# Patient Record
Sex: FEMALE | Race: BLACK OR AFRICAN AMERICAN | Employment: UNEMPLOYED | URBAN - METROPOLITAN AREA
[De-identification: names, ages, dates, MRNs, and addresses within clinical notes are randomized per-mention and may not be internally consistent; named-entity substitution may affect disease eponyms.]

---

## 2017-01-06 ENCOUNTER — OFFICE VISIT (OUTPATIENT)
Dept: ENDOCRINOLOGY | Age: 27
End: 2017-01-06

## 2017-01-06 ENCOUNTER — NURSE NAVIGATOR (OUTPATIENT)
Dept: ENDOCRINOLOGY | Age: 27
End: 2017-01-06

## 2017-01-06 VITALS
DIASTOLIC BLOOD PRESSURE: 94 MMHG | HEIGHT: 68 IN | SYSTOLIC BLOOD PRESSURE: 124 MMHG | BODY MASS INDEX: 20.16 KG/M2 | HEART RATE: 100 BPM | WEIGHT: 133 LBS

## 2017-01-06 DIAGNOSIS — G62.9 NEUROPATHY: ICD-10-CM

## 2017-01-06 DIAGNOSIS — I10 HTN (HYPERTENSION), BENIGN: ICD-10-CM

## 2017-01-06 DIAGNOSIS — E10.8 TYPE 1 DIABETES MELLITUS WITH COMPLICATIONS (HCC): ICD-10-CM

## 2017-01-06 DIAGNOSIS — E10.8 TYPE 1 DIABETES MELLITUS WITH COMPLICATION (HCC): Primary | ICD-10-CM

## 2017-01-06 RX ORDER — INSULIN GLARGINE 100 [IU]/ML
INJECTION, SOLUTION SUBCUTANEOUS
Qty: 10 EACH | Refills: 0
Start: 2017-01-06 | End: 2019-04-18
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
## 2017-01-06 NOTE — PATIENT INSTRUCTIONS
Diabetes:  Monitor glucoses frequently  - check some bedtime values to assess how Lantus is working for you overnight    Lantus -   Recommend 23-24 units twice daily to better control glucoses when not eating (overnight)     Apidra: 1 unit for every 8 g carb appear to not be enough. Recommend trying carb ratio of 6 . If this is too much, then raise it to 7. Continue 1 unit to lower glucose 25 mg/dl for higher vlaues     I agree with taking extra in times of stress    Goals for blood sugars:  Fasting  (less than 150)  Other times -  (less than 180). Microalbuminuria - microscopic protein in urine  Continue lisinopril and good blood pressure control     Neuropathy:  Recommend considering duloxetine (generic for Cymbalta) or Lyrica for symptoms if they are very bothersome.

## 2017-01-06 NOTE — MR AVS SNAPSHOT
Visit Information Date & Time Provider Department Dept. Phone Encounter #  
 1/6/2017 11:30 AM Christiane Soriano, [address] [place] [address] Diabetes and Endocrinology [phone] [id number] Follow-up Instructions Return if symptoms worsen or fail to improve. Upcoming Health Maintenance Date Due  
 EYE EXAM RETINAL OR DILATED Q1 1/20/1998 DTaP/Tdap/Td series (1 - Tdap) 1/20/2009 PAP AKA CERVICAL CYTOLOGY 1/20/2009 Pneumococcal 19-64 Highest Risk (3 of 3 - PCV13) 5/19/2014 INFLUENZA AGE 9 TO ADULT 8/1/2016 FOOT EXAM Q1 11/25/2016 MICROALBUMIN Q1 1/5/2017 HEMOGLOBIN A1C Q6M 2/18/2017 LIPID PANEL Q1 5/15/2017 Allergies as of 1/6/2017  Review Complete On: 1/6/2017 By: Christiane Soriano MD  
  
 Severity Noted Reaction Type Reactions Morphine High 08/19/2016   Intolerance Other (comments) Pcn [Penicillins]  08/23/2012    Hives Current Immunizations  Reviewed on 8/8/2016 Name Date Influenza Vaccine 9/1/2013 Influenza Vaccine (Madin Dalton Canine Kidney) PF 9/29/2014  4:42 PM  
 Influenza Vaccine (Quad) PF 11/30/2015  1:45 PM  
 Influenza Vaccine Split 10/10/2012 12:12 PM  
 Pneumococcal Polysaccharide (PPSV-23) 5/19/2013  3:15 PM  
 Pneumococcal Vaccine (Unspecified Type) 1/1/2010 Not reviewed this visit You Were Diagnosed With   
  
 Codes Comments Type 1 diabetes mellitus with complication (HCC)    -  Primary ICD-10-CM: E10.8 ICD-9-CM: 250.91   
 HTN (hypertension), benign     ICD-10-CM: I10 
ICD-9-CM: 401.1 Neuropathy     ICD-10-CM: G62.9 ICD-9-CM: 355.9 Type 1 diabetes mellitus with complications (HCC)     BPV-03-TP: E10.8 ICD-9-CM: 250.91 Vitals BP Pulse Height(growth percentile) Weight(growth percentile) BMI OB Status (!) 124/94 100 5' 8\" (1.727 m) 133 lb (60.3 kg) 20.22 kg/m2 Unknown Smoking Status Current Every Day Smoker Vitals History BMI and BSA Data Body Mass Index Body Surface Area  
 20.22 kg/m 2 1.7 m 2 Preferred Pharmacy Pharmacy Name Phone [facility] [address], [address] [place] [address] Avenue [phone] Your Updated Medication List  
  
   
This list is accurate as of: 1/6/17 12:42 PM.  Always use your most recent med list.  
  
  
  
  
 FISH OIL 1,000 mg Cap Generic drug:  omega-3 fatty acids-vitamin e Take 1 Cap by mouth. insulin glargine 100 unit/mL (3 mL) pen Commonly known as:  LANTUS SOLOSTAR  
23-24 units twice daily  
  
 insulin glulisine 100 unit/mL pen Commonly known as:  APIDRA SOLOSTAR  
1 unit for every 6-7 g carb Insulin Needles (Disposable) 32 gauge x 5/32\" Ndle  
5 times daily LISINOPRIL PO Take  by mouth. METOPROLOL SUCCINATE PO Take  by mouth.  
  
 multivitamin tablet Commonly known as:  ONE A DAY Take 1 Tab by mouth daily. OTHER Glucose support vitamin Follow-up Instructions Return if symptoms worsen or fail to improve. Patient Instructions Diabetes: 
Monitor glucoses frequently - check some bedtime values to assess how Lantus is working for you overnight Lantus - Recommend 23-24 units twice daily to better control glucoses when not eating (overnight) Apidra: 1 unit for every 8 g carb appear to not be enough. Recommend trying carb ratio of 6 . If this is too much, then raise it to 7. Continue 1 unit to lower glucose 25 mg/dl for higher vlaues I agree with taking extra in times of stress Goals for blood sugars: 
Fasting  (less than 150) Other times -  (less than 180). Microalbuminuria - microscopic protein in urine Continue lisinopril and good blood pressure control Neuropathy: 
Recommend considering duloxetine (generic for Cymbalta) or Lyrica for symptoms if they are very bothersome. Introducing [facility]! Lisa Thomson introduces ebindle patient portal. Now you can access parts of your medical record, email your doctor's office, and request medication refills online. 1. In your internet browser, go to [email or web address] 2. Click on the First Time User? Click Here link in the Sign In box. You will see the New Member Sign Up page. 3. Enter your ebindle Access Code exactly as it appears below. You will not need to use this code after youve completed the sign-up process. If you do not sign up before the expiration date, you must request a new code. · ebindle Access Code: [email or web address] Expires: 4/6/2017 12:40 PM 
 
4. Enter the last four digits of your Social Security Number (xxxx) and Date of Birth (mm/dd/yyyy) as indicated and click Submit. You will be taken to the next sign-up page. 5. Create a ebindle ID. This will be your ebindle login ID and cannot be changed, so think of one that is secure and easy to remember. 6. Create a ebindle password. You can change your password at any time. 7. Enter your Password Reset Question and Answer. This can be used at a later time if you forget your password. 8. Enter your e-mail address. You will receive e-mail notification when new information is available in [address] 9. Click Sign Up. You can now view and download portions of your medical record. 10. Click the Download Summary menu link to download a portable copy of your medical information. If you have questions, please visit the Frequently Asked Questions section of the ebindle website. Remember, ebindle is NOT to be used for urgent needs. For medical emergencies, dial 911. Now available from your iPhone and Android! Please provide this summary of care documentation to your next provider. Your primary care clinician is listed as NONE. If you have any questions after today's visit, please call [phone].

## 2017-01-10 ENCOUNTER — NURSE NAVIGATOR (OUTPATIENT)
Dept: ENDOCRINOLOGY | Age: 27
End: 2017-01-10

## 2017-03-28 DIAGNOSIS — E10.8 TYPE 1 DIABETES MELLITUS WITH COMPLICATION (HCC): Primary | ICD-10-CM

## 2017-03-28 NOTE — TELEPHONE ENCOUNTER
Hannah Morelos,     Thanks for your reply. She is unsure how long she will need the samples. I don't want to use up all your samples. Do you have 4 pens to spare? Let me know if this is ok and I will place the order.      Thank you,   Benjamin Myers

## 2017-03-28 NOTE — TELEPHONE ENCOUNTER
I returned patient's call and offered her samples of Apidra from the [address] [place] [address] office. Patient stated this is too far for her and she would really like to get samples from the [place] office if we have any. She prefers Apidra pens, but will take the vials if that is all that is available.

## 2017-03-28 NOTE — TELEPHONE ENCOUNTER
Order placed for 6 sample pens of Apidra from the [facility] office. I attempted to reach patient but her phone rang busy.
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
## 2017-03-28 NOTE — TELEPHONE ENCOUNTER
We have several Apidra pens, if she wants to pick them up from [facility] office. How many should I log?      Thanks,  Jaswinder Keita

## 2017-03-31 ENCOUNTER — APPOINTMENT (OUTPATIENT)
Dept: GENERAL RADIOLOGY | Age: 27
End: 2017-03-31
Attending: EMERGENCY MEDICINE
Payer: MEDICAID

## 2017-03-31 ENCOUNTER — HOSPITAL ENCOUNTER (EMERGENCY)
Age: 27
Discharge: HOME | End: 2017-04-01
Attending: EMERGENCY MEDICINE
Payer: MEDICAID

## 2017-03-31 DIAGNOSIS — R10.13 EPIGASTRIC ABDOMINAL PAIN: ICD-10-CM

## 2017-03-31 DIAGNOSIS — F12.90 MARIJUANA USE: ICD-10-CM

## 2017-03-31 DIAGNOSIS — G43.A0 CYCLICAL VOMITING WITH NAUSEA, INTRACTABILITY OF VOMITING NOT SPECIFIED: Primary | ICD-10-CM

## 2017-03-31 DIAGNOSIS — E10.69 TYPE 1 DIABETES MELLITUS WITH OTHER SPECIFIED COMPLICATION (HCC): ICD-10-CM

## 2017-03-31 LAB
ALBUMIN SERPL BCP-MCNC: 4.1 G/DL (ref 3.5–5)
ALBUMIN/GLOB SERPL: 0.9 {RATIO} (ref 1.1–2.2)
ALP SERPL-CCNC: 111 U/L (ref 45–117)
ALT SERPL-CCNC: 21 U/L (ref 12–78)
AMPHET UR QL SCN: NEGATIVE
ANION GAP BLD CALC-SCNC: 17 MMOL/L (ref 5–15)
APPEARANCE UR: ABNORMAL
ARTERIAL PATENCY WRIST A: YES
AST SERPL W P-5'-P-CCNC: 16 U/L (ref 15–37)
BACTERIA URNS QL MICRO: ABNORMAL /HPF
BARBITURATES UR QL SCN: NEGATIVE
BASE DEFICIT BLDA-SCNC: 1.8 MMOL/L
BASOPHILS # BLD AUTO: 0 K/UL (ref 0–0.1)
BASOPHILS # BLD: 0 % (ref 0–1)
BDY SITE: ABNORMAL
BENZODIAZ UR QL: NEGATIVE
BILIRUB SERPL-MCNC: 0.5 MG/DL (ref 0.2–1)
BILIRUB UR QL: NEGATIVE
BUN SERPL-MCNC: 24 MG/DL (ref 6–20)
BUN/CREAT SERPL: 21 (ref 12–20)
CALCIUM SERPL-MCNC: 10.9 MG/DL (ref 8.5–10.1)
CANNABINOIDS UR QL SCN: POSITIVE
CHLORIDE SERPL-SCNC: 96 MMOL/L (ref 97–108)
CO2 SERPL-SCNC: 23 MMOL/L (ref 21–32)
COCAINE UR QL SCN: NEGATIVE
COLOR UR: ABNORMAL
CREAT SERPL-MCNC: 1.16 MG/DL (ref 0.55–1.02)
DRUG SCRN COMMENT,DRGCM: ABNORMAL
EOSINOPHIL # BLD: 0.1 K/UL (ref 0–0.4)
EOSINOPHIL NFR BLD: 1 % (ref 0–7)
EPITH CASTS URNS QL MICRO: ABNORMAL /LPF
ERYTHROCYTE [DISTWIDTH] IN BLOOD BY AUTOMATED COUNT: 17.6 % (ref 11.5–14.5)
FIO2 ON VENT: 21 %
GLOBULIN SER CALC-MCNC: 4.7 G/DL (ref 2–4)
GLUCOSE BLD STRIP.AUTO-MCNC: 169 MG/DL (ref 65–100)
GLUCOSE SERPL-MCNC: 187 MG/DL (ref 65–100)
GLUCOSE UR STRIP.AUTO-MCNC: >1000 MG/DL
HCG UR QL: NEGATIVE
HCO3 BLDA-SCNC: 23 MMOL/L (ref 22–26)
HCT VFR BLD AUTO: 35.5 % (ref 35–47)
HGB BLD-MCNC: 10.4 G/DL (ref 11.5–16)
HGB UR QL STRIP: ABNORMAL
KETONES UR QL STRIP.AUTO: 80 MG/DL
LEUKOCYTE ESTERASE UR QL STRIP.AUTO: ABNORMAL
LIPASE SERPL-CCNC: 445 U/L (ref 73–393)
LYMPHOCYTES # BLD AUTO: 21 % (ref 12–49)
LYMPHOCYTES # BLD: 3.1 K/UL (ref 0.8–3.5)
MAGNESIUM SERPL-MCNC: 2 MG/DL (ref 1.6–2.4)
MCH RBC QN AUTO: 21 PG (ref 26–34)
MCHC RBC AUTO-ENTMCNC: 29.3 G/DL (ref 30–36.5)
MCV RBC AUTO: 71.7 FL (ref 80–99)
METHADONE UR QL: NEGATIVE
MONOCYTES # BLD: 0.6 K/UL (ref 0–1)
MONOCYTES NFR BLD AUTO: 4 % (ref 5–13)
NEUTS SEG # BLD: 10.9 K/UL (ref 1.8–8)
NEUTS SEG NFR BLD AUTO: 74 % (ref 32–75)
NITRITE UR QL STRIP.AUTO: POSITIVE
OPIATES UR QL: NEGATIVE
PCO2 BLDA: 38 MMHG (ref 35–45)
PCP UR QL: NEGATIVE
PH BLDA: 7.4 [PH] (ref 7.35–7.45)
PH UR STRIP: 6 [PH] (ref 5–8)
PLATELET # BLD AUTO: 541 K/UL (ref 150–400)
PO2 BLDA: 102 MMHG (ref 80–100)
POTASSIUM SERPL-SCNC: 3.6 MMOL/L (ref 3.5–5.1)
PROT SERPL-MCNC: 8.8 G/DL (ref 6.4–8.2)
PROT UR STRIP-MCNC: 100 MG/DL
RBC # BLD AUTO: 4.95 M/UL (ref 3.8–5.2)
RBC #/AREA URNS HPF: ABNORMAL /HPF (ref 0–5)
RBC MORPH BLD: ABNORMAL
SAO2 % BLD: 98 % (ref 92–97)
SAO2% DEVICE SAO2% SENSOR NAME: ABNORMAL
SERVICE CMNT-IMP: ABNORMAL
SODIUM SERPL-SCNC: 136 MMOL/L (ref 136–145)
SP GR UR REFRACTOMETRY: 1.03 (ref 1–1.03)
SPECIMEN SITE: ABNORMAL
UA: UC IF INDICATED,UAUC: ABNORMAL
UROBILINOGEN UR QL STRIP.AUTO: 0.2 EU/DL (ref 0.2–1)
WBC # BLD AUTO: 14.7 K/UL (ref 3.6–11)
WBC URNS QL MICRO: ABNORMAL /HPF (ref 0–4)
YEAST URNS QL MICRO: PRESENT

## 2017-03-31 PROCEDURE — 36600 WITHDRAWAL OF ARTERIAL BLOOD: CPT | Performed by: EMERGENCY MEDICINE

## 2017-03-31 PROCEDURE — 96361 HYDRATE IV INFUSION ADD-ON: CPT

## 2017-03-31 PROCEDURE — 83735 ASSAY OF MAGNESIUM: CPT | Performed by: EMERGENCY MEDICINE

## 2017-03-31 PROCEDURE — 99285 EMERGENCY DEPT VISIT HI MDM: CPT

## 2017-03-31 PROCEDURE — 74011000258 HC RX REV CODE- 258: Performed by: EMERGENCY MEDICINE

## 2017-03-31 PROCEDURE — 83690 ASSAY OF LIPASE: CPT | Performed by: EMERGENCY MEDICINE

## 2017-03-31 PROCEDURE — 80053 COMPREHEN METABOLIC PANEL: CPT | Performed by: EMERGENCY MEDICINE

## 2017-03-31 PROCEDURE — 85025 COMPLETE CBC W/AUTO DIFF WBC: CPT | Performed by: EMERGENCY MEDICINE

## 2017-03-31 PROCEDURE — 96365 THER/PROPH/DIAG IV INF INIT: CPT

## 2017-03-31 PROCEDURE — 36600 WITHDRAWAL OF ARTERIAL BLOOD: CPT

## 2017-03-31 PROCEDURE — 82962 GLUCOSE BLOOD TEST: CPT

## 2017-03-31 PROCEDURE — 96375 TX/PRO/DX INJ NEW DRUG ADDON: CPT

## 2017-03-31 PROCEDURE — 82803 BLOOD GASES ANY COMBINATION: CPT | Performed by: EMERGENCY MEDICINE

## 2017-03-31 PROCEDURE — 74022 RADEX COMPL AQT ABD SERIES: CPT

## 2017-03-31 PROCEDURE — 87077 CULTURE AEROBIC IDENTIFY: CPT | Performed by: EMERGENCY MEDICINE

## 2017-03-31 PROCEDURE — 87086 URINE CULTURE/COLONY COUNT: CPT | Performed by: EMERGENCY MEDICINE

## 2017-03-31 PROCEDURE — 74011250637 HC RX REV CODE- 250/637: Performed by: EMERGENCY MEDICINE

## 2017-03-31 PROCEDURE — 81025 URINE PREGNANCY TEST: CPT

## 2017-03-31 PROCEDURE — 87186 SC STD MICRODIL/AGAR DIL: CPT | Performed by: EMERGENCY MEDICINE

## 2017-03-31 PROCEDURE — 80307 DRUG TEST PRSMV CHEM ANLYZR: CPT | Performed by: EMERGENCY MEDICINE

## 2017-03-31 PROCEDURE — 96376 TX/PRO/DX INJ SAME DRUG ADON: CPT

## 2017-03-31 PROCEDURE — 36415 COLL VENOUS BLD VENIPUNCTURE: CPT | Performed by: EMERGENCY MEDICINE

## 2017-03-31 PROCEDURE — 93005 ELECTROCARDIOGRAM TRACING: CPT

## 2017-03-31 PROCEDURE — 74011250636 HC RX REV CODE- 250/636: Performed by: EMERGENCY MEDICINE

## 2017-03-31 PROCEDURE — 81001 URINALYSIS AUTO W/SCOPE: CPT | Performed by: EMERGENCY MEDICINE

## 2017-03-31 RX ORDER — KETOROLAC TROMETHAMINE 30 MG/ML
30 INJECTION, SOLUTION INTRAMUSCULAR; INTRAVENOUS
Status: COMPLETED | OUTPATIENT
Start: 2017-03-31 | End: 2017-03-31

## 2017-03-31 RX ORDER — FENTANYL CITRATE 50 UG/ML
100 INJECTION, SOLUTION INTRAMUSCULAR; INTRAVENOUS
Status: COMPLETED | OUTPATIENT
Start: 2017-03-31 | End: 2017-03-31

## 2017-03-31 RX ORDER — SODIUM CHLORIDE 0.9 % (FLUSH) 0.9 %
5-10 SYRINGE (ML) INJECTION EVERY 8 HOURS
Status: DISCONTINUED | OUTPATIENT
Start: 2017-03-31 | End: 2017-04-01 | Stop reason: HOSPADM

## 2017-03-31 RX ORDER — SODIUM CHLORIDE 0.9 % (FLUSH) 0.9 %
5-10 SYRINGE (ML) INJECTION AS NEEDED
Status: DISCONTINUED | OUTPATIENT
Start: 2017-03-31 | End: 2017-04-01 | Stop reason: HOSPADM

## 2017-03-31 RX ORDER — HYDROMORPHONE HYDROCHLORIDE 1 MG/ML
1 INJECTION, SOLUTION INTRAMUSCULAR; INTRAVENOUS; SUBCUTANEOUS
Status: COMPLETED | OUTPATIENT
Start: 2017-03-31 | End: 2017-03-31

## 2017-03-31 RX ORDER — OXYCODONE AND ACETAMINOPHEN 5; 325 MG/1; MG/1
2 TABLET ORAL
Status: COMPLETED | OUTPATIENT
Start: 2017-03-31 | End: 2017-03-31

## 2017-03-31 RX ORDER — METOCLOPRAMIDE HYDROCHLORIDE 5 MG/ML
10 INJECTION INTRAMUSCULAR; INTRAVENOUS
Status: COMPLETED | OUTPATIENT
Start: 2017-03-31 | End: 2017-03-31

## 2017-03-31 RX ORDER — LORAZEPAM 2 MG/ML
1 INJECTION INTRAMUSCULAR ONCE
Status: COMPLETED | OUTPATIENT
Start: 2017-03-31 | End: 2017-03-31

## 2017-03-31 RX ORDER — FLUCONAZOLE 100 MG/1
100 TABLET ORAL
Status: COMPLETED | OUTPATIENT
Start: 2017-03-31 | End: 2017-03-31

## 2017-03-31 RX ADMIN — CEFTRIAXONE SODIUM 1 G: 1 INJECTION, POWDER, FOR SOLUTION INTRAMUSCULAR; INTRAVENOUS at 21:59

## 2017-03-31 RX ADMIN — Medication 10 ML: at 18:52

## 2017-03-31 RX ADMIN — Medication 10 ML: at 22:03

## 2017-03-31 RX ADMIN — FENTANYL CITRATE 100 MCG: 50 INJECTION, SOLUTION INTRAMUSCULAR; INTRAVENOUS at 17:15

## 2017-03-31 RX ADMIN — METOCLOPRAMIDE 10 MG: 5 INJECTION, SOLUTION INTRAMUSCULAR; INTRAVENOUS at 18:52

## 2017-03-31 RX ADMIN — METOCLOPRAMIDE 10 MG: 5 INJECTION, SOLUTION INTRAMUSCULAR; INTRAVENOUS at 17:18

## 2017-03-31 RX ADMIN — HYDROMORPHONE HYDROCHLORIDE 1 MG: 1 INJECTION, SOLUTION INTRAMUSCULAR; INTRAVENOUS; SUBCUTANEOUS at 18:51

## 2017-03-31 RX ADMIN — FLUCONAZOLE 100 MG: 100 TABLET ORAL at 21:59

## 2017-03-31 RX ADMIN — LORAZEPAM 1 MG: 2 INJECTION, SOLUTION INTRAMUSCULAR; INTRAVENOUS at 22:56

## 2017-03-31 RX ADMIN — SODIUM CHLORIDE 2000 ML: 900 INJECTION, SOLUTION INTRAVENOUS at 17:13

## 2017-03-31 RX ADMIN — KETOROLAC TROMETHAMINE 30 MG: 30 INJECTION, SOLUTION INTRAMUSCULAR at 18:52

## 2017-03-31 RX ADMIN — OXYCODONE HYDROCHLORIDE AND ACETAMINOPHEN 2 TABLET: 5; 325 TABLET ORAL at 23:34

## 2017-03-31 NOTE — ED TRIAGE NOTES
Patient reports vomiting x 4 today with BG at 376. She has had DKA in the past and believe she is in it currently. She is tearful in triage.

## 2017-03-31 NOTE — LETTER
4/4/2017 10:04 AM 
 
Ms. Yanes 81 [address] [facility] [address] Dear Ms. Edwardsord Yana, We have been unable to reach you by phone to notify you of your test results. Please call our office at [phone] and ask to speak with my nurse in order to explain these results to you and advise you of any recommendations. Sincerely, Delta Eduardo

## 2017-03-31 NOTE — ED NOTES
Bedside and Verbal shift change report given to Scot Marmolejo RN (oncoming nurse) by Nahun Cano RN (offgoing nurse). Report included the following information SBAR, Kardex and ED Summary.

## 2017-03-31 NOTE — ED NOTES
Pt arrives to the ED with c/c of nausea and vomiting onset about an hour ago. Pt reports severe generalized pain, denies being around anybody else that has been sick. Pt currently lives in [place] and is in [place] visiting family. Pt is alert, oriented, able to follow commands. Pt is tearful, anxious, intermittently moaning. Monitor X3, Side rails X2, call bell within reach.

## 2017-03-31 NOTE — ED NOTES
Assumed care of pt at this time, received bedside report from DEE Guerin with pt included in care. Pt is resting in room, with call bell in reach. All questions answered.

## 2017-03-31 NOTE — ED PROVIDER NOTES
HPI Comments: Carlos Mcnair is a 32 y.o. F with PMHx significant for DKA Type 1 / DM Type 1 who presents ambulatory to [facility] ED c/o nausea and vomiting with generalized body aches x 1600 today. Pt states that thinks she is in DKA currently, with PMHx of DKA. She notes last using marijuana 3 days ago. Pt reports a BG today of 376. She notes current tobacco use. Pt denies any current etOH use. She specifically denies any fever, diarrhea or recent sickness. There are no other complaints, changes, or physical findings at this time. The history is provided by the patient. Past Medical History:   Diagnosis Date    Chronic pain     Diabetes type 1, uncontrolled ([address]     DKA, type 1 (HCC)     Heart murmur     Other ill-defined conditions(799.89)     neuropathy    V-tach (HCC)        Past Surgical History:   Procedure Laterality Date    ABDOMEN SURGERY PROC UNLISTED      exploratory laparoscopy    HX CYST REMOVAL      HX OTHER SURGICAL  2-5-13    Diag. [facility]-Dr. Nataliia Martinez         Family History:   Problem Relation Age of Onset    Hypertension Mother     Sleep Apnea Father     Hypertension Other        Social History     Social History    Marital status: SINGLE     Spouse name: N/A    Number of children: N/A    Years of education: N/A     Occupational History    Not on file. Social History Main Topics    Smoking status: Current Every Day Smoker     Packs/day: 0.25     Years: 8.00     Types: Cigarettes     Last attempt to quit: 10/26/2014    Smokeless tobacco: Never Used    Alcohol use No    Drug use: 2.00 per week     Special: Marijuana    Sexual activity: Yes     Partners: Male     Birth control/ protection: Pill     Other Topics Concern    Not on file     Social History Narrative         ALLERGIES: Morphine and Pcn [penicillins]    Review of Systems   Constitutional: Negative for appetite change, chills, fatigue and fever. Positive for generalized body aches   HENT: Negative. Negative for congestion, rhinorrhea, sinus pressure and sore throat. Eyes: Negative. Respiratory: Negative. Negative for cough, choking, chest tightness, shortness of breath and wheezing. Cardiovascular: Negative. Negative for chest pain, palpitations and leg swelling. Gastrointestinal: Positive for nausea and vomiting. Negative for abdominal pain, constipation and diarrhea. Endocrine: Negative. Genitourinary: Negative. Negative for difficulty urinating, dysuria, flank pain and urgency. Musculoskeletal: Negative. Skin: Negative. Neurological: Negative. Negative for dizziness, speech difficulty, weakness, light-headedness, numbness and headaches. Psychiatric/Behavioral: Negative. All other systems reviewed and are negative. Patient Vitals for the past 12 hrs:   Temp Pulse Resp BP SpO2   03/31/17 2230 - (!) 101 17 (!) 164/99 99 %   03/31/17 2130 - 94 18 (!) 174/92 99 %   03/31/17 2030 - 98 18 (!) 165/92 98 %   03/31/17 1953 - 97 (!) 33 (!) 174/99 100 %   03/31/17 1900 - 96 15 (!) 159/99 97 %   03/31/17 1733 - 95 21 (!) 181/108 99 %   03/31/17 1653 97.7 °F (36.5 °C) (!) 109 20 (!) 171/128 100 %   03/31/17 1650 - - - (!) 171/128 -             Physical Exam   Constitutional: She is oriented to person, place, and time. She appears well-developed and well-nourished. Thin female, rocking back and forth, moaning    HENT:   Head: Normocephalic and atraumatic. Mouth/Throat: Oropharynx is clear and moist. No oropharyngeal exudate. Eyes: Conjunctivae and EOM are normal. Pupils are equal, round, and reactive to light. Neck: Normal range of motion. Neck supple. No JVD present. No tracheal deviation present. Cardiovascular: Regular rhythm, normal heart sounds and intact distal pulses. No murmur heard. Tachycardia     Pulmonary/Chest: Effort normal and breath sounds normal. No stridor. No respiratory distress. She has no wheezes. She has no rales. She exhibits no tenderness. Abdominal: Soft. She exhibits no distension. There is tenderness (Diffuse). There is no rebound and no guarding. Musculoskeletal: Normal range of motion. She exhibits no edema or tenderness. Neurological: She is alert and oriented to person, place, and time. No cranial nerve deficit. No gross motor or sensory deficits    Skin: Skin is warm and dry. She is not diaphoretic. Psychiatric: Her behavior is normal.   Anxious, upset     Nursing note and vitals reviewed. MDM  Number of Diagnoses or Management Options  Diagnosis management comments: DDx: DKA, HHNK, Cannabinoid Induced Hyperemesis, UTI, Renal failure. Amount and/or Complexity of Data Reviewed  Clinical lab tests: ordered and reviewed  Tests in the radiology section of CPT®: ordered and reviewed  Tests in the medicine section of CPT®: ordered and reviewed  Review and summarize past medical records: yes    Patient Progress  Patient progress: stable    ED Course       Procedures     ED EKG interpretation:  Rhythm: sinus tachycardia; and regular . Rate (approx.): 106; Axis: normal; P wave: normal; QRS interval: normal ; ST/T wave: normal;  This EKG was interpreted by Gaudencio Moreira DO,ED Provider. PROGRESS NOTE:  1:15 AM  Pt is Tolerating PO, Pt not in DKA. Discussed with pt admission vs discharge, she would like to be discharged.   Written by Charles Soriano ED Scribe, as dictated by Gaudencio Moreira DO.    LABORATORY TESTS:  Recent Results (from the past 12 hour(s))   GLUCOSE, POC    Collection Time: 03/31/17  4:28 PM   Result Value Ref Range    Glucose (POC) 169 (H) 65 - 100 mg/dL    Performed by Roxie Perry    CBC WITH AUTOMATED DIFF    Collection Time: 03/31/17  5:06 PM   Result Value Ref Range    WBC 14.7 (H) 3.6 - 11.0 K/uL    RBC 4.95 3.80 - 5.20 M/uL    HGB 10.4 (L) 11.5 - 16.0 g/dL    HCT 35.5 35.0 - 47.0 %    MCV 71.7 (L) 80.0 - 99.0 FL    MCH 21.0 (L) 26.0 - 34.0 PG    MCHC 29.3 (L) 30.0 - 36.5 g/dL    RDW 17.6 (H) 11.5 - 14.5 %    PLATELET 554 (H) 359 - 400 K/uL    NEUTROPHILS 74 32 - 75 %    LYMPHOCYTES 21 12 - 49 %    MONOCYTES 4 (L) 5 - 13 %    EOSINOPHILS 1 0 - 7 %    BASOPHILS 0 0 - 1 %    ABS. NEUTROPHILS 10.9 (H) 1.8 - 8.0 K/UL    ABS. LYMPHOCYTES 3.1 0.8 - 3.5 K/UL    ABS. MONOCYTES 0.6 0.0 - 1.0 K/UL    ABS. EOSINOPHILS 0.1 0.0 - 0.4 K/UL    ABS. BASOPHILS 0.0 0.0 - 0.1 K/UL    RBC COMMENTS ANISOCYTOSIS  1+        RBC COMMENTS MICROCYTOSIS  1+        RBC COMMENTS HYPOCHROMIA  1+       METABOLIC PANEL, COMPREHENSIVE    Collection Time: 03/31/17  5:06 PM   Result Value Ref Range    Sodium 136 136 - 145 mmol/L    Potassium 3.6 3.5 - 5.1 mmol/L    Chloride 96 (L) 97 - 108 mmol/L    CO2 23 21 - 32 mmol/L    Anion gap 17 (H) 5 - 15 mmol/L    Glucose 187 (H) 65 - 100 mg/dL    BUN 24 (H) 6 - 20 MG/DL    Creatinine 1.16 (H) 0.55 - 1.02 MG/DL    BUN/Creatinine ratio 21 (H) 12 - 20      GFR est AA >60 >60 ml/min/1.73m2    GFR est non-AA 55 (L) >60 ml/min/1.73m2    Calcium 10.9 (H) 8.5 - 10.1 MG/DL    Bilirubin, total 0.5 0.2 - 1.0 MG/DL    ALT (SGPT) 21 12 - 78 U/L    AST (SGOT) 16 15 - 37 U/L    Alk.  phosphatase 111 45 - 117 U/L    Protein, total 8.8 (H) 6.4 - 8.2 g/dL    Albumin 4.1 3.5 - 5.0 g/dL    Globulin 4.7 (H) 2.0 - 4.0 g/dL    A-G Ratio 0.9 (L) 1.1 - 2.2     LIPASE    Collection Time: 03/31/17  5:06 PM   Result Value Ref Range    Lipase 445 (H) 73 - 393 U/L   MAGNESIUM    Collection Time: 03/31/17  5:06 PM   Result Value Ref Range    Magnesium 2.0 1.6 - 2.4 mg/dL   EKG, 12 LEAD, INITIAL    Collection Time: 03/31/17  5:15 PM   Result Value Ref Range    Ventricular Rate 106 BPM    Atrial Rate 106 BPM    P-R Interval 112 ms    QRS Duration 72 ms    Q-T Interval 344 ms    QTC Calculation (Bezet) 456 ms    Calculated P Axis 45 degrees    Calculated R Axis 78 degrees    Calculated T Axis 42 degrees    Diagnosis       Sinus tachycardia  When compared with ECG of 18-AUG-2016 11:40,  Nonspecific T wave abnormality has replaced inverted T waves in Inferior   leads  T wave amplitude has decreased in Anterior leads     URINALYSIS W/ REFLEX CULTURE    Collection Time: 03/31/17  6:46 PM   Result Value Ref Range    Color YELLOW/STRAW      Appearance CLOUDY (A) CLEAR      Specific gravity 1.027 1.003 - 1.030      pH (UA) 6.0 5.0 - 8.0      Protein 100 (A) NEG mg/dL    Glucose >1000 (A) NEG mg/dL    Ketone 80 (A) NEG mg/dL    Bilirubin NEGATIVE  NEG      Blood MODERATE (A) NEG      Urobilinogen 0.2 0.2 - 1.0 EU/dL    Nitrites POSITIVE (A) NEG      Leukocyte Esterase MODERATE (A) NEG      WBC 10-20 0 - 4 /hpf    RBC 5-10 0 - 5 /hpf    Epithelial cells FEW FEW /lpf    Bacteria 2+ (A) NEG /hpf    UA:UC IF INDICATED URINE CULTURE ORDERED (A) CNI      Yeast PRESENT (A) NEG     DRUG SCREEN, URINE    Collection Time: 03/31/17  6:46 PM   Result Value Ref Range    AMPHETAMINE NEGATIVE  NEG      BARBITURATES NEGATIVE  NEG      BENZODIAZEPINE NEGATIVE  NEG      COCAINE NEGATIVE  NEG      METHADONE NEGATIVE  NEG      OPIATES NEGATIVE  NEG      PCP(PHENCYCLIDINE) NEGATIVE  NEG      THC (TH-CANNABINOL) POSITIVE (A) NEG      Drug screen comment (NOTE)    HCG URINE, QL. - POC    Collection Time: 03/31/17  6:58 PM   Result Value Ref Range    Pregnancy test,urine (POC) NEGATIVE  NEG     BLOOD GAS, ARTERIAL    Collection Time: 03/31/17  9:39 PM   Result Value Ref Range    pH 7.40 7.35 - 7.45      PCO2 38 35.0 - 45.0 mmHg    PO2 102 (H) 80 - 100 mmHg    O2 SAT 98 (H) 92 - 97 %    BICARBONATE 23 22 - 26 mmol/L    BASE DEFICIT 1.8 mmol/L    O2 METHOD ROOM AIR      FIO2 21 %    Sample source ARTERIAL      SITE RIGHT RADIAL      VALERIA'S TEST YES         IMAGING RESULTS:  XR ABD ACUTE W 1 V CHEST   Final Result   INDICATION: Abdominal Pain. Vomiting 4 times today.     Exam: AP view of the chest, supine and upright frontal views of the abdomen.     FINDINGS: Cardiomediastinal silhouette is within normal limits. Lungs are clear  bilaterally.  Pleural spaces are normal. Osseous structures are intact.     There is a nonspecific bowel gas pattern. No dilated loop of bowel or air-fluid  level is seen. There is no evidence of free intraperitoneal gas. No pathologic  calcification is seen. Osseous structures are intact.     IMPRESSION  IMPRESSION: No acute cardiopulmonary disease. Nonspecific bowel gas pattern. No  evidence of perforation. MEDICATIONS GIVEN:  Medications   sodium chloride (NS) flush 5-10 mL (10 mL IntraVENous Given 3/31/17 2203)   sodium chloride (NS) flush 5-10 mL (not administered)   sodium chloride 0.9 % bolus infusion 2,000 mL (2,000 mL IntraVENous New Bag 3/31/17 1713)   fentaNYL citrate (PF) injection 100 mcg (100 mcg IntraVENous Given 3/31/17 1715)   metoclopramide HCl (REGLAN) injection 10 mg (10 mg IntraVENous Given 3/31/17 1718)   ketorolac (TORADOL) injection 30 mg (30 mg IntraVENous Given 3/31/17 1852)   HYDROmorphone (PF) (DILAUDID) injection 1 mg (1 mg IntraVENous Given 3/31/17 1851)   metoclopramide HCl (REGLAN) injection 10 mg (10 mg IntraVENous Given 3/31/17 1852)   cefTRIAXone (ROCEPHIN) 1 g in 0.9% sodium chloride (MBP/ADV) 50 mL (1 g IntraVENous New Bag 3/31/17 2159)   fluconazole (DIFLUCAN) tablet 100 mg (100 mg Oral Given 3/31/17 2159)   LORazepam (ATIVAN) injection 1 mg (1 mg IntraVENous Given 3/31/17 2256)   oxyCODONE-acetaminophen (PERCOCET) 5-325 mg per tablet 2 Tab (2 Tabs Oral Given 3/31/17 2334)     Pt not in DKA, no vomiting here in the ED, I have discussed with pt concern for cannabinoid induced hyperemesis, pt also has hx of chronic abdominal pain. Pt tachycardia and BP improved. IMPRESSION:  1. Cyclical vomiting with nausea, intractability of vomiting not specified    2. Epigastric abdominal pain    3. Type 1 diabetes mellitus with other specified complication ([facility] [address]        PLAN:  1. Current Discharge Medication List        2.    Follow-up Information     None        Return to ED if worse     DISCHARGE NOTE  1:16 AM  The patient has been re-evaluated and is ready for discharge. Reviewed available results with patient. Counseled pt on diagnosis and care plan. Pt has expressed understanding, and all questions have been answered. Pt agrees with plan and agrees to F/U as recommended, or return to the ED if their sxs worsen. Discharge instructions have been provided and explained to the pt, along with reasons to return to the ED. Attestations: This note is prepared by Billie Freitas, acting as Scribe for Ilsa Sands, [address] [place] [address] DO: The scribe's documentation has been prepared under my direction and personally reviewed by me in its entirety. I confirm that the note above accurately reflects all work, treatment, procedures, and medical decision making performed by me.

## 2017-04-01 VITALS
TEMPERATURE: 97.7 F | OXYGEN SATURATION: 96 % | RESPIRATION RATE: 14 BRPM | HEIGHT: 68 IN | SYSTOLIC BLOOD PRESSURE: 104 MMHG | BODY MASS INDEX: 19.7 KG/M2 | DIASTOLIC BLOOD PRESSURE: 71 MMHG | HEART RATE: 97 BPM | WEIGHT: 130 LBS

## 2017-04-01 LAB
ATRIAL RATE: 106 BPM
CALCULATED P AXIS, ECG09: 45 DEGREES
CALCULATED R AXIS, ECG10: 78 DEGREES
CALCULATED T AXIS, ECG11: 42 DEGREES
DIAGNOSIS, 93000: NORMAL
P-R INTERVAL, ECG05: 112 MS
Q-T INTERVAL, ECG07: 344 MS
QRS DURATION, ECG06: 72 MS
QTC CALCULATION (BEZET), ECG08: 456 MS
VENTRICULAR RATE, ECG03: 106 BPM

## 2017-04-01 RX ORDER — OXYCODONE AND ACETAMINOPHEN 10; 325 MG/1; MG/1
1 TABLET ORAL
Qty: 10 TAB | Refills: 0 | Status: SHIPPED | OUTPATIENT
Start: 2017-04-01 | End: 2019-04-17

## 2017-04-01 RX ORDER — METOCLOPRAMIDE 10 MG/1
10 TABLET ORAL
Qty: 20 TAB | Refills: 0 | Status: SHIPPED | OUTPATIENT
Start: 2017-04-01 | End: 2017-04-11

## 2017-04-01 RX ORDER — PROMETHAZINE HYDROCHLORIDE 25 MG/1
25 SUPPOSITORY RECTAL
Qty: 6 SUPPOSITORY | Refills: 0 | Status: SHIPPED | OUTPATIENT
Start: 2017-04-01 | End: 2019-04-17

## 2017-04-01 NOTE — ED NOTES
Pt discharged with written instructions and prescriptions at this time. Pt verbalizes understanding and all questions were answered. Discharged by Ernestina Fuentes, in stable condition, ambulatory with assist to yellow cab.

## 2017-04-01 NOTE — DISCHARGE INSTRUCTIONS
Abdominal Pain: Care Instructions  Your Care Instructions    Abdominal pain has many possible causes. Some aren't serious and get better on their own in a few days. Others need more testing and treatment. If your pain continues or gets worse, you need to be rechecked and may need more tests to find out what is wrong. You may need surgery to correct the problem. Don't ignore new symptoms, such as fever, nausea and vomiting, urination problems, pain that gets worse, and dizziness. These may be signs of a more serious problem. Your doctor may have recommended a follow-up visit in the next 8 to 12 hours. If you are not getting better, you may need more tests or treatment. The doctor has checked you carefully, but problems can develop later. If you notice any problems or new symptoms, get medical treatment right away. Follow-up care is a key part of your treatment and safety. Be sure to make and go to all appointments, and call your doctor if you are having problems. It's also a good idea to know your test results and keep a list of the medicines you take. How can you care for yourself at home? · Rest until you feel better. · To prevent dehydration, drink plenty of fluids, enough so that your urine is light yellow or clear like water. Choose water and other caffeine-free clear liquids until you feel better. If you have kidney, heart, or liver disease and have to limit fluids, talk with your doctor before you increase the amount of fluids you drink. · If your stomach is upset, eat mild foods, such as rice, dry toast or crackers, bananas, and applesauce. Try eating several small meals instead of two or three large ones. · Wait until 48 hours after all symptoms have gone away before you have spicy foods, alcohol, and drinks that contain caffeine. · Do not eat foods that are high in fat. · Avoid anti-inflammatory medicines such as aspirin, ibuprofen (Advil, Motrin), and naproxen (Aleve).  These can cause stomach upset. Talk to your doctor if you take daily aspirin for another health problem. When should you call for help? Call 911 anytime you think you may need emergency care. For example, call if:  · You passed out (lost consciousness). · You pass maroon or very bloody stools. · You vomit blood or what looks like coffee grounds. · You have new, severe belly pain. Call your doctor now or seek immediate medical care if:  · Your pain gets worse, especially if it becomes focused in one area of your belly. · You have a new or higher fever. · Your stools are black and look like tar, or they have streaks of blood. · You have unexpected vaginal bleeding. · You have symptoms of a urinary tract infection. These may include:  ¨ Pain when you urinate. ¨ Urinating more often than usual.  ¨ Blood in your urine. · You are dizzy or lightheaded, or you feel like you may faint. Watch closely for changes in your health, and be sure to contact your doctor if:  · You are not getting better after 1 day (24 hours). Where can you learn more? Go to http://elvisGigmaxbernarda.info/. Enter F463 in the search box to learn more about \"Abdominal Pain: Care Instructions. \"  Current as of: May 27, 2016  Content Version: 11.2  © 0510-1770 [employer]. Care instructions adapted under license by [employer] (which disclaims liability or warranty for this information). If you have questions about a medical condition or this instruction, always ask your healthcare professional. [place] [address] any warranty or liability for your use of this information. Learning About Cyclic Vomiting Syndrome  What is it? An adult or child who has cyclic vomiting syndrome (CVS) has repeated bouts of severe vomiting and nausea. Between the vomiting episodes, the person's health is normal. The cause of CVS isn't known, but it may be related to migraine headaches.   What happens when you have CVS?  CVS causes severe nausea, vomiting, and drooling. You may not be able to walk or talk during an episode. You may look pale. You may have a fever and feel very tired and thirsty. Some people with CVS have belly pain and diarrhea. Bouts of vomiting can last for a few hours or a few days. People with this condition tend to have a typical pattern of attacks. For example, one person may have bouts of CVS 4 times a year and always in the morning. Another person may have 8 bouts a year and always in the evening. Some people with CVS have triggers that set off the vomiting. People have reported an infection, such as a cold, as a trigger. Other triggers include stress, menstrual cycles, and certain foods like chocolate or cheese. Children tend to have more triggers than adults do. How is CVS treated? Treatment is centered around easing the nausea and vomiting. The doctor may prescribe medicine. During very bad bouts of vomiting, your doctor may want you to stay in the hospital for a while. You may get fluids through a vein (IV) to prevent dehydration. Dehydration can be serious. Your body needs fluids to make enough blood. Without a good supply of blood, vital organs such as the heart and brain can't work as well as they should. When should you call for help? Call your doctor now or seek immediate medical care if:  · You have symptoms of dehydration, such as:  ¨ Dry eyes and a dry mouth. ¨ Passing only a little urine. ¨ Feeling thirstier than usual.  Watch closely for changes in your health, and be sure to contact your doctor if:  · You do not get better as expected. Follow-up care is a key part of your treatment and safety. Be sure to make and go to all appointments, and call your doctor if you are having problems. It's also a good idea to know your test results and keep a list of the medicines you take. Where can you learn more? Go to http://elvis-bernarda.info/.   Enter D521 in the search box to learn more about \"Learning About Cyclic Vomiting Syndrome. \"  Current as of: August 9, 2016  Content Version: 11.2  © 7638-9982 [employer]. Care instructions adapted under license by [employer] (which disclaims liability or warranty for this information). If you have questions about a medical condition or this instruction, always ask your healthcare professional. [place] [address] any warranty or liability for your use of this information.   IT IS LIKELY THAT YOUR ABDOMINAL PAIN IS DUE TO CANNABINOID INDUCED HYPEREMESIS, ABSTINENCE MAY BE KEY TO PREVENTING FURTHER EPISODES

## 2017-04-04 LAB
BACTERIA SPEC CULT: ABNORMAL
CC UR VC: ABNORMAL
SERVICE CMNT-IMP: ABNORMAL

## 2017-04-04 NOTE — PROGRESS NOTES
Attempted to contact patient, no answer and unable to leave . Sent letter. Advise to start [address] for UTI.    Delta Rowley

## 2017-06-15 ENCOUNTER — TELEPHONE (OUTPATIENT)
Dept: ENDOCRINOLOGY | Age: 27
End: 2017-06-15

## 2017-06-15 NOTE — TELEPHONE ENCOUNTER
----- Message from Dwain Thayer sent at 6/15/2017  3:34 PM EDT -----  Regarding: Dr. Shea Araiza  Pt requests to  \"Insulin\" and \"Apidra pens\" at [facility] on 06/16/17 or 06/19/17. Best contact number [phone].

## 2017-06-16 RX ORDER — INSULIN GLARGINE 100 [IU]/ML
INJECTION, SOLUTION SUBCUTANEOUS
Qty: 3 PEN | Refills: 0 | Status: SHIPPED | COMMUNITY
Start: 2017-06-16 | End: 2019-04-18

## 2018-10-20 LAB
ANTIBODY SCREEN, EXTERNAL: NEGATIVE
HBSAG, EXTERNAL: NEGATIVE
HIV, EXTERNAL: NORMAL
RPR, EXTERNAL: NORMAL
RUBELLA, EXTERNAL: NORMAL

## 2018-10-30 LAB
CHLAMYDIA, EXTERNAL: NEGATIVE
N. GONORRHEA, EXTERNAL: NEGATIVE

## 2019-04-01 LAB — TYPE, ABO & RH, EXTERNAL: NORMAL

## 2019-04-07 ENCOUNTER — HOSPITAL ENCOUNTER (INPATIENT)
Age: 29
LOS: 10 days | Discharge: HOME | DRG: 832 | End: 2019-04-17
Attending: OBSTETRICS & GYNECOLOGY | Admitting: OBSTETRICS & GYNECOLOGY
Payer: MEDICAID

## 2019-04-07 ENCOUNTER — HOSPITAL ENCOUNTER (INPATIENT)
Age: 29
LOS: 1 days | Discharge: OTHER INSTITUTION | DRG: 832 | End: 2019-04-07
Attending: EMERGENCY MEDICINE | Admitting: OBSTETRICS & GYNECOLOGY
Payer: MEDICAID

## 2019-04-07 VITALS
SYSTOLIC BLOOD PRESSURE: 134 MMHG | WEIGHT: 156 LBS | RESPIRATION RATE: 17 BRPM | TEMPERATURE: 98.5 F | HEIGHT: 68 IN | HEART RATE: 95 BPM | DIASTOLIC BLOOD PRESSURE: 76 MMHG | BODY MASS INDEX: 23.64 KG/M2 | OXYGEN SATURATION: 95 %

## 2019-04-07 DIAGNOSIS — O10.919 PRE-EXISTING HYPERTENSION DURING PREGNANCY, ANTEPARTUM, UNSPECIFIED PRE-EXISTING HYPERTENSION TYPE: ICD-10-CM

## 2019-04-07 DIAGNOSIS — G43.A0 CYCLICAL VOMITING WITH NAUSEA, INTRACTABILITY OF VOMITING NOT SPECIFIED: Primary | ICD-10-CM

## 2019-04-07 PROBLEM — O11.9 PRE-ECLAMPSIA SUPERIMPOSED ON CHRONIC HYPERTENSION, ANTEPARTUM: Status: ACTIVE | Noted: 2019-04-07

## 2019-04-07 LAB
ALBUMIN SERPL-MCNC: 2.2 G/DL (ref 3.5–5)
ALBUMIN/GLOB SERPL: 0.5 {RATIO} (ref 1.1–2.2)
ALP SERPL-CCNC: 78 U/L (ref 45–117)
ALT SERPL-CCNC: 13 U/L (ref 12–78)
AMPHET UR QL SCN: NEGATIVE
ANION GAP SERPL CALC-SCNC: 12 MMOL/L (ref 5–15)
APPEARANCE UR: ABNORMAL
ARTERIAL PATENCY WRIST A: ABNORMAL
AST SERPL-CCNC: 27 U/L (ref 15–37)
BACTERIA URNS QL MICRO: NEGATIVE /HPF
BARBITURATES UR QL SCN: NEGATIVE
BASE DEFICIT BLD-SCNC: 2 MMOL/L
BASOPHILS # BLD: 0.1 K/UL (ref 0–0.1)
BASOPHILS NFR BLD: 0 % (ref 0–1)
BDY SITE: ABNORMAL
BENZODIAZ UR QL: NEGATIVE
BILIRUB SERPL-MCNC: 0.4 MG/DL (ref 0.2–1)
BILIRUB UR QL CFM: NEGATIVE
BUN SERPL-MCNC: 18 MG/DL (ref 6–20)
BUN/CREAT SERPL: 12 (ref 12–20)
CALCIUM SERPL-MCNC: 9.9 MG/DL (ref 8.5–10.1)
CANNABINOIDS UR QL SCN: POSITIVE
CHLORIDE SERPL-SCNC: 100 MMOL/L (ref 97–108)
CO2 SERPL-SCNC: 25 MMOL/L (ref 21–32)
COCAINE UR QL SCN: NEGATIVE
COLOR UR: ABNORMAL
CREAT SERPL-MCNC: 1.54 MG/DL (ref 0.55–1.02)
CREAT UR-MCNC: 297 MG/DL
DIFFERENTIAL METHOD BLD: ABNORMAL
DRUG SCRN COMMENT,DRGCM: ABNORMAL
EOSINOPHIL # BLD: 0 K/UL (ref 0–0.4)
EOSINOPHIL NFR BLD: 0 % (ref 0–7)
EPITH CASTS URNS QL MICRO: ABNORMAL /LPF
ERYTHROCYTE [DISTWIDTH] IN BLOOD BY AUTOMATED COUNT: 17.2 % (ref 11.5–14.5)
GAS FLOW.O2 O2 DELIVERY SYS: ABNORMAL L/MIN
GLOBULIN SER CALC-MCNC: 4.8 G/DL (ref 2–4)
GLUCOSE BLD STRIP.AUTO-MCNC: 135 MG/DL (ref 65–100)
GLUCOSE BLD STRIP.AUTO-MCNC: 208 MG/DL (ref 65–100)
GLUCOSE BLD STRIP.AUTO-MCNC: 220 MG/DL (ref 65–100)
GLUCOSE SERPL-MCNC: 151 MG/DL (ref 65–100)
GLUCOSE UR STRIP.AUTO-MCNC: 500 MG/DL
HCO3 BLD-SCNC: 22.1 MMOL/L (ref 22–26)
HCT VFR BLD AUTO: 32.9 % (ref 35–47)
HGB BLD-MCNC: 10.6 G/DL (ref 11.5–16)
HGB UR QL STRIP: NEGATIVE
IMM GRANULOCYTES # BLD AUTO: 0.1 K/UL (ref 0–0.04)
IMM GRANULOCYTES NFR BLD AUTO: 1 % (ref 0–0.5)
KETONES UR QL STRIP.AUTO: 15 MG/DL
KETONES UR QL: NORMAL MG/DL
LEUKOCYTE ESTERASE UR QL STRIP.AUTO: NEGATIVE
LYMPHOCYTES # BLD: 2.2 K/UL (ref 0.8–3.5)
LYMPHOCYTES NFR BLD: 13 % (ref 12–49)
MCH RBC QN AUTO: 25.3 PG (ref 26–34)
MCHC RBC AUTO-ENTMCNC: 32.2 G/DL (ref 30–36.5)
MCV RBC AUTO: 78.5 FL (ref 80–99)
METHADONE UR QL: NEGATIVE
MONOCYTES # BLD: 0.8 K/UL (ref 0–1)
MONOCYTES NFR BLD: 5 % (ref 5–13)
NEUTS SEG # BLD: 13.5 K/UL (ref 1.8–8)
NEUTS SEG NFR BLD: 81 % (ref 32–75)
NITRITE UR QL STRIP.AUTO: NEGATIVE
NRBC # BLD: 0 K/UL (ref 0–0.01)
NRBC BLD-RTO: 0 PER 100 WBC
OPIATES UR QL: NEGATIVE
PCO2 BLD: 30.5 MMHG (ref 35–45)
PCP UR QL: NEGATIVE
PH BLD: 7.47 [PH] (ref 7.35–7.45)
PH UR STRIP: 6 [PH] (ref 5–8)
PLATELET # BLD AUTO: 466 K/UL (ref 150–400)
PMV BLD AUTO: 11.2 FL (ref 8.9–12.9)
PO2 BLD: 103 MMHG (ref 80–100)
POTASSIUM SERPL-SCNC: 3.4 MMOL/L (ref 3.5–5.1)
PROT SERPL-MCNC: 7 G/DL (ref 6.4–8.2)
PROT UR STRIP-MCNC: 300 MG/DL
PROT UR-MCNC: 1598 MG/DL (ref 0–11.9)
PROT/CREAT UR-RTO: 5.4
RBC # BLD AUTO: 4.19 M/UL (ref 3.8–5.2)
RBC #/AREA URNS HPF: ABNORMAL /HPF (ref 0–5)
SAO2 % BLD: 98 % (ref 92–97)
SERVICE CMNT-IMP: ABNORMAL
SODIUM SERPL-SCNC: 137 MMOL/L (ref 136–145)
SP GR UR REFRACTOMETRY: 1.01 (ref 1–1.03)
SPECIMEN TYPE: ABNORMAL
TOTAL RESP. RATE, ITRR: 22
UA: UC IF INDICATED,UAUC: ABNORMAL
UROBILINOGEN UR QL STRIP.AUTO: 0.2 EU/DL (ref 0.2–1)
WBC # BLD AUTO: 16.7 K/UL (ref 3.6–11)
WBC URNS QL MICRO: ABNORMAL /HPF (ref 0–4)

## 2019-04-07 PROCEDURE — 74011250636 HC RX REV CODE- 250/636: Performed by: OBSTETRICS & GYNECOLOGY

## 2019-04-07 PROCEDURE — 99285 EMERGENCY DEPT VISIT HI MDM: CPT

## 2019-04-07 PROCEDURE — 74011250636 HC RX REV CODE- 250/636: Performed by: EMERGENCY MEDICINE

## 2019-04-07 PROCEDURE — 80053 COMPREHEN METABOLIC PANEL: CPT

## 2019-04-07 PROCEDURE — 84156 ASSAY OF PROTEIN URINE: CPT

## 2019-04-07 PROCEDURE — 96372 THER/PROPH/DIAG INJ SC/IM: CPT

## 2019-04-07 PROCEDURE — 77030034850

## 2019-04-07 PROCEDURE — 96374 THER/PROPH/DIAG INJ IV PUSH: CPT

## 2019-04-07 PROCEDURE — 87086 URINE CULTURE/COLONY COUNT: CPT

## 2019-04-07 PROCEDURE — 65270000029 HC RM PRIVATE

## 2019-04-07 PROCEDURE — 59025 FETAL NON-STRESS TEST: CPT

## 2019-04-07 PROCEDURE — 74011000250 HC RX REV CODE- 250

## 2019-04-07 PROCEDURE — 74011250636 HC RX REV CODE- 250/636: Performed by: PHYSICIAN ASSISTANT

## 2019-04-07 PROCEDURE — 65410000002 HC RM PRIVATE OB

## 2019-04-07 PROCEDURE — 76815 OB US LIMITED FETUS(S): CPT

## 2019-04-07 PROCEDURE — 36415 COLL VENOUS BLD VENIPUNCTURE: CPT

## 2019-04-07 PROCEDURE — 36600 WITHDRAWAL OF ARTERIAL BLOOD: CPT

## 2019-04-07 PROCEDURE — 85025 COMPLETE CBC W/AUTO DIFF WBC: CPT

## 2019-04-07 PROCEDURE — 81001 URINALYSIS AUTO W/SCOPE: CPT

## 2019-04-07 PROCEDURE — 96360 HYDRATION IV INFUSION INIT: CPT

## 2019-04-07 PROCEDURE — 93005 ELECTROCARDIOGRAM TRACING: CPT

## 2019-04-07 PROCEDURE — 74011250636 HC RX REV CODE- 250/636

## 2019-04-07 PROCEDURE — 96365 THER/PROPH/DIAG IV INF INIT: CPT

## 2019-04-07 PROCEDURE — 80307 DRUG TEST PRSMV CHEM ANLYZR: CPT

## 2019-04-07 PROCEDURE — 81002 URINALYSIS NONAUTO W/O SCOPE: CPT

## 2019-04-07 PROCEDURE — 74011000250 HC RX REV CODE- 250: Performed by: OBSTETRICS & GYNECOLOGY

## 2019-04-07 PROCEDURE — 82962 GLUCOSE BLOOD TEST: CPT

## 2019-04-07 PROCEDURE — 74011000258 HC RX REV CODE- 258: Performed by: EMERGENCY MEDICINE

## 2019-04-07 PROCEDURE — 82803 BLOOD GASES ANY COMBINATION: CPT

## 2019-04-07 PROCEDURE — 96361 HYDRATE IV INFUSION ADD-ON: CPT

## 2019-04-07 RX ORDER — SODIUM CHLORIDE 0.9 % (FLUSH) 0.9 %
5-40 SYRINGE (ML) INJECTION EVERY 8 HOURS
Status: DISCONTINUED | OUTPATIENT
Start: 2019-04-07 | End: 2019-04-07 | Stop reason: HOSPADM

## 2019-04-07 RX ORDER — SODIUM CHLORIDE, SODIUM LACTATE, POTASSIUM CHLORIDE, CALCIUM CHLORIDE 600; 310; 30; 20 MG/100ML; MG/100ML; MG/100ML; MG/100ML
75 INJECTION, SOLUTION INTRAVENOUS CONTINUOUS
Status: DISCONTINUED | OUTPATIENT
Start: 2019-04-07 | End: 2019-04-07

## 2019-04-07 RX ORDER — LANOLIN ALCOHOL/MO/W.PET/CERES
1 CREAM (GRAM) TOPICAL
Status: DISCONTINUED | OUTPATIENT
Start: 2019-04-08 | End: 2019-04-07 | Stop reason: HOSPADM

## 2019-04-07 RX ORDER — MAGNESIUM SULFATE HEPTAHYDRATE 40 MG/ML
INJECTION, SOLUTION INTRAVENOUS
Status: DISCONTINUED
Start: 2019-04-07 | End: 2019-04-07

## 2019-04-07 RX ORDER — LABETALOL HYDROCHLORIDE 5 MG/ML
INJECTION, SOLUTION INTRAVENOUS
Status: COMPLETED
Start: 2019-04-07 | End: 2019-04-07

## 2019-04-07 RX ORDER — FAMOTIDINE 10 MG/ML
20 INJECTION INTRAVENOUS ONCE
Status: DISCONTINUED | OUTPATIENT
Start: 2019-04-07 | End: 2019-04-07

## 2019-04-07 RX ORDER — ONDANSETRON 2 MG/ML
4 INJECTION INTRAMUSCULAR; INTRAVENOUS
Status: COMPLETED | OUTPATIENT
Start: 2019-04-07 | End: 2019-04-07

## 2019-04-07 RX ORDER — ONDANSETRON 4 MG/1
4 TABLET, FILM COATED ORAL
Status: ON HOLD | COMMUNITY
End: 2019-04-17 | Stop reason: SDUPTHER

## 2019-04-07 RX ORDER — HALOPERIDOL 5 MG/ML
5 INJECTION INTRAMUSCULAR
Status: COMPLETED | OUTPATIENT
Start: 2019-04-07 | End: 2019-04-07

## 2019-04-07 RX ORDER — CALCIUM GLUCONATE 94 MG/ML
INJECTION, SOLUTION INTRAVENOUS
Status: DISCONTINUED
Start: 2019-04-07 | End: 2019-04-07 | Stop reason: WASHOUT

## 2019-04-07 RX ORDER — SODIUM CHLORIDE 0.9 % (FLUSH) 0.9 %
5-40 SYRINGE (ML) INJECTION AS NEEDED
Status: DISCONTINUED | OUTPATIENT
Start: 2019-04-07 | End: 2019-04-07 | Stop reason: HOSPADM

## 2019-04-07 RX ORDER — ACETAMINOPHEN 325 MG/1
650 TABLET ORAL
Status: DISCONTINUED | OUTPATIENT
Start: 2019-04-07 | End: 2019-04-07 | Stop reason: HOSPADM

## 2019-04-07 RX ORDER — LABETALOL HYDROCHLORIDE 5 MG/ML
20 INJECTION, SOLUTION INTRAVENOUS ONCE
Status: COMPLETED | OUTPATIENT
Start: 2019-04-07 | End: 2019-04-07

## 2019-04-07 RX ORDER — DEXTROSE MONOHYDRATE AND SODIUM CHLORIDE 5; .9 G/100ML; G/100ML
75 INJECTION, SOLUTION INTRAVENOUS CONTINUOUS
Status: DISCONTINUED | OUTPATIENT
Start: 2019-04-07 | End: 2019-04-07 | Stop reason: HOSPADM

## 2019-04-07 RX ORDER — DULOXETIN HYDROCHLORIDE 60 MG/1
60 CAPSULE, DELAYED RELEASE ORAL DAILY
Status: ON HOLD | COMMUNITY
End: 2019-04-17 | Stop reason: SDUPTHER

## 2019-04-07 RX ORDER — ZOLPIDEM TARTRATE 5 MG/1
5 TABLET ORAL
Status: DISCONTINUED | OUTPATIENT
Start: 2019-04-07 | End: 2019-04-07 | Stop reason: HOSPADM

## 2019-04-07 RX ORDER — MAGNESIUM SULFATE HEPTAHYDRATE 40 MG/ML
4 INJECTION, SOLUTION INTRAVENOUS ONCE
Status: COMPLETED | OUTPATIENT
Start: 2019-04-07 | End: 2019-04-07

## 2019-04-07 RX ORDER — SODIUM CHLORIDE 9 MG/ML
75 INJECTION, SOLUTION INTRAVENOUS CONTINUOUS
Status: DISCONTINUED | OUTPATIENT
Start: 2019-04-07 | End: 2019-04-07 | Stop reason: HOSPADM

## 2019-04-07 RX ORDER — LABETALOL 300 MG/1
300 TABLET, FILM COATED ORAL 2 TIMES DAILY
COMMUNITY
End: 2019-04-17

## 2019-04-07 RX ADMIN — LABETALOL HYDROCHLORIDE 20 MG: 5 INJECTION, SOLUTION INTRAVENOUS at 18:16

## 2019-04-07 RX ADMIN — LABETALOL HYDROCHLORIDE 20 MG: 5 INJECTION INTRAVENOUS at 18:16

## 2019-04-07 RX ADMIN — MAGNESIUM SULFATE HEPTAHYDRATE 4 G: 40 INJECTION, SOLUTION INTRAVENOUS at 18:55

## 2019-04-07 RX ADMIN — SODIUM CHLORIDE 75 ML/HR: 900 INJECTION, SOLUTION INTRAVENOUS at 21:28

## 2019-04-07 RX ADMIN — MAGNESIUM SULFATE 2 G/HR: 500 INJECTION, SOLUTION INTRAMUSCULAR; INTRAVENOUS at 19:18

## 2019-04-07 RX ADMIN — ONDANSETRON 4 MG: 2 INJECTION INTRAMUSCULAR; INTRAVENOUS at 14:00

## 2019-04-07 RX ADMIN — DEXTROSE MONOHYDRATE AND SODIUM CHLORIDE 75 ML/HR: 5; .9 INJECTION, SOLUTION INTRAVENOUS at 14:55

## 2019-04-07 RX ADMIN — SODIUM CHLORIDE, SODIUM LACTATE, POTASSIUM CHLORIDE, AND CALCIUM CHLORIDE 75 ML/HR: 600; 310; 30; 20 INJECTION, SOLUTION INTRAVENOUS at 18:51

## 2019-04-07 RX ADMIN — HALOPERIDOL LACTATE 5 MG: 5 INJECTION INTRAMUSCULAR at 14:51

## 2019-04-07 RX ADMIN — FAMOTIDINE 20 MG: 10 INJECTION, SOLUTION INTRAVENOUS at 21:46

## 2019-04-07 RX ADMIN — SODIUM CHLORIDE 1000 ML: 900 INJECTION, SOLUTION INTRAVENOUS at 14:00

## 2019-04-07 RX ADMIN — MAGNESIUM SULFATE IN WATER 4 G: 40 INJECTION, SOLUTION INTRAVENOUS at 18:55

## 2019-04-07 NOTE — ED NOTES
Pt is side lying on right side; she has the arm tucked up Checked iv it is hard to flush, but does flush and not blown/infiltrated Asked the pt to try not to compress the right arm for the iv fluids.

## 2019-04-07 NOTE — ED NOTES
Assumed care of pt, repositioned in the bed. Pt is short to answer questions and is voicing displeasure with plan of care. Switched BP cuff to other arm. Male visitor at bedside.

## 2019-04-07 NOTE — ED PROVIDER NOTES
EMERGENCY DEPARTMENT HISTORY AND PHYSICAL EXAM 
 
 
Date: 4/7/2019 Patient Name: Silvino Marshall History of Presenting Illness Chief Complaint Patient presents with  Vomiting During Pregnancy  
  complains of abdominal pain and vomiting: per pt she has vomited up dark brown vomit: EDC 06/26/2019 and does not have an OBGYN in [place] History Provided By: Patient HPI: Silvino Marshall, 30 y.o. female,  insulin-dependent diabetic presenting the emergency department complaining of nausea and vomiting. Patient is 29 weeks pregnant, with history htn prior to pregnancy on labetolol. Has suffered with some nausea and vomiting during this pregnancy and was last seen on Monday in [facility]. She has had to be admitted multiple times during this pregnancy for hyperemesis. Patient does admit to cannabinoid use. Has been told that her vomiting may be related to the cannabinoid use, but she is not stopped smoking marijuana. Patient has an insulin pump and states her glucose is well controlled, denies feeling like she is in DKA. She admits to cramping epigastric abdominal pain without radiation. No exacerbating or relieving factors. She reports multiple visits to a hospital in [facility] for the same. Se reports recent hospitalization for the same. She states normally when her symptoms are this bad she gets morphine and Phenergan in the IV. DX of htn prior to pregnancy, is on labetalol 300 mg bid. PCP: None No current facility-administered medications on file prior to encounter. Current Outpatient Medications on File Prior to Encounter Medication Sig Dispense Refill  insulin glulisine (APIDRA SOLOSTAR) 100 unit/mL pen 1 unit for every 6-7 g carb 2 Pen 0  
 insulin glargine (LANTUS SOLOSTAR) 100 unit/mL (3 mL) inpn 23-24 units twice daily.  3 Pen 0  
 promethazine (PHENERGAN) 25 mg suppository Insert 1 Suppository into rectum every six (6) hours as needed for Nausea. 6 Suppository 0  
 oxyCODONE-acetaminophen (PERCOCET)  mg per tablet Take 1 Tab by mouth every six (6) hours as needed for Pain. Max Daily Amount: 4 Tabs. 10 Tab 0  
 insulin glulisine (APIDRA SOLOSTAR) 100 unit/mL pen 1 unit for every 6-7 g carb 6 Pen 0  
 METOPROLOL SUCCINATE PO Take  by mouth.  LISINOPRIL PO Take  by mouth.  insulin glargine (LANTUS SOLOSTAR) 100 unit/mL (3 mL) pen 23-24 units twice daily 10 Each 0  
 insulin glulisine (APIDRA SOLOSTAR) 100 unit/mL pen 1 unit for every 6-7 g carb 4 Syringe 0  
 multivitamin (ONE A DAY) tablet Take 1 Tab by mouth daily.  OTHER Glucose support vitamin  omega-3 fatty acids-vitamin e (FISH OIL) 1,000 mg cap Take 1 Cap by mouth.  Insulin Needles, Disposable, 32 gauge x 5/32\" ndle 5 times daily 200 Pen Needle 6 Past History Past Medical History: 
Past Medical History:  
Diagnosis Date  Chronic pain  Diabetes type 1, uncontrolled ([address]  DKA, type 1 ([address]  Heart murmur  Other ill-defined conditions(799.89)   
 neuropathy  V-tach ([address] Past Surgical History: 
Past Surgical History:  
Procedure Laterality Date  ABDOMEN SURGERY PROC UNLISTED    
 exploratory laparoscopy  HX CYST REMOVAL    
 HX OTHER SURGICAL  2-5-13 Diag. [facility]-Dr. Santa Ly Family History: 
Family History Problem Relation Age of Onset  Hypertension Mother  Sleep Apnea Father  Hypertension Other Social History: 
Social History Tobacco Use  Smoking status: Current Every Day Smoker Packs/day: 0.25 Years: 8.00 Pack years: 2.00 Types: Cigarettes Last attempt to quit: 10/26/2014 Years since quittin.4  Smokeless tobacco: Never Used Substance Use Topics  Alcohol use: No  
  Alcohol/week: 0.0 oz  Drug use: Yes Frequency: 2.0 times per week Types: Marijuana Allergies: Allergies Allergen Reactions  Morphine Other (comments)  Pcn [Penicillins] Hives Review of Systems Review of Systems Constitutional: Negative for chills and fever. HENT: Negative for congestion and sore throat. Eyes: Negative for visual disturbance. Respiratory: Negative for cough and shortness of breath. Cardiovascular: Negative for chest pain and leg swelling. Gastrointestinal: Positive for abdominal pain, nausea and vomiting. Negative for blood in stool and diarrhea. Endocrine: Negative for polyuria. Genitourinary: Negative for dysuria, flank pain, vaginal bleeding and vaginal discharge. Musculoskeletal: Negative for myalgias. Skin: Negative for rash. Allergic/Immunologic: Negative for immunocompromised state. Neurological: Negative for weakness and headaches. Psychiatric/Behavioral: Negative for confusion. Physical Exam  
Physical Exam  
Constitutional: oriented to person, place, and time. Mild distress. HENT:  
Head: Normocephalic and atraumatic. Dry mucous membranes Eyes: Pupils are equal, round, and reactive to light. Conjunctivae are normal. Right eye exhibits no discharge. Left eye exhibits no discharge. Neck: Normal range of motion. Neck supple. No tracheal deviation present. Cardiovascular: Tachycardic rate, regular rhythm. No murmur heard. Pulmonary/Chest: Effort normal and breath sounds normal. No respiratory distress. no wheezes. no rales. Abdominal: ttp in the epigastrium. No guarding or rebound. Musculoskeletal: Normal range of motion. exhibits no edema, tenderness or deformity. Neurological: alert and oriented to person, place, and time. Skin: Skin is warm and dry. No rash noted. No erythema. Psychiatric: behavior is normal.  
Nursing note and vitals reviewed. Diagnostic Study Results Labs - Recent Results (from the past 12 hour(s)) GLUCOSE, POC Collection Time: 04/07/19  1:15 PM  
Result Value Ref Range Glucose (POC) 135 (H) 65 - 100 mg/dL Performed by Unkown  CBC WITH AUTOMATED DIFF Collection Time: 04/07/19  1:57 PM  
Result Value Ref Range WBC 16.7 (H) 3.6 - 11.0 K/uL  
 RBC 4.19 3.80 - 5.20 M/uL  
 HGB 10.6 (L) 11.5 - 16.0 g/dL HCT 32.9 (L) 35.0 - 47.0 % MCV 78.5 (L) 80.0 - 99.0 FL  
 MCH 25.3 (L) 26.0 - 34.0 PG  
 MCHC 32.2 30.0 - 36.5 g/dL  
 RDW 17.2 (H) 11.5 - 14.5 % PLATELET 500 (H) 414 - 400 K/uL MPV 11.2 8.9 - 12.9 FL  
 NRBC 0.0 0  WBC ABSOLUTE NRBC 0.00 0.00 - 0.01 K/uL NEUTROPHILS 81 (H) 32 - 75 % LYMPHOCYTES 13 12 - 49 % MONOCYTES 5 5 - 13 % EOSINOPHILS 0 0 - 7 % BASOPHILS 0 0 - 1 % IMMATURE GRANULOCYTES 1 (H) 0.0 - 0.5 % ABS. NEUTROPHILS 13.5 (H) 1.8 - 8.0 K/UL  
 ABS. LYMPHOCYTES 2.2 0.8 - 3.5 K/UL  
 ABS. MONOCYTES 0.8 0.0 - 1.0 K/UL  
 ABS. EOSINOPHILS 0.0 0.0 - 0.4 K/UL  
 ABS. BASOPHILS 0.1 0.0 - 0.1 K/UL  
 ABS. IMM. GRANS. 0.1 (H) 0.00 - 0.04 K/UL  
 DF AUTOMATED Radiologic Studies - No orders to display CT Results  (Last 48 hours) None CXR Results  (Last 48 hours) None Medical Decision Making I am the first provider for this patient. I reviewed the vital signs, available nursing notes, past medical history, past surgical history, family history and social history. Vital Signs-Reviewed the patient's vital signs. Patient Vitals for the past 12 hrs: 
 Temp Pulse Resp BP SpO2  
04/07/19 1309  (!) 118 18 104/88 99 % Pulse Oximetry Analysis - 95% on RA 
 
 
EKG interpretation: (Preliminary) Sinus tach, rate 111. qtc 465. No acute ischemic changes. Time 301pm.  
 
Records Reviewed: Nursing Notes and Old Medical Records Provider Notes (Medical Decision Making):  
68-year-old diabetic pregnant female here with epigastric pain and vomiting not in DKA according to the labs, does slightly smell of ketones, may be starvation ketosis, will add ketones, urinalysis, dextrose containing fluids to her therapy and workup. We will try haloperidol for symptomatic management first before opiates and other antiemetics. Disposition pending symptomatic management, anticipate likely hospitalization on the OB service given the severity of symptoms and hypertension. ED Course:  
Initial assessment performed. The patients presenting problems have been discussed, and they are in agreement with the care plan formulated and outlined with them. I have encouraged them to ask questions as they arise throughout their visit. Time 1542: Patient feeling better, feels as though she wants to try to drink something by mouth. Will try apple juice and ice water. Feels as though the haloperidol helped her. BP still elevated, lft's normal. Has history of htn, on labetalol at home. Discussed with Dr. Fareed Pretty. Requesting patient come up to L&D for further evaluation Critical Care Time:  
none Disposition: To labor and delivery Diagnosis Clinical Impression: 1. Cyclical vomiting with nausea, intractability of vomiting not specified 2. Pre-existing hypertension during pregnancy, antepartum, unspecified pre-existing hypertension type Attestations:  
This note was completed by Mackenzie Joe DO

## 2019-04-07 NOTE — H&P
OB History & Physical 
 
Name: Halima Ng MRN: [id number]  SSN: [id number] YOB: 1990  Age: 30 y.o. Sex: female Subjective:  
 
Reason for Admission:  Pregnancy and nausea/vomiting History of Present Illness: Halima Ng is a 30 y.o.  female with an estimated gestational age of 33w3d with Estimated Date of Delivery: 19. Patient complains of severe persistent nausea and vomiting since yesterday evening. Also associated with severe epigastric pain as well. Denies RUQ pain, severe HA, visual disturbances. Denies [address]; denies bleeding, ROM, contractions, lower abdominal cramping. Baby active. Patient was given haldol 5mg in [facility] ED. The patient states she has been obtaining her OB care in [place] and has just moved back the the [facility] and plans to deliver here. Problems with this pregnancy: 
Type I DM, insulin pump basal rate 1.1 units/hour. Adds boluses at mealtime. Patient reports multiple previous admissions for DKA, most recently about a month ago. Chronic HTN: was on labetalol 200 BID until last week when her OB provider increased the  Dose to 300 BID. The patient's partner states she was admitted to the hospital briefly  last week due to concerns about pre-eclampsia but was discharged home. Chronic renal disease:  Elevated creatinine in 2016 1.06, now 1.56 
 
OB History Perlita Spencer 1 Para Term  AB Living SAB  
   
 TAB Ectopic Molar Multiple Live Births Past Medical History:  
Diagnosis Date  Chronic pain  Diabetes type 1, uncontrolled ([address]  DKA, type 1 ([address]  Heart murmur  Other ill-defined conditions(799.89)   
 neuropathy  V-tach ([address] Past Surgical History:  
Procedure Laterality Date  ABDOMEN SURGERY PROC UNLISTED    
 exploratory laparoscopy  HX CYST REMOVAL    
 HX OTHER SURGICAL  2- Diag. [facility]Dr. Gurjit Joyce Social History Occupational History  Not on file Tobacco Use  Smoking status: Current Every Day Smoker Packs/day: 0.25 Years: 8.00 Pack years: 2.00 Types: Cigarettes Last attempt to quit: 10/26/2014 Years since quittin.4  Smokeless tobacco: Never Used Substance and Sexual Activity  Alcohol use: No  
  Alcohol/week: 0.0 oz  Drug use: Yes Frequency: 2.0 times per week Types: Marijuana  Sexual activity: Yes  
  Partners: Male Birth control/protection: Pill Family History Problem Relation Age of Onset  Hypertension Mother  Sleep Apnea Father  Hypertension Other Allergies Allergen Reactions  Morphine Other (comments)  Pcn [Penicillins] Hives Prior to Admission medications Medication Sig Start Date End Date Taking? Authorizing Provider  
insulin glulisine (APIDRA SOLOSTAR) 100 unit/mL pen 1 unit for every 6-7 g carb 17   Rosemary Simpson MD  
insulin glargine (LANTUS SOLOSTAR) 100 unit/mL (3 mL) inpn 23-24 units twice daily. 17   Rosemary Simpson MD  
promethazine (PHENERGAN) 25 mg suppository Insert 1 Suppository into rectum every six (6) hours as needed for Nausea. 17   Param Prudent, DO  
oxyCODONE-acetaminophen (PERCOCET)  mg per tablet Take 1 Tab by mouth every six (6) hours as needed for Pain. Max Daily Amount: 4 Tabs. 17   Param Prudent, DO  
insulin glulisine (APIDRA SOLOSTAR) 100 unit/mL pen 1 unit for every 6-7 g carb 3/28/17   Rosemary Simpson MD  
METOPROLOL SUCCINATE PO Take  by mouth. Provider, Historical  
LISINOPRIL PO Take  by mouth. Provider, Historical  
insulin glargine (LANTUS SOLOSTAR) 100 unit/mL (3 mL) pen 23-24 units twice daily 17   Rosemary Simpson MD  
insulin glulisine (APIDRA SOLOSTAR) 100 unit/mL pen 1 unit for every 6-7 g carb 17   Rosemary Simpson MD  
multivitamin (ONE A DAY) tablet Take 1 Tab by mouth daily.     Provider, Historical  
 OTHER Glucose support vitamin    Provider, Historical  
omega-3 fatty acids-vitamin e (FISH OIL) 1,000 mg cap Take 1 Cap by mouth. Provider, Historical  
Insulin Needles, Disposable, 32 gauge x \" ndle 5 times daily 16   Ramona Levin MD  
  
 
Review of Systems: 
General: Denies fever, sweats, chills CV: Denies CP, palpitations Resp: Denies SOB, cough GI:  See HPI 
: Denies dysura, abnormal frequency, hematuria Neuro: Denies HA, visual disturbance Objective:  
 
Vitals:   
Vitals:  
 19 1826 19 1855 19 1906 19 1907 BP: (!) 142/91 (!) 142/91  110/67 Pulse: (!) 101   97 Resp:      
Temp:      
SpO2: 94%  95% Weight:      
Height:      
  
Temp (24hrs), Av.8 °F (37.1 °C), Min:98.8 °F (37.1 °C), Max:98.8 °F (37.1 °C) BP  Min: 104/88  Max: 181/122 Physical Exam 
General: in NAD Back: no CVAT Lungs clear COR:  RRR, Abdomen: Gravid, soft, mild epigastric tenderness, no abnormal masses, rebound, rigidity,   guarding Fundus: soft, nontender Cervical Exam: not checked Uterine Activity: no contractions detected Membranes: no gross evidence of ROM 
DTRs: 1 + no clonus Fetal Heart Rate: adequate variability and reactivity; no significant abnormal decelerations Labs:  
Recent Results (from the past 24 hour(s)) GLUCOSE, POC Collection Time: 19  1:15 PM  
Result Value Ref Range Glucose (POC) 135 (H) 65 - 100 mg/dL Performed by Unkown  CBC WITH AUTOMATED DIFF Collection Time: 19  1:57 PM  
Result Value Ref Range WBC 16.7 (H) 3.6 - 11.0 K/uL  
 RBC 4.19 3.80 - 5.20 M/uL  
 HGB 10.6 (L) 11.5 - 16.0 g/dL HCT 32.9 (L) 35.0 - 47.0 % MCV 78.5 (L) 80.0 - 99.0 FL  
 MCH 25.3 (L) 26.0 - 34.0 PG  
 MCHC 32.2 30.0 - 36.5 g/dL  
 RDW 17.2 (H) 11.5 - 14.5 % PLATELET 503 (H) 864 - 400 K/uL MPV 11.2 8.9 - 12.9 FL  
 NRBC 0.0 0  WBC ABSOLUTE NRBC 0.00 0.00 - 0.01 K/uL NEUTROPHILS 81 (H) 32 - 75 % LYMPHOCYTES 13 12 - 49 % MONOCYTES 5 5 - 13 % EOSINOPHILS 0 0 - 7 % BASOPHILS 0 0 - 1 % IMMATURE GRANULOCYTES 1 (H) 0.0 - 0.5 % ABS. NEUTROPHILS 13.5 (H) 1.8 - 8.0 K/UL  
 ABS. LYMPHOCYTES 2.2 0.8 - 3.5 K/UL  
 ABS. MONOCYTES 0.8 0.0 - 1.0 K/UL  
 ABS. EOSINOPHILS 0.0 0.0 - 0.4 K/UL  
 ABS. BASOPHILS 0.1 0.0 - 0.1 K/UL  
 ABS. IMM. GRANS. 0.1 (H) 0.00 - 0.04 K/UL  
 DF AUTOMATED METABOLIC PANEL, COMPREHENSIVE Collection Time: 04/07/19  1:57 PM  
Result Value Ref Range Sodium 137 136 - 145 mmol/L Potassium 3.4 (L) 3.5 - 5.1 mmol/L Chloride 100 97 - 108 mmol/L  
 CO2 25 21 - 32 mmol/L Anion gap 12 5 - 15 mmol/L Glucose 151 (H) 65 - 100 mg/dL BUN 18 6 - 20 MG/DL Creatinine 1.54 (H) 0.55 - 1.02 MG/DL  
 BUN/Creatinine ratio 12 12 - 20 GFR est AA 48 (L) >60 ml/min/1.73m2 GFR est non-AA 39 (L) >60 ml/min/1.73m2 Calcium 9.9 8.5 - 10.1 MG/DL Bilirubin, total 0.4 0.2 - 1.0 MG/DL  
 ALT (SGPT) 13 12 - 78 U/L  
 AST (SGOT) 27 15 - 37 U/L Alk. phosphatase 78 45 - 117 U/L Protein, total 7.0 6.4 - 8.2 g/dL Albumin 2.2 (L) 3.5 - 5.0 g/dL Globulin 4.8 (H) 2.0 - 4.0 g/dL A-G Ratio 0.5 (L) 1.1 - 2.2 EKG, 12 LEAD, INITIAL Collection Time: 04/07/19  2:57 PM  
Result Value Ref Range Ventricular Rate 111 BPM  
 Atrial Rate 111 BPM  
 P-R Interval 130 ms QRS Duration 72 ms Q-T Interval 342 ms QTC Calculation (Bezet) 465 ms Calculated P Axis 30 degrees Calculated R Axis 67 degrees Calculated T Axis 30 degrees Diagnosis Sinus tachycardia When compared with ECG of 31-MAR-2017 17:15, No significant change was found GLUCOSE, POC Collection Time: 04/07/19  6:19 PM  
Result Value Ref Range Glucose (POC) 220 (H) 65 - 100 mg/dL Performed by Elvia Melendez POC G3 - PUL Collection Time: 04/07/19  7:12 PM  
Result Value Ref Range pH (POC) 7.469 (H) 7.35 - 7.45    
 pCO2 (POC) 30.5 (L) 35.0 - 45.0 MMHG pO2 (POC) 103 (H) 80 - 100 MMHG  
 HCO3 (POC) 22.1 22 - 26 MMOL/L  
 sO2 (POC) 98 (H) 92 - 97 % Base deficit (POC) 2 mmol/L Site RIGHT BRACHIAL Device: ROOM AIR Allens test (POC) N/A Specimen type (POC) ARTERIAL Total resp. rate 22 Patient Active Problem List  
Diagnosis Code  Abnormal LFTs R94.5  Acute renal failure (HCC) N17.9  SIRS (systemic inflammatory response syndrome) ([facility]) R65.10  Ventricular tachycardia (HCC) I47.2  SVT (supraventricular tachycardia) ([facility]) I47.1  UTI (urinary tract infection) N39.0  DM type 1 (diabetes mellitus, type 1) ([facility]) E10.9  Chronic abdominal pain R10.9, G89.29  
 Nausea & vomiting R11.2  Viral syndrome B34.9  Depression F32.9  Diabetic peripheral neuropathy associated with type 1 diabetes mellitus ([facility]) E10.42  
 DKA, type 1 ([facility]) E10.10  GIB (gastrointestinal bleeding) K92.2  Nausea R11.0  Neuropathy G62.9  Hypertension I10  
 Hypokalemia E87.6  Hypophosphatemia E83.39  
 DKA, type 1, not at goal [facility]) E10.10  Leukocytosis D72.829  
 Epigastric abdominal pain R10.13  
 LLQ abdominal pain R10.32  
 Diarrhea R19.7  Weight loss R63.4  Esophagitis, erosive K22.10  Nausea and vomiting R11.2  Sepsis ([address] A41.9  DKA (diabetic ketoacidoses) ([facility]) E13.10  Pre-eclampsia superimposed on chronic hypertension, antepartum O11.9, O10.019 Assessment and Plan:  
 
28 weeks IUP, nausea/vomiting, epigastric pain, severe range BPs Suspected pre-eclampsia with severe features, however, confounding issues include  history of GI issues, chronic HTN Type I DM, rising accuchecks: 135 - 151 - 220 Chronic renal disease, exacerbation/progression Discussed with MFM Dr Katty Barriga--- Admit L&D Start magnesium sulfate--4/2 Patient given Labetalol 20mg IV X1 with adequate response noted: 110/67 Further lab evaluation: ABG, UA, beta-hydroxybutyrate Dr Zo Liu recommends transfer to [address] [place] [address] this evening. Signed By:  Evelyn Joiner MD   
 April 7, 2019

## 2019-04-07 NOTE — ROUTINE PROCESS
TRANSFER - OUT REPORT: 
 
Verbal report given to Mayco Orosco RN(name) on [employer]  being transferred to L&D(unit) for routine progression of care Report consisted of patients Situation, Background, Assessment and  
Recommendations(SBAR). Information from the following report(s) SBAR, ED Summary, [facility] and Recent Results was reviewed with the receiving nurse. Lines:    
 
Opportunity for questions and clarification was provided. Patient transported with: 
 [employer]

## 2019-04-07 NOTE — PROGRESS NOTES
1920 - report received from elda Sage, [facility] resumed at this time Patient is resting on her right side, eyes closed, s/o at bedside. Magnesium infusing per order. Assessment completed. Waiting for transfer to [facility]. 1953 - bedside ultrasound by dr. Delvin Salmeron - baby is active, BONNY 15.  
 
2015 - quintana inserted. 2039 - urine output minimal with quintana, dr. Delvin Salmeron aware, LR bolus of 500 cc ordered. Per Dr. Delvin Salmeron, patient is ready for transport to [facility], this RN to initiate calls. 2050 - this RN spoke with Rodrigo Bowen from the transfer center. 2109 - report given to receiving RN Lisbeth Nunez RN. 
 
2120 - Per Rodrigo Bowen at transfer center, ETA for patient pickup is [phone].  
 
 
2138 - patient turned to her left side due to FHR, patient was reluctant to turn, this RN and her s/o had to encourage patient and persuade her. 2156 - report given to [address], EMT. 
 
2225 - Dr. Delvin Salmeron aware of patient's minimal urine output, 500 CC bolus of NS ordered. Dr. Delvin Salmeron aware of FHR. 2240 - transport team at bedside. 2250 - patient off of monitor for transport.  
 
2304 - patient departed L&D with transport team.

## 2019-04-07 NOTE — PROGRESS NOTES
1900 Pt states she can not pee Dr Ritesh Renee wants straight cath. Magnesium Sulfate bolus has been started. [facility]. Respiratory has been called for blood gas. 1912 urine to lab. Magnesium bolus continues to run. Pt doing well. Pt tolerated st cath well. Urine smells, dark in color.  elodia

## 2019-04-08 ENCOUNTER — APPOINTMENT (OUTPATIENT)
Dept: GENERAL RADIOLOGY | Age: 29
DRG: 832 | End: 2019-04-08
Attending: ANESTHESIOLOGY
Payer: MEDICAID

## 2019-04-08 PROBLEM — O11.9 CHRONIC HYPERTENSION WITH SUPERIMPOSED PREECLAMPSIA: Status: ACTIVE | Noted: 2019-04-08

## 2019-04-08 LAB
ADMINISTERED INITIALS, ADMINIT: NORMAL
ALBUMIN SERPL-MCNC: 1.4 G/DL (ref 3.5–5)
ALBUMIN SERPL-MCNC: 1.6 G/DL (ref 3.5–5)
ALBUMIN/GLOB SERPL: 0.5 {RATIO} (ref 1.1–2.2)
ALBUMIN/GLOB SERPL: 0.5 {RATIO} (ref 1.1–2.2)
ALP SERPL-CCNC: 59 U/L (ref 45–117)
ALP SERPL-CCNC: 61 U/L (ref 45–117)
ALT SERPL-CCNC: 7 U/L (ref 12–78)
ALT SERPL-CCNC: 9 U/L (ref 12–78)
ANION GAP SERPL CALC-SCNC: 10 MMOL/L (ref 5–15)
ANION GAP SERPL CALC-SCNC: 12 MMOL/L (ref 5–15)
AST SERPL-CCNC: 14 U/L (ref 15–37)
AST SERPL-CCNC: 18 U/L (ref 15–37)
ATRIAL RATE: 111 BPM
BASOPHILS # BLD: 0 K/UL (ref 0–0.1)
BASOPHILS # BLD: 0 K/UL (ref 0–0.1)
BASOPHILS NFR BLD: 0 % (ref 0–1)
BASOPHILS NFR BLD: 0 % (ref 0–1)
BILIRUB SERPL-MCNC: 0.2 MG/DL (ref 0.2–1)
BILIRUB SERPL-MCNC: 0.3 MG/DL (ref 0.2–1)
BUN SERPL-MCNC: 17 MG/DL (ref 6–20)
BUN SERPL-MCNC: 18 MG/DL (ref 6–20)
BUN/CREAT SERPL: 13 (ref 12–20)
BUN/CREAT SERPL: 16 (ref 12–20)
CALCIUM SERPL-MCNC: 7.4 MG/DL (ref 8.5–10.1)
CALCIUM SERPL-MCNC: 8.2 MG/DL (ref 8.5–10.1)
CALCULATED P AXIS, ECG09: 30 DEGREES
CALCULATED R AXIS, ECG10: 67 DEGREES
CALCULATED T AXIS, ECG11: 30 DEGREES
CHLORIDE SERPL-SCNC: 101 MMOL/L (ref 97–108)
CHLORIDE SERPL-SCNC: 103 MMOL/L (ref 97–108)
CO2 SERPL-SCNC: 22 MMOL/L (ref 21–32)
CO2 SERPL-SCNC: 23 MMOL/L (ref 21–32)
CREAT SERPL-MCNC: 1.13 MG/DL (ref 0.55–1.02)
CREAT SERPL-MCNC: 1.29 MG/DL (ref 0.55–1.02)
CREAT UR-MCNC: 244 MG/DL
D50 ADMINISTERED, D50ADM: 0 ML
D50 ORDER, D50ORD: 0 ML
DIAGNOSIS, 93000: NORMAL
DIFFERENTIAL METHOD BLD: ABNORMAL
DIFFERENTIAL METHOD BLD: ABNORMAL
EOSINOPHIL # BLD: 0 K/UL (ref 0–0.4)
EOSINOPHIL # BLD: 0.1 K/UL (ref 0–0.4)
EOSINOPHIL NFR BLD: 0 % (ref 0–7)
EOSINOPHIL NFR BLD: 1 % (ref 0–7)
ERYTHROCYTE [DISTWIDTH] IN BLOOD BY AUTOMATED COUNT: 16.9 % (ref 11.5–14.5)
ERYTHROCYTE [DISTWIDTH] IN BLOOD BY AUTOMATED COUNT: 17.1 % (ref 11.5–14.5)
ERYTHROCYTE [DISTWIDTH] IN BLOOD BY AUTOMATED COUNT: 17.2 % (ref 11.5–14.5)
GLOBULIN SER CALC-MCNC: 2.8 G/DL (ref 2–4)
GLOBULIN SER CALC-MCNC: 3.1 G/DL (ref 2–4)
GLSCOM COMMENTS: NORMAL
GLUCOSE BLD STRIP.AUTO-MCNC: 110 MG/DL (ref 65–100)
GLUCOSE BLD STRIP.AUTO-MCNC: 122 MG/DL (ref 65–100)
GLUCOSE BLD STRIP.AUTO-MCNC: 127 MG/DL (ref 65–100)
GLUCOSE BLD STRIP.AUTO-MCNC: 131 MG/DL (ref 65–100)
GLUCOSE BLD STRIP.AUTO-MCNC: 131 MG/DL (ref 65–100)
GLUCOSE BLD STRIP.AUTO-MCNC: 133 MG/DL (ref 65–100)
GLUCOSE BLD STRIP.AUTO-MCNC: 147 MG/DL (ref 65–100)
GLUCOSE BLD STRIP.AUTO-MCNC: 151 MG/DL (ref 65–100)
GLUCOSE BLD STRIP.AUTO-MCNC: 154 MG/DL (ref 65–100)
GLUCOSE BLD STRIP.AUTO-MCNC: 165 MG/DL (ref 65–100)
GLUCOSE BLD STRIP.AUTO-MCNC: 167 MG/DL (ref 65–100)
GLUCOSE BLD STRIP.AUTO-MCNC: 167 MG/DL (ref 65–100)
GLUCOSE BLD STRIP.AUTO-MCNC: 169 MG/DL (ref 65–100)
GLUCOSE BLD STRIP.AUTO-MCNC: 177 MG/DL (ref 65–100)
GLUCOSE BLD STRIP.AUTO-MCNC: 182 MG/DL (ref 65–100)
GLUCOSE BLD STRIP.AUTO-MCNC: 185 MG/DL (ref 65–100)
GLUCOSE BLD STRIP.AUTO-MCNC: 190 MG/DL (ref 65–100)
GLUCOSE BLD STRIP.AUTO-MCNC: 200 MG/DL (ref 65–100)
GLUCOSE BLD STRIP.AUTO-MCNC: 204 MG/DL (ref 65–100)
GLUCOSE BLD STRIP.AUTO-MCNC: 230 MG/DL (ref 65–100)
GLUCOSE BLD STRIP.AUTO-MCNC: 240 MG/DL (ref 65–100)
GLUCOSE SERPL-MCNC: 157 MG/DL (ref 65–100)
GLUCOSE SERPL-MCNC: 172 MG/DL (ref 65–100)
GLUCOSE, GLC: 110 MG/DL
GLUCOSE, GLC: 122 MG/DL
GLUCOSE, GLC: 127 MG/DL
GLUCOSE, GLC: 131 MG/DL
GLUCOSE, GLC: 147 MG/DL
GLUCOSE, GLC: 151 MG/DL
GLUCOSE, GLC: 154 MG/DL
GLUCOSE, GLC: 165 MG/DL
GLUCOSE, GLC: 167 MG/DL
GLUCOSE, GLC: 167 MG/DL
GLUCOSE, GLC: 169 MG/DL
GLUCOSE, GLC: 177 MG/DL
GLUCOSE, GLC: 182 MG/DL
GLUCOSE, GLC: 185 MG/DL
GLUCOSE, GLC: 200 MG/DL
GLUCOSE, GLC: 204 MG/DL
GLUCOSE, GLC: 230 MG/DL
GLUCOSE, GLC: 240 MG/DL
HCT VFR BLD AUTO: 20 % (ref 35–47)
HCT VFR BLD AUTO: 21.7 % (ref 35–47)
HCT VFR BLD AUTO: 25.7 % (ref 35–47)
HGB BLD-MCNC: 6 G/DL (ref 11.5–16)
HGB BLD-MCNC: 6.5 G/DL (ref 11.5–16)
HGB BLD-MCNC: 8 G/DL (ref 11.5–16)
HIGH TARGET, HITG: 180 MG/DL
IMM GRANULOCYTES # BLD AUTO: 0.1 K/UL (ref 0–0.04)
IMM GRANULOCYTES # BLD AUTO: 0.1 K/UL (ref 0–0.04)
IMM GRANULOCYTES NFR BLD AUTO: 1 % (ref 0–0.5)
IMM GRANULOCYTES NFR BLD AUTO: 1 % (ref 0–0.5)
INSULIN ADMINSTERED, INSADM: 0 UNITS/HOUR
INSULIN ADMINSTERED, INSADM: 0 UNITS/HOUR
INSULIN ADMINSTERED, INSADM: 0.1 UNITS/HOUR
INSULIN ADMINSTERED, INSADM: 0.7 UNITS/HOUR
INSULIN ADMINSTERED, INSADM: 0.7 UNITS/HOUR
INSULIN ADMINSTERED, INSADM: 1.2 UNITS/HOUR
INSULIN ADMINSTERED, INSADM: 1.4 UNITS/HOUR
INSULIN ADMINSTERED, INSADM: 1.4 UNITS/HOUR
INSULIN ADMINSTERED, INSADM: 1.5 UNITS/HOUR
INSULIN ADMINSTERED, INSADM: 2.1 UNITS/HOUR
INSULIN ADMINSTERED, INSADM: 2.2 UNITS/HOUR
INSULIN ADMINSTERED, INSADM: 2.5 UNITS/HOUR
INSULIN ADMINSTERED, INSADM: 2.8 UNITS/HOUR
INSULIN ADMINSTERED, INSADM: 3.2 UNITS/HOUR
INSULIN ADMINSTERED, INSADM: 3.2 UNITS/HOUR
INSULIN ADMINSTERED, INSADM: 3.5 UNITS/HOUR
INSULIN ADMINSTERED, INSADM: 3.6 UNITS/HOUR
INSULIN ADMINSTERED, INSADM: 3.6 UNITS/HOUR
INSULIN ADMINSTERED, INSADM: 4.9 UNITS/HOUR
INSULIN ADMINSTERED, INSADM: 5.1 UNITS/HOUR
INSULIN ORDER, INSORD: 0 UNITS/HOUR
INSULIN ORDER, INSORD: 0 UNITS/HOUR
INSULIN ORDER, INSORD: 0.1 UNITS/HOUR
INSULIN ORDER, INSORD: 0.7 UNITS/HOUR
INSULIN ORDER, INSORD: 0.7 UNITS/HOUR
INSULIN ORDER, INSORD: 1.2 UNITS/HOUR
INSULIN ORDER, INSORD: 1.4 UNITS/HOUR
INSULIN ORDER, INSORD: 1.4 UNITS/HOUR
INSULIN ORDER, INSORD: 1.5 UNITS/HOUR
INSULIN ORDER, INSORD: 2.1 UNITS/HOUR
INSULIN ORDER, INSORD: 2.2 UNITS/HOUR
INSULIN ORDER, INSORD: 2.5 UNITS/HOUR
INSULIN ORDER, INSORD: 2.8 UNITS/HOUR
INSULIN ORDER, INSORD: 3.2 UNITS/HOUR
INSULIN ORDER, INSORD: 3.2 UNITS/HOUR
INSULIN ORDER, INSORD: 3.5 UNITS/HOUR
INSULIN ORDER, INSORD: 3.6 UNITS/HOUR
INSULIN ORDER, INSORD: 3.6 UNITS/HOUR
INSULIN ORDER, INSORD: 4.9 UNITS/HOUR
INSULIN ORDER, INSORD: 5.1 UNITS/HOUR
LOW TARGET, LOT: 140 MG/DL
LYMPHOCYTES # BLD: 0.9 K/UL (ref 0.8–3.5)
LYMPHOCYTES # BLD: 2.7 K/UL (ref 0.8–3.5)
LYMPHOCYTES NFR BLD: 19 % (ref 12–49)
LYMPHOCYTES NFR BLD: 7 % (ref 12–49)
MCH RBC QN AUTO: 24.4 PG (ref 26–34)
MCH RBC QN AUTO: 24.6 PG (ref 26–34)
MCH RBC QN AUTO: 25.2 PG (ref 26–34)
MCHC RBC AUTO-ENTMCNC: 30 G/DL (ref 30–36.5)
MCHC RBC AUTO-ENTMCNC: 30 G/DL (ref 30–36.5)
MCHC RBC AUTO-ENTMCNC: 31.1 G/DL (ref 30–36.5)
MCV RBC AUTO: 81.1 FL (ref 80–99)
MCV RBC AUTO: 81.6 FL (ref 80–99)
MCV RBC AUTO: 82 FL (ref 80–99)
MINUTES UNTIL NEXT BG, NBG: 120 MIN
MINUTES UNTIL NEXT BG, NBG: 60 MIN
MONOCYTES # BLD: 0.1 K/UL (ref 0–1)
MONOCYTES # BLD: 0.9 K/UL (ref 0–1)
MONOCYTES NFR BLD: 1 % (ref 5–13)
MONOCYTES NFR BLD: 6 % (ref 5–13)
MULTIPLIER, MUL: 0
MULTIPLIER, MUL: 0.01
MULTIPLIER, MUL: 0.02
MULTIPLIER, MUL: 0.03
MULTIPLIER, MUL: 0.04
MULTIPLIER, MUL: 0.04
NEUTS SEG # BLD: 10.4 K/UL (ref 1.8–8)
NEUTS SEG # BLD: 11.9 K/UL (ref 1.8–8)
NEUTS SEG NFR BLD: 73 % (ref 32–75)
NEUTS SEG NFR BLD: 91 % (ref 32–75)
NRBC # BLD: 0 K/UL (ref 0–0.01)
NRBC BLD-RTO: 0 PER 100 WBC
ORDER INITIALS, ORDINIT: NORMAL
P-R INTERVAL, ECG05: 130 MS
PLATELET # BLD AUTO: 268 K/UL (ref 150–400)
PLATELET # BLD AUTO: 289 K/UL (ref 150–400)
PLATELET # BLD AUTO: 293 K/UL (ref 150–400)
PLATELET COMMENTS,PCOM: ABNORMAL
PLATELET COMMENTS,PCOM: ABNORMAL
PMV BLD AUTO: 11.1 FL (ref 8.9–12.9)
PMV BLD AUTO: 11.1 FL (ref 8.9–12.9)
PMV BLD AUTO: 11.3 FL (ref 8.9–12.9)
POTASSIUM SERPL-SCNC: 3.2 MMOL/L (ref 3.5–5.1)
POTASSIUM SERPL-SCNC: 3.5 MMOL/L (ref 3.5–5.1)
PROT SERPL-MCNC: 4.2 G/DL (ref 6.4–8.2)
PROT SERPL-MCNC: 4.7 G/DL (ref 6.4–8.2)
PROT UR-MCNC: 625 MG/DL (ref 0–11.9)
Q-T INTERVAL, ECG07: 342 MS
QRS DURATION, ECG06: 72 MS
QTC CALCULATION (BEZET), ECG08: 465 MS
RBC # BLD AUTO: 2.44 M/UL (ref 3.8–5.2)
RBC # BLD AUTO: 2.66 M/UL (ref 3.8–5.2)
RBC # BLD AUTO: 3.17 M/UL (ref 3.8–5.2)
RBC MORPH BLD: ABNORMAL
SERVICE CMNT-IMP: ABNORMAL
SODIUM SERPL-SCNC: 135 MMOL/L (ref 136–145)
SODIUM SERPL-SCNC: 136 MMOL/L (ref 136–145)
THERAPEUTIC MAGNESI,THMG: 5 MG/DL (ref 4.8–8.4)
VENTRICULAR RATE, ECG03: 111 BPM
WBC # BLD AUTO: 13 K/UL (ref 3.6–11)
WBC # BLD AUTO: 14.2 K/UL (ref 3.6–11)
WBC # BLD AUTO: 16.6 K/UL (ref 3.6–11)

## 2019-04-08 PROCEDURE — 74011250636 HC RX REV CODE- 250/636: Performed by: OBSTETRICS & GYNECOLOGY

## 2019-04-08 PROCEDURE — 71045 X-RAY EXAM CHEST 1 VIEW: CPT

## 2019-04-08 PROCEDURE — 74011250637 HC RX REV CODE- 250/637: Performed by: OBSTETRICS & GYNECOLOGY

## 2019-04-08 PROCEDURE — 65270000029 HC RM PRIVATE

## 2019-04-08 PROCEDURE — 74011000258 HC RX REV CODE- 258: Performed by: OBSTETRICS & GYNECOLOGY

## 2019-04-08 PROCEDURE — 85025 COMPLETE CBC W/AUTO DIFF WBC: CPT

## 2019-04-08 PROCEDURE — 36415 COLL VENOUS BLD VENIPUNCTURE: CPT

## 2019-04-08 PROCEDURE — 86644 CMV ANTIBODY: CPT

## 2019-04-08 PROCEDURE — 83735 ASSAY OF MAGNESIUM: CPT

## 2019-04-08 PROCEDURE — 86900 BLOOD TYPING SEROLOGIC ABO: CPT

## 2019-04-08 PROCEDURE — 84156 ASSAY OF PROTEIN URINE: CPT

## 2019-04-08 PROCEDURE — 74011250636 HC RX REV CODE- 250/636

## 2019-04-08 PROCEDURE — 85027 COMPLETE CBC AUTOMATED: CPT

## 2019-04-08 PROCEDURE — 80053 COMPREHEN METABOLIC PANEL: CPT

## 2019-04-08 PROCEDURE — P9016 RBC LEUKOCYTES REDUCED: HCPCS

## 2019-04-08 PROCEDURE — 86920 COMPATIBILITY TEST SPIN: CPT

## 2019-04-08 PROCEDURE — 82962 GLUCOSE BLOOD TEST: CPT

## 2019-04-08 PROCEDURE — 77030012890

## 2019-04-08 PROCEDURE — 74011250637 HC RX REV CODE- 250/637: Performed by: INTERNAL MEDICINE

## 2019-04-08 PROCEDURE — 74011636637 HC RX REV CODE- 636/637: Performed by: OBSTETRICS & GYNECOLOGY

## 2019-04-08 PROCEDURE — 36430 TRANSFUSION BLD/BLD COMPNT: CPT

## 2019-04-08 PROCEDURE — 82570 ASSAY OF URINE CREATININE: CPT

## 2019-04-08 RX ORDER — BACITRACIN 500 UNIT/G
1 PACKET (EA) TOPICAL AS NEEDED
Status: DISCONTINUED | OUTPATIENT
Start: 2019-04-08 | End: 2019-04-17 | Stop reason: HOSPADM

## 2019-04-08 RX ORDER — DEXTROSE 50 % IN WATER (D50W) INTRAVENOUS SYRINGE
25-50 AS NEEDED
Status: DISCONTINUED | OUTPATIENT
Start: 2019-04-08 | End: 2019-04-10

## 2019-04-08 RX ORDER — SODIUM CHLORIDE 9 MG/ML
250 INJECTION, SOLUTION INTRAVENOUS AS NEEDED
Status: DISCONTINUED | OUTPATIENT
Start: 2019-04-08 | End: 2019-04-17 | Stop reason: HOSPADM

## 2019-04-08 RX ORDER — SODIUM CHLORIDE 0.9 % (FLUSH) 0.9 %
SYRINGE (ML) INJECTION
Status: DISPENSED
Start: 2019-04-08 | End: 2019-04-08

## 2019-04-08 RX ORDER — BETAMETHASONE SODIUM PHOSPHATE AND BETAMETHASONE ACETATE 3; 3 MG/ML; MG/ML
12 INJECTION, SUSPENSION INTRA-ARTICULAR; INTRALESIONAL; INTRAMUSCULAR; SOFT TISSUE EVERY 24 HOURS
Status: COMPLETED | OUTPATIENT
Start: 2019-04-08 | End: 2019-04-09

## 2019-04-08 RX ORDER — MIDAZOLAM HYDROCHLORIDE 1 MG/ML
INJECTION, SOLUTION INTRAMUSCULAR; INTRAVENOUS
Status: DISCONTINUED
Start: 2019-04-08 | End: 2019-04-08 | Stop reason: WASHOUT

## 2019-04-08 RX ORDER — ONDANSETRON 2 MG/ML
4 INJECTION INTRAMUSCULAR; INTRAVENOUS
Status: DISCONTINUED | OUTPATIENT
Start: 2019-04-08 | End: 2019-04-17 | Stop reason: HOSPADM

## 2019-04-08 RX ORDER — POTASSIUM CHLORIDE 750 MG/1
40 TABLET, FILM COATED, EXTENDED RELEASE ORAL
Status: COMPLETED | OUTPATIENT
Start: 2019-04-08 | End: 2019-04-08

## 2019-04-08 RX ORDER — INSULIN LISPRO 100 [IU]/ML
INJECTION, SOLUTION INTRAVENOUS; SUBCUTANEOUS
Status: DISCONTINUED | OUTPATIENT
Start: 2019-04-08 | End: 2019-04-10

## 2019-04-08 RX ORDER — SODIUM CHLORIDE 0.9 % (FLUSH) 0.9 %
10 SYRINGE (ML) INJECTION EVERY 24 HOURS
Status: DISCONTINUED | OUTPATIENT
Start: 2019-04-08 | End: 2019-04-17 | Stop reason: HOSPADM

## 2019-04-08 RX ORDER — SODIUM CHLORIDE 0.9 % (FLUSH) 0.9 %
10-30 SYRINGE (ML) INJECTION AS NEEDED
Status: DISCONTINUED | OUTPATIENT
Start: 2019-04-08 | End: 2019-04-17 | Stop reason: HOSPADM

## 2019-04-08 RX ORDER — SODIUM CHLORIDE 9 MG/ML
25 INJECTION, SOLUTION INTRAVENOUS CONTINUOUS
Status: DISCONTINUED | OUTPATIENT
Start: 2019-04-08 | End: 2019-04-10

## 2019-04-08 RX ORDER — FAMOTIDINE 10 MG/ML
20 INJECTION INTRAVENOUS EVERY 12 HOURS
Status: DISCONTINUED | OUTPATIENT
Start: 2019-04-08 | End: 2019-04-15 | Stop reason: SDUPTHER

## 2019-04-08 RX ORDER — SODIUM CHLORIDE 0.9 % (FLUSH) 0.9 %
10 SYRINGE (ML) INJECTION AS NEEDED
Status: DISCONTINUED | OUTPATIENT
Start: 2019-04-08 | End: 2019-04-17 | Stop reason: HOSPADM

## 2019-04-08 RX ORDER — SODIUM CHLORIDE 0.9 % (FLUSH) 0.9 %
20 SYRINGE (ML) INJECTION EVERY 24 HOURS
Status: DISCONTINUED | OUTPATIENT
Start: 2019-04-08 | End: 2019-04-17 | Stop reason: HOSPADM

## 2019-04-08 RX ORDER — SODIUM CHLORIDE 0.9 % (FLUSH) 0.9 %
10-40 SYRINGE (ML) INJECTION EVERY 8 HOURS
Status: DISCONTINUED | OUTPATIENT
Start: 2019-04-08 | End: 2019-04-08

## 2019-04-08 RX ORDER — MAGNESIUM SULFATE 100 %
4 CRYSTALS MISCELLANEOUS AS NEEDED
Status: DISCONTINUED | OUTPATIENT
Start: 2019-04-08 | End: 2019-04-10

## 2019-04-08 RX ADMIN — SODIUM CHLORIDE 75 ML/HR: 900 INJECTION, SOLUTION INTRAVENOUS at 13:31

## 2019-04-08 RX ADMIN — LABETALOL HYDROCHLORIDE 300 MG: 100 TABLET, FILM COATED ORAL at 14:33

## 2019-04-08 RX ADMIN — FAMOTIDINE 20 MG: 10 INJECTION, SOLUTION INTRAVENOUS at 14:16

## 2019-04-08 RX ADMIN — SODIUM CHLORIDE 500 ML: 900 INJECTION, SOLUTION INTRAVENOUS at 03:53

## 2019-04-08 RX ADMIN — INSULIN LISPRO 2 UNITS: 100 INJECTION, SOLUTION INTRAVENOUS; SUBCUTANEOUS at 18:05

## 2019-04-08 RX ADMIN — POTASSIUM CHLORIDE 40 MEQ: 750 TABLET, EXTENDED RELEASE ORAL at 13:44

## 2019-04-08 RX ADMIN — SODIUM CHLORIDE 500 ML: 900 INJECTION, SOLUTION INTRAVENOUS at 00:25

## 2019-04-08 RX ADMIN — SODIUM CHLORIDE 2.2 UNITS/HR: 900 INJECTION, SOLUTION INTRAVENOUS at 01:54

## 2019-04-08 RX ADMIN — PROMETHAZINE HYDROCHLORIDE 25 MG: 25 INJECTION INTRAMUSCULAR; INTRAVENOUS at 22:09

## 2019-04-08 RX ADMIN — BETAMETHASONE SODIUM PHOSPHATE AND BETAMETHASONE ACETATE 12 MG: 3; 3 INJECTION, SUSPENSION INTRA-ARTICULAR; INTRALESIONAL; INTRAMUSCULAR at 02:01

## 2019-04-08 RX ADMIN — SODIUM CHLORIDE 125 ML/HR: 900 INJECTION, SOLUTION INTRAVENOUS at 05:00

## 2019-04-08 NOTE — DIABETES MGMT
DTC Consult Note Recommendations/ Comments:  Patient is 28 weeks and 5 days pregnant. She was on an insulin pump prior to admit, but is now on the insulin drip. If appropriate, please consider: 
 - adding carbohydrate coverage tid ac (1 unit per 15 grams of carbs) Spoke with someone on unit regarding above Current hospital DM medication: Insulin drip running @ 1.4 units/hr. Consult received for:  [x]             Assessment of home management [x]             Insulin pump patient [x]             Insulin infusion Chart reviewed and initial evaluation complete on Gracy Torres. Patient is a 30 y.o. female with known Type 1 Diabetes on insulin pump at home. Insulin Pump Settings from home - currently patient is not wearing: 
 
Pt on (Type of Pump) and uses [employer] G 70. Basal Rates     Insulin Carb Ratios 12am to 4 am = 1.0 units/hr  1 to 11 = 1 unit per 11 g from 12am-5pm 
4 to 10am = 1.1 units/hr  1 to 8 = 1 unit per 8 g 5pm-12am 
10am to 12am = 1 units/hr Insulin Sensitivity Factors 1 to 43 = 1 unit per 43 mg/dl Target 100-120 BG monitoring at home 3-4 times per day. Patient reports BG levels at home trend: fluctuating a bit. Assessed and instructed patient on the following:  
·  blood sugar goals, complications of diabetes mellitus, hypoglycemia prevention and treatment and nutrition Provided patient with the following: [x]             Survival skills education materials [x]             Outpatient DTC contact number Discussed with patient and/or family need for follow up appointment for diabetes management after discharge. A1c:  
Lab Results Component Value Date/Time Hemoglobin A1c 11.1 (H) 08/18/2016 12:38 PM  
 
 
Recent Glucose Results:  
Lab Results Component Value Date/Time   (H) 04/08/2019 07:17 AM  
  (H) 04/08/2019 01:27 AM  
 GLUCPOC 240 (H) 04/08/2019 12:22 PM  
 GLUCPOC 204 (H) 04/08/2019 11:25 AM  
 GLUCPOC 133 (H) 04/08/2019 10:17 AM  
  
 
Lab Results Component Value Date/Time Creatinine 1.13 (H) 04/08/2019 07:17 AM  
 
Estimated Creatinine Clearance: 72.8 mL/min (A) (based on SCr of 1.13 mg/dL (H)). Active Orders Diet DIET DIABETIC WITH OPTIONS Consistent Carb 2000kcal; Regular PO intake: No data found. Will continue to follow as needed. Thank you. Mari Chin, MS, RN, CDE Time spent: 30 minutes

## 2019-04-08 NOTE — PROGRESS NOTES
1530: Bedside and Verbal shift change report given to ROEL Lara (oncoming nurse) by COOKIE Chong (offgoing nurse). Report included the following information SBAR, Procedure Summary, Intake/Output, MAR and Recent Results. 1559: MFM at bedside to perform ultrasound. 1720: Dr. Melvin Fortune at bedside to talk with patient. 1935: Verbal shift change report given to RIKI Patrick (oncoming nurse) by ROEL Lamas (offgoing nurse). Report included the following information SBAR, Procedure Summary, Intake/Output, MAR and Recent Results.

## 2019-04-08 NOTE — PROGRESS NOTES
Central Line Procedure Note Indication: Inadequate venous access Sepsis protocol, DKA Risks, benefits, alternatives explained and patient agrees to proceed. Patient positioned in Trendelenburg. An ultrasound was used and the vascular structures were identified and marked. 7-Step Sterility Protocol followed. (cap, mask sterile gown, sterile gloves, large sterile sheet, hand hygiene, 2% chlorhexidine for cutaneous antisepsis) 5 mL 1% Lidocaine placed at insertion site. Right internal jugular cannulated x 1 attempt(s) utilizing the Seldinger technique. An 8.5 Fr Quad lumen catheter was secured with a suture and a Biopatch and sterile Tegaderm were applied. All ports were aspirated and flushed. CXR pending.

## 2019-04-08 NOTE — CONSULTS
Consultation Note    NAME: Fabricio Martin   :  1990   MRN:  [id number]     Date/Time:  2019 1:15 PM    I have been asked to see this patient by Dr. Keegan Estrada  for advice/opinion re: proteinuria. Assessment :    Plan:  MARIS  Proteinuria-UPCR 1598  Type I DM  Pregnancy  Anemia  Hypokalemia  preeclampsia Her MARIS seems related to hypovolemia. Creatinine has been improving with IVF. UO is better. Continue for now. Proteinuria related to Type I DM +/- preeclampsia. Repeat in AM    BP control is good at present. Replete K po. Subjective:   CHIEF COMPLAINT:  \"I'm sick to my stomach. \"    HISTORY OF PRESENT ILLNESS:     Dwain Phan is a 30 y.o.   female who has a history of type I DM admitted with N/V/MARIS/preeclampsia. She presented to [facility] yesterday with severe N/V. [place] moved here from [place] and says that she plans on staying in [place] for now. She was admitted and subsequently transferred to [facility]. Her creatinine yesterday was 1.54. Today, after IVF, it is down to 1.13. Nurse reports that the patient has had poor UO although it started to increase when I came to see her. She says that her N/V is better. Her father says that she has had very poor po intake for much of her pregnancy. She says that she has been trying to eat but almost immediately vomits it back up.           Past Medical History:   Diagnosis Date    Anemia     Chronic pain     Diabetes type 1, uncontrolled (HCC)     Disease of blood and blood forming organ     anemia    DKA, type 1 ([address]     Essential hypertension     Heart murmur     Herpes simplex virus (HSV) infection 2017    positive in blood    Kidney disease     Other ill-defined conditions(799.89)     neuropathy    Psychiatric problem     depression    V-tach [facility])       Past Surgical History:   Procedure Laterality Date    ABDOMEN SURGERY PROC UNLISTED      exploratory laparoscopy    HX CYST REMOVAL      HX OTHER SURGICAL  [facility]     Social History     Tobacco Use    Smoking status: Current Every Day Smoker     Packs/day: 0.25     Years: 8.00     Pack years: 2.00     Types: Cigarettes     Last attempt to quit: 10/26/2014     Years since quittin.4    Smokeless tobacco: Never Used   Substance Use Topics    Alcohol use: No     Alcohol/week: 0.0 oz      Family History   Problem Relation Age of Onset    Hypertension Mother     Sleep Apnea Father     Hypertension Other       Allergies   Allergen Reactions    Morphine Other (comments)    Pcn [Penicillins] Hives      Prior to Admission medications    Medication Sig Start Date End Date Taking? Authorizing Provider   DULoxetine (CYMBALTA) 60 mg capsule Take 60 mg by mouth daily. Yes Provider, Historical   ondansetron hcl (ZOFRAN) 4 mg tablet Take 4 mg by mouth every eight (8) hours as needed for Nausea. Yes Provider, Historical   labetalol (NORMODYNE) 300 mg tablet Take 300 mg by mouth two (2) times a day. Yes Provider, Historical   insulin glulisine (APIDRA SOLOSTAR) 100 unit/mL pen 1 unit for every 6-7 g carb 17   Mana Machuca MD   insulin glargine (LANTUS SOLOSTAR) 100 unit/mL (3 mL) inpn 23-24 units twice daily. 17   Mana Machuca MD   promethazine (PHENERGAN) 25 mg suppository Insert 1 Suppository into rectum every six (6) hours as needed for Nausea. 17   Marino Grise, DO   oxyCODONE-acetaminophen (PERCOCET)  mg per tablet Take 1 Tab by mouth every six (6) hours as needed for Pain. Max Daily Amount: 4 Tabs. 17   Marino Grise, DO   insulin glulisine (APIDRA SOLOSTAR) 100 unit/mL pen 1 unit for every 6-7 g carb 3/28/17   Mana Machuca MD   insulin glargine (LANTUS SOLOSTAR) 100 unit/mL (3 mL) pen 23-24 units twice daily 17   Mana Machuca MD   insulin glulisine (APIDRA SOLOSTAR) 100 unit/mL pen 1 unit for every 6-7 g carb 17   Mana Machuca MD   multivitamin (ONE A DAY) tablet Take 1 Tab by mouth daily. Provider, Historical   OTHER Glucose support vitamin    Provider, Historical   Insulin Needles, Disposable, 32 gauge x 5/32\" ndle 5 times daily 9/12/16   Christin Dial MD     REVIEW OF SYSTEMS:     []  Unable to obtain reliable ROS due to  [] mental status  [] sedated   [] intubated   [x] Total of 12 systems reviewed as follows:  Constitutional: negative fever, negative chills, negative weight loss  Eyes:   negative visual changes  ENT:   negative sore throat, tongue or lip swelling  Respiratory:  negative cough, negative dyspnea  Cards:  negative for chest pain, palpitations, lower extremity edema  GI:   pos for nausea, vomiting, no diarrhea, and no abdominal pain now  :  negative for frequency, dysuria  Integument:  negative for rash and pruritus  Heme:  negative for easy bruising and gum/nose bleeding  Musculoskel: negative for myalgias,  back pain and muscle weakness  Neuro:  negative for headaches, dizziness, vertigo  Psych:  negative for feelings of anxiety, depression   Travel?: none    Objective:   VITALS:    Visit Vitals  /72   Pulse 99   Temp 97.8 °F (36.6 °C)   Resp 16   Ht 5' 8\" (1.727 m)   Wt 70.8 kg (156 lb)   SpO2 100%   Breastfeeding?  No   BMI 23.72 kg/m²     PHYSICAL EXAM:  Gen:  [x]  WD [x]  WN - heavily tattooed  [] cachectic []  thin []  obese []  disheveled             []  ill apearing  []   Critical  []   Chronic    []  No acute distress    HEENT:   [x] NC/AT/PERRL    [x] pink conjunctivae      [] pale conjunctivae                  PERRL  [] yes  [] no      [] moist mucosa    [] dry mucosa    hearing intact to voice [] yes  [] No                 NECK:   supple [x] yes  [] no        masses [] yes  [x] No               thyroid  []  non tender  []  tender    RESP:   [x] CTA bilaterally/no wheezing/rhonchi/rales/crackles    [] rhonchi bilaterally - no dullness  [] wheezing   [] rhonchi   [] crackles     use of accessory muscles [] yes [] no    CARD:   [x]  regular rate and rhythm/No murmurs/rubs/gallops    murmur  [] yes ()  [] no      Rubs  [] yes  [] no       Gallops [] yes  [] no    Rate []  regular  []  irregular        carotid bruits  [] Right  []  Left                 LE edema [] yes  [x] no           JVP  []  yes   []  no    ABD:    [x] soft/non distended/non tender/+bowel sounds/no HSM    []  Rigid    tenderness [] yes [] no   Liver enlargement  []  yes []  no                Spleen enlargement  []  yes []  no     distended []  yes [] no     bowel sound  [] hypoactive   [] hyperactive    LYMPH:    Neck []  yes [x]  no       Axillae []  yes [x]  no    SKIN:   Rashes []  yes   [x]  no    Ulcers []  yes   [x]  no               [] tight to palpitation    skin turgor []  good  [] poor  [] decreased               Cyanosis/clubbing []  yes []  no    NEUR:   [x] cranial nerves II-XII grossly intact       [] Cranial nerves deficit                 []  facial droop    []  slurred speech   [] aphasic     [] Strength normal     []  weakness  []  LUE  []   RUE/ []  LLE  []   RLE    follows commands  [x]  yes []  no           PSYCH:   insight [] poor [x] good   Alert and Oriented to  [x] person  [x] place  [x]  time                    [] depressed [] anxious [] agitated  [] lethargic [] stuporous  [] sedated     LAB DATA REVIEWED:    Recent Labs     04/08/19 0717 04/08/19  0247   WBC 13.0* 14.2*   HGB 6.5* 6.0*   HCT 21.7* 20.0*    289     Recent Labs     04/08/19 0717 04/08/19 0127 04/07/19  1357   * 136 137   K 3.2* 3.5 3.4*    101 100   CO2 22 23 25   BUN 18 17 18   CREA 1.13* 1.29* 1.54*   * 157* 151*   CA 7.4* 8.2* 9.9     Recent Labs     04/08/19 0717 04/08/19 0127 04/07/19  1357   SGOT 14* 18 27   ALT 9* 7* 13   AP 59 61 78   TBILI 0.3 0.2 0.4   ALB 1.4* 1.6* 2.2*   GLOB 2.8 3.1 4.8*     No results for input(s): INR, PTP, APTT in the last 72 hours. No lab exists for component: INREXT   No results for input(s): FE, TIBC, PSAT, FERR in the last 72 hours.    No results for input(s): PH, PCO2, PO2 in the last 72 hours. No results for input(s): CPK, CKMB in the last 72 hours.     No lab exists for component: TROPONINI  Lab Results   Component Value Date/Time    Glucose (POC) 240 (H) 04/08/2019 12:22 PM    Glucose (POC) 204 (H) 04/08/2019 11:25 AM    Glucose (POC) 133 (H) 04/08/2019 10:17 AM    Glucose (POC) 131 (H) 04/08/2019 09:10 AM    Glucose (POC) 167 (H) 04/08/2019 08:05 AM       Procedures: see electronic medical records for all procedures/Xrays and details which were not copied into this note but were reviewed prior to creation of Plan.    ________________________________________________________________________       ___________________________________________________  Consulting Physician: Sonu Dan MD

## 2019-04-08 NOTE — CONSULTS
[address]  [address] [facility] [address], [address]  [phone]                     GI CONSULTATION NOTE  Carmelina Kline, AGACNP-BC    NAME:  Venita Zuniga   :   1990   MRN:   [id number]       Referring Provider: Dr. Angi Dexter Date: 2019     Chief Complaint: N/V, coffee ground emesis    History of Present Illness:  30year old female with history of IDDM type 1, gastroparesis who is 28 weeks pregnant seen in consultation for above complaint. She has history of chronic abdominal pain with nausea and vomiting. Previous work up included multiple EGDs,negative exploratory laparotomy (Dr. Praful Lazaro). She was admitted to labor and delivery yesterday for intractable nausea and vomiting. She reports she had lots of nausea and once she starts vomiting she is unable to stop. She noticed what she describes as coffee ground color to her emesis. Denies bright red blood. She denies abdominal pain. normal stools. She denies marijuana use however her drug screen is positive. Denies NSAIDs, alcohol or cigarette smoking. Her blood sugars were running around \"130\" prior to this admission. She uses an insulin pump. Previous endoscopies:  EGD 2012-200cc bilious gastric residua, minimal gastritis, moderate distal candida esophagitis-biopsy     EGD 10/2014-Diffuse ulceration in the distal esophagus extending proximally for 6 cm c/w LA Grade D erosive esophagitis;  Mucosa was friable. Bilious liquid present in stomach. Distal body and antrum with linear erosions/shallow ulcerations present without bleeding. Gastric, biopsy:Minimal chronic superficial gastritis with mucosal edema and patchy hemorrhage. Esophagus, biopsy: Ulcerative esophagitis. Negative for viral cytopathic effect and malignancy. GMS stain shows rare fungal hyphae of uncertain significance    EGD 2016-proximal esopahgus there was patchy white exudate from the crico to about 30 cm.   From 37 cm distally there was bluish necrotic debris and more severe yellow white exudate. The proximal exudate looked like severe candida. Esophagus, biopsy: Exudate, consistent with esophagitis (specimen consists entirely of exudate without mucosa). GMS stain shows fungal yeast and pseudohyphae suggestive of Candida esophagitis         PMH:  Past Medical History:   Diagnosis Date    Anemia     Chronic pain     Diabetes type 1, uncontrolled ([facility] [address] [address]     Disease of blood and blood forming organ     anemia    DKA, type 1 ([facility]     Essential hypertension     Heart murmur     Herpes simplex virus (HSV) infection 2017    positive in blood    Kidney disease     Other ill-defined conditions(799.89)     neuropathy    Psychiatric problem     depression    V-tach (HCC)        PSH:  Past Surgical History:   Procedure Laterality Date    ABDOMEN SURGERY PROC UNLISTED      exploratory laparoscopy    HX CYST REMOVAL      HX OTHER SURGICAL  2    [facility]. [facility]Dr. Theo Preciado       Allergies: Allergies   Allergen Reactions    Morphine Other (comments)    Pcn [Penicillins] Hives       Home Medications:  Prior to Admission Medications   Prescriptions Last Dose Informant Patient Reported? Taking? DULoxetine (CYMBALTA) 60 mg capsule 2019 at Unknown time  Yes Yes   Sig: Take 60 mg by mouth daily. Insulin Needles, Disposable, 32 gauge x \" ndle Not Taking at Unknown time  No No   Si times daily   OTHER   Yes No   Sig: Glucose support vitamin   insulin glargine (LANTUS SOLOSTAR) 100 unit/mL (3 mL) pen Not Taking at Unknown time  No No   Si-24 units twice daily   insulin glargine (LANTUS SOLOSTAR) 100 unit/mL (3 mL) inpn Not Taking at Unknown time  No No   Si-24 units twice daily.    insulin glulisine (APIDRA SOLOSTAR) 100 unit/mL pen Not Taking at Unknown time  No No   Si unit for every 6-7 g carb   insulin glulisine (APIDRA SOLOSTAR) 100 unit/mL pen Not Taking at Unknown time  No No   Si unit for every 6-7 g carb insulin glulisine (APIDRA SOLOSTAR) 100 unit/mL pen Not Taking at Unknown time  No No   Si unit for every 6-7 g carb   labetalol (NORMODYNE) 300 mg tablet 2019 at Unknown time  Yes Yes   Sig: Take 300 mg by mouth two (2) times a day. multivitamin (ONE A DAY) tablet Unknown at Unknown time  Yes No   Sig: Take 1 Tab by mouth daily. ondansetron hcl (ZOFRAN) 4 mg tablet 2019 at Unknown time  Yes Yes   Sig: Take 4 mg by mouth every eight (8) hours as needed for Nausea. oxyCODONE-acetaminophen (PERCOCET)  mg per tablet Unknown at Unknown time  No No   Sig: Take 1 Tab by mouth every six (6) hours as needed for Pain. Max Daily Amount: 4 Tabs. promethazine (PHENERGAN) 25 mg suppository Unknown at Unknown time  No No   Sig: Insert 1 Suppository into rectum every six (6) hours as needed for Nausea.       Facility-Administered Medications: None       Hospital Medications:  Current Facility-Administered Medications   Medication Dose Route Frequency    insulin regular (NOVOLIN R, HUMULIN R) 100 Units in 0.9% sodium chloride 100 mL infusion  1-10 Units/hr IntraVENous TITRATE    glucose chewable tablet 16 g  4 Tab Oral PRN    dextrose (D50W) injection syrg 12.5-25 g  25-50 mL IntraVENous PRN    glucagon (GLUCAGEN) injection 1 mg  1 mg IntraMUSCular PRN    betamethasone (CELESTONE) injection 12 mg  12 mg IntraMUSCular Q24H    ondansetron (ZOFRAN) injection 4 mg  4 mg IntraVENous Q4H PRN    promethazine (PHENERGAN) 25 mg in 0.9% sodium chloride 50 mL IVPB  25 mg IntraVENous Q4H PRN    famotidine (PF) (PEPCID) injection 20 mg  20 mg IntraVENous Q12H    sodium chloride (NS) flush        sodium chloride (NS) flush 10-30 mL  10-30 mL InterCATHeter PRN    sodium chloride (NS) flush 10 mL  10 mL InterCATHeter Q24H    sodium chloride (NS) flush 10 mL  10 mL InterCATHeter PRN    sodium chloride (NS) flush 20 mL  20 mL InterCATHeter Q24H    bacitracin 500 unit/gram packet 1 Packet  1 Packet Topical PRN  0.9% sodium chloride infusion  75 mL/hr IntraVENous CONTINUOUS    0.9% sodium chloride infusion 250 mL  250 mL IntraVENous PRN       Social History:  Social History     Tobacco Use    Smoking status: Current Every Day Smoker     Packs/day: 0.25     Years: 8.00     Pack years: 2.00     Types: Cigarettes     Last attempt to quit: 10/26/2014     Years since quittin.4    Smokeless tobacco: Never Used   Substance Use Topics    Alcohol use: No     Alcohol/week: 0.0 oz       Family History:  Family History   Problem Relation Age of Onset    Hypertension Mother     Sleep Apnea Father     Hypertension Other        Review of Systems:    Constitutional: negative fever, negative chills, negative weight loss  Eyes:   negative visual changes  ENT:   negative sore throat, tongue or lip swelling  Respiratory:  negative cough, negative dyspnea  Cards:  negative for chest pain, palpitations, lower extremity edema  GI:   See HPI  :  negative for frequency, dysuria  Integument:  negative for rash and pruritus  Heme:  negative for easy bruising and gum/nose bleeding  Musculoskel: negative for myalgias, back pain and muscle weakness  Neuro: negative for headaches, dizziness, vertigo  Psych:  negative for feelings of anxiety, depression      Objective:     Patient Vitals for the past 8 hrs:   BP Temp Pulse Resp SpO2   19 1252 105/72 97.8 °F (36.6 °C) (!) 101 16 100 %   19 1251 105/72  (!) 102     19 1242 141/81 97.9 °F (36.6 °C) 98 16 100 %   19 1237 118/72 97.8 °F (36.6 °C) 100 16 100 %   19 0958 137/89  94  100 %   19 0905 134/86  96     19 0814 124/88 98.3 °F (36.8 °C) 100 16    19 0658 117/62  88 16 100 %   19 0600 119/68  87 17      701 - 1900  In: 1631.2 [P.O.:1000; I.V.:631.2]  Out: 53 [Urine:53]  1901 -  07  In: -   Out: 130 [Urine:130]      PHYSICAL EXAM:  General: Well developed, Well nourished.  Alert, cooperative, no acute distress.    HEENT: Normocephalic, Atraumatic. PERRLA, EOMI. Anicteric sclerae. Lungs:  CTA Bilaterally. No Wheezing/Rhonchi/Rales. Heart:  Regular rate and rhythm, No murmur, No Rubs, No Gallops  Abdomen: Soft, non-distended, non-tender.  +Bowel sounds, no hepatomegaly  Extremities: No cyanosis,clubing or edema  Neurologic:  Alert and oriented X 3. No acute neurological distress. Psych:   Good insight. Not anxious nor agitated. Data Review     Recent Labs     04/08/19  0717 04/08/19  0247   WBC 13.0* 14.2*   HGB 6.5* 6.0*   HCT 21.7* 20.0*    289     Recent Labs     04/08/19  0717 04/08/19  0127   * 136   K 3.2* 3.5    101   CO2 22 23   BUN 18 17   CREA 1.13* 1.29*   * 157*   CA 7.4* 8.2*     Recent Labs     04/08/19  0717 04/08/19  0127   SGOT 14* 18   AP 59 61   TP 4.2* 4.7*   ALB 1.4* 1.6*   GLOB 2.8 3.1     No results for input(s): INR, PTP, APTT in the last 72 hours. No lab exists for component: INREXT     Imaging studies reviewed        Assessment:   1. Intractable n/v with hematemesis, gastroparesis, pregnancy, possible marijuana abuse all possibly could be playing a role. Differentials include esophagitis, gastritis or [address] tear, PUD, etc.  2. Chronic anemia-baseline hemoglobin ~10. GI cause is possibility.      Patient Active Problem List   Diagnosis Code    Abnormal LFTs R94.5    Acute renal failure (HCC) N17.9    SIRS (systemic inflammatory response syndrome) (HCC) R65.10    Ventricular tachycardia (HCC) I47.2    SVT (supraventricular tachycardia) (HCC) I47.1    UTI (urinary tract infection) N39.0    DM type 1 (diabetes mellitus, type 1) (HCC) E10.9    Chronic abdominal pain R10.9, G89.29    Nausea & vomiting R11.2    Viral syndrome B34.9    Depression F32.9    Diabetic peripheral neuropathy associated with type 1 diabetes mellitus (HCC) E10.42    DKA, type 1 (HCC) E10.10    GIB (gastrointestinal bleeding) K92.2    Nausea R11.0    Neuropathy G62.9    Hypertension I10    Hypokalemia E87.6    Hypophosphatemia E83.39    DKA, type 1, not at goal ([facility] [address] [address] E10.10    Leukocytosis D72.829    Epigastric abdominal pain R10.13    LLQ abdominal pain R10.32    Diarrhea R19.7    Weight loss R63.4    Esophagitis, erosive K22.10    Nausea and vomiting R11.2    Sepsis (HCC) A41.9    DKA (diabetic ketoacidoses) (HCC) E13.10    Pre-eclampsia superimposed on chronic hypertension, antepartum O11.9, O10.019    Chronic hypertension with superimposed preeclampsia O11.9                  Plan:   -Supportive care per primary team  -Transfuse as necessary   -PPI bid  -Monitor clinical course  -Consider EGD if hemoglobin continues to drop or overt bleeding occurs.  -Discussed with Dr. Kittie Buerger  -Will follow along with you  -Thank you kindly for allowing us to participate in the care of this patient      I have personally reviewed the history and independently examined the patient. I have reviewed the chart and agree with the documentation recorded by the Mid Level Provider, including the assessment, treatment plan, and disposition. ASSESSMENT AND PLAN:  Nausea and vomiting with UGI bleeding. Would consider differentials as PUD, erosive gastritis/esophagitis and MW tear. Transfuse as necessary and Pepci BID. Monitor clinical course as EGD and sedation are a risk given her pregnant state.   However, if her Hgb continues to drop or symptoms persist, would consider EGD  Will follow closely with you      Rexann Lennox, MD

## 2019-04-08 NOTE — PROGRESS NOTES
2355: Patient received via critical care transport from [address] [place] [address]. Previously took phone report at 2110 from Nury Hawkins, [facility] (nurse at [address] [place] [address]). 0002: Dr. Samantha Baird at bedside meeting patient and discussing [address].  
 
0113: Patient removed her own insulin pump. Awaiting anesthesia to start second IV. 5555: Dr. Eather Duane at bedside for central line placement. Consent reviewed and signed with patient. 5098-1800: Dr. Samantha Baird aware of fetal heart rate tracing since patient's arrival. Will continue to monitor. 5645: Patient sleeping flat on her back. Woke her up and asked her to try to sleep on her side instead. 3179: Patient complaining of pain in her lower legs. States the pain is in both legs and has been since Friday. Patient thinks the pain is related to her walking up and down the steps on Friday while moving. TEDS placed on legs.

## 2019-04-08 NOTE — PROGRESS NOTES
7298  Bedside and Verbal shift change report given to COOKIE Fernandez,RN (oncoming nurse) by ANGÉLICA Mack,DEE (offgoing nurse). Report included the following information SBAR, Kardex, MAR and Recent Results. [facility] office called for appt today, will be seen this afternoon. 0900  Dr. Keegan Estrada at bedside to examine pt and viewed strip and discussed plan for care. 1100  Dr. Keegan Estrada aware of low urine output, at bedside to discuss plan of care. 1130  Pt tolerating diet well. [facility] [address], Dr. Jeanie Blancas (on call), called for consult. [address] [facility], Dr. Lisa Cortes called for consult. 1150  [facility], Kimber Fajardo at bedside to talk with pt. 
26  Dr. Lisa Cortes at bedside to examine pt and discuss plan for care. Aware of low urine output. [address] practitoner at bedside to talk with pt. 
1410  Dr. Keegan Estrada notified of elevated BP's orders received. 1540  Bedside and Verbal shift change report given to ROEL Mccollum RN (oncoming nurse) by COOKIE Fernandez RN (offgoing nurse). Report included the following information SBAR, Kardex, MAR and Recent Results.

## 2019-04-08 NOTE — PROGRESS NOTES
Progress Note Patient reports abdominal pain improved. No more vomiting, still with some nausea. Denies bleeding, ROM, contractions. Baby active. Physical Exam: 
Visit Vitals /67 Pulse 97 Temp 98.8 °F (37.1 °C) Resp 20 Ht 5' 8\" (1.727 m) Wt 70.8 kg (156 lb) SpO2 95% BMI 23.72 kg/m² Abdomen; soft, minimal epigastric tenderness. Fundus: nontender Fetal Heart Rate: 120 - 130,  adequate variability and reactivity, CW gestational age Decelerations: brief variable (, 15 seconds) Bedside ultrasound :  Single IUP, vertex presentation; BONNY 15 cm. Placenta posterior, fundal 
  Fetus appears to be female. Recent Results (from the past 12 hour(s)) GLUCOSE, POC Collection Time: 04/07/19  1:15 PM  
Result Value Ref Range Glucose (POC) 135 (H) 65 - 100 mg/dL Performed by Unkown  CBC WITH AUTOMATED DIFF Collection Time: 04/07/19  1:57 PM  
Result Value Ref Range WBC 16.7 (H) 3.6 - 11.0 K/uL  
 RBC 4.19 3.80 - 5.20 M/uL  
 HGB 10.6 (L) 11.5 - 16.0 g/dL HCT 32.9 (L) 35.0 - 47.0 % MCV 78.5 (L) 80.0 - 99.0 FL  
 MCH 25.3 (L) 26.0 - 34.0 PG  
 MCHC 32.2 30.0 - 36.5 g/dL  
 RDW 17.2 (H) 11.5 - 14.5 % PLATELET 518 (H) 217 - 400 K/uL MPV 11.2 8.9 - 12.9 FL  
 NRBC 0.0 0  WBC ABSOLUTE NRBC 0.00 0.00 - 0.01 K/uL NEUTROPHILS 81 (H) 32 - 75 % LYMPHOCYTES 13 12 - 49 % MONOCYTES 5 5 - 13 % EOSINOPHILS 0 0 - 7 % BASOPHILS 0 0 - 1 % IMMATURE GRANULOCYTES 1 (H) 0.0 - 0.5 % ABS. NEUTROPHILS 13.5 (H) 1.8 - 8.0 K/UL  
 ABS. LYMPHOCYTES 2.2 0.8 - 3.5 K/UL  
 ABS. MONOCYTES 0.8 0.0 - 1.0 K/UL  
 ABS. EOSINOPHILS 0.0 0.0 - 0.4 K/UL  
 ABS. BASOPHILS 0.1 0.0 - 0.1 K/UL  
 ABS. IMM. GRANS. 0.1 (H) 0.00 - 0.04 K/UL  
 DF AUTOMATED METABOLIC PANEL, COMPREHENSIVE Collection Time: 04/07/19  1:57 PM  
Result Value Ref Range Sodium 137 136 - 145 mmol/L Potassium 3.4 (L) 3.5 - 5.1 mmol/L  Chloride 100 97 - 108 mmol/L  
 CO2 25 21 - 32 mmol/L Anion gap 12 5 - 15 mmol/L Glucose 151 (H) 65 - 100 mg/dL BUN 18 6 - 20 MG/DL Creatinine 1.54 (H) 0.55 - 1.02 MG/DL  
 BUN/Creatinine ratio 12 12 - 20 GFR est AA 48 (L) >60 ml/min/1.73m2 GFR est non-AA 39 (L) >60 ml/min/1.73m2 Calcium 9.9 8.5 - 10.1 MG/DL Bilirubin, total 0.4 0.2 - 1.0 MG/DL  
 ALT (SGPT) 13 12 - 78 U/L  
 AST (SGOT) 27 15 - 37 U/L Alk. phosphatase 78 45 - 117 U/L Protein, total 7.0 6.4 - 8.2 g/dL Albumin 2.2 (L) 3.5 - 5.0 g/dL Globulin 4.8 (H) 2.0 - 4.0 g/dL A-G Ratio 0.5 (L) 1.1 - 2.2 EKG, 12 LEAD, INITIAL Collection Time: 04/07/19  2:57 PM  
Result Value Ref Range Ventricular Rate 111 BPM  
 Atrial Rate 111 BPM  
 P-R Interval 130 ms QRS Duration 72 ms Q-T Interval 342 ms QTC Calculation (Bezet) 465 ms Calculated P Axis 30 degrees Calculated R Axis 67 degrees Calculated T Axis 30 degrees Diagnosis Sinus tachycardia When compared with ECG of 31-MAR-2017 17:15, No significant change was found GLUCOSE, POC Collection Time: 04/07/19  6:19 PM  
Result Value Ref Range Glucose (POC) 220 (H) 65 - 100 mg/dL Performed by Wero Nettle URINALYSIS W/ REFLEX CULTURE Collection Time: 04/07/19  7:07 PM  
Result Value Ref Range Color YELLOW/STRAW Appearance TURBID (A) CLEAR Specific gravity 1.010 1.003 - 1.030    
 pH (UA) 6.0 5.0 - 8.0 Protein 300 (A) NEG mg/dL Glucose 500 (A) NEG mg/dL Ketone 15 (A) NEG mg/dL Blood NEGATIVE  NEG Urobilinogen 0.2 0.2 - 1.0 EU/dL Nitrites NEGATIVE  NEG Leukocyte Esterase NEGATIVE  NEG    
 WBC 5-10 0 - 4 /hpf  
 RBC 0-5 0 - 5 /hpf Epithelial cells FEW FEW /lpf Bacteria NEGATIVE  NEG /hpf  
 UA:UC IF INDICATED URINE CULTURE ORDERED (A) CNI ACETONE/KETONE URINE, QL. Collection Time: 04/07/19  7:07 PM  
Result Value Ref Range Acetone/Ketone,urine qual. LARGE MG/DL PROTEIN/CREATININE RATIO, URINE  
 Collection Time: 19  7:07 PM  
Result Value Ref Range Protein, urine random 1,598 (H) 0.0 - 11.9 mg/dL Creatinine, urine 297.00 mg/dL Protein/Creat. urine Ratio 5.4 DRUG SCREEN, URINE Collection Time: 19  7:07 PM  
Result Value Ref Range AMPHETAMINES NEGATIVE  NEG    
 BARBITURATES NEGATIVE  NEG BENZODIAZEPINES NEGATIVE  NEG    
 COCAINE NEGATIVE  NEG METHADONE NEGATIVE  NEG    
 OPIATES NEGATIVE  NEG    
 PCP(PHENCYCLIDINE) NEGATIVE  NEG    
 THC (TH-CANNABINOL) POSITIVE (A) NEG Drug screen comment (NOTE) BILIRUBIN, CONFIRM Collection Time: 19  7:07 PM  
Result Value Ref Range Bilirubin UA, confirm NEGATIVE  NEG    
POC G3 - PUL Collection Time: 19  7:12 PM  
Result Value Ref Range pH (POC) 7.469 (H) 7.35 - 7.45    
 pCO2 (POC) 30.5 (L) 35.0 - 45.0 MMHG  
 pO2 (POC) 103 (H) 80 - 100 MMHG  
 HCO3 (POC) 22.1 22 - 26 MMOL/L  
 sO2 (POC) 98 (H) 92 - 97 % Base deficit (POC) 2 mmol/L Site RIGHT BRACHIAL Device: ROOM AIR Allens test (POC) N/A Specimen type (POC) ARTERIAL Total resp. rate 22 Assessment/Plan: 
Reassuring fetal status. Magnesium bolus complete. Preparing for transfer to [facility]--discussed transfer in detail, including risks and benefits. Also discussed potential for delivery, including  section. Patient indicates she understands and agrees to proceed with transfer to [facility].  
 
Bernadine Foy MD

## 2019-04-08 NOTE — PROGRESS NOTES
High Risk Obstetrics Progress Note Name: James Piedra MRN: [id number]  SSN: [id number] YOB: 1990  Age: 30 y.o. Sex: female Subjective: LOS: 1 day Estimated Date of Delivery: 19 Gestational Age Today: 30w6d Patient admitted for diabetes - Type 1 and preeclampsia. States she does not have abdominal pain  , chest pain, contractions, fever, headache , nausea and vomiting, pelvic pressure, right upper quadrant pain  , shortness of breath, swelling, vaginal bleeding , vaginal leaking of fluid  and visual disturbances. Objective:  
 
Vitals:  Blood pressure 124/88, pulse 100, temperature 98.3 °F (36.8 °C), resp. rate 16, height 5' 8\" (1.727 m), weight 70.8 kg (156 lb), SpO2 100 %, not currently breastfeeding. Temp (24hrs), Av.6 °F (37 °C), Min:98.1 °F (36.7 °C), Max:99.1 °F (37.3 °C) Systolic (92CWE), ZIQ:695 , Min:93 , Max:181 Diastolic (05HDR), NUN:38, Min:52, Max:122 Intake and Output:    
Date 19 0700 - 19 8896 Shift [id number] [id number] 24 Hour Total  
INTAKE Shift Total(mL/kg) OUTPUT Urine(mL/kg/hr) 38   38 Shift Total(mL/kg) 38(0.5)   38(0.5) Weight (kg) 70.8 70.8 70.8 70.8 Physical Exam: 
Patient without distress. Fundus: soft and non tender Lower Extremities:  - Patellar Reflexes: 1+ bilaterally Membranes:  Intact Uterine Activity:  None Fetal Heart Rate:  Baseline: 130 per minute Variability: moderate Accelerations: no 
Decelerations: none Uterine contractions: none Labs:  
Recent Results (from the past 36 hour(s)) GLUCOSE, POC Collection Time: 19  1:15 PM  
Result Value Ref Range Glucose (POC) 135 (H) 65 - 100 mg/dL Performed by Unkown  CBC WITH AUTOMATED DIFF Collection Time: 19  1:57 PM  
Result Value Ref Range WBC 16.7 (H) 3.6 - 11.0 K/uL  
 RBC 4.19 3.80 - 5.20 M/uL  
 HGB 10.6 (L) 11.5 - 16.0 g/dL HCT 32.9 (L) 35.0 - 47.0 % MCV 78.5 (L) 80.0 - 99.0 FL  
 MCH 25.3 (L) 26.0 - 34.0 PG  
 MCHC 32.2 30.0 - 36.5 g/dL  
 RDW 17.2 (H) 11.5 - 14.5 % PLATELET 735 (H) 876 - 400 K/uL MPV 11.2 8.9 - 12.9 FL  
 NRBC 0.0 0  WBC ABSOLUTE NRBC 0.00 0.00 - 0.01 K/uL NEUTROPHILS 81 (H) 32 - 75 % LYMPHOCYTES 13 12 - 49 % MONOCYTES 5 5 - 13 % EOSINOPHILS 0 0 - 7 % BASOPHILS 0 0 - 1 % IMMATURE GRANULOCYTES 1 (H) 0.0 - 0.5 % ABS. NEUTROPHILS 13.5 (H) 1.8 - 8.0 K/UL  
 ABS. LYMPHOCYTES 2.2 0.8 - 3.5 K/UL  
 ABS. MONOCYTES 0.8 0.0 - 1.0 K/UL  
 ABS. EOSINOPHILS 0.0 0.0 - 0.4 K/UL  
 ABS. BASOPHILS 0.1 0.0 - 0.1 K/UL  
 ABS. IMM. GRANS. 0.1 (H) 0.00 - 0.04 K/UL  
 DF AUTOMATED METABOLIC PANEL, COMPREHENSIVE Collection Time: 04/07/19  1:57 PM  
Result Value Ref Range Sodium 137 136 - 145 mmol/L Potassium 3.4 (L) 3.5 - 5.1 mmol/L Chloride 100 97 - 108 mmol/L  
 CO2 25 21 - 32 mmol/L Anion gap 12 5 - 15 mmol/L Glucose 151 (H) 65 - 100 mg/dL BUN 18 6 - 20 MG/DL Creatinine 1.54 (H) 0.55 - 1.02 MG/DL  
 BUN/Creatinine ratio 12 12 - 20 GFR est AA 48 (L) >60 ml/min/1.73m2 GFR est non-AA 39 (L) >60 ml/min/1.73m2 Calcium 9.9 8.5 - 10.1 MG/DL Bilirubin, total 0.4 0.2 - 1.0 MG/DL  
 ALT (SGPT) 13 12 - 78 U/L  
 AST (SGOT) 27 15 - 37 U/L Alk. phosphatase 78 45 - 117 U/L Protein, total 7.0 6.4 - 8.2 g/dL Albumin 2.2 (L) 3.5 - 5.0 g/dL Globulin 4.8 (H) 2.0 - 4.0 g/dL A-G Ratio 0.5 (L) 1.1 - 2.2 EKG, 12 LEAD, INITIAL Collection Time: 04/07/19  2:57 PM  
Result Value Ref Range Ventricular Rate 111 BPM  
 Atrial Rate 111 BPM  
 P-R Interval 130 ms QRS Duration 72 ms Q-T Interval 342 ms QTC Calculation (Bezet) 465 ms Calculated P Axis 30 degrees Calculated R Axis 67 degrees Calculated T Axis 30 degrees Diagnosis Sinus tachycardia When compared with ECG of 31-MAR-2017 17:15, No significant change was found GLUCOSE, POC  
 Collection Time: 04/07/19  6:19 PM  
Result Value Ref Range Glucose (POC) 220 (H) 65 - 100 mg/dL Performed by Heaven Preciado URINALYSIS W/ REFLEX CULTURE Collection Time: 04/07/19  7:07 PM  
Result Value Ref Range Color YELLOW/STRAW Appearance TURBID (A) CLEAR Specific gravity 1.010 1.003 - 1.030    
 pH (UA) 6.0 5.0 - 8.0 Protein 300 (A) NEG mg/dL Glucose 500 (A) NEG mg/dL Ketone 15 (A) NEG mg/dL Blood NEGATIVE  NEG Urobilinogen 0.2 0.2 - 1.0 EU/dL Nitrites NEGATIVE  NEG Leukocyte Esterase NEGATIVE  NEG    
 WBC 5-10 0 - 4 /hpf  
 RBC 0-5 0 - 5 /hpf Epithelial cells FEW FEW /lpf Bacteria NEGATIVE  NEG /hpf  
 UA:UC IF INDICATED URINE CULTURE ORDERED (A) CNI ACETONE/KETONE URINE, QL. Collection Time: 04/07/19  7:07 PM  
Result Value Ref Range Acetone/Ketone,urine qual. LARGE MG/DL PROTEIN/CREATININE RATIO, URINE Collection Time: 04/07/19  7:07 PM  
Result Value Ref Range Protein, urine random 1,598 (H) 0.0 - 11.9 mg/dL Creatinine, urine 297.00 mg/dL Protein/Creat. urine Ratio 5.4 DRUG SCREEN, URINE Collection Time: 04/07/19  7:07 PM  
Result Value Ref Range AMPHETAMINES NEGATIVE  NEG    
 BARBITURATES NEGATIVE  NEG BENZODIAZEPINES NEGATIVE  NEG    
 COCAINE NEGATIVE  NEG METHADONE NEGATIVE  NEG    
 OPIATES NEGATIVE  NEG    
 PCP(PHENCYCLIDINE) NEGATIVE  NEG    
 THC (TH-CANNABINOL) POSITIVE (A) NEG Drug screen comment (NOTE) BILIRUBIN, CONFIRM Collection Time: 04/07/19  7:07 PM  
Result Value Ref Range Bilirubin UA, confirm NEGATIVE  NEG    
POC G3 - PUL Collection Time: 04/07/19  7:12 PM  
Result Value Ref Range pH (POC) 7.469 (H) 7.35 - 7.45    
 pCO2 (POC) 30.5 (L) 35.0 - 45.0 MMHG  
 pO2 (POC) 103 (H) 80 - 100 MMHG  
 HCO3 (POC) 22.1 22 - 26 MMOL/L  
 sO2 (POC) 98 (H) 92 - 97 % Base deficit (POC) 2 mmol/L Site RIGHT BRACHIAL Device: ROOM AIR  Allens test (POC) N/A    
 Specimen type (POC) ARTERIAL Total resp. rate 22 GLUCOSE, POC Collection Time: 04/07/19  8:17 PM  
Result Value Ref Range Glucose (POC) 208 (H) 65 - 100 mg/dL Performed by Nohemy Patel GLUCOSE, POC Collection Time: 04/08/19 12:02 AM  
Result Value Ref Range Glucose (POC) 190 (H) 65 - 100 mg/dL Performed by Leah Perez METABOLIC PANEL, COMPREHENSIVE Collection Time: 04/08/19  1:27 AM  
Result Value Ref Range Sodium 136 136 - 145 mmol/L Potassium 3.5 3.5 - 5.1 mmol/L Chloride 101 97 - 108 mmol/L  
 CO2 23 21 - 32 mmol/L Anion gap 12 5 - 15 mmol/L Glucose 157 (H) 65 - 100 mg/dL BUN 17 6 - 20 MG/DL Creatinine 1.29 (H) 0.55 - 1.02 MG/DL  
 BUN/Creatinine ratio 13 12 - 20 GFR est AA 58 (L) >60 ml/min/1.73m2 GFR est non-AA 48 (L) >60 ml/min/1.73m2 Calcium 8.2 (L) 8.5 - 10.1 MG/DL Bilirubin, total 0.2 0.2 - 1.0 MG/DL  
 ALT (SGPT) 7 (L) 12 - 78 U/L  
 AST (SGOT) 18 15 - 37 U/L Alk. phosphatase 61 45 - 117 U/L Protein, total 4.7 (L) 6.4 - 8.2 g/dL Albumin 1.6 (L) 3.5 - 5.0 g/dL Globulin 3.1 2.0 - 4.0 g/dL A-G Ratio 0.5 (L) 1.1 - 2.2 MAGNESIUM, THERAPEUTIC Collection Time: 04/08/19  1:27 AM  
Result Value Ref Range Therapeutic magnesium 5.0 4.8 - 8.4 MG/DL  
GLUCOSE, POC Collection Time: 04/08/19  1:51 AM  
Result Value Ref Range Glucose (POC) 169 (H) 65 - 100 mg/dL Performed by Khadar Fuentes [facility] Collection Time: 04/08/19  1:58 AM  
Result Value Ref Range Glucose 169 mg/dL Insulin order 2.2 units/hour Insulin adminstered 2.2 units/hour Multiplier 0.020 Low target 140 mg/dL High target 180 mg/dL D50 order 0.0 ml  
 D50 administered 0.00 ml Minutes until next BG 60 min Order initials SS Administered initials RT   
 GLSCOM Comments CBC WITH AUTOMATED DIFF Collection Time: 04/08/19  2:47 AM  
Result Value Ref Range WBC 14.2 (H) 3.6 - 11.0 K/uL RBC 2.44 (L) 3.80 - 5.20 M/uL HGB 6.0 (L) 11.5 - 16.0 g/dL HCT 20.0 (L) 35.0 - 47.0 % MCV 82.0 80.0 - 99.0 FL  
 MCH 24.6 (L) 26.0 - 34.0 PG  
 MCHC 30.0 30.0 - 36.5 g/dL  
 RDW 17.1 (H) 11.5 - 14.5 % PLATELET 647 742 - 509 K/uL MPV 11.3 8.9 - 12.9 FL  
 NRBC 0.0 0  WBC ABSOLUTE NRBC 0.00 0.00 - 0.01 K/uL NEUTROPHILS 73 32 - 75 % LYMPHOCYTES 19 12 - 49 % MONOCYTES 6 5 - 13 % EOSINOPHILS 1 0 - 7 % BASOPHILS 0 0 - 1 % IMMATURE GRANULOCYTES 1 (H) 0.0 - 0.5 % ABS. NEUTROPHILS 10.4 (H) 1.8 - 8.0 K/UL  
 ABS. LYMPHOCYTES 2.7 0.8 - 3.5 K/UL  
 ABS. MONOCYTES 0.9 0.0 - 1.0 K/UL  
 ABS. EOSINOPHILS 0.1 0.0 - 0.4 K/UL  
 ABS. BASOPHILS 0.0 0.0 - 0.1 K/UL  
 ABS. IMM. GRANS. 0.1 (H) 0.00 - 0.04 K/UL  
 DF SMEAR SCANNED    
 PLATELET COMMENTS Large Platelets RBC COMMENTS ANISOCYTOSIS 1+ 
    
 RBC COMMENTS HYPOCHROMIA 1+ GLUCOSE, POC Collection Time: 04/08/19  3:00 AM  
Result Value Ref Range Glucose (POC) 131 (H) 65 - 100 mg/dL Performed by Ford Greene Collection Time: 04/08/19  3:02 AM  
Result Value Ref Range Glucose 131 mg/dL Insulin order 0.7 units/hour Insulin adminstered 0.7 units/hour Multiplier 0.010 Low target 140 mg/dL High target 180 mg/dL D50 order 0.0 ml  
 D50 administered 0.00 ml Minutes until next BG 60 min Order initials rt Administered initials cd GLSCOM Comments GLUCOSE, POC Collection Time: 04/08/19  3:57 AM  
Result Value Ref Range Glucose (POC) 127 (H) 65 - 100 mg/dL Performed by Ford Greene Collection Time: 04/08/19  3:58 AM  
Result Value Ref Range Glucose 127 mg/dL Insulin order 0.0 units/hour Insulin adminstered 0.0 units/hour Multiplier 0.000 Low target 140 mg/dL High target 180 mg/dL D50 order 0.0 ml  
 D50 administered 0.00 ml Minutes until next BG 60 min  Order initials SS   
 Administered initials RT   
 GLSCOM Comments GLUCOSE, POC Collection Time: 04/08/19  5:00 AM  
Result Value Ref Range Glucose (POC) 147 (H) 65 - 100 mg/dL Performed by [facility] Collection Time: 04/08/19  5:03 AM  
Result Value Ref Range Glucose 147 mg/dL Insulin order 0.1 units/hour Insulin adminstered 0.1 units/hour Multiplier 0.000 Low target 140 mg/dL High target 180 mg/dL D50 order 0.0 ml  
 D50 administered 0.00 ml Minutes until next BG 60 min Order initials SS Administered initials RT   
 GLSCOM Comments GLUCOSE, POC Collection Time: 04/08/19  5:57 AM  
Result Value Ref Range Glucose (POC) 200 (H) 65 - 100 mg/dL Performed by [facility] Collection Time: 04/08/19  5:57 AM  
Result Value Ref Range Glucose 200 mg/dL Insulin order 1.4 units/hour Insulin adminstered 1.4 units/hour Multiplier 0.010 Low target 140 mg/dL High target 180 mg/dL D50 order 0.0 ml  
 D50 administered 0.00 ml Minutes until next BG 60 min Order initials SS Administered initials RT   
 GLSCOM Comments GLUCOSE, POC Collection Time: 04/08/19  6:55 AM  
Result Value Ref Range Glucose (POC) 185 (H) 65 - 100 mg/dL Performed by [facility] Collection Time: 04/08/19  6:56 AM  
Result Value Ref Range Glucose 185 mg/dL Insulin order 2.5 units/hour Insulin adminstered 2.5 units/hour Multiplier 0.020 Low target 140 mg/dL High target 180 mg/dL D50 order 0.0 ml  
 D50 administered 0.00 ml Minutes until next BG 60 min Order initials SS Administered initials RT   
 GLSCOM Comments CBC WITH AUTOMATED DIFF Collection Time: 04/08/19  7:17 AM  
Result Value Ref Range WBC 13.0 (H) 3.6 - 11.0 K/uL  
 RBC 2.66 (L) 3.80 - 5.20 M/uL HGB 6.5 (L) 11.5 - 16.0 g/dL HCT 21.7 (L) 35.0 - 47.0 %  MCV 81.6 80.0 - 99.0 FL  
 MCH 24.4 (L) 26.0 - 34.0 PG  
 MCHC 30.0 30.0 - 36.5 g/dL  
 RDW 17.2 (H) 11.5 - 14.5 % PLATELET 940 891 - 092 K/uL MPV 11.1 8.9 - 12.9 FL  
 NRBC 0.0 0  WBC ABSOLUTE NRBC 0.00 0.00 - 0.01 K/uL NEUTROPHILS 91 (H) 32 - 75 % LYMPHOCYTES 7 (L) 12 - 49 % MONOCYTES 1 (L) 5 - 13 % EOSINOPHILS 0 0 - 7 % BASOPHILS 0 0 - 1 % IMMATURE GRANULOCYTES 1 (H) 0.0 - 0.5 % ABS. NEUTROPHILS 11.9 (H) 1.8 - 8.0 K/UL  
 ABS. LYMPHOCYTES 0.9 0.8 - 3.5 K/UL  
 ABS. MONOCYTES 0.1 0.0 - 1.0 K/UL  
 ABS. EOSINOPHILS 0.0 0.0 - 0.4 K/UL  
 ABS. BASOPHILS 0.0 0.0 - 0.1 K/UL  
 ABS. IMM. GRANS. 0.1 (H) 0.00 - 0.04 K/UL  
 DF AUTOMATED PLATELET COMMENTS Large Platelets RBC COMMENTS ANISOCYTOSIS 1+ 
    
 RBC COMMENTS HYPOCHROMIA 1+ METABOLIC PANEL, COMPREHENSIVE Collection Time: 04/08/19  7:17 AM  
Result Value Ref Range Sodium 135 (L) 136 - 145 mmol/L Potassium 3.2 (L) 3.5 - 5.1 mmol/L Chloride 103 97 - 108 mmol/L  
 CO2 22 21 - 32 mmol/L Anion gap 10 5 - 15 mmol/L Glucose 172 (H) 65 - 100 mg/dL BUN 18 6 - 20 MG/DL Creatinine 1.13 (H) 0.55 - 1.02 MG/DL  
 BUN/Creatinine ratio 16 12 - 20 GFR est AA >60 >60 ml/min/1.73m2 GFR est non-AA 56 (L) >60 ml/min/1.73m2 Calcium 7.4 (L) 8.5 - 10.1 MG/DL Bilirubin, total 0.3 0.2 - 1.0 MG/DL  
 ALT (SGPT) 9 (L) 12 - 78 U/L  
 AST (SGOT) 14 (L) 15 - 37 U/L Alk. phosphatase 59 45 - 117 U/L Protein, total 4.2 (L) 6.4 - 8.2 g/dL Albumin 1.4 (L) 3.5 - 5.0 g/dL Globulin 2.8 2.0 - 4.0 g/dL A-G Ratio 0.5 (L) 1.1 - 2.2 GLUCOSE, POC Collection Time: 04/08/19  8:05 AM  
Result Value Ref Range Glucose (POC) 167 (H) 65 - 100 mg/dL Performed by Tessa Quach Collection Time: 04/08/19  8:07 AM  
Result Value Ref Range Glucose 167 mg/dL Insulin order 2.1 units/hour Insulin adminstered 2.1 units/hour Multiplier 0.020 Low target 140 mg/dL High target 180 mg/dL D50 order 0.0 ml  
 D50 administered 0.00 ml Minutes until next BG 60 min Order initials RT Administered initials RT   
 GLSCOM Comments verified by PF   
GLUCOSE, POC Collection Time: 04/08/19  9:10 AM  
Result Value Ref Range Glucose (POC) 131 (H) 65 - 100 mg/dL Performed by Radha Noriega Collection Time: 04/08/19  9:14 AM  
Result Value Ref Range Glucose 131 mg/dL Insulin order 0.7 units/hour Insulin adminstered 0.7 units/hour Multiplier 0.010 Low target 140 mg/dL High target 180 mg/dL D50 order 0.0 ml  
 D50 administered 0.00 ml Minutes until next BG 60 min Order initials pf   
 Administered initials pf   
 GLSCOM Comments Assessment and Plan: Active Problems: 
  Chronic hypertension with superimposed preeclampsia (4/8/2019) 1. Chronic HTN with Superimposed Preeclampsia- Bp's Controlled w/o Severe features On labetalol 300 mg BID 2. IUP @ 31 weeks s/p Betamethasone x 1 Next dose due in AM for FLM, CAt 1 NST EGA appropriate NST 3. IDDM since age 13 h/o DKA in Pregnancy No current Evidence DKA poor control currently  On Insulin Drip Per MFM 4. H/o Opiate abuse(per records) 5. H/o Cannabis abuse(positve UDS Screen on admission) 6. H/o  ARF with oliguria , Nephrology Consult. 7. N/V of Pregnancy currently on Antiemetics Stable( scopolamine patch) 8. H/o Depression Currently on Cymbalta 9. H/o Chronic Pain(cymbalta) recent percocet prescription 10 H/o tobacco abuse 11. Awaiting MFM consult today for management advice. 12. Anemia-unknown etiology(Pregnancy related?) no acute symptoms of acute blood loss. Will Transfuse 1u PRBC, GI consult due to h/o hematemesis n/v r/o GI bleed Signed By: Declan Rivas MD   
 April 8, 2019

## 2019-04-08 NOTE — PROGRESS NOTES
1348 In patient's room to change fetal monitor paper. BP noted to be 155/107, pulse 108. Will re-evaluate after next BP to be taken in 5 minutes. 1353 /103 and pulse 102. Charge nurse TINO An RN notified.

## 2019-04-08 NOTE — H&P
Labor and Delivery Admission Note 4/8/2019 Transfer from [facility] with Chronic Hypertension with ASMITA and Uncontrolled Type 1 DM on Insulin pump 30 y.o., , female, G1 P 0 Estimated Date of Delivery: 6/26/19 @ 28 weeks 5 days  presents with nausea/vomiting and epigastric pain at [facility] around 12.30 pm . Patient states her symptoms worsened after having dinner with hot dogs on Saturday night. On arrival at the ED at [facility] her BPs were in the severe range and she was treated with ? Haldol. Thereafter she was transferred to L &D requiring Labetalol 20 mg IV single dose and was started on magnesium. Her urine output at [facility] over the last 4 hrs had been 10-15 cc/hr. Fetal monitoring was Baseline 120-130, with adequate variability and reactivity C/W GA. Currently , her nausea and vomiting has been better, no epigastric pain, no headache, no visual changes, no epigastric pain. Has been a Type 1 DM since the age of 13 and had been on Insulin pump since march 2018. Diagnosed with Chronic Hypertension and has been on labetalol 200 mg BID which was recently increased to 300 mg BID during her hospital admission last week at [facility]. Patient also mentions that she has been admitted with DKA couple of times in the first weeks of her pregnancy. Patient has been having prenatal care at [facility] and mentions having seen by high risk OB at [facility]. States her blood sugars has been in the normal ?range. PNC: Blood type: B 
          RH: pos Rubella: SVII serology: GBS status:  
Past Medical History:  
Diagnosis Date  Chronic pain  Diabetes type 1, uncontrolled ([address]  DKA, type 1 ([address]  Heart murmur  Other ill-defined conditions(799.89)   
 neuropathy  V-tach ([address] Past Surgical History:  
Procedure Laterality Date  ABDOMEN SURGERY PROC UNLISTED    
 exploratory laparoscopy  HX CYST REMOVAL    
 HX OTHER SURGICAL  [facility]: [facility] Meds:  
Current Facility-Administered Medications Medication Dose Route Frequency  insulin regular (NOVOLIN R, HUMULIN R) 100 Units in 0.9% sodium chloride 100 mL infusion  1-10 Units/hr IntraVENous TITRATE  glucose chewable tablet 16 g  4 Tab Oral PRN  
 dextrose (D50W) injection syrg 12.5-25 g  25-50 mL IntraVENous PRN  
 glucagon (GLUCAGEN) injection 1 mg  1 mg IntraMUSCular PRN  
 sodium chloride 0.9 % bolus infusion 333 mL  333 mL IntraVENous ONCE  
 betamethasone (CELESTONE) injection 12 mg  12 mg IntraMUSCular Q24H Allergies: Allergies Allergen Reactions  Morphine Other (comments)  Pcn [Penicillins] Hives Pertinent ROS: per HPI Family History Problem Relation Age of Onset  Hypertension Mother  Sleep Apnea Father  Hypertension Other Social History Socioeconomic History  Marital status: SINGLE Spouse name: Not on file  Number of children: Not on file  Years of education: Not on file  Highest education level: Not on file Occupational History  Not on file Social Needs  Financial resource strain: Not on file  Food insecurity:  
  Worry: Not on file Inability: Not on file  Transportation needs:  
  Medical: Not on file Non-medical: Not on file Tobacco Use  Smoking status: Current Every Day Smoker Packs/day: 0.25 Years: 8.00 Pack years: 2.00 Types: Cigarettes Last attempt to quit: 10/26/2014 Years since quittin.4  Smokeless tobacco: Never Used Substance and Sexual Activity  Alcohol use: No  
  Alcohol/week: 0.0 oz  Drug use: Yes Frequency: 2.0 times per week Types: Marijuana  Sexual activity: Yes  
  Partners: Male Birth control/protection: Pill Lifestyle  Physical activity:  
  Days per week: Not on file Minutes per session: Not on file  Stress: Not on file Relationships  Social connections:  
  Talks on phone: Not on file Gets together: Not on file Attends [employer] service: Not on file Active member of club or organization: Not on file Attends meetings of clubs or organizations: Not on file Relationship status: Not on file  Intimate partner violence:  
  Fear of current or ex partner: Not on file Emotionally abused: Not on file Physically abused: Not on file Forced sexual activity: Not on file Other Topics Concern  Not on file Social History Narrative  Not on file OBJECTIVE: 
Gravid , female NAD Temp (24hrs), Av.7 °F (37.1 °C), Min:98.5 °F (36.9 °C), Max:99.1 °F (37.3 °C) Visit Vitals /69 (BP 1 Location: Left arm, BP Patient Position: At rest;Sitting) Pulse 95 Temp 98.5 °F (36.9 °C) Resp 17 Breastfeeding? No  
 
 
Labs:   
Lab Results Component Value Date/Time WBC 16.7 (H) 2019 01:57 PM  
 
 
Exam: 
HEENT:  normal  
Lungs:  clear Cor:  RRR Abdomen:  Fundal height 26 cm Gravid, relaxed Fetal heart rate tracin, mild-moderate variability, few variables, with few accels C/W GA Contraction pattern: irritability noted Cervix: deferred Fluid: Intact U/S done at [facility] by Dr. Edmond Crowder- reveals BONNY 15 cm , placenta- posterior, fundal, Vertex presentation Impression:   
G1 @ 28 weeks 5 days with Poorly controlled Type 1 DM on Insulin pump and Chronic Hypertension with superimposed Pre-eclampsia Inpatient admission Strict NPO Fluids NS 1l over 2-3 hrs Strict Is and Os Will stop magnesium and Insulin pump Regular Insulin drip per protocol and acuchecks per protocol Betamethasone 12 mg IM and 2 nd dose to be repeated in 24 hrs Labs CBC, CMP, magnesium stat NICU consult All the above recommendations were reviewed with Dr. Laci Funez Delivery Indications along with mode of delivery -  delivery with Classical  section with implications in future pregnancies along with risks of prematurity were discussed with patient.  
 
 
 
 
Beatriz Sutton MD

## 2019-04-09 ENCOUNTER — APPOINTMENT (OUTPATIENT)
Dept: ULTRASOUND IMAGING | Age: 29
DRG: 832 | End: 2019-04-09
Attending: NURSE PRACTITIONER
Payer: MEDICAID

## 2019-04-09 LAB
ABO + RH BLD: NORMAL
ADMINISTERED INITIALS, ADMINIT: NORMAL
ANION GAP SERPL CALC-SCNC: 6 MMOL/L (ref 5–15)
BACTERIA SPEC CULT: NORMAL
BLD PROD TYP BPU: NORMAL
BLOOD GROUP ANTIBODIES SERPL: NORMAL
BPU ID: NORMAL
BUN SERPL-MCNC: 20 MG/DL (ref 6–20)
BUN/CREAT SERPL: 17 (ref 12–20)
CALCIUM SERPL-MCNC: 7.3 MG/DL (ref 8.5–10.1)
CC UR VC: NORMAL
CHLORIDE SERPL-SCNC: 107 MMOL/L (ref 97–108)
CO2 SERPL-SCNC: 21 MMOL/L (ref 21–32)
CREAT SERPL-MCNC: 1.15 MG/DL (ref 0.55–1.02)
CREAT UR-MCNC: 17.2 MG/DL
CROSSMATCH RESULT,%XM: NORMAL
D50 ADMINISTERED, D50ADM: 0 ML
D50 ORDER, D50ORD: 0 ML
ERYTHROCYTE [DISTWIDTH] IN BLOOD BY AUTOMATED COUNT: 16.8 % (ref 11.5–14.5)
GLSCOM COMMENTS: NORMAL
GLUCOSE BLD STRIP.AUTO-MCNC: 112 MG/DL (ref 65–100)
GLUCOSE BLD STRIP.AUTO-MCNC: 113 MG/DL (ref 65–100)
GLUCOSE BLD STRIP.AUTO-MCNC: 115 MG/DL (ref 65–100)
GLUCOSE BLD STRIP.AUTO-MCNC: 126 MG/DL (ref 65–100)
GLUCOSE BLD STRIP.AUTO-MCNC: 126 MG/DL (ref 65–100)
GLUCOSE BLD STRIP.AUTO-MCNC: 127 MG/DL (ref 65–100)
GLUCOSE BLD STRIP.AUTO-MCNC: 130 MG/DL (ref 65–100)
GLUCOSE BLD STRIP.AUTO-MCNC: 139 MG/DL (ref 65–100)
GLUCOSE BLD STRIP.AUTO-MCNC: 141 MG/DL (ref 65–100)
GLUCOSE BLD STRIP.AUTO-MCNC: 143 MG/DL (ref 65–100)
GLUCOSE BLD STRIP.AUTO-MCNC: 156 MG/DL (ref 65–100)
GLUCOSE BLD STRIP.AUTO-MCNC: 161 MG/DL (ref 65–100)
GLUCOSE BLD STRIP.AUTO-MCNC: 162 MG/DL (ref 65–100)
GLUCOSE BLD STRIP.AUTO-MCNC: 168 MG/DL (ref 65–100)
GLUCOSE BLD STRIP.AUTO-MCNC: 172 MG/DL (ref 65–100)
GLUCOSE BLD STRIP.AUTO-MCNC: 172 MG/DL (ref 65–100)
GLUCOSE BLD STRIP.AUTO-MCNC: 174 MG/DL (ref 65–100)
GLUCOSE BLD STRIP.AUTO-MCNC: 180 MG/DL (ref 65–100)
GLUCOSE BLD STRIP.AUTO-MCNC: 199 MG/DL (ref 65–100)
GLUCOSE BLD STRIP.AUTO-MCNC: 225 MG/DL (ref 65–100)
GLUCOSE BLD STRIP.AUTO-MCNC: 226 MG/DL (ref 65–100)
GLUCOSE SERPL-MCNC: 172 MG/DL (ref 65–100)
GLUCOSE, GLC: 112 MG/DL
GLUCOSE, GLC: 113 MG/DL
GLUCOSE, GLC: 115 MG/DL
GLUCOSE, GLC: 126 MG/DL
GLUCOSE, GLC: 126 MG/DL
GLUCOSE, GLC: 127 MG/DL
GLUCOSE, GLC: 130 MG/DL
GLUCOSE, GLC: 139 MG/DL
GLUCOSE, GLC: 143 MG/DL
GLUCOSE, GLC: 156 MG/DL
GLUCOSE, GLC: 161 MG/DL
GLUCOSE, GLC: 162 MG/DL
GLUCOSE, GLC: 168 MG/DL
GLUCOSE, GLC: 172 MG/DL
GLUCOSE, GLC: 172 MG/DL
GLUCOSE, GLC: 174 MG/DL
GLUCOSE, GLC: 180 MG/DL
GLUCOSE, GLC: 199 MG/DL
GLUCOSE, GLC: 225 MG/DL
GLUCOSE, GLC: 226 MG/DL
HCT VFR BLD AUTO: 24.4 % (ref 35–47)
HGB BLD-MCNC: 7.7 G/DL (ref 11.5–16)
HIGH TARGET, HITG: 140 MG/DL
HIGH TARGET, HITG: 180 MG/DL
INSULIN ADMINSTERED, INSADM: 0 UNITS/HOUR
INSULIN ADMINSTERED, INSADM: 0.1 UNITS/HOUR
INSULIN ADMINSTERED, INSADM: 0.7 UNITS/HOUR
INSULIN ADMINSTERED, INSADM: 1.3 UNITS/HOUR
INSULIN ADMINSTERED, INSADM: 1.6 UNITS/HOUR
INSULIN ADMINSTERED, INSADM: 1.6 UNITS/HOUR
INSULIN ADMINSTERED, INSADM: 1.7 UNITS/HOUR
INSULIN ADMINSTERED, INSADM: 2 UNITS/HOUR
INSULIN ADMINSTERED, INSADM: 2 UNITS/HOUR
INSULIN ADMINSTERED, INSADM: 2.2 UNITS/HOUR
INSULIN ADMINSTERED, INSADM: 2.2 UNITS/HOUR
INSULIN ADMINSTERED, INSADM: 2.3 UNITS/HOUR
INSULIN ADMINSTERED, INSADM: 2.4 UNITS/HOUR
INSULIN ADMINSTERED, INSADM: 2.4 UNITS/HOUR
INSULIN ADMINSTERED, INSADM: 2.9 UNITS/HOUR
INSULIN ADMINSTERED, INSADM: 3.1 UNITS/HOUR
INSULIN ADMINSTERED, INSADM: 3.3 UNITS/HOUR
INSULIN ADMINSTERED, INSADM: 4.2 UNITS/HOUR
INSULIN ORDER, INSORD: 0 UNITS/HOUR
INSULIN ORDER, INSORD: 0.1 UNITS/HOUR
INSULIN ORDER, INSORD: 0.7 UNITS/HOUR
INSULIN ORDER, INSORD: 1.3 UNITS/HOUR
INSULIN ORDER, INSORD: 1.6 UNITS/HOUR
INSULIN ORDER, INSORD: 1.6 UNITS/HOUR
INSULIN ORDER, INSORD: 1.7 UNITS/HOUR
INSULIN ORDER, INSORD: 2 UNITS/HOUR
INSULIN ORDER, INSORD: 2 UNITS/HOUR
INSULIN ORDER, INSORD: 2.2 UNITS/HOUR
INSULIN ORDER, INSORD: 2.2 UNITS/HOUR
INSULIN ORDER, INSORD: 2.3 UNITS/HOUR
INSULIN ORDER, INSORD: 2.4 UNITS/HOUR
INSULIN ORDER, INSORD: 2.4 UNITS/HOUR
INSULIN ORDER, INSORD: 2.9 UNITS/HOUR
INSULIN ORDER, INSORD: 3.1 UNITS/HOUR
INSULIN ORDER, INSORD: 3.3 UNITS/HOUR
INSULIN ORDER, INSORD: 4.2 UNITS/HOUR
LIPASE SERPL-CCNC: 70 U/L (ref 73–393)
LOW TARGET, LOT: 100 MG/DL
LOW TARGET, LOT: 140 MG/DL
MCH RBC QN AUTO: 25.6 PG (ref 26–34)
MCHC RBC AUTO-ENTMCNC: 31.6 G/DL (ref 30–36.5)
MCV RBC AUTO: 81.1 FL (ref 80–99)
MINUTES UNTIL NEXT BG, NBG: 120 MIN
MINUTES UNTIL NEXT BG, NBG: 60 MIN
MULTIPLIER, MUL: 0
MULTIPLIER, MUL: 0
MULTIPLIER, MUL: 0.01
MULTIPLIER, MUL: 0.01
MULTIPLIER, MUL: 0.02
MULTIPLIER, MUL: 0.03
NRBC # BLD: 0 K/UL (ref 0–0.01)
NRBC BLD-RTO: 0 PER 100 WBC
ORDER INITIALS, ORDINIT: NORMAL
PHYSICIAN INSTRUCTIO,%PI: NORMAL
PLATELET # BLD AUTO: 260 K/UL (ref 150–400)
PMV BLD AUTO: 11 FL (ref 8.9–12.9)
POTASSIUM SERPL-SCNC: 4.1 MMOL/L (ref 3.5–5.1)
PROT UR-MCNC: 74 MG/DL (ref 0–11.9)
PROT/CREAT UR-RTO: 4.3
RBC # BLD AUTO: 3.01 M/UL (ref 3.8–5.2)
SERVICE CMNT-IMP: ABNORMAL
SERVICE CMNT-IMP: NORMAL
SODIUM SERPL-SCNC: 134 MMOL/L (ref 136–145)
SPECIMEN EXP DATE BLD: NORMAL
STATUS OF UNIT,%ST: NORMAL
UNIT DIVISION, %UDIV: 0
WBC # BLD AUTO: 13.9 K/UL (ref 3.6–11)

## 2019-04-09 PROCEDURE — 74011000258 HC RX REV CODE- 258: Performed by: OBSTETRICS & GYNECOLOGY

## 2019-04-09 PROCEDURE — 85027 COMPLETE CBC AUTOMATED: CPT

## 2019-04-09 PROCEDURE — 74011636637 HC RX REV CODE- 636/637: Performed by: OBSTETRICS & GYNECOLOGY

## 2019-04-09 PROCEDURE — 65270000029 HC RM PRIVATE

## 2019-04-09 PROCEDURE — 74011250636 HC RX REV CODE- 250/636: Performed by: OBSTETRICS & GYNECOLOGY

## 2019-04-09 PROCEDURE — 76805 OB US >/= 14 WKS SNGL FETUS: CPT | Performed by: OBSTETRICS & GYNECOLOGY

## 2019-04-09 PROCEDURE — 74011250637 HC RX REV CODE- 250/637: Performed by: NURSE PRACTITIONER

## 2019-04-09 PROCEDURE — 80048 BASIC METABOLIC PNL TOTAL CA: CPT

## 2019-04-09 PROCEDURE — 83690 ASSAY OF LIPASE: CPT

## 2019-04-09 PROCEDURE — 74011250637 HC RX REV CODE- 250/637: Performed by: OBSTETRICS & GYNECOLOGY

## 2019-04-09 PROCEDURE — 84156 ASSAY OF PROTEIN URINE: CPT

## 2019-04-09 PROCEDURE — 36415 COLL VENOUS BLD VENIPUNCTURE: CPT

## 2019-04-09 PROCEDURE — 77030018846 HC SOL IRR STRL H20 ICUM -A

## 2019-04-09 PROCEDURE — 76705 ECHO EXAM OF ABDOMEN: CPT

## 2019-04-09 PROCEDURE — 74011000250 HC RX REV CODE- 250

## 2019-04-09 PROCEDURE — 82962 GLUCOSE BLOOD TEST: CPT

## 2019-04-09 RX ORDER — SUCRALFATE 1 G/1
1 TABLET ORAL
Status: DISCONTINUED | OUTPATIENT
Start: 2019-04-09 | End: 2019-04-17 | Stop reason: HOSPADM

## 2019-04-09 RX ORDER — SUCRALFATE 1 G/10ML
1 SUSPENSION ORAL
Status: DISCONTINUED | OUTPATIENT
Start: 2019-04-09 | End: 2019-04-09 | Stop reason: SDUPTHER

## 2019-04-09 RX ORDER — SODIUM CHLORIDE 9 MG/ML
INJECTION INTRAMUSCULAR; INTRAVENOUS; SUBCUTANEOUS
Status: COMPLETED
Start: 2019-04-09 | End: 2019-04-09

## 2019-04-09 RX ADMIN — FAMOTIDINE 20 MG: 10 INJECTION, SOLUTION INTRAVENOUS at 02:24

## 2019-04-09 RX ADMIN — SODIUM CHLORIDE 0.1 UNITS/HR: 900 INJECTION, SOLUTION INTRAVENOUS at 04:23

## 2019-04-09 RX ADMIN — Medication 10 ML: at 03:48

## 2019-04-09 RX ADMIN — ONDANSETRON 4 MG: 2 INJECTION INTRAMUSCULAR; INTRAVENOUS at 03:47

## 2019-04-09 RX ADMIN — Medication 30 ML: at 08:16

## 2019-04-09 RX ADMIN — LABETALOL HYDROCHLORIDE 300 MG: 100 TABLET, FILM COATED ORAL at 14:01

## 2019-04-09 RX ADMIN — PROMETHAZINE HYDROCHLORIDE 25 MG: 25 INJECTION INTRAMUSCULAR; INTRAVENOUS at 08:45

## 2019-04-09 RX ADMIN — Medication 10 ML: at 03:47

## 2019-04-09 RX ADMIN — Medication 30 ML: at 04:27

## 2019-04-09 RX ADMIN — BETAMETHASONE SODIUM PHOSPHATE AND BETAMETHASONE ACETATE 12 MG: 3; 3 INJECTION, SUSPENSION INTRA-ARTICULAR; INTRALESIONAL; INTRAMUSCULAR at 02:26

## 2019-04-09 RX ADMIN — Medication 20 ML: at 02:33

## 2019-04-09 RX ADMIN — FAMOTIDINE 20 MG: 10 INJECTION, SOLUTION INTRAVENOUS at 14:02

## 2019-04-09 RX ADMIN — PROMETHAZINE HYDROCHLORIDE 25 MG: 25 INJECTION INTRAMUSCULAR; INTRAVENOUS at 02:40

## 2019-04-09 RX ADMIN — SUCRALFATE 1 G: 1 TABLET ORAL at 21:58

## 2019-04-09 RX ADMIN — SODIUM CHLORIDE 75 ML/HR: 900 INJECTION, SOLUTION INTRAVENOUS at 03:47

## 2019-04-09 RX ADMIN — SUCRALFATE 1 G: 1 TABLET ORAL at 15:47

## 2019-04-09 RX ADMIN — INSULIN LISPRO 4 UNITS: 100 INJECTION, SOLUTION INTRAVENOUS; SUBCUTANEOUS at 10:01

## 2019-04-09 RX ADMIN — INSULIN LISPRO 5 UNITS: 100 INJECTION, SOLUTION INTRAVENOUS; SUBCUTANEOUS at 18:42

## 2019-04-09 RX ADMIN — INSULIN LISPRO 4 UNITS: 100 INJECTION, SOLUTION INTRAVENOUS; SUBCUTANEOUS at 12:11

## 2019-04-09 RX ADMIN — INSULIN LISPRO 2 UNITS: 100 INJECTION, SOLUTION INTRAVENOUS; SUBCUTANEOUS at 00:19

## 2019-04-09 RX ADMIN — SODIUM CHLORIDE 10 ML: 9 INJECTION, SOLUTION INTRAMUSCULAR; INTRAVENOUS; SUBCUTANEOUS at 02:24

## 2019-04-09 RX ADMIN — LABETALOL HYDROCHLORIDE 300 MG: 100 TABLET, FILM COATED ORAL at 02:16

## 2019-04-09 NOTE — PROGRESS NOTES
[address] 
[address] [facility] [address] [facility] [address] [place], [address] 
[phone] GI [facility] [place] [facility] 
 
 
NAME:   Gene Chavira :    1990 MRN:    [id number] Assessment/Plan 1. Intractable n/v with hematemesis,-gastroparesis, pregnancy, possible marijuana abuse all possibly could be playing a role. Differentials include esophagitis, gastritis or [place] tear, PUD, etc. Abd US unremarkable. -Transfuse as necessary 
-Pepcid BID 
-add carafate 
-Continue to monitor clinical course. EGD and sedation are a risk given her pregnant state. However, if her Hgb continues to drop or symptoms persist, would consider EGD 2. Chronic anemia-baseline hemoglobin ~10. GI cause is possibility Patient Active Problem List  
Diagnosis Code  Abnormal LFTs R94.5  Acute renal failure (HCC) N17.9  SIRS (systemic inflammatory response syndrome) ([facility]) R65.10  Ventricular tachycardia ([facility]) I47.2  SVT (supraventricular tachycardia) ([facility]) I47.1  UTI (urinary tract infection) N39.0  DM type 1 (diabetes mellitus, type 1) ([facility]) E10.9  Chronic abdominal pain R10.9, G89.29  
 Nausea & vomiting R11.2  Viral syndrome B34.9  Depression F32.9  Diabetic peripheral neuropathy associated with type 1 diabetes mellitus (HCC) E10.42  
 DKA, type 1 (HCC) E10.10  GIB (gastrointestinal bleeding) K92.2  Nausea R11.0  Neuropathy G62.9  Hypertension I10  
 Hypokalemia E87.6  Hypophosphatemia E83.39  
 DKA, type 1, not at goal [facility]) E10.10  Leukocytosis D72.829  
 Epigastric abdominal pain R10.13  
 LLQ abdominal pain R10.32  
 Diarrhea R19.7  Weight loss R63.4  Esophagitis, erosive K22.10  Nausea and vomiting R11.2  Sepsis ([address] A41.9  DKA (diabetic ketoacidoses) ([facility]) E13.10  Pre-eclampsia superimposed on chronic hypertension, antepartum O11.9, O10.019  
  Chronic hypertension with superimposed preeclampsia O11.9 Subjective:  
 
Feeling ino. No vomiting. Tolerating PO. Objective: VITALS:  
Last 24hrs VS reviewed since prior hospitalist progress note. Most recent are: 
Visit Vitals /85 Pulse 91 Temp 97.9 °F (36.6 °C) Resp 16 Ht 5' 8\" (1.727 m) Wt 70.8 kg (156 lb) SpO2 99% Breastfeeding? No  
BMI 23.72 kg/m² Intake/Output Summary (Last 24 hours) at 4/9/2019 1518 Last data filed at 4/9/2019 1103 Gross per 24 hour Intake 50 ml Output 2100 ml Net -2050 ml PHYSICAL EXAM: 
General:          Well developed, Well nourished. Alert, cooperative, no acute distress.   
HEENT:           Normocephalic, Atraumatic. PERRLA, EOMI. Anicteric sclerae. Lungs:             CTA Bilaterally. No Wheezing/Rhonchi/Rales. Heart:              Regular rate and rhythm, No murmur, No Rubs, No Gallops Abdomen:        Soft, gravid, non-tender.  +Bowel sounds, no hepatomegaly Extremities:     No cyanosis,clubing or edema Neurologic:      Alert and oriented X 3. No acute neurological distress. Psych:             Good insight. Not anxious nor agitated. Lab Data Recent Results (from the past 12 hour(s)) METABOLIC PANEL, BASIC Collection Time: 04/09/19  4:18 AM  
Result Value Ref Range Sodium 134 (L) 136 - 145 mmol/L Potassium 4.1 3.5 - 5.1 mmol/L Chloride 107 97 - 108 mmol/L  
 CO2 21 21 - 32 mmol/L Anion gap 6 5 - 15 mmol/L Glucose 172 (H) 65 - 100 mg/dL BUN 20 6 - 20 MG/DL Creatinine 1.15 (H) 0.55 - 1.02 MG/DL  
 BUN/Creatinine ratio 17 12 - 20 GFR est AA >60 >60 ml/min/1.73m2 GFR est non-AA 55 (L) >60 ml/min/1.73m2 Calcium 7.3 (L) 8.5 - 10.1 MG/DL PROTEIN/CREATININE RATIO, URINE Collection Time: 04/09/19  4:18 AM  
Result Value Ref Range Protein, urine random 74 (H) 0.0 - 11.9 mg/dL Creatinine, urine 17.20 mg/dL Protein/Creat.  urine Ratio 4.3    
CBC W/O DIFF  
 Collection Time: 04/09/19  4:18 AM  
Result Value Ref Range WBC 13.9 (H) 3.6 - 11.0 K/uL  
 RBC 3.01 (L) 3.80 - 5.20 M/uL HGB 7.7 (L) 11.5 - 16.0 g/dL HCT 24.4 (L) 35.0 - 47.0 % MCV 81.1 80.0 - 99.0 FL  
 MCH 25.6 (L) 26.0 - 34.0 PG  
 MCHC 31.6 30.0 - 36.5 g/dL  
 RDW 16.8 (H) 11.5 - 14.5 % PLATELET 809 917 - 464 K/uL MPV 11.0 8.9 - 12.9 FL  
 NRBC 0.0 0  WBC ABSOLUTE NRBC 0.00 0.00 - 0.01 K/uL GLUCOSE, POC Collection Time: 04/09/19  4:21 AM  
Result Value Ref Range Glucose (POC) 172 (H) 65 - 100 mg/dL Performed by Anoop Rivera Collection Time: 04/09/19  4:25 AM  
Result Value Ref Range Glucose 172 mg/dL Insulin order 0.1 units/hour Insulin adminstered 0.1 units/hour Multiplier 0.000 Low target 140 mg/dL High target 180 mg/dL D50 order 0.0 ml  
 D50 administered 0.00 ml Minutes until next BG 60 min Order initials cd Administered initials cd GLSCOM Comments GLUCOSE, POC Collection Time: 04/09/19  5:33 AM  
Result Value Ref Range Glucose (POC) 226 (H) 65 - 100 mg/dL Performed by Anoop Rivera Collection Time: 04/09/19  5:33 AM  
Result Value Ref Range Glucose 226 mg/dL Insulin order 1.7 units/hour Insulin adminstered 1.7 units/hour Multiplier 0.010 Low target 140 mg/dL High target 180 mg/dL D50 order 0.0 ml  
 D50 administered 0.00 ml Minutes until next BG 60 min Order initials cd Administered initials cd GLSCOM Comments GLUCOSE, POC Collection Time: 04/09/19  6:40 AM  
Result Value Ref Range Glucose (POC) 225 (H) 65 - 100 mg/dL Performed by Anoop Rivera Collection Time: 04/09/19  6:41 AM  
Result Value Ref Range Glucose 225 mg/dL Insulin order 3.3 units/hour Insulin adminstered 3.3 units/hour Multiplier 0.020 Low target 140 mg/dL High target 180 mg/dL  D50 order 0.0 ml  
 D50 administered 0.00 ml Minutes until next BG 60 min Order initials cd Administered initials cd GLSCOM Comments GLUCOSE, POC Collection Time: 04/09/19  7:57 AM  
Result Value Ref Range Glucose (POC) 199 (H) 65 - 100 mg/dL Performed by Pastora Kramer Collection Time: 04/09/19  7:57 AM  
Result Value Ref Range Glucose 199 mg/dL Insulin order 4.2 units/hour Insulin adminstered 4.2 units/hour Multiplier 0.030 Low target 140 mg/dL High target 180 mg/dL D50 order 0.0 ml  
 D50 administered 0.00 ml Minutes until next BG 60 min Order initials cd Administered initials cd GLSCOM Comments LIPASE Collection Time: 04/09/19  8:21 AM  
Result Value Ref Range Lipase 70 (L) 73 - 393 U/L  
GLUCOSE, POC Collection Time: 04/09/19  9:06 AM  
Result Value Ref Range Glucose (POC) 156 (H) 65 - 100 mg/dL Performed by [place] Kramer Collection Time: 04/09/19  9:08 AM  
Result Value Ref Range Glucose 156 mg/dL Insulin order 2.9 units/hour Insulin adminstered 2.9 units/hour Multiplier 0.030 Low target 140 mg/dL High target 180 mg/dL D50 order 0.0 ml  
 D50 administered 0.00 ml Minutes until next BG 60 min Order initials lf   
 Administered initials lf   
 GLSCOM Comments GLUCOSE, POC Collection Time: 04/09/19 10:00 AM  
Result Value Ref Range Glucose (POC) 127 (H) 65 - 100 mg/dL Performed by [place] Kramer Collection Time: 04/09/19 10:03 AM  
Result Value Ref Range Glucose 127 mg/dL Insulin order 1.3 units/hour Insulin adminstered 1.3 units/hour Multiplier 0.020 Low target 140 mg/dL High target 180 mg/dL D50 order 0.0 ml  
 D50 administered 0.00 ml Minutes until next BG 60 min Order initials lf   
 Administered initials lf   
 GLSCOM Comments GLUCOSE, POC Collection Time: 04/09/19 11:00 AM  
Result Value Ref Range Glucose (POC) 172 (H) 65 - 100 mg/dL Performed by Amaya Alberto Collection Time: 04/09/19 11:02 AM  
Result Value Ref Range Glucose 172 mg/dL Insulin order 2.2 units/hour Insulin adminstered 2.2 units/hour Multiplier 0.020 Low target 140 mg/dL High target 180 mg/dL D50 order 0.0 ml  
 D50 administered 0.00 ml Minutes until next BG 60 min Order initials lf   
 Administered initials lf   
 GLSCOM Comments GLUCOSE, POC Collection Time: 04/09/19 11:59 AM  
Result Value Ref Range Glucose (POC) 174 (H) 65 - 100 mg/dL Performed by Amaya Alberto Collection Time: 04/09/19 12:02 PM  
Result Value Ref Range Glucose 174 mg/dL Insulin order 2.3 units/hour Insulin adminstered 2.3 units/hour Multiplier 0.020 Low target 140 mg/dL High target 180 mg/dL D50 order 0.0 ml  
 D50 administered 0.00 ml Minutes until next BG 60 min Order initials lf   
 Administered initials lf   
 GLSCOM Comments GLUCOSE, POC Collection Time: 04/09/19 12:59 PM  
Result Value Ref Range Glucose (POC) 180 (H) 65 - 100 mg/dL Performed by Amaya Alberto Collection Time: 04/09/19  1:01 PM  
Result Value Ref Range Glucose 180 mg/dL Insulin order 2.4 units/hour Insulin adminstered 2.4 units/hour Multiplier 0.020 Low target 140 mg/dL High target 180 mg/dL D50 order 0.0 ml  
 D50 administered 0.00 ml Minutes until next BG 60 min Order initials lf   
 Administered initials lf   
 GLSCOM Comments GLUCOSE, POC Collection Time: 04/09/19  1:59 PM  
Result Value Ref Range Glucose (POC) 168 (H) 65 - 100 mg/dL Performed by Amaya Alberto Collection Time: 04/09/19  1:59 PM  
Result Value Ref Range Glucose 168 mg/dL Insulin order 2.2 units/hour Insulin adminstered 2.2 units/hour Multiplier 0.020 Low target 140 mg/dL High target 180 mg/dL D50 order 0.0 ml  
 D50 administered 0.00 ml Minutes until next BG 60 min Order initials lf   
 Administered initials lf   
 GLSCOM Comments GLUCOSE, POC Collection Time: 04/09/19  3:02 PM  
Result Value Ref Range Glucose (POC) 162 (H) 65 - 100 mg/dL Performed by Qiana Brewer Collection Time: 04/09/19  3:03 PM  
Result Value Ref Range Glucose 162 mg/dL Insulin order 3.1 units/hour Insulin adminstered 3.1 units/hour Multiplier 0.030 Low target 100 mg/dL High target 140 mg/dL D50 order 0.0 ml  
 D50 administered 0.00 ml Minutes until next BG 60 min Order initials lf   
 Administered initials lf   
 GLSCOM Comments Medications: Reviewed PMH/ reviewed - no change compared to H&P Mid-Level Provider: Jones Schmitz NP Date/Time:  4/9/2019 I have personally reviewed the history and independently examined the patient. I have reviewed the chart and agree with the documentation recorded by the Mid Level Provider, including the assessment, treatment plan, and disposition.  
 
 
Murali Mccrary MD

## 2019-04-09 NOTE — PROGRESS NOTES
NAME: Silvino Marshall :  1990 MRN:  [id number] Assessment :    Plan: 
--MARIS [id number] Type I DM Pregnancy Anemia Hypokalemia 
preeclampsia --repeat UPCR a little better. Creatinine stable. Suspect underlying CKD from DM I. 
 
Says that she is taking po well. Will decrease IVF. Subjective: Chief Complaint:  \" No one has been by today. \" Review of Systems: 
 
Symptom Y/N Comments  Symptom Y/N Comments Fever/Chills    Chest Pain Poor Appetite    Edema Cough    Abdominal Pain Sputum    Joint Pain SOB/JUSTIN    Pruritis/Rash Nausea/vomit    Tolerating PT/OT Diarrhea    Tolerating Diet Constipation    Other Could not obtain due to:   
 
Objective: VITALS:  
Last 24hrs VS reviewed since prior progress note. Most recent are: 
Visit Vitals /89 Pulse 93 Temp 97.9 °F (36.6 °C) Resp 16 Ht 5' 8\" (1.727 m) Wt 70.8 kg (156 lb) SpO2 99% Breastfeeding? No  
BMI 23.72 kg/m² Intake/Output Summary (Last 24 hours) at 2019 1135 Last data filed at 2019 1103 Gross per 24 hour Intake 360 ml Output 2200 ml Net -1840 ml Telemetry Reviewed: PHYSICAL EXAM: 
General: NAD Lab Data Reviewed: (see below) Medications Reviewed: (see below) PMH/SH reviewed - no change compared to H&P 
________________________________________________________________________ Care Plan discussed with: 
Patient Family RN Care Manager Consultant:     
 
  Comments >50% of visit spent in counseling and coordination of care    
 
________________________________________________________________________ Chris Peralta MD  
 
Procedures: see electronic medical records for all procedures/Xrays and details which 
were not copied into this note but were reviewed prior to creation of Plan.    
 
LABS: 
 Recent Labs 04/09/19 
0418 04/08/19 
1815 WBC 13.9* 16.6* HGB 7.7* 8.0*  
HCT 24.4* 25.7*  
 293 Recent Labs 04/09/19 
4744 04/08/19 
6644 04/08/19 
0127 * 135* 136  
K 4.1 3.2* 3.5  103 101 CO2 21 22 23 BUN 20 18 17 CREA 1.15* 1.13* 1.29* * 172* 157* CA 7.3* 7.4* 8.2* Recent Labs 04/09/19 
2124 04/08/19 
5628 04/08/19 
0127 04/07/19 
1357 SGOT  --  14* 18 27 AP  --  59 61 78 TP  --  4.2* 4.7* 7.0 ALB  --  1.4* 1.6* 2.2*  
GLOB  --  2.8 3.1 4.8* LPSE 70*  --   --   -- No results for input(s): INR, PTP, APTT in the last 72 hours. No lab exists for component: INREXT No results for input(s): FE, TIBC, PSAT, FERR in the last 72 hours. Lab Results Component Value Date/Time Folate 26.9 (H) 09/03/2012 05:45 AM  
  
No results for input(s): PH, PCO2, PO2 in the last 72 hours. No results for input(s): CPK, CKMB in the last 72 hours. No lab exists for component: TROPONINI No components found for: [place] Lab Results Component Value Date/Time Color YELLOW/STRAW 04/07/2019 07:07 PM  
 Appearance TURBID (A) 04/07/2019 07:07 PM  
 Specific gravity 1.010 04/07/2019 07:07 PM  
 Specific gravity 1.027 03/31/2017 06:46 PM  
 pH (UA) 6.0 04/07/2019 07:07 PM  
 Protein 300 (A) 04/07/2019 07:07 PM  
 Glucose 500 (A) 04/07/2019 07:07 PM  
 Ketone 15 (A) 04/07/2019 07:07 PM  
 Bilirubin NEGATIVE  03/31/2017 06:46 PM  
 Urobilinogen 0.2 04/07/2019 07:07 PM  
 Nitrites NEGATIVE  04/07/2019 07:07 PM  
 Leukocyte Esterase NEGATIVE  04/07/2019 07:07 PM  
 Epithelial cells FEW 04/07/2019 07:07 PM  
 Bacteria NEGATIVE  04/07/2019 07:07 PM  
 WBC 5-10 04/07/2019 07:07 PM  
 RBC 0-5 04/07/2019 07:07 PM  
 
 
MEDICATIONS: 
Current Facility-Administered Medications Medication Dose Route Frequency  insulin regular (NOVOLIN R, HUMULIN R) 100 Units in 0.9% sodium chloride 100 mL infusion  1-10 Units/hr IntraVENous TITRATE  glucose chewable tablet 16 g  4 Tab Oral PRN  
 dextrose (D50W) injection syrg 12.5-25 g  25-50 mL IntraVENous PRN  
 glucagon (GLUCAGEN) injection 1 mg  1 mg IntraMUSCular PRN  
 ondansetron (ZOFRAN) injection 4 mg  4 mg IntraVENous Q4H PRN  promethazine (PHENERGAN) 25 mg in 0.9% sodium chloride 50 mL IVPB  25 mg IntraVENous Q4H PRN  
 famotidine (PF) (PEPCID) injection 20 mg  20 mg IntraVENous Q12H  
 sodium chloride (NS) flush 10-30 mL  10-30 mL InterCATHeter PRN  
 sodium chloride (NS) flush 10 mL  10 mL InterCATHeter Q24H  
 sodium chloride (NS) flush 10 mL  10 mL InterCATHeter PRN  
 sodium chloride (NS) flush 20 mL  20 mL InterCATHeter Q24H  
 bacitracin 500 unit/gram packet 1 Packet  1 Packet Topical PRN  
 0.9% sodium chloride infusion  25 mL/hr IntraVENous CONTINUOUS  
 0.9% sodium chloride infusion 250 mL  250 mL IntraVENous PRN  
 labetalol (NORMODYNE) tablet 300 mg  300 mg Oral Q12H  
 insulin lispro (HUMALOG) injection   SubCUTAneous TIDAC

## 2019-04-09 NOTE — PROGRESS NOTES
High Risk Obstetrics Progress Note Name: Joss Perdomo MRN: [id number]  SSN: [id number] YOB: 1990  Age: 30 y.o. Sex: female Subjective: LOS: 2 days Estimated Date of Delivery: 19 Gestational Age Today: 30w11d Patient admitted for Chronic Hypertension with Superimposed Preeclampsia and Poorly controlled Type 1 DM . States she does not have abdominal pain  , contractions, headache , nausea and vomiting, pelvic pressure, right upper quadrant pain  , shortness of breath, swelling, vaginal bleeding , vaginal leaking of fluid  and visual disturbances. Objective:  
 
Vitals:  Blood pressure 130/81, pulse 97, temperature 97.9 °F (36.6 °C), resp. rate 16, height 5' 8\" (1.727 m), weight 70.8 kg (156 lb), SpO2 99 %, not currently breastfeeding. Temp (24hrs), Av °F (36.7 °C), Min:97.8 °F (36.6 °C), Max:98.7 °F (37.1 °C) Systolic (85ETY), URQ:000 , Min:105 , Max:155 Diastolic (15RCY), KRE:53, Min:69, Max:107 Intake and Output:    
Date 19 0700 - 04/10/19 5283 Shift [id number] [id number] 24 Hour Total  
INTAKE Shift Total(mL/kg) OUTPUT Urine(mL/kg/hr) 60   60 Shift Total(mL/kg) 60(0.8)   60(0.8) Weight (kg) 70.8 70.8 70.8 70.8 Physical Exam: 
Patient without distress. Heart: Regular rate and rhythm Lung: normal respiratory effort Abdomen: soft, nontender Fundus: soft and non tender Lower Extremities:  - Edema No    
 
Membranes:  Intact Uterine Activity:  None Fetal Heart Rate:  Baseline: 140 per minute Variability: moderate Accelerations: yes Decelerations: none Uterine irritability: no   
   
Labs:  
Recent Results (from the past 36 hour(s)) GLUCOSE, POC Collection Time: 19 12:02 AM  
Result Value Ref Range Glucose (POC) 190 (H) 65 - 100 mg/dL Performed by Svetlana Cortes METABOLIC PANEL, COMPREHENSIVE Collection Time: 19  1:27 AM  
Result Value Ref Range  Sodium 136 136 - 145 mmol/L  
 Potassium 3.5 3.5 - 5.1 mmol/L Chloride 101 97 - 108 mmol/L  
 CO2 23 21 - 32 mmol/L Anion gap 12 5 - 15 mmol/L Glucose 157 (H) 65 - 100 mg/dL BUN 17 6 - 20 MG/DL Creatinine 1.29 (H) 0.55 - 1.02 MG/DL  
 BUN/Creatinine ratio 13 12 - 20 GFR est AA 58 (L) >60 ml/min/1.73m2 GFR est non-AA 48 (L) >60 ml/min/1.73m2 Calcium 8.2 (L) 8.5 - 10.1 MG/DL Bilirubin, total 0.2 0.2 - 1.0 MG/DL  
 ALT (SGPT) 7 (L) 12 - 78 U/L  
 AST (SGOT) 18 15 - 37 U/L Alk. phosphatase 61 45 - 117 U/L Protein, total 4.7 (L) 6.4 - 8.2 g/dL Albumin 1.6 (L) 3.5 - 5.0 g/dL Globulin 3.1 2.0 - 4.0 g/dL A-G Ratio 0.5 (L) 1.1 - 2.2 MAGNESIUM, THERAPEUTIC Collection Time: 04/08/19  1:27 AM  
Result Value Ref Range Therapeutic magnesium 5.0 4.8 - 8.4 MG/DL  
GLUCOSE, POC Collection Time: 04/08/19  1:51 AM  
Result Value Ref Range Glucose (POC) 169 (H) 65 - 100 mg/dL Performed by Janusz Calvillo Collection Time: 04/08/19  1:58 AM  
Result Value Ref Range Glucose 169 mg/dL Insulin order 2.2 units/hour Insulin adminstered 2.2 units/hour Multiplier 0.020 Low target 140 mg/dL High target 180 mg/dL D50 order 0.0 ml  
 D50 administered 0.00 ml Minutes until next BG 60 min Order initials SS Administered initials RT   
 [facility] Comments CBC WITH AUTOMATED DIFF Collection Time: 04/08/19  2:47 AM  
Result Value Ref Range WBC 14.2 (H) 3.6 - 11.0 K/uL  
 RBC 2.44 (L) 3.80 - 5.20 M/uL HGB 6.0 (L) 11.5 - 16.0 g/dL HCT 20.0 (L) 35.0 - 47.0 % MCV 82.0 80.0 - 99.0 FL  
 MCH 24.6 (L) 26.0 - 34.0 PG  
 MCHC 30.0 30.0 - 36.5 g/dL  
 RDW 17.1 (H) 11.5 - 14.5 % PLATELET 401 487 - 542 K/uL MPV 11.3 8.9 - 12.9 FL  
 NRBC 0.0 0  WBC ABSOLUTE NRBC 0.00 0.00 - 0.01 K/uL NEUTROPHILS 73 32 - 75 % LYMPHOCYTES 19 12 - 49 % MONOCYTES 6 5 - 13 % EOSINOPHILS 1 0 - 7 % BASOPHILS 0 0 - 1 % IMMATURE GRANULOCYTES 1 (H) 0.0 - 0.5 % ABS. NEUTROPHILS 10.4 (H) 1.8 - 8.0 K/UL  
 ABS. LYMPHOCYTES 2.7 0.8 - 3.5 K/UL  
 ABS. MONOCYTES 0.9 0.0 - 1.0 K/UL  
 ABS. EOSINOPHILS 0.1 0.0 - 0.4 K/UL  
 ABS. BASOPHILS 0.0 0.0 - 0.1 K/UL  
 ABS. IMM. GRANS. 0.1 (H) 0.00 - 0.04 K/UL  
 DF SMEAR SCANNED    
 PLATELET COMMENTS Large Platelets RBC COMMENTS ANISOCYTOSIS 1+ 
    
 RBC COMMENTS HYPOCHROMIA 1+ GLUCOSE, POC Collection Time: 04/08/19  3:00 AM  
Result Value Ref Range Glucose (POC) 131 (H) 65 - 100 mg/dL Performed by Juancarlos Turk Collection Time: 04/08/19  3:02 AM  
Result Value Ref Range Glucose 131 mg/dL Insulin order 0.7 units/hour Insulin adminstered 0.7 units/hour Multiplier 0.010 Low target 140 mg/dL High target 180 mg/dL D50 order 0.0 ml  
 D50 administered 0.00 ml Minutes until next BG 60 min Order initials rt Administered initials cd GLSCOM Comments TYPE + CROSSMATCH Collection Time: 04/08/19  3:11 AM  
Result Value Ref Range Crossmatch Expiration 04/11/2019 ABO/Rh(D) B POSITIVE Antibody screen NEG Physician instructions CMV NEGATIVE Unit number [id number] Blood component type RC LR Unit division 00 Status of unit TRANSFUSED Crossmatch result Compatible GLUCOSE, POC Collection Time: 04/08/19  3:57 AM  
Result Value Ref Range Glucose (POC) 127 (H) 65 - 100 mg/dL Performed by Juancarlos Turk Collection Time: 04/08/19  3:58 AM  
Result Value Ref Range Glucose 127 mg/dL Insulin order 0.0 units/hour Insulin adminstered 0.0 units/hour Multiplier 0.000 Low target 140 mg/dL High target 180 mg/dL D50 order 0.0 ml  
 D50 administered 0.00 ml Minutes until next BG 60 min Order initials SS Administered initials RT   
 [facility] Comments GLUCOSE, POC  Collection Time: 04/08/19  5:00 AM  
 Result Value Ref Range Glucose (POC) 147 (H) 65 - 100 mg/dL Performed by Geovanna Cavanaughlouise Betty Lewis Collection Time: 04/08/19  5:03 AM  
Result Value Ref Range Glucose 147 mg/dL Insulin order 0.1 units/hour Insulin adminstered 0.1 units/hour Multiplier 0.000 Low target 140 mg/dL High target 180 mg/dL D50 order 0.0 ml  
 D50 administered 0.00 ml Minutes until next BG 60 min Order initials SS Administered initials RT   
 GLSCOM Comments GLUCOSE, POC Collection Time: 04/08/19  5:57 AM  
Result Value Ref Range Glucose (POC) 200 (H) 65 - 100 mg/dL Performed by Geovanna Cavanaughlouise Betty Lewis Collection Time: 04/08/19  5:57 AM  
Result Value Ref Range Glucose 200 mg/dL Insulin order 1.4 units/hour Insulin adminstered 1.4 units/hour Multiplier 0.010 Low target 140 mg/dL High target 180 mg/dL D50 order 0.0 ml  
 D50 administered 0.00 ml Minutes until next BG 60 min Order initials SS Administered initials RT   
 GLSCOM Comments GLUCOSE, POC Collection Time: 04/08/19  6:55 AM  
Result Value Ref Range Glucose (POC) 185 (H) 65 - 100 mg/dL Performed by Geovanna Douglas Betty Lewis Collection Time: 04/08/19  6:56 AM  
Result Value Ref Range Glucose 185 mg/dL Insulin order 2.5 units/hour Insulin adminstered 2.5 units/hour Multiplier 0.020 Low target 140 mg/dL High target 180 mg/dL D50 order 0.0 ml  
 D50 administered 0.00 ml Minutes until next BG 60 min Order initials SS Administered initials RT   
 [facility] Comments CBC WITH AUTOMATED DIFF Collection Time: 04/08/19  7:17 AM  
Result Value Ref Range WBC 13.0 (H) 3.6 - 11.0 K/uL  
 RBC 2.66 (L) 3.80 - 5.20 M/uL HGB 6.5 (L) 11.5 - 16.0 g/dL HCT 21.7 (L) 35.0 - 47.0 % MCV 81.6 80.0 - 99.0 FL  
 MCH 24.4 (L) 26.0 - 34.0 PG  
 MCHC 30.0 30.0 - 36.5 g/dL  
 RDW 17.2 (H) 11.5 - 14.5 % PLATELET 010 117 - 482 K/uL MPV 11.1 8.9 - 12.9 FL  
 NRBC 0.0 0  WBC ABSOLUTE NRBC 0.00 0.00 - 0.01 K/uL NEUTROPHILS 91 (H) 32 - 75 % LYMPHOCYTES 7 (L) 12 - 49 % MONOCYTES 1 (L) 5 - 13 % EOSINOPHILS 0 0 - 7 % BASOPHILS 0 0 - 1 % IMMATURE GRANULOCYTES 1 (H) 0.0 - 0.5 % ABS. NEUTROPHILS 11.9 (H) 1.8 - 8.0 K/UL  
 ABS. LYMPHOCYTES 0.9 0.8 - 3.5 K/UL  
 ABS. MONOCYTES 0.1 0.0 - 1.0 K/UL  
 ABS. EOSINOPHILS 0.0 0.0 - 0.4 K/UL  
 ABS. BASOPHILS 0.0 0.0 - 0.1 K/UL  
 ABS. IMM. GRANS. 0.1 (H) 0.00 - 0.04 K/UL  
 DF AUTOMATED PLATELET COMMENTS Large Platelets RBC COMMENTS ANISOCYTOSIS 1+ 
    
 RBC COMMENTS HYPOCHROMIA 1+ METABOLIC PANEL, COMPREHENSIVE Collection Time: 04/08/19  7:17 AM  
Result Value Ref Range Sodium 135 (L) 136 - 145 mmol/L Potassium 3.2 (L) 3.5 - 5.1 mmol/L Chloride 103 97 - 108 mmol/L  
 CO2 22 21 - 32 mmol/L Anion gap 10 5 - 15 mmol/L Glucose 172 (H) 65 - 100 mg/dL BUN 18 6 - 20 MG/DL Creatinine 1.13 (H) 0.55 - 1.02 MG/DL  
 BUN/Creatinine ratio 16 12 - 20 GFR est AA >60 >60 ml/min/1.73m2 GFR est non-AA 56 (L) >60 ml/min/1.73m2 Calcium 7.4 (L) 8.5 - 10.1 MG/DL Bilirubin, total 0.3 0.2 - 1.0 MG/DL  
 ALT (SGPT) 9 (L) 12 - 78 U/L  
 AST (SGOT) 14 (L) 15 - 37 U/L Alk. phosphatase 59 45 - 117 U/L Protein, total 4.2 (L) 6.4 - 8.2 g/dL Albumin 1.4 (L) 3.5 - 5.0 g/dL Globulin 2.8 2.0 - 4.0 g/dL A-G Ratio 0.5 (L) 1.1 - 2.2 GLUCOSE, POC Collection Time: 04/08/19  8:05 AM  
Result Value Ref Range Glucose (POC) 167 (H) 65 - 100 mg/dL Performed by Vito Kramer Collection Time: 04/08/19  8:07 AM  
Result Value Ref Range Glucose 167 mg/dL Insulin order 2.1 units/hour Insulin adminstered 2.1 units/hour Multiplier 0.020 Low target 140 mg/dL High target 180 mg/dL D50 order 0.0 ml  
 D50 administered 0.00 ml Minutes until next BG 60 min Order initials RT Administered initials RT   
 GL[facility]OM Comments verified by PF   
GLUCOSE, POC Collection Time: 04/08/19  9:10 AM  
Result Value Ref Range Glucose (POC) 131 (H) 65 - 100 mg/dL Performed by [facility] Collection Time: 04/08/19  9:14 AM  
Result Value Ref Range Glucose 131 mg/dL Insulin order 0.7 units/hour Insulin adminstered 0.7 units/hour Multiplier 0.010 Low target 140 mg/dL High target 180 mg/dL D50 order 0.0 ml  
 D50 administered 0.00 ml Minutes until next BG 60 min Order initials pf   
 Administered initials pf   
 GLSCOM Comments GLUCOSE, POC Collection Time: 04/08/19 10:17 AM  
Result Value Ref Range Glucose (POC) 133 (H) 65 - 100 mg/dL Performed by [facility] Collection Time: 04/08/19 10:23 AM  
Result Value Ref Range Glucose 131 mg/dL Insulin order 0.0 units/hour Insulin adminstered 0.0 units/hour Multiplier 0.000 Low target 140 mg/dL High target 180 mg/dL D50 order 0.0 ml  
 D50 administered 0.00 ml Minutes until next BG 60 min Order initials pf   
 Administered initials pf   
 GLSCOM Comments GLUCOSE, POC Collection Time: 04/08/19 11:25 AM  
Result Value Ref Range Glucose (POC) 204 (H) 65 - 100 mg/dL Performed by [facility] Collection Time: 04/08/19 11:29 AM  
Result Value Ref Range Glucose 204 mg/dL Insulin order 1.4 units/hour Insulin adminstered 1.4 units/hour Multiplier 0.010 Low target 140 mg/dL High target 180 mg/dL D50 order 0.0 ml  
 D50 administered 0.00 ml Minutes until next BG 60 min Order initials pf   
 Administered initials pf   
 GL[facility] Comments verified with  DEE Thacker   
GLUCOSE, POC Collection Time: 04/08/19 12:22 PM  
Result Value Ref Range Glucose (POC) 240 (H) 65 - 100 mg/dL Performed by Aleksandra Drake  Collection Time: 04/08/19 12:23 PM  
 Result Value Ref Range Glucose 240 mg/dL Insulin order 3.6 units/hour Insulin adminstered 3.6 units/hour Multiplier 0.020 Low target 140 mg/dL High target 180 mg/dL D50 order 0.0 ml  
 D50 administered 0.00 ml Minutes until next BG 60 min Order initials pf   
 Administered initials pf   
 GLSCOM Comments verified with DEE Evans   
GLUCOSE, POC Collection Time: 04/08/19  1:21 PM  
Result Value Ref Range Glucose (POC) 230 (H) 65 - 100 mg/dL Performed by Wenceslao Snow Collection Time: 04/08/19  1:24 PM  
Result Value Ref Range Glucose 230 mg/dL Insulin order 5.1 units/hour Insulin adminstered 5.1 units/hour Multiplier 0.030 Low target 140 mg/dL High target 180 mg/dL D50 order 0.0 ml  
 D50 administered 0.00 ml Minutes until next BG 60 min Order initials pf   
 Administered initials pf   
 GL[facility]OM Comments verified with COOKIE Fernandez RN   
GLUCOSE, POC Collection Time: 04/08/19  2:23 PM  
Result Value Ref Range Glucose (POC) 177 (H) 65 - 100 mg/dL Performed by Wenceslao Snow Collection Time: 04/08/19  2:27 PM  
Result Value Ref Range Glucose 177 mg/dL Insulin order 3.5 units/hour Insulin adminstered 3.5 units/hour Multiplier 0.030 Low target 140 mg/dL High target 180 mg/dL D50 order 0.0 ml  
 D50 administered 0.00 ml Minutes until next BG 60 min Order initials pf   
 Administered initials pf   
 GLSC[facility] Comments verified with DEE Downey PROTEIN URINE, RANDOM Collection Time: 04/08/19  3:02 PM  
Result Value Ref Range Protein, urine random 625 (H) 0.0 - 11.9 mg/dL CREATININE, UR, RANDOM Collection Time: 04/08/19  3:02 PM  
Result Value Ref Range Creatinine, urine 244.00 mg/dL GLUCOSE, POC Collection Time: 04/08/19  3:27 PM  
Result Value Ref Range Glucose (POC) 154 (H) 65 - 100 mg/dL  Performed by Wenceslao Snow  
 Collection Time: 04/08/19  3:27 PM  
Result Value Ref Range Glucose 154 mg/dL Insulin order 2.8 units/hour Insulin adminstered 2.8 units/hour Multiplier 0.030 Low target 140 mg/dL High target 180 mg/dL D50 order 0.0 ml  
 D50 administered 0.00 ml Minutes until next BG 60 min Order initials ff   
 Administered initials ff   
 GLSCOM Comments GLUCOSE, POC Collection Time: 04/08/19  4:31 PM  
Result Value Ref Range Glucose (POC) 165 (H) 65 - 100 mg/dL Performed by Jamie Barcenas Collection Time: 04/08/19  4:32 PM  
Result Value Ref Range Glucose 165 mg/dL Insulin order 3.2 units/hour Insulin adminstered 3.2 units/hour Multiplier 0.030 Low target 140 mg/dL High target 180 mg/dL D50 order 0.0 ml  
 D50 administered 0.00 ml Minutes until next BG 60 min Order initials AB Administered initials AB   
 GLSCOM Comments GLUCOSE, POC Collection Time: 04/08/19  5:42 PM  
Result Value Ref Range Glucose (POC) 167 (H) 65 - 100 mg/dL Performed by Jamie Barcenas Collection Time: 04/08/19  5:42 PM  
Result Value Ref Range Glucose 167 mg/dL Insulin order 3.2 units/hour Insulin adminstered 3.2 units/hour Multiplier 0.030 Low target 140 mg/dL High target 180 mg/dL D50 order 0.0 ml  
 D50 administered 0.00 ml Minutes until next BG 60 min Order initials ff   
 Administered initials ff   
 GLSCOM Comments CBC W/O DIFF Collection Time: 04/08/19  6:15 PM  
Result Value Ref Range WBC 16.6 (H) 3.6 - 11.0 K/uL  
 RBC 3.17 (L) 3.80 - 5.20 M/uL HGB 8.0 (L) 11.5 - 16.0 g/dL HCT 25.7 (L) 35.0 - 47.0 % MCV 81.1 80.0 - 99.0 FL  
 MCH 25.2 (L) 26.0 - 34.0 PG  
 MCHC 31.1 30.0 - 36.5 g/dL  
 RDW 16.9 (H) 11.5 - 14.5 % PLATELET 109 552 - 616 K/uL MPV 11.1 8.9 - 12.9 FL  
 NRBC 0.0 0  WBC ABSOLUTE NRBC 0.00 0.00 - 0.01 K/uL GLUCOSE, POC  
 Collection Time: 04/08/19  6:52 PM  
Result Value Ref Range Glucose (POC) 182 (H) 65 - 100 mg/dL Performed by Roman Meyers Collection Time: 04/08/19  6:53 PM  
Result Value Ref Range Glucose 182 mg/dL Insulin order 4.9 units/hour Insulin adminstered 4.9 units/hour Multiplier 0.040 Low target 140 mg/dL High target 180 mg/dL D50 order 0.0 ml  
 D50 administered 0.00 ml Minutes until next BG 60 min Order initials ff   
 Administered initials ff   
 GLSCOM Comments GLUCOSE, POC Collection Time: 04/08/19  7:57 PM  
Result Value Ref Range Glucose (POC) 151 (H) 65 - 100 mg/dL Performed by Kaylyn Gilbert Hurley Collection Time: 04/08/19  7:58 PM  
Result Value Ref Range Glucose 151 mg/dL Insulin order 3.6 units/hour Insulin adminstered 3.6 units/hour Multiplier 0.040 Low target 140 mg/dL High target 180 mg/dL D50 order 0.0 ml  
 D50 administered 0.00 ml Minutes until next  min Order initials SS Administered initials CD   
 GLSCOM Comments GLUCOSE, POC Collection Time: 04/08/19 10:01 PM  
Result Value Ref Range Glucose (POC) 110 (H) 65 - 100 mg/dL Performed by Kaylyn Gilbert Hurley Collection Time: 04/08/19 10:03 PM  
Result Value Ref Range Glucose 110 mg/dL Insulin order 1.5 units/hour Insulin adminstered 1.5 units/hour Multiplier 0.030 Low target 140 mg/dL High target 180 mg/dL D50 order 0.0 ml  
 D50 administered 0.00 ml Minutes until next BG 60 min Order initials cd Administered initials ah   
 GLSCOM Comments GLUCOSE, POC Collection Time: 04/08/19 11:06 PM  
Result Value Ref Range Glucose (POC) 122 (H) 65 - 100 mg/dL Performed by Kaylyn Gilbert Hurley Collection Time: 04/08/19 11:07 PM  
Result Value Ref Range Glucose 122 mg/dL Insulin order 1.2 units/hour Insulin adminstered 1.2 units/hour Multiplier 0.020 Low target 140 mg/dL High target 180 mg/dL D50 order 0.0 ml  
 D50 administered 0.00 ml Minutes until next BG 60 min Order initials cd Administered initials cd GLSCOM Comments GLUCOSE, POC Collection Time: 04/09/19 12:10 AM  
Result Value Ref Range Glucose (POC) 161 (H) 65 - 100 mg/dL Performed by Flavio Sofia Michelle Collection Time: 04/09/19 12:11 AM  
Result Value Ref Range Glucose 161 mg/dL Insulin order 2.0 units/hour Insulin adminstered 2.0 units/hour Multiplier 0.020 Low target 140 mg/dL High target 180 mg/dL D50 order 0.0 ml  
 D50 administered 0.00 ml Minutes until next BG 60 min Order initials cd Administered initials SS   
 GLSCOM Comments GLUCOSE, POC Collection Time: 04/09/19  1:08 AM  
Result Value Ref Range Glucose (POC) 143 (H) 65 - 100 mg/dL Performed by Flavio Sofia Michelle Collection Time: 04/09/19  1:09 AM  
Result Value Ref Range Glucose 143 mg/dL Insulin order 1.7 units/hour Insulin adminstered 1.7 units/hour Multiplier 0.020 Low target 140 mg/dL High target 180 mg/dL D50 order 0.0 ml  
 D50 administered 0.00 ml Minutes until next BG 60 min Order initials cd Administered initials cd GLSCOM Comments GLUCOSE, POC Collection Time: 04/09/19  2:13 AM  
Result Value Ref Range Glucose (POC) 126 (H) 65 - 100 mg/dL Performed by Flavio Sofia Michelle Collection Time: 04/09/19  2:15 AM  
Result Value Ref Range Glucose 126 mg/dL Insulin order 0.7 units/hour Insulin adminstered 0.7 units/hour Multiplier 0.010 Low target 140 mg/dL High target 180 mg/dL D50 order 0.0 ml  
 D50 administered 0.00 ml Minutes until next BG 60 min Order initials cd Administered initials cd GLSCOM Comments GLUCOSE, POC  
 Collection Time: 04/09/19  3:13 AM  
Result Value Ref Range Glucose (POC) 130 (H) 65 - 100 mg/dL Performed by Esperanza Drake Collection Time: 04/09/19  3:13 AM  
Result Value Ref Range Glucose 130 mg/dL Insulin order 0.0 units/hour Insulin adminstered 0.0 units/hour Multiplier 0.000 Low target 140 mg/dL High target 180 mg/dL D50 order 0.0 ml  
 D50 administered 0.00 ml Minutes until next BG 60 min Order initials cd Administered initials CD   
 GLSCOM Comments METABOLIC PANEL, BASIC Collection Time: 04/09/19  4:18 AM  
Result Value Ref Range Sodium 134 (L) 136 - 145 mmol/L Potassium 4.1 3.5 - 5.1 mmol/L Chloride 107 97 - 108 mmol/L  
 CO2 21 21 - 32 mmol/L Anion gap 6 5 - 15 mmol/L Glucose 172 (H) 65 - 100 mg/dL BUN 20 6 - 20 MG/DL Creatinine 1.15 (H) 0.55 - 1.02 MG/DL  
 BUN/Creatinine ratio 17 12 - 20 GFR est AA >60 >60 ml/min/1.73m2 GFR est non-AA 55 (L) >60 ml/min/1.73m2 Calcium 7.3 (L) 8.5 - 10.1 MG/DL PROTEIN/CREATININE RATIO, URINE Collection Time: 04/09/19  4:18 AM  
Result Value Ref Range Protein, urine random 74 (H) 0.0 - 11.9 mg/dL Creatinine, urine 17.20 mg/dL Protein/Creat. urine Ratio 4.3    
CBC W/O DIFF Collection Time: 04/09/19  4:18 AM  
Result Value Ref Range WBC 13.9 (H) 3.6 - 11.0 K/uL  
 RBC 3.01 (L) 3.80 - 5.20 M/uL HGB 7.7 (L) 11.5 - 16.0 g/dL HCT 24.4 (L) 35.0 - 47.0 % MCV 81.1 80.0 - 99.0 FL  
 MCH 25.6 (L) 26.0 - 34.0 PG  
 MCHC 31.6 30.0 - 36.5 g/dL  
 RDW 16.8 (H) 11.5 - 14.5 % PLATELET 091 114 - 102 K/uL MPV 11.0 8.9 - 12.9 FL  
 NRBC 0.0 0  WBC ABSOLUTE NRBC 0.00 0.00 - 0.01 K/uL GLUCOSE, POC Collection Time: 04/09/19  4:21 AM  
Result Value Ref Range Glucose (POC) 172 (H) 65 - 100 mg/dL Performed by Esperanza Drake Collection Time: 04/09/19  4:25 AM  
Result Value Ref Range Glucose 172 mg/dL Insulin order 0.1 units/hour Insulin adminstered 0.1 units/hour Multiplier 0.000 Low target 140 mg/dL High target 180 mg/dL D50 order 0.0 ml  
 D50 administered 0.00 ml Minutes until next BG 60 min Order initials cd Administered initials cd GLSCOM Comments GLUCOSE, POC Collection Time: 04/09/19  5:33 AM  
Result Value Ref Range Glucose (POC) 226 (H) 65 - 100 mg/dL [facility]formed [facility] Collection Time: 04/09/19  5:33 AM  
Result Value Ref Range Glucose 226 mg/dL Insulin order 1.7 units/hour Insulin adminstered 1.7 units/hour Multiplier 0.010 Low target 140 mg/dL High target 180 mg/dL D50 order 0.0 ml  
 D50 administered 0.00 ml Minutes until next BG 60 min Order initials cd Administered initials cd GLSCOM Comments GLUCOSE, POC Collection Time: 04/09/19  6:40 AM  
Result Value Ref Range Glucose (POC) 225 (H) 65 - 100 mg/dL [facility] Collection Time: 04/09/19  6:41 AM  
Result Value Ref Range Glucose 225 mg/dL Insulin order 3.3 units/hour Insulin adminstered 3.3 units/hour Multiplier 0.020 Low target 140 mg/dL High target 180 mg/dL D50 order 0.0 ml  
 D50 administered 0.00 ml Minutes until next BG 60 min Order initials cd Administered initials cd GLSCOM Comments GLUCOSE, POC Collection Time: 04/09/19  7:57 AM  
Result Value Ref Range Glucose (POC) 199 (H) 65 - 100 mg/dL Performed by [facility] Collection Time: 04/09/19  7:57 AM  
Result Value Ref Range Glucose 199 mg/dL Insulin order 4.2 units/hour Insulin adminstered 4.2 units/hour Multiplier 0.030 Low target 140 mg/dL High target 180 mg/dL D50 order 0.0 ml  
 D50 administered 0.00 ml Minutes until next BG 60 min Order initials cd Administered initials cd GLSCOM Comments GLUCOSE, POC Collection Time: 04/09/19  9:06 AM  
Result Value Ref Range Glucose (POC) 156 (H) 65 - 100 mg/dL Performed by Bud Backbone Lavinia Kehr Collection Time: 04/09/19  9:08 AM  
Result Value Ref Range Glucose 156 mg/dL Insulin order 2.9 units/hour Insulin adminstered 2.9 units/hour Multiplier 0.030 Low target 140 mg/dL High target 180 mg/dL D50 order 0.0 ml  
 D50 administered 0.00 ml Minutes until next BG 60 min Order initials lf   
 Administered initials lf   
 GLSCOM Comments Assessment and Plan: Active Problems: 
  Chronic hypertension with superimposed preeclampsia (4/8/2019) 
 
  
G1 @ 28 weeks 6 days admitted with Chronic Hypertension with Superimposed Preeclampsia and Poorly controlled Type 1 DM S/P 2 doses of Betamethasone Currently On Insulin Drip Blood Pressures Stable: On Labetalol 300 mg PO BID Anemia- S/P 1 unit of PRBC Nausea/Vomiting/Gastroparesis: Appreciate GI consult Chronic Renal Disease- Appreciate Nephrology Consult Endocrinology Consult placed Appreciate Input from Diabetic management Team 
Recommendations reviewed with Dr Santi RILEY 
NSTs BID Strict Is and Os TEDs in place Signed By: Naz Marion MD   
 April 9, 2019

## 2019-04-09 NOTE — DIABETES MGMT
DTC Insulin Drip Progress Note Recommendations/ Comments:  Patient continutes on the Insulin drip. Drip rate 2.4 units/hr. Has required 15.3 units insulin over last 6 hours. Blood sugars ranging 127-226 mg/dL in the last 12 hours. If appropriate, please consider: 
 - changing target ranges for glucostabilizer to 100-140 mg/dL. Message sent to MD regarding above Patient will require basal insulin (Insulin Pump) 1-2 hours prior to discontinuing the drip. Chart reviewed on [facility]. Patient is a 30 y.o. female with known Type 1 Diabetes on insulin pump at home. Insulin Pump Settings from home - currently patient is not wearing: 
  
Pt on (Type of Pump) and uses [employer] G 70. 
  
Basal Rates                                    Insulin Carb Ratios 12am to 4 am = 1.0 units/hr                      1 to 11 = 1 unit per 11 g from 12am-5pm 
4 to 10am = 1.1 units/hr                 1 to 8 = 1 unit per 8 g 5pm-12am 
10am to 12am = 1 units/hr                         
  
 Insulin Sensitivity Factors 1 to 43 = 1 unit per 43 mg/dl 
  
Target 100-120 A1c:  
Lab Results Component Value Date/Time Hemoglobin A1c 11.1 (H) 08/18/2016 12:38 PM  
 
 
 
Recent Glucose Results:  
Lab Results Component Value Date/Time  (H) 04/09/2019 04:18 AM  
 GLUCPOC 168 (H) 04/09/2019 01:59 PM  
 GLUCPOC 180 (H) 04/09/2019 12:59 PM  
 GLUCPOC 174 (H) 04/09/2019 11:59 AM  
  
 
Lab Results Component Value Date/Time Creatinine 1.15 (H) 04/09/2019 04:18 AM  
 
 
Active Orders Diet DIET DIABETIC WITH OPTIONS Consistent Carb 2000kcal; Regular PO intake: No data found. Currently on insulin gtt. Will continue to follow as needed. Thank you. Tee Holbrook MS, RN, CDE Time Spent: 8 minutes

## 2019-04-09 NOTE — PROGRESS NOTES
1935: Verbal shift change report given to ALEX Patrick RN (oncoming nurse) by Hermelindo Tobar RN (offgoing nurse). Report included the following information SBAR, Intake/Output, MAR and Recent Results. Pt currently sleeping 2025: pt eating dinner tray, reports upper abd pain since she started eating (salmon and green beans), gave sugar free pop sicle instead 
 
2209: phenergan given per pt request for abd pain, texting on cell phone in no apparent distress 2300: pt states she is still hungry and asking for something to eat. Offered chicken lean cuisine meal and pt agrees. 0000: pt sister visiting at the bedside, pt talking with sister and reports abd pain is \"a little better\", ate about 75% of meal, insulin meal coverage given  
 
0100: pt watching tv, talking, and laughing with boyfriend 
 
[phone]: pt requesting more phenergan for abd pain, reports that it is still in her upper abdomen 2369: zofran given per pt request 
 
[phone]: drawing labs off central line, pt sleeping through procedure 0440: pt whimpering and crying, asking for the fetal monitors to be removed and states that she has had them on \"long enough\". Advised patient that we need to continue to monitor her and her baby to keep them safe but will ask MFM in the morning. Attempted to determine source of pts \"constant\" abdominal pain, questionable contractions on toco but pt denies contractions/cramping, states that it feels like someone \"punched\" her at the top of her stomach \"really hard\", upper abdomen soft to palpation. Pt rolled over in the bed and stopped answering questions from RN, unable to readjust fetal monitors at this time d/t pt being uncooperative 0510: pt sleeping, snoring 0530: pt sleeping 
 
0700: updated Dr. Ameya Hemphill on overnight events 9500: Bedside and Verbal shift change report given to [facility] (oncoming nurse) by Rhetta Castleman RN (offgoing nurse).  Report included the following information SBAR, Intake/Output, MAR and Recent Results.

## 2019-04-09 NOTE — ROUTINE PROCESS
1057: Bedside shift change report given to [facility] (oncoming nurse) by Katrin Fields RN (offgoing nurse). Report included the following information SBAR, Intake/Output, MAR and Recent Results. 0815: Dr. Jeanie Blancas at bedside. Would like lipase to be drawn. Will consider Carafate if safe for baby. Patient complains of nausea and indigestion, would like Phenergan. 0845: ultrasound at bedside. 3938: Dr. Martin Ahumada at bedside discussing [address]. Monitors removed, orders for BID NST. 
 
1005: patient eating breakfast in bed, refuses to sit up on side of bed due to being uncomfortable from Carranza. 1102: adequate urine output, Carranza may be d/c'ed per Dr. Martin Ahumada. 1110: Dr. Lisa Cortes at bedside, plan to decrease IVF and allow patient to eat and drink. 1130: patient will remain on drip 24-36 hours post second betamethasone dose. Discussed insulin pump with patient, states she has her supplies at home. Significant other to  pump supplies this afternoon. [phone] [id number]: patient refusing to eat most of lunch stating, \"my stomach is hurting again. \"  IVF increased back to 75/hr. 1435: spoke with Brigid Banks from [employer], would like gluco- stabilizer target range to be changed to 100-140 after speaking with Dr. Martin Ahumada. Gluco-stabilizer target range changed and verified by TINO An RN 
 
3414: IVF rate changed back to 25/hr. Patient states \"I'm feeling much better. \"  Drinking ice water and tea at this time. 1549: patient up to [facility], voided 300 ml urine.

## 2019-04-10 LAB
ADMINISTERED INITIALS, ADMINIT: NORMAL
D50 ADMINISTERED, D50ADM: 0 ML
D50 ORDER, D50ORD: 0 ML
GLSCOM COMMENTS: NORMAL
GLUCOSE BLD STRIP.AUTO-MCNC: 101 MG/DL (ref 65–100)
GLUCOSE BLD STRIP.AUTO-MCNC: 114 MG/DL (ref 65–100)
GLUCOSE BLD STRIP.AUTO-MCNC: 118 MG/DL (ref 65–100)
GLUCOSE BLD STRIP.AUTO-MCNC: 120 MG/DL (ref 65–100)
GLUCOSE BLD STRIP.AUTO-MCNC: 129 MG/DL (ref 65–100)
GLUCOSE BLD STRIP.AUTO-MCNC: 137 MG/DL (ref 65–100)
GLUCOSE BLD STRIP.AUTO-MCNC: 140 MG/DL (ref 65–100)
GLUCOSE BLD STRIP.AUTO-MCNC: 155 MG/DL (ref 65–100)
GLUCOSE BLD STRIP.AUTO-MCNC: 62 MG/DL (ref 65–100)
GLUCOSE BLD STRIP.AUTO-MCNC: 66 MG/DL (ref 65–100)
GLUCOSE BLD STRIP.AUTO-MCNC: 75 MG/DL (ref 65–100)
GLUCOSE BLD STRIP.AUTO-MCNC: 78 MG/DL (ref 65–100)
GLUCOSE BLD STRIP.AUTO-MCNC: 85 MG/DL (ref 65–100)
GLUCOSE BLD STRIP.AUTO-MCNC: 85 MG/DL (ref 65–100)
GLUCOSE BLD STRIP.AUTO-MCNC: 90 MG/DL (ref 65–100)
GLUCOSE BLD STRIP.AUTO-MCNC: 91 MG/DL (ref 65–100)
GLUCOSE, GLC: 101 MG/DL
GLUCOSE, GLC: 114 MG/DL
GLUCOSE, GLC: 118 MG/DL
GLUCOSE, GLC: 129 MG/DL
GLUCOSE, GLC: 137 MG/DL
GLUCOSE, GLC: 140 MG/DL
GLUCOSE, GLC: 155 MG/DL
HIGH TARGET, HITG: 140 MG/DL
INSULIN ADMINSTERED, INSADM: 1.2 UNITS/HOUR
INSULIN ADMINSTERED, INSADM: 1.6 UNITS/HOUR
INSULIN ADMINSTERED, INSADM: 1.7 UNITS/HOUR
INSULIN ADMINSTERED, INSADM: 2.1 UNITS/HOUR
INSULIN ADMINSTERED, INSADM: 2.3 UNITS/HOUR
INSULIN ADMINSTERED, INSADM: 2.4 UNITS/HOUR
INSULIN ADMINSTERED, INSADM: 2.9 UNITS/HOUR
INSULIN ORDER, INSORD: 1.2 UNITS/HOUR
INSULIN ORDER, INSORD: 1.6 UNITS/HOUR
INSULIN ORDER, INSORD: 1.7 UNITS/HOUR
INSULIN ORDER, INSORD: 2.1 UNITS/HOUR
INSULIN ORDER, INSORD: 2.3 UNITS/HOUR
INSULIN ORDER, INSORD: 2.4 UNITS/HOUR
INSULIN ORDER, INSORD: 2.9 UNITS/HOUR
LIPASE SERPL-CCNC: 108 U/L (ref 73–393)
LOW TARGET, LOT: 100 MG/DL
MINUTES UNTIL NEXT BG, NBG: 120 MIN
MINUTES UNTIL NEXT BG, NBG: 60 MIN
MULTIPLIER, MUL: 0.03
ORDER INITIALS, ORDINIT: NORMAL
SERVICE CMNT-IMP: ABNORMAL
SERVICE CMNT-IMP: NORMAL

## 2019-04-10 PROCEDURE — 74011250637 HC RX REV CODE- 250/637: Performed by: NURSE PRACTITIONER

## 2019-04-10 PROCEDURE — 74011250637 HC RX REV CODE- 250/637: Performed by: OBSTETRICS & GYNECOLOGY

## 2019-04-10 PROCEDURE — 74011636637 HC RX REV CODE- 636/637: Performed by: OBSTETRICS & GYNECOLOGY

## 2019-04-10 PROCEDURE — 83690 ASSAY OF LIPASE: CPT

## 2019-04-10 PROCEDURE — 74011250636 HC RX REV CODE- 250/636: Performed by: INTERNAL MEDICINE

## 2019-04-10 PROCEDURE — 65410000002 HC RM PRIVATE OB

## 2019-04-10 PROCEDURE — 74011000258 HC RX REV CODE- 258: Performed by: OBSTETRICS & GYNECOLOGY

## 2019-04-10 PROCEDURE — 74011250636 HC RX REV CODE- 250/636: Performed by: OBSTETRICS & GYNECOLOGY

## 2019-04-10 PROCEDURE — 36415 COLL VENOUS BLD VENIPUNCTURE: CPT

## 2019-04-10 PROCEDURE — 82962 GLUCOSE BLOOD TEST: CPT

## 2019-04-10 RX ORDER — OXYTOCIN/0.9 % SODIUM CHLORIDE 30/500 ML
PLASTIC BAG, INJECTION (ML) INTRAVENOUS
Status: DISCONTINUED
Start: 2019-04-10 | End: 2019-04-10

## 2019-04-10 RX ORDER — SCOLOPAMINE TRANSDERMAL SYSTEM 1 MG/1
1 PATCH, EXTENDED RELEASE TRANSDERMAL
Status: DISCONTINUED | OUTPATIENT
Start: 2019-04-10 | End: 2019-04-17 | Stop reason: HOSPADM

## 2019-04-10 RX ORDER — INSULIN LISPRO 100 [IU]/ML
INJECTION, SOLUTION INTRAVENOUS; SUBCUTANEOUS ONCE
Status: COMPLETED | OUTPATIENT
Start: 2019-04-10 | End: 2019-04-10

## 2019-04-10 RX ORDER — INSULIN LISPRO 100 [IU]/ML
INJECTION, SOLUTION INTRAVENOUS; SUBCUTANEOUS
Status: COMPLETED | OUTPATIENT
Start: 2019-04-10 | End: 2019-04-10

## 2019-04-10 RX ORDER — DEXTROSE 50 % IN WATER (D50W) INTRAVENOUS SYRINGE
12.5-25 AS NEEDED
Status: DISCONTINUED | OUTPATIENT
Start: 2019-04-10 | End: 2019-04-17 | Stop reason: HOSPADM

## 2019-04-10 RX ADMIN — INSULIN LISPRO 4 UNITS: 100 INJECTION, SOLUTION INTRAVENOUS; SUBCUTANEOUS at 12:12

## 2019-04-10 RX ADMIN — FAMOTIDINE 20 MG: 10 INJECTION, SOLUTION INTRAVENOUS at 02:09

## 2019-04-10 RX ADMIN — SODIUM CHLORIDE 25 ML/HR: 900 INJECTION, SOLUTION INTRAVENOUS at 02:28

## 2019-04-10 RX ADMIN — INSULIN LISPRO 1.2 UNITS: 100 INJECTION, SOLUTION INTRAVENOUS; SUBCUTANEOUS at 12:36

## 2019-04-10 RX ADMIN — LABETALOL HYDROCHLORIDE 300 MG: 100 TABLET, FILM COATED ORAL at 13:53

## 2019-04-10 RX ADMIN — SODIUM CHLORIDE 1.7 UNITS/HR: 900 INJECTION, SOLUTION INTRAVENOUS at 02:35

## 2019-04-10 RX ADMIN — SUCRALFATE 1 G: 1 TABLET ORAL at 22:05

## 2019-04-10 RX ADMIN — Medication 20 ML: at 02:18

## 2019-04-10 RX ADMIN — PROMETHAZINE HYDROCHLORIDE 25 MG: 25 INJECTION INTRAMUSCULAR; INTRAVENOUS at 09:57

## 2019-04-10 RX ADMIN — INSULIN LISPRO 3 UNITS: 100 INJECTION, SOLUTION INTRAVENOUS; SUBCUTANEOUS at 15:48

## 2019-04-10 RX ADMIN — Medication 10 ML: at 02:18

## 2019-04-10 RX ADMIN — SUCRALFATE 1 G: 1 TABLET ORAL at 09:40

## 2019-04-10 RX ADMIN — SUCRALFATE 1 G: 1 TABLET ORAL at 16:30

## 2019-04-10 RX ADMIN — SUCRALFATE 1 G: 1 TABLET ORAL at 11:44

## 2019-04-10 RX ADMIN — PROMETHAZINE HYDROCHLORIDE 25 MG: 25 INJECTION INTRAMUSCULAR; INTRAVENOUS at 02:31

## 2019-04-10 RX ADMIN — LABETALOL HYDROCHLORIDE 300 MG: 100 TABLET, FILM COATED ORAL at 02:00

## 2019-04-10 RX ADMIN — INSULIN LISPRO 2 UNITS: 100 INJECTION, SOLUTION INTRAVENOUS; SUBCUTANEOUS at 01:58

## 2019-04-10 RX ADMIN — FAMOTIDINE 20 MG: 10 INJECTION, SOLUTION INTRAVENOUS at 13:54

## 2019-04-10 NOTE — PROGRESS NOTES
[address] 
[address] [facility] [address] [place], [address] 
[phone] GI PROGRESS Therese Marlow AGACN-BC 
 
 
NAME:   Jr Gee :    1990 MRN:    [id number] Assessment/Plan 1. Intractable n/v with hematemesis,-gastroparesis, pregnancy, possible marijuana abuse all possibly could be playing a role. Differentials include esophagitis, gastritis or [place] tear, PUD, etc. Abd US unremarkable. -Transfuse as necessary 
-Pepcid BID 
-add carafate 
-Continue to monitor clinical course. EGD and sedation are a risk given her pregnant state. However, if her Hgb continues to drop or symptoms persist, would consider EGD 
  
  
2. Chronic anemia-baseline hemoglobin ~10. GI cause is possibility Patient Active Problem List  
Diagnosis Code  Abnormal LFTs R94.5  Acute renal failure (HCC) N17.9  SIRS (systemic inflammatory response syndrome) ([facility]) R65.10  Ventricular tachycardia ([facility]) I47.2  SVT (supraventricular tachycardia) ([facility]) I47.1  UTI (urinary tract infection) N39.0  DM type 1 (diabetes mellitus, type 1) ([facility]) E10.9  Chronic abdominal pain R10.9, G89.29  
 Nausea & vomiting R11.2  Viral syndrome B34.9  Depression F32.9  Diabetic peripheral neuropathy associated with type 1 diabetes mellitus (HCC) E10.42  
 DKA, type 1 (HCC) E10.10  GIB (gastrointestinal bleeding) K92.2  Nausea R11.0  Neuropathy G62.9  Hypertension I10  
 Hypokalemia E87.6  Hypophosphatemia E83.39  
 DKA, type 1, not at goal [facility]) E10.10  Leukocytosis D72.829  
 Epigastric abdominal pain R10.13  
 LLQ abdominal pain R10.32  
 Diarrhea R19.7  Weight loss R63.4  Esophagitis, erosive K22.10  Nausea and vomiting R11.2  Sepsis ([address] A41.9  DKA (diabetic ketoacidoses) ([facility]) E13.10  Pre-eclampsia superimposed on chronic hypertension, antepartum O11.9, O10.019  
  Chronic hypertension with superimposed preeclampsia O11.9 Subjective:  
 
Denies abdominal pain or vomiting. Objective: VITALS:  
Last 24hrs VS reviewed since prior hospitalist progress note. Most recent are: 
Visit Vitals BP (!) 144/93 Pulse 93 Temp 98.1 °F (36.7 °C) Resp 16 Ht 5' 8\" (1.727 m) Wt 70.8 kg (156 lb) SpO2 99% Breastfeeding? No  
BMI 23.72 kg/m² Intake/Output Summary (Last 24 hours) at 4/10/2019 1311 Last data filed at 4/10/2019 0205 Gross per 24 hour Intake  Output 2150 ml Net -2150 ml PHYSICAL EXAM: 
General:          Well developed, Well nourished. Alert, cooperative, no acute distress.   
HEENT:           Normocephalic, Atraumatic. PERRLA, EOMI. Anicteric sclerae. Lungs:             CTA Bilaterally. No Wheezing/Rhonchi/Rales. Heart:              Regular rate and rhythm, No murmur, No Rubs, No Gallops Abdomen:        Soft, gravid, non-tender.  +Bowel sounds, no hepatomegaly Extremities:     No cyanosis,clubing or edema Neurologic:      Alert and oriented X 3.  No acute neurological distress. Psych:             Good insight. Not anxious nor agitated. Lab Data Recent Results (from the past 12 hour(s)) GLUCOSE, POC Collection Time: 04/10/19  2:04 AM  
Result Value Ref Range Glucose (POC) 118 (H) 65 - 100 mg/dL Performed by Frieda Shipley Collection Time: 04/10/19  2:06 AM  
Result Value Ref Range Glucose 118 mg/dL Insulin order 1.7 units/hour Insulin adminstered 1.7 units/hour Multiplier 0.030 Low target 100 mg/dL High target 140 mg/dL D50 order 0.0 ml  
 D50 administered 0.00 ml Minutes until next  min Order initials AB Administered initials AB   
 GLSCOM Comments LIPASE Collection Time: 04/10/19  2:21 AM  
Result Value Ref Range Lipase 108 73 - 393 U/L  
GLUCOSE, POC Collection Time: 04/10/19  4:08 AM  
Result Value Ref Range Glucose (POC) 101 (H) 65 - 100 mg/dL Performed by Jone Easton Collection Time: 04/10/19  4:12 AM  
Result Value Ref Range Glucose 101 mg/dL Insulin order 1.2 units/hour Insulin adminstered 1.2 units/hour Multiplier 0.030 Low target 100 mg/dL High target 140 mg/dL D50 order 0.0 ml  
 D50 administered 0.00 ml Minutes until next  min Order initials AB Administered initials AB   
 GLSCOM Comments GLUCOSE, POC Collection Time: 04/10/19  6:00 AM  
Result Value Ref Range Glucose (POC) 137 (H) 65 - 100 mg/dL Performed by Jone Easton Collection Time: 04/10/19  6:02 AM  
Result Value Ref Range Glucose 137 mg/dL Insulin order 2.3 units/hour Insulin adminstered 2.3 units/hour Multiplier 0.030 Low target 100 mg/dL High target 140 mg/dL D50 order 0.0 ml  
 D50 administered 0.00 ml Minutes until next  min Order initials AB Administered initials AB   
 GLSCOM Comments GLUCOSE, POC Collection Time: 04/10/19  8:03 AM  
Result Value Ref Range Glucose (POC) 129 (H) 65 - 100 mg/dL Performed by Fahad Cruz Collection Time: 04/10/19  8:05 AM  
Result Value Ref Range Glucose 129 mg/dL Insulin order 2.1 units/hour Insulin adminstered 2.1 units/hour Multiplier 0.030 Low target 100 mg/dL High target 140 mg/dL D50 order 0.0 ml  
 D50 administered 0.00 ml Minutes until next  min Order initials lf   
 Administered initials lf   
 GLSCOM Comments GLUCOSE, POC Collection Time: 04/10/19  9:45 AM  
Result Value Ref Range Glucose (POC) 114 (H) 65 - 100 mg/dL Performed by Fahad Cruz Collection Time: 04/10/19  9:48 AM  
Result Value Ref Range Glucose 114 mg/dL Insulin order 1.6 units/hour Insulin adminstered 1.6 units/hour Multiplier 0.030 Low target 100 mg/dL High target 140 mg/dL D50 order 0.0 ml  
 D50 administered 0.00 ml Minutes until next  min Order initials lf   
 Administered initials lf   
 GLSCOM Comments GLUCOSE, POC Collection Time: 04/10/19 11:47 AM  
Result Value Ref Range Glucose (POC) 140 (H) 65 - 100 mg/dL Performed by Yojana Hooper Collection Time: 04/10/19 11:49 AM  
Result Value Ref Range Glucose 140 mg/dL Insulin order 2.4 units/hour Insulin adminstered 2.4 units/hour Multiplier 0.030 Low target 100 mg/dL High target 140 mg/dL D50 order 0.0 ml  
 D50 administered 0.00 ml Minutes until next  min Order initials lf   
 Administered initials lf   
 GLSCOM Comments tr   
 
 
 
Medications: Reviewed PMH/ reviewed - no change compared to H&P Mid-Level Provider: Vanesa Lewis NP Date/Time:  4/10/2019 I have personally reviewed the history and independently examined the patient. I have reviewed the chart and agree with the documentation recorded by the Mid Level Provider, including the assessment, treatment plan, and disposition.  
 
 
Kuldip Gray MD

## 2019-04-10 NOTE — ROUTINE PROCESS
0740: Bedside shift change report given to  [facility] [place] (oncoming nurse) by Amanda Workman RN (offgoing nurse). Report included the following information SBAR, Intake/Output, MAR and Recent Results. 0945: patient refusing NST at this time, stating \"I'm too nauseous. \"  Will attempt later after Phenergan dose. 1210: Arielle Watkins from [employer] at bedside, patient signed consent to use own insulin pump. Patient will set up pump after eating lunch. 1505: spoke with Dr. Joey Parada, informed that insulin drip has been discontinued. Orders for q2 accuchecks until glucose has been stabilized, then may change to ACHS. 1817: BS = 62, apple juice given and will recheck.  
 
1903: spoke with Dr. Joey Parada, patient may be transferred to [facility].

## 2019-04-10 NOTE — PROGRESS NOTES
High Risk Obstetrics Progress Note Name: Johnathon Sweeney MRN: [id number]  SSN: [id number] YOB: 1990  Age: 30 y.o. Sex: female Subjective: LOS: 3 days Estimated Date of Delivery: 19 Gestational Age Today: 32w0d Patient admitted for chronic hypertension, diabetes - Type 1, nausea and vomiting and Anemia. States she does not have abdominal pain  , chest pain, contractions, fever, headache , nausea and vomiting, pelvic pressure, right upper quadrant pain  , shortness of breath, swelling, vaginal bleeding , vaginal leaking of fluid  and visual disturbances. Objective:  
 
Vitals:  Blood pressure 148/87, pulse 90, temperature 98.1 °F (36.7 °C), resp. rate 16, height 5' 8\" (1.727 m), weight 70.8 kg (156 lb), SpO2 99 %, not currently breastfeeding. Temp (24hrs), Av.2 °F (36.8 °C), Min:98.1 °F (36.7 °C), Max:98.3 °F (36.8 °C) Systolic (95TLQ), OSY:174 , Min:126 , Max:163 Diastolic (21MVB), OPP:92, Min:73, Max:97 Intake and Output:    
Date 04/10/19 0700 - 19 3814 Shift [id number] 1805-6710 24 Hour Total  
INTAKE Shift Total(mL/kg) OUTPUT Urine(mL/kg/hr) 700   700 Shift Total(mL/kg) 700(9.9)   700(9.9) Weight (kg) 70.8 70.8 70.8 70.8 Physical Exam: 
Patient without distress. Abdomen: soft, nontender Membranes:  Intact Uterine Activity:  None Fetal Heart Rate:  Baseline: 130 per minute Variability: moderate Accelerations: yes Decelerations: none Uterine contractions: none Labs:  
Recent Results (from the past 36 hour(s)) GLUCOSE, POC Collection Time: 19 12:10 AM  
Result Value Ref Range Glucose (POC) 161 (H) 65 - 100 mg/dL Performed by Angely Brewer Collection Time: 19 12:11 AM  
Result Value Ref Range Glucose 161 mg/dL Insulin order 2.0 units/hour Insulin adminstered 2.0 units/hour Multiplier 0.020 Low target 140 mg/dL High target 180 mg/dL D50 order 0.0 ml  
 D50 administered 0.00 ml Minutes until next BG 60 min Order initials cd Administered initials SS   
 [facility] Comments GLUCOSE, POC Collection Time: 04/09/19  1:08 AM  
Result Value Ref Range Glucose (POC) 143 (H) 65 - 100 mg/dL Performed by Logan Kothari Collection Time: 04/09/19  1:09 AM  
Result Value Ref Range Glucose 143 mg/dL Insulin order 1.7 units/hour Insulin adminstered 1.7 units/hour Multiplier 0.020 Low target 140 mg/dL High target 180 mg/dL D50 order 0.0 ml  
 D50 administered 0.00 ml Minutes until next BG 60 min Order initials cd Administered initials cd GLSCOM Comments GLUCOSE, POC Collection Time: 04/09/19  2:13 AM  
Result Value Ref Range Glucose (POC) 126 (H) 65 - 100 mg/dL Performed by Logan Kothari Collection Time: 04/09/19  2:15 AM  
Result Value Ref Range Glucose 126 mg/dL Insulin order 0.7 units/hour Insulin adminstered 0.7 units/hour Multiplier 0.010 Low target 140 mg/dL High target 180 mg/dL D50 order 0.0 ml  
 D50 administered 0.00 ml Minutes until next BG 60 min Order initials cd Administered initials cd GLSCOM Comments GLUCOSE, POC Collection Time: 04/09/19  3:13 AM  
Result Value Ref Range Glucose (POC) 130 (H) 65 - 100 mg/dL Performed by Logan Kothari Collection Time: 04/09/19  3:13 AM  
Result Value Ref Range Glucose 130 mg/dL Insulin order 0.0 units/hour Insulin adminstered 0.0 units/hour Multiplier 0.000 Low target 140 mg/dL High target 180 mg/dL D50 order 0.0 ml  
 D50 administered 0.00 ml Minutes until next BG 60 min Order initials cd Administered initials CD   
 [facility] Comments METABOLIC PANEL, BASIC Collection Time: 04/09/19  4:18 AM  
Result Value Ref Range  Sodium 134 (L) 136 - 145 mmol/L  
 Potassium 4.1 3.5 - 5.1 mmol/L Chloride 107 97 - 108 mmol/L  
 CO2 21 21 - 32 mmol/L Anion gap 6 5 - 15 mmol/L Glucose 172 (H) 65 - 100 mg/dL BUN 20 6 - 20 MG/DL Creatinine 1.15 (H) 0.55 - 1.02 MG/DL  
 BUN/Creatinine ratio 17 12 - 20 GFR est AA >60 >60 ml/min/1.73m2 GFR est non-AA 55 (L) >60 ml/min/1.73m2 Calcium 7.3 (L) 8.5 - 10.1 MG/DL PROTEIN/CREATININE RATIO, URINE Collection Time: 04/09/19  4:18 AM  
Result Value Ref Range Protein, urine random 74 (H) 0.0 - 11.9 mg/dL Creatinine, urine 17.20 mg/dL Protein/Creat. urine Ratio 4.3    
CBC W/O DIFF Collection Time: 04/09/19  4:18 AM  
Result Value Ref Range WBC 13.9 (H) 3.6 - 11.0 K/uL  
 RBC 3.01 (L) 3.80 - 5.20 M/uL HGB 7.7 (L) 11.5 - 16.0 g/dL HCT 24.4 (L) 35.0 - 47.0 % MCV 81.1 80.0 - 99.0 FL  
 MCH 25.6 (L) 26.0 - 34.0 PG  
 MCHC 31.6 30.0 - 36.5 g/dL  
 RDW 16.8 (H) 11.5 - 14.5 % PLATELET 649 253 - 689 K/uL MPV 11.0 8.9 - 12.9 FL  
 NRBC 0.0 0  WBC ABSOLUTE NRBC 0.00 0.00 - 0.01 K/uL GLUCOSE, POC Collection Time: 04/09/19  4:21 AM  
Result Value Ref Range Glucose (POC) 172 (H) 65 - 100 mg/dL Performed by Marlen Fishman Collection Time: 04/09/19  4:25 AM  
Result Value Ref Range Glucose 172 mg/dL Insulin order 0.1 units/hour Insulin adminstered 0.1 units/hour Multiplier 0.000 Low target 140 mg/dL High target 180 mg/dL D50 order 0.0 ml  
 D50 administered 0.00 ml Minutes until next BG 60 min Order initials cd Administered initials cd GLSCOM Comments GLUCOSE, POC Collection Time: 04/09/19  5:33 AM  
Result Value Ref Range Glucose (POC) 226 (H) 65 - 100 mg/dL Performed by Marlen Fishman Collection Time: 04/09/19  5:33 AM  
Result Value Ref Range Glucose 226 mg/dL Insulin order 1.7 units/hour Insulin adminstered 1.7 units/hour  Multiplier 0.010   
 Low target 140 mg/dL High target 180 mg/dL D50 order 0.0 ml  
 D50 administered 0.00 ml Minutes until next BG 60 min Order initials cd Administered initials cd GLSCOM Comments GLUCOSE, POC Collection Time: 04/09/19  6:40 AM  
Result Value Ref Range Glucose (POC) 225 (H) 65 - 100 mg/dL Performed by Lakisha Cruz Collection Time: 04/09/19  6:41 AM  
Result Value Ref Range Glucose 225 mg/dL Insulin order 3.3 units/hour Insulin adminstered 3.3 units/hour Multiplier 0.020 Low target 140 mg/dL High target 180 mg/dL D50 order 0.0 ml  
 D50 administered 0.00 ml Minutes until next BG 60 min Order initials cd Administered initials cd GLSCOM Comments GLUCOSE, POC Collection Time: 04/09/19  7:57 AM  
Result Value Ref Range Glucose (POC) 199 (H) 65 - 100 mg/dL Performed by Lakisha Cruz Collection Time: 04/09/19  7:57 AM  
Result Value Ref Range Glucose 199 mg/dL Insulin order 4.2 units/hour Insulin adminstered 4.2 units/hour Multiplier 0.030 Low target 140 mg/dL High target 180 mg/dL D50 order 0.0 ml  
 D50 administered 0.00 ml Minutes until next BG 60 min Order initials cd Administered initials cd GLSCOM Comments LIPASE Collection Time: 04/09/19  8:21 AM  
Result Value Ref Range Lipase 70 (L) 73 - 393 U/L  
GLUCOSE, POC Collection Time: 04/09/19  9:06 AM  
Result Value Ref Range Glucose (POC) 156 (H) 65 - 100 mg/dL Performed by Fahad Cruz Collection Time: 04/09/19  9:08 AM  
Result Value Ref Range Glucose 156 mg/dL Insulin order 2.9 units/hour Insulin adminstered 2.9 units/hour Multiplier 0.030 Low target 140 mg/dL High target 180 mg/dL D50 order 0.0 ml  
 D50 administered 0.00 ml Minutes until next BG 60 min Order initials lf   
 Administered initials lf   
 GLSCOM Comments GLUCOSE, POC Collection Time: 04/09/19 10:00 AM  
Result Value Ref Range Glucose (POC) 127 (H) 65 - 100 mg/dL Performed by Luis E Hartman Collection Time: 04/09/19 10:03 AM  
Result Value Ref Range Glucose 127 mg/dL Insulin order 1.3 units/hour Insulin adminstered 1.3 units/hour Multiplier 0.020 Low target 140 mg/dL High target 180 mg/dL D50 order 0.0 ml  
 D50 administered 0.00 ml Minutes until next BG 60 min Order initials lf   
 Administered initials lf   
 GLSCOM Comments GLUCOSE, POC Collection Time: 04/09/19 11:00 AM  
Result Value Ref Range Glucose (POC) 172 (H) 65 - 100 mg/dL Performed by Luis E Hartman Collection Time: 04/09/19 11:02 AM  
Result Value Ref Range Glucose 172 mg/dL Insulin order 2.2 units/hour Insulin adminstered 2.2 units/hour Multiplier 0.020 Low target 140 mg/dL High target 180 mg/dL D50 order 0.0 ml  
 D50 administered 0.00 ml Minutes until next BG 60 min Order initials lf   
 Administered initials lf   
 GLSCOM Comments GLUCOSE, POC Collection Time: 04/09/19 11:59 AM  
Result Value Ref Range Glucose (POC) 174 (H) 65 - 100 mg/dL Performed by Luis E Hartman Collection Time: 04/09/19 12:02 PM  
Result Value Ref Range Glucose 174 mg/dL Insulin order 2.3 units/hour Insulin adminstered 2.3 units/hour Multiplier 0.020 Low target 140 mg/dL High target 180 mg/dL D50 order 0.0 ml  
 D50 administered 0.00 ml Minutes until next BG 60 min Order initials lf   
 Administered initials lf   
 GLSCOM Comments GLUCOSE, POC Collection Time: 04/09/19 12:59 PM  
Result Value Ref Range Glucose (POC) 180 (H) 65 - 100 mg/dL Performed by Luis E Hartman Collection Time: 04/09/19  1:01 PM  
Result Value Ref Range Glucose 180 mg/dL Insulin order 2.4 units/hour Insulin adminstered 2.4 units/hour Multiplier 0.020 Low target 140 mg/dL High target 180 mg/dL D50 order 0.0 ml  
 D50 administered 0.00 ml Minutes until next BG 60 min Order initials lf   
 Administered initials lf   
 GLSCOM Comments GLUCOSE, POC Collection Time: 04/09/19  1:59 PM  
Result Value Ref Range Glucose (POC) 168 (H) 65 - 100 mg/dL Performed by [place] Kramer Collection Time: 04/09/19  1:59 PM  
Result Value Ref Range Glucose 168 mg/dL Insulin order 2.2 units/hour Insulin adminstered 2.2 units/hour Multiplier 0.020 Low target 140 mg/dL High target 180 mg/dL D50 order 0.0 ml  
 D50 administered 0.00 ml Minutes until next BG 60 min Order initials lf   
 Administered initials lf   
 GLSCOM Comments GLUCOSE, POC Collection Time: 04/09/19  3:02 PM  
Result Value Ref Range Glucose (POC) 162 (H) 65 - 100 mg/dL Performed by [place] Kramer Collection Time: 04/09/19  3:03 PM  
Result Value Ref Range Glucose 162 mg/dL Insulin order 3.1 units/hour Insulin adminstered 3.1 units/hour Multiplier 0.030 Low target 100 mg/dL High target 140 mg/dL D50 order 0.0 ml  
 D50 administered 0.00 ml Minutes until next BG 60 min Order initials lf   
 Administered initials lf   
 GL[facility]OM Comments GLUCOSE, POC Collection Time: 04/09/19  4:04 PM  
Result Value Ref Range Glucose (POC) 139 (H) 65 - 100 mg/dL Performed by [place] Kramer Collection Time: 04/09/19  4:06 PM  
Result Value Ref Range Glucose 139 mg/dL Insulin order 2.4 units/hour Insulin adminstered 2.4 units/hour Multiplier 0.030 Low target 100 mg/dL High target 140 mg/dL D50 order 0.0 ml  
 D50 administered 0.00 ml Minutes until next  min Order initials lf   
 Administered initials lf   
 GL[facility]OM Comments GLUCOSE, POC  Collection Time: 04/09/19  5:02 PM  
 Result Value Ref Range Glucose (POC) 141 (H) 65 - 100 mg/dL Performed by Carlos Avendaño GLUCOSE, POC Collection Time: 04/09/19  5:59 PM  
Result Value Ref Range Glucose (POC) 126 (H) 65 - 100 mg/dL Performed by Carlos James Collection Time: 04/09/19  6:01 PM  
Result Value Ref Range Glucose 126 mg/dL Insulin order 2.0 units/hour Insulin adminstered 2.0 units/hour Multiplier 0.030 Low target 100 mg/dL High target 140 mg/dL D50 order 0.0 ml  
 D50 administered 0.00 ml Minutes until next  min Order initials lf   
 Administered initials lf   
 GLSCOM Comments GLUCOSE, POC Collection Time: 04/09/19  7:45 PM  
Result Value Ref Range Glucose (POC) 113 (H) 65 - 100 mg/dL Performed by Carlos James Collection Time: 04/09/19  7:46 PM  
Result Value Ref Range Glucose 113 mg/dL Insulin order 1.6 units/hour Insulin adminstered 1.6 units/hour Multiplier 0.030 Low target 100 mg/dL High target 140 mg/dL D50 order 0.0 ml  
 D50 administered 0.00 ml Minutes until next  min Order initials lf   
 Administered initials lf   
 GLSCOM Comments GLUCOSE, POC Collection Time: 04/09/19  9:55 PM  
Result Value Ref Range Glucose (POC) 115 (H) 65 - 100 mg/dL Performed by Sheela Saucedo Collection Time: 04/09/19  9:58 PM  
Result Value Ref Range Glucose 115 mg/dL Insulin order 1.7 units/hour Insulin adminstered 1.7 units/hour Multiplier 0.030 Low target 100 mg/dL High target 140 mg/dL D50 order 0.0 ml  
 D50 administered 0.00 ml Minutes until next  min Order initials AB Administered initials AB   
 [facility] Comments GLUCOSE, POC Collection Time: 04/09/19 11:54 PM  
Result Value Ref Range Glucose (POC) 112 (H) 65 - 100 mg/dL Performed by Sheela Saucedo  Collection Time: 04/09/19 11:55 PM  
 Result Value Ref Range Glucose 112 mg/dL Insulin order 1.6 units/hour Insulin adminstered 1.6 units/hour Multiplier 0.030 Low target 100 mg/dL High target 140 mg/dL D50 order 0.0 ml  
 D50 administered 0.00 ml Minutes until next  min Order initials AB Administered initials AB   
 GLSCOM Comments GLUCOSE, POC Collection Time: 04/10/19  2:04 AM  
Result Value Ref Range Glucose (POC) 118 (H) 65 - 100 mg/dL Performed by Coby Tanner Collection Time: 04/10/19  2:06 AM  
Result Value Ref Range Glucose 118 mg/dL Insulin order 1.7 units/hour Insulin adminstered 1.7 units/hour Multiplier 0.030 Low target 100 mg/dL High target 140 mg/dL D50 order 0.0 ml  
 D50 administered 0.00 ml Minutes until next  min Order initials AB Administered initials AB   
 GUALBERT[facility]OM Comments LIPASE Collection Time: 04/10/19  2:21 AM  
Result Value Ref Range Lipase 108 73 - 393 U/L  
GLUCOSE, POC Collection Time: 04/10/19  4:08 AM  
Result Value Ref Range Glucose (POC) 101 (H) 65 - 100 mg/dL Performed by Coby Tanner Collection Time: 04/10/19  4:12 AM  
Result Value Ref Range Glucose 101 mg/dL Insulin order 1.2 units/hour Insulin adminstered 1.2 units/hour Multiplier 0.030 Low target 100 mg/dL High target 140 mg/dL D50 order 0.0 ml  
 D50 administered 0.00 ml Minutes until next  min Order initials AB Administered initials AB   
 GUALBERTOOM Comments GLUCOSE, POC Collection Time: 04/10/19  6:00 AM  
Result Value Ref Range Glucose (POC) 137 (H) 65 - 100 mg/dL Performed by Coby Tanner Collection Time: 04/10/19  6:02 AM  
Result Value Ref Range Glucose 137 mg/dL Insulin order 2.3 units/hour Insulin adminstered 2.3 units/hour Multiplier 0.030 Low target 100 mg/dL High target 140 mg/dL  D50 order 0.0 ml  
 D50 administered 0.00 ml Minutes until next  min Order initials AB Administered initials AB   
 GLSCOM Comments GLUCOSE, POC Collection Time: 04/10/19  8:03 AM  
Result Value Ref Range Glucose (POC) 129 (H) 65 - 100 mg/dL Performed by Vidya Rodriguez Collection Time: 04/10/19  8:05 AM  
Result Value Ref Range Glucose 129 mg/dL Insulin order 2.1 units/hour Insulin adminstered 2.1 units/hour Multiplier 0.030 Low target 100 mg/dL High target 140 mg/dL D50 order 0.0 ml  
 D50 administered 0.00 ml Minutes until next  min Order initials lf   
 Administered initials lf   
 [facility] Comments GLUCOSE, POC Collection Time: 04/10/19  9:45 AM  
Result Value Ref Range Glucose (POC) 114 (H) 65 - 100 mg/dL Performed by Vidya Rodriguez Collection Time: 04/10/19  9:48 AM  
Result Value Ref Range Glucose 114 mg/dL Insulin order 1.6 units/hour Insulin adminstered 1.6 units/hour Multiplier 0.030 Low target 100 mg/dL High target 140 mg/dL D50 order 0.0 ml  
 D50 administered 0.00 ml Minutes until next  min Order initials lf   
 Administered initials lf   
 [facility] Comments Assessment and Plan: Active Problems: 
  Chronic hypertension with superimposed preeclampsia (4/8/2019) Diabetes-Type 1:  management per Diabetes treatment team reccomendations  To switch over to Insulin Pump, then stop IV insulin. CHronic HTN- Stable on Labetalol 300mg BID 
N/V improved . [facility] per [facility](thanks) Oliguria improved, CKD, management per [facility](thanks) 29 weeks EGA Cat 1 NST. Manage per MFM  (thanks) Discharge planning- patient to need OB Provider. Signed By: Cristo Jeff MD   
 April 10, 2019

## 2019-04-10 NOTE — PROGRESS NOTES
~1950: Bedside and Verbal shift change report given to TINO Cason RN by KELLIE Collins RN. Report included the following information SBAR, MAR, Accordion and Recent Results.

## 2019-04-10 NOTE — DIABETES MGMT
DTC Insulin Drip Progress Note Recommendations/ Comments:  Chart reviewed on [address]  Patient is now off insulin drip and back on pump, which was started 2 hours prior to stop of drip. Blood sugar monitoring per OB MD. 
 
Current Hospital DM Medication:  Insulin Pump with pre admit settings, see below Patient is a 30 y.o. female with known Type 1 Diabetes on insulin pump at home. Insulin Pump Settings from home - currently patient is not wearing: 
  
Pt on (Type of Pump) and uses Medtronic G 70. 
  
Basal Rates                                    Insulin Carb Ratios 12am to 4 am = 1.0 units/hr                      1 to 11 = 1 unit per 11 g from 12am-5pm 
4 to 10am = 1.1 units/hr                 1 to 8 = 1 unit per 8 g 5pm-12am 
10am to 12am = 1 units/hr                         
  
 Insulin Sensitivity Factors 1 to 43 = 1 unit per 43 mg/dl 
  
Target 100-120 A1c:  
Lab Results Component Value Date/Time Hemoglobin A1c 11.1 (H) 08/18/2016 12:38 PM  
 
 
 
Recent Glucose Results:  
Lab Results Component Value Date/Time GLUCPOC 90 04/10/2019 02:51 PM  
 GLUCPOC 155 (H) 04/10/2019 01:48 PM  
 GLUCPOC 140 (H) 04/10/2019 11:47 AM  
  
 
Lab Results Component Value Date/Time Creatinine 1.15 (H) 04/09/2019 04:18 AM  
 
 
Active Orders Diet DIET DIABETIC WITH OPTIONS Consistent Carb 2000kcal; Regular PO intake: No data found. Currently on insulin gtt. Will continue to follow as needed. Thank you. Jem Gonzales, MS, RN, CDE Time Spent: 15 minutes

## 2019-04-11 LAB
CREAT UR-MCNC: 42.7 MG/DL
GLUCOSE BLD STRIP.AUTO-MCNC: 103 MG/DL (ref 65–100)
GLUCOSE BLD STRIP.AUTO-MCNC: 118 MG/DL (ref 65–100)
GLUCOSE BLD STRIP.AUTO-MCNC: 136 MG/DL (ref 65–100)
GLUCOSE BLD STRIP.AUTO-MCNC: 87 MG/DL (ref 65–100)
PROT UR-MCNC: 245 MG/DL (ref 0–11.9)
SERVICE CMNT-IMP: ABNORMAL
SERVICE CMNT-IMP: NORMAL

## 2019-04-11 PROCEDURE — 84156 ASSAY OF PROTEIN URINE: CPT

## 2019-04-11 PROCEDURE — 65410000002 HC RM PRIVATE OB

## 2019-04-11 PROCEDURE — 82962 GLUCOSE BLOOD TEST: CPT

## 2019-04-11 PROCEDURE — 74011250637 HC RX REV CODE- 250/637: Performed by: ADVANCED PRACTICE MIDWIFE

## 2019-04-11 PROCEDURE — 59025 FETAL NON-STRESS TEST: CPT

## 2019-04-11 PROCEDURE — 74011250636 HC RX REV CODE- 250/636: Performed by: OBSTETRICS & GYNECOLOGY

## 2019-04-11 PROCEDURE — 74011250637 HC RX REV CODE- 250/637: Performed by: NURSE PRACTITIONER

## 2019-04-11 PROCEDURE — 82570 ASSAY OF URINE CREATININE: CPT

## 2019-04-11 PROCEDURE — 74011250637 HC RX REV CODE- 250/637: Performed by: OBSTETRICS & GYNECOLOGY

## 2019-04-11 RX ORDER — PROMETHAZINE HYDROCHLORIDE 25 MG/1
25 TABLET ORAL
Status: DISCONTINUED | OUTPATIENT
Start: 2019-04-11 | End: 2019-04-17 | Stop reason: HOSPADM

## 2019-04-11 RX ADMIN — FAMOTIDINE 20 MG: 10 INJECTION, SOLUTION INTRAVENOUS at 01:22

## 2019-04-11 RX ADMIN — LABETALOL HYDROCHLORIDE 300 MG: 100 TABLET, FILM COATED ORAL at 13:56

## 2019-04-11 RX ADMIN — FAMOTIDINE 20 MG: 10 INJECTION, SOLUTION INTRAVENOUS at 13:56

## 2019-04-11 RX ADMIN — SUCRALFATE 1 G: 1 TABLET ORAL at 17:42

## 2019-04-11 RX ADMIN — Medication 20 ML: at 04:33

## 2019-04-11 RX ADMIN — LABETALOL HYDROCHLORIDE 300 MG: 100 TABLET, FILM COATED ORAL at 01:24

## 2019-04-11 RX ADMIN — Medication 10 ML: at 04:33

## 2019-04-11 RX ADMIN — SUCRALFATE 1 G: 1 TABLET ORAL at 22:20

## 2019-04-11 RX ADMIN — SUCRALFATE 1 G: 1 TABLET ORAL at 12:25

## 2019-04-11 RX ADMIN — Medication 10 ML: at 21:08

## 2019-04-11 RX ADMIN — SUCRALFATE 1 G: 1 TABLET ORAL at 06:55

## 2019-04-11 RX ADMIN — PROMETHAZINE HYDROCHLORIDE 25 MG: 25 TABLET ORAL at 03:50

## 2019-04-11 NOTE — PROGRESS NOTES
Maternal-Fetal Medicine See [facility] consultation (by Dr. Wendy Newton) below my note. Ms. Freddy Marrero P0 at 29w 1d gestation, was admitted 3 days ago with c/o nausea/vomiting. She was transferred from [facility]. Patient has type 1 diabetes and chronic hypertension and recently moved from [place]. Since asdmission, she received magnesium sulfate and betamethasone. She was seen by our nephrologist and GI (gastroparesis). Her GI symptoms have improved. She also has mild renal insufficiency. Patient is taking labetalol 300 mg bid. Ultrasound after admission showed appropriately-grown fetus. Patient does not have symptoms of severe headache or visual disturbances or right upper quadrant pain or vaginal bleeding. Diabetes: Patient is on continuous subcutaneous insulin infusion (CSII or pump). Her basal rates are: 
12-4AM: 1.0 units/h 
4A-10A: 1.1 units 10a-12A: 1.0 units She is on insulin:carb ratio of 1:11 (ISF 1:43) Her blood glucose levels were normal yesterday. Kenna Soares fasting level is increased. She is being followed by our diabetic educator. P/E: Patient is comfortably sitting up in bed; not in distress. BP: 131-163/73/102 mm Hg Abd: Soft gravid uterus; no tenderness. Labs including liver enzymes and platelets are normal. 
 
I counseled the patient on the following: 
Chronic hypertension: She had one SBP value of 160 mm Hg over 24 hours and her blood pressures have been either normal or in the mild hypertensive range. I am reluctant to make a diagnosis of preeclampsia with severe features. Increased proteinuria is common in patients with long-standing diabetes and mild renal insufficiency. She also did not require increasing antihypertensive dosage. I recommend we observe her for 24 to 48 hours to be certain of the diagnosis. Outpatient management is reasonable if no severe features are present. Type 1 diabetes: Patient is very educated on insulin pump.  However, she needs to follow with her endocrinologist once discharged. She is keen on delivering at [facility] and is in the process of searching for an obstetrician who has privileges here. Plan: 
-Close monitoring of BP. 
-Increase labetalol as needed. -Diabetic management with CSII. -If blood pressures are not in the severe range, we can consider discharge in 24 to 48 hours. xxxxxxxxxxxxxxxxxxxxxxxxxxxxxxxxxxxxxxxxxxxxxxxxxxxxxxxxxxxxxxxxxxxxxxxxx[id number] MFM Consultation (Dr. Piña)-2019 Indication: Pre-existing DM Type 1 3rd Tri O24.013, Hypertension, Pre-existing 3rd Tri O10.013.  
____________________________________________________________________________ History: Age: 30 years. : 1 Para: 0.  
Current Pregnancy: Pre- pregnancy data: Weight 156 lbs. Height 5 ft 8 ins. BMI 23.7. 
____________________________________________________________________________ Dating: 
Stated EDC:  EDC: 19 GA by stated EDC: 28w5d Current Scan on: 19 EDC: 19 GA by current scan: 28w1d Best Overall Assessment: 19 EDC: 19 Assessed GA: 65G8V The calculation of the gestational age by current scan was based on BPD, OFD, HC, AC, FL and HUM. The Best Overall Assessment is based on the stated EDC. 
____________________________________________________________________________ General Evaluation: 
Fetal heart activity: present. Fetal heart rate: 147 bpm.  
Presentation: cephalic. Fetal movement: visible. Amniotic Fluid: normal. BONNY  20.3 cm. Maximal vertical pocket 5.6 cm. Cord: 3 vessels. Fetal cord insertion site: Normal.  
Placenta: posterior. ____________________________________________________________________________ Anatomy Scan: 
[facility] gestation. Biometry: BPD 69.0 mm  - 27w5d (27w0d to 28w3d) .3 mm  - 28w5d (26w5d to 30w6d) .5 mm  - 28w1d (27w3d to 28w6d) FL 51.9 mm  - 27w5d (25w4d to 29w5d) OFD 92.0 mm  - 27w5d HUM 49.4 mm  - 28w5d EFW (lbs/oz) 2 lbs 9 ozs EFW (g) 1163 g  40th% Fetal Anatomy: 
Visualized with normal appearance: head, brain, abdominal wall, gastrointestinal tract, kidneys, bladder. Heart: The 4-chamber view was obtained but outflow tracts could not be adequately visualized. Summary of Ultrasound Findings: 
Transabdominal US. U/S machine: Squee E8 Expert. U/S view: limited by adiposity. ____________________________________________________________________________ Fetal Wellbeing Assessment: 
Amniotic fluid: normal. BONNY: 20.3 cm. MVP: 5.6 cm. Q1: 4.8 cm. Q2: 4.3 cm. Q3: 5.6 cm. Q4: 5.6 cm. Biophysical Profile: Fetal body movements: normal (2), Fetal tone: normal (2), Fetal breathing movements: normal (2), Amniotic fluid volume: normal (2). Score 8 / 8.  
 
____________________________________________________________________________ Doppler: 
Fetal Doppler: 
Umbilical Artery: PS 11.0 cm/s  
 ED 18.38 cm/s S/D ratio 2.18   
 RI 0.55   
 PI 0.78   
 TAMX 27.14 cm/s  
 
____________________________________________________________________________ Report Summary: 
Impression: A maternal and fetal anatomy evaluation was performed. The patient moved back to [place] from [place], [place] ([place]) a few days ago, and was admitted to [facility] for evaluation of her hypertension and diabetes. She is a known type 1 diabetic on an insulin pump. Her last A1c was ~8%, and she reports fasting values 70-90, and postprandial values less than 130. Her diabetes is complicated by gastroparesis and mild renal insufficiency. She also has chronic hypertension (diagnosed 2-3 years ago) and takes Labetalol 300 mg BID. Her urine Pr/Cr ratio is chronically elevated (last value 5.4) likely due to her diabetic nephropathy.  She also takes Cymbalta for anxiety/depression, hydroxyzine for insomnia, and Zofran, Phenergan, and the scopolamine patch for nausea. She was anemic on admission, and received one unit of blood. GI is following, to see if she needs endoscopy for a possible GI bleed. Single viable IUP with biometry measurements consistent with her stated EDC is observed. Fetal growth is appropriate; today's EFW is at the 40th percentile. Visible fetal anatomy appears normal as indicated above. Normal fetal movements and amniotic fluid measurements are noted. The patient received magnesium intially due to worse hypertension, but current values are in the mild range. She also is receiving a course of  corticosteroids, and is on an insulin drip with a sliding scale to account for steroid-induced hyperglycemia. Her steroids finish tomorrow morning; I recommend she stay on her sliding scale drip for 24-36 hours after steroids are completed, and then get switched back to her insulin pump at her normal dosing (was at 1.1 units/hr). Once she is doing well back on her pump, BP's are stable on her Labetalol, and her Hgb is stable she is a candidate for discharge. She needs to arrange local OB care, we discussed her options. All questions and concerns were addressed. Recommendations: We recommend repeat evaluation for fetal growth assessment in 4 weeks.

## 2019-04-11 NOTE — PROGRESS NOTES
Bedside and Verbal shift change report given to Collette Sour RN (oncoming nurse) by Veronica Streeter RN and Jie RN (offgoing nurse). Report included the following information SBAR, Kardex, Intake/Output, MAR and Recent Results. 9591: pt states her pump administered 3.4 units of insulin for her blood sugar of 136 
 
0900: pt resting comfortably. Denies bleeding, discharge, vision changes, headache, epigastric pain. 1015: Dr. Cori Ford at bedside. Pt upset that she has to stay at hospital longer. Education given on importance of monitoring with pre-eclampsia 1030: List of OB offices and physicians given to pt.  
 
448 [id number]: NST non reactive but reassuring. 1 accel, 1 variable, baseline 150. Dr. Cori Ford notified. Pt seems in better spirits with family at bedside. 1145: Dr. John Linton with GI at bedside 1159: . Pump administered 2.1

## 2019-04-11 NOTE — PROGRESS NOTES
0200- assessment, listened to fetal heart tones, checked /105, scheduled labetalol given 5826- Pt complaining of nausea, requests phenergan, phenergan d/c, offered compazine and zofran. Pt states \"phenergan is the only thing that works for me, I have tried zofran and compazine\". Midwife paged, orders for phenergan received.

## 2019-04-11 NOTE — PROGRESS NOTES
[address] [address] 
[address] [facility] [address] [place], [address] 
[phone] GI PROGRESS Selvin Jin AGACN-BC 
 
 
NAME:   Luz Mobley :    1990 MRN:    [id number] Assessment/Plan 1. Intractable n/v with hematemesis,-gastroparesis, pregnancy, possible marijuana abuse all possibly could be playing a role. Differentials include esophagitis, gastritis or [place] tear, PUD, etc. Abd US unremarkable.  
-Transfuse as necessary 
-Pepcid BID 
-add carafate 
-Continue to monitor clinical course. EGD and sedation are a risk given her pregnant state. However, if her Hgb continues to drop or symptoms persist, would consider EGD. Symptoms are improving and stable. Continue above plan and PNV w/ iron.  
  
2. Chronic anemia-baseline hemoglobin ~10. No labs today Patient Active Problem List  
Diagnosis Code  Abnormal LFTs R94.5  Acute renal failure (HCC) N17.9  SIRS (systemic inflammatory response syndrome) ([facility]) R65.10  Ventricular tachycardia ([facility]) I47.2  SVT (supraventricular tachycardia) ([facility]) I47.1  UTI (urinary tract infection) N39.0  DM type 1 (diabetes mellitus, type 1) ([facility]) E10.9  Chronic abdominal pain R10.9, G89.29  
 Nausea & vomiting R11.2  Viral syndrome B34.9  Depression F32.9  Diabetic peripheral neuropathy associated with type 1 diabetes mellitus (HCC) E10.42  
 DKA, type 1 (HCC) E10.10  GIB (gastrointestinal bleeding) K92.2  Nausea R11.0  Neuropathy G62.9  Hypertension I10  
 Hypokalemia E87.6  Hypophosphatemia E83.39  
 DKA, type 1, not at goal [facility]) E10.10  Leukocytosis D72.829  
 Epigastric abdominal pain R10.13  
 LLQ abdominal pain R10.32  
 Diarrhea R19.7  Weight loss R63.4  Esophagitis, erosive K22.10  Nausea and vomiting R11.2  Sepsis ([facility] [address] A41.9  DKA (diabetic ketoacidoses) ([facility]) E13.10  Pre-eclampsia superimposed on chronic hypertension, antepartum O11.9, O10.019  
 Chronic hypertension with superimposed preeclampsia O11.9 Subjective:  
 
Upset and agitated that she is not being discharged. No further vomiting or abdominal pain. Eating. Off insulin drip and on insulin pump. Objective: VITALS:  
Last 24hrs VS reviewed since prior hospitalist progress note. Most recent are: 
Visit Vitals /89 (BP 1 Location: Left arm, BP Patient Position: At rest;Sitting) Pulse 94 Temp 98.5 °F (36.9 °C) Resp 16 Ht 5' 8\" (1.727 m) Wt 70.8 kg (156 lb) SpO2 99% Breastfeeding? No  
BMI 23.72 kg/m² Intake/Output Summary (Last 24 hours) at 4/11/2019 1144 Last data filed at 4/10/2019 1818 Gross per 24 hour Intake  Output 850 ml Net -850 ml PHYSICAL EXAM: 
General:          Well developed, Well nourished. Alert, cooperative, no acute distress.   
HEENT:           Normocephalic, Atraumatic. PERRLA, EOMI. Anicteric sclerae. Lungs:             CTA Bilaterally. No Wheezing/Rhonchi/Rales. Heart:              Regular rate and rhythm, No murmur, No Rubs, No Gallops Abdomen:        Soft, gravid, non-tender.  +Bowel sounds, no hepatomegaly Extremities:     No cyanosis,clubing or edema Neurologic:      Alert and oriented X 3.  No acute neurological distress. Psych:             Good insight. Not anxious nor agitated. 
  
 
 
 
  
Lab Data Recent Results (from the past 12 hour(s)) GLUCOSE, POC Collection Time: 04/11/19  6:56 AM  
Result Value Ref Range Glucose (POC) 136 (H) 65 - 100 mg/dL Performed by Chante Jefferson Medications: Reviewed PMH/ reviewed - no change compared to H&P Mid-Level Provider: Toan Ng NP Date/Time:  4/11/2019

## 2019-04-11 NOTE — PROGRESS NOTES
High Risk Obstetrics Progress Note Name: Radha Chacon MRN: [id number]  SSN: [id number] YOB: 1990  Age: 30 y.o. Sex: female Subjective: LOS: 4 days Estimated Date of Delivery: 19 Gestational Age Today: 28w2d Patient admitted for Chronic Hypertension with Superimposed Preeclampsia and Poorly controlled Type 1 DM on Insulin Pump. . States she does not have headache, visual changes, epigastric pain. , contractions, Vaginal bleeding. Active fetal movement. Nausea and Vomiting improved. Patient very tearful today as she desires to be discharged home. Talked to the patient at length about Severe Pre-eclampsia and the need for close monitoring. Objective:  
BPs in the last 24 hrs  126-159/ mmHg Vitals:  Blood pressure 135/89, pulse 94, temperature 98.5 °F (36.9 °C), resp. rate 16, height 5' 8\" (1.727 m), weight 70.8 kg (156 lb), SpO2 99 %, not currently breastfeeding. Temp (24hrs), Av.3 °F (36.8 °C), Min:98.1 °F (36.7 °C), Max:98.5 °F (36.9 °C) Systolic (97ZJI), MNA:391 , Min:135 , Max:159 Diastolic (45FZZ), QOV:68, Min:89, Max:109 Intake and Output:    
 
 
Physical Exam: 
Patient without distress. Heart: Regular rate and rhythm Lung: normal respiratory effort Abdomen: soft, nontender Fundus: soft and non tender Lower Extremities:  - Edema No    
 
Membranes:  Intact Uterine Activity:  None Fetal Heart Rate:  Baseline: 130 per minute Variability: moderate Accelerations: yes Decelerations: none Labs:  
Recent Results (from the past 36 hour(s)) GLUCOSE, POC Collection Time: 19 11:54 PM  
Result Value Ref Range Glucose (POC) 112 (H) 65 - 100 mg/dL Performed by Dk Gong Collection Time: 19 11:55 PM  
Result Value Ref Range Glucose 112 mg/dL Insulin order 1.6 units/hour Insulin adminstered 1.6 units/hour Multiplier 0.030 Low target 100 mg/dL High target 140 mg/dL D50 order 0.0 ml  
 D50 administered 0.00 ml Minutes until next  min Order initials AB Administered initials AB   
 GLSCOM Comments GLUCOSE, POC Collection Time: 04/10/19  2:04 AM  
Result Value Ref Range Glucose (POC) 118 (H) 65 - 100 mg/dL Performed by Ulyess Barrier Collection Time: 04/10/19  2:06 AM  
Result Value Ref Range Glucose 118 mg/dL Insulin order 1.7 units/hour Insulin adminstered 1.7 units/hour Multiplier 0.030 Low target 100 mg/dL High target 140 mg/dL D50 order 0.0 ml  
 D50 administered 0.00 ml Minutes until next  min Order initials AB Administered initials AB   
 GLSCOM Comments LIPASE Collection Time: 04/10/19  2:21 AM  
Result Value Ref Range Lipase 108 73 - 393 U/L  
GLUCOSE, POC Collection Time: 04/10/19  4:08 AM  
Result Value Ref Range Glucose (POC) 101 (H) 65 - 100 mg/dL Performed by Ulyess Barrier Collection Time: 04/10/19  4:12 AM  
Result Value Ref Range Glucose 101 mg/dL Insulin order 1.2 units/hour Insulin adminstered 1.2 units/hour Multiplier 0.030 Low target 100 mg/dL High target 140 mg/dL D50 order 0.0 ml  
 D50 administered 0.00 ml Minutes until next  min Order initials AB Administered initials AB   
 GLSCOM Comments GLUCOSE, POC Collection Time: 04/10/19  6:00 AM  
Result Value Ref Range Glucose (POC) 137 (H) 65 - 100 mg/dL Performed by Ulyess Barrier Collection Time: 04/10/19  6:02 AM  
Result Value Ref Range Glucose 137 mg/dL Insulin order 2.3 units/hour Insulin adminstered 2.3 units/hour Multiplier 0.030 Low target 100 mg/dL High target 140 mg/dL D50 order 0.0 ml  
 D50 administered 0.00 ml Minutes until next  min Order initials AB Administered initials AB   
 GLSCOM Comments GLUCOSE, POC  
 Collection Time: 04/10/19  8:03 AM  
Result Value Ref Range Glucose (POC) 129 (H) 65 - 100 mg/dL Performed by Supa Rivera Collection Time: 04/10/19  8:05 AM  
Result Value Ref Range Glucose 129 mg/dL Insulin order 2.1 units/hour Insulin adminstered 2.1 units/hour Multiplier 0.030 Low target 100 mg/dL High target 140 mg/dL D50 order 0.0 ml  
 D50 administered 0.00 ml Minutes until next  min Order initials lf   
 Administered initials lf   
 GLSCOM Comments GLUCOSE, POC Collection Time: 04/10/19  9:45 AM  
Result Value Ref Range Glucose (POC) 114 (H) 65 - 100 mg/dL Performed by Supa Rivera Collection Time: 04/10/19  9:48 AM  
Result Value Ref Range Glucose 114 mg/dL Insulin order 1.6 units/hour Insulin adminstered 1.6 units/hour Multiplier 0.030 Low target 100 mg/dL High target 140 mg/dL D50 order 0.0 ml  
 D50 administered 0.00 ml Minutes until next  min Order initials lf   
 Administered initials lf   
 GLSCOM Comments GLUCOSE, POC Collection Time: 04/10/19 11:47 AM  
Result Value Ref Range Glucose (POC) 140 (H) 65 - 100 mg/dL Performed by Supa Rivera Collection Time: 04/10/19 11:49 AM  
Result Value Ref Range Glucose 140 mg/dL Insulin order 2.4 units/hour Insulin adminstered 2.4 units/hour Multiplier 0.030 Low target 100 mg/dL High target 140 mg/dL D50 order 0.0 ml  
 D50 administered 0.00 ml Minutes until next  min Order initials lf   
 Administered initials lf   
 GLSCOM Comments tr   
GLUCOSE, POC Collection Time: 04/10/19  1:48 PM  
Result Value Ref Range Glucose (POC) 155 (H) 65 - 100 mg/dL Performed by Supa Lemus Grzegorz Nicole Collection Time: 04/10/19  1:49 PM  
Result Value Ref Range Glucose 155 mg/dL Insulin order 2.9 units/hour Insulin adminstered 2.9 units/hour Multiplier 0.030 Low target 100 mg/dL High target 140 mg/dL D50 order 0.0 ml  
 D50 administered 0.00 ml Minutes until next BG 60 min Order initials lf   
 Administered initials lf   
 GLSCOM Comments GLUCOSE, POC Collection Time: 04/10/19  2:51 PM  
Result Value Ref Range Glucose (POC) 90 65 - 100 mg/dL Performed by Felipe So GLUCOSE, POC Collection Time: 04/10/19  5:17 PM  
Result Value Ref Range Glucose (POC) 85 65 - 100 mg/dL Performed by Chivo Gee GLUCOSE, POC Collection Time: 04/10/19  6:17 PM  
Result Value Ref Range Glucose (POC) 62 (L) 65 - 100 mg/dL Performed by inContact GLUCOSE, POC Collection Time: 04/10/19  6:45 PM  
Result Value Ref Range Glucose (POC) 75 65 - 100 mg/dL Performed by [facility] So GLUCOSE, POC Collection Time: 04/10/19  6:59 PM  
Result Value Ref Range Glucose (POC) 85 65 - 100 mg/dL Performed by Felipe So GLUCOSE, POC Collection Time: 04/10/19  9:25 PM  
Result Value Ref Range Glucose (POC) 66 65 - 100 mg/dL Performed by Cullen Carpenter, POC Collection Time: 04/10/19  9:47 PM  
Result Value Ref Range Glucose (POC) 78 65 - 100 mg/dL Performed by Willits  GLUCOSE, POC Collection Time: 04/10/19 10:06 PM  
Result Value Ref Range Glucose (POC) 91 65 - 100 mg/dL Performed by Len  GLUCOSE, POC Collection Time: 04/10/19 11:34 PM  
Result Value Ref Range Glucose (POC) 120 (H) 65 - 100 mg/dL Performed by Madhu Hill, POC Collection Time: 04/11/19  6:56 AM  
Result Value Ref Range Glucose (POC) 136 (H) 65 - 100 mg/dL Performed by Laura Diane Assessment and Plan: Active Problems: 
  Chronic hypertension with superimposed preeclampsia (4/8/2019) 
 
  
G1 @ 29 weeks 1 day with CHTN with Superimposed Pre-eclampsia and Type 1 DM on Insulin pump S/P Insulin drip and currently on the pump. Acuchecks AC and HS 
CHTN - On Labetalol 300 mg BID. Will continue to monitor closely NSTs BID Appreciate GI and Nephrology Consults Dr. Ellen RILEY to see patient today Signed By: Rhianna Chacon MD   
 April 11, 2019

## 2019-04-11 NOTE — PROGRESS NOTES
TRANSFER - IN REPORT: 
 
Verbal report received from Romy Hoff RN(name) on [employer]  being received from L&D(unit) for routine progression of care Report consisted of patients Situation, Background, Assessment and  
Recommendations(SBAR). Information from the following report(s) SBAR, Kardex, Intake/Output, MAR, Accordion and Recent Results was reviewed with the receiving nurse. Opportunity for questions and clarification was provided. Assessment completed upon patients arrival to unit and care assumed. 2012:  Assessment and vitals taken at this time. Pt states pain is tolerable at a 2/3 out of 10.  
 
2125:  Pt called out for BS to be taken, because she wanted to eat her dinner. BS = 66, given apple juice and will recheck in 15mins. 2147:  Recheck BS= 78, given another cup of apple juice and will recheck.

## 2019-04-11 NOTE — DIABETES MGMT
DTC Insulin Drip Progress Note Recommendations/ Comments:  Chart reviewed on [facility]. Patient is now off insulin drip and back on pump, which was started 2 hours prior to stop of drip. Noted BG being treated below 80 mg/dl at this time. Per pregnancy hypoglycemia protocol, BG to be treated below 60 mg/dl. If pt symptomatic and above 60 mg/dl, please provide combined protein + carbohydrate snack. Current Hospital DM Medication:  Insulin Pump with pre admit settings, see below Patient is a 30 y.o. female with known Type 1 Diabetes on insulin pump at home. Insulin Pump Settings from home - currently patient is not wearing: 
  
Pt on (Type of Pump) and uses Medtronic G 70. 
  
Basal Rates                                    Insulin Carb Ratios 12am to 4 am = 1.0 units/hr                      1 to 11 = 1 unit per 11 g from 12am-5pm 
4 to 10am = 1.1 units/hr                 1 to 8 = 1 unit per 8 g 5pm-12am 
10am to 12am = 1 units/hr                         
  
 Insulin Sensitivity Factors 1 to 43 = 1 unit per 43 mg/dl 
  
Target 100-120 A1c:  
Lab Results Component Value Date/Time Hemoglobin A1c 11.1 (H) 08/18/2016 12:38 PM  
 
 
 
Recent Glucose Results:  
Lab Results Component Value Date/Time GLUCPOC 136 (H) 04/11/2019 06:56 AM  
 GLUCPOC 120 (H) 04/10/2019 11:34 PM  
 GLUCPOC 91 04/10/2019 10:06 PM  
  
 
Lab Results Component Value Date/Time Creatinine 1.15 (H) 04/09/2019 04:18 AM  
 
 
Active Orders Diet DIET DIABETIC WITH OPTIONS Consistent Carb 2000kcal; Regular PO intake: No data found. Currently on insulin gtt. Will continue to follow as needed. Thank you, Miguelangel Burris RD Time Spent: 4 minutes

## 2019-04-11 NOTE — PROGRESS NOTES
Problem: Pressure Injury - Risk of 
Goal: *Prevention of pressure injury Description Document Doroteo Scale and appropriate interventions in the flowsheet. Outcome: Progressing Towards Goal 
  
Problem: Patient Education: Go to Patient Education Activity Goal: Patient/Family Education Outcome: Progressing Towards Goal 
  
Problem: Hypertensive Disorders of Pregnancy Care Plan (Gestational HTN, Preeclampsia, HELLP syndrome, Eclampsia, Chronic HTN, Superimposed Preeclamsia) Goal: *Blood pressure within specified parameters Outcome: Progressing Towards Goal 
Goal: *Fluid volume balance Outcome: Progressing Towards Goal 
Goal: *Labs within defined limits Outcome: Progressing Towards Goal 
Goal: *Reassuring fetal surveillance Outcome: Progressing Towards Goal 
Goal: *Remains free of seizures Outcome: Progressing Towards Goal 
  
Problem: Falls - Risk of 
Goal: *Absence of Falls Description Document Lisa Velazquezjeffdimple Fall Risk and appropriate interventions in the flowsheet. Outcome: Progressing Towards Goal 
  
Problem: Patient Education: Go to Patient Education Activity Goal: Patient/Family Education Outcome: Progressing Towards Goal 
  
Problem: Patient Education: Go to Patient Education Activity Goal: Patient/Family Education Outcome: Progressing Towards Goal 
  
Problem: Antepartum: Day 1 through Discharge Goal: Off Pathway (Use only if patient is Off Pathway) Outcome: Progressing Towards Goal 
Goal: Activity/Safety Outcome: Progressing Towards Goal 
Goal: Consults, if ordered Outcome: Progressing Towards Goal 
Goal: Diagnostic Test/Procedures Outcome: Progressing Towards Goal 
Goal: Nutrition/Diet Outcome: Progressing Towards Goal 
Goal: Discharge Planning Outcome: Progressing Towards Goal 
Goal: Medications Outcome: Progressing Towards Goal 
Goal: Treatments/Interventions/Procedures Outcome: Progressing Towards Goal 
Goal: Psychosocial 
Outcome: Progressing Towards Goal 
 Goal: *Vital signs within defined limits Outcome: Progressing Towards Goal 
Goal: *Labs within defined limits Outcome: Progressing Towards Goal 
Goal: *Hemodynamically stable Outcome: Progressing Towards Goal 
Goal: *Optimal pain control at patient's stated goal 
Outcome: Progressing Towards Goal 
Goal: *Tolerating diet Outcome: Progressing Towards Goal 
Goal: *Performs self perineal care Outcome: Progressing Towards Goal 
Goal: *Reassuring fetal surveillance Outcome: Progressing Towards Goal 
Goal: *Able to cope Outcome: Progressing Towards Goal 
Goal: *Absence of injury Outcome: Progressing Towards Goal 
Goal: *Free from active signs of labor Outcome: Progressing Towards Goal 
  
Problem: Antepartum: Discharge Outcomes Goal: *Free from active signs of labor Outcome: Progressing Towards Goal 
Goal: *Absence of injury Outcome: Progressing Towards Goal 
Goal: *Able to cope Outcome: Progressing Towards Goal 
Goal: *Reassuring fetal surveillance Outcome: Progressing Towards Goal 
Goal: *Describes available resources and support systems Outcome: Progressing Towards Goal 
Goal: *No signs and symptoms of infection Outcome: Progressing Towards Goal 
Goal: *Received and verbalizes understanding of discharge plan and instructions Outcome: Progressing Towards Goal 
Goal: *Vital signs within defined limits Outcome: Progressing Towards Goal 
Goal: *Labs within defined limits Outcome: Progressing Towards Goal 
Goal: *Hemodynamically stable Outcome: Progressing Towards Goal 
Goal: *Optimal pain control at patient's stated goal 
Outcome: Progressing Towards Goal 
Goal: *Demonstrates progressive activity Outcome: Progressing Towards Goal 
Goal: *Tolerating diet Outcome: Progressing Towards Goal 
Goal: *Verbalizes name, dosage, time, side effects, and number of days to continue medications Outcome: Progressing Towards Goal 
  
Problem: Diabetes Self-Management Goal: *Disease process and treatment process Description Define diabetes and identify own type of diabetes; list 3 options for treating diabetes. Outcome: Progressing Towards Goal 
Goal: *Incorporating nutritional management into lifestyle Description Describe effect of type, amount and timing of food on blood glucose; list 3 methods for planning meals. Outcome: Progressing Towards Goal 
Goal: *Incorporating physical activity into lifestyle Description State effect of exercise on blood glucose levels. Outcome: Progressing Towards Goal 
Goal: *Developing strategies to promote health/change behavior Description Define the ABC's of diabetes; identify appropriate screenings, schedule and personal plan for screenings. Outcome: Progressing Towards Goal 
Goal: *Using medications safely Description State effect of diabetes medications on diabetes; name diabetes medication taking, action and side effects. Outcome: Progressing Towards Goal 
Goal: *Monitoring blood glucose, interpreting and using results Description Identify recommended blood glucose targets  and personal targets. Outcome: Progressing Towards Goal 
Goal: *Prevention, detection, treatment of acute complications Description List symptoms of hyper- and hypoglycemia; describe how to treat low blood sugar and actions for lowering  high blood glucose level. Outcome: Progressing Towards Goal 
Goal: *Prevention, detection and treatment of chronic complications Description Define the natural course of diabetes and describe the relationship of blood glucose levels to long term complications of diabetes. Outcome: Progressing Towards Goal 
Goal: *Developing strategies to address psychosocial issues Description Describe feelings about living with diabetes; identify support needed and support network Outcome: Progressing Towards Goal 
Goal: *Insulin pump training Outcome: Progressing Towards Goal 
Goal: *Sick day guidelines Outcome: Progressing Towards Goal 
 Goal: *Patient Specific Goal (EDIT GOAL, INSERT TEXT) Outcome: Progressing Towards Goal 
  
Problem: Patient Education: Go to Patient Education Activity Goal: Patient/Family Education Outcome: Progressing Towards Goal

## 2019-04-12 LAB
ALBUMIN SERPL-MCNC: 1.5 G/DL (ref 3.5–5)
ALBUMIN/GLOB SERPL: 0.4 {RATIO} (ref 1.1–2.2)
ALP SERPL-CCNC: 70 U/L (ref 45–117)
ALT SERPL-CCNC: 10 U/L (ref 12–78)
ANION GAP SERPL CALC-SCNC: 3 MMOL/L (ref 5–15)
AST SERPL-CCNC: 13 U/L (ref 15–37)
BASOPHILS # BLD: 0.1 K/UL (ref 0–0.1)
BASOPHILS NFR BLD: 1 % (ref 0–1)
BILIRUB SERPL-MCNC: 0.2 MG/DL (ref 0.2–1)
BUN SERPL-MCNC: 22 MG/DL (ref 6–20)
BUN/CREAT SERPL: 20 (ref 12–20)
CALCIUM SERPL-MCNC: 8.4 MG/DL (ref 8.5–10.1)
CHLORIDE SERPL-SCNC: 109 MMOL/L (ref 97–108)
CO2 SERPL-SCNC: 21 MMOL/L (ref 21–32)
CREAT SERPL-MCNC: 1.1 MG/DL (ref 0.55–1.02)
DIFFERENTIAL METHOD BLD: ABNORMAL
EOSINOPHIL # BLD: 0.3 K/UL (ref 0–0.4)
EOSINOPHIL NFR BLD: 2 % (ref 0–7)
ERYTHROCYTE [DISTWIDTH] IN BLOOD BY AUTOMATED COUNT: 17.1 % (ref 11.5–14.5)
GLOBULIN SER CALC-MCNC: 3.7 G/DL (ref 2–4)
GLUCOSE BLD STRIP.AUTO-MCNC: 143 MG/DL (ref 65–100)
GLUCOSE BLD STRIP.AUTO-MCNC: 156 MG/DL (ref 65–100)
GLUCOSE BLD STRIP.AUTO-MCNC: 186 MG/DL (ref 65–100)
GLUCOSE BLD STRIP.AUTO-MCNC: 56 MG/DL (ref 65–100)
GLUCOSE BLD STRIP.AUTO-MCNC: 60 MG/DL (ref 65–100)
GLUCOSE BLD STRIP.AUTO-MCNC: 70 MG/DL (ref 65–100)
GLUCOSE BLD STRIP.AUTO-MCNC: 90 MG/DL (ref 65–100)
GLUCOSE SERPL-MCNC: 208 MG/DL (ref 65–100)
HCT VFR BLD AUTO: 27.2 % (ref 35–47)
HGB BLD-MCNC: 8 G/DL (ref 11.5–16)
IMM GRANULOCYTES # BLD AUTO: 0.1 K/UL (ref 0–0.04)
IMM GRANULOCYTES NFR BLD AUTO: 1 % (ref 0–0.5)
LYMPHOCYTES # BLD: 2 K/UL (ref 0.8–3.5)
LYMPHOCYTES NFR BLD: 17 % (ref 12–49)
MCH RBC QN AUTO: 24.8 PG (ref 26–34)
MCHC RBC AUTO-ENTMCNC: 29.4 G/DL (ref 30–36.5)
MCV RBC AUTO: 84.2 FL (ref 80–99)
MONOCYTES # BLD: 0.9 K/UL (ref 0–1)
MONOCYTES NFR BLD: 7 % (ref 5–13)
NEUTS SEG # BLD: 8.7 K/UL (ref 1.8–8)
NEUTS SEG NFR BLD: 72 % (ref 32–75)
NRBC # BLD: 0 K/UL (ref 0–0.01)
NRBC BLD-RTO: 0 PER 100 WBC
PLATELET # BLD AUTO: 249 K/UL (ref 150–400)
PMV BLD AUTO: 10.9 FL (ref 8.9–12.9)
POTASSIUM SERPL-SCNC: 4.4 MMOL/L (ref 3.5–5.1)
PROT SERPL-MCNC: 5.2 G/DL (ref 6.4–8.2)
RBC # BLD AUTO: 3.23 M/UL (ref 3.8–5.2)
SERVICE CMNT-IMP: ABNORMAL
SERVICE CMNT-IMP: NORMAL
SERVICE CMNT-IMP: NORMAL
SODIUM SERPL-SCNC: 133 MMOL/L (ref 136–145)
WBC # BLD AUTO: 12 K/UL (ref 3.6–11)

## 2019-04-12 PROCEDURE — 65410000002 HC RM PRIVATE OB

## 2019-04-12 PROCEDURE — 85025 COMPLETE CBC W/AUTO DIFF WBC: CPT

## 2019-04-12 PROCEDURE — 82962 GLUCOSE BLOOD TEST: CPT

## 2019-04-12 PROCEDURE — 74011250637 HC RX REV CODE- 250/637: Performed by: NURSE PRACTITIONER

## 2019-04-12 PROCEDURE — 74011250637 HC RX REV CODE- 250/637: Performed by: ADVANCED PRACTICE MIDWIFE

## 2019-04-12 PROCEDURE — 74011250637 HC RX REV CODE- 250/637: Performed by: OBSTETRICS & GYNECOLOGY

## 2019-04-12 PROCEDURE — 74011250636 HC RX REV CODE- 250/636: Performed by: OBSTETRICS & GYNECOLOGY

## 2019-04-12 PROCEDURE — 36415 COLL VENOUS BLD VENIPUNCTURE: CPT

## 2019-04-12 PROCEDURE — 77030012890

## 2019-04-12 PROCEDURE — 80053 COMPREHEN METABOLIC PANEL: CPT

## 2019-04-12 PROCEDURE — 74011000250 HC RX REV CODE- 250: Performed by: OBSTETRICS & GYNECOLOGY

## 2019-04-12 RX ORDER — NIFEDIPINE 10 MG/1
20 CAPSULE ORAL
Status: COMPLETED | OUTPATIENT
Start: 2019-04-12 | End: 2019-04-12

## 2019-04-12 RX ORDER — NIFEDIPINE 30 MG/1
30 TABLET, EXTENDED RELEASE ORAL DAILY
Status: DISCONTINUED | OUTPATIENT
Start: 2019-04-13 | End: 2019-04-17 | Stop reason: HOSPADM

## 2019-04-12 RX ORDER — NIFEDIPINE 10 MG/1
10 CAPSULE ORAL ONCE
Status: COMPLETED | OUTPATIENT
Start: 2019-04-12 | End: 2019-04-12

## 2019-04-12 RX ORDER — LABETALOL 100 MG/1
100 TABLET, FILM COATED ORAL
Status: COMPLETED | OUTPATIENT
Start: 2019-04-12 | End: 2019-04-12

## 2019-04-12 RX ORDER — SODIUM CHLORIDE, SODIUM LACTATE, POTASSIUM CHLORIDE, CALCIUM CHLORIDE 600; 310; 30; 20 MG/100ML; MG/100ML; MG/100ML; MG/100ML
75 INJECTION, SOLUTION INTRAVENOUS CONTINUOUS
Status: DISCONTINUED | OUTPATIENT
Start: 2019-04-12 | End: 2019-04-12

## 2019-04-12 RX ORDER — LABETALOL 100 MG/1
400 TABLET, FILM COATED ORAL EVERY 12 HOURS
Status: DISCONTINUED | OUTPATIENT
Start: 2019-04-12 | End: 2019-04-17 | Stop reason: HOSPADM

## 2019-04-12 RX ORDER — LABETALOL HCL 20 MG/4 ML
20 SYRINGE (ML) INTRAVENOUS ONCE
Status: ACTIVE | OUTPATIENT
Start: 2019-04-12 | End: 2019-04-12

## 2019-04-12 RX ADMIN — DEXTROSE MONOHYDRATE 25 G: 500 INJECTION PARENTERAL at 18:25

## 2019-04-12 RX ADMIN — SUCRALFATE 1 G: 1 TABLET ORAL at 19:10

## 2019-04-12 RX ADMIN — SODIUM CHLORIDE, SODIUM LACTATE, POTASSIUM CHLORIDE, AND CALCIUM CHLORIDE 75 ML/HR: 600; 310; 30; 20 INJECTION, SOLUTION INTRAVENOUS at 08:30

## 2019-04-12 RX ADMIN — LABETALOL HYDROCHLORIDE 400 MG: 100 TABLET, FILM COATED ORAL at 14:07

## 2019-04-12 RX ADMIN — LABETALOL HYDROCHLORIDE 300 MG: 100 TABLET, FILM COATED ORAL at 02:15

## 2019-04-12 RX ADMIN — Medication 20 ML: at 02:14

## 2019-04-12 RX ADMIN — LABETALOL HYDROCHLORIDE 100 MG: 100 TABLET, FILM COATED ORAL at 08:44

## 2019-04-12 RX ADMIN — Medication 10 ML: at 20:22

## 2019-04-12 RX ADMIN — NIFEDIPINE 10 MG: 10 CAPSULE, LIQUID FILLED ORAL at 07:17

## 2019-04-12 RX ADMIN — SUCRALFATE 1 G: 1 TABLET ORAL at 22:27

## 2019-04-12 RX ADMIN — FAMOTIDINE 20 MG: 10 INJECTION, SOLUTION INTRAVENOUS at 02:15

## 2019-04-12 RX ADMIN — NIFEDIPINE 20 MG: 10 CAPSULE ORAL at 08:44

## 2019-04-12 RX ADMIN — PROMETHAZINE HYDROCHLORIDE 25 MG: 25 TABLET ORAL at 01:27

## 2019-04-12 RX ADMIN — PROMETHAZINE HYDROCHLORIDE 25 MG: 25 TABLET ORAL at 06:07

## 2019-04-12 NOTE — PROGRESS NOTES
High Risk Obstetrics Progress Note Name: Mark Juarez MRN: [id number]  SSN: [id number] YOB: 1990  Age: 30 y.o. Sex: female Subjective: LOS: 5 days Estimated Date of Delivery: 19 Gestational Age Today: 26w3d Patient admitted for chronic hypertension, diabetes - Type 1 and nausea and vomiting. States she does not have abdominal pain  , chest pain, contractions, fever, headache , nausea and vomiting, pelvic pressure, right upper quadrant pain  , shortness of breath, swelling, vaginal bleeding , vaginal leaking of fluid  and visual disturbances. Objective:  
 
Vitals:  Blood pressure (!) 144/105, pulse 92, temperature 98.7 °F (37.1 °C), resp. rate 20, height 5' 8\" (1.727 m), weight 70.8 kg (156 lb), SpO2 99 %, not currently breastfeeding. Temp (24hrs), Av.7 °F (37.1 °C), Min:98.7 °F (37.1 °C), Max:98.7 °F (37.1 °C) Systolic (30QGX), HDB:127 , Min:121 , Max:192 Diastolic (06UVF), VJX:445, Min:73, Max:125 Intake and Output:    
 
 
Physical Exam: 
Deferred Membranes:  Intact Uterine Activity:  None Fetal Heart Rate:  Baseline: 120 per minute Variability: moderate Accelerations: no 
Decelerations: none Uterine contractions: none Labs:  
Recent Results (from the past 36 hour(s)) GLUCOSE, POC Collection Time: 04/10/19  9:25 PM  
Result Value Ref Range Glucose (POC) 66 65 - 100 mg/dL Performed by Anshu Mcdonald, POC Collection Time: 04/10/19  9:47 PM  
Result Value Ref Range Glucose (POC) 78 65 - 100 mg/dL Performed by Roman Stout GLUCOSE, POC Collection Time: 04/10/19 10:06 PM  
Result Value Ref Range Glucose (POC) 91 65 - 100 mg/dL Performed by Roman Stout GLUCOSE, POC Collection Time: 04/10/19 11:34 PM  
Result Value Ref Range Glucose (POC) 120 (H) 65 - 100 mg/dL Performed by Dina Lutz, POC  Collection Time: 19  6:56 AM  
 Result Value Ref Range Glucose (POC) 136 (H) 65 - 100 mg/dL Performed by Paulina Ivan, POC Collection Time: 04/11/19 11:58 AM  
Result Value Ref Range Glucose (POC) 118 (H) 65 - 100 mg/dL Performed by Valdez Low PROTEIN URINE, RANDOM Collection Time: 04/11/19  4:45 PM  
Result Value Ref Range Protein, urine random 245 (H) 0.0 - 11.9 mg/dL CREATININE, UR, RANDOM Collection Time: 04/11/19  4:45 PM  
Result Value Ref Range Creatinine, urine 42.70 mg/dL GLUCOSE, POC Collection Time: 04/11/19  6:12 PM  
Result Value Ref Range Glucose (POC) 87 65 - 100 mg/dL Performed by Mihcael Acosta GLUCOSE, POC Collection Time: 04/11/19 10:19 PM  
Result Value Ref Range Glucose (POC) 103 (H) 65 - 100 mg/dL Performed by Ana Rose   
CBC WITH AUTOMATED DIFF Collection Time: 04/12/19  7:31 AM  
Result Value Ref Range WBC 12.0 (H) 3.6 - 11.0 K/uL  
 RBC 3.23 (L) 3.80 - 5.20 M/uL HGB 8.0 (L) 11.5 - 16.0 g/dL HCT 27.2 (L) 35.0 - 47.0 % MCV 84.2 80.0 - 99.0 FL  
 MCH 24.8 (L) 26.0 - 34.0 PG  
 MCHC 29.4 (L) 30.0 - 36.5 g/dL  
 RDW 17.1 (H) 11.5 - 14.5 % PLATELET 724 903 - 143 K/uL MPV 10.9 8.9 - 12.9 FL  
 NRBC 0.0 0  WBC ABSOLUTE NRBC 0.00 0.00 - 0.01 K/uL NEUTROPHILS 72 32 - 75 % LYMPHOCYTES 17 12 - 49 % MONOCYTES 7 5 - 13 % EOSINOPHILS 2 0 - 7 % BASOPHILS 1 0 - 1 % IMMATURE GRANULOCYTES 1 (H) 0.0 - 0.5 % ABS. NEUTROPHILS 8.7 (H) 1.8 - 8.0 K/UL  
 ABS. LYMPHOCYTES 2.0 0.8 - 3.5 K/UL  
 ABS. MONOCYTES 0.9 0.0 - 1.0 K/UL  
 ABS. EOSINOPHILS 0.3 0.0 - 0.4 K/UL  
 ABS. BASOPHILS 0.1 0.0 - 0.1 K/UL  
 ABS. IMM. GRANS. 0.1 (H) 0.00 - 0.04 K/UL  
 DF AUTOMATED METABOLIC PANEL, COMPREHENSIVE Collection Time: 04/12/19  7:31 AM  
Result Value Ref Range Sodium 133 (L) 136 - 145 mmol/L Potassium 4.4 3.5 - 5.1 mmol/L Chloride 109 (H) 97 - 108 mmol/L  
 CO2 21 21 - 32 mmol/L  Anion gap 3 (L) 5 - 15 mmol/L  
 Glucose 208 (H) 65 - 100 mg/dL BUN 22 (H) 6 - 20 MG/DL Creatinine 1.10 (H) 0.55 - 1.02 MG/DL  
 BUN/Creatinine ratio 20 12 - 20 GFR est AA >60 >60 ml/min/1.73m2 GFR est non-AA 58 (L) >60 ml/min/1.73m2 Calcium 8.4 (L) 8.5 - 10.1 MG/DL Bilirubin, total 0.2 0.2 - 1.0 MG/DL  
 ALT (SGPT) 10 (L) 12 - 78 U/L  
 AST (SGOT) 13 (L) 15 - 37 U/L Alk. phosphatase 70 45 - 117 U/L Protein, total 5.2 (L) 6.4 - 8.2 g/dL Albumin 1.5 (L) 3.5 - 5.0 g/dL Globulin 3.7 2.0 - 4.0 g/dL A-G Ratio 0.4 (L) 1.1 - 2.2 Assessment and Plan: Active Problems: 
  Chronic hypertension with superimposed preeclampsia (4/8/2019) Acute Exacerbation of Hypertension in severe range. She received Procardia 10 Mg PO. With some improvement. She also received 20 mg Procardia. POP and Labetalol increased  To 400 mg BID(100mg given now). M Recommend s Procardia XL 30 Mg Daily in addition to 400 mg BID Labetalol. Will continue Close observation of BPs. IDDM- on Insulin Pump- [facility] recommends IM Hospitalist consult for diabetes management(insulin Pump). MARIS- Stable Nephrology signed Off (Appreaciate ). none Signed By: Noemi Duke MD   
 April 12, 2019

## 2019-04-12 NOTE — PROGRESS NOTES
NAME: Teddy Deluca :  1990 MRN:  [id number] Assessment :    Plan: 
--MAR[facility]OHIWZFOIC-[facility]  Type I DM Pregnancy Anemia Hypokalemia 
preeclampsia -- Creatinine stable. Suspect underlying CKD from DM I. I will sign off. Please call if nay questions. Subjective: Chief Complaint:  \" I feel OK. \"  BP up. No N/V. No dyspnea. No pain. C/o pedal edema. Review of Systems: 
 
Symptom Y/N Comments  Symptom Y/N Comments Fever/Chills    Chest Pain Poor Appetite    Edema Cough    Abdominal Pain Sputum    Joint Pain SOB/JUSTIN    Pruritis/Rash Nausea/vomit    Tolerating PT/OT Diarrhea    Tolerating Diet Constipation    Other Could not obtain due to:   
 
Objective: VITALS:  
Last 24hrs VS reviewed since prior progress note. Most recent are: 
Visit Vitals BP (!) 151/93 Pulse 93 Temp 98.5 °F (36.9 °C) Resp 16 Ht 5' 8\" (1.727 m) Wt 70.8 kg (156 lb) SpO2 99% Breastfeeding? No  
BMI 23.72 kg/m² Intake/Output Summary (Last 24 hours) at 2019 1040 Last data filed at 2019 2221 Gross per 24 hour Intake 250 ml Output 500 ml Net -250 ml Telemetry Reviewed: PHYSICAL EXAM: 
General: NAD Trace pedal edema Lab Data Reviewed: (see below) Medications Reviewed: (see below) PMH/SH reviewed - no change compared to H&P 
________________________________________________________________________ Care Plan discussed with: 
Patient Family RN Care Manager Consultant:     
 
  Comments >50% of visit spent in counseling and coordination of care    
 
________________________________________________________________________ Delmis Rausch MD  
 
Procedures: see electronic medical records for all procedures/Xrays and details which 
 were not copied into this note but were reviewed prior to creation of Plan. LABS: 
Recent Labs 04/12/19 
5584 WBC 12.0* HGB 8.0*  
HCT 27.2*  
 Recent Labs 04/12/19 
1389 * K 4.4  
* CO2 21 BUN 22* CREA 1.10* * CA 8.4* Recent Labs 04/12/19 
6198 04/10/19 
0221 SGOT 13*  --   
AP 70  --   
TP 5.2*  --   
ALB 1.5*  --   
GLOB 3.7  --   
LPSE  --  108 No results for input(s): INR, PTP, APTT in the last 72 hours. No lab exists for component: INREXT, INREXT No results for input(s): FE, TIBC, PSAT, FERR in the last 72 hours. Lab Results Component Value Date/Time Folate 26.9 (H) 09/03/2012 05:45 AM  
  
No results for input(s): PH, PCO2, PO2 in the last 72 hours. No results for input(s): CPK, CKMB in the last 72 hours. No lab exists for component: TROPONINI No components found for: [place] Lab Results Component Value Date/Time Color YELLOW/STRAW 04/07/2019 07:07 PM  
 Appearance TURBID (A) 04/07/2019 07:07 PM  
 Specific gravity 1.010 04/07/2019 07:07 PM  
 Specific gravity 1.027 03/31/2017 06:46 PM  
 pH (UA) 6.0 04/07/2019 07:07 PM  
 Protein 300 (A) 04/07/2019 07:07 PM  
 Glucose 500 (A) 04/07/2019 07:07 PM  
 Ketone 15 (A) 04/07/2019 07:07 PM  
 Bilirubin NEGATIVE  03/31/2017 06:46 PM  
 Urobilinogen 0.2 04/07/2019 07:07 PM  
 Nitrites NEGATIVE  04/07/2019 07:07 PM  
 Leukocyte Esterase NEGATIVE  04/07/2019 07:07 PM  
 Epithelial cells FEW 04/07/2019 07:07 PM  
 Bacteria NEGATIVE  04/07/2019 07:07 PM  
 WBC 5-10 04/07/2019 07:07 PM  
 RBC 0-5 04/07/2019 07:07 PM  
 
 
MEDICATIONS: 
Current Facility-Administered Medications Medication Dose Route Frequency  labetalol (NORMODYNE;TRANDATE) 20 mg/4 mL (5 mg/mL) injection 20 mg  20 mg IntraVENous ONCE  
 labetalol (NORMODYNE) tablet 400 mg  400 mg Oral Q12H  promethazine (PHENERGAN) tablet 25 mg  25 mg Oral Q6H PRN  
  scopolamine (TRANSDERM-SCOP) 1 mg over 3 days 1 Patch  1 Patch TransDERmal Q72H  
 insulin pump (PATIENT SUPPLIED) (Patient Supplied)   SubCUTAneous PRN  
 dextrose (D50W) injection syrg 12.5-25 g  12.5-25 g IntraVENous PRN  prochlorperazine (COMPAZINE) with saline injection 10 mg  10 mg IntraVENous Q6H PRN  
 sucralfate (CARAFATE) tablet 1 g  1 g Oral AC&HS  ondansetron (ZOFRAN) injection 4 mg  4 mg IntraVENous Q4H PRN  
 famotidine (PF) (PEPCID) injection 20 mg  20 mg IntraVENous Q12H  
 sodium chloride (NS) flush 10-30 mL  10-30 mL InterCATHeter PRN  
 sodium chloride (NS) flush 10 mL  10 mL InterCATHeter Q24H  
 sodium chloride (NS) flush 10 mL  10 mL InterCATHeter PRN  
 sodium chloride (NS) flush 20 mL  20 mL InterCATHeter Q24H  
 bacitracin 500 unit/gram packet 1 Packet  1 Packet Topical PRN  
 0.9% sodium chloride infusion 250 mL  250 mL IntraVENous PRN

## 2019-04-12 NOTE — PROGRESS NOTES
65- TRANSFER - IN REPORT: 
 
Verbal report received from KELLIE Brewer RN (name) on Karyna Sickle  being received from [address] [place] [address] (unit) for routine progression of care Report consisted of patients Situation, Background, Assessment and  
Recommendations(SBAR). Information from the following report(s) Kardex, Intake/Output, MAR, Accordion, Recent Results and Med Rec Status was reviewed with the receiving nurse. Opportunity for questions and clarification was provided. Assessment completed upon patients arrival to unit and care assumed. 1200- Lunch Insulin- 8.6 units [facility] at bedside to ANDREW Lora RN

## 2019-04-12 NOTE — PROGRESS NOTES
1540-Bedside and Verbal shift change report given to ANDREW Avilez RN (oncoming nurse) by ANGÉLICA Garcia RN (offgoing nurse). Report included the following information SBAR. Pt sitting up in bed eating dinner. 1548-BG prior to eating was 143. Pt ate just a few bites and bolused herself with 2.4 units on insulin pump. 1615-Dr Helga Nuñez called and aware of last two blood pressures. Patient's only complaint at this time is pain in upper abdomen at breast bone which has been present x 5 days and MDs aware. Pt given heat pack for it. Will recheck BP in 20 minutes per Dr Helga Nuñez and if BP greater than 160/110 then will initiate protocol. 1638-/103. Will continue to monitor closely. Pt sitting high fowlers, states she is unable to lay on her side due to back pain. 1705-Dr Augustine aware of last 2 BPs. Can go to to unit protocol for checking BPs and if di systolic over 782 check q 20 minutes. 1811-BG 56, pt asymptomatic. Will give D50W per order set. Pt aware. 1825-50ml of D50W given IV. Will recheck BG in 15 minutes per order. Pt remains on insulin pump. 1840-Repeat BG now 156 (15 minutes after dextrose). Pt has dinner at bedside but states she is not ready to eat at this time. 1854-Diabetic treatment team after hours called and message left. Awaiting call back. 1908-Pt sitting up eating dinner. No complaints voiced. 1955-Pt complains of constant back pain, stated has been present last few days. Kpad to back. 2000-Dr Helga Nuñez updated on patient, BPs and recent BGs and back pain. 2005-DR Augustine at bedside, viewed strip. LR stopped per Dr Helga Nuñez. Pulse ox placed per Dr Helga Nuñez. Will continue BG check before meals and at bedtime. 2015-Dr Helga Nuñez remains at bedside,  [facility] for patient to be transferred to [address] [place] [address] to discontinue fetal monitoring.   
2120-TRANSFER - OUT REPORT: 
 
Verbal report given to [facility] RN(name) on [employer]  being transferred to room 323 (unit) for routine progression of care Report consisted of patients Situation, Background, Assessment and  
Recommendations(SBAR). Information from the following report(s) SBAR was reviewed with the receiving nurse. Lines:  
Quad Lumen 04/08/19 Right Internal jugular (Active) Central Line Being Utilized Yes 4/12/2019  2:11 AM  
Criteria for Appropriate Use Limited/no vessel suitable for conventional peripheral access 4/12/2019  2:11 AM  
Site Assessment Clean, dry, & intact 4/12/2019  2:11 AM  
Infiltration Assessment 0 4/12/2019  2:11 AM  
Affected Extremity/Extremities Color distal to insertion site pink (or appropriate for race); Pulses palpable 4/12/2019  2:11 AM  
Date of Last Dressing Change 04/08/19 4/12/2019  2:11 AM  
Dressing Status Clean, dry, & intact 4/12/2019  2:11 AM  
Dressing Type Disk with Chlorhexadine gluconate (CHG); Transparent 4/12/2019  2:11 AM  
Action Taken Open ports on tubing capped; Other (comment) 4/12/2019  2:11 AM  
Proximal Hub Color/Line Status Capped 4/12/2019  2:11 AM  
Positive Blood Return (Medial Site) Yes 4/12/2019  2:11 AM  
Medial 1 Hub Color/Line Status Capped 4/12/2019  2:11 AM  
Positive Blood Return (Lateral Site) Yes 4/12/2019  2:11 AM  
Medial 2 Hub Color/Line Status Capped 4/12/2019  2:11 AM  
Positive Blood Return (Site #3) Yes 4/12/2019  2:11 AM  
Distal Hub Color/Line Status Capped 4/12/2019  2:11 AM  
Positive Blood Return (Site #4) Yes 4/12/2019  2:11 AM  
Alcohol Cap Used Yes 4/12/2019  2:11 AM  
   
Subcutaneous Insulin Infusion Device 04/10/19 (Active) Site Assessment Clean, dry, & intact 4/12/2019  7:40 PM  
Patient able to care for pump? Yes 4/12/2019  3:48 PM  
Was pump agreement signed? Yes 4/12/2019  2:11 AM  
BOLUS (in Units) given via pump 3.3 4/12/2019  7:40 PM  
  
 
Opportunity for questions and clarification was provided. Patient transported with: 
 Registered Nurse Pt transported with all belongings. No complaints voiced.

## 2019-04-12 NOTE — PROGRESS NOTES
Bedside shift change report given to QUANG oMn RN (oncoming nurse) by Hair Wallace RN (offgoing nurse). Report included the following information SBAR, Kardex, Intake/Output and MAR.  
 
2212: RN entered pt room and noticed pt has dried blood on her nails. When RN asked pt where the blood came from, pt said that she just had a small nosebleed. RN asked pt if she knew why and pt reported that it was because the air in the hospital is very dry, and that she has a history of minor nosebleeds with dry air. RN offered pt some vaseline for nose, and she declined. Will continue to monitor.

## 2019-04-12 NOTE — DIABETES MGMT
DTC Insulin Drip Progress Note Recommendations/ Comments:  Chart reviewed on [address]  Patient is now off insulin drip and back on home insuloin pump, which was started 2 hours prior to stop of drip. Per pregnancy hypoglycemia protocol, BG to be treated below 60 mg/dl. If pt symptomatic and above 60 mg/dl, please provide combined protein + carbohydrate snack. Noted elevated bg at 1058 of 186 mg/dl, now seems to be improving. Spoke with patient's RN and provided DTC after hours phone and encouraged to call if needed during the weekend. DTC after hours phone: [phone] Current Hospital DM Medication:  Insulin Pump with pre admit settings, see below Patient is a 30 y.o. female with known Type 1 Diabetes on insulin pump at home. Insulin Pump Settings from home  
  
Pt on (Type of Pump) and uses Medtronic G 70. 
  
Basal Rates                                    Insulin Carb Ratios 12am to 4 am = 1.0 units/hr                      1 to 11 = 1 unit per 11 g from 12am-5pm 
4 to 10am = 1.1 units/hr                 1 to 8 = 1 unit per 8 g 5pm-12am 
10am to 12am = 1 units/hr                         
  
 Insulin Sensitivity Factors 1 to 43 = 1 unit per 43 mg/dl 
  
Target 100-120 A1c:  
Lab Results Component Value Date/Time Hemoglobin A1c 11.1 (H) 08/18/2016 12:38 PM  
 
 
 
Recent Glucose Results:  
Lab Results Component Value Date/Time  (H) 04/12/2019 07:31 AM  
 GLUCPOC 143 (H) 04/12/2019 03:11 PM  
 GLUCPOC 186 (H) 04/12/2019 10:58 AM  
 GLUCPOC 103 (H) 04/11/2019 10:19 PM  
  
 
Lab Results Component Value Date/Time Creatinine 1.10 (H) 04/12/2019 07:31 AM  
 
 
Active Orders Diet DIET DIABETIC WITH OPTIONS Consistent Carb 2000kcal; Regular PO intake:  
Patient Vitals for the past 72 hrs: 
 % Diet Eaten 04/11/19 2103 75 % Currently on insulin gtt. Will continue to follow as needed. Thank you, Ligia Biggs RD, CDE 
 [facility] Pager: [phone] Time Spent: 4 minutes

## 2019-04-12 NOTE — PROGRESS NOTES
2150: Pt called out complaining of nausea, requesting dose of phenergan. 
 
0607: Phenergan given. 0630: Automatic BP taken in R arm at rest, 192/125.  
0635: Repeat /105. 
0637: Automatic BP taken in L arm at rest, 188/107. 4704: Manual BP taken L arm, 176/110. 
 
0642: Called OB Hospitalist regarding BP per BP parameters. Dr. Miguel Sanchez to send over LD nurse to retake BP. [id number] Davis RN from L&D repeated /111. 
 
2374Berdara Lloyd RN placed called to Dr. Miguel Sanchez regarding pt repeat BP check. Dr. Miguel Sanchez to place orders for 10 mg procardia. 6076: Procardia 10mg PO given. 1398: Dr. Miguel Sanchez at bedside to evaluate pt. Dr. Miguel Sanchez requesting BP check in 15 minutes. 9892: Repeat BP taken 166/106. Dr. Miguel Sanchez notified by LYN Selby RN. Dr. Miguel Sanchez requesting pt transfer to L&D. 
 
6226: Pt transferred to L&D room 7 with this RN and Callie Min RN.  
 
1334: Bedside and verbal report given to Ambika Ortiz RN.

## 2019-04-12 NOTE — PROGRESS NOTES
8960:  Dr. Corrigan Place at bedside to assess patient on [address]. 2106:  Patient placed on Fetal Heart Monitor. 5716:  Labs drawn and sent per order. 0740:  L&D notified of patient's repeat blood pressure. Plan for patient to transfer to L&D for higher level of monitoring/care. Transfer to L&D room 7.

## 2019-04-12 NOTE — ADT AUTH CERT NOTES
Utilization Reviews  
 
   
LOC:Acute Adult-Hypertensive Disorders of Pregnancy (4/11/2019) by Surinder Rebolledo  
 
   
Review Status Review Entered In Primary 4/11/2019 15:19  
   
Criteria Review REVIEW SUMMARY 
  
Patient: Parveen Chandra Review Number: [id number] Review Status: In Primary 
  
Condition Specific: Yes 
  
Condition Level Of Care Code: ACUTE Condition Level Of Care Description: Acute 
  
  
OUTCOMES Outcome Type: Primary 
  
  
  
REVIEW DETAILS 
  
Service Date: 04/11/2019 Admit Date: 04/07/2019 Product: Migdalia Katja Adult Subset: Hypertensive Disorders of Pregnancy (Symptom or finding within 24h) 
  (Excludes PO medications unless noted) [X] Select Day, One: 
            [X] Episode Day 4-5, One: 
                [X] ACUTE, One: 
                ~--Admin, IQ Admin Admin on 04- 03:19 PM--~ 
                Pt is 29w1d; DG 6/26/19 Off insulin drip and using self insulin pump Pt not happy that not being discharged today, but BP are too high; MD discussed at length need for close monitoring with PREE Pt still with chronic HNT and superimposed PREE and poorly controlled type 1 DM Gastroparetic s/s improving, but still c/o some nausea-receiving IV antiemetic GI MD contin to eval for gastroparesis DMT contin to eval for diabetes NST BID: FHR: 130/min; mod variabil; +acc; no decel; membranes intact; no uterine activity Case management asst with locating OB/GYN of preference Pt moved to bld glucose ac and hs: 136-118 today 98.7; 89; RR 16 
                 
                Pepcid 20mg IV Q12H Phenergan 25mg Q8H PRN PO x1 Carafate 1gram PO QID IVF off Poss dc tomorrow if stable [X] Partial responder, not clinically stable for discharge and requires continued stay, >= One: 
                        [X] Hypertension uncontrolled <= 2d, All: 
                            [X] Finding, >= One: 
                            ~--Admin, IQ Admin Admin on 04- 03:09 PM--~ 
                            BP today 146/101; 148/102; 159/105; 143/109 [X] Systolic  >= 845 mmHg last 24h [X] Diastolic >= 749 mmHg last 24h [X] Antihypertensive (includes PO) 
                            ~--Admin, IQ Admin Admin on 04- 03:10 PM--~ 
                            Normodyne 300mg PO Q12H 
                             
                             
                             
                            [X] BP monitoring at least 3x/24h 
                            ~--Admin, IQ Admin Admin on 04- 03:10 PM--~ 
                            BP min Q4H 
                             
                             
                             
  
Version: GCW 2018.1 Lucie Burns  © 2018 [employer] and/or one of its [place]. All Rights Reserved. CPT only © 2017 American [employer] Association. All Rights Reserved.  
   
LOC:Acute Adult-Antepartum (4/10/2019) by Radha Reeder  
 
   
Review Status Review Entered In Primary 4/11/2019 11:16  
   
Criteria Review REVIEW SUMMARY 
  
Patient: Meeta Rock Review Number: [id number] Review Status: In Primary 
  
Condition Specific: Yes 
  
Condition Level Of Care Code: ACUTE Condition Level Of Care Description: Acute 
  
  
OUTCOMES Outcome Type: Primary 
  
  
  
REVIEW DETAILS 
  
Service Date: 04/10/2019 Admit Date: 04/07/2019 Product: Lonny Pearson Adult Subset: Antepartum (Symptom or finding within 24h) 
  (Excludes PO medications unless noted) [X] Select Day, One: 
            [X] Episode Day 3-X, One: 
                [X] ACUTE, One: 
                ~--Admin, IQ Admin Admin on 04- 11:16 AM--~ 
                29w0d with DG 6/26/19 Pt off insulin drip and switched to insulin pump at 1400 Bld glucoses closely monitored and adjustments made Pt moved to antepartum from L+D room NST BID: FHR: baseline 130/min; mod viability; +accel/no decel and no uterine contractions noted GI progress note: gastroparesis; denies abd pain or vomiting DX: chronic HTN with superimposed PREE Pt will need OB outpt since moved to area 98.1; 93; 135/99; RR 16 with O2 sat 99% Pepcid 20mg IV Q12H Normodyne 300mg PO Q12H Transdermal scopalamine patch started today for continued nausea Carafate 1gram PO QID Phenergan 25mg IV Q4H PRN x2 Tolerating diabetic diet with snacks at all meals Plan; dc soon as soon as bld glucoses stable [X] Partial responder, not clinically stable for discharge and requires continued stay, >= One: 
                        [X] Gestational diabetes mellitus and uncontrolled blood sugar <= 24h, Both: 
                            [X] Finding, One: 
                                [X] Blood sugar < 70 mg/dL(3.9 mmol/L) 
                                ~--Admin, IQ Admin Admin on 04- 11:07 AM--~ 
                                bld glucose 62 at 1817 [X] Intervention, Both: 
                                [X] Blood glucose monitoring at least 3x/24h 
                                ~--Admin, IQ Admin Admin on 04- 11:06 AM--~ Bld glucose monitored Q1-2H while on insulin drip:  
                                [phone] Bld glucose monitored Q2H when stable/changed to insulin pump (pt's own-after consent signed); started at 1400 
                                [id number]- [X] Hypoglycemic medication adjustment at least daily (includes PO) 
                                ~--Admin, IQ Admin Admin on 04- 11:07 AM--~ Insulin drip/pump adjustments made according to bld glucose level 1.7units/2.3 units/2. 4units/2.9 units 
                                 
                                 
                                 
  
Version: [employer]Qual® 2018.1 Danis Gupta  © 2018 [employer] and/or one of its [place]. All Rights Reserved. CPT only © 2017 American Medical Association. All Rights Reserved.

## 2019-04-12 NOTE — ROUTINE PROCESS
Bedside shift change report given to KELLIE Brewer RN (oncoming nurse) by Miriam Boyer RN (offgoing nurse). Report included the following information SBAR.

## 2019-04-13 PROBLEM — N18.9 CHRONIC RENAL DISEASE: Status: ACTIVE | Noted: 2019-04-13

## 2019-04-13 LAB
GLUCOSE BLD STRIP.AUTO-MCNC: 112 MG/DL (ref 65–100)
GLUCOSE BLD STRIP.AUTO-MCNC: 87 MG/DL (ref 65–100)
GLUCOSE BLD STRIP.AUTO-MCNC: 87 MG/DL (ref 65–100)
GLUCOSE BLD STRIP.AUTO-MCNC: 97 MG/DL (ref 65–100)
SERVICE CMNT-IMP: ABNORMAL
SERVICE CMNT-IMP: NORMAL

## 2019-04-13 PROCEDURE — 74011250637 HC RX REV CODE- 250/637: Performed by: OBSTETRICS & GYNECOLOGY

## 2019-04-13 PROCEDURE — 74011250636 HC RX REV CODE- 250/636: Performed by: OBSTETRICS & GYNECOLOGY

## 2019-04-13 PROCEDURE — 74011250637 HC RX REV CODE- 250/637: Performed by: ADVANCED PRACTICE MIDWIFE

## 2019-04-13 PROCEDURE — 82962 GLUCOSE BLOOD TEST: CPT

## 2019-04-13 PROCEDURE — 65410000002 HC RM PRIVATE OB

## 2019-04-13 PROCEDURE — 74011250637 HC RX REV CODE- 250/637: Performed by: NURSE PRACTITIONER

## 2019-04-13 PROCEDURE — 59025 FETAL NON-STRESS TEST: CPT

## 2019-04-13 RX ADMIN — Medication 20 ML: at 05:30

## 2019-04-13 RX ADMIN — LABETALOL HYDROCHLORIDE 400 MG: 100 TABLET, FILM COATED ORAL at 14:04

## 2019-04-13 RX ADMIN — SUCRALFATE 1 G: 1 TABLET ORAL at 16:54

## 2019-04-13 RX ADMIN — NIFEDIPINE 30 MG: 30 TABLET, FILM COATED, EXTENDED RELEASE ORAL at 09:16

## 2019-04-13 RX ADMIN — FAMOTIDINE 20 MG: 10 INJECTION, SOLUTION INTRAVENOUS at 02:20

## 2019-04-13 RX ADMIN — SUCRALFATE 1 G: 1 TABLET ORAL at 12:31

## 2019-04-13 RX ADMIN — PROMETHAZINE HYDROCHLORIDE 25 MG: 25 TABLET ORAL at 15:16

## 2019-04-13 RX ADMIN — Medication 10 ML: at 05:29

## 2019-04-13 RX ADMIN — FAMOTIDINE 20 MG: 10 INJECTION, SOLUTION INTRAVENOUS at 14:04

## 2019-04-13 RX ADMIN — PROMETHAZINE HYDROCHLORIDE 25 MG: 25 TABLET ORAL at 02:37

## 2019-04-13 RX ADMIN — SUCRALFATE 1 G: 1 TABLET ORAL at 23:36

## 2019-04-13 RX ADMIN — PROMETHAZINE HYDROCHLORIDE 25 MG: 25 TABLET ORAL at 09:15

## 2019-04-13 RX ADMIN — LABETALOL HYDROCHLORIDE 400 MG: 100 TABLET, FILM COATED ORAL at 02:18

## 2019-04-13 NOTE — PROGRESS NOTES
Antepartum Progress Note ASSESSMENT/PLAN Luz Mobley is a 30 y.o.  at 28w2d Hospital day: 6 1. DM - insulin pump, bolus wizard. Improved control. Fewer hypoglycemic episodes with pt eating better. 2. HTN - chronic HTN. Possible superimposed preeclampsia Labetalol increased to 400mg BID Procardia 30mg XL added. Mild range pressures now. No preeclampsia symptoms. Reassuring monitoring for 29 weeks 3. DM related Renal dz suspected. Creatinine Stable 4. GI  - Nausea still, but somewhat improved with PPI, sucralfate and anti-emetics. 5. Dispo - continue inpatient management. MFM to see again next week Active Problems: 
  DM type 1 (diabetes mellitus, type 1) ([facility] [address] (2012) Nausea and vomiting (2016) Chronic hypertension with superimposed preeclampsia (2019) Chronic renal disease (2019) Subjective No HA, Mild nausea, no emesis No visual changes, no abdominal pain GFM Objective Vital signs in last 24 hours: 
Temp:  [98.1 °F (36.7 °C)-98.7 °F (37.1 °C)] Pulse (Heart Rate):  [87-98] BP: (106-192)/() Resp Rate:  [16-20] O2 Sat (%):  [98 %-99 %] Weight: -- 
 
Fetal Monitoring NST + variability, no decels. Approp for 29 weeks. PHYSICAL EXAM 
 
General:   WDWN, NAD Abdomen:  Gravid, uterus nontender Pelvic:  Deferred Extremities     No edema, nontender Data Review Labs:  
Recent Results (from the past 36 hour(s)) CBC WITH AUTOMATED DIFF Collection Time: 19  7:31 AM  
Result Value Ref Range WBC 12.0 (H) 3.6 - 11.0 K/uL  
 RBC 3.23 (L) 3.80 - 5.20 M/uL HGB 8.0 (L) 11.5 - 16.0 g/dL HCT 27.2 (L) 35.0 - 47.0 % MCV 84.2 80.0 - 99.0 FL  
 MCH 24.8 (L) 26.0 - 34.0 PG  
 MCHC 29.4 (L) 30.0 - 36.5 g/dL  
 RDW 17.1 (H) 11.5 - 14.5 % PLATELET 361 779 - 171 K/uL MPV 10.9 8.9 - 12.9 FL  
 NRBC 0.0 0  WBC ABSOLUTE NRBC 0.00 0.00 - 0.01 K/uL NEUTROPHILS 72 32 - 75 % LYMPHOCYTES 17 12 - 49 % MONOCYTES 7 5 - 13 % EOSINOPHILS 2 0 - 7 % BASOPHILS 1 0 - 1 % IMMATURE GRANULOCYTES 1 (H) 0.0 - 0.5 % ABS. NEUTROPHILS 8.7 (H) 1.8 - 8.0 K/UL  
 ABS. LYMPHOCYTES 2.0 0.8 - 3.5 K/UL  
 ABS. MONOCYTES 0.9 0.0 - 1.0 K/UL  
 ABS. EOSINOPHILS 0.3 0.0 - 0.4 K/UL  
 ABS. BASOPHILS 0.1 0.0 - 0.1 K/UL  
 ABS. IMM. GRANS. 0.1 (H) 0.00 - 0.04 K/UL  
 DF AUTOMATED METABOLIC PANEL, COMPREHENSIVE Collection Time: 04/12/19  7:31 AM  
Result Value Ref Range Sodium 133 (L) 136 - 145 mmol/L Potassium 4.4 3.5 - 5.1 mmol/L Chloride 109 (H) 97 - 108 mmol/L  
 CO2 21 21 - 32 mmol/L Anion gap 3 (L) 5 - 15 mmol/L Glucose 208 (H) 65 - 100 mg/dL BUN 22 (H) 6 - 20 MG/DL Creatinine 1.10 (H) 0.55 - 1.02 MG/DL  
 BUN/Creatinine ratio 20 12 - 20 GFR est AA >60 >60 ml/min/1.73m2 GFR est non-AA 58 (L) >60 ml/min/1.73m2 Calcium 8.4 (L) 8.5 - 10.1 MG/DL Bilirubin, total 0.2 0.2 - 1.0 MG/DL  
 ALT (SGPT) 10 (L) 12 - 78 U/L  
 AST (SGOT) 13 (L) 15 - 37 U/L Alk. phosphatase 70 45 - 117 U/L Protein, total 5.2 (L) 6.4 - 8.2 g/dL Albumin 1.5 (L) 3.5 - 5.0 g/dL Globulin 3.7 2.0 - 4.0 g/dL A-G Ratio 0.4 (L) 1.1 - 2.2 GLUCOSE, POC Collection Time: 04/12/19 10:58 AM  
Result Value Ref Range Glucose (POC) 186 (H) 65 - 100 mg/dL Performed by Hallie Gary, POC Collection Time: 04/12/19  3:11 PM  
Result Value Ref Range Glucose (POC) 143 (H) 65 - 100 mg/dL Performed by Hallie Gary, POC Collection Time: 04/12/19  6:11 PM  
Result Value Ref Range Glucose (POC) 56 (L) 65 - 100 mg/dL Performed by Craig Daniels, POC Collection Time: 04/12/19  6:42 PM  
Result Value Ref Range Glucose (POC) 156 (H) 65 - 100 mg/dL Performed by Craig Daniels, POC Collection Time: 04/12/19 10:09 PM  
Result Value Ref Range Glucose (POC) 60 (L) 65 - 100 mg/dL Performed by Lee Ann Solis, POC Collection Time: 04/12/19 10:29 PM  
Result Value Ref Range Glucose (POC) 70 65 - 100 mg/dL Performed by Diego Shane, POC Collection Time: 04/12/19 10:46 PM  
Result Value Ref Range Glucose (POC) 90 65 - 100 mg/dL Performed by Diego Carbmarika, POC Collection Time: 04/13/19  7:15 AM  
Result Value Ref Range Glucose (POC) 112 (H) 65 - 100 mg/dL Performed by [address] [place] [address] Noel López MD 
 
4/13/2019 
10:23 AM

## 2019-04-13 NOTE — PROGRESS NOTES
TRANSFER - IN REPORT: 
 
Verbal report received from Madan Brar RN(name) on [employer]  being received from L&D(unit) for routine progression of care Report consisted of patients Situation, Background, Assessment and  
Recommendations(SBAR). Information from the following report(s) SBAR, Kardex, Intake/Output, MAR, Accordion, Recent Results and Med Rec Status was reviewed with the receiving nurse. Opportunity for questions and clarification was provided. Assessment completed upon patients arrival to unit and care assumed.

## 2019-04-13 NOTE — DISCHARGE SUMMARY
Obstetrical Discharge Summary     Name: Fabricio Martin MRN: [id number]  SSN: [id number]    YOB: 1990  Age: 30 y.o. Sex: female      Admit Date: 4/7/2019    Discharge Date: 4/13/2019     Admitting Physician: Jean Carlos Painter MD     Attending Physician:  Jean Carlos Painter MD     Admission Diagnoses: 28 week IUP   Pre-eclampsia superimposed on chronic hypertension, antepartum [O11.9, O10.019]  Type I DM, poorly controlled  Nausea and vomiting of pregnancy, third trimester    Discharge Diagnoses:   Same     Additional Diagnoses:   Hospital Problems  Date Reviewed: 1/6/2017          Codes Class Noted POA    Pre-eclampsia superimposed on chronic hypertension, antepartum ICD-10-CM: O11.9, O10.019  ICD-9-CM: 642.73  4/7/2019 Unknown             Lab Results   Component Value Date/Time    Rubella, External immune 10/20/2018       Immunization(s):   Immunization History   Administered Date(s) Administered    (RETIRED) Pneumococcal Vaccine (Unspecified Type) 01/01/2010    Influenza Vaccine 09/01/2013    Influenza Vaccine (Madin Rabia Canine Kidney) PF 09/29/2014    Influenza Vaccine (Quad) PF 11/30/2015    Influenza Vaccine Split 10/10/2012    Pneumococcal Polysaccharide (PPSV-23) 05/19/2013        Hospital Course: The patient was admitted through the ED with severe nausea and vomiting, epigastric abdominal pain at 28 weeks of pregnancy. Also, she has a history of chronic hypertension, on Labetalol 300mg BID, as well as Type I DM, with an insulin pump. Initial BPs in the ED and on L&D were in the severe range, and the patient was started on Magnesium Sulfate prophylaxis for presumptive pre-eclampsia with severe features. She required one dose of IV labetalol. Utrasound revealed a single IUP, vertex, normal BONNY and fetal activity, and posterior placenta. Fetal surveillance was reassuring for gestational age. After stabilizing, she was transferred to [address] [place] [address] for ongoing care.          Signed By:  Jean Carlos Painter MD April 13, 2019

## 2019-04-13 NOTE — PROGRESS NOTES
Bedside and Verbal shift change report given to Milagros Estrada RN (oncoming nurse) by JALEESA Marte RN (offgoing nurse). Report included the following information SBAR, Kardex, ED Summary, Intake/Output, MAR and Recent Results. Pt. Resting comfortably. 1005- pt placed on fetal monitors. Denies any complaints at this time. 1015- apple juice given. MD Tim Bowen at bedside. 1025- RN at bedside encouraged to drink juice. 1040- fetal monitors removed. Pt up to restroom. Denies any complaints at this time. NST reactive. 1525- pt request blood sugar be taken, states feeling funny. BS 97 
 
1550- pt resting comfortably.

## 2019-04-13 NOTE — PROGRESS NOTES
Pt stated she wanted this nurse to \"call the doctor now\" to inform of 2+ BLE edema she has had for the \"past 2 nights or more\". This nurse called Doctor Nagi Servin [facility] hospitalist on call. Reported patient insisted doctor be informed now and pt requested explanation for edema. Patient VS are stable and patient is alert and oriented, no c/o pain. This nurse has not observed the patient sleep this shift.

## 2019-04-14 LAB
FERRITIN SERPL-MCNC: 8 NG/ML (ref 8–252)
GLUCOSE BLD STRIP.AUTO-MCNC: 101 MG/DL (ref 65–100)
GLUCOSE BLD STRIP.AUTO-MCNC: 149 MG/DL (ref 65–100)
GLUCOSE BLD STRIP.AUTO-MCNC: 162 MG/DL (ref 65–100)
GLUCOSE BLD STRIP.AUTO-MCNC: 40 MG/DL (ref 65–100)
GLUCOSE BLD STRIP.AUTO-MCNC: 78 MG/DL (ref 65–100)
IRON SATN MFR SERPL: 7 % (ref 20–50)
IRON SERPL-MCNC: 22 UG/DL (ref 35–150)
SERVICE CMNT-IMP: ABNORMAL
SERVICE CMNT-IMP: NORMAL
TIBC SERPL-MCNC: 320 UG/DL (ref 250–450)

## 2019-04-14 PROCEDURE — 59025 FETAL NON-STRESS TEST: CPT

## 2019-04-14 PROCEDURE — 74011000250 HC RX REV CODE- 250: Performed by: OBSTETRICS & GYNECOLOGY

## 2019-04-14 PROCEDURE — 36415 COLL VENOUS BLD VENIPUNCTURE: CPT

## 2019-04-14 PROCEDURE — 82962 GLUCOSE BLOOD TEST: CPT

## 2019-04-14 PROCEDURE — 65410000002 HC RM PRIVATE OB

## 2019-04-14 PROCEDURE — 74011250637 HC RX REV CODE- 250/637: Performed by: NURSE PRACTITIONER

## 2019-04-14 PROCEDURE — 83540 ASSAY OF IRON: CPT

## 2019-04-14 PROCEDURE — 82728 ASSAY OF FERRITIN: CPT

## 2019-04-14 PROCEDURE — 74011250636 HC RX REV CODE- 250/636: Performed by: OBSTETRICS & GYNECOLOGY

## 2019-04-14 PROCEDURE — 74011250637 HC RX REV CODE- 250/637: Performed by: OBSTETRICS & GYNECOLOGY

## 2019-04-14 RX ADMIN — FAMOTIDINE 20 MG: 10 INJECTION, SOLUTION INTRAVENOUS at 14:16

## 2019-04-14 RX ADMIN — SUCRALFATE 1 G: 1 TABLET ORAL at 21:30

## 2019-04-14 RX ADMIN — SUCRALFATE 1 G: 1 TABLET ORAL at 06:44

## 2019-04-14 RX ADMIN — SUCRALFATE 1 G: 1 TABLET ORAL at 16:34

## 2019-04-14 RX ADMIN — NIFEDIPINE 30 MG: 30 TABLET, FILM COATED, EXTENDED RELEASE ORAL at 08:39

## 2019-04-14 RX ADMIN — SUCRALFATE 1 G: 1 TABLET ORAL at 11:16

## 2019-04-14 RX ADMIN — FAMOTIDINE 20 MG: 10 INJECTION, SOLUTION INTRAVENOUS at 02:40

## 2019-04-14 RX ADMIN — Medication 20 ML: at 03:00

## 2019-04-14 RX ADMIN — Medication 10 ML: at 03:00

## 2019-04-14 RX ADMIN — LABETALOL HYDROCHLORIDE 400 MG: 100 TABLET, FILM COATED ORAL at 14:16

## 2019-04-14 RX ADMIN — Medication 10 ML: at 14:16

## 2019-04-14 RX ADMIN — DEXTROSE MONOHYDRATE 25 G: 500 INJECTION PARENTERAL at 11:19

## 2019-04-14 NOTE — CONSULTS
HOSPITALIST CONSULT    Patient: Yony Rubi MRN: [id number]  SSN: [id number]    YOB: 1990  Age: 30 y.o. Sex: female      Subjective:      Patient is a 56-year-old female with type I diabetes who is 29 weeks pregnant. She was admitted a week ago to the labor and delivery unit at [facility] for management of hypertension and potential preeclampsia, eventually transferred to [facility]. Patient presented with uncontrolled blood pressure and acute kidney injury. Her estimated date of delivery is June 26, 2019. Patient was diagnosed with type I diabetes at the age of 13, and has been on insulin pump since March, 2018. She states that she does not take any other diabetic medication at home, and that her blood glucose has been nicely managed with the insulin pump. Patient states that her basal insulin rate per insulin pump is Humalog 1.1/hour, and she boluses after eating with 1 unit per 11 g carbohydrate with breakfast and lunch, and 1 unit per 8 g carbohydrate with  dinner. Patient was given steroids initially during her stay at [facility] when it was anticipated that patient me have to deliver. While on steroids (per record, she was on betamethasone injections 12 mg IM every 24 hours on 4/8 and 4/9), she was on insulin drip. After the Betamethasone was stopped, the insulin drip was also stopped. On April 8-10, patient also received subcutaneous Insulin prior to each meal then management went back to patients insulin pump. Apparently blood glucose did drop to the 50s when PO intake was low, and up to the low 200s when patient was on steroids. Patient had blood glucose in the  100-140 range for the most part over the last few days, and over the day today her  glucose has been in the 80s-90s.     Per nursing staff as well as review of progress notes, it is anticipated that patient will be discharged shortly, now that her blood pressure has come under control with labetalol and nifedipine. She is not expected to imminently deliver the baby. Patient feels fine. She has no complaints at this time. It is noted that initially, she did have nausea and vomiting,  and patient now has decent PO intake. Past Medical History:   Diagnosis Date    Anemia     Chronic pain     Depression     Diabetes type 1, uncontrolled (HCC)     DKA, type 1 (HCC)     Essential hypertension     Heart murmur     Herpes simplex virus (HSV) infection 2017    positive in blood    Peripheral neuropathy     Ventricular tachycardia (HCC)      Past Surgical History:   Procedure Laterality Date    ABDOMEN SURGERY PROC UNLISTED      exploratory laparoscopy for chronic abdominal pain -- no pathology found    HX CYST REMOVAL        Family History   Problem Relation Age of Onset    Sleep Apnea Father     Hypertension Father     Gastric ulcers Mother      Social History     Tobacco Use    Smoking status: Current Every Day Smoker     Packs/day: 0.25     Years: 13     Types: Cigarettes     Last attempt to quit: Stopped when she found herself pregnany    Smokeless tobacco: Never Used   Substance Use Topics    Alcohol use: No     Alcohol/week: 0.0 oz    Recreational drugs: Admits to  Occasional marijuana use      Allergies   Allergen Reactions    Morphine Other (comments)    Pcn [Penicillins] Hives       Review of Systems:  A comprehensive review of systems was negative except for that written in the History of Present Illness. Objective:      04/13/19 1403 04/13/19 2039   BP: 131/87 114/78   Pulse: 89 90   Resp: 16 16   Temp: 98.3 °F (36.8 °C) 98.3 °F (36.8 °C)   SpO2:  98% on room air   Weight:     Height:          Physical Exam:  General:  Alert, no distress, appears stated age. Eyes:  Conjunctivae/corneas clear. PERRL, EOMs intact. Mouth/Throat: Lips, mucosa, and tongue normal. Teeth and gums normal.   Neck: Supple, symmetrical, trachea midline, no adenopathy.    Lungs:   Clear to auscultation bilaterally. Heart:  Regular rate and rhythm, S1, S2 normal, no murmur, click, rub or gallop. Abdomen:   Soft, gravid. Bowel sounds normal. Nontender. Extremities: Extremities normal, atraumatic, no cyanosis or edema. Pulses: 2+ and symmetric all extremities. Skin: Skin color, texture, turgor normal. No rashes or lesions   Lymph nodes: Cervical, supraclavicular, and axillary nodes normal.   Neurologic: CNII-XII intact. Normal strength, sensation and reflexes throughout. Recent Results (from the past 24 hour(s))   GLUCOSE, POC    Collection Time: 04/13/19  7:15 AM   Result Value Ref Range    Glucose (POC) 112 (H) 65 - 100 mg/dL    Performed by MINOR MARSHE(CON)    GLUCOSE, POC    Collection Time: 04/13/19 10:57 AM   Result Value Ref Range    Glucose (POC) 87 65 - 100 mg/dL    Performed by Yasmani 350, POC    Collection Time: 04/13/19  3:26 PM   Result Value Ref Range    Glucose (POC) 97 65 - 100 mg/dL    Performed by Florecita SMITH    GLUCOSE, POC    Collection Time: 04/13/19  8:37 PM   Result Value Ref Range    Glucose (POC) 87 65 - 100 mg/dL    Performed by Suyapa 49 Problems  Date Reviewed: 1/6/2017          Codes Class Noted POA        Chronic hypertension with superimposed preeclampsia ICD-10-CM: O11.9  ICD-9-CM: 642.70  4/8/2019 Unknown        Nausea and vomiting ICD-10-CM: R11.2  ICD-9-CM: 787.01  5/14/2016 Yes        DM type [facility] ICD-10-CM: E10.9  ICD-9-CM: 250.01  11/23/2012 Yes              Plan:   Impression/recommendations:  1. Type one diabetes. At this time, feel that insulin pump will adequately manage blood glucose. Patient appears to have a good handle on managing her own insulin pump. 2. Hypertension. Patient has underlying hypertension and now has preeclampsia related hypertension.  Blood pressures are being well managed with the Nifedipine 30 mg PO daily and labetalol 200 mg PO BID.    Signed By: Desiree Ramos, DO     April 14, 2019

## 2019-04-14 NOTE — PROGRESS NOTES
High Risk Obstetrics Progress Note Name: Silvino Marshall MRN: [id number]  SSN: [id number] YOB: 1990  Age: 30 y.o. Sex: female Subjective: LOS: 7 days Estimated Date of Delivery: 19 Gestational Age Today: 32w2d Patient admitted for chronic hypertension, diabetes - Type 1, nausea and vomiting and preeclampsia. States she does not have abdominal pain  , chest pain, contractions, fever, headache , pelvic pressure, right upper quadrant pain  , shortness of breath, swelling, vaginal bleeding , vaginal leaking of fluid  and visual disturbances. Objective:  
 
Vitals:  Blood pressure (!) 139/95, pulse 88, temperature 98.3 °F (36.8 °C), resp. rate 18, height 5' 8\" (1.727 m), weight 70.8 kg (156 lb), SpO2 98 %, not currently breastfeeding. Temp (24hrs), Av.3 °F (36.8 °C), Min:98.3 °F (36.8 °C), Max:98.3 °F (36.8 °C) Systolic (47BPY), JTH:940 , Min:105 , Max:139 Diastolic (64JHY), JMK:89, Min:70, Max:95 Intake and Output:    
 
 
Physical Exam: 
Patient without distress. Fundus: soft and non tender Lower Extremities:  - Edema 2+ 
 - No evidence of DVT seen on physical exam. 
Negative Prisca's sign. No cords or calf tenderness. 
 - Patellar Reflexes: 1+ bilaterally - Clonus: absent Membranes:  Intact Uterine Activity:  None Fetal Heart Rate:  Baseline: 130 per minute Variability: moderate Accelerations: yes Decelerations: none Uterine contractions: none Labs:  
Recent Results (from the past 36 hour(s)) GLUCOSE, POC Collection Time: 19 10:46 PM  
Result Value Ref Range Glucose (POC) 90 65 - 100 mg/dL Performed by Mary Ellen Acuna, POC Collection Time: 19  7:15 AM  
Result Value Ref Range Glucose (POC) 112 (H) 65 - 100 mg/dL Performed by Enval) GLUCOSE, POC Collection Time: 19 10:57 AM  
Result Value Ref Range Glucose (POC) 87 65 - 100 mg/dL Performed by Wilhemenia Schlatter, POC Collection Time: 19  3:26 PM  
Result Value Ref Range Glucose (POC) 97 65 - 100 mg/dL Performed by Florecita SMITH   
GLUCOSE, POC Collection Time: 19  8:37 PM  
Result Value Ref Range Glucose (POC) 87 65 - 100 mg/dL Performed by Waldo Boxer, POC Collection Time: 19  6:51 AM  
Result Value Ref Range Glucose (POC) 101 (H) 65 - 100 mg/dL Performed by Brandon Doll Collection Time: 19  8:46 AM  
Result Value Ref Range Ferritin 8 8 - 252 NG/ML Assessment and Plan: Active Problems: 
  [facility] ([facility]) Nausea and vomiting (2016) Chronic hypertension with superimposed preeclampsia (2019) Chronic renal disease (2019) 
 
  
none Signed By: Declan Rivas MD   
 2019 High Risk Obstetrics Progress Note Name: Yony Rubi MRN: [id number]  SSN: [id number] YOB: 1990  Age: 30 y.o. Sex: female Subjective: LOS: 7 days Estimated Date of Delivery: 19 Gestational Age Today: 32w2d Patient admitted for none. States she does not have abdominal pain  , chest pain, contractions, fever, headache , nausea and vomiting, right upper quadrant pain  , shortness of breath, swelling, vaginal bleeding , vaginal leaking of fluid  and visual disturbances. Objective:  
 
Vitals:  Blood pressure (!) 139/95, pulse 88, temperature 98.3 °F (36.8 °C), resp. rate 18, height 5' 8\" (1.727 m), weight 70.8 kg (156 lb), SpO2 98 %, not currently breastfeeding. Temp (24hrs), Av.3 °F (36.8 °C), Min:98.3 °F (36.8 °C), Max:98.3 °F (36.8 °C) Systolic (09XKP), QHT:248 , Min:105 , Max:139 Diastolic (32XNM), MYU:28, Min:70, Max:95 Intake and Output:    
 
 
Physical Exam: 
Patient without distress. Fundus: soft and non tender Lower Extremities:  - Edema 2+ - No evidence of DVT seen on physical exam. 
Negative Prisca's sign. 
 - Patellar Reflexes: 1+ bilaterally - Clonus: absent Membranes:  Intact Uterine Activity:  None Fetal Heart Rate:  See above Labs:  
Recent Results (from the past 36 hour(s)) GLUCOSE, POC Collection Time: 04/12/19 10:46 PM  
Result Value Ref Range Glucose (POC) 90 65 - 100 mg/dL Performed by Darryl Gonzalez, POC Collection Time: 04/13/19  7:15 AM  
Result Value Ref Range Glucose (POC) 112 (H) 65 - 100 mg/dL Performed by Meitu) GLUCOSE, POC Collection Time: 04/13/19 10:57 AM  
Result Value Ref Range Glucose (POC) 87 65 - 100 mg/dL Performed by Shai Ansari, POC Collection Time: 04/13/19  3:26 PM  
Result Value Ref Range Glucose (POC) 97 65 - 100 mg/dL Performed by Raymond SMITH   
GLUCOSE, POC Collection Time: 04/13/19  8:37 PM  
Result Value Ref Range Glucose (POC) 87 65 - 100 mg/dL Performed by Daryle Perkins, POC Collection Time: 04/14/19  6:51 AM  
Result Value Ref Range Glucose (POC) 101 (H) 65 - 100 mg/dL Performed by Colletta Antonio Collection Time: 04/14/19  8:46 AM  
Result Value Ref Range Ferritin 8 8 - 252 NG/ML Assessment and Plan: Active Problems: 
  DM type 1 (diabetes mellitus, type 1) ([facility] (11/23/2012) Nausea and vomiting (5/14/2016) Chronic hypertension with superimposed preeclampsia (4/8/2019) Chronic renal disease (4/13/2019) 
 
  
none 1. IUP @ 29 w 4d Cat 1 NST S/P Magnesium for Neuro protection and Betamethasone for FLM. 2. Appropriately Grown fetus per [facility] Ultrasound 3. IDDM Type 1 Managed with Insulin Pump, Appreciate IM Hospitalist service Consult and Diabetic Treatment Team Input. BS improved w/o hypoglycemia today.  
4. Chronic Kidney Disease secondary to IDDM, S/P Hypovolemic NORA and Oliguria(now Improved) with elevated but improved BUN and Creatinine. (Nephrology consult appreciated- now signed off.) 5. Chronic N/V of pregnancy and Also possibly secondary to Gastroparesis of IDDM, PUD, Gastritis- Appreciate continued GI Input will continue PPI, Carafate. EGD if signs of acute Hemorrhage or persistent intractable N/V unresponsive to Medications and supportive treatments. 6. Hypertension Chronic with proteinuria (suspected from CKD and IDDM) Vs Superimposed Preeclampsia. P/C 5.4 on admission. Bps requiring  IV labetalol on admission and again a few days ago for sever range BP. Now well controlled on labetalol 400 mg BID and Procardia XL 30 Mg Daily. 7. H/o Opioid abuse and Cannabis 8. Depression- continue antidepressant Medication 9. Lower extremity edema 2+ bilaterally w/o calf tenderness or signs of DVT. SCD if not ambulating. Suspect secondary to Chronic Kidney disease and or third spacing from IVF over past week. Encourage Ambulation. Close observation Signed By: Amy Richardson MD   
 April 14, 2019

## 2019-04-14 NOTE — PROGRESS NOTES
HYPOGLYCEMIC EPISODE DOCUMENTATION Patient with hypoglycemic episode(s) at 1115 (time) on 4/14/19 (date). BG value(s) pre-treatment 40 Was patient symptomatic? [x] yes, [] no (per pt feeling that my blood sugar is low) Patient was treated with the following rescue medications/treatments: [x] D50 [] Glucose tablets 
              [] Glucagon 
              [x] 4oz juice 
              [] 6oz reg soda 
              [] 8oz low fat milk BG value post-treatment: 162 Once BG treated and value greater than 80mg/dl, pt was provided with the following: 
[x] snack (crackers and apple juice given ) [] meal 
Name of MD notified:MD Johana Borjas paged @[phone]. The following orders were received:  
 
 
Second page to MD placed @ [phone] to update in regards to hypoglycemic episode.

## 2019-04-14 NOTE — PROGRESS NOTES
Hospitalist Progress Note Julio César Moore MD 
Answering service: [phone] from in house phone Cell: [phone] Date of Service:  2019 NAME:  Luz Mobley :  1990 MRN:  [id number] Admission Summary:  
Patient is a 57-year-old female with type I diabetes who is 29 weeks pregnant. She was admitted a week ago to the labor and delivery unit at [facility] for management of hypertension and potential preeclampsia, eventually transferred to [facility]. Patient presented with uncontrolled blood pressure and acute kidney injury. Her estimated date of delivery is 2019. Patient was diagnosed with type I diabetes at the age of 13, and has been on insulin pump since . She states that she does not take any other diabetic medication at home, and that her blood glucose has been nicely managed with the insulin pump. Patient states that her basal insulin rate per insulin pump is Humalog 1.1/hour, and she boluses after eating with 1 unit per 11 g carbohydrate with breakfast and lunch, and 1 unit per 8 g carbohydrate with  dinner. Patient was given steroids initially during her stay at [facility] when it was anticipated that patient may have to deliver. While on steroids (per record, she was on betamethasone injections 12 mg IM every 24 hours on  and ), she was on insulin drip. After the Betamethasone was stopped, the insulin drip was also stopped. On April 8-10, patient also received subcutaneous Insulin prior to each meal then management went back to patients insulin pump. Apparently blood glucose did drop to the 50s when PO intake was low, and up to the low 200s when patient was on steroids. Patient had blood glucose in the  100-140 range for the most part over the last few days, and over the day today her  glucose has been in the 80s-90s.  
  
 Per nursing staff as well as review of progress notes, it is anticipated that patient will be discharged shortly, now that her blood pressure has come under control with labetalol and nifedipine. She is not expected to imminently deliver the baby. 
  
Patient feels fine. She has no complaints at this time. It is noted that initially, she did have nausea and vomiting,  and patient now has decent PO intake. 
  
 
 
 
Interval history / Subjective:  
  F/u Diabetes management Assessment & Plan:  
 
DM type 1 
-on insulin pump. 
-blood sugars reasonable 
-Monitor Hypertension/Pre-eclampsia.  
-On Nifedipine 30 mg PO daily and labetalol 200 mg PO BID. 
-Continue 
  
Diabetic diet Will follow the patient peripherally. Please call if needed. Code status: FULL CODE 
DVT prophylaxis: per primary Care Plan discussed with: Patient/Family Disposition: TBD Hospital Problems  Date Reviewed: 1/6/2017 Codes Class Noted POA Chronic renal disease ICD-10-CM: N18.9 ICD-9-CM: 585.9  4/13/2019 Unknown Chronic hypertension with superimposed preeclampsia ICD-10-CM: O11.9 ICD-9-CM: 642.70  4/8/2019 Unknown Nausea and vomiting ICD-10-CM: R11.2 ICD-9-CM: 787.01  5/14/2016 Yes DM type 1 (diabetes mellitus, type 1) ([facility]) ICD-10-CM: E10.9 ICD-9-CM: 250.01  11/23/2012 Yes Review of Systems: A comprehensive review of systems was negative except for that written in the HPI. Vital Signs:  
 Last 24hrs VS reviewed since prior progress note. Most recent are: 
Visit Vitals /80 (BP 1 Location: Left arm, BP Patient Position: At rest) Pulse 86 Temp 98.3 °F (36.8 °C) Resp 16 Ht 5' 8\" (1.727 m) Wt 70.8 kg (156 lb) SpO2 98% Breastfeeding? No  
BMI 23.72 kg/m² No intake or output data in the 24 hours ending 04/14/19 0730 Physical Examination:  
 
 
     
Constitutional:  No acute distress, cooperative, pleasant   
 ENT:  Oral mucous moist, oropharynx benign. Neck supple, Resp:  CTA bilaterally. No wheezing/rhonchi/rales. No accessory muscle use CV:  Regular rhythm, normal rate, no murmurs, gallops, rubs GI:  Soft, non distended, non tender. normoactive bowel sounds, no hepatosplenomegaly Musculoskeletal:  No edema, warm, 2+ pulses throughout Neurologic:  Moves all extremities. AAOx3, CN II-XII reviewed Skin:  Good turgor, no rashes or ulcers Data Review:  
 Review and/or order of clinical lab test 
 
 
Labs:  
 
Recent Labs 04/12/19 
4196 WBC 12.0* HGB 8.0*  
HCT 27.2*  
 Recent Labs 04/12/19 
7927 * K 4.4  
* CO2 21 BUN 22* CREA 1.10* * CA 8.4* Recent Labs 04/12/19 
9665 SGOT 13* ALT 10* AP 70 TBILI 0.2 TP 5.2* ALB 1.5*  
GLOB 3.7 No results for input(s): INR, PTP, APTT in the last 72 hours. No lab exists for component: INREXT No results for input(s): FE, TIBC, PSAT, FERR in the last 72 hours. Lab Results Component Value Date/Time Folate 26.9 (H) 09/03/2012 05:45 AM  
  
No results for input(s): PH, PCO2, PO2 in the last 72 hours. No results for input(s): CPK, CKNDX, TROIQ in the last 72 hours. No lab exists for component: CPKMB Lab Results Component Value Date/Time Cholesterol, total 160 05/15/2016 12:09 AM  
 HDL Cholesterol 41 05/15/2016 12:09 AM  
 LDL, calculated 98 05/15/2016 12:09 AM  
 Triglyceride 105 05/15/2016 12:09 AM  
 CHOL/HDL Ratio 3.9 05/15/2016 12:09 AM  
 
Lab Results Component Value Date/Time Glucose (POC) 101 (H) 04/14/2019 06:51 AM  
 Glucose (POC) 87 04/13/2019 08:37 PM  
 Glucose (POC) 97 04/13/2019 03:26 PM  
 Glucose (POC) 87 04/13/2019 10:57 AM  
 Glucose (POC) 112 (H) 04/13/2019 07:15 AM  
 
Lab Results Component Value Date/Time  Color YELLOW/STRAW 04/07/2019 07:07 PM  
 Appearance TURBID (A) 04/07/2019 07:07 PM  
 Specific gravity 1.010 04/07/2019 07:07 PM  
 Specific gravity 1.027 03/31/2017 06:46 PM  
 pH (UA) 6.0 04/07/2019 07:07 PM  
 Protein 300 (A) 04/07/2019 07:07 PM  
 Glucose 500 (A) 04/07/2019 07:07 PM  
 Ketone 15 (A) 04/07/2019 07:07 PM  
 Bilirubin NEGATIVE  03/31/2017 06:46 PM  
 Urobilinogen 0.2 04/07/2019 07:07 PM  
 Nitrites NEGATIVE  04/07/2019 07:07 PM  
 Leukocyte Esterase NEGATIVE  04/07/2019 07:07 PM  
 Epithelial cells FEW 04/07/2019 07:07 PM  
 Bacteria NEGATIVE  04/07/2019 07:07 PM  
 WBC 5-10 04/07/2019 07:07 PM  
 RBC 0-5 04/07/2019 07:07 PM  
 
 
 
Medications Reviewed:  
 
Current Facility-Administered Medications Medication Dose Route Frequency  labetalol (NORMODYNE) tablet 400 mg  400 mg Oral Q12H  
 NIFEdipine ER (PROCARDIA XL) tablet 30 mg  30 mg Oral DAILY  promethazine (PHENERGAN) tablet 25 mg  25 mg Oral Q6H PRN  
 scopolamine (TRANSDERM-SCOP) 1 mg over 3 days 1 Patch  1 Patch TransDERmal Q72H  
 insulin pump (PATIENT SUPPLIED) (Patient Supplied)   SubCUTAneous PRN  
 dextrose (D50W) injection syrg 12.5-25 g  12.5-25 g IntraVENous PRN  prochlorperazine (COMPAZINE) with saline injection 10 mg  10 mg IntraVENous Q6H PRN  
 sucralfate (CARAFATE) tablet 1 g  1 g Oral AC&HS  ondansetron (ZOFRAN) injection 4 mg  4 mg IntraVENous Q4H PRN  
 famotidine (PF) (PEPCID) injection 20 mg  20 mg IntraVENous Q12H  
 sodium chloride (NS) flush 10-30 mL  10-30 mL InterCATHeter PRN  
 sodium chloride (NS) flush 10 mL  10 mL InterCATHeter Q24H  
 sodium chloride (NS) flush 10 mL  10 mL InterCATHeter PRN  
 sodium chloride (NS) flush 20 mL  20 mL InterCATHeter Q24H  
 bacitracin 500 unit/gram packet 1 Packet  1 Packet Topical PRN  
 0.9% sodium chloride infusion 250 mL  250 mL IntraVENous PRN  
 
______________________________________________________________________ EXPECTED LENGTH OF STAY: - - - 
ACTUAL LENGTH OF STAY:          7 Kusum Mendez MD

## 2019-04-14 NOTE — PROGRESS NOTES
Bedside and Verbal shift change report given to Sonja Ferrer RN (oncoming nurse) by JALEESA Pavon RN (offgoing nurse). Report included the following information SBAR, Kardex, MAR and Recent Results. 0800- MD Bárbara at bedside. 0830 - pt reports 4.7 coverage given after breakfast.  
 
0910-pt placed on fetal monitors. Complaints of mild abd. Cramping. 0930- pt removed from fetal monitors. NST reactive. No contrctions noted. Pt denies any complaints at this time.

## 2019-04-15 LAB
GLUCOSE BLD STRIP.AUTO-MCNC: 103 MG/DL (ref 65–100)
GLUCOSE BLD STRIP.AUTO-MCNC: 106 MG/DL (ref 65–100)
GLUCOSE BLD STRIP.AUTO-MCNC: 62 MG/DL (ref 65–100)
GLUCOSE BLD STRIP.AUTO-MCNC: 66 MG/DL (ref 65–100)
GLUCOSE BLD STRIP.AUTO-MCNC: 83 MG/DL (ref 65–100)
GLUCOSE BLD STRIP.AUTO-MCNC: 91 MG/DL (ref 65–100)
SERVICE CMNT-IMP: ABNORMAL
SERVICE CMNT-IMP: NORMAL

## 2019-04-15 PROCEDURE — 74011250637 HC RX REV CODE- 250/637: Performed by: ADVANCED PRACTICE MIDWIFE

## 2019-04-15 PROCEDURE — 74011250637 HC RX REV CODE- 250/637: Performed by: OBSTETRICS & GYNECOLOGY

## 2019-04-15 PROCEDURE — 74011250636 HC RX REV CODE- 250/636: Performed by: OBSTETRICS & GYNECOLOGY

## 2019-04-15 PROCEDURE — 74011250637 HC RX REV CODE- 250/637: Performed by: NURSE PRACTITIONER

## 2019-04-15 PROCEDURE — 82962 GLUCOSE BLOOD TEST: CPT

## 2019-04-15 PROCEDURE — 65410000002 HC RM PRIVATE OB

## 2019-04-15 RX ORDER — FAMOTIDINE 20 MG/1
20 TABLET, FILM COATED ORAL EVERY 12 HOURS
Status: DISCONTINUED | OUTPATIENT
Start: 2019-04-15 | End: 2019-04-17 | Stop reason: HOSPADM

## 2019-04-15 RX ADMIN — SUCRALFATE 1 G: 1 TABLET ORAL at 12:31

## 2019-04-15 RX ADMIN — PROMETHAZINE HYDROCHLORIDE 25 MG: 25 TABLET ORAL at 17:08

## 2019-04-15 RX ADMIN — NIFEDIPINE 30 MG: 30 TABLET, FILM COATED, EXTENDED RELEASE ORAL at 08:49

## 2019-04-15 RX ADMIN — FAMOTIDINE 20 MG: 10 INJECTION, SOLUTION INTRAVENOUS at 14:09

## 2019-04-15 RX ADMIN — Medication 20 ML: at 02:05

## 2019-04-15 RX ADMIN — LABETALOL HYDROCHLORIDE 400 MG: 100 TABLET, FILM COATED ORAL at 14:09

## 2019-04-15 RX ADMIN — FAMOTIDINE 20 MG: 20 TABLET ORAL at 23:02

## 2019-04-15 RX ADMIN — FAMOTIDINE 20 MG: 10 INJECTION, SOLUTION INTRAVENOUS at 02:02

## 2019-04-15 RX ADMIN — SUCRALFATE 1 G: 1 TABLET ORAL at 23:02

## 2019-04-15 RX ADMIN — SUCRALFATE 1 G: 1 TABLET ORAL at 08:49

## 2019-04-15 RX ADMIN — SUCRALFATE 1 G: 1 TABLET ORAL at 17:40

## 2019-04-15 NOTE — PROGRESS NOTES
Bedside shift change report given to Anish Villar RN (oncoming nurse) by Letty Rodarte (offgoing nurse). Report included the following information SBAR, Kardex, Intake/Output, MAR and Recent Results. 6906: . Pump administered 3.8 units. 0900: Dr. Aaron Jiménez on unit. Will return to assess pt after M appointment. Aware of high BP. 2472-5884: pt to MFM 
1219: BS 91. Pump administered 1.7 units. Pt states she will likely to choose Dr. Denise Alejandre as her OB for the remainder of the pregnancy. 1300: Dr. Aaron Jiménez at bedside. 1318: Dr. Jo Ann Cochran called to unit to update RN on plan of care. Plans to monitor patient for at least 48 hrs and the revaluate for discharge. Will make appointment with Dr. Denise Alejandre before discharge or Dr. Denise Alejandre may take over care in hospital. Pt plans to see Dr. Angi Cho from endocrinology for DM management. States he will talk to hospitalist about plan of care as well. 0655: Pt .

## 2019-04-15 NOTE — PROGRESS NOTES
Clinical Pharmacy Note: IV to PO Automatic Conversion Please note: Gracy Torres?s medication(s) (famotidine) has/have been changed from IV to PO based on the following critiera: 
 
Patient is taking scheduled oral medications Patient is tolerating tube feeds at goal rate or a full liquid, soft or regular diet This IV to PO conversion is based on the [employer] approved automatic conversion policy for eligible patients. Please call with questions.

## 2019-04-15 NOTE — PROGRESS NOTES
Bedside shift change report given to QUANG Mon RN (oncoming nurse) by Sendy Perez RN (offgoing nurse). Report included the following information SBAR, Kardex, Intake/Output and MAR.  
 
2000: RN went to assess pt, pt up in bed eating dinnner. Pt will call out after finished eating for RN assessment. BP taken 123/88. 
 
2305: RN took pt BG, 66. RN gave Pt apple juice to drink, and will recheck BG in 15 minutes. 2322: RN took pt BG, 62. RN gave pt apple juice to drink, will recheck BG in 15 minutes. 2342: RN took pt BG, 83. Will continue to monitor.

## 2019-04-15 NOTE — CONSULTS
Maternal-Fetal Medicine    Ms. Saige Valverde was admitted with the diagnosis of type 1 diabetes and chronic hypertension with r/o superimposed preeclampsia. She is  at 29w 5d gestation. She does not have symptoms of severe headache or visual disturbances or right upper quadrant pain or vaginal bleeding. Labs including liver enzymes and platelets are normal.    Patient is on insulin pump (settings unchanged) and her blood glucose levels are mostly within normal range. She had one hypoglycemic episode (40 mg/dL) that was corrected with orange juice. She reports good fetal movements. She is being followed by hospitalist (medicine) and diabetic educator. Over the last 48 hours, her blood pressures have not been in the severe range. She takes labetalol 400 mg bid and nifedipine XL 30 mg daily. P/E: Patient is comfortable; not in distress. BP (48 hours): 114-146/ mm Hg. Afebrile  Abd: Soft gravid uterus; no tenderness. Labs: , ALT 10, AST 13, creatinine 1.1    On ultrasound, amniotic fluid is normal and good fetal activity is seen. Cephalic presentation.  testing is reassuring. Cardiac anatomy appears normal. Umbilical artery Doppler study is normal.  NST performed in AP unit showed reactive trace (appropriate for gestational age) with occasional variable decelerations. I reassured the patient of ultrasound findings. I counseled her on the following:  Chronic hypertension: It is difficult to differentiate between chronic hypertension and superimposed preeclampsia especially when the patient also has long-standing diabetes and renal insufficiency. Management is similar for now till 29 weeks gestation. However, it is important to make a decision for inpatient versus outpatient management. Patient recently moved from [place], and it is possible she had inadequate treatment of chronic hypertension.   Patient does not have symptoms of severe features of preeclampsia and would like to go home. Her parents live in [place] and she informed that she has good family support. I recommend 48-hour observation to check her blood pressures and symptoms. I also encouraged her to choose an obstetrician who has privileges at [facility]. If her hypertension is not in the severe range and if antihypertensive dosage is not increased, consider discharge after 48 hours. Appointment should be made to see her Ob provider before discharge. Diabetes:   Patient is being followed by hospitalist and DTC. She informed that she had seen Dr. Marga Bello (endocrinologist) when she was in [place] and would like to have a follow-up with him after discharge. Recommendations:  -Observation for 48 hours. -If she does not have severe features, consider discharge.  -Ob and endocrinology appointment before discharge.  -Continue labetalol and nifedipine.  -[facility]ly BPP at the [facility], [facility]..  -Daily NST.  -Labs once weekly or more-frequently as indicated.

## 2019-04-15 NOTE — PROGRESS NOTES
High Risk Obstetrics Progress Note Name: Diego Moscoso MRN: [id number]  SSN: [id number] YOB: 1990  Age: 30 y.o. Sex: female Subjective: LOS: 8 days Estimated Date of Delivery: 19 Gestational Age Today: 34w10d Patient admitted for chronic hypertension, diabetes - Type 1 and nausea and vomiting, and anemia. States she feels well. Saw MFM today. Objective:  
 
Vitals:  Blood pressure (!) 139/93, pulse 92, temperature 98 °F (36.7 °C), resp. rate 16, height 5' 8\" (1.727 m), weight 70.8 kg (156 lb), SpO2 95 %, not currently breastfeeding. Temp (24hrs), Av.1 °F (36.7 °C), Min:98 °F (36.7 °C), Max:98.3 °F (36.8 °C) Systolic (82DOG), MATIAS:466 , Min:104 , Max:146 Diastolic (12PES), DMM:55, Min:65, Max:105 Intake and Output:    
 
 
Physical Exam: 
 
Normal ultrasound and BPP today with MFM Labs:  
Recent Results (from the past 36 hour(s)) GLUCOSE, POC Collection Time: 19  6:51 AM  
Result Value Ref Range Glucose (POC) 101 (H) 65 - 100 mg/dL Performed by Ethel Garsia Collection Time: 19  8:46 AM  
Result Value Ref Range Ferritin 8 8 - 252 NG/ML  
IRON PROFILE Collection Time: 19  8:46 AM  
Result Value Ref Range Iron 22 (L) 35 - 150 ug/dL TIBC 320 250 - 450 ug/dL Iron % saturation 7 (L) 20 - 50 % GLUCOSE, POC Collection Time: 19 11:14 AM  
Result Value Ref Range Glucose (POC) 40 (LL) 65 - 100 mg/dL Performed by Tu SMITH   
GLUCOSE, POC Collection Time: 19 11:30 AM  
Result Value Ref Range Glucose (POC) 162 (H) 65 - 100 mg/dL Performed by Tu SMITH   
GLUCOSE, POC Collection Time: 19  6:06 PM  
Result Value Ref Range Glucose (POC) 149 (H) 65 - 100 mg/dL Performed by Tu SMITH   
GLUCOSE, POC Collection Time: 19 11:06 PM  
Result Value Ref Range Glucose (POC) 78 65 - 100 mg/dL Performed by Barron Dykes GLUCOSE, POC Collection Time: 04/15/19  8:53 AM  
Result Value Ref Range Glucose (POC) 106 (H) 65 - 100 mg/dL Performed by cuaQea GLUCOSE, POC Collection Time: 04/15/19 12:19 PM  
Result Value Ref Range Glucose (POC) 91 65 - 100 mg/dL Performed by cuaQea Assessment and Plan: Active Problems: 
  DM [facility] ([facility]2012) Nausea and vomiting (5/14/2016) Chronic hypertension with superimposed preeclampsia (4/8/2019) Chronic renal disease (4/13/2019) Diabetes-Type 1:  stable on insulin pump Anemia:  Continue oral iron therapy. chronic hypertension - labile but mostly acceptable on labetelol 400 BID and nifedipine XL 30 QD Per MFM stay in house to be monitored for an additional 48 hours. If she remains stable can go home Wednesday. Pt would like to f/u with Dr. Marcio Garcia. She will call for appt. She saw Dr. Bennett Batres before for endocrinology and will call his office for f/u.

## 2019-04-15 NOTE — PROGRESS NOTES
Bedside and Verbal shift change report given to ANDREW Harden RN (oncoming nurse) by Alo Uribe RN (offgoing nurse). Report included the following information SBAR, Kardex, Intake/Output, MAR, Accordion and Recent Results. 21:30 Pt reports 4.9 coverage given with dinner

## 2019-04-16 LAB
GLUCOSE BLD STRIP.AUTO-MCNC: 110 MG/DL (ref 65–100)
GLUCOSE BLD STRIP.AUTO-MCNC: 89 MG/DL (ref 65–100)
GLUCOSE BLD STRIP.AUTO-MCNC: 93 MG/DL (ref 65–100)
SERVICE CMNT-IMP: ABNORMAL
SERVICE CMNT-IMP: NORMAL
SERVICE CMNT-IMP: NORMAL

## 2019-04-16 PROCEDURE — 74011250636 HC RX REV CODE- 250/636: Performed by: OBSTETRICS & GYNECOLOGY

## 2019-04-16 PROCEDURE — 59025 FETAL NON-STRESS TEST: CPT

## 2019-04-16 PROCEDURE — 74011250637 HC RX REV CODE- 250/637: Performed by: NURSE PRACTITIONER

## 2019-04-16 PROCEDURE — 74011250637 HC RX REV CODE- 250/637: Performed by: ADVANCED PRACTICE MIDWIFE

## 2019-04-16 PROCEDURE — 82962 GLUCOSE BLOOD TEST: CPT

## 2019-04-16 PROCEDURE — 65410000002 HC RM PRIVATE OB

## 2019-04-16 PROCEDURE — 74011250637 HC RX REV CODE- 250/637: Performed by: OBSTETRICS & GYNECOLOGY

## 2019-04-16 RX ADMIN — FAMOTIDINE 20 MG: 20 TABLET ORAL at 08:48

## 2019-04-16 RX ADMIN — NIFEDIPINE 30 MG: 30 TABLET, FILM COATED, EXTENDED RELEASE ORAL at 08:48

## 2019-04-16 RX ADMIN — SUCRALFATE 1 G: 1 TABLET ORAL at 22:10

## 2019-04-16 RX ADMIN — FAMOTIDINE 20 MG: 20 TABLET ORAL at 22:10

## 2019-04-16 RX ADMIN — LABETALOL HYDROCHLORIDE 400 MG: 100 TABLET, FILM COATED ORAL at 13:56

## 2019-04-16 RX ADMIN — SUCRALFATE 1 G: 1 TABLET ORAL at 13:56

## 2019-04-16 RX ADMIN — PROMETHAZINE HYDROCHLORIDE 25 MG: 25 TABLET ORAL at 11:51

## 2019-04-16 RX ADMIN — SUCRALFATE 1 G: 1 TABLET ORAL at 08:48

## 2019-04-16 RX ADMIN — ONDANSETRON 4 MG: 2 INJECTION INTRAMUSCULAR; INTRAVENOUS at 14:01

## 2019-04-16 RX ADMIN — LABETALOL HYDROCHLORIDE 400 MG: 100 TABLET, FILM COATED ORAL at 02:42

## 2019-04-16 NOTE — PROGRESS NOTES
Bedside shift change report given to QUANG Mon RN (oncoming nurse) by Cara Hyman RN (offgoing nurse). Report included the following information SBAR, Kardex, Intake/Output and MAR.

## 2019-04-16 NOTE — PROGRESS NOTES
Bedside and Verbal shift change report given to [address] (oncoming nurse) by Trey Santacruz RN (offgoing nurse). Report included the following information SBAR, Kardex, Intake/Output, MAR and Recent Results. 0849: . Pump administered 1.6 units of insulin 1000: Dr. Olive Adams at bedside 
 
1100: appointment made for after discharge with Dr. Suni Mckoy on Tuesday the 23rd at 1120am at [address] [facility] [address] [address].  
 
1240: Medical release form signed and faxed to Dr. Fahad Garcia office. 1318: BS 93. Pt has been feeling nauseous and did not eat a full lunch so no insulin bolus given. 1905: BS 89.

## 2019-04-16 NOTE — PROGRESS NOTES
High Risk Obstetrics Progress Note Name: Alyssia Jaime MRN: [id number]  SSN: [id number] YOB: 1990  Age: 30 y.o. Sex: female Subjective: LOS: 9 days Estimated Date of Delivery: 19 Gestational Age Today: 33w8d Patient admitted for chronic hypertension. States she does not have chest pain, contractions, fever, headache , nausea and vomiting, pelvic pressure, shortness of breath, swelling, vaginal bleeding , vaginal leaking of fluid  and visual disturbances. Objective:  
 
Vitals:  Blood pressure (!) 146/92, pulse 84, temperature 98 °F (36.7 °C), resp. rate 16, height 5' 8\" (1.727 m), weight 70.8 kg (156 lb), SpO2 98 %, not currently breastfeeding. Temp (24hrs), Av.2 °F (36.8 °C), Min:98 °F (36.7 °C), Max:98.4 °F (36.9 °C) Systolic (12KTD), KGF:486 , Min:117 , Max:146 Diastolic (74WCC), EYZ:59, Min:73, Max:94 Intake and Output:    
 
 
Physical Exam: 
Patient without distress. Abdomen: soft, nontender Fundus: soft and non tender Lower Extremities:  - Edema 2+ - Patellar Reflexes: 1+ bilaterally - Clonus: absent Membranes:  Intact Uterine Activity:  None Fetal Heart Rate:  NST Pending Labs:  
Recent Results (from the past 36 hour(s)) GLUCOSE, POC Collection Time: 19 11:06 PM  
Result Value Ref Range Glucose (POC) 78 65 - 100 mg/dL Performed by Jones Leavitt GLUCOSE, POC Collection Time: 04/15/19  8:53 AM  
Result Value Ref Range Glucose (POC) 106 (H) 65 - 100 mg/dL Performed by Margaret Cuevas GLUCOSE, POC Collection Time: 04/15/19 12:19 PM  
Result Value Ref Range Glucose (POC) 91 65 - 100 mg/dL Performed by Margaret Cuevas GLUCOSE, POC Collection Time: 04/15/19  5:43 PM  
Result Value Ref Range Glucose (POC) 103 (H) 65 - 100 mg/dL Performed by Anita Erazo GLUCOSE, POC Collection Time: 04/15/19 11:05 PM  
Result Value Ref Range Glucose (POC) 66 65 - 100 mg/dL Performed by Janina Diez, POC Collection Time: 04/15/19 11:24 PM  
Result Value Ref Range Glucose (POC) 62 (L) 65 - 100 mg/dL Performed by Janina Diez, POC Collection Time: 04/15/19 11:42 PM  
Result Value Ref Range Glucose (POC) 83 65 - 100 mg/dL Performed by Janina Diez, POC Collection Time: 04/16/19  8:50 AM  
Result Value Ref Range Glucose (POC) 110 (H) 65 - 100 mg/dL Performed by Heather Richard Assessment and Plan: Active Problems: 
  DM type 1 (diabetes mellitus, type 1) ([facility] (11/23/2012) Nausea and vomiting (5/14/2016) Chronic hypertension with superimposed preeclampsia (4/8/2019) Chronic renal disease (4/13/2019) Preeclampsia:  mild  BP Stableand Normal  
Diabetes-Type 1:  Improved glucose control with insulin Pump: to f/u with Endocrinologist upon discharge Anemia:  Continue oral iron therapy. Desires to f/u with Dr. Benson Faust of [address] [place] [address]  OB. Will need scheduled appointment before Discharge Signed By: Ramo Spears MD   
 April 16, 2019

## 2019-04-17 VITALS
TEMPERATURE: 98 F | SYSTOLIC BLOOD PRESSURE: 144 MMHG | HEART RATE: 79 BPM | OXYGEN SATURATION: 99 % | BODY MASS INDEX: 23.64 KG/M2 | HEIGHT: 68 IN | WEIGHT: 156 LBS | DIASTOLIC BLOOD PRESSURE: 100 MMHG | RESPIRATION RATE: 16 BRPM

## 2019-04-17 LAB
GLUCOSE BLD STRIP.AUTO-MCNC: 173 MG/DL (ref 65–100)
GLUCOSE BLD STRIP.AUTO-MCNC: 69 MG/DL (ref 65–100)
SERVICE CMNT-IMP: ABNORMAL
SERVICE CMNT-IMP: NORMAL

## 2019-04-17 PROCEDURE — 74011250636 HC RX REV CODE- 250/636: Performed by: OBSTETRICS & GYNECOLOGY

## 2019-04-17 PROCEDURE — 82962 GLUCOSE BLOOD TEST: CPT

## 2019-04-17 PROCEDURE — 74011250637 HC RX REV CODE- 250/637: Performed by: OBSTETRICS & GYNECOLOGY

## 2019-04-17 PROCEDURE — 74011250637 HC RX REV CODE- 250/637: Performed by: ADVANCED PRACTICE MIDWIFE

## 2019-04-17 RX ORDER — ONDANSETRON 4 MG/1
4 TABLET, FILM COATED ORAL
Qty: 30 TAB | Refills: 0 | Status: SHIPPED | OUTPATIENT
Start: 2019-04-17 | End: 2019-05-14

## 2019-04-17 RX ORDER — DULOXETIN HYDROCHLORIDE 60 MG/1
60 CAPSULE, DELAYED RELEASE ORAL DAILY
Qty: 30 CAP | Refills: 0 | Status: SHIPPED | OUTPATIENT
Start: 2019-04-17 | End: 2019-04-18

## 2019-04-17 RX ORDER — FAMOTIDINE 20 MG/1
20 TABLET, FILM COATED ORAL EVERY 12 HOURS
Qty: 30 TAB | Refills: 0 | Status: SHIPPED | OUTPATIENT
Start: 2019-04-17 | End: 2019-05-14

## 2019-04-17 RX ORDER — NIFEDIPINE 30 MG/1
30 TABLET, EXTENDED RELEASE ORAL DAILY
Qty: 30 TAB | Refills: 0 | Status: SHIPPED | OUTPATIENT
Start: 2019-04-18 | End: 2019-04-18

## 2019-04-17 RX ORDER — LABETALOL 200 MG/1
400 TABLET, FILM COATED ORAL EVERY 12 HOURS
Qty: 30 TAB | Refills: 0 | Status: SHIPPED | OUTPATIENT
Start: 2019-04-17 | End: 2019-05-14

## 2019-04-17 RX ORDER — PROMETHAZINE HYDROCHLORIDE 25 MG/1
25 TABLET ORAL
Qty: 30 TAB | Refills: 0 | Status: SHIPPED | OUTPATIENT
Start: 2019-04-17 | End: 2019-05-14

## 2019-04-17 RX ADMIN — LABETALOL HYDROCHLORIDE 400 MG: 100 TABLET, FILM COATED ORAL at 01:54

## 2019-04-17 RX ADMIN — FAMOTIDINE 20 MG: 20 TABLET ORAL at 08:40

## 2019-04-17 RX ADMIN — PROMETHAZINE HYDROCHLORIDE 25 MG: 25 TABLET ORAL at 00:56

## 2019-04-17 RX ADMIN — Medication 10 ML: at 03:00

## 2019-04-17 RX ADMIN — NIFEDIPINE 30 MG: 30 TABLET, FILM COATED, EXTENDED RELEASE ORAL at 08:40

## 2019-04-17 RX ADMIN — Medication 10 ML: at 10:12

## 2019-04-17 RX ADMIN — ONDANSETRON 4 MG: 2 INJECTION INTRAMUSCULAR; INTRAVENOUS at 03:49

## 2019-04-17 RX ADMIN — PROMETHAZINE HYDROCHLORIDE 25 MG: 25 TABLET ORAL at 08:21

## 2019-04-17 RX ADMIN — Medication 20 ML: at 03:00

## 2019-04-17 RX ADMIN — ONDANSETRON 4 MG: 2 INJECTION INTRAMUSCULAR; INTRAVENOUS at 10:11

## 2019-04-17 NOTE — ADT AUTH CERT NOTES
Utilization Reviews  
 
   
LOC:Acute Adult-Extended Stay (4/16/2019) by Toan Oliver  
 
   
Review Status Review Entered In Primary 4/17/2019 12:58  
   
Criteria Review REVIEW SUMMARY 
  
Patient: Ashley Cho Review Number: [id number] Review Status: In Primary 
  
Condition Specific: Yes 
  
Condition Level Of Care Code: ACUTE Condition Level Of Care Description: Acute 
  
  
OUTCOMES Outcome Type: Primary 
  
  
  
REVIEW DETAILS 
  
Service Date: 04/16/2019 Admit Date: 04/07/2019 Product: Melisa Dempsey Adult Subset: Extended Stay (Symptom or finding within 24h) 
  (Excludes PO medications unless noted) Select Level of Care, One: 
            [ ] ACUTE, >= One: 
                [ ] General, One: 
                ~--Admin, IQ Admin Admin on 04- 12:58 PM--~ 
                29w6d; DG 6/26/19 Pt still with some labile bld glucose sec to nausea: insulin pump on and boluses held as needed for bld glucose reading, but also rebounded: 110, 93, 89, 173 HTN still present: BP Q4H 144/101; 124/94; 127/91; 146/92; 137/94;  
                NST twice daily reactive; no uterine activity; membranes intact Pepcid 20mg po twice daily 
                normodyne 400mg po Q12H Procardia xl 30mg po daily 
                zofran 4mg IV PRN x1 and phenergan 25mg po Q6H PRN x1 
                carafate 1gram po QID Plan: dc when stable with f/u to endocrinologist and OB appt scheduled OP 
                 
                 
                 
  
Version: InterQual® 2018.1 Delia Bryan  © 2018 [employer] and/or one of its [place]. All Rights Reserved. CPT only © 2017 American Medical Association.   All Rights Reserved.  
   
LOC:Acute Adult-Hypertensive Disorders of Pregnancy (4/15/2019) by Haley Espinoza  
 Review Status Review Entered In Primary 4/16/2019 17:35  
   
Criteria Review REVIEW SUMMARY 
  
Patient: Chandra Castellano Review Number: [id number] Review Status: In Primary 
  
Condition Specific: Yes 
  
Condition Level Of Care Code: ACUTE Condition Level Of Care Description: Acute 
  
  
OUTCOMES Outcome Type: Primary 
  
  
  
REVIEW DETAILS 
  
Service Date: 04/15/2019 Admit Date: 04/07/2019 Product: Audelia Duong Adult Subset: Hypertensive Disorders of Pregnancy (Symptom or finding within 24h) 
  (Excludes PO medications unless noted) [X] Select Day, One: 
            [X] Episode Day 4-5, One: 
                [X] ACUTE, One: 
                ~--Admin, IQ Admin Admin on 04- 05:35 PM--~ 
                Pt states feeling better: still having some nausea but controlled with PO phenergan x1 Having some elevated BP Pt remains on insulin pump: ac and hs bld glucose checks: 137--91 IV pepcid changed to PO 20mg twice daily Remains on carafate 1gram po QID 
                 
                afebrile: 98.0; 94; RR 16 with O2 sats 90-98% on RA Normal US and BPP today with MFM Plan:  
                Per MFM stay in house to be monitored for an additional 48 hours. If she remains stable can go home Wednesday. Pt would like to f/u with Dr. Arya Britton. She will call for appt. She saw Dr. Marina Aggarwal before for endocrinology and will call his office for f/u. 
                 
                 
                 
                    [X] Partial responder, not clinically stable for discharge and requires continued stay, >= One: 
                        [X] Preeclampsia, One: 
                            [X] Expectant management, Both: 
                                [X] Finding, One: 
                                    [X] Severe and < 34 wks gestation ~--Admin,  Pin [place] Shawn on 04- 05:31 PM--~ 
                                    Estimated Date of Delivery: 6/26/19 Gestational Age Today: 34w10d  
                                      
                                    Patient admitted for chronic hypertension, diabetes - Type 1 and nausea and vomiting, and anemia. States she feels well. Saw MFM today [X] Intervention, Both: 
                                    [X] Blood pressure management, >= One: 
                                        [X] Antihypertensive (includes PO) 
                                        ~--Admin, IQ Admin Admin on 04- 05:30 PM--~ 
                                        Normodyne 400mg po [id number] dose held 
                                        procardia Xl 30mg po daily [X] BP monitoring at least 3x/24h 
                                        ~--Admin, IQ Admin Admin on 04- 05:29 PM--~ 
                                        BP Q4H 
                                        146/105; 144/91; 139/93 [X] Fetal heart rate monitoring ~--Admin, IQ Admin Admin on 04- 05:30 PM--~ 
                                    NST twice daily 
                                     
                                     
                                     
  
Version: InterQual® 2018. 1 Micaela Preston  © 2018 [employer] and/or one of its [place]. All Rights Reserved. CPT only © 2017 American Medical Association. All Rights Reserved.  
   
LOC:Acute Adult-Hypertensive Disorders of Pregnancy (4/14/2019) by Rito Luo  
 
   
Review Status Review Entered In Primary 4/15/2019 15:40  
   
Criteria Review REVIEW SUMMARY 
  
Patient: Sammy Velazquez Review Number: [id number] Review Status: In Primary 
  
Condition Specific: Yes 
  
Condition Level Of Care Code: ACUTE Condition Level Of Care Description: Acute 
  
  
OUTCOMES Outcome Type: Primary 
  
  
  
REVIEW DETAILS 
  
Service Date: 04/14/2019 Admit Date: 04/07/2019 Product: Latrobe Goodwill Adult Subset: Hypertensive Disorders of Pregnancy (Symptom or finding within 24h) 
  (Excludes PO medications unless noted) [X] Select Day, One: 
            [X] Episode Day 4-5, One: 
                [X] ACUTE, One: 
                ~--Admin, IQ Admin Admin on 04- 03:40 PM--~ 
                29w4d Pt remains with at least one episode of hypoglycemia/day: received D50 x1 
                remains on own insulin pump with ac bld glucose checks: 101-40 (post tx 802)-306-26 No nauseas/vomiting and pt states that she now has decent po intake Afebrile: 98.3; 91; RR 16 and on RA-no O2 sat recorded today Internal Medicine eval today for diabetes Normodyne 400mg PO Q12H Procardia XL 30mg PO daily Carafate 1gram PO QID with meals Pepcid 20mg IV Q12H Plan: discharge when bld glucose levels stabililze and HTN normalizes [X] Partial responder, not clinically stable for discharge and requires continued stay, >= One: [X] Preeclampsia, One: 
                            [X] Expectant management, Both: 
                                [X] Finding, One: 
                                    [X] Without severe features and < 37 wks gestation, >= One: 
                                        [X] Barrier to discharge and arrangements for outpatient services pending <= 24h, >= One: 
                                            [X] Patient unreliable ~--Admin,  Pin [place] Shawn on 04- 03:35 PM--~ Pt with continued hypoglycemia episodes, at least one/day:  
                                            1115 Pt said that she felt her Bld glucose was low, and noted value of 40; Pt was given D50 and 4 oz juice Post treatment value 162 [X] Intervention, Both: 
                                    [X] Blood pressure management, >= One: 
                                        [X] Antihypertensive (includes PO) 
                                        ~--Admin, IQ Admin Admin on 04- 03:30 PM--~ 
                                        labetalol and procardia xl [X] BP monitoring at least 3x/24h 
                                        ~--Admin, IQ Admin Admin on 04- 03:32 PM--~ 
                                        BP Q4H at minimum 
                                        bp 139/95 and 140/105 readings appear self limiting remainder of bp 619//83 [X] Fetal heart rate monitoring ~--Admin, IQ Admin Admin on 04- 03:32 PM--~ 
                                    NST twice daily:  
                                    baseline 130/min  
                                    mod variability 
                                    +acceleration 
                                    no decels 
                                    no uterine contractions 
                                     
                                     
                                     
  
Version: [employer] 2018.1 Sarah He  © 2018 [employer] and/or one of its [place]. All Rights Reserved. CPT only © 2017 American Medical Association. All Rights Reserved.  
   
LOC:Acute Adult-Hypertensive Disorders of Pregnancy (2019) by Gabe Toribio  
 
   
Review Status Review Entered In Primary 4/15/2019 15:27  
   
Criteria Review REVIEW SUMMARY 
  
Patient: Lseley Watters Review Number: [id number] Review Status: In Primary 
  
Condition Specific: Yes 
  
Condition Level Of Care Code: ACUTE Condition Level Of Care Description: Acute 
  
  
OUTCOMES Outcome Type: Primary 
  
  
  
REVIEW DETAILS 
  
Service Date: 2019 Admit Date: 2019 Product: Mary Issa Adult Subset: Hypertensive Disorders of Pregnancy (Symptom or finding within 24h) 
  (Excludes PO medications unless noted) [X] Select Day, One: 
            [X] Episode Day 3, One: 
                [X] ACUTE, One: 
                ~--Admin, IQ Admin Admin on 04- 03:27 PM--~ 
                Pt is 29yr old   
                29w3d Remains on own insulin pump-still having occasional hypoglucemia episodes. Pt with superimposed PEE on chronic HTN: labetalol increased to 400mg PO BID; Procardia XL added; mild range pressure now; reassuring montitoring for 29wks Denies abd pain today but states that still has nausea/req nausea meds Toleraing diabetic det with snacks at all meals 
                 
                bld glucose checks ac and hs: [id number] Pepcid 20mg IV Q12H Normodyne as described Procardia XL as described Phenergan 25mg po Q6H PRN x3 Scopaolomine patch 
                carafate 1gram QID-refused breakfast med Plan: as per gained stability and MD plan 
                 
                 
                 
                    [X] Partial responder, not clinically stable for discharge and requires continued stay, >= One: 
                        [X] Preeclampsia, One: 
                            [X] Expectant management, Both: 
                                [X] Finding, One: 
                                    [X] Without severe features and < 37 wks gestation, >= One: 
                                        [X] Barrier to discharge and arrangements for outpatient services pending <= 24h, >= One: 
                                            [X] Patient unreliable ~--Admin,  Pin [place] Shawn on 04- 03:21 PM--~ 
                                            Pt unreliable with management Pt also having concurrent hypoglycemia in hospitall on insulin pump. Must be encouraged to eat with bld glucose checks as low as 56 ac supper and pt has not eaten meal delivered.  Pt reports feeling funny at times and requiesing additional bld glucose assessments [X] Intervention, Both: 
                                    [X] Blood pressure management, >= One: 
                                        [X] Antihypertensive (includes PO) [X] BP monitoring at least 3x/24h 
                                        ~--Admin, IQ Admin Admin on 04- 03:18 PM--~ 
                                        BP still high, although pt was moved back to antepartum room last night when able to control acute BP 
                                        BP Q4H minimum:  
                                        149/99; 143/102 for 2 readings wiht rest 112//87 [X] Fetal heart rate monitoring ~--Admin,  Pin [place] Shawn on 04- 03:16 PM--~ Fetal Non stress test twice daily: +variabilities; no decels; approp for 29wks 
                                     
                                     
                                     
  
Version: InterQual® 2018.1 Trinh Tony  © 2018 [employer] and/or one of its [place]. All Rights Reserved. CPT only © 2017 American Medical Association. All Rights Reserved.  
   
LOC:Acute Adult-Hypertensive Disorders of Pregnancy (4/12/2019) by Angela Wahl  
 
   
Review Status Review Entered In Primary 4/12/2019 14:01  
   
Criteria Review REVIEW SUMMARY 
  
Patient: Harley Wallace Review Number: [id number] Review Status:  In Primary 
  
Condition Specific: Yes 
  
Condition Level Of Care Code: ACUTE 
 Condition Level Of Care Description: Acute 
  
  
OUTCOMES Outcome Type: Primary 
  
  
  
REVIEW DETAILS 
  
Service Date: 04/12/2019 Admit Date: 04/07/2019 Product: Madison Ellis Adult Subset: Hypertensive Disorders of Pregnancy (Symptom or finding within 24h) 
  (Excludes PO medications unless noted) [X] Select Day, One: 
            [X] Episode Day 2, One: 
                [X] ACUTE, >= One: 
                ~--Admin, IQ Admin Admin on 04- 02:01 PM--~ Pt with very elevated BP noted at 0630 today and continued on multiple repeats Moved back to L+D from antepartum for closer monitoring Multiple doses stat procardia and additional labetalol given Remains on insulin pump at 1. 1units/hour with ac and hs bld glucose readings: [phone] Nephrology has signed off: MARIS resolved; suspect underliying CKD from DM1 MFM consult completed yesterday evening Wbc 12.0; hgb 8.0; hct 27.2 Na 133; chl 109; bun 22; creat 1.10; gluc 208; bogdan 8.4; sgot 13; tp 5.2; alb 1.5 FHR reassuring Denies SOB, vaginal bleeding, etc.  
                 
                Plan: continue close monitoring of BP 
                 
                 
                 
                    [X] Preeclampsia, One: 
                        [X] Expectant management, Both: 
                            [X] < 37 wks gestation ~--Admin, IQ Admin Admin on 04- 01:47 PM--~ 
                            29w2d [X] Intervention, Both: 
                                [X] Blood pressure management, >= One: 
                                    [X] Antihypertensive (includes PO) ~--Admin, IQ Admin Admin on 04- 01:48 PM--~ 
                                    Normodyne NOW at 0700 (received regular dose at 0200) Procardia 10mg NOW at 0700 Procardia 20mg NOW at 0800 [X] BP monitoring at least 3x/24h 
                                    ~--Admin, IQ Admin Admin on 04- 01:52 PM--~ 
                                    BP monitoring every 10-20min from [id number] secondary to very elevated bp and interventions 192/125, 188/107, 156/99, 160/104; 140/102; 139/102 are samples from [id number] 
                                    1012: 130/68 
                                    1022: 129/83 1032: 144/91 
                                    1032: 134/85 [X] Fetal heart rate monitoring ~--Admin, IQ Admin Admin on 04- 01:53 PM--~ 
                                FH: baseline 120/min; mod viability; no acc, no decel; no uterine contractoins

## 2019-04-17 NOTE — DISCHARGE INSTRUCTIONS
HYPERTENSIVE DISORDERS OF PREGNANCY DISCHARGE INSTRUCTIONS    Name: Luz Mboley  YOB: 1990  Primary Diagnosis: Active Problems:    DM type 1 (diabetes mellitus, type 1) ([facility] (11/23/2012)      Nausea and vomiting (5/14/2016)      Chronic hypertension with superimposed preeclampsia (4/8/2019)      Chronic renal disease (4/13/2019)        Introduction:  You have visited the hospital because your physician thinks your blood pressure is increased or increasing. This condition during pregnancy is called gestational hypertension. If you also have an elevated level of protein in your urine, this condition is called preeclampsia. Some of the characteristics you may have seen or felt include edema (usually swelling of face, feet, ankles or hands), and increased weight gain, headaches, spots (floaters) before your eyes or excessive epigastric pain (much more intense than indigestion). It is because of these symptoms and the effect on you and your baby/babies that you have come to the hospital. These guidelines are for your information at home to help you decide whether to call your physician an possibly the need for hospitalization. General:     Follow instructions given to you by Dr. Antwon Rossi this morning. Diet/Diet Restrictions: Follow diabetic diet as ordered. Physical Activity / Restrictions / Safety:     * Activity at home is based on how you may be feeling or at the direction of your physician. {OB PHYSICAL ACTIVITIES:42288949}       Discharge Instructions/ Special Treatment/ Home Care Needs:     Call your provider if:   You notice an increase in swelling or excessive epigastric pain.  You have a constant headache not relieved by Tylenol.  You have dizziness while carrying out activities of daily living.  You see spots (floaters) before your eyes.  You start having regular painful contractions every 5 minutes or less for 1 hour.  Time your contractions from the beginning of one to the beginning of the next one.   Your baby is not moving as much as usual-at least 4 fetal movements in 1 hour after drinking and resting on your side for 1 hour.   You have a gush of fluid or blood from your vagina (it is normal to have spotting after vaginal exam or intercourse).  Other:       labor instructions:    Uterine cramping (menstrual-like cramps, intermittent or constant   Uterine contractions every 10-15 minutes or more frequently   Low abdominal pressure (pelvic pressure)   Dull low backache (intermittent or constant)   Increase or change in vaginal discharge   Feeling that the baby is \"pushing down\"   Abdominal cramping with or without diarrhea  If any of these symptoms are experienced, stop what you are doing, lie down on your side, drink two to three glasses of water and wait one hour. If the symptoms persist or get worse, call your provider. Pain Management:     Tylenol as needed for pain. Discharge Checklist-NURSING TO COMPLETE:     Date and Time of Discharge: Date: 2019 Time: 12:15 PM    Return of:   Dental Appliance: Dental Appliances: None  Vision: Visual Aid: Glasses, With patient  Hearing Aid:    Tonna Rico: Tonna Rico: Body Piercing(both nipples, lips, and ears)  Clothing: Clothing: At bedside  Other Valuables: Other Valuables: At bedside  Valuables sent to safe: Personal Items Sent to Safe: None    Prescription Given: yes  Medication Instruction Sheet(s), including side effects, provided: {yes no:409194}    Accompanied By:     Mode of Transportation:  car    Discharge Disposition: {Discharge Destination:38671}    I have had the opportunity to make my options or choices for discharge. I have received and understand these instructions.

## 2019-04-17 NOTE — PROGRESS NOTES
Problem: Pressure Injury - Risk of 
Goal: *Prevention of pressure injury Description Document Doroteo Scale and appropriate interventions in the flowsheet. Outcome: Progressing Towards Goal 
  
Problem: Patient Education: Go to Patient Education Activity Goal: Patient/Family Education Outcome: Progressing Towards Goal 
  
Problem: Hypertensive Disorders of Pregnancy Care Plan (Gestational HTN, Preeclampsia, HELLP syndrome, Eclampsia, Chronic HTN, Superimposed Preeclamsia) Goal: *Blood pressure within specified parameters Outcome: Progressing Towards Goal 
Goal: *Fluid volume balance Outcome: Progressing Towards Goal 
Goal: *Labs within defined limits Outcome: Progressing Towards Goal 
Goal: *Reassuring fetal surveillance Outcome: Progressing Towards Goal 
Goal: *Remains free of seizures Outcome: Progressing Towards Goal 
  
Problem: Falls - Risk of 
Goal: *Absence of Falls Description Document Jeremy Lowell Fall Risk and appropriate interventions in the flowsheet. Outcome: Progressing Towards Goal 
  
Problem: Patient Education: Go to Patient Education Activity Goal: Patient/Family Education Outcome: Progressing Towards Goal 
  
Problem: Patient Education: Go to Patient Education Activity Goal: Patient/Family Education Outcome: Progressing Towards Goal 
  
Problem: Antepartum: Day 1 through Discharge Goal: Off Pathway (Use only if patient is Off Pathway) Outcome: Progressing Towards Goal 
Goal: Activity/Safety Outcome: Progressing Towards Goal 
Goal: Consults, if ordered Outcome: Progressing Towards Goal 
Goal: Diagnostic Test/Procedures Outcome: Progressing Towards Goal 
Goal: Nutrition/Diet Outcome: Progressing Towards Goal 
Goal: Discharge Planning Outcome: Progressing Towards Goal 
Goal: Medications Outcome: Progressing Towards Goal 
Goal: Treatments/Interventions/Procedures Outcome: Progressing Towards Goal 
Goal: Psychosocial 
Outcome: Progressing Towards Goal 
 Goal: *Vital signs within defined limits Outcome: Progressing Towards Goal 
Goal: *Labs within defined limits Outcome: Progressing Towards Goal 
Goal: *Hemodynamically stable Outcome: Progressing Towards Goal 
Goal: *Optimal pain control at patient's stated goal 
Outcome: Progressing Towards Goal 
Goal: *Tolerating diet Outcome: Progressing Towards Goal 
Goal: *Performs self perineal care Outcome: Progressing Towards Goal 
Goal: *Reassuring fetal surveillance Outcome: Progressing Towards Goal 
Goal: *Able to cope Outcome: Progressing Towards Goal 
Goal: *Absence of injury Outcome: Progressing Towards Goal 
Goal: *Free from active signs of labor Outcome: Progressing Towards Goal 
  
Problem: Antepartum: Discharge Outcomes Goal: *Free from active signs of labor Outcome: Progressing Towards Goal 
Goal: *Absence of injury Outcome: Progressing Towards Goal 
Goal: *Able to cope Outcome: Progressing Towards Goal 
Goal: *Reassuring fetal surveillance Outcome: Progressing Towards Goal 
Goal: *Describes available resources and support systems Outcome: Progressing Towards Goal 
Goal: *No signs and symptoms of infection Outcome: Progressing Towards Goal 
Goal: *Received and verbalizes understanding of discharge plan and instructions Outcome: Progressing Towards Goal 
Goal: *Vital signs within defined limits Outcome: Progressing Towards Goal 
Goal: *Labs within defined limits Outcome: Progressing Towards Goal 
Goal: *Hemodynamically stable Outcome: Progressing Towards Goal 
Goal: *Optimal pain control at patient's stated goal 
Outcome: Progressing Towards Goal 
Goal: *Demonstrates progressive activity Outcome: Progressing Towards Goal 
Goal: *Tolerating diet Outcome: Progressing Towards Goal 
Goal: *Verbalizes name, dosage, time, side effects, and number of days to continue medications Outcome: Progressing Towards Goal 
  
Problem: Diabetes Self-Management Goal: *Disease process and treatment process Description Define diabetes and identify own type of diabetes; list 3 options for treating diabetes. Outcome: Progressing Towards Goal 
Goal: *Incorporating nutritional management into lifestyle Description Describe effect of type, amount and timing of food on blood glucose; list 3 methods for planning meals. Outcome: Progressing Towards Goal 
Goal: *Incorporating physical activity into lifestyle Description State effect of exercise on blood glucose levels. Outcome: Progressing Towards Goal 
Goal: *Developing strategies to promote health/change behavior Description Define the ABC's of diabetes; identify appropriate screenings, schedule and personal plan for screenings. Outcome: Progressing Towards Goal 
Goal: *Using medications safely Description State effect of diabetes medications on diabetes; name diabetes medication taking, action and side effects. Outcome: Progressing Towards Goal 
Goal: *Monitoring blood glucose, interpreting and using results Description Identify recommended blood glucose targets  and personal targets. Outcome: Progressing Towards Goal 
Goal: *Prevention, detection, treatment of acute complications Description List symptoms of hyper- and hypoglycemia; describe how to treat low blood sugar and actions for lowering  high blood glucose level. Outcome: Progressing Towards Goal 
Goal: *Prevention, detection and treatment of chronic complications Description Define the natural course of diabetes and describe the relationship of blood glucose levels to long term complications of diabetes. Outcome: Progressing Towards Goal 
Goal: *Developing strategies to address psychosocial issues Description Describe feelings about living with diabetes; identify support needed and support network Outcome: Progressing Towards Goal 
Goal: *Insulin pump training Outcome: Progressing Towards Goal 
Goal: *Sick day guidelines Outcome: Progressing Towards Goal 
 Goal: *Patient Specific Goal (EDIT GOAL, INSERT TEXT) Outcome: Progressing Towards Goal 
  
Problem: Patient Education: Go to Patient Education Activity Goal: Patient/Family Education Outcome: Progressing Towards Goal

## 2019-04-17 NOTE — PROGRESS NOTES
Bedside and Verbal shift change report given to [employer] (oncoming nurse) by Devan Chaidez (offgoing nurse). Report included the following information SBAR, Kardex, Intake/Output, MAR, Accordion and Recent Results.

## 2019-04-17 NOTE — PROGRESS NOTES
High Risk Obstetrics Progress Note Name: Gonzalo Eller MRN: [id number]  SSN: [id number] YOB: 1990  Age: 30 y.o. Sex: female Subjective: LOS: 10 days Estimated Date of Delivery: 19 Gestational Age Today: 26w0d Patient admitted for chronic hypertension, diabetes - Type 1 and nausea and vomiting. States she she has some nausea this morning but feels ready to go home. Normal fetal movement. Objective:  
 
Vitals:  Blood pressure (!) 144/100, pulse 79, temperature 98 °F (36.7 °C), resp. rate 16, height 5' 8\" (1.727 m), weight 70.8 kg (156 lb), SpO2 99 %, not currently breastfeeding. Temp (24hrs), Av °F (36.7 °C), Min:97.9 °F (36.6 °C), Max:98.1 °F (36.7 °C) Systolic (11LCM), WPP:889 , Min:113 , Max:144 Diastolic (39DQD), JZY:24, Min:70, Max:101 Intake and Output:    
 
 
Physical Exam: 
Patient without distress. Abdomen: soft, nontender Membranes:  Intact Uterine Activity:  None Fetal Heart Rate:  Reactive Labs:  
Recent Results (from the past 36 hour(s)) GLUCOSE, POC Collection Time: 04/15/19 11:05 PM  
Result Value Ref Range Glucose (POC) 66 65 - 100 mg/dL Performed by Reola Aishwarya, POC Collection Time: 04/15/19 11:24 PM  
Result Value Ref Range Glucose (POC) 62 (L) 65 - 100 mg/dL Performed by Reola Aishwarya, POC Collection Time: 04/15/19 11:42 PM  
Result Value Ref Range Glucose (POC) 83 65 - 100 mg/dL Performed by Reola Aishwarya, POC Collection Time: 19  8:50 AM  
Result Value Ref Range Glucose (POC) 110 (H) 65 - 100 mg/dL Performed by Lopez Fried GLUCOSE, POC Collection Time: 19  1:18 PM  
Result Value Ref Range Glucose (POC) 93 65 - 100 mg/dL Performed by Jean-Paul SMITH   
GLUCOSE, POC Collection Time: 19  7:05 PM  
Result Value Ref Range Glucose (POC) 89 65 - 100 mg/dL Performed by Wesley Finnegan GLUCOSE, POC Collection Time: 04/17/19 12:46 AM  
Result Value Ref Range Glucose (POC) 173 (H) 65 - 100 mg/dL Performed by Hannah Angulo GLUCOSE, POC Collection Time: 04/17/19  8:46 AM  
Result Value Ref Range Glucose (POC) 69 65 - 100 mg/dL Performed by Mercy Safia Assessment and Plan: Active Problems: 
  [facility] (11/23/2012) Nausea and vomiting (5/14/2016) Chronic hypertension with superimposed preeclampsia (4/8/2019) Chronic renal disease (4/13/2019) Diabetes-Type 1:  stable on insulin pum Patient stable and ready for discharge. I spoke with Dr. Casimiro Arias this morning who agrees with discharge as well. She has f/u with Dr. Francia Alba and Dr. Rui Esquivel (endocrinology) scheduled

## 2019-04-17 NOTE — PROGRESS NOTES
Bedside and Verbal shift change report given to RIKI Bhat RN (oncoming nurse) by RIKI Denis RN (offgoing nurse). Report included the following information SBAR, Kardex and MAR.  
 
0846  BS 69.  Patient will bolus her insulin once she starts eating. 8478  Dr. Brigid Kwan at bedside. Notified of patient's BP and that patient reports 2 episodes of loose stools this am and BS of 69 prior to breakfast.  No new orders received. 1138  IJ removed, tip intact. Pressure applied for 5 minutes. Patient to remain flat for 30 minutes. No bleeding noted. 1250  Discharge instructions given and reviewed with patient. All questions answered. RXs given. Significant other here to take patient home.

## 2019-04-17 NOTE — DISCHARGE SUMMARY
Antepartum Discharge Summary     Name: Esther Iyer MRN: [id number]  SSN: [id number]    YOB: 1990  Age: 30 y.o. Sex: female      Allergies: Morphine and Pcn [penicillins]    Admit Date: 4/7/2019    Discharge Date: 4/17/2019     Admitting Physician: Estefany Smith MD     Attending Physician:  Caden Mcnair MD     * Admission Diagnoses: Chronic hypertension with superimposed preeclampsia [O11.9]    * Discharge Diagnoses:   Hospital Problems as of 4/17/2019 Date Reviewed: 1/6/2017          Codes Class Noted - Resolved POA    Chronic renal disease ICD-10-CM: N18.9  ICD-9-CM: 585.9  4/13/2019 - Present Yes        Chronic hypertension with superimposed preeclampsia ICD-10-CM: O11.9  ICD-9-CM: 642.70  4/8/2019 - Present Yes        Pre-eclampsia superimposed on chronic hypertension, antepartum ICD-10-CM: O11.9, O10.019  ICD-9-CM: 642.73  4/7/2019 - Present         Nausea and vomiting ICD-10-CM: R11.2  ICD-9-CM: 787.01  5/14/2016 - Present Yes        DM type 1 (diabetes mellitus, type 1) ([facility] ICD-10-CM: E10.9  ICD-9-CM: 250.01  11/23/2012 - Present Yes             Lab Results   Component Value Date/Time    ABO/Rh(D) B POSITIVE 04/08/2019 03:11 AM    Rubella, External immune 10/20/2018    ABO,Rh B positive 04/01/2019      Immunization History   Administered Date(s) Administered    (RETIRED) Pneumococcal Vaccine (Unspecified Type) 01/01/2010    Influenza Vaccine 09/01/2013    Influenza Vaccine ([employer] [employer] [facility]) PF 09/29/2014    Influenza Vaccine (Quad) PF 11/30/2015    Influenza Vaccine Split 10/10/2012    Pneumococcal Polysaccharide (PPSV-23) 05/19/2013       * Discharge Condition: good    * Procedures: none  * No surgery found *      [facility] Course:    - Diabetes -Type 1:                                     - Discharge home on insulin pump. - Her blood pressure was stabilized by increasing labetalol and adding procardia. Nausea improved with antiemetics.  She was seen by MFM and had reassuring testing. * Disposition: Home    Discharge Medications:   Current Discharge Medication List      START taking these medications    Details   famotidine (PEPCID) 20 mg tablet Take 1 Tab by mouth every twelve (12) hours. Qty: 30 Tab, Refills: 0      NIFEdipine ER (PROCARDIA XL) 30 mg ER tablet Take 1 Tab by mouth daily. Qty: 30 Tab, Refills: 0      promethazine (PHENERGAN) 25 mg tablet Take 1 Tab by mouth every six (6) hours as needed for Nausea. Qty: 30 Tab, Refills: 0         CONTINUE these medications which have CHANGED    Details   DULoxetine (CYMBALTA) 60 mg capsule Take 1 Cap by mouth daily for 30 days. Qty: 30 Cap, Refills: 0      ondansetron hcl (ZOFRAN) 4 mg tablet Take 1 Tab by mouth every eight (8) hours as needed for Nausea. Qty: 30 Tab, Refills: 0      labetalol (NORMODYNE) 200 mg tablet Take 2 Tabs by mouth every twelve (12) hours. Qty: 30 Tab, Refills: 0         CONTINUE these medications which have NOT CHANGED    Details   !! insulin glulisine (APIDRA SOLOSTAR) 100 unit/mL pen 1 unit for every 6-7 g carb  Qty: 2 Pen, Refills: 0      !! insulin glargine (LANTUS SOLOSTAR) 100 unit/mL (3 mL) inpn 23-24 units twice daily. Qty: 3 Pen, Refills: 0      !! insulin glulisine (APIDRA SOLOSTAR) 100 unit/mL pen 1 unit for every 6-7 g carb  Qty: 6 Pen, Refills: 0    Associated Diagnoses: Type 1 diabetes mellitus with complication ([address]      ! ! insulin glargine (LANTUS SOLOSTAR) 100 unit/mL (3 mL) pen 23-24 units twice daily  Qty: 10 Each, Refills: 0      !! insulin glulisine (APIDRA SOLOSTAR) 100 unit/mL pen 1 unit for every 6-7 g carb  Qty: 4 Syringe, Refills: 0    Associated Diagnoses: Type 1 diabetes mellitus with complications (HCC)      multivitamin (ONE A DAY) tablet Take 1 Tab by mouth daily.       OTHER Glucose support vitamin      Insulin Needles, Disposable, 32 gauge x 5/32\" ndle 5 times daily  Qty: 200 Pen Needle, Refills: 6    Comments: Please hold       !! - Potential duplicate medications found. Please discuss with provider. STOP taking these medications       promethazine (PHENERGAN) 25 mg suppository Comments:   Reason for Stopping:         oxyCODONE-acetaminophen (PERCOCET)  mg per tablet Comments:   Reason for Stopping:               * Follow-up Care/Patient Instructions:   Activity: Activity as tolerated  Diet: Diabetic Diet  Wound Care: None needed    Follow-up Information     Follow up With Specialties Details Why Contact Info    James Huang MD Obstetrics & Gynecology Go on 4/23/2019 11: 20am [address] [facility] [address] [address] [address] [place] [address]  [address] [facility]  [phone]      Antoine Sandoval MD Endocrinology   [address] [facility]  [address]  [phone]

## 2019-04-18 ENCOUNTER — OFFICE VISIT (OUTPATIENT)
Dept: ENDOCRINOLOGY | Age: 29
End: 2019-04-18

## 2019-04-18 VITALS
HEIGHT: 68 IN | DIASTOLIC BLOOD PRESSURE: 87 MMHG | HEART RATE: 89 BPM | WEIGHT: 163 LBS | SYSTOLIC BLOOD PRESSURE: 130 MMHG | BODY MASS INDEX: 24.71 KG/M2

## 2019-04-18 DIAGNOSIS — O24.013 PRE-EXISTING TYPE 1 DIABETES MELLITUS DURING PREGNANCY IN THIRD TRIMESTER: Primary | ICD-10-CM

## 2019-04-18 RX ORDER — SCOLOPAMINE TRANSDERMAL SYSTEM 1 MG/1
1 PATCH, EXTENDED RELEASE TRANSDERMAL
COMMUNITY
End: 2019-05-14

## 2019-04-18 RX ORDER — HYDROXYZINE 25 MG/1
TABLET, FILM COATED ORAL
COMMUNITY
Start: 2018-04-09 | End: 2019-05-14

## 2019-04-18 RX ORDER — INSULIN LISPRO 100 [IU]/ML
INJECTION, SOLUTION INTRAVENOUS; SUBCUTANEOUS
COMMUNITY
Start: 2019-03-13 | End: 2019-05-30

## 2019-04-18 RX ORDER — PYRIDOXINE HCL (VITAMIN B6) 25 MG
25 TABLET ORAL
COMMUNITY
Start: 2018-11-03 | End: 2019-05-14

## 2019-04-18 NOTE — PATIENT INSTRUCTIONS
Diabetes type I Goals for blood sugar: 
- fasting: less than 90 
- 2 hours after a meal: less than 120-130. [place] ** Be sure to check glucose before each meal and also 2 hours after the meal. 
 
Enter in all carbohydrate intake into meter See about obtaining CGM (continuous glucose monitor) from [employer] Lowered basal rate to 0.9 Current pump settings: 
- Basal: 0.9 
- Carbohydrate ratio: 11. 5 pm: 8 
- Sensitivity: 43 - Target:100-120 - Active insulin time: 3

## 2019-04-18 NOTE — PROGRESS NOTES
History of Present Illness: Elian Meredith is a 30 y.o. female presents for follow-up of diabetes. She was last seen in 1/2017. [place] moved back In 2015 and 2016 she had numerous admissions for DKA at [facility] as well as at [facility]. Admissions were due to N/V - either from gastroparesis or hyperemesis from marijuana use. She is now 29 weeks pregnant Using pump. She has had this for about 1 year. Current pump settings: 
- Basal: 1.0, 4 am 1.1, 10 am 1.0 
- CHO ratio: 11. 5 pm: 8 
- Sensitivity: 43 - Target:100-120 - Active insulin time: 3 Reviewed pump download. Not all glucoses are entered into pump as she uses two meters, one that links to pump and other that does not. It should also be noted she was in hospital from 4/7/2019-4/13/2019. Average entered into pump is 188 with standard deviation of 138 Total insulin used/day - 26 units, 85% basal. 
Since hospitalization glucoses have been much better Fasting values 101, 106, 110, 69, 73 on 4/14-4/18. Values at other times are  over these 4 days. She is not checking 2 hours after meals currently. Feels low at times overnight Over the last week she has been doing a much better job entering in carb intake, but will sometimes do manual boluses as well. Admits to eating at times and not bolusing for carb intake due to concern about hypoglycemia if she does bolus. Social: 
Her mother and father are here. Brandie Velasco is here. Past Medical History:  
Diagnosis Date  Anemia  Chronic pain  Depression  Diabetes type 1, uncontrolled ([address]  DKA, type 1 ([address]  Essential hypertension  Heart murmur  Herpes simplex virus (HSV) infection 2017  
 positive in blood  Peripheral neuropathy  Ventricular tachycardia ([address] Current Outpatient Medications Medication Sig  
 scopolamine (TRANSDERM-SCOP) 1 mg over 3 days pt3d 1 Patch by TransDERmal route.  glucose blood VI test strips (CONTOUR NEXT TEST STRIPS) strip Check blood sugar 7-10 times per day, mini med contour next link blood glucose meter  HUMALOG KWIKPEN INSULIN 100 unit/mL kwikpen  hydrOXYzine HCl (ATARAX) 25 mg tablet  glucagon (GLUCAGON EMERGENCY KIT, HUMAN,) 1 mg injection Glucagon emergency kit, IM injection  Indications: type 1 diabetes, pregnancy  doxylamine succinate (UNISOM, DOXYLAMINE,) 25 mg tablet Take 25 mg by mouth.  pyridoxine, vitamin B6, (VITAMIN B-6) 25 mg tablet Take 25 mg by mouth.  pnv w/o calcium-iron fum-fa (COMPLETENATE) 29 mg iron- 1 mg chew Take 1 Tab by mouth.  ondansetron hcl (ZOFRAN) 4 mg tablet Take 1 Tab by mouth every eight (8) hours as needed for Nausea.  labetalol (NORMODYNE) 200 mg tablet Take 2 Tabs by mouth every twelve (12) hours.  famotidine (PEPCID) 20 mg tablet Take 1 Tab by mouth every twelve (12) hours.  promethazine (PHENERGAN) 25 mg tablet Take 1 Tab by mouth every six (6) hours as needed for Nausea. No current facility-administered medications for this visit. Allergies Allergen Reactions  Morphine Other (comments)  Pcn [Penicillins] Hives Review of Systems: - Eyes: no blurry vision or double vision - Cardiovascular: no chest pain - Respiratory: no shortness of breath - Musculoskeletal: no myalgias - Neurological: no numbness/tingling in extremities Physical Examination: 
Visit Vitals /87 (BP 1 Location: Left arm, BP Patient Position: Sitting) Pulse 89 Ht 5' 8\" (1.727 m) Wt 163 lb (73.9 kg) BMI 24.78 kg/m²  
-  
- General: pleasant, no distress, normal gait HEENT: hearing intact, EOMI, clear sclera without icterus - Cardiovascular: regular, normal rate - Respiratory: normal effort - Integumentary: no edema Diabetic foot exam:  
 
Left Foot: 
 Visual Exam: normal  
 Pulse DP: 2+ (normal) Filament test: normal sensation Right Foot: 
 Visual Exam: normal  
 Pulse DP: 2+ (normal) Filament test: normal sensation - Psychiatric: normal mood and affect Data Reviewed:  
No results found for: HBA1C, [id number], [id number] Lab Results Component Value Date/Time Sodium 133 04/12/2019 07:31 AM  
 Potassium 4.4 04/12/2019 07:31 AM  
 Creatinine 1.10 04/12/2019 07:31 AM  
  
 
No results found for: CHOL, HDL, LDLC, LDL, LDLCEXT, TRIGL No results found for: TSH, FT4, TRALT, TMCLT, TSHEXT Assessment/Plan: 1. Pre-existing type 1 diabetes mellitus during pregnancy in third trimester -  As noted in HPI, she has not been seen by me in a little over 2 years. This is her first pregnancy and first visit with me in pregnancy 
- reviewed the glucose goals in pregnancy and discussed importance of monitoring 2 hours after meals. - discussed how high glucoses can lead to larger babies, due to overnutrition,  Which will make for more difficult and risky child birth for mother and for child. - Also reviewed how chronically elevated glucoses will lead to pancreatic cell hypertrophy by baby and can lead to hypoglycemia after child birth, which can be scary and dangerous. She seems to be doing much better since hospitalization. Glucoses are not too far off. We lowered basal rate to 0.9 units/hr so glucoses will not drop as much when she is not eating. Would like her to enter into pump all carbohydrate intake and to receive boluses based on bolus wizard on pump. This way we know how her dose is determined and which settings should be changed. Likewise, encouraged her to enter all glucoses into the pump Recommended she contact [facility] for continuous glucose monitor Greater than 50% of 40 minute visit was spent counseling the patient about above. Patient Instructions Diabetes type I Goals for blood sugar: 
- fasting: less than 90 
- 2 hours after a meal: less than 120-130. [place] ** Be sure to check glucose before each meal and also 2 hours after the meal. 
 
Enter in all carbohydrate intake into meter See about obtaining CGM (continuous glucose monitor) from [employer] Lowered basal rate to 0.9 Current pump settings: 
- Basal: 0.9 
- Carbohydrate ratio: 11. 5 pm: 8 
- Sensitivity: 43 - Target:100-120 - Active insulin time: 3 Follow-up and Dispositions · Return in about 1 week (around 4/25/2019), or 4/23/2019 - 3:50 pm . Copy sent to:

## 2019-04-23 ENCOUNTER — OFFICE VISIT (OUTPATIENT)
Dept: OBGYN CLINIC | Age: 29
End: 2019-04-23

## 2019-04-23 VITALS
BODY MASS INDEX: 24.25 KG/M2 | DIASTOLIC BLOOD PRESSURE: 84 MMHG | SYSTOLIC BLOOD PRESSURE: 124 MMHG | WEIGHT: 160 LBS | HEIGHT: 68 IN

## 2019-04-23 DIAGNOSIS — Z34.90 PREGNANCY, UNSPECIFIED GESTATIONAL AGE: ICD-10-CM

## 2019-04-23 DIAGNOSIS — E10.65 HYPERGLYCEMIA DUE TO TYPE 1 DIABETES MELLITUS (HCC): ICD-10-CM

## 2019-04-23 DIAGNOSIS — O10.919 CHRONIC HYPERTENSION AFFECTING PREGNANCY: ICD-10-CM

## 2019-04-23 DIAGNOSIS — Z23 ENCOUNTER FOR IMMUNIZATION: ICD-10-CM

## 2019-04-23 DIAGNOSIS — I47.20 VENTRICULAR TACHYCARDIA: ICD-10-CM

## 2019-04-23 DIAGNOSIS — O13.3 GESTATIONAL HYPERTENSION, THIRD TRIMESTER: Primary | ICD-10-CM

## 2019-04-23 NOTE — PROGRESS NOTES
OB Transfer Visit    Mert Jefferson is a 30 y.o.  at 30w6d by stated [address] (based on first trimester ultrasound in [facility], consistent with 28 wk scan with MFM - Dr. Rodo Yepez, records showing [address] of  by ultrasound so 2 day discrepancy). Pt is presenting for transfer OB visit. Prior care in [place]. FHT's in office today 147. Good FM. Denies ctx, LOF or VB. Ongoing intermittent N/V. Complicated medical history. Unsure if she got Tdap. She has had her flu vaccine. s/p BMZ and mag during recent hospital admission. FOB involved and supportive. Her family is also in [place] [address] and is supportive. PMH significant for:  -IDDM (type 1) - on insulin pump, following with Dr. Aidee Metcalf, multiple admissions for DKA in the past, BG as high as 552, also with sequelae of nephropathy, gastroparesis, and peripheral neuropathy  -CHTN - normotensive today, currently on labetalol 300mg BID, has not been taking procardia XL 30mg since hospital discharge, has had BPs as high as 210s/120s documented earlier in the pregnancy, has been checking BPs at home, mostly normal, highest has been 150/100, but this was an outlier. Pt with chronic renal disease and chronic proteinuria. PreE labs in hospital overall wnl. Needs close surveillance for SI preE. S/p mag on admission. S/p BMZ.  Not currently on ASA 81mg daily due to renal disease.  -anemia - likely iron deficiency and chronic disease, ferritin 8, recommended ferrous sulfate 325mg daily  -?h/o VTACH, heart murmur, and pt reports cardiac arrest (unable to find documentation of this - given extensive medical records), will refer to cardiology  -chronic kidney disease - with recent MARIS --> creat 1.54 in hospital, improved to 1.1 prior to discharge, not currently following with nephrologist, will refer  -depression - currently stable, no meds, has been prescribed cymbalta and celexa 60mg daily in the past  -h/o chronic pain - prior diagnostic laparoscopy wnl, no evidenc eof endometriosis, no current pain complaints  -tobacco and marijuana abuse    Pregnancy problems:  -N/V of pregnancy - using scopolamine patch and taking promethazine and zofran prn, has also used THC for nausea in the pregnancy  -GERD - taking pepcid daily and tums prn    Pregnancy symptoms:  -N/V? present  -breast tenderness? denies  -fatigue? present  -cramping? denies  -weight change? yes  -Vaginal bleeding? denies  -Fetal Movement? present    Ob/Gyn Hx:   A1 - 1 SAB  EDC- 19  LMP- Date: unsure  Menstrual pattern prior to conception: irregular  Contraception at time of conception? none  STI-denies  ? SA-yes    Health maintenance:  Pap- 3 years ago, normal per patient, ?h/o abnormal pap in the past  Gardasil- unsure  Mammo- not indicated    Substance history: negative for alcohol, tobacco, does use marijuana intermittently        Past Medical History:   Diagnosis Date    Anemia     Chronic pain     Depression     Diabetes type 1, uncontrolled ([address]     DKA, type 1 ([address]     Essential hypertension     Heart murmur     Herpes simplex virus (HSV) infection 2017    positive in blood    Peripheral neuropathy     Ventricular tachycardia ([address]      Past Surgical History:   Procedure Laterality Date    ABDOMEN SURGERY PROC UNLISTED      exploratory laparoscopy    HX CYST REMOVAL      groin       Family History   Problem Relation Age of Onset    No Known Problems Mother     Sleep Apnea Father     Hypertension Father     Diabetes Father     Heart Disease Maternal Grandfather      Social History     Socioeconomic History    Marital status: SINGLE     Spouse name: Not on file    Number of children: Not on file    Years of education: Not on file    Highest education level: Not on file   Occupational History    Not on file   Social Needs    Financial resource strain: Not on file    Food insecurity:     Worry: Not on file     Inability: Not on file    Transportation needs:     Medical: Not on file Non-medical: Not on file   Tobacco Use    Smoking status: Former Smoker     Packs/day: 0.25     Years: 8.00     Pack years: 2.00     Types: Cigarettes     Last attempt to quit: 10/26/2014     Years since quittin.4    Smokeless tobacco: Never Used   Substance and Sexual Activity    Alcohol use: No     Alcohol/week: 0.0 oz    Drug use: Yes     Frequency: 2.0 times per week     Types: Marijuana    Sexual activity: Yes     Partners: Male     Birth control/protection: None   Lifestyle    Physical activity:     Days per week: Not on file     Minutes per session: Not on file    Stress: Not on file   Relationships    Social connections:     Talks on phone: Not on file     Gets together: Not on file     Attends Mandaen service: Not on file     Active member of club or organization: Not on file     Attends meetings of clubs or organizations: Not on file     Relationship status: Not on file    Intimate partner violence:     Fear of current or ex partner: Not on file     Emotionally abused: Not on file     Physically abused: Not on file     Forced sexual activity: Not on file   Other Topics Concern     Service Not Asked    Blood Transfusions Not Asked    Caffeine Concern Not Asked    Occupational Exposure Not Asked   Declan Proud Hazards Not Asked    Sleep Concern Not Asked    Stress Concern Not Asked    Weight Concern Not Asked    Special Diet Not Asked    Back Care Not Asked    Exercise Not Asked    Bike Helmet Not Asked    Seat Belt Not Asked    Self-Exams Not Asked   Social History Narrative    Not on file       Current Outpatient Medications   Medication Sig Dispense Refill    scopolamine (TRANSDERM-SCOP) 1 mg over 3 days pt3d 1 Patch by TransDERmal route.  HUMALOG KWIKPEN INSULIN 100 unit/mL kwikpen       pyridoxine, vitamin B6, (VITAMIN B-6) 25 mg tablet Take 25 mg by mouth.       ondansetron hcl (ZOFRAN) 4 mg tablet Take 1 Tab by mouth every eight (8) hours as needed for Nausea. 30 Tab 0    labetalol (NORMODYNE) 200 mg tablet Take 2 Tabs by mouth every twelve (12) hours. 30 Tab 0    famotidine (PEPCID) 20 mg tablet Take 1 Tab by mouth every twelve (12) hours. 30 Tab 0    promethazine (PHENERGAN) 25 mg tablet Take 1 Tab by mouth every six (6) hours as needed for Nausea. 30 Tab 0    glucose blood VI test strips (CONTOUR NEXT TEST STRIPS) strip Check blood sugar 7-10 times per day, mini med contour next link blood glucose meter      hydrOXYzine HCl (ATARAX) 25 mg tablet       glucagon (GLUCAGON EMERGENCY KIT, HUMAN,) 1 mg injection Glucagon emergency kit, IM injection  Indications: type 1 diabetes, pregnancy      doxylamine succinate (UNISOM, DOXYLAMINE,) 25 mg tablet Take 25 mg by mouth.  pnv w/o calcium-iron fum-fa (COMPLETENATE) 29 mg iron- 1 mg chew Take 1 Tab by mouth.        Allergies   Allergen Reactions    Morphine Other (comments)    Pcn [Penicillins] Hives         Review of Systems - History obtained from the patient  Constitutional: negative for weight loss, fever, night sweats  HEENT: negative for hearing loss, earache, congestion, snoring, sorethroat  CV: negative for chest pain, palpitations, edema  Resp: negative for cough, shortness of breath, wheezing  GI: negative for change in bowel habits, abdominal pain, black or bloody stools, +nausea/vomiting  : negative for frequency, dysuria, hematuria, vaginal discharge  MSK: negative for back pain, joint pain, muscle pain  Breast: negative for breast lumps, nipple discharge, galactorrhea  Skin :negative for itching, rash, hives  Neuro: negative for dizziness, confusion, weakness, +occasional mild HAs  Psych: negative for anxiety, depression, change in mood  Heme/lymph: negative for bleeding, bruising, pallor    Physical Exam    Visit Vitals  /84 (BP 1 Location: Left arm, BP Patient Position: Sitting)   Ht 5' 8\" (1.727 m)   Wt 160 lb (72.6 kg)   BMI 24.33 kg/m²       Constitutional  · Appearance: well-nourished, well developed, alert, in no acute distress    HENT  · Head and Face: appears normal    Chest  · Respiratory Effort: non-labored breathing  · Auscultation: CTAB, normal breath sounds    Cardiovascular  · Heart:  · Auscultation: regular rate and rhythm without murmur  · Extremities: no peripheral edema    Gastrointestinal  · Abdominal Examination: abdomen non-tender to palpation, normal bowel sounds, no masses present, gravid, FH 29  · Liver and spleen: no hepatomegaly present, spleen not palpable  · Hernias: no hernias identified    Skin  · General Inspection: no rash, no lesions identified, multiple tattoos and piercings    Neurologic/Psychiatric  · Mental Status:  · Orientation: grossly oriented to person, place and time  · Mood and Affect: mood normal, affect appropriate      Assessment/Plan:  Primary Provider: Yuri Stover    31 yo  with DG 19 by stated EDC (based on first trimester ultrasound in [facility], consistent with 28 wk scan with MFM - Dr. Latasha Rocha) vs.  by ultrasound per OSH records. 2 day discrepancy in dating.     IUP: MFM scan 19 - 28w1d showing EFW 40%ile and posterior placenta, per OSH records prior fetal ECHO wnl  -records requested from anatomy scan and dating scan  -Tdap:   -Flu: vaccinated 2018  -PNV    Genetics/Carrier screening: late transfer, do not see documentation that this was done    PNL: B pos / ABSC neg / Hgb 8.0, Plts 249 / hiv, hepB, hepC, rubella, syphilis, gc, chl all neg / urine GBS+ / needs pap postpartum (last wnl 3 yrs ago per pt)    PMH significant for:  -IDDM (type 1) - on insulin pump, following with Dr. Letty Madden, improved control, however, multiple admissions for DKA in the past, BG as high as 552, also with sequelae of nephropathy, gastroparesis, and peripheral neuropathy --> advised follow up with GI for gastroparesis, also will recommend follow up with ophthalmologist and podiatrist at future visit  -CHTN - normotensive today, currently on labetalol 300mg BID, has not been taking procardia XL 30mg since hospital discharge, has had BPs as high as 210s/120s documented earlier in the pregnancy, has been checking BPs at home, mostly normal, highest has been 150/100, but this was an outlier. Pt with likely chronic renal disease and chronic proteinuria. Baseline 24 hr urine protein 4659g per OSH records. PreE labs in hospital overall wnl, will repeat preE labs weekly. Needs close surveillance for SI preE. S/p mag on admission. S/p BMZ. Not currently on ASA 81mg daily due to renal disease. Needs weekly surveillance BPPs with MFM.  Appointment requested 4/23.  -anemia - hgb most recently 8.0, likely iron deficiency and chronic disease, ferritin 8, recommended ferrous sulfate 325mg daily  -?h/o VTACH, heart murmur, and pt reports cardiac arrest (unable to find documentation of this - given extensive medical records), will refer to cardiology  -chronic kidney disease - with recent MARIS --> creat 1.54 in hospital, improved to 1.1 prior to discharge, will refer to nephrology  -depression - currently stable, no meds, has been prescribed cymbalta and celexa 60mg daily in the past  -h/o chronic pain - prior diagnostic laparoscopy wnl, no evidenc eof endometriosis, no current pain complaints  -tobacco and marijuana abuse - counseled on cessation  -HSV - unclear history, reports possible outbreak and was empirically treated, but was never informed of test results, will plan for acyclovir suppression at 34 wks in anticipation of slightly early delivery    Pregnancy problems:  -N/V of pregnancy - using scopolamine patch and taking promethazine and zofran prn, has also used THC for nausea in the pregnancy  -GERD - taking pepcid daily and tums prn    Delivery/PP plans:  -Breast - concerned about nipple piercings  -NCB vs. Epid? tbd  -Gender/Circ? tbd    Social:  -FOB and parents supportive  -pt is     RTC: 1 week, or sooner prn  -labor precautions and FKCs reviewed    Krunal Garcia MD  4/23/2019  4:28 PM           Patient in office for Tdap injection, office supplied.      After obtaining consent, and per orders of Aide Adams, injection of Tdap given by Iraj Gore LPN. Patient instructed to remain in clinic for 20 minutes afterwards, and to report any adverse reaction to me immediately.     Lot   Exp 6/14/21  Dose 0.5 ml  Site right deltoid  Route IM   [employer]  [employer] [phone]

## 2019-04-23 NOTE — PATIENT INSTRUCTIONS

## 2019-04-30 ENCOUNTER — TELEPHONE (OUTPATIENT)
Dept: OBGYN CLINIC | Age: 29
End: 2019-04-30

## 2019-04-30 NOTE — TELEPHONE ENCOUNTER
Call received at 303PM    30year old patient  31w6d pregnant patient last sen in the office on 19. [facility] calling with appointment details for the patient for 19 at 2:!5PM    MFM stating they have attempted to reach the patient and are unable to leave a message due to voice mail box is not set up. This nurse called the patient and was able to advise the patient of her appointment details. Patient verbalized understanding and states the baby is doing ok.     CONNIE

## 2019-05-02 ENCOUNTER — ROUTINE PRENATAL (OUTPATIENT)
Dept: OBGYN CLINIC | Age: 29
End: 2019-05-02

## 2019-05-02 VITALS — WEIGHT: 159.6 LBS | DIASTOLIC BLOOD PRESSURE: 84 MMHG | BODY MASS INDEX: 24.27 KG/M2 | SYSTOLIC BLOOD PRESSURE: 126 MMHG

## 2019-05-02 DIAGNOSIS — O13.3 GESTATIONAL HYPERTENSION, THIRD TRIMESTER: Primary | ICD-10-CM

## 2019-05-02 DIAGNOSIS — Z34.90 PREGNANCY, UNSPECIFIED GESTATIONAL AGE: ICD-10-CM

## 2019-05-02 RX ORDER — VALACYCLOVIR HYDROCHLORIDE 500 MG/1
500 TABLET, FILM COATED ORAL 2 TIMES DAILY
Qty: 60 TAB | Refills: 2 | Status: SHIPPED | OUTPATIENT
Start: 2019-05-02 | End: 2019-05-14

## 2019-05-02 NOTE — PROGRESS NOTES
Few headaches, resolve spontaneously, no significant vision changes, no RUQ pain, no swelling, normotensive today on labetalol 300mg BID. Nausea only. Pelvic cramping, no LOF or VB. Positive fetal movement. Size slightly less than dates, FH 29 at 32 wks --> growth assessment with MFM shceduled. BS elevated this AM in 200s, otherwise running <100  Missed MFM appt today, rescheduled for next Thursday at 1:45 PM.   Has not seen nephrology or cardiology. Referrals provided again today and pt given phone numbers. Advised pt start valtrex suppression. Rx provided. GAYLE briggs with pt today. [address] labs today and weekly.     RTC: 1 week (pt requiring weekly appts)

## 2019-05-02 NOTE — PATIENT INSTRUCTIONS
Weeks 32 to 34 of Your Pregnancy: Care Instructions  Your Care Instructions    During the last few weeks of your pregnancy, you may have more aches and pains. It's important to rest when you can. Your growing baby is putting more pressure on your bladder. So you may need to urinate more often. Hemorrhoids are also common. These are painful, itchy veins in the rectal area. In the 36th week, most women have a test for group B streptococcus (GBS). GBS is a common bacteria that can live in the vagina and rectum. It can make your baby sick after birth. If you test positive, you will get antibiotics during labor. These will keep your baby from getting the bacteria. You may want to talk with your doctor about banking your baby's umbilical cord blood. This is the blood left in the cord after birth. If you want to save this blood, you must arrange it ahead of time. You can't decide at the last minute. If you haven't already had the Tdap shot during this pregnancy, talk to your doctor about getting it. It will help protect your  against pertussis infection. Follow-up care is a key part of your treatment and safety. Be sure to make and go to all appointments, and call your doctor if you are having problems. It's also a good idea to know your test results and keep a list of the medicines you take. How can you care for yourself at home? Ease hemorrhoids  · Get more liquids, fruits, vegetables, and fiber in your diet. This will help keep your stools soft. · Avoid sitting for too long. Lie on your left side several times a day. · Clean yourself with soft, moist toilet paper. Or you can use witch hazel pads or personal hygiene pads. · If you are uncomfortable, try ice packs. Or you can sit in a warm sitz bath. Do these for 20 minutes at a time, as needed. · Use hydrocortisone cream for pain and itching. Two examples are Anusol and Preparation H Hydrocortisone.   · Ask your doctor about taking an over-the-counter stool softener. Consider breastfeeding  · Experts recommend that women breastfeed for 1 year or longer. Breast milk is the perfect food for babies. · Breast milk is easier for babies to digest than formula. And it is always available, just the right temperature, and free. · Breast milk may help protect your child from some health problems.  babies are less likely than formula-fed babies to:  ? Get ear infections, colds, diarrhea, and pneumonia. ? Be obese or get diabetes later in life. · Women who breastfeed have less bleeding after the birth. Their uteruses also shrink back faster. · Some women who breastfeed lose weight faster. Making milk burns calories. · Breastfeeding can lower your risk of breast cancer, ovarian cancer, and osteoporosis. Decide about circumcision for boys  · As you make this decision, it may help to think about your personal, Restorationism, and family traditions. You get to decide if you will keep your son's penis natural or if he will be circumcised. · If you decide that you would like to have your baby circumcised, talk with your doctor. You can share your concerns about pain. And you can discuss your preferences for anesthesia. Where can you learn more? Go to http://elvis-bernarda.info/. Enter K448 in the search box to learn more about \"Weeks 32 to 34 of Your Pregnancy: Care Instructions. \"  Current as of: September 5, 2018  Content Version: 11.9  © 9860-3171 [employer]. Care instructions adapted under license by [employer] (which disclaims liability or warranty for this information). If you have questions about a medical condition or this instruction, always ask your healthcare professional. [place] [address] any warranty or liability for your use of this information.

## 2019-05-03 LAB
ALBUMIN SERPL-MCNC: 2.7 G/DL (ref 3.5–5.5)
ALBUMIN/GLOB SERPL: 1.1 {RATIO} (ref 1.2–2.2)
ALP SERPL-CCNC: 87 IU/L (ref 39–117)
ALT SERPL-CCNC: 12 IU/L (ref 0–32)
AST SERPL-CCNC: 15 IU/L (ref 0–40)
BILIRUB SERPL-MCNC: <0.2 MG/DL (ref 0–1.2)
BUN SERPL-MCNC: 28 MG/DL (ref 6–20)
BUN/CREAT SERPL: 20 (ref 9–23)
CALCIUM SERPL-MCNC: 8.3 MG/DL (ref 8.7–10.2)
CHLORIDE SERPL-SCNC: 106 MMOL/L (ref 96–106)
CO2 SERPL-SCNC: 21 MMOL/L (ref 20–29)
CREAT SERPL-MCNC: 1.37 MG/DL (ref 0.57–1)
ERYTHROCYTE [DISTWIDTH] IN BLOOD BY AUTOMATED COUNT: 18.7 % (ref 12.3–15.4)
GLOBULIN SER CALC-MCNC: 2.5 G/DL (ref 1.5–4.5)
GLUCOSE SERPL-MCNC: 66 MG/DL (ref 65–99)
HCT VFR BLD AUTO: 29.8 % (ref 34–46.6)
HGB BLD-MCNC: 9.3 G/DL (ref 11.1–15.9)
MCH RBC QN AUTO: 25.3 PG (ref 26.6–33)
MCHC RBC AUTO-ENTMCNC: 31.2 G/DL (ref 31.5–35.7)
MCV RBC AUTO: 81 FL (ref 79–97)
PLATELET # BLD AUTO: 295 X10E3/UL (ref 150–379)
POTASSIUM SERPL-SCNC: 4.6 MMOL/L (ref 3.5–5.2)
PROT SERPL-MCNC: 5.2 G/DL (ref 6–8.5)
RBC # BLD AUTO: 3.67 X10E6/UL (ref 3.77–5.28)
SODIUM SERPL-SCNC: 139 MMOL/L (ref 134–144)
WBC # BLD AUTO: 11.4 X10E3/UL (ref 3.4–10.8)

## 2019-05-06 NOTE — PROGRESS NOTES
preE labs overall reassuring    Hgb 9.3 (improved from 8.0)    Plts 295    Creat 1.37 (elevated from baseline, while patient has some degree of chronic renal impairment, this is elevated c/w prior value, thus recommend good hydration and recheck in 1 week) --> please inform patient and make sure she keeps MFM appointment this week    AST/ALT wnl

## 2019-05-09 ENCOUNTER — ANESTHESIA (OUTPATIENT)
Dept: LABOR AND DELIVERY | Age: 29
End: 2019-05-09
Payer: MEDICAID

## 2019-05-09 ENCOUNTER — HOSPITAL ENCOUNTER (OUTPATIENT)
Dept: PERINATAL CARE | Age: 29
Discharge: HOME | End: 2019-05-09
Attending: OBSTETRICS & GYNECOLOGY
Payer: MEDICAID

## 2019-05-09 ENCOUNTER — ANESTHESIA EVENT (OUTPATIENT)
Dept: LABOR AND DELIVERY | Age: 29
End: 2019-05-09
Payer: MEDICAID

## 2019-05-09 ENCOUNTER — HOSPITAL ENCOUNTER (INPATIENT)
Age: 29
LOS: 5 days | Discharge: HOME | End: 2019-05-14
Attending: OBSTETRICS & GYNECOLOGY | Admitting: OBSTETRICS & GYNECOLOGY
Payer: MEDICAID

## 2019-05-09 PROBLEM — Z34.93 PREGNANT AND NOT YET DELIVERED IN THIRD TRIMESTER: Status: ACTIVE | Noted: 2019-05-09

## 2019-05-09 LAB
ABO + RH BLD: NORMAL
ALBUMIN SERPL-MCNC: 2.1 G/DL (ref 3.5–5)
ALBUMIN/GLOB SERPL: 0.5 {RATIO} (ref 1.1–2.2)
ALP SERPL-CCNC: 119 U/L (ref 45–117)
ALT SERPL-CCNC: 18 U/L (ref 12–78)
ANION GAP SERPL CALC-SCNC: 9 MMOL/L (ref 5–15)
AST SERPL-CCNC: 35 U/L (ref 15–37)
BILIRUB SERPL-MCNC: 0.2 MG/DL (ref 0.2–1)
BLOOD GROUP ANTIBODIES SERPL: NORMAL
BUN SERPL-MCNC: 27 MG/DL (ref 6–20)
BUN/CREAT SERPL: 21 (ref 12–20)
CALCIUM SERPL-MCNC: 9.8 MG/DL (ref 8.5–10.1)
CHLORIDE SERPL-SCNC: 105 MMOL/L (ref 97–108)
CO2 SERPL-SCNC: 22 MMOL/L (ref 21–32)
CREAT SERPL-MCNC: 1.28 MG/DL (ref 0.55–1.02)
ERYTHROCYTE [DISTWIDTH] IN BLOOD BY AUTOMATED COUNT: 17.7 % (ref 11.5–14.5)
GLOBULIN SER CALC-MCNC: 4.6 G/DL (ref 2–4)
GLUCOSE BLD STRIP.AUTO-MCNC: 155 MG/DL (ref 65–100)
GLUCOSE BLD STRIP.AUTO-MCNC: 156 MG/DL (ref 65–100)
GLUCOSE BLD STRIP.AUTO-MCNC: 70 MG/DL (ref 65–100)
GLUCOSE SERPL-MCNC: 120 MG/DL (ref 65–100)
HCT VFR BLD AUTO: 39 % (ref 35–47)
HGB BLD-MCNC: 11.8 G/DL (ref 11.5–16)
MCH RBC QN AUTO: 24.8 PG (ref 26–34)
MCHC RBC AUTO-ENTMCNC: 30.3 G/DL (ref 30–36.5)
MCV RBC AUTO: 82.1 FL (ref 80–99)
NRBC # BLD: 0 K/UL (ref 0–0.01)
NRBC BLD-RTO: 0 PER 100 WBC
PLATELET # BLD AUTO: 286 K/UL (ref 150–400)
PMV BLD AUTO: 11.8 FL (ref 8.9–12.9)
POTASSIUM SERPL-SCNC: 4.5 MMOL/L (ref 3.5–5.1)
PROT SERPL-MCNC: 6.7 G/DL (ref 6.4–8.2)
RBC # BLD AUTO: 4.75 M/UL (ref 3.8–5.2)
SERVICE CMNT-IMP: ABNORMAL
SERVICE CMNT-IMP: ABNORMAL
SERVICE CMNT-IMP: NORMAL
SODIUM SERPL-SCNC: 136 MMOL/L (ref 136–145)
SPECIMEN EXP DATE BLD: NORMAL
WBC # BLD AUTO: 10.4 K/UL (ref 3.6–11)

## 2019-05-09 PROCEDURE — 05HY33Z INSERTION OF INFUSION DEVICE INTO UPPER VEIN, PERCUTANEOUS APPROACH: ICD-10-PCS | Performed by: OBSTETRICS & GYNECOLOGY

## 2019-05-09 PROCEDURE — 76819 FETAL BIOPHYS PROFIL W/O NST: CPT | Performed by: OBSTETRICS & GYNECOLOGY

## 2019-05-09 PROCEDURE — 74011250636 HC RX REV CODE- 250/636

## 2019-05-09 PROCEDURE — 80053 COMPREHEN METABOLIC PANEL: CPT

## 2019-05-09 PROCEDURE — 85027 COMPLETE CBC AUTOMATED: CPT

## 2019-05-09 PROCEDURE — 87491 CHLMYD TRACH DNA AMP PROBE: CPT

## 2019-05-09 PROCEDURE — 75410000002 HC LABOR FEE PER 1 HR

## 2019-05-09 PROCEDURE — 74011250636 HC RX REV CODE- 250/636: Performed by: OBSTETRICS & GYNECOLOGY

## 2019-05-09 PROCEDURE — 76816 OB US FOLLOW-UP PER FETUS: CPT | Performed by: OBSTETRICS & GYNECOLOGY

## 2019-05-09 PROCEDURE — 87210 SMEAR WET MOUNT SALINE/INK: CPT

## 2019-05-09 PROCEDURE — 76820 UMBILICAL ARTERY ECHO: CPT | Performed by: OBSTETRICS & GYNECOLOGY

## 2019-05-09 PROCEDURE — 74011000258 HC RX REV CODE- 258: Performed by: OBSTETRICS & GYNECOLOGY

## 2019-05-09 PROCEDURE — 36415 COLL VENOUS BLD VENIPUNCTURE: CPT

## 2019-05-09 PROCEDURE — 87510 GARDNER VAG DNA DIR PROBE: CPT

## 2019-05-09 PROCEDURE — 65270000029 HC RM PRIVATE

## 2019-05-09 PROCEDURE — C1751 CATH, INF, PER/CENT/MIDLINE: HCPCS

## 2019-05-09 PROCEDURE — 86900 BLOOD TYPING SEROLOGIC ABO: CPT

## 2019-05-09 PROCEDURE — 74011250637 HC RX REV CODE- 250/637: Performed by: OBSTETRICS & GYNECOLOGY

## 2019-05-09 PROCEDURE — 82962 GLUCOSE BLOOD TEST: CPT

## 2019-05-09 RX ORDER — LABETALOL 200 MG/1
400 TABLET, FILM COATED ORAL EVERY 12 HOURS
Status: DISCONTINUED | OUTPATIENT
Start: 2019-05-09 | End: 2019-05-10

## 2019-05-09 RX ORDER — VALACYCLOVIR HYDROCHLORIDE 500 MG/1
500 TABLET, FILM COATED ORAL EVERY 12 HOURS
Status: DISCONTINUED | OUTPATIENT
Start: 2019-05-09 | End: 2019-05-11

## 2019-05-09 RX ORDER — DIPHENHYDRAMINE HCL 25 MG
25 CAPSULE ORAL
Status: DISCONTINUED | OUTPATIENT
Start: 2019-05-09 | End: 2019-05-14 | Stop reason: HOSPADM

## 2019-05-09 RX ORDER — LABETALOL HYDROCHLORIDE 5 MG/ML
20 INJECTION, SOLUTION INTRAVENOUS ONCE
Status: DISCONTINUED | OUTPATIENT
Start: 2019-05-09 | End: 2019-05-09

## 2019-05-09 RX ORDER — SWAB
1 SWAB, NON-MEDICATED MISCELLANEOUS DAILY
Status: DISCONTINUED | OUTPATIENT
Start: 2019-05-10 | End: 2019-05-14 | Stop reason: HOSPADM

## 2019-05-09 RX ORDER — MAGNESIUM SULFATE 100 %
4 CRYSTALS MISCELLANEOUS AS NEEDED
Status: DISCONTINUED | OUTPATIENT
Start: 2019-05-09 | End: 2019-05-14 | Stop reason: HOSPADM

## 2019-05-09 RX ORDER — NALBUPHINE HYDROCHLORIDE 10 MG/ML
10 INJECTION, SOLUTION INTRAMUSCULAR; INTRAVENOUS; SUBCUTANEOUS
Status: DISCONTINUED | OUTPATIENT
Start: 2019-05-09 | End: 2019-05-11

## 2019-05-09 RX ORDER — DOCUSATE SODIUM 100 MG/1
100 CAPSULE, LIQUID FILLED ORAL
Status: DISCONTINUED | OUTPATIENT
Start: 2019-05-09 | End: 2019-05-13 | Stop reason: SDUPTHER

## 2019-05-09 RX ORDER — NIFEDIPINE 10 MG/1
10 CAPSULE ORAL ONCE
Status: COMPLETED | OUTPATIENT
Start: 2019-05-09 | End: 2019-05-09

## 2019-05-09 RX ORDER — SODIUM CHLORIDE 0.9 % (FLUSH) 0.9 %
5-40 SYRINGE (ML) INJECTION EVERY 8 HOURS
Status: DISCONTINUED | OUTPATIENT
Start: 2019-05-09 | End: 2019-05-14 | Stop reason: HOSPADM

## 2019-05-09 RX ORDER — SODIUM CHLORIDE 0.9 % (FLUSH) 0.9 %
5-40 SYRINGE (ML) INJECTION AS NEEDED
Status: DISCONTINUED | OUTPATIENT
Start: 2019-05-09 | End: 2019-05-14 | Stop reason: HOSPADM

## 2019-05-09 RX ORDER — SCOLOPAMINE TRANSDERMAL SYSTEM 1 MG/1
1 PATCH, EXTENDED RELEASE TRANSDERMAL
Status: DISCONTINUED | OUTPATIENT
Start: 2019-05-09 | End: 2019-05-14 | Stop reason: HOSPADM

## 2019-05-09 RX ORDER — LANOLIN ALCOHOL/MO/W.PET/CERES
1 CREAM (GRAM) TOPICAL
Status: DISCONTINUED | OUTPATIENT
Start: 2019-05-10 | End: 2019-05-14 | Stop reason: HOSPADM

## 2019-05-09 RX ORDER — DEXTROSE 50 % IN WATER (D50W) INTRAVENOUS SYRINGE
12.5-25 AS NEEDED
Status: DISCONTINUED | OUTPATIENT
Start: 2019-05-09 | End: 2019-05-13 | Stop reason: SDUPTHER

## 2019-05-09 RX ORDER — TERBUTALINE SULFATE 1 MG/ML
INJECTION SUBCUTANEOUS
Status: COMPLETED
Start: 2019-05-09 | End: 2019-05-09

## 2019-05-09 RX ORDER — SODIUM CHLORIDE 0.9 % (FLUSH) 0.9 %
SYRINGE (ML) INJECTION
Status: COMPLETED
Start: 2019-05-09 | End: 2019-05-09

## 2019-05-09 RX ORDER — ZOLPIDEM TARTRATE 5 MG/1
5 TABLET ORAL
Status: DISCONTINUED | OUTPATIENT
Start: 2019-05-09 | End: 2019-05-14 | Stop reason: HOSPADM

## 2019-05-09 RX ORDER — FAMOTIDINE 20 MG/1
20 TABLET, FILM COATED ORAL
Status: DISCONTINUED | OUTPATIENT
Start: 2019-05-09 | End: 2019-05-14 | Stop reason: HOSPADM

## 2019-05-09 RX ORDER — SODIUM CHLORIDE, SODIUM LACTATE, POTASSIUM CHLORIDE, CALCIUM CHLORIDE 600; 310; 30; 20 MG/100ML; MG/100ML; MG/100ML; MG/100ML
125 INJECTION, SOLUTION INTRAVENOUS CONTINUOUS
Status: DISCONTINUED | OUTPATIENT
Start: 2019-05-09 | End: 2019-05-10

## 2019-05-09 RX ORDER — TERBUTALINE SULFATE 1 MG/ML
0.25 INJECTION SUBCUTANEOUS
Status: COMPLETED | OUTPATIENT
Start: 2019-05-09 | End: 2019-05-09

## 2019-05-09 RX ADMIN — NALBUPHINE HYDROCHLORIDE 10 MG: 10 INJECTION, SOLUTION INTRAMUSCULAR; INTRAVENOUS; SUBCUTANEOUS at 22:00

## 2019-05-09 RX ADMIN — PROMETHAZINE HYDROCHLORIDE 25 MG: 25 INJECTION INTRAMUSCULAR; INTRAVENOUS at 22:01

## 2019-05-09 RX ADMIN — TERBUTALINE SULFATE 0.25 MG: 1 INJECTION SUBCUTANEOUS at 20:24

## 2019-05-09 RX ADMIN — SODIUM CHLORIDE, SODIUM LACTATE, POTASSIUM CHLORIDE, AND CALCIUM CHLORIDE 999 ML/HR: 600; 310; 30; 20 INJECTION, SOLUTION INTRAVENOUS at 21:33

## 2019-05-09 RX ADMIN — Medication 10 ML: at 21:33

## 2019-05-09 RX ADMIN — SODIUM CHLORIDE, SODIUM LACTATE, POTASSIUM CHLORIDE, AND CALCIUM CHLORIDE 125 ML/HR: 600; 310; 30; 20 INJECTION, SOLUTION INTRAVENOUS at 22:42

## 2019-05-09 RX ADMIN — LABETALOL HCL 400 MG: 200 TABLET, FILM COATED ORAL at 17:33

## 2019-05-09 RX ADMIN — NIFEDIPINE 10 MG: 10 CAPSULE ORAL at 17:00

## 2019-05-09 RX ADMIN — VALACYCLOVIR HYDROCHLORIDE 500 MG: 500 TABLET, FILM COATED ORAL at 21:59

## 2019-05-09 NOTE — H&P
History & Physical 
 
Name: Stella Wong MRN: [id number]  SSN: [id number] YOB: 1990  Age: [age] [place] y.o. Sex: female Subjective:  
 
Estimated Date of Delivery: 19 OB History Deonna Gunn 2 Para Term  AB 1 Living SAB  
1  
 TAB Ectopic Molar Multiple Live Births Ms. Tiffany Panchal is a [age] [place] yo  insulin dependent type 1 brittle diabetic admitted to L&D for blood glucose and fetal monitoring. Pt was seen by Dr. Amol Rucker ([facility]) this afternoon and had hypoglycemic episode to 46s in office. Ultrasound notable for abnormal dopplers. Dr. Amol Rucker recommended brief monitoring of baby and blood glucose on L&D. Since arrival to L&D, pt with multiple severe range BPs. Pt c/o mild HAs over the past couple of days, denies vision changes, RUQ pain, SOB/CP, etc. Does have slight increase in lower extremity edema. Pt with increased nausea today. At home she uses scopolomine, zofran, and phenergan as needed. Patient Active Problem List  
 Diagnosis  Esophagitis, erosive  Pregnant Primary Provider: Dorina Ron MD ONLY PATIENT - to be co-managed with MFM and OBHG as needed 
  
[id number] [place] yo  with DG 19 by stated EDC (based on first trimester ultrasound in [facility], consistent with 28 wk scan with MFM - Dr. Amol Rucker) vs.  by ultrasound per OSH records.  2 day discrepancy in dating. 
  
IUP: MFM scan 19 - 28w1d showing EFW 40%ile and posterior placenta, per OSH records prior fetal ECHO wnl, SIZE<DATES on exam  --> has MFM appt scheduled next week  
-records requested from anatomy scan and dating scan 
-Tdap:  
-Flu: vaccinated 2018 
-PNV 
  
Genetics/Carrier screening: late transfer, do not see documentation that this was done 
  
PNL: B pos / ABSC neg / Hgb 8.0, Plts 249 / hiv, hepB, hepC, rubella, syphilis, gc, chl all neg / urine GBS+ / needs pap postpartum (last wnl 3 yrs ago per pt) 
  
 PMH significant for: 
-IDDM (type 1) - on insulin pump, following with Dr. Rui Esquivel, improved control, however, multiple admissions for DKA in the past, BG as high as 552, also with sequelae of nephropathy, gastroparesis, and peripheral neuropathy --> advised follow up with GI for gastroparesis, also will recommend follow up with ophthalmologist and podiatrist at future visit 
-CHTN - normotensive today, currently on labetalol 300mg BID, has not been taking procardia XL 30mg since hospital discharge, has had BPs as high as 210s/120s documented earlier in the pregnancy, has been checking BPs at home, mostly normal, highest has been 150/100, but this was an outlier. Pt with likely chronic renal disease and chronic proteinuria. Baseline 24 hr urine protein 4659g per OSH records. PreE labs in hospital overall wnl, will repeat preE labs weekly. Needs close surveillance for SI preE. S/p mag on admission. S/p BMZ. Not currently on ASA 81mg daily due to renal disease. Needs weekly surveillance BPPs with MFM.  Appointment requested 4/23. 
-anemia - hgb most recently 8.0, likely iron deficiency and chronic disease, ferritin 8, recommended ferrous sulfate 325mg daily 
-?h/o VTACH, heart murmur, and pt reports cardiac arrest (unable to find documentation of this - given extensive medical records), will refer to cardiology 
-chronic kidney disease - with recent MARIS --> creat 1.54 in hospital, improved to 1.1 prior to discharge, will refer to nephrology 
-depression - currently stable, no meds, has been prescribed cymbalta and celexa 60mg daily in the past 
-h/o chronic pain - prior diagnostic laparoscopy wnl, no evidenc eof endometriosis, no current pain complaints 
-tobacco and marijuana abuse - counseled on cessation 
-HSV - unclear history, reports possible outbreak and was empirically treated, but was never informed of test results, valtrex suppression initiated 32 wks in anticipation of possible need for early delivery 
  
 Pregnancy problems: 
-N/V of pregnancy - using scopolamine patch and taking promethazine and zofran prn, has also used THC for nausea in the pregnancy -GERD - taking pepcid daily and tums prn 
  
Delivery/PP plans: 
-Breast - concerned about nipple piercings 
-NCB vs. Epid? tbd 
-Gender/Circ? tbd 
  
Social: 
-FOB \"Burke\" and parents supportive 
-pt is  
 
  
  
  
 Chronic renal disease  Chronic hypertension with superimposed preeclampsia  Pre-eclampsia superimposed on chronic hypertension, antepartum  DKA (diabetic ketoacidoses) ([address]  Sepsis ([address]  Nausea and vomiting  Epigastric abdominal pain  LLQ abdominal pain  Diarrhea  Weight loss  DKA, type 1, not at goal [facility])  Leukocytosis  Hypokalemia  Hypophosphatemia  Nausea  Neuropathy  Hypertension  GIB (gastrointestinal bleeding)  DKA, type 1 ([address]  Viral syndrome  Depression  Diabetic peripheral neuropathy associated with type 1 diabetes mellitus ([address]  DM type 1 (diabetes mellitus, type 1) ([address]  Chronic abdominal pain  Nausea & vomiting  UTI (urinary tract infection)  Ventricular tachycardia ([address]  SVT (supraventricular tachycardia) (HCC)  Acute renal failure ([address]  SIRS (systemic inflammatory response syndrome) (HCC)  Abnormal LFTs No specialty comments available. Past Medical History:  
Diagnosis Date  Anemia  Chronic pain  Depression  Diabetes type 1, uncontrolled ([address]  DKA, type 1 ([address]  Essential hypertension  Heart murmur  Herpes simplex virus (HSV) infection 2017  
 positive in blood  Peripheral neuropathy  Ventricular tachycardia ([address] Past Surgical History:  
Procedure Laterality Date  ABDOMEN SURGERY PROC UNLISTED    
 exploratory laparoscopy  HX CYST REMOVAL    
 groin Social History Occupational History  Not on file Tobacco Use  Smoking status: Former Smoker Packs/day: 0.25 Years: 8.00 Pack years: 2.00 Types: Cigarettes Last attempt to quit: 10/26/2014 Years since quittin.5  Smokeless tobacco: Never Used Substance and Sexual Activity  Alcohol use: No  
  Alcohol/week: 0.0 oz  Drug use: Yes Frequency: 2.0 times per week Types: Marijuana  Sexual activity: Yes  
  Partners: Male Birth control/protection: None Family History Problem Relation Age of Onset  No Known Problems Mother  Sleep Apnea Father  Hypertension Father  Diabetes Father  Heart Disease Maternal Grandfather Allergies Allergen Reactions  Morphine Other (comments)  Pcn [Penicillins] Hives Prior to Admission medications Medication Sig Start Date End Date Taking? Authorizing Provider  
valACYclovir (VALTREX) 500 mg tablet Take 1 Tab by mouth two (2) times a day for 30 days. 19 Yes Trey Gandhi MD  
glucose blood VI test strips (CONTOUR NEXT TEST STRIPS) strip Check blood sugar 7-10 times per day, mini med contour next link blood glucose meter 18  Yes Provider, Historical  
hydrOXYzine HCl (ATARAX) 25 mg tablet  18  Yes Provider, Historical  
doxylamine succinate (UNISOM, DOXYLAMINE,) 25 mg tablet Take 25 mg by mouth. 11/3/18  Yes Provider, Historical  
pyridoxine, vitamin B6, (VITAMIN B-6) 25 mg tablet Take 25 mg by mouth. 11/3/18  Yes Provider, Historical  
pnv w/o calcium-iron fum-fa (COMPLETENATE) 29 mg iron- 1 mg chew Take 1 Tab by mouth. 10/23/18  Yes Provider, Historical  
ondansetron hcl (ZOFRAN) 4 mg tablet Take 1 Tab by mouth every eight (8) hours as needed for Nausea. 19  Yes Dirk Sellers MD  
famotidine (PEPCID) 20 mg tablet Take 1 Tab by mouth every twelve (12) hours. 19  Yes Dirk Sellers MD  
promethazine (PHENERGAN) 25 mg tablet Take 1 Tab by mouth every six (6) hours as needed for Nausea.  19  Yes Aurelio Mello MD  
 scopolamine (TRANSDERM-SCOP) 1 mg over 3 days pt3d 1 Patch by TransDERmal route. Provider, Historical  
HUMALOG Xavi Mcfadden INSULIN 100 unit/mL kwikpen  3/13/19   Provider, Historical  
glucagon (GLUCAGON EMERGENCY KIT, HUMAN,) 1 mg injection Glucagon emergency kit, IM injection  Indications: type 1 diabetes, pregnancy 18   Provider, Historical  
labetalol (NORMODYNE) 200 mg tablet Take 2 Tabs by mouth every twelve (12) hours. Patient taking differently: Take 300 mg by mouth every twelve (12) hours. 19   Jorge Smith MD  
  
 
Review of Systems: A comprehensive review of systems was negative except for that written in the HPI. Objective:  
 
Vitals: 
Vitals:  
 19 1518 Height: 5' 8\" (1.727 m) Physical Exam: 
Patient without distress. Heart: Regular rate and rhythm Lung: normal respiratory effort, no w/r/r Abdomen: soft, nontender, size<dates Membranes:  Intact : cervix closed/long/high, very posterior, speculum exam wnl, with small amount of thin white d/c Ext: 2+ edema NST:  
Indication: DM type 1, CHTN, r/o SI preE, abnormal dopplers FHTs: baseline 150s, moderate variability, +accels, occasional variable decels [place]: uterine irritability Time: > 20 min Prenatal Labs:  
Lab Results Component Value Date/Time  
 Rubella, External immune 10/20/2018 HBsAg, External negative 10/20/2018 HIV, External non-reactive 10/20/2018 RPR, External non-reactive 10/20/2018 Gonorrhea, External negative 10/30/2018 Chlamydia, External negative 10/30/2018 Assessment/Plan:  
 
29 yo  insulin dependent type 1 brittle diabetic admitted to L&D for blood glucose and fetal monitoring due to hypoglycemic episode at [facility] with abnormal S/D ratio. Pt also with CHTN, now with severe range, must r/o SI preE. CHTN, r/o si preE: 
-BP monitoring  
-CBC, CMP 
-initiate IR oral nifedipine protocol for BP control -increase labetalol to 400mg BID (given next dose now) 
-consider IV mag if pressures persistently elevated and not responding to medications IDDM, episode of hypoglycemia on admission: multiple episodes of DKA in current pregnancy 
-insulin pump 
-monitor BG fasting and 1 hr postprandial TID 
 
IUP: abnormal dopplers (though still forward flow), fetal monitoring category 2 
-CEFM for now until reactive, if reactive can consider changing frequency of monitoring to BID 
-Dr. Erica Aldana ([facility]) following --> recommends holding off on rescue course of BMZ for now due to concern for effects on BG Vaginal discharge: reports pruritis and odor 
-screen for BV/yeast/GC/Chl sent PNL: B pos / GBS pos / needs pap postpartum PMH also significant for: 
-vtach 
-chronic kidney disease 
-HSV - cont valtrex suppression  
-depression 
-tobacco/marijuana  
-N/V of pregnancy - scopolamine patch and zofran ordered (will have to be careful with QT prolonging agents given h/o vtach) -GERD - famotidine PRN Difficult IV access: currently has functioning IV in [facility], but has required PICC lines or neck lines on prior admissions Dispo: continued inpatient monitoring on L&D Pt signed out to [facility] Dr. Ramo Spears for care overnight. Signed By:  Betty Bo MD   
 May 9, 2019

## 2019-05-09 NOTE — PROGRESS NOTES
1450 pt arrived to labor and delivery from Dr Luis Roa office for a low blood sugar episode. BS was 56 in office. Apple juice given. 1509 BS 70, pt feeling better. BP's are increased. Dr Anabelle Arango is on her way to unit. 1700 pt extremely hard IV start, multiple tries by nurses and Dr Alfa Brice. Labs able to be obtained. PO nifedipine given at this time for blood pressure until IV access can be obtained. 1730 blood pressure within normal range now and Dr Anabelle Arango canceled orders for IV labetalol. 1745 fetal heart rate tracing is minimal variability without decelerations. Dr Anabelle Arango aware of strip.

## 2019-05-09 NOTE — CONSULTS
Maternal-Fetal Medicine    Indication: Pre-existing DM Type 1 3rd Tri  O24.013, Hypertension, Pre-existing 3rd Tri  O10.013.   ____________________________________________________________________________  History: Age: 30 years. : 1 Para: 0.   Current Pregnancy: Pre- pregnancy data: Weight 156 lbs. Height 5 ft 8 ins. BMI 23.7.  ____________________________________________________________________________  Dating:  Stated EDC:  EDC: 19 GA by stated EDC: 33w1d  Current Scan on: 19 EDC: 19 GA by current scan: 31w5d  Best Overall Assessment: 19 EDC: 19 Assessed GA: 33w1d  The calculation of the gestational age by current scan was based on BPD, OFD, HC, AC and FL. The Best Overall Assessment is based on the stated EDC.  ____________________________________________________________________________  General Evaluation:  Fetal heart activity: present. Fetal heart rate: 161 bpm.   Presentation: cephalic. Fetal movement: visible. Amniotic Fluid: normal. BONNY  14.6 cm. Maximal vertical pocket 5.4 cm. Placenta: posterior. ____________________________________________________________________________  Anatomy Scan:  Villalba gestation. Biometry:  BPD 78.8 mm 9th% 31w4d (30w4d to 32w5d)  .0 mm <5th% 31w2d (28w2d to 34w2d)  .4 mm 48th% 33w0d (32w0d to 34w0d)  FL 61.9 mm 15th% 32w1d (29w1d to 35w0d)  .1 mm 6th% 30w2d  EFW (lbs/oz) 4 lbs 6 ozs  EFW (g) 1977 g  34th%       Fetal Anatomy:  Visualized with normal appearance: chest, gastrointestinal tract, kidneys, bladder. Heart: Limited views. Summary of Ultrasound Findings:  Transabdominal US. U/S machine: [email or web address] E8 Expert. U/S view: limited by late gestational age.     ____________________________________________________________________________  Fetal Wellbeing Assessment:  Amniotic fluid: normal. BONNY: 14.6 cm. MVP: 5.4 cm. Q1: 5.4 cm. Q2: 2.4 cm. Q3: 3.4 cm. Q4: 3.4 cm.    Biophysical Profile: Fetal body movements: normal (2), Fetal tone: normal (2), Fetal breathing movements: Did not meet the criteria, Amniotic fluid volume: normal (2). Score 6 / 8.     ____________________________________________________________________________  Doppler:  U/S Machine: Dwellable Vol[employer]on E8 Expert.  ____________________________________________________________________________  Report Summary:  Impression:   Ms. Cory Fernandez returned for fetal growth assessment and  testing. She has type 1 diabetes and had initiated care with Dr. Valeri Osgood, her endocrinologist. He recommended reduction of basal insulin because of low blood glucose levels. Hypertension: Well-controlled on labetalol 200 mg bid. Renal insufficiency: Recent creatinine level is 1.37. On ultrasound, amniotic fluid is normal and good fetal activity is seen. Fetal growth is appropriate for gestational age. Head circumference measures between -1 and -2 SD. Cephalic presentation. Fetal breathing movements did not meet the criteria. BPP 6/8. Umbilical artery Doppler study showed increased S/D ratio. Patient felt unwell during ultrasound. She checked her blood glucose and it was 56 mg/dL. We gave her a cookie. I made a decision to send her to L&D. I counseled her on the following:  Abnormal Doppler studies: Although fetal growth is appropriate for gestational age, abnormal Doppler and a BPP of 6/8 warrants NST and clinical evaluation. If NST is not reactive, I recommend inpatient management. Given the presence of multiple medical problems, I recommend delivery at 37 weeks provided  testing is reassuring. Patient also needs serial blood pressure monitoring and titration of antihypertensives if required. If the patient is discharged, we will follow her at the [facility]. next week for BPP and Doppler studies. Discussed with Dr. Josey Muller.

## 2019-05-10 ENCOUNTER — ANESTHESIA EVENT (OUTPATIENT)
Dept: LABOR AND DELIVERY | Age: 29
End: 2019-05-10
Payer: MEDICAID

## 2019-05-10 ENCOUNTER — ANESTHESIA (OUTPATIENT)
Dept: LABOR AND DELIVERY | Age: 29
End: 2019-05-10
Payer: MEDICAID

## 2019-05-10 LAB
ALBUMIN SERPL-MCNC: 1.5 G/DL (ref 3.5–5)
ALBUMIN/GLOB SERPL: 0.5 {RATIO} (ref 1.1–2.2)
ALP SERPL-CCNC: 68 U/L (ref 45–117)
ALT SERPL-CCNC: 9 U/L (ref 12–78)
ANION GAP SERPL CALC-SCNC: 8 MMOL/L (ref 5–15)
AST SERPL-CCNC: 20 U/L (ref 15–37)
BILIRUB SERPL-MCNC: 0.2 MG/DL (ref 0.2–1)
BUN SERPL-MCNC: 26 MG/DL (ref 6–20)
BUN/CREAT SERPL: 20 (ref 12–20)
C TRACH DNA SPEC QL NAA+PROBE: NEGATIVE
CALCIUM SERPL-MCNC: 7.8 MG/DL (ref 8.5–10.1)
CHLORIDE SERPL-SCNC: 108 MMOL/L (ref 97–108)
CLUE CELLS VAG QL WET PREP: NORMAL
CO2 SERPL-SCNC: 23 MMOL/L (ref 21–32)
CREAT SERPL-MCNC: 1.3 MG/DL (ref 0.55–1.02)
ERYTHROCYTE [DISTWIDTH] IN BLOOD BY AUTOMATED COUNT: 17.4 % (ref 11.5–14.5)
GLOBULIN SER CALC-MCNC: 2.8 G/DL (ref 2–4)
GLUCOSE BLD STRIP.AUTO-MCNC: 104 MG/DL (ref 65–100)
GLUCOSE BLD STRIP.AUTO-MCNC: 110 MG/DL (ref 65–100)
GLUCOSE BLD STRIP.AUTO-MCNC: 116 MG/DL (ref 65–100)
GLUCOSE BLD STRIP.AUTO-MCNC: 119 MG/DL (ref 65–100)
GLUCOSE BLD STRIP.AUTO-MCNC: 127 MG/DL (ref 65–100)
GLUCOSE BLD STRIP.AUTO-MCNC: 131 MG/DL (ref 65–100)
GLUCOSE BLD STRIP.AUTO-MCNC: 133 MG/DL (ref 65–100)
GLUCOSE BLD STRIP.AUTO-MCNC: 145 MG/DL (ref 65–100)
GLUCOSE BLD STRIP.AUTO-MCNC: 154 MG/DL (ref 65–100)
GLUCOSE BLD STRIP.AUTO-MCNC: 160 MG/DL (ref 65–100)
GLUCOSE BLD STRIP.AUTO-MCNC: 160 MG/DL (ref 65–100)
GLUCOSE BLD STRIP.AUTO-MCNC: 45 MG/DL (ref 65–100)
GLUCOSE BLD STRIP.AUTO-MCNC: 50 MG/DL (ref 65–100)
GLUCOSE BLD STRIP.AUTO-MCNC: 50 MG/DL (ref 65–100)
GLUCOSE BLD STRIP.AUTO-MCNC: 68 MG/DL (ref 65–100)
GLUCOSE BLD STRIP.AUTO-MCNC: 71 MG/DL (ref 65–100)
GLUCOSE BLD STRIP.AUTO-MCNC: 75 MG/DL (ref 65–100)
GLUCOSE SERPL-MCNC: 86 MG/DL (ref 65–100)
HCT VFR BLD AUTO: 22.6 % (ref 35–47)
HGB BLD-MCNC: 7.2 G/DL (ref 11.5–16)
KOH PREP SPEC: NORMAL
MAGNESIUM SERPL-MCNC: 4.6 MG/DL (ref 1.6–2.4)
MCH RBC QN AUTO: 26.1 PG (ref 26–34)
MCHC RBC AUTO-ENTMCNC: 31.9 G/DL (ref 30–36.5)
MCV RBC AUTO: 81.9 FL (ref 80–99)
N GONORRHOEA DNA SPEC QL NAA+PROBE: NEGATIVE
NRBC # BLD: 0 K/UL (ref 0–0.01)
NRBC BLD-RTO: 0 PER 100 WBC
PLATELET # BLD AUTO: 208 K/UL (ref 150–400)
PMV BLD AUTO: 11.1 FL (ref 8.9–12.9)
POTASSIUM SERPL-SCNC: 4.5 MMOL/L (ref 3.5–5.1)
PROT SERPL-MCNC: 4.3 G/DL (ref 6.4–8.2)
RBC # BLD AUTO: 2.76 M/UL (ref 3.8–5.2)
SAMPLE TYPE: NORMAL
SERVICE CMNT-IMP: ABNORMAL
SERVICE CMNT-IMP: NORMAL
SODIUM SERPL-SCNC: 139 MMOL/L (ref 136–145)
SPECIMEN SOURCE: NORMAL
T VAGINALIS VAG QL WET PREP: NORMAL
THERAPEUTIC MAGNESI,THMG: 7 MG/DL (ref 4.8–8.4)
THERAPEUTIC MAGNESI,THMG: 7.2 MG/DL (ref 4.8–8.4)
WBC # BLD AUTO: 13.3 K/UL (ref 3.6–11)

## 2019-05-10 PROCEDURE — 74011000258 HC RX REV CODE- 258: Performed by: OBSTETRICS & GYNECOLOGY

## 2019-05-10 PROCEDURE — 83735 ASSAY OF MAGNESIUM: CPT

## 2019-05-10 PROCEDURE — 75410000002 HC LABOR FEE PER 1 HR

## 2019-05-10 PROCEDURE — 77030032060 HC PWDR HEMSTAT ARISTA ASRB 3GM BARD -C

## 2019-05-10 PROCEDURE — 74011250637 HC RX REV CODE- 250/637

## 2019-05-10 PROCEDURE — 36415 COLL VENOUS BLD VENIPUNCTURE: CPT

## 2019-05-10 PROCEDURE — 85027 COMPLETE CBC AUTOMATED: CPT

## 2019-05-10 PROCEDURE — 76010000391 HC C SECN FIRST 1 HR: Performed by: OBSTETRICS & GYNECOLOGY

## 2019-05-10 PROCEDURE — 74011250636 HC RX REV CODE- 250/636: Performed by: OBSTETRICS & GYNECOLOGY

## 2019-05-10 PROCEDURE — 74011636637 HC RX REV CODE- 636/637: Performed by: INTERNAL MEDICINE

## 2019-05-10 PROCEDURE — 74011000250 HC RX REV CODE- 250

## 2019-05-10 PROCEDURE — 74011250636 HC RX REV CODE- 250/636

## 2019-05-10 PROCEDURE — 77030034850

## 2019-05-10 PROCEDURE — 77030011255 HC DSG AQUACEL AG BMS -A

## 2019-05-10 PROCEDURE — 80053 COMPREHEN METABOLIC PANEL: CPT

## 2019-05-10 PROCEDURE — 74011000250 HC RX REV CODE- 250: Performed by: OBSTETRICS & GYNECOLOGY

## 2019-05-10 PROCEDURE — 75410000003 HC RECOV DEL/VAG/CSECN EA 0.5 HR: Performed by: OBSTETRICS & GYNECOLOGY

## 2019-05-10 PROCEDURE — 76010000392 HC C SECN EA ADDL 0.5 HR: Performed by: OBSTETRICS & GYNECOLOGY

## 2019-05-10 PROCEDURE — 76060000078 HC EPIDURAL ANESTHESIA: Performed by: OBSTETRICS & GYNECOLOGY

## 2019-05-10 PROCEDURE — 74011250637 HC RX REV CODE- 250/637: Performed by: OBSTETRICS & GYNECOLOGY

## 2019-05-10 PROCEDURE — 65270000029 HC RM PRIVATE

## 2019-05-10 PROCEDURE — 77030039266 HC ADH SKN EXOFIN S2SG -A

## 2019-05-10 PROCEDURE — 88307 TISSUE EXAM BY PATHOLOGIST: CPT

## 2019-05-10 PROCEDURE — 77030012890

## 2019-05-10 PROCEDURE — 77030007866 HC KT SPN ANES BBMI -B: Performed by: ANESTHESIOLOGY

## 2019-05-10 PROCEDURE — 74011250636 HC RX REV CODE- 250/636: Performed by: ANESTHESIOLOGY

## 2019-05-10 PROCEDURE — 82962 GLUCOSE BLOOD TEST: CPT

## 2019-05-10 RX ORDER — HYDROMORPHONE HYDROCHLORIDE 1 MG/ML
0.5 INJECTION, SOLUTION INTRAMUSCULAR; INTRAVENOUS; SUBCUTANEOUS
Status: DISPENSED | OUTPATIENT
Start: 2019-05-10 | End: 2019-05-11

## 2019-05-10 RX ORDER — NALOXONE HYDROCHLORIDE 0.4 MG/ML
0.2 INJECTION, SOLUTION INTRAMUSCULAR; INTRAVENOUS; SUBCUTANEOUS AS NEEDED
Status: ACTIVE | OUTPATIENT
Start: 2019-05-10 | End: 2019-05-11

## 2019-05-10 RX ORDER — SODIUM CHLORIDE 0.9 % (FLUSH) 0.9 %
SYRINGE (ML) INJECTION
Status: COMPLETED
Start: 2019-05-10 | End: 2019-05-10

## 2019-05-10 RX ORDER — MAGNESIUM SULFATE 100 %
16 CRYSTALS MISCELLANEOUS AS NEEDED
Status: DISCONTINUED | OUTPATIENT
Start: 2019-05-10 | End: 2019-05-14 | Stop reason: HOSPADM

## 2019-05-10 RX ORDER — OXYTOCIN/RINGER'S LACTATE 20/1000 ML
PLASTIC BAG, INJECTION (ML) INTRAVENOUS
Status: COMPLETED
Start: 2019-05-10 | End: 2019-05-10

## 2019-05-10 RX ORDER — MAGNESIUM SULFATE HEPTAHYDRATE 40 MG/ML
INJECTION, SOLUTION INTRAVENOUS
Status: COMPLETED
Start: 2019-05-10 | End: 2019-05-10

## 2019-05-10 RX ORDER — MAGNESIUM SULFATE HEPTAHYDRATE 40 MG/ML
4 INJECTION, SOLUTION INTRAVENOUS
Status: COMPLETED | OUTPATIENT
Start: 2019-05-10 | End: 2019-05-10

## 2019-05-10 RX ORDER — HYDROCORTISONE ACETATE PRAMOXINE HCL 2.5; 1 G/100G; G/100G
CREAM TOPICAL AS NEEDED
Status: DISCONTINUED | OUTPATIENT
Start: 2019-05-10 | End: 2019-05-14 | Stop reason: HOSPADM

## 2019-05-10 RX ORDER — IBUPROFEN 400 MG/1
800 TABLET ORAL EVERY 8 HOURS
Status: DISCONTINUED | OUTPATIENT
Start: 2019-05-10 | End: 2019-05-11

## 2019-05-10 RX ORDER — OXYTOCIN/RINGER'S LACTATE 20/1000 ML
999 PLASTIC BAG, INJECTION (ML) INTRAVENOUS AS NEEDED
Status: DISCONTINUED | OUTPATIENT
Start: 2019-05-10 | End: 2019-05-11

## 2019-05-10 RX ORDER — HYDROCORTISONE 1 %
CREAM (GRAM) TOPICAL AS NEEDED
Status: DISCONTINUED | OUTPATIENT
Start: 2019-05-10 | End: 2019-05-14 | Stop reason: HOSPADM

## 2019-05-10 RX ORDER — DOCUSATE SODIUM 100 MG/1
100 CAPSULE, LIQUID FILLED ORAL
Status: DISCONTINUED | OUTPATIENT
Start: 2019-05-10 | End: 2019-05-14 | Stop reason: HOSPADM

## 2019-05-10 RX ORDER — OXYTOCIN/RINGER'S LACTATE 20/1000 ML
125 PLASTIC BAG, INJECTION (ML) INTRAVENOUS AS NEEDED
Status: DISCONTINUED | OUTPATIENT
Start: 2019-05-10 | End: 2019-05-14 | Stop reason: HOSPADM

## 2019-05-10 RX ORDER — SODIUM CHLORIDE 0.9 % (FLUSH) 0.9 %
5-40 SYRINGE (ML) INJECTION EVERY 8 HOURS
Status: DISCONTINUED | OUTPATIENT
Start: 2019-05-10 | End: 2019-05-14 | Stop reason: HOSPADM

## 2019-05-10 RX ORDER — PROCHLORPERAZINE EDISYLATE 5 MG/ML
5 INJECTION INTRAMUSCULAR; INTRAVENOUS
Status: DISCONTINUED | OUTPATIENT
Start: 2019-05-10 | End: 2019-05-10 | Stop reason: SDUPTHER

## 2019-05-10 RX ORDER — SODIUM CHLORIDE 0.9 % (FLUSH) 0.9 %
5-40 SYRINGE (ML) INJECTION AS NEEDED
Status: DISCONTINUED | OUTPATIENT
Start: 2019-05-10 | End: 2019-05-14 | Stop reason: HOSPADM

## 2019-05-10 RX ORDER — DEXTROSE, SODIUM CHLORIDE, SODIUM LACTATE, POTASSIUM CHLORIDE, AND CALCIUM CHLORIDE 5; .6; .31; .03; .02 G/100ML; G/100ML; G/100ML; G/100ML; G/100ML
100 INJECTION, SOLUTION INTRAVENOUS CONTINUOUS
Status: DISCONTINUED | OUTPATIENT
Start: 2019-05-10 | End: 2019-05-10

## 2019-05-10 RX ORDER — NALBUPHINE HYDROCHLORIDE 10 MG/ML
5 INJECTION, SOLUTION INTRAMUSCULAR; INTRAVENOUS; SUBCUTANEOUS
Status: DISCONTINUED | OUTPATIENT
Start: 2019-05-10 | End: 2019-05-11

## 2019-05-10 RX ORDER — INSULIN LISPRO 100 [IU]/ML
INJECTION, SOLUTION INTRAVENOUS; SUBCUTANEOUS
Status: DISCONTINUED | OUTPATIENT
Start: 2019-05-10 | End: 2019-05-14 | Stop reason: HOSPADM

## 2019-05-10 RX ORDER — OXYCODONE HYDROCHLORIDE 5 MG/1
5 TABLET ORAL
Status: DISPENSED | OUTPATIENT
Start: 2019-05-10 | End: 2019-05-11

## 2019-05-10 RX ORDER — ACETAMINOPHEN 325 MG/1
650 TABLET ORAL EVERY 6 HOURS
Status: ACTIVE | OUTPATIENT
Start: 2019-05-10 | End: 2019-05-11

## 2019-05-10 RX ORDER — LABETALOL HCL 20 MG/4 ML
40 SYRINGE (ML) INTRAVENOUS
Status: ACTIVE | OUTPATIENT
Start: 2019-05-10 | End: 2019-05-11

## 2019-05-10 RX ORDER — CLINDAMYCIN PHOSPHATE 900 MG/50ML
INJECTION INTRAVENOUS
Status: COMPLETED
Start: 2019-05-10 | End: 2019-05-10

## 2019-05-10 RX ORDER — INSULIN LISPRO 100 [IU]/ML
1 INJECTION, SOLUTION INTRAVENOUS; SUBCUTANEOUS ONCE
Status: COMPLETED | OUTPATIENT
Start: 2019-05-10 | End: 2019-05-10

## 2019-05-10 RX ORDER — OXYTOCIN/RINGER'S LACTATE 20/1000 ML
125-1000 PLASTIC BAG, INJECTION (ML) INTRAVENOUS
Status: DISCONTINUED | OUTPATIENT
Start: 2019-05-10 | End: 2019-05-11

## 2019-05-10 RX ORDER — ONDANSETRON 2 MG/ML
INJECTION INTRAMUSCULAR; INTRAVENOUS
Status: COMPLETED
Start: 2019-05-10 | End: 2019-05-10

## 2019-05-10 RX ORDER — ONDANSETRON 4 MG/1
8 TABLET, ORALLY DISINTEGRATING ORAL
Status: DISCONTINUED | OUTPATIENT
Start: 2019-05-10 | End: 2019-05-14 | Stop reason: HOSPADM

## 2019-05-10 RX ORDER — CLINDAMYCIN PHOSPHATE 900 MG/50ML
INJECTION INTRAVENOUS AS NEEDED
Status: DISCONTINUED | OUTPATIENT
Start: 2019-05-10 | End: 2019-05-10 | Stop reason: HOSPADM

## 2019-05-10 RX ORDER — OXYTOCIN/RINGER'S LACTATE 20/1000 ML
PLASTIC BAG, INJECTION (ML) INTRAVENOUS
Status: DISCONTINUED | OUTPATIENT
Start: 2019-05-10 | End: 2019-05-10 | Stop reason: HOSPADM

## 2019-05-10 RX ORDER — ONDANSETRON 2 MG/ML
8 INJECTION INTRAMUSCULAR; INTRAVENOUS ONCE
Status: COMPLETED | OUTPATIENT
Start: 2019-05-10 | End: 2019-05-10

## 2019-05-10 RX ORDER — NIFEDIPINE 10 MG/1
10 CAPSULE ORAL
Status: COMPLETED | OUTPATIENT
Start: 2019-05-10 | End: 2019-05-10

## 2019-05-10 RX ORDER — LABETALOL HCL 20 MG/4 ML
80 SYRINGE (ML) INTRAVENOUS
Status: DISCONTINUED | OUTPATIENT
Start: 2019-05-10 | End: 2019-05-11

## 2019-05-10 RX ORDER — SIMETHICONE 80 MG
80 TABLET,CHEWABLE ORAL
Status: DISCONTINUED | OUTPATIENT
Start: 2019-05-10 | End: 2019-05-14 | Stop reason: HOSPADM

## 2019-05-10 RX ORDER — LABETALOL HCL 20 MG/4 ML
20 SYRINGE (ML) INTRAVENOUS ONCE
Status: COMPLETED | OUTPATIENT
Start: 2019-05-10 | End: 2019-05-10

## 2019-05-10 RX ORDER — INSULIN GLARGINE 100 [IU]/ML
6 INJECTION, SOLUTION SUBCUTANEOUS DAILY
Status: DISCONTINUED | OUTPATIENT
Start: 2019-05-10 | End: 2019-05-14 | Stop reason: HOSPADM

## 2019-05-10 RX ORDER — SODIUM CHLORIDE, SODIUM LACTATE, POTASSIUM CHLORIDE, CALCIUM CHLORIDE 600; 310; 30; 20 MG/100ML; MG/100ML; MG/100ML; MG/100ML
100 INJECTION, SOLUTION INTRAVENOUS CONTINUOUS
Status: DISCONTINUED | OUTPATIENT
Start: 2019-05-10 | End: 2019-05-11

## 2019-05-10 RX ORDER — SODIUM CHLORIDE, SODIUM LACTATE, POTASSIUM CHLORIDE, CALCIUM CHLORIDE 600; 310; 30; 20 MG/100ML; MG/100ML; MG/100ML; MG/100ML
125 INJECTION, SOLUTION INTRAVENOUS CONTINUOUS
Status: DISCONTINUED | OUTPATIENT
Start: 2019-05-10 | End: 2019-05-10

## 2019-05-10 RX ORDER — DEXTROSE, SODIUM CHLORIDE, SODIUM LACTATE, POTASSIUM CHLORIDE, AND CALCIUM CHLORIDE 5; .6; .31; .03; .02 G/100ML; G/100ML; G/100ML; G/100ML; G/100ML
100 INJECTION, SOLUTION INTRAVENOUS CONTINUOUS
Status: DISCONTINUED | OUTPATIENT
Start: 2019-05-10 | End: 2019-05-11

## 2019-05-10 RX ORDER — SODIUM CHLORIDE, SODIUM LACTATE, POTASSIUM CHLORIDE, CALCIUM CHLORIDE 600; 310; 30; 20 MG/100ML; MG/100ML; MG/100ML; MG/100ML
INJECTION, SOLUTION INTRAVENOUS
Status: DISCONTINUED | OUTPATIENT
Start: 2019-05-10 | End: 2019-05-10 | Stop reason: HOSPADM

## 2019-05-10 RX ORDER — AMMONIA 15 % (W/V)
1 AMPUL (EA) INHALATION AS NEEDED
Status: DISCONTINUED | OUTPATIENT
Start: 2019-05-10 | End: 2019-05-14 | Stop reason: HOSPADM

## 2019-05-10 RX ORDER — LABETALOL HCL 20 MG/4 ML
SYRINGE (ML) INTRAVENOUS
Status: DISCONTINUED
Start: 2019-05-10 | End: 2019-05-10

## 2019-05-10 RX ORDER — FENTANYL CITRATE 50 UG/ML
INJECTION, SOLUTION INTRAMUSCULAR; INTRAVENOUS AS NEEDED
Status: DISCONTINUED | OUTPATIENT
Start: 2019-05-10 | End: 2019-05-10 | Stop reason: HOSPADM

## 2019-05-10 RX ORDER — BUPIVACAINE HYDROCHLORIDE 7.5 MG/ML
INJECTION, SOLUTION EPIDURAL; RETROBULBAR AS NEEDED
Status: DISCONTINUED | OUTPATIENT
Start: 2019-05-10 | End: 2019-05-10 | Stop reason: HOSPADM

## 2019-05-10 RX ORDER — DEXTROSE 50 % IN WATER (D50W) INTRAVENOUS SYRINGE
12.5-25 AS NEEDED
Status: DISCONTINUED | OUTPATIENT
Start: 2019-05-10 | End: 2019-05-14 | Stop reason: HOSPADM

## 2019-05-10 RX ORDER — KETOROLAC TROMETHAMINE 30 MG/ML
INJECTION, SOLUTION INTRAMUSCULAR; INTRAVENOUS AS NEEDED
Status: DISCONTINUED | OUTPATIENT
Start: 2019-05-10 | End: 2019-05-10 | Stop reason: HOSPADM

## 2019-05-10 RX ORDER — MORPHINE SULFATE 0.5 MG/ML
INJECTION, SOLUTION EPIDURAL; INTRATHECAL; INTRAVENOUS AS NEEDED
Status: DISCONTINUED | OUTPATIENT
Start: 2019-05-10 | End: 2019-05-10 | Stop reason: HOSPADM

## 2019-05-10 RX ORDER — ONDANSETRON 2 MG/ML
4 INJECTION INTRAMUSCULAR; INTRAVENOUS
Status: DISCONTINUED | OUTPATIENT
Start: 2019-05-10 | End: 2019-05-11

## 2019-05-10 RX ORDER — CLINDAMYCIN PHOSPHATE 900 MG/50ML
900 INJECTION INTRAVENOUS ONCE
Status: DISCONTINUED | OUTPATIENT
Start: 2019-05-10 | End: 2019-05-10

## 2019-05-10 RX ORDER — MISOPROSTOL 100 UG/1
TABLET ORAL AS NEEDED
Status: DISCONTINUED | OUTPATIENT
Start: 2019-05-10 | End: 2019-05-10 | Stop reason: HOSPADM

## 2019-05-10 RX ORDER — NIFEDIPINE 10 MG/1
CAPSULE ORAL
Status: COMPLETED
Start: 2019-05-10 | End: 2019-05-10

## 2019-05-10 RX ORDER — LABETALOL HCL 20 MG/4 ML
20 SYRINGE (ML) INTRAVENOUS ONCE
Status: DISCONTINUED | OUTPATIENT
Start: 2019-05-10 | End: 2019-05-10

## 2019-05-10 RX ADMIN — AZITHROMYCIN MONOHYDRATE 500 MG: 500 INJECTION, POWDER, LYOPHILIZED, FOR SOLUTION INTRAVENOUS at 03:46

## 2019-05-10 RX ADMIN — SODIUM CHLORIDE, SODIUM LACTATE, POTASSIUM CHLORIDE, AND CALCIUM CHLORIDE 125 ML/HR: 600; 310; 30; 20 INJECTION, SOLUTION INTRAVENOUS at 02:29

## 2019-05-10 RX ADMIN — Medication 10 ML: at 09:07

## 2019-05-10 RX ADMIN — PROMETHAZINE HYDROCHLORIDE 25 MG: 25 INJECTION INTRAMUSCULAR; INTRAVENOUS at 18:19

## 2019-05-10 RX ADMIN — INSULIN LISPRO 1 UNITS: 100 INJECTION, SOLUTION INTRAVENOUS; SUBCUTANEOUS at 11:56

## 2019-05-10 RX ADMIN — ONDANSETRON 8 MG: 2 INJECTION INTRAMUSCULAR; INTRAVENOUS at 03:06

## 2019-05-10 RX ADMIN — KETOROLAC TROMETHAMINE 30 MG: 30 INJECTION, SOLUTION INTRAMUSCULAR; INTRAVENOUS at 04:31

## 2019-05-10 RX ADMIN — DEXTROSE MONOHYDRATE 25 G: 500 INJECTION PARENTERAL at 06:03

## 2019-05-10 RX ADMIN — HYDROMORPHONE HYDROCHLORIDE 0.5 MG: 1 INJECTION, SOLUTION INTRAMUSCULAR; INTRAVENOUS; SUBCUTANEOUS at 06:17

## 2019-05-10 RX ADMIN — DEXTROSE MONOHYDRATE 25 G: 500 INJECTION PARENTERAL at 02:40

## 2019-05-10 RX ADMIN — MAGNESIUM SULFATE 2 G/HR: 500 INJECTION, SOLUTION INTRAMUSCULAR; INTRAVENOUS at 09:15

## 2019-05-10 RX ADMIN — Medication 999 ML/HR: at 03:55

## 2019-05-10 RX ADMIN — BUPIVACAINE HYDROCHLORIDE 10 MG: 7.5 INJECTION, SOLUTION EPIDURAL; RETROBULBAR at 03:35

## 2019-05-10 RX ADMIN — SODIUM CHLORIDE, SODIUM LACTATE, POTASSIUM CHLORIDE, AND CALCIUM CHLORIDE 125 ML/HR: 600; 310; 30; 20 INJECTION, SOLUTION INTRAVENOUS at 06:10

## 2019-05-10 RX ADMIN — Medication 10 ML: at 05:08

## 2019-05-10 RX ADMIN — Medication 20 ML: at 10:36

## 2019-05-10 RX ADMIN — Medication 10 ML: at 22:30

## 2019-05-10 RX ADMIN — NIFEDIPINE 10 MG: 10 CAPSULE ORAL at 03:00

## 2019-05-10 RX ADMIN — FENTANYL CITRATE 50 MCG: 50 INJECTION, SOLUTION INTRAMUSCULAR; INTRAVENOUS at 04:38

## 2019-05-10 RX ADMIN — SODIUM CHLORIDE, SODIUM LACTATE, POTASSIUM CHLORIDE, CALCIUM CHLORIDE, AND DEXTROSE MONOHYDRATE 100 ML/HR: 600; 310; 30; 20; 5 INJECTION, SOLUTION INTRAVENOUS at 09:00

## 2019-05-10 RX ADMIN — MAGNESIUM SULFATE 2 G/HR: 500 INJECTION, SOLUTION INTRAMUSCULAR; INTRAVENOUS at 19:55

## 2019-05-10 RX ADMIN — NIFEDIPINE 10 MG: 10 CAPSULE ORAL at 02:17

## 2019-05-10 RX ADMIN — MAGNESIUM SULFATE IN WATER 4 G: 40 INJECTION, SOLUTION INTRAVENOUS at 02:33

## 2019-05-10 RX ADMIN — Medication 16 G: at 02:14

## 2019-05-10 RX ADMIN — SODIUM CHLORIDE, SODIUM LACTATE, POTASSIUM CHLORIDE, CALCIUM CHLORIDE, AND DEXTROSE MONOHYDRATE 100 ML/HR: 600; 310; 30; 20; 5 INJECTION, SOLUTION INTRAVENOUS at 19:54

## 2019-05-10 RX ADMIN — MAGNESIUM SULFATE 2 G/HR: 500 INJECTION, SOLUTION INTRAMUSCULAR; INTRAVENOUS at 14:59

## 2019-05-10 RX ADMIN — MAGNESIUM SULFATE 2 G/HR: 500 INJECTION, SOLUTION INTRAMUSCULAR; INTRAVENOUS at 02:58

## 2019-05-10 RX ADMIN — SODIUM CHLORIDE, SODIUM LACTATE, POTASSIUM CHLORIDE, CALCIUM CHLORIDE: 600; 310; 30; 20 INJECTION, SOLUTION INTRAVENOUS at 03:26

## 2019-05-10 RX ADMIN — LABETALOL 20 MG/4 ML (5 MG/ML) INTRAVENOUS SYRINGE 20 MG: at 02:38

## 2019-05-10 RX ADMIN — HYDROMORPHONE HYDROCHLORIDE 0.5 MG: 1 INJECTION, SOLUTION INTRAMUSCULAR; INTRAVENOUS; SUBCUTANEOUS at 21:13

## 2019-05-10 RX ADMIN — MAGNESIUM SULFATE HEPTAHYDRATE 4 G: 40 INJECTION, SOLUTION INTRAVENOUS at 02:33

## 2019-05-10 RX ADMIN — INSULIN GLARGINE 6 UNITS: 100 INJECTION, SOLUTION SUBCUTANEOUS at 13:06

## 2019-05-10 RX ADMIN — LABETALOL HCL 300 MG: 200 TABLET, FILM COATED ORAL at 19:07

## 2019-05-10 RX ADMIN — MORPHINE SULFATE 100 MCG: 0.5 INJECTION, SOLUTION EPIDURAL; INTRATHECAL; INTRAVENOUS at 03:46

## 2019-05-10 RX ADMIN — PROMETHAZINE HYDROCHLORIDE 25 MG: 25 INJECTION INTRAMUSCULAR; INTRAVENOUS at 11:24

## 2019-05-10 RX ADMIN — LABETALOL HCL 300 MG: 200 TABLET, FILM COATED ORAL at 10:47

## 2019-05-10 RX ADMIN — BENZOCAINE AND MENTHOL 1 LOZENGE: 15; 3.6 LOZENGE ORAL at 11:57

## 2019-05-10 RX ADMIN — HYDROMORPHONE HYDROCHLORIDE 1 MG: 1 INJECTION, SOLUTION INTRAMUSCULAR; INTRAVENOUS; SUBCUTANEOUS at 10:32

## 2019-05-10 RX ADMIN — CLINDAMYCIN PHOSPHATE 900 MG: 900 INJECTION INTRAVENOUS at 03:36

## 2019-05-10 NOTE — ANESTHESIA PROCEDURE NOTES
Spinal Block    Performed by: Orlando Bryson MD  Authorized by: Orlando Bryson MD     Pre-procedure: Indications: primary anesthetic  Preanesthetic Checklist: patient identified, risks and benefits discussed, anesthesia consent, site marked, patient being monitored and timeout performed          Assessment:      Spinal Block Procedure Note    Anesthetic procedure including post-op analgesic plan was discussed and all questions were answered. Pt. agrees to proceed. Patient was assisted to the seated position in the O.R., a \"time-out\" was performed. Monitors (ECG, NIBP & Sp02) and oxygen @ 2LPM via nasal cannula were applied. Mild sedation was administered. The back was prepped at the lumbar region with topical antiseptic and draped. 3mL 1% lidocaine was injected locally at the L3-4 interspace. A 25 G pencil point spinal needle was placed @ the above noted interspace and advanced until CSF was obtained. No blood or paresthesia was noted. Bupivacaine-MPF(hyperbaric) 10mg  +  morphine PF 0.1mg was injected into the CSF. Patient tolerated the procedure well. No apparent complications were noted.

## 2019-05-10 NOTE — PROGRESS NOTES
Postpartum Progress Note Subjective: Pt POD0 1LTCS overnight for cat 3 tracing, in setting of CHTN with SI preE (on mag for persistent severe range BPs despite treatment) and poorly controlled type 1 IDDM (on insulin pump). Pain currently controlled, just mild appropriate abdominal discomfort. Lochia/bleeding appropriate. Carranza catheter in place, thus far UOP adequate. Not yet passing flatus. Scant edema. Denies f/c, CP, SOB, or other concerns. Baby in NICU. Significant hypoglycemia overnight requiring multiple amps of D50. Insulin pump paused. Pt with additional severe range BPs around 2am and received additional PO nifedipine IR and one dose of IV labetalol. She was started on magnesium at that time (4g load and 2g/hr maintenance). On labetalol 400mg BID for maintenance. Normotensive at this time, will continue to monitor. No HA, vision changes or RUQ pain. She does have increased lower extremity edema. Objective: 
Patient Vitals for the past 12 hrs: 
 Temp Pulse Resp BP SpO2  
05/10/19 0739  81  123/81   
05/10/19 0724  81  127/83 99 % 05/10/19 0709  82  119/74 99 % 05/10/19 0654  84  124/76 99 % 05/10/19 0639  85  123/75   
05/10/19 0624  82  126/87 98 % 05/10/19 0609  86  132/61 99 % 05/10/19 0554    122/74 94 % 05/10/19 0538    105/61 97 % 05/10/19 0523  79  106/80 (!) 87 % 05/10/19 0508  75  106/78 97 % 05/10/19 0457 97.6 °F (36.4 °C) 75 12 124/75 97 % 05/10/19 0453  76  124/75 91 % 05/10/19 0241  83  (!) 138/93 97 % 05/10/19 0239  83  (!) 157/96   
05/10/19 0225    (!) 164/104   
05/10/19 0215  86  (!) 162/104   
05/10/19 0146  81  (!) 152/100 99 % 05/09/19 2350 98.4 °F (36.9 °C) 88 16 103/74   
05/09/19 2148  93 16 128/80   
05/09/19 2029 98.4 °F (36.9 °C) 89 16 134/82 99 % UOP: 100-125cc/hr overnight per nursing Physical Exam: 
Gen-A&O, NAD, resting comfortably CV-regular rate, no m/r/g 
 Resp-non-labored, CTAB, no w/r/r Abd-nt, nd, fundus firm below the umbilicus Incision-c/d/i, dressing left in place -scant blood on pad Ext-2+ edema, 2+ DTRs bilateral patellar reflexes Recent Results (from the past 24 hour(s)) GLUCOSE, POC Collection Time: 05/09/19  3:09 PM  
Result Value Ref Range Glucose (POC) 70 65 - 100 mg/dL Performed by Ellis Sheppard CBC W/O DIFF Collection Time: 05/09/19  5:14 PM  
Result Value Ref Range WBC 10.4 3.6 - 11.0 K/uL  
 RBC 4.75 3.80 - 5.20 M/uL  
 HGB 11.8 11.5 - 16.0 g/dL HCT 39.0 35.0 - 47.0 % MCV 82.1 80.0 - 99.0 FL  
 MCH 24.8 (L) 26.0 - 34.0 PG  
 MCHC 30.3 30.0 - 36.5 g/dL  
 RDW 17.7 (H) 11.5 - 14.5 % PLATELET 550 112 - 012 K/uL MPV 11.8 8.9 - 12.9 FL  
 NRBC 0.0 0  WBC ABSOLUTE NRBC 0.00 0.00 - 0.01 K/uL METABOLIC PANEL, COMPREHENSIVE Collection Time: 05/09/19  5:14 PM  
Result Value Ref Range Sodium 136 136 - 145 mmol/L Potassium 4.5 3.5 - 5.1 mmol/L Chloride 105 97 - 108 mmol/L  
 CO2 22 21 - 32 mmol/L Anion gap 9 5 - 15 mmol/L Glucose 120 (H) 65 - 100 mg/dL BUN 27 (H) 6 - 20 MG/DL Creatinine 1.28 (H) 0.55 - 1.02 MG/DL  
 BUN/Creatinine ratio 21 (H) 12 - 20 GFR est AA 59 (L) >60 ml/min/1.73m2 GFR est non-AA 49 (L) >60 ml/min/1.73m2 Calcium 9.8 8.5 - 10.1 MG/DL Bilirubin, total 0.2 0.2 - 1.0 MG/DL  
 ALT (SGPT) 18 12 - 78 U/L  
 AST (SGOT) 35 15 - 37 U/L Alk. phosphatase 119 (H) 45 - 117 U/L Protein, total 6.7 6.4 - 8.2 g/dL Albumin 2.1 (L) 3.5 - 5.0 g/dL Globulin 4.6 (H) 2.0 - 4.0 g/dL A-G Ratio 0.5 (L) 1.1 - 2.2 TYPE & SCREEN Collection Time: 05/09/19  5:14 PM  
Result Value Ref Range Crossmatch Expiration 05/12/2019 ABO/Rh(D) B POSITIVE Antibody screen NEG   
GLUCOSE, POC Collection Time: 05/09/19  5:59 PM  
Result Value Ref Range Glucose (POC) 156 (H) 65 - 100 mg/dL Performed by Ellis Sheppard GLUCOSE, POC  
 Collection Time: 05/09/19 10:48 PM  
Result Value Ref Range Glucose (POC) 155 (H) 65 - 100 mg/dL Performed by Jackolyn Nissen, POC Collection Time: 05/10/19  2:04 AM  
Result Value Ref Range Glucose (POC) 50 (L) 65 - 100 mg/dL Performed by Kt Toussainten, POC Collection Time: 05/10/19  2:36 AM  
Result Value Ref Range Glucose (POC) 50 (L) 65 - 100 mg/dL Performed by Ramona Robbie GLUCOSE, POC Collection Time: 05/10/19  2:54 AM  
Result Value Ref Range Glucose (POC) 160 (H) 65 - 100 mg/dL Performed by Ramona Robbie GLUCOSE, POC Collection Time: 05/10/19  5:07 AM  
Result Value Ref Range Glucose (POC) 68 65 - 100 mg/dL Performed by Kt Nissen, POC Collection Time: 05/10/19  5:59 AM  
Result Value Ref Range Glucose (POC) 45 (LL) 65 - 100 mg/dL Performed by Jackolyn Nissen, POC Collection Time: 05/10/19  6:21 AM  
Result Value Ref Range Glucose (POC) 116 (H) 65 - 100 mg/dL Performed by Jackolyn Nissen, POC Collection Time: 05/10/19  7:23 AM  
Result Value Ref Range Glucose (POC) 71 65 - 100 mg/dL Performed by Ellen Appiah Assessment/Plan: 
25yo POD1 s/p 1LTCS for cat 3 tracing, in setting of CHTN with SI preE (on mag for persistent severe range BPs despite treatment) and poorly controlled type 1 IDDM (on insulin pump), uncomplicated. Routine post-op care: 
-d/c epidural, transition to PO pain meds 
-advance diet as tolerated 
-remove quintana POD1 
-cont to monitor bleeding 
-stool softeners, antiemetics, benadryl prn 
-SCDs, ambulation, incentive spirometry CHTN, r/o si preE:  
-BP monitoring  
-on mag x 24 hrs postpartum, 2g/hr for now, but will monitor mag level q4 hrs to make sure not getting mag toxic given impaired renal function, no si/sx of mag toxicity on exam, low threshold to decrease or discontinue mag rate -CBC, CMP, mag level 
-cont labetalol to 400mg BID 
-IV antihypertensives prn severe range BPs IDDM, type 1: multiple episodes of DKA in current pregnancy, most recently with issues with hypoglycemia overnight 
-insulin pump paused --> endocrinology (Dr. Venu Lewis) to come help manage this patient post-operatively (spoke with him directly this morning), holding insulin pump for now until he sees her (per his recommendation, as insulin requirements plummet after placenta removed) -D5LR at 100cc/hr (due to hypoglycemia), 
-hourly POC blood glucose checks for now  
-D50 prn hypoglycemia 
-goal BG >70 but <200 Vaginal discharge: reports pruritis and odor  
-screen for BV/yeast/GC/Chl sent, results pending PNL: B pos / GBS pos / needs pap postpartum PMH also significant for:  
-vtach  
-chronic kidney disease 
-HSV - cont valtrex suppression  
-depression  
-tobacco/marijuana  
-N/V of pregnancy - scopolamine patch and zofran ordered (will have to be careful with QT prolonging agents given h/o vtach) -GERD - famotidine PRN Difficult IV access: now with central line placed 5/9 Postpartum plans: 
-baby in NICU 
-pumping as tolerated Dispo: continued inpatient monitoring on L&D Norma Mendez MD 
[facility] [place]

## 2019-05-10 NOTE — PROGRESS NOTES
0800 Bedside and Verbal shift change report given to Dearl Schwab RN (oncoming nurse) by Devorah Aguilar RN (offgoing nurse). Report included the following information SBAR, Procedure Summary, Intake/Output, Accordion and Recent Results. Nahid Salgado in to see pt. Received orders for labs: cmp, magnesium, & cbc, change fluids to D5LR, and continue to labetalol. MD consulting Jolene Quinn in endocrinology for further interventions. Jolene Quinn coming to bedside to evaluate. Finger stick blood sugar 75. 
 
0900 [place] in to see pt & discuss POC; leave insulin pump off, will manage with every hour fingerstick blood sugars. Aware of current blood sugar of 75. Please call MD for blood sugar > 150: [phone] (cell). 0915 FLuids paused currently. Lab drawn for distal port of central line: cmp, cbc & magnesium level. 0930 Fingerstick blood sugar 104. 
 
1032 Fingerstick blood sugar 131. Dilaudid 0.5 mg IV as requested for incision pain. 1124 Phenergan 25 mg IVPB as requested for nausea 1130 Fingerstick blood sugar 160. 
 
1145 Spoke with Jolene Quinn (Endocrinology) regarding insulin order & current medication. Aware that pt is not eating. MD to enter orders. To give 1 unit humalog now. 1156 Humalog 1 unit SQ. 
 
1230 Pt pumped breast x 10 mins. Fingerstick blood sugar 156. Pharmacy alerted to Lantus insulin order. 1308 Lantus 6 units per Coamo's order. 190 [facility] MD at bedside to assess pt. Noted a slight pink to urine output. Tubing flushed with 10cc NS per MD request.  
 
3699 [facility] at bedside, pt sleeping, but spoke with Jordin Morillo (father of baby) & nurse. MD reported history of drug dependency, to use caution with narcotics. MD aware of current blood sugars & output. Received verbal orders to continue with D5LR until dinner time, which can be a soft diet, check blood sugars every 2-4 hours unless pt symptomatic. 1435 Fingerstick blood sugar 110. Malou, case management in to see pt. 1500 Pt with nausea but no emesis. Given [place]. [address] called in to unit. Updated on urine assessment & noted bleeding from pt \"chipped\" tooth during oral care. Pt to see nephrology this evening. Received orders for repeat magnesium level & to change maintenance IV fluid to LR at 1800. 
 
1615 Bedside and Verbal shift change report given to Kavin Campos RN (oncoming nurse) by Elle Hurtado RN (offgoing nurse). Report included the following information SBAR, Procedure Summary, Intake/Output, MAR, Accordion and Recent Results. 1630 Nephology notified of ordered consult. Face sheet faxed to practice. Mini Tariq is the on call MD.  
 
Julián Wiggins called to unit and requested to speak to attending physician. Jesica Mcarthur, on call for Fernie Wellington made aware and given MD number. [phone]

## 2019-05-10 NOTE — PROGRESS NOTES
OB Hospitalist 
 
Assumed care from Dr. Susy Arrington. S/P LTCS for NRFHR POD # 0 Pregnancy complicated by [facility] with superimposed Preeclampsia and Type 1 DM and Chronic renal disease. Postoperatively her Insulin requirements is being managed by Dr. Eusebia Bartlett (Endocrinologist) Currently , patient asleep. Blood Pressures Stable. On labetalol 300 mg BID. Adequate Urine output. /88 mmHg RRR 
CTA B/L BS Abdomen Soft, Dressing in place B/L SCDs in place On Magnesium Sulphate for Seizure Prophylaxis until 3 am  
Will continue to follow closely.  
 
 
Uriah Rae MD

## 2019-05-10 NOTE — PROGRESS NOTES
Pt clinically stable at this time, just very fatigued, and drowsy from dilaudid/phenergan that she received. Otherwise pain well controlled. Mild nausea, but starting to feel hungry. Denies HA, vision changes, or other si/sx of preE. Normal respiratory rate and normal DTRs (no evidence of mag toxicity). Normotensive, still making good urine output, but most recently with slightly blood tinged urine. Advised nursing flush quintana. Continue IV hydration. Nephrology consulted due to hematuria and also baseline impaired renal function, with high risk for developing MARIS due to anemia, surgery, etc. Consult placed to Dr. Thea Gallagher [facility]. BG currently stable 150s-160. Per Dr. Juan Alberto Aponte (endocrine), addition of lantus 6U daily, with sliding scale lispro. He will continue to follow along with this patient, very grateful for assistance on this patient. Plan for continued close monitoring on L&D at this time. Continue mag x24 hrs post-delivery. Patient Vitals for the past 4 hrs: 
 Pulse Resp BP SpO2  
05/10/19 1200 72   96 % 05/10/19 1159   124/82 98 % 05/10/19 1130  16  97 % 05/10/19 1102  16  95 % 05/10/19 1056    99 % 05/10/19 0957 72  127/86 98 % 05/10/19 0954    98 % Recent Results (from the past 8 hour(s)) GLUCOSE, POC Collection Time: 05/10/19  5:59 AM  
Result Value Ref Range Glucose (POC) 45 (LL) 65 - 100 mg/dL Performed by Jaime Bond, POC Collection Time: 05/10/19  6:21 AM  
Result Value Ref Range Glucose (POC) 116 (H) 65 - 100 mg/dL Performed by Jaime Bond, POC Collection Time: 05/10/19  7:23 AM  
Result Value Ref Range Glucose (POC) 71 65 - 100 mg/dL Performed by Jaime Bond, POC Collection Time: 05/10/19  8:25 AM  
Result Value Ref Range Glucose (POC) 75 65 - 100 mg/dL Performed by Sondra Hernandez   
CBC W/O DIFF  Collection Time: 05/10/19  9:08 AM  
 Result Value Ref Range WBC 13.3 (H) 3.6 - 11.0 K/uL  
 RBC 2.76 (L) 3.80 - 5.20 M/uL HGB 7.2 (L) 11.5 - 16.0 g/dL HCT 22.6 (L) 35.0 - 47.0 % MCV 81.9 80.0 - 99.0 FL  
 MCH 26.1 26.0 - 34.0 PG  
 MCHC 31.9 30.0 - 36.5 g/dL  
 RDW 17.4 (H) 11.5 - 14.5 % PLATELET 516 045 - 978 K/uL MPV 11.1 8.9 - 12.9 FL  
 NRBC 0.0 0  WBC ABSOLUTE NRBC 0.00 0.00 - 0.01 K/uL METABOLIC PANEL, COMPREHENSIVE Collection Time: 05/10/19  9:08 AM  
Result Value Ref Range Sodium 139 136 - 145 mmol/L Potassium 4.5 3.5 - 5.1 mmol/L Chloride 108 97 - 108 mmol/L  
 CO2 23 21 - 32 mmol/L Anion gap 8 5 - 15 mmol/L Glucose 86 65 - 100 mg/dL BUN 26 (H) 6 - 20 MG/DL Creatinine 1.30 (H) 0.55 - 1.02 MG/DL  
 BUN/Creatinine ratio 20 12 - 20 GFR est AA 58 (L) >60 ml/min/1.73m2 GFR est non-AA 48 (L) >60 ml/min/1.73m2 Calcium 7.8 (L) 8.5 - 10.1 MG/DL Bilirubin, total 0.2 0.2 - 1.0 MG/DL  
 ALT (SGPT) 9 (L) 12 - 78 U/L  
 AST (SGOT) 20 15 - 37 U/L Alk. phosphatase 68 45 - 117 U/L Protein, total 4.3 (L) 6.4 - 8.2 g/dL Albumin 1.5 (L) 3.5 - 5.0 g/dL Globulin 2.8 2.0 - 4.0 g/dL A-G Ratio 0.5 (L) 1.1 - 2.2 MAGNESIUM Collection Time: 05/10/19  9:08 AM  
Result Value Ref Range Magnesium 4.6 (H) 1.6 - 2.4 mg/dL GLUCOSE, POC Collection Time: 05/10/19  9:33 AM  
Result Value Ref Range Glucose (POC) 104 (H) 65 - 100 mg/dL Performed by Vee Castrejon, POC Collection Time: 05/10/19 10:51 AM  
Result Value Ref Range Glucose (POC) 131 (H) 65 - 100 mg/dL Performed by Vee Castrejon, POC Collection Time: 05/10/19 11:33 AM  
Result Value Ref Range Glucose (POC) 160 (H) 65 - 100 mg/dL Performed by Vee Castrejon, POC Collection Time: 05/10/19 12:43 PM  
Result Value Ref Range Glucose (POC) 154 (H) 65 - 100 mg/dL  Performed by Cruz Jimenez

## 2019-05-10 NOTE — ANESTHESIA PREPROCEDURE EVALUATION
Relevant Problems No relevant active problems Anesthetic History No history of anesthetic complications PONV Review of Systems / Medical History Patient summary reviewed, nursing notes reviewed and pertinent labs reviewed Pulmonary Within defined limits Neuro/Psych Within defined limits Cardiovascular Within defined limits Hypertension Dysrhythmias GI/Hepatic/Renal 
Within defined limits GERD Endo/Other Within defined limits Diabetes Other Findings Physical Exam 
 
Airway Mallampati: II 
TM Distance: > 6 cm Neck ROM: normal range of motion Mouth opening: Normal 
 
 Cardiovascular Regular rate and rhythm,  S1 and S2 normal,  no murmur, click, rub, or gallop Dental 
No notable dental hx Pulmonary Breath sounds clear to auscultation Abdominal 
GI exam deferred Other Findings Anesthetic Plan ASA: 2 Anesthesia type: spinal 
 
 
 
 
 
Anesthetic plan and risks discussed with: Patient

## 2019-05-10 NOTE — L&D DELIVERY NOTE
Operative Note    Name: Alexander Wiley  Record Number: [id number]   YOB: 1990  Today's Date: May 10, 2019      Pre-operative Diagnosis: Fetal Intolerance of Labor    Post-operative Diagnosis: * No post-op diagnosis entered *    Operation: Low Cervical Transverse Procedure(s):   SECTION    Surgeon(s):  Lucina Hatch MD    Anesthesia: Epidural    Prophylactic Antibiotics: clindamycin and azithromycin  DVT Prophylaxis: Sequential Compression Devices         Fetal Description: chance     Birth Information:   Information for the patient's :  Aliza Brennan [[id number]]   Delivery of a   female infant on 5/10/2019 at 3:53 AM. Apgars were 7  and 8 . Umbilical Cord:       Umbilical Cord Events:       Placenta: Manual Removal removal with   appearance. Amniotic Fluid Volume: Moderate     Amniotic Fluid Description:  Blood stained        Umbilical Cord: 3 vessels present    Placenta:  spontaneous    Specimens: placenta           Complications:  non reassuring fetal monitoring, decreased variability, and persistent non remediable late deceleerations    Procedure Detail:      After proper patient identification and consent, the patient was taken to the operating room, where spinal anesthesia was administered and found to be adequate. Carranza catheter had been placed using sterile technique. The patient was prepped and draped in the normal sterile fashion. The abdomen was entered using the Pfannenstiel technique. The peritoneum was entered sharply well superior to the bladder without any apparent injury. The bladder flap was created without difficulty. A low transverse uterine incision was made with the scalpel and extended with blunt finger dissection. Amniotomy was performed and the fluid was medium amount bloody amniotic fluid. The babys head was then delivered atraumatically. The nose and mouth were suctioned.  The cord was clamped and cut and the baby was handed off to  staff in attendance. Placenta was then removed from the uterus. The uterus was curettaged with a moist lap pad and cleared of all clots and debris. The uterine incision was closed with 0 chromic, double layer  in running locking fashion with good hemostasis assured followed by an embricating stitch. The posterior cul-de-sac was irrigated with warm normal saline. Good hemostasis was again reassured and the uterus was returned to the abdomen. The anterior pelvis was irrigated with warm normal saline and good hemostasis was again reassured throughout. The fascia was closed with 0 Vicryl in a running fashion. Good hemostasis was assured. The skin was closed with a 4-0 vicryl subcuticular closure. The patient tolerated the procedure well. Sponge, lap, and needle counts were correct times three and the patient and baby were taken to recovery/postpartum room in stable condition. Isabella Gee MD  May 10, 2019  4:47 AM                 Delivery Summary    Patient: Meka Gaston MRN: [id number]  SSN: [id number]    YOB: 1990  Age: 30 y.o. Sex: female        Information for the patient's :  Heather Desai [[id number]]       Labor Events:    Labor: No    Steroids: Full Course   Cervical Ripening Date/Time:       Cervical Ripening Type: None   Antibiotics During Labor: No   Rupture Identifier: Sac 1    Rupture Date/Time: 5/10/2019 3:53 AM   Rupture Type: AROM   Amniotic Fluid Volume: Moderate    Amniotic Fluid Description: Blood stained    Amniotic Fluid Odor: None    Induction: None       Induction Date/Time:        Indications for Induction:      Augmentation: None   Augmentation Date/Time:      Indications for Augmentation:     Labor complications:          Additional complications:        Delivery Events:  Indications For Episiotomy:     Episiotomy: None   Perineal Laceration(s): None   Repaired:     Periurethral Laceration Location:      Repaired:     Labial Laceration Location:     Repaired:     Sulcal Laceration Location:     Repaired:     Vaginal Laceration Location:     Repaired:     Cervical Laceration Location:     Repaired:     Repair Suture: None   Number of Repair Packets:     Estimated Blood Loss (ml):  ml     Delivery Date: 5/10/2019    Delivery Time: 3:53 AM   Delivery Type: , Low Transverse     Details    Trial of Labor: No   Primary/Repeat: Primary   Priority:     Indications:          Sex:  Female     Gestational Age: 33w2d  Delivery Clinician:  José Rouse  Living Status: Living   Delivery Location: L&D OR 1           APGARS  One minute Five minutes Ten minutes   Skin color: 1   1        Heart rate: 2   2        Grimace: 2   2        Muscle tone: 1   1        Breathin   2        Totals: 7   8          Presentation: Vertex    Position:        Resuscitation Method:  Suctioning-bulb; Tactile Stimulation;C-PAP     Meconium Stained: None      Cord Information:    Complications:    Cord around:    Delayed cord clamping? Cord clamped date/time:   Disposition of Cord Blood: Discard    Blood Gases Sent?: Yes    Placenta:  Date/Time: 5/10/2019  3:54 AM  Removal: Manual Removal      Appearance:       Ferrum Measurements:  Birth Weight:        Birth Length:        Head Circumference:        Chest Circumference:       Abdominal Girth: Other Providers:   JOEY Pederson;BYRON TORIBIO;JOSEPH CASEY;PATRIA ARAGON;OJ CESAR;;ELEUTERIO DUGAN;SALINA PRIETO;JOHANA JACOBO, Obstetrician;Primary Nurse;Nicu Nurse;Staff Nurse; Anesthesiologist;Crna;Physician Assistant; Nicu Nurse;Respiratory Therapist             Group B Strep: No results found for: GRBSEXT, GRBSEXT  Information for the patient's :  River Reyes [[id number]]   No results found for: ABORH, PCTABR, PCTDIG, BILI, ABORHEXT, ABORH    No results for input(s): PCO2CB, PO2CB, HCO3I, SO2I, IBD, PTEMPI, SPECTI, PHICB, ISITE, IDEV, IALLEN in the last 72 hours.

## 2019-05-10 NOTE — PROGRESS NOTES
Endocrinology Progress Note Ms Dana Vallejo was seen around 1:30. She was quite lethargic from lack of sleep and from recent administration of pain medications No food intake D5 LR infusing at 100 Glucoses: 
Component Latest Ref Rng & Units 5/10/2019 5/10/2019 5/10/2019 5/10/2019  
 
     12:43 PM 11:33 AM 10:51 AM  9:33 AM  
GLUCOSE,FAST - POC 
    65 - 100 mg/dL 154 (H) 160 (H) 131 (H) 104 (H) Received 1 unit Humalog at noon 6 units Lantus at 1pm 
 
Recommendations: 1. Diabetes type I 
- will see how she does with Lantus 6 units 
- will likely add mealtime Humalog to be given after meals based on intake this evening 
- recommend changing D5 LR to LR at dinnertime 2. History of marijuana use and possible other drug use/dependency 
- majority of her drug screens dating back to 2012 have been positive for THC, numerous for opiates and a few for benzodiazepines 
- with type I diabetes, would prefer her to be lucid and clear-headed as much as possible to manage her diabetes/insuiln dosing well. Recommend treating her pain appropriately in the post-op period, but would be as frugal as possible with IV opiate pain medications and would very much limit/avoid any outpatient prescriptions for oral opiate-based medications to decrease dependency risks.

## 2019-05-10 NOTE — PROGRESS NOTES
CTSP due to cramping. Contractions noted on monitor with concomitant fetal decelerations No IV Access awaiting PICC line Terbutaline SQ given for contractions.

## 2019-05-10 NOTE — PROGRESS NOTES
Endocrinology Progress Note Glucoses stable. Ms Delvis Haley is very sedated/sleepy still. She is not very hungry. Thinks she may eat a little this evening. Glucoses are stable 110-119 last 2 checks D5LR infusing Recommendations 1. Diabetes: 
- continue lantus 6 units daily, Humalog for highs 
- will add Humalog after meals when she starts eating- hopefully tomorrow. - would continue D5 LR so long as glucoses are < 150 and she is not eating. If glucoses start rising, would change to LR. 
- so long as she is very sleepy and not alert, would continue every 2 hour glucose checks Once she is more alert and eating ,can change to [facility] and . - spoke with nurse about above impressions and recommendations Will check in remotely over weekend. Feel free to call with questions - [phone] I spent 50 minutes on her care today and > 50% of the time was spent in coordination of her care

## 2019-05-10 NOTE — PROGRESS NOTES
Labor Progress Note CTSP due to fetal decelerations and decreased variability. Patient seen, fetal heart rate and contraction pattern evaluated. Rn reports having medicated patient with Nubain and phenergan earlier. Reports patient BS was 153 and she medicated herself with insulin coverage in addition to her basal rate. Physical Exam: 
Cervical Exam: closed and high Membranes:  Intact Uterine Activity: Frequency: Irregular Fetal Heart Rate: 160 Accelerations: no 
Decelerations: variable Variability- absent Uterine contractions: irregular Uterus soft and nontender Assessment/Plan: 
 IUP @ 33w2d IDDM poorly controlled and brittle. BS was 150  But after Medicating dropped to 50- I believe it is hypoglycemia affecting the fetal tracing- so will give juice now and Glucose tabs PRN. Recheck BS every 1 hour. Will continue with IVf bolus . CHt with Si Pre-e will add procardia 60 mg Xl daily- and give 10 mg PO now. Will Begin Magnesium Sulfate for seizure prophylaxis now. 4gm bolus then 2 gm /hr. Begin Hypertension Protocol with IV labetalol dwight Rouse MD

## 2019-05-10 NOTE — PROGRESS NOTES
1615 Bedside and Verbal shift change report given to ROXANA bose RN (oncoming nurse) by Angel Bagley RN (offgoing nurse). Report included the following information SBAR, Kardex, Procedure Summary, MAR and Recent Results. 1635 Magnesium level drawn. Blood sugar 119. Central line ports flushed. 1800 Dr Raymond Glover in to see patient and consult on care coordination. States to keep D5LR running. Change to LR if blood sugars running over 150 and eating. Continue with Q 2 hour blood sugars until eating, then ac and hs 
1835 Assisting patient with breast pump. Pt very sleepy 200 Dr Luis A Viera notified that pt was seen by Dr Raymond Glover, of increasing blood pressures, that pt is very sleepy and 7.0 MgSo4 level. Order received for increasing labetalol to Q 8 hours and repeat mg+ level at 2100 
1930 Bedside and Verbal shift change report given to Faby Leon2 (oncoming nurse) by roxana bose RN (offgoing nurse). Report included the following information SBAR, Kardex, Procedure Summary, MAR and Recent Results. 1955 Dr Luis A Viera states to decrease D%LR to 75 ml hour. States that she has not gotten a call from nephrology

## 2019-05-10 NOTE — PROGRESS NOTES
Pt signed out to Dr. Dorothea Ogden [facility] agreeable to round over weekend).

## 2019-05-10 NOTE — PROGRESS NOTES
1945: Bedside and Verbal shift change report given to HILLARY Bruce RN (oncoming nurse) by KELLIE Denise RN (offgoing nurse). Report included the following information SBAR, Kardex, Procedure Summary, Intake/Output, MAR, Accordion, Recent Results and Med Rec Status. 2024: Dr Melonie Calvillo called for central line placement. terb given per Dr Meliton Hathaway order. 2133: central line placed. Dr Melonie Calvillo states xray placement not needed, states he visualized placement, states to flush ports and it is ok to use. All ports flushed with good blood return. 2142: Dr Darius Claros called to inform him that central line was placed, pt has a severe sudden onset CABA, informed Dr Darius Claros that placement xray not done per Dr Melonie Calvillo and asked if he wanted to have one done, declines need for placement xray if Dr Melonie Calvillo feels it is not needed, orders received for pain medication for pt. 2153: IV restarted per Dr Melonie Calvillo order, declines need for line placement xray, states if HA gets worse may get xray. 9352: Dr Darius Claros called to assess pt,informed of FHR tracing, states will come see pt. 0204: BS 50, will treat w/ glucose tabs,Dr Darius Claros remains at bedside 0217: procardia 10mg given. 0235: IV labetalol given. 0236: BS 50, will treat w/ D50 
 
0310: c-section called 3332: NICU in room giving pt update on baby.

## 2019-05-10 NOTE — ANESTHESIA PREPROCEDURE EVALUATION
Relevant Problems No relevant active problems Anesthetic History No history of anesthetic complications PONV Review of Systems / Medical History Patient summary reviewed, nursing notes reviewed and pertinent labs reviewed Pulmonary Within defined limits Neuro/Psych Within defined limits Cardiovascular Within defined limits Hypertension Dysrhythmias GI/Hepatic/Renal 
Within defined limits Endo/Other Within defined limits Diabetes Other Findings Physical Exam 
 
Airway Mallampati: II 
TM Distance: > 6 cm Neck ROM: normal range of motion Mouth opening: Normal 
 
 Cardiovascular Regular rate and rhythm,  S1 and S2 normal,  no murmur, click, rub, or gallop Dental 
No notable dental hx Pulmonary Breath sounds clear to auscultation Abdominal 
GI exam deferred Other Findings Anesthetic Plan ASA: 2 Local injection Anesthetic plan and risks discussed with: Patient

## 2019-05-10 NOTE — PROGRESS NOTES
Labor Progress Note Patient seen, fetal heart rate and contraction pattern evaluated. Physical Exam: 
Cervical Exam: not examined Membranes:  Intact Uterine Activity: Frequency: regular Fetal Heart Rate: 160 Accelerations: no 
Decelerations:  Repetitive Variability: Absent Uterine contractions: regular Assessment/Plan: 
Cat 3 NST with repetitive late decelerations, and absent variability  @ 33 w 2 d , CHTN with SI Pre-e. D/w MFM who agrees with Plan for delivery by  section.  
 
Marshal Jones MD

## 2019-05-10 NOTE — ANESTHESIA POSTPROCEDURE EVALUATION
Procedure(s):   SECTION. spinal    <BSHSIANPOST>    No vitals data found for the desired time range.

## 2019-05-10 NOTE — PROGRESS NOTES
Endocrinology Progress Note Concern: diabetes. Called by Dr Santi Rush this AM to notify me that Ms Dionisio Chacon had delivered via  overnight. Ms Dionisio Chacon is a 31 yo woman who I have followed in clinic for several years with type I diabetes. [place] was away for several years in [place] and returned several months ago. She was last seen by me on  She uses an insulin pump at home to manage her diabetes. Has had hypoglycemia since delivery. Insulin pump was removed around 6 am. 
 
She is receiving D5LR as IV fluid Glucoses are stabilizing and gradually rising Component GLUCOSE,FAST - POC Latest Ref Rng & Units 65 - 100 mg/dL 5/10/2019 
    10:51  (H)  
5/10/2019 
    9:33  (H)  
5/10/2019 
    8:25 AM 75  
5/10/2019 
    7:23 AM 71  
5/10/2019 
    6:21  (H)  
5/10/2019 
    5:59 AM 45 (LL) Mrs Dionisio Chacon was seen around 8:30 this AM 
She was quite sleepy after having little sleep last night and after having recently receiving some pain medications. We discussed continuing pump with substantial setting changes vs using SC insulin to manage. Plan: 1. Type I diabetes 
- insulin needs decrease dramatically after delivery and are sometimes substantially lower than pre-pregnancy needs for several days. This is clearly the case with her 
- she has received no insulin for about 5 hours now. She will need some insulin to prevent DKA. Due to uncertainties about insulin needs, likely need to make considerable adjustments to insulin doses over the next few days, and due to her sleepy/somnolent mental status with pain medication and sleep deprivation overnight, feel SC insulin is best course of treatment Will start Lantus 6 units now. This will provide 0.25 units/hour of basal insulin, which is very conservative. Will assess later in the day for the need to increase this or provide a second dose this evening Humalog correction - will use insulin sensitive scale Humalog for meals - will start once glucose rise higher. Will see her at lunch. After lunch, we can likely decrease glucose montoring.

## 2019-05-10 NOTE — PROGRESS NOTES
I was consulted on Ms Cory Fernandez for CKD management. She was seen previously by [address] [place] [address]. I contacted Dr Rashad Esquivel and informed him about the consult. Thank you, Signed By: Darcie Ontiveros MD   
 May 10, 2019

## 2019-05-10 NOTE — OP NOTES
L&D Delivery Note   Signed   Date of Service:  05/10/19 0447                          []Hide copied text    []Samm for details       Operative Note     Name: [address] Record Number: [id number]   YOB: 1990  Today's Date: May 10, 2019        Pre-operative Diagnosis: Fetal Intolerance of Labor     Post-operative Diagnosis: * No post-op diagnosis entered *     Operation: Low Cervical Transverse Procedure(s):   SECTION     Surgeon(s):  Ca Joseph MD     Anesthesia: Epidural     Prophylactic Antibiotics: clindamycin and azithromycin  DVT Prophylaxis: Sequential Compression Devices        Fetal Description: chance      Birth Information:   Information for the patient's :  Roya Villatoro [[id number]]   Delivery of a   female infant on 5/10/2019 at 3:53 AM. Apgars were 7  and 8 .     Umbilical Cord:        Umbilical Cord Events:        Placenta: Manual Removal removal with   appearance.     Amniotic Fluid Volume: Moderate      Amniotic Fluid Description:  Blood stained           Umbilical Cord: 3 vessels present     Placenta:  spontaneous     Specimens: placenta           Complications:  non reassuring fetal monitoring, decreased variability, and persistent non remediable late deceleerations     Procedure Detail:       After proper patient identification and consent, the patient was taken to the operating room, where spinal anesthesia was administered and found to be adequate. Carranza catheter had been placed using sterile technique. The patient was prepped and draped in the normal sterile fashion. The abdomen was entered using the Pfannenstiel technique. The peritoneum was entered sharply well superior to the bladder without any apparent injury. The bladder flap was created without difficulty. A low transverse uterine incision was made with the scalpel and extended with blunt finger dissection.  Amniotomy was performed and the fluid was medium amount bloody amniotic fluid. The babys head was then delivered atraumatically. The nose and mouth were suctioned. The cord was clamped and cut and the baby was handed off to  staff in attendance. Placenta was then removed from the uterus. The uterus was curettaged with a moist lap pad and cleared of all clots and debris. The uterine incision was closed with 0 chromic, double layer  in running locking fashion with good hemostasis assured followed by an embricating stitch. The posterior cul-de-sac was irrigated with warm normal saline. Good hemostasis was again reassured and the uterus was returned to the abdomen. The anterior pelvis was irrigated with warm normal saline and good hemostasis was again reassured throughout. The fascia was closed with 0 Vicryl in a running fashion. Good hemostasis was assured. The skin was closed with a 4-0 vicryl subcuticular closure. The patient tolerated the procedure well. Sponge, lap, and needle counts were correct times three and the patient and baby were taken to recovery/postpartum room in stable condition.     Alicia Belle MD  May 10, 2019  4:47 AM                       Delivery Summary     Patient: Mark Juarez MRN: [id number]  SSN: [id number]    YOB: 1990  Age: 30 y.o. Sex: female        Information for the patient's :  Franc Flynn [[id number]]         Labor Events:          Labor: No     Steroids: Full Course   Cervical Ripening Date/Time:       Cervical Ripening Type: None   Antibiotics During Labor: No   Rupture Identifier: Sac 1    Rupture Date/Time: 5/10/2019 3:53 AM   Rupture Type: AROM   Amniotic Fluid Volume: Moderate    Amniotic Fluid Description: Blood stained    Amniotic Fluid Odor: None    Induction: None       Induction Date/Time:        Indications for Induction:      Augmentation: None   Augmentation Date/Time:      Indications for Augmentation:     Labor complications:          Additional complications:         Delivery Events:             Indications For Episiotomy:     Episiotomy: None   Perineal Laceration(s): None   Repaired:     Periurethral Laceration Location:      Repaired:     Labial Laceration Location:     Repaired:     Sulcal Laceration Location:     Repaired:     Vaginal Laceration Location:     Repaired:     Cervical Laceration Location:     Repaired:     Repair Suture: None   Number of Repair Packets:     Estimated Blood Loss (ml):  ml      Delivery Date: 5/10/2019    Delivery Time: 3:53 AM   Delivery Type: , Low Transverse      Details     Trial of Labor: No   Primary/Repeat: Primary   Priority:     Indications:           Sex:  Female     Gestational Age: 33w2d  Delivery Clinician:  Jazmin Renteria  Living Status: Living   Delivery Location: L&D OR 1            APGARS  One minute Five minutes Ten minutes   Skin color: 1   1        Heart rate: 2   2        Grimace: 2   2        Muscle tone: 1   1        Breathin   2        Totals: 7   8           Presentation: Vertex    Position:        Resuscitation Method:  Suctioning-bulb; Tactile Stimulation;C-PAP     Meconium Stained: None       Cord Information:    Complications:    Cord around:    Delayed cord clamping? Cord clamped date/time:   Disposition of Cord Blood: Discard    Blood Gases Sent?: Yes     Placenta:  Date/Time: 5/10/2019  3:54 AM  Removal: Manual Removal      Appearance:         Measurements:  Birth Weight:        Birth Length:        Head Circumference:        Chest Circumference:       Abdominal Girth:       Other Providers:   JOEY GOMEZ;BYRON TORIBIO;JOSEPH CASEY;PATRIA ARAGON;OJ CESAR;;ELEUTERIO DUGAN;SALINA PRIETO;JOHANA JACOBO, Obstetrician;Primary Nurse;Nicu Nurse;Staff Nurse; Anesthesiologist;Crna;Physician Assistant; Nicu Nurse;Respiratory Therapist                  Group B Strep: No results found for: GRBSEXT, GRBSEXT  Information for the patient's :  Radha Bob [[id number]]   No results found for: ABORH, PCTABR, PCTDIG, BILI, ABORHEXT, ABORH     No results for input(s): PCO2CB, PO2CB, HCO3I, SO2I, IBD, PTEMPI, SPECTI, PHICB, ISITE, IDEV, IALLEN in the last 72 hours.

## 2019-05-10 NOTE — ANESTHESIA PROCEDURE NOTES
Central Line Placement Performed by: Jarvis Fleming MD 
Authorized by: Jarvis Fleming MD  
 
Indications: vascular access Preanesthetic Checklist: patient identified, risks and benefits discussed, anesthesia consent, site marked, patient being monitored and timeout performed Pre-procedure: All elements of maximal sterile barrier technique followed? Yes   
2% Chlorhexidine for cutaneous antisepsis, Hand hygiene performed prior to catheter insertion and Ultrasound guidance Sterile Ultrasound Technique followed?: Yes Procedure:  
Prep:  ChloraPrep Orientation:  Right Patient position:  Flat Catheter type:  Triple lumen Catheter size:  7 Fr 
 
Number of attempts:  1 Successful placement: Yes Assessment:  
Post-procedure:  Catheter secured and sterile dressing applied Assessment:  Blood return through all ports and guidewire removal verified Insertion:  Uncomplicated Patient tolerance:  Patient tolerated the procedure well with no immediate complications Central Line Procedure Note Indication: Inadequate venous access All questions answered. Consent obtained. Time-out performed. Patient positioned in slight Trendelenburg. 7-Step Sterility Protocol followed. (cap, mask sterile gown, sterile gloves, large sterile sheet, hand hygiene, patient skin prep) 5 mL 1% Lidocaine placed at insertion site. Right internal jugular vein cannulated utilizing the Seldinger technique. Venous cannulation confirmed with column drop test.   
Secured w/ suture,  Biopatch and Tegaderm applied. Positive blood return from all ports. Flushed w/ saline. CXR pending.

## 2019-05-10 NOTE — DIABETES MGMT
DTC Pump and Consult Note Recommendations/ Comments:  this AM. Consult received for insulin pump. Noted multiple episodes of hypoglycemia Per nursing note, Dr Eri Hassan has been in to see the pt. The pump has been stopped. BG's will be monitored hourly with results reported to Dr Eri Hassan. DTC will continue to follow Patient with known DM on Medtronic 670 insulin pump PTA. She is well known to the DTC from previous admissions - last seen 2019 A1c:  
Lab Results Component Value Date/Time Hemoglobin A1c 11.1 (H) 2016 12:38 PM  
 
 
Recent Glucose Results:  
Lab Results Component Value Date/Time GLU 86 05/10/2019 09:08 AM  
  (H) 2019 05:14 PM  
 GLUCPOC 104 (H) 05/10/2019 09:33 AM  
 GLUCPOC 75 05/10/2019 08:25 AM  
 GLUCPOC 71 05/10/2019 07:23 AM  
  
 
Lab Results Component Value Date/Time Creatinine 1.30 (H) 05/10/2019 09:08 AM  
 
CrCl cannot be calculated (Unknown ideal weight. ). Active Orders Diet DIET CLEAR LIQUID  
  
 
PO intake: No data found. Will continue to follow as needed. Thank you. Emmanuelle Boyd RN, CDE Pager [phone] Time spent: 8 min

## 2019-05-11 LAB
ALBUMIN SERPL-MCNC: 1.4 G/DL (ref 3.5–5)
ALBUMIN/GLOB SERPL: 0.5 {RATIO} (ref 1.1–2.2)
ALP SERPL-CCNC: 77 U/L (ref 45–117)
ALT SERPL-CCNC: 11 U/L (ref 12–78)
ANION GAP SERPL CALC-SCNC: 11 MMOL/L (ref 5–15)
AST SERPL-CCNC: 24 U/L (ref 15–37)
BASOPHILS # BLD: 0 K/UL (ref 0–0.1)
BASOPHILS NFR BLD: 0 % (ref 0–1)
BILIRUB SERPL-MCNC: 0.1 MG/DL (ref 0.2–1)
BUN SERPL-MCNC: 21 MG/DL (ref 6–20)
BUN/CREAT SERPL: 19 (ref 12–20)
CALCIUM SERPL-MCNC: 7.5 MG/DL (ref 8.5–10.1)
CHLORIDE SERPL-SCNC: 103 MMOL/L (ref 97–108)
CO2 SERPL-SCNC: 23 MMOL/L (ref 21–32)
CREAT SERPL-MCNC: 1.11 MG/DL (ref 0.55–1.02)
DIFFERENTIAL METHOD BLD: ABNORMAL
EOSINOPHIL # BLD: 0.1 K/UL (ref 0–0.4)
EOSINOPHIL NFR BLD: 1 % (ref 0–7)
ERYTHROCYTE [DISTWIDTH] IN BLOOD BY AUTOMATED COUNT: 17.5 % (ref 11.5–14.5)
GLOBULIN SER CALC-MCNC: 3.1 G/DL (ref 2–4)
GLUCOSE BLD STRIP.AUTO-MCNC: 104 MG/DL (ref 65–100)
GLUCOSE BLD STRIP.AUTO-MCNC: 114 MG/DL (ref 65–100)
GLUCOSE BLD STRIP.AUTO-MCNC: 118 MG/DL (ref 65–100)
GLUCOSE BLD STRIP.AUTO-MCNC: 189 MG/DL (ref 65–100)
GLUCOSE BLD STRIP.AUTO-MCNC: 216 MG/DL (ref 65–100)
GLUCOSE BLD STRIP.AUTO-MCNC: 223 MG/DL (ref 65–100)
GLUCOSE BLD STRIP.AUTO-MCNC: 238 MG/DL (ref 65–100)
GLUCOSE BLD STRIP.AUTO-MCNC: 68 MG/DL (ref 65–100)
GLUCOSE BLD STRIP.AUTO-MCNC: 73 MG/DL (ref 65–100)
GLUCOSE BLD STRIP.AUTO-MCNC: 90 MG/DL (ref 65–100)
GLUCOSE SERPL-MCNC: 58 MG/DL (ref 65–100)
HCT VFR BLD AUTO: 26.1 % (ref 35–47)
HGB BLD-MCNC: 8.1 G/DL (ref 11.5–16)
IMM GRANULOCYTES # BLD AUTO: 0.1 K/UL (ref 0–0.04)
IMM GRANULOCYTES NFR BLD AUTO: 0 % (ref 0–0.5)
LYMPHOCYTES # BLD: 1.1 K/UL (ref 0.8–3.5)
LYMPHOCYTES NFR BLD: 9 % (ref 12–49)
MCH RBC QN AUTO: 25.3 PG (ref 26–34)
MCHC RBC AUTO-ENTMCNC: 31 G/DL (ref 30–36.5)
MCV RBC AUTO: 81.6 FL (ref 80–99)
MONOCYTES # BLD: 0.7 K/UL (ref 0–1)
MONOCYTES NFR BLD: 5 % (ref 5–13)
NEUTS SEG # BLD: 10.8 K/UL (ref 1.8–8)
NEUTS SEG NFR BLD: 85 % (ref 32–75)
NRBC # BLD: 0 K/UL (ref 0–0.01)
NRBC BLD-RTO: 0 PER 100 WBC
PLATELET # BLD AUTO: 214 K/UL (ref 150–400)
PMV BLD AUTO: 11.8 FL (ref 8.9–12.9)
POTASSIUM SERPL-SCNC: 4.3 MMOL/L (ref 3.5–5.1)
PROT SERPL-MCNC: 4.5 G/DL (ref 6.4–8.2)
RBC # BLD AUTO: 3.2 M/UL (ref 3.8–5.2)
SERVICE CMNT-IMP: ABNORMAL
SERVICE CMNT-IMP: NORMAL
SODIUM SERPL-SCNC: 137 MMOL/L (ref 136–145)
WBC # BLD AUTO: 12.8 K/UL (ref 3.6–11)

## 2019-05-11 PROCEDURE — 65410000002 HC RM PRIVATE OB

## 2019-05-11 PROCEDURE — 74011250636 HC RX REV CODE- 250/636: Performed by: OBSTETRICS & GYNECOLOGY

## 2019-05-11 PROCEDURE — 77030018846 HC SOL IRR STRL H20 ICUM -A

## 2019-05-11 PROCEDURE — 74011250637 HC RX REV CODE- 250/637: Performed by: ANESTHESIOLOGY

## 2019-05-11 PROCEDURE — 82962 GLUCOSE BLOOD TEST: CPT

## 2019-05-11 PROCEDURE — 36415 COLL VENOUS BLD VENIPUNCTURE: CPT

## 2019-05-11 PROCEDURE — 74011250637 HC RX REV CODE- 250/637: Performed by: OBSTETRICS & GYNECOLOGY

## 2019-05-11 PROCEDURE — 74011636637 HC RX REV CODE- 636/637: Performed by: INTERNAL MEDICINE

## 2019-05-11 PROCEDURE — 80053 COMPREHEN METABOLIC PANEL: CPT

## 2019-05-11 PROCEDURE — 85025 COMPLETE CBC W/AUTO DIFF WBC: CPT

## 2019-05-11 RX ORDER — OXYCODONE AND ACETAMINOPHEN 5; 325 MG/1; MG/1
2 TABLET ORAL
Status: DISCONTINUED | OUTPATIENT
Start: 2019-05-11 | End: 2019-05-14 | Stop reason: HOSPADM

## 2019-05-11 RX ORDER — INSULIN LISPRO 100 [IU]/ML
2 INJECTION, SOLUTION INTRAVENOUS; SUBCUTANEOUS ONCE
Status: COMPLETED | OUTPATIENT
Start: 2019-05-11 | End: 2019-05-11

## 2019-05-11 RX ORDER — ACETAMINOPHEN 325 MG/1
650 TABLET ORAL
Status: DISCONTINUED | OUTPATIENT
Start: 2019-05-11 | End: 2019-05-14 | Stop reason: HOSPADM

## 2019-05-11 RX ORDER — OXYCODONE AND ACETAMINOPHEN 5; 325 MG/1; MG/1
1 TABLET ORAL
Status: DISCONTINUED | OUTPATIENT
Start: 2019-05-11 | End: 2019-05-14 | Stop reason: HOSPADM

## 2019-05-11 RX ADMIN — Medication 10 ML: at 06:15

## 2019-05-11 RX ADMIN — INSULIN LISPRO 2 UNITS: 100 INJECTION, SOLUTION INTRAVENOUS; SUBCUTANEOUS at 02:37

## 2019-05-11 RX ADMIN — LABETALOL HYDROCHLORIDE 300 MG: 100 TABLET, FILM COATED ORAL at 20:25

## 2019-05-11 RX ADMIN — Medication 10 ML: at 23:52

## 2019-05-11 RX ADMIN — Medication 10 ML: at 15:22

## 2019-05-11 RX ADMIN — Medication 10 ML: at 23:53

## 2019-05-11 RX ADMIN — INSULIN LISPRO 2 UNITS: 100 INJECTION, SOLUTION INTRAVENOUS; SUBCUTANEOUS at 17:37

## 2019-05-11 RX ADMIN — OXYCODONE HYDROCHLORIDE AND ACETAMINOPHEN 2 TABLET: 5; 325 TABLET ORAL at 18:14

## 2019-05-11 RX ADMIN — OXYCODONE HYDROCHLORIDE 5 MG: 5 TABLET ORAL at 04:09

## 2019-05-11 RX ADMIN — SODIUM CHLORIDE, SODIUM LACTATE, POTASSIUM CHLORIDE, AND CALCIUM CHLORIDE 75 ML/HR: 600; 310; 30; 20 INJECTION, SOLUTION INTRAVENOUS at 02:17

## 2019-05-11 RX ADMIN — Medication 10 ML: at 15:21

## 2019-05-11 RX ADMIN — FERROUS SULFATE TAB 325 MG (65 MG ELEMENTAL FE) 325 MG: 325 (65 FE) TAB at 09:29

## 2019-05-11 RX ADMIN — Medication 10 ML: at 15:20

## 2019-05-11 RX ADMIN — INSULIN LISPRO 1 UNITS: 100 INJECTION, SOLUTION INTRAVENOUS; SUBCUTANEOUS at 00:40

## 2019-05-11 RX ADMIN — MAGNESIUM SULFATE 2 G/HR: 500 INJECTION, SOLUTION INTRAMUSCULAR; INTRAVENOUS at 01:03

## 2019-05-11 RX ADMIN — OXYCODONE HYDROCHLORIDE AND ACETAMINOPHEN 2 TABLET: 5; 325 TABLET ORAL at 12:13

## 2019-05-11 RX ADMIN — LABETALOL HCL 300 MG: 200 TABLET, FILM COATED ORAL at 02:39

## 2019-05-11 RX ADMIN — INSULIN LISPRO 2 UNITS: 100 INJECTION, SOLUTION INTRAVENOUS; SUBCUTANEOUS at 16:37

## 2019-05-11 RX ADMIN — INSULIN GLARGINE 6 UNITS: 100 INJECTION, SOLUTION SUBCUTANEOUS at 09:04

## 2019-05-11 RX ADMIN — Medication 1 TABLET: at 09:30

## 2019-05-11 RX ADMIN — INSULIN LISPRO 2 UNITS: 100 INJECTION, SOLUTION INTRAVENOUS; SUBCUTANEOUS at 12:14

## 2019-05-11 RX ADMIN — Medication 10 ML: at 23:54

## 2019-05-11 NOTE — ROUTINE PROCESS
TRANSFER - IN REPORT: 
 
Verbal report received from Kendrick Moscoso RN(name) on [employer]  being received from L & D(unit) for routine progression of care Report consisted of patients Situation, Background, Assessment and  
Recommendations(SBAR). Information from the following report(s) SBAR was reviewed with the receiving nurse. Opportunity for questions and clarification was provided. Assessment completed upon patients arrival to unit and care assumed. 1230-Pt escorted to NICU. 
1400-Called to NICU to check on pt. 
1500-Pt escorted back to her room. 1730-Pt requested I call Dr. Cristal Jules due to having 2 AC Blood Sugars over 200. Dr. Cristal Jules gave orders to administer 2 units of Humalog Insulin in addition to sliding scale as a one time dose to give coverage for the meal she was getting ready to eat.   He ordered 2 units of Humalog Insulin be given to cover her dinner if she eats an additional meal.

## 2019-05-11 NOTE — CONSULTS
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 Underlying DM nephropathy     Nephrotic syndrome: DM nephropathy complicated by pre-eclampsia    HTN: Labetalol dose decreased    DM1: Poorly controlled. Dr. Aidee Metcalf (endocrinology) helping manage blood sugars    Anemia: On ferrous sulfate    33wk   delivery on 5/10/19    Plan/Recommendations:  Avoid NSAIDS  Would benefit from Nephrology f/u post discharge  BP management per OB team. Agree with decreasing labetalol  Insulin per Dr. Monica Scott with patient and RN    Thanks for the consultation. Renal service will follow patient with you. Please contact me with any questions or concerns. Initial Consult note         Patient name: Mert Jefferson  MR no: [id number]  Date of admission: 2019  Date of consultation: 2019  Requested by: Dr. Chet Shepard  Reason for consult: MARIS/CKD    Patient seen and examined. History obtained from patient and chart review. Relevant labs, data and notes reviewed. HPI: Mert Jefferson is a 30 y.o. female with PMH significant for CKD stage 2/3, DM1 (since age 13), HTN recently delivered   at 31wks via  on 5/10/19. No complications noted during surgery. Pregnancy complicated by pre-eclampsia and nephrotic range proteinuria. Nephrology consulted to evaluate MARIS/CKD. Patient's serum Cr holding steady at 1.1-1.3mg/dl. HTN better-> mildly hypotensive this morning.  No NSAIDS noted on MAR    PMH:  Past Medical History:   Diagnosis Date    Anemia     Chronic pain     Depression     Diabetes type 1, uncontrolled ([address]     DKA, type 1 ([address]     Essential hypertension     Heart murmur     Herpes simplex virus (HSV) infection 2017    positive in blood    Peripheral neuropathy     Ventricular tachycardia (HCC)      PSH:  Past Surgical History:   Procedure Laterality Date    ABDOMEN SURGERY PROC UNLISTED      exploratory laparoscopy    HX CYST REMOVAL      groin       Social history:   Social History     Tobacco Use    Smoking status: Former Smoker     Packs/day: 0.25     Years: 8.00     Pack years: 2.00     Types: Cigarettes     Last attempt to quit: 10/26/2014     Years since quittin.5    Smokeless tobacco: Never Used   Substance Use Topics    Alcohol use: No     Alcohol/week: 0.0 oz    Drug use: Yes     Frequency: 2.0 times per week     Types: Marijuana       Family history:  No history of CKD or ESRD in the family.      Allergies   Allergen Reactions    Morphine Other (comments)    Pcn [Penicillins] Hives       Current Facility-Administered Medications   Medication Dose Route Frequency Last Dose    labetalol (NORMODYNE) tablet 300 mg  300 mg Oral Q12H      acetaminophen (TYLENOL) tablet 650 mg  650 mg Oral Q4H PRN      oxyCODONE-acetaminophen (PERCOCET) 5-325 mg per tablet 1 Tab  1 Tab Oral Q4H PRN      oxyCODONE-acetaminophen (PERCOCET) 5-325 mg per tablet 2 Tab  2 Tab Oral Q4H PRN      glucose chewable tablet 16 g  16 g Oral PRN      magnesium sulfate 10 gm/250 mL D5W infusion  2 g/hr IntraVENous CONTINUOUS Stopped at 19 0302    labetalol (NORMODYNE;TRANDATE) 20 mg/4 mL (5 mg/mL) injection 80 mg  80 mg IntraVENous Q10MIN PRN      ondansetron (ZOFRAN ODT) tablet 8 mg  8 mg Oral Q8H PRN      oxytocin (PITOCIN) 20 units/1000 ml LR  125-1,000 mL/hr IntraVENous TITRATE      sodium chloride (NS) flush 5-40 mL  5-40 mL IntraVENous Q8H 10 mL at 19 0615    sodium chloride (NS) flush 5-40 mL  5-40 mL IntraVENous PRN      nalbuphine (NUBAIN) injection 5 mg  5 mg IntraVENous Q5MIN PRN      sodium chloride (NS) flush 5-40 mL  5-40 mL IntraVENous Q8H 10 mL at 19 0615    sodium chloride (NS) flush 5-40 mL  5-40 mL IntraVENous PRN 20 mL at 05/10/19 1036    oxytocin (PITOCIN) 20 units/1000 ml LR  125 mL/hr IntraVENous PRN      oxytocin (PITOCIN) 20 units/1000 ml LR  999 mL/hr IntraVENous PRN      docusate sodium (COLACE) capsule 100 mg  100 mg Oral DAILY PRN      witch hazel-glycerin (TUCKS) 12.5-50 % pads 1 Pad  1 Pad PeriANAL PRN      hydrocortisone (CORTAID) 1 % cream   Topical PRN      modified lanolin (LANSINOH) cream   Topical PRN      hydrocortisone-pramoxine (ANALPRAM-HC) 2.5-1 % (4g) cream   PeriANAL PRN      aluminum-magnesium hydroxide (MAALOX) oral suspension 30 mL  30 mL Oral PRN      simethicone (MYLICON) tablet 80 mg  80 mg Oral QID PRN      ammonia aromatic 15% (W/V) ampule for inhalation  1 Ampule Inhalation PRN      ondansetron (ZOFRAN) injection 4 mg  4 mg IntraVENous Q6H PRN      prochlorperazine (COMPAZINE) with saline injection 5 mg  5 mg IntraVENous Q6H PRN      dextrose 5% lactated ringers infusion  100 mL/hr IntraVENous CONTINUOUS Stopped at 05/11/19 0218    benzocaine-menthol (CEPACOL) lozenge 1 Lozenge  1 Lozenge Mucous Membrane PRN 1 Lozenge at 05/10/19 1157    insulin glargine (LANTUS) injection 6 Units  6 Units SubCUTAneous DAILY 6 Units at 05/11/19 0904    insulin lispro (HUMALOG) injection   SubCUTAneous AC&HS 2 Units at 05/11/19 0237    dextrose (D50W) injection syrg 12.5-25 g  12.5-25 g IntraVENous PRN      lactated Ringers infusion  100 mL/hr IntraVENous CONTINUOUS Stopped at 05/11/19 0931    sodium chloride (NS) flush 5-40 mL  5-40 mL IntraVENous Q8H 10 mL at 05/11/19 0615    sodium chloride (NS) flush 5-40 mL  5-40 mL IntraVENous PRN      zolpidem (AMBIEN) tablet 5 mg  5 mg Oral QHS PRN      ferrous sulfate tablet 325 mg  1 Tab Oral DAILY WITH BREAKFAST 325 mg at 05/11/19 0929    diphenhydrAMINE (BENADRYL) capsule 25 mg  25 mg Oral Q4H PRN      docusate sodium (COLACE) capsule 100 mg  100 mg Oral BID PRN      famotidine (PEPCID) tablet 20 mg  20 mg Oral Q12H PRN      prenatal vit-iron fumarate-fa (PRENATAL PLUS with IRON) tablet 1 Tab  1 Tab Oral DAILY 1 Tab at 05/11/19 0930    insulin pump (PATIENT SUPPLIED)   SubCUTAneous PRN      glucose chewable tablet 16 g  4 Tab Oral PRN 16 g at 05/10/19 0214    dextrose (D50W) injection syrg 12.5-25 g  12.5-25 g IntraVENous PRN 25 g at 05/10/19 0603    glucagon (GLUCAGEN) injection 1 mg  1 mg IntraMUSCular PRN      scopolamine (TRANSDERM-SCOP) 1 mg over 3 days 1 Patch  1 Patch TransDERmal Q72H 1 Patch at 19 1755    nalbuphine (NUBAIN) injection 10 mg  10 mg IntraVENous Q3H PRN 10 mg at 19 2200    promethazine (PHENERGAN) 25 mg in 0.9% sodium chloride 50 mL IVPB  25 mg IntraVENous Q6H PRN 25 mg at 05/10/19 1819       ROS (besides HPI):    General: No fever. No weight changes  ENT: No hearing loss or visual changes  Cardiovascular: No Chest pain  Pulmonary: No SOB  GI: No abdominal pain. No Nausea/Vomiting/Diarrhea. No blood in stool  : No blood in urine. No foamy or cloudy urine  Musculoskeletal: No joint swelling or redness. No morning stiffness  Endocrine: no cold or heat intolerance  Psych: denies anxiety or depression  Neuro: No light headedness or dizziness    Objective   Visit Vitals  /82   Pulse 69   Temp 98.1 °F (36.7 °C)   Resp 16   Ht 5' 8\" (1.727 m)   SpO2 97%   Breastfeeding? Unknown   BMI 24.27 kg/m²       Physical Exam:    Gen: NAD    HEENT: AT/NC, EOMI, moist mucous membrane, no scleral icterus    Neck: no JVD, no cervical lymphadenopathy, no carotid bruit    Lungs/Chest wall: Breath sounds normal. Symmetrical chest wall expansion. No accessory muscle use. Clear to auscultation    Cardiovascular: Normal S1/S2, normal rate, regular rhythm. Abdomen: soft, TTP.  incision    Ext: no clubbing or cyanosis. No edema    Skin: warm and dry. No rashes    : no CVA tenderness    CNS: alert awake. Answers appropriately.      Labs/Data:    Lab Results   Component Value Date/Time    Sodium 137 2019 06:29 AM    Potassium 4.3 2019 06:29 AM    Chloride 103 2019 06:29 AM    CO2 23 2019 06:29 AM    Anion gap 11 2019 06:29 AM    Glucose 58 (L) 2019 06:29 AM    BUN 21 (H) 05/11/2019 06:29 AM    Creatinine 1.11 (H) 05/11/2019 06:29 AM    BUN/Creatinine ratio 19 05/11/2019 06:29 AM    GFR est AA >60 05/11/2019 06:29 AM    GFR est non-AA 57 (L) 05/11/2019 06:29 AM    Calcium 7.5 (L) 05/11/2019 06:29 AM       Lab Results   Component Value Date/Time    WBC 12.8 (H) 05/11/2019 06:29 AM    Hemoglobin (POC) 15.6 05/13/2014 11:59 PM    HGB 8.1 (L) 05/11/2019 06:29 AM    Hematocrit (POC) 46 05/13/2014 11:59 PM    HCT 26.1 (L) 05/11/2019 06:29 AM    PLATELET 593 01/61/6084 06:29 AM    MCV 81.6 05/11/2019 06:29 AM       Urine analysis:   Results for orders placed or performed in visit on 12/03/13   AMB POC URINALYSIS DIP STICK MANUAL W/O MICRO     Status: None   Result Value Ref Range Status    Color (UA POC) Yellow  Final    Clarity (UA POC) Slightly Cloudy  Final    Glucose (UA POC) 2+ Negative Final    Bilirubin (UA POC) 3+ Negative Final    Ketones (UA POC) 2+ Negative Final    Specific gravity (UA POC) 1.020 1.001 - 1.035 Final    Blood (UA POC) 2+ Negative Final    pH (UA POC) 6.0 4.6 - 8.0 Final    Protein (UA POC) Trace Negative mg/dL Final    Urobilinogen (UA POC) 0.2 mg/dL 0.2 - 1 Final    Nitrites (UA POC) Negative Negative Final    Leukocyte esterase (UA POC) 1+ Negative Final           No components found for: SPEP, UPEP  No results found for: PUQ, PROTU2, PROTU1, BJP1, CPE1, IMEL1, MET2  Lab Results   Component Value Date/Time    Microalb/Creat ratio (ug/mg creat.) 58.9 (H) 01/05/2016 09:50 AM         Intake/Output Summary (Last 24 hours) at 5/11/2019 1016  Last data filed at 5/11/2019 0443  Gross per 24 hour   Intake 2602 ml   Output 3245 ml   Net -643 ml       Wt Readings from Last 3 Encounters:   05/02/19 72.4 kg (159 lb 9.6 oz)   04/23/19 72.6 kg (160 lb)   04/18/19 73.9 kg (163 lb)       Signed by:  Diana Pierre MD  Nephrology and Hypertension  [facility]

## 2019-05-11 NOTE — PROGRESS NOTES
~1950: Bedside and Verbal shift change report given to A. Refugio Koyanagi by Marilyn León. Coleen Machuca RN. Report included the following information SBAR, MAR, Accordion and Recent Results.  
~2010: Dr Latasha Rao is at the nurses station. Discussed the plan of care for the patient. 
~2002: DEANNA Machuca, RN spoke with Dr Latasha Rao. Okay to decrease D5LR to 75ml/hr. 
~2100: The patient is pumping. She is awake, alert, and oriented. The patient is conversing with nurse and her boyfriend. Apple sauce is offered to the patient and she agrees to try some. ~2120: The patient vomited a small amount of green fluid. Declines antiemetic at this time. ~2145: The patient is sitting in bed coloring in her coloring book and watching TV.   
~2153: dr aLtasha Rao was called and made aware of the therapeutic magnesium result. No additional orders are received. Magnesium is to be discontinued at 0300. ~2230: The patient is asleep. She has not eaten any applesauce yet. Naldo@[employer]: The patient is given beef broth per request. 
~0217:  Blood sugar is 216. D5LR is stopped and LR is started. IV line is primed with LR. The patient is sipping broth. The patient reports pain of 4/10 in abdomen. The patient requests more IV pain medication. I discussed with the patient trying Tylenol by mouth or IV Tylenol instead of IV Dilaudid. I informed the patient that the IV Dilaudid makes her very drowsy and at times it is difficult to wake her up. The patient states that Tylenol does not work for her and refuses pain medication if she cannot have Dilaudid. 
~0302: Magnesium infusion is discontinued. The patient continues to sleep and does not wake when the nurse is in the room. ~0355: The patient is awake and texting on the phone when the nurse enters the room. I inquired about the patient's pain. The patient states \"I am still in a lot of pain\". Pain medication options ar discussed again with the patient. ~1615: The patient is up to a wheelchair with one person assist.  The patient dangled at the bedside and denies dizziness or shortness of breath. The patient is steady on her feet when ambulating to the wheelchair. ~8497: The patient is in NICU visiting with her baby. Nurse remains with the patient. ~0600: The patient is back to her room after visiting her baby in NICU.   
~0615: Blood sugar is taken and the result is 68. The patient denies symptoms of low blood sugar. The patient is given apple juice and the blood sugar will be repeated. ~0625: The patient is finished pumping. Small amount of colostrum is collected. ~6709: Repeat blood sugar is 73. The patient still denies hypoglycemia symptoms. More apple juice is given to the patient.  
~0707: Recheck of blood sugar is 90. The patient is given a menu to order breakfast because she states that her appetite is improving.  
~2475: Bedside and Verbal shift change report given to DEANNA Ibarra RN by TINO Red RN. Report included the following information SBAR, MAR, Accordion and Recent Results.

## 2019-05-11 NOTE — PROGRESS NOTES
Anesthesia Post Operative Day 1 The patient is status post C Section with spinal  anesthesia and Duramorph for pain. The patient relates the following scales: pain,mild ; itching, none ; nausea, none. All symptoms were treated with medications per protocol. The patient is up and ambulating and has minimal complaints. Plan: Continue to treat breakthrough symptoms as needed with protocol medications.

## 2019-05-11 NOTE — PROGRESS NOTES
0750 Bedside and Verbal shift change report given to ROXANA boes RN (oncoming nurse) by Gayatri Castanon RN (offgoing nurse). Report included the following information SBAR, Kardex, Procedure Summary, Intake/Output, MAR and Recent Results. 0815 . Served diet 7768 Dr Edgar Greene in to see patient 0830 Dr Feli Franz (from nephrology) in to see patient. Discourages use of ibuprofen, even for short term use. Will discontinue order. States that he will check in on patient tomorrow and would ask her to follow up in clinic after discharge 1020 TRANSFER - OUT REPORT: 
 
Verbal report given to A Criptext (name) on [facility]  being transferred to MIU(unit) for routine progression of care Report consisted of patients Situation, Background, Assessment and  
Recommendations(SBAR). Information from the following report(s) SBAR, Kardex, Procedure Summary and Recent Results was reviewed with the receiving nurse. Lines:  
Triple Lumen 05/09/19 Right (Active) Central Line Being Utilized Yes 5/11/2019 12:00 AM  
Criteria for Appropriate Use Limited/no vessel suitable for conventional peripheral access 5/10/2019  8:00 PM  
Site Assessment Clean, dry, & intact 5/11/2019 12:00 AM  
Infiltration Assessment 0 5/11/2019 12:00 AM  
Affected Extremity/Extremities Color distal to insertion site pink (or appropriate for race); Pulses palpable;Range of motion performed 5/10/2019  8:00 PM  
Dressing Status Clean, dry, & intact 5/11/2019 12:00 AM  
Dressing Type Transparent 5/11/2019 12:00 AM  
Distal Hub Color/Line Status Yellow 5/10/2019  9:15 AM  
Alcohol Cap Used Yes 5/10/2019  8:00 PM  
   
Subcutaneous Insulin Infusion Device 02/09/19 (Active) Site Assessment Clean, dry, & intact 5/10/2019  8:30 AM  
Patient reported basal rate  .9 5/9/2019 10:48 PM  
BOLUS (in Units) given via pump 0.8 5/9/2019 10:48 PM  
  
 
Opportunity for questions and clarification was provided. Patient transported with: 
 Registered Nurse

## 2019-05-11 NOTE — PROGRESS NOTES
Endocrinology Progress Note Chart reviewed remotely. Glucoses overnight reviewed: 
Component GLUCOSE,FAST - POC Latest Ref Rng & Units 65 - 100 mg/dL 5/11/2019 
    6:41 AM 73  
5/11/2019 6:15 AM 68  
5/11/2019 
    4:38  (H)  
5/11/2019 2:15  (H)  
5/11/2019 
    12:18  (H) She received 1 unit Humalog for glucose of 189, then 2 units 2 hour later for glucose of 216 and then D5LR was stopped at 2 am. 
 
Diabetes: 1. Diabetes: - Lows overnight could have been due in part to the two Humalog doses 'stacking' as Humalog has a 4 hour duration and takes 4 hours to display full effect (3 units given within 2 hours). Glucoses were stable to increasing on D5LR at 75 cc/hr. Stopping D5 LR likely also played a role in the decrease. Recommendations: Hopefully she'll eat some breakfast and lunch today We'll continue Lantus 6 units daily Will review chart around midday to evaluate for adding a Humalog dose after meals based on meal consumption Please feel free to call with questions [phone]

## 2019-05-11 NOTE — PROGRESS NOTES
Progress Note Patient: Osmar Dunham MRN: [id number]  SSN: [id number] YOB: 1990  Age: 30 y.o. Sex: female 1 Day Post-Op Subjective:  
 
Patient doing well postpartum without significant complaints. Voiding without difficulty. Her endocrinologist saw her this morning and nephrologist saw her as well. Patient reports normal lochia. She has questions mostly about her baby. Objective:  
 
Patient Vitals for the past 18 hrs: 
 Temp Pulse Resp BP SpO2  
19 0708  64  98/67   
19 0603  67  98/65   
19 0356  66  (!) 125/92 97 % 19 0302  64  (!) 128/91 98 % 19 0204  65 16 (!) 136/95   
19 0101  66 14 (!) 136/94   
19 0002 97.8 °F (36.6 °C) 68 16 (!) 148/91   
05/10/19 2300  64  137/87 95 % 05/10/19 2159  64 18 (!) 138/92   
05/10/19 2055  79 18 143/90 98 % 05/10/19 2002 98 °F (36.7 °C) 65 16 (!) 143/98   
05/10/19 1907    (!) 139/101   
05/10/19 1906 97.8 °F (36.6 °C) 66 14 (!) 139/101   
05/10/19 1834  66  (!) 146/99 96 % 05/10/19 1833   16 (!) 147/102   
05/10/19 1704  66 16 132/88 99 % 05/10/19 1600 97.6 °F (36.4 °C) 66 14 136/86   
05/10/19 1559     99 % Temp (24hrs), Av.8 °F (36.6 °C), Min:97.6 °F (36.4 °C), Max:98 °F (36.7 °C) Physical Exam:   
General:   Patient without distress. Abdomen: Soft, fundus firm at level of umbilicus, nontender Incision: Clean, dry, and intact without erythema Lower Extremities: Negative for swelling, cords, or tenderness Lab/Data Review: CBC:   
Recent Labs 19 
9199 05/10/19 
2461 WBC 12.8* 13.3* HGB 8.1* 7.2* HCT 26.1* 22.6*  
 208 BMP/CMP:   
Recent Labs 19 
6068 05/10/19 
2901  139  
K 4.3 4.5  
 108 CO2 23 23 AGAP 11 8 GLU 58* 86 BUN 21* 26* CREA 1.11* 1.30* GFRAA >60 58* GFRNA 57* 48*  
CA 7.5* 7.8*  
MG  --  4.6* ALB 1.4* 1.5* TP 4.5* 4.3*  
 AP 77 68 GLOB 3.1 2.8 AGRAT 0.5* 0.5* SGOT 24 20 ALT 11* 9* Assessment and Plan: POD #1 s/p primary c/s for NRFHTs, severe pre-e @ 32 weeks - Mg off; doing well overall - Type 1 IDDM: Insulin being managed by her endocrinologist Dr. Letty Madden. Really appreciate his help - Chronic kidney disease. Creatine 1.11 today (down from 1.3). Nephrologist saw her this morning. Recommend outpatient follow up and no NSAIDs post-op. Will do percocet and then just tylenol 
- CHTN: her blood pressure dropped too much with the labetalol so will decrease it to BID instead of TID I reviewed what to expect in terms of post-op course.

## 2019-05-12 LAB
GLUCOSE BLD STRIP.AUTO-MCNC: 108 MG/DL (ref 65–100)
GLUCOSE BLD STRIP.AUTO-MCNC: 165 MG/DL (ref 65–100)
GLUCOSE BLD STRIP.AUTO-MCNC: 254 MG/DL (ref 65–100)
GLUCOSE BLD STRIP.AUTO-MCNC: 415 MG/DL (ref 65–100)
GLUCOSE BLD STRIP.AUTO-MCNC: 99 MG/DL (ref 65–100)
SERVICE CMNT-IMP: ABNORMAL
SERVICE CMNT-IMP: NORMAL

## 2019-05-12 PROCEDURE — 74011636637 HC RX REV CODE- 636/637: Performed by: INTERNAL MEDICINE

## 2019-05-12 PROCEDURE — 74011250637 HC RX REV CODE- 250/637: Performed by: OBSTETRICS & GYNECOLOGY

## 2019-05-12 PROCEDURE — 65410000002 HC RM PRIVATE OB

## 2019-05-12 PROCEDURE — 82962 GLUCOSE BLOOD TEST: CPT

## 2019-05-12 RX ORDER — INSULIN LISPRO 100 [IU]/ML
2 INJECTION, SOLUTION INTRAVENOUS; SUBCUTANEOUS
Status: DISCONTINUED | OUTPATIENT
Start: 2019-05-12 | End: 2019-05-14 | Stop reason: HOSPADM

## 2019-05-12 RX ORDER — INSULIN LISPRO 100 [IU]/ML
5 INJECTION, SOLUTION INTRAVENOUS; SUBCUTANEOUS ONCE
Status: ACTIVE | OUTPATIENT
Start: 2019-05-12 | End: 2019-05-12

## 2019-05-12 RX ORDER — INSULIN LISPRO 100 [IU]/ML
1-2 INJECTION, SOLUTION INTRAVENOUS; SUBCUTANEOUS AS NEEDED
Status: DISCONTINUED | OUTPATIENT
Start: 2019-05-12 | End: 2019-05-14 | Stop reason: HOSPADM

## 2019-05-12 RX ADMIN — OXYCODONE HYDROCHLORIDE AND ACETAMINOPHEN 2 TABLET: 5; 325 TABLET ORAL at 13:08

## 2019-05-12 RX ADMIN — OXYCODONE HYDROCHLORIDE AND ACETAMINOPHEN 2 TABLET: 5; 325 TABLET ORAL at 00:17

## 2019-05-12 RX ADMIN — Medication 10 ML: at 23:30

## 2019-05-12 RX ADMIN — OXYCODONE HYDROCHLORIDE AND ACETAMINOPHEN 2 TABLET: 5; 325 TABLET ORAL at 23:22

## 2019-05-12 RX ADMIN — INSULIN LISPRO 2 UNITS: 100 INJECTION, SOLUTION INTRAVENOUS; SUBCUTANEOUS at 09:21

## 2019-05-12 RX ADMIN — Medication 10 ML: at 16:00

## 2019-05-12 RX ADMIN — Medication 10 ML: at 07:17

## 2019-05-12 RX ADMIN — INSULIN LISPRO 3 UNITS: 100 INJECTION, SOLUTION INTRAVENOUS; SUBCUTANEOUS at 13:22

## 2019-05-12 RX ADMIN — INSULIN LISPRO 2 UNITS: 100 INJECTION, SOLUTION INTRAVENOUS; SUBCUTANEOUS at 19:00

## 2019-05-12 RX ADMIN — OXYCODONE HYDROCHLORIDE AND ACETAMINOPHEN 2 TABLET: 5; 325 TABLET ORAL at 17:24

## 2019-05-12 RX ADMIN — LABETALOL HYDROCHLORIDE 300 MG: 100 TABLET, FILM COATED ORAL at 09:21

## 2019-05-12 RX ADMIN — Medication 1 TABLET: at 09:19

## 2019-05-12 RX ADMIN — Medication 10 ML: at 16:17

## 2019-05-12 RX ADMIN — INSULIN LISPRO 2 UNITS: 100 INJECTION, SOLUTION INTRAVENOUS; SUBCUTANEOUS at 13:21

## 2019-05-12 RX ADMIN — INSULIN LISPRO 5 UNITS: 100 INJECTION, SOLUTION INTRAVENOUS; SUBCUTANEOUS at 07:54

## 2019-05-12 RX ADMIN — Medication 10 ML: at 16:16

## 2019-05-12 RX ADMIN — OXYCODONE HYDROCHLORIDE AND ACETAMINOPHEN 2 TABLET: 5; 325 TABLET ORAL at 06:06

## 2019-05-12 RX ADMIN — Medication 10 ML: at 07:18

## 2019-05-12 RX ADMIN — LABETALOL HYDROCHLORIDE 300 MG: 100 TABLET, FILM COATED ORAL at 23:22

## 2019-05-12 RX ADMIN — INSULIN GLARGINE 6 UNITS: 100 INJECTION, SOLUTION SUBCUTANEOUS at 09:20

## 2019-05-12 RX ADMIN — FERROUS SULFATE TAB 325 MG (65 MG ELEMENTAL FE) 325 MG: 325 (65 FE) TAB at 09:19

## 2019-05-12 NOTE — PROGRESS NOTES
Endocrinology Progress Note Reviewed chart remotely and have been in communication with nurse yesterday and overnight. At dinner (very late lunch) she had 2 units Humalog for glucose in low 200s and 2 units for meal.  This seemed to work well and glucose was 114 at bedtime She reportedly has noted an increase in her appetite and snacked throughout the night on ervin crackers and peanut butter. This may explain very high value this AM. Nurse also reports that she is starting to pump and has some milk coming in, which may be contributing to the increase in appetite. Glucoses noted: 
Component GLUCOSE,FAST - POC Latest Ref Rng & Units 65 - 100 mg/dL 5/12/2019 
    7:34  (H)  
5/11/2019 
    9:37  (H)  
5/11/2019 4:17  (H)  
5/11/2019 
    12:03  (H)  
5/11/2019 
    8:15  (H) Insulin: 
Lantus - 6 units daily Humalog correction Plan: 1. Diabetes type I 
- due to snacking last night, hard to determine if Lantus 6 units is appropriate or not. This dose worked well night prior, so will continue - Will add scheduled 2 units Humalog with meals today 
- continue Humalog correction She will receive 5 units Humalog now for glucose of 415 and then 2 units with breakfast for 7 units total 
 
Encouraged nurse to communicate to Ms Dionisio Chacon to aim to eat all of her meals and to limit snacking overnight However, will also add a Humalog order of 1 unit for every 20-30 g carb consumed as a 'snack' to be given prn. Please feel free to contact me - [phone] I will see her in the AM tomorrow.

## 2019-05-12 NOTE — PROGRESS NOTES
Post Operative Day #2- C section Ambulating and voiding; advancing diet as tolerated. Passing flatus. Pian well controlled. Trying to breast pump. VSS/AF Abd- soft and NT with fundus at or below umbilicus Breasts- NE Wound- c/d/i Extrem- negative edema or Prisca's Impression- POD #2 S/P C section doing well CHTN- BPs stable. S/P mag for 24 hrs. On labetalol 300 mg BID Type 1 DM - Sugars being managed by Dr. Valeri Osgood. Chronic Kidney disease- Outpatient follow up recommended. No NSAIDs postop.   
 
Quinn Rodgers MD

## 2019-05-12 NOTE — LACTATION NOTE
Mom continues to pump every 2-3 hours and any EBM obtained is put in the syringe, labeled and taken to the NICU for the infant. Mom had planned to try to latch infant this afternoon, but is having pain issues and is not able to do so at this time. Mom has \"bar\" nipple piercings. Mom will need to remove bars before attempting to latch infant.

## 2019-05-12 NOTE — ROUTINE PROCESS
Bedside shift change report given to [address] [place] [address] (oncoming nurse) by TINO Diaz RN (offgoing nurse). Report included the following information SBAR, Procedure Summary, Intake/Output, MAR and Recent Results.

## 2019-05-12 NOTE — PROGRESS NOTES
2587 Verbal shift change report given to Antonio Sahu RN (oncoming nurse) by Giorgi Washington RN (offgoing nurse). Report included the following information SBAR, Kardex, MAR and Recent Results. 1740 Note pt continues to graze on her lunch tray at this time. RN reoffered to remove tray as most food was eaten, and pt states she is still working on it, and saved the corn and a couple bites of meat. Pt states she has already ordered her supper tray but will eat later, after pumping, taking pain medication, and visiting daughter in the NICU. Pt has been educated on importance in eating at mealtimes rather than grazing in attempt to stabilize blood sugars as she doesn't have insulin pump in place anymore. 1800 Pt reports her supper tray is here and she's ready to have her blood sugar checked so she can eat.

## 2019-05-12 NOTE — PROGRESS NOTES
Patient in wheelchair with FOB to NICU to visit with baby. 2345 Returned from NICU, VS /84, triple lumen flushed as ordered.

## 2019-05-13 ENCOUNTER — APPOINTMENT (OUTPATIENT)
Dept: GENERAL RADIOLOGY | Age: 29
End: 2019-05-13
Attending: OBSTETRICS & GYNECOLOGY
Payer: MEDICAID

## 2019-05-13 LAB
GLUCOSE BLD STRIP.AUTO-MCNC: 121 MG/DL (ref 65–100)
GLUCOSE BLD STRIP.AUTO-MCNC: 126 MG/DL (ref 65–100)
GLUCOSE BLD STRIP.AUTO-MCNC: 180 MG/DL (ref 65–100)
SERVICE CMNT-IMP: ABNORMAL

## 2019-05-13 PROCEDURE — 74011250637 HC RX REV CODE- 250/637: Performed by: OBSTETRICS & GYNECOLOGY

## 2019-05-13 PROCEDURE — 82962 GLUCOSE BLOOD TEST: CPT

## 2019-05-13 PROCEDURE — 71045 X-RAY EXAM CHEST 1 VIEW: CPT

## 2019-05-13 PROCEDURE — 74011636637 HC RX REV CODE- 636/637: Performed by: INTERNAL MEDICINE

## 2019-05-13 PROCEDURE — 74011250636 HC RX REV CODE- 250/636: Performed by: OBSTETRICS & GYNECOLOGY

## 2019-05-13 PROCEDURE — 65410000002 HC RM PRIVATE OB

## 2019-05-13 PROCEDURE — 74011000258 HC RX REV CODE- 258: Performed by: OBSTETRICS & GYNECOLOGY

## 2019-05-13 RX ORDER — NIFEDIPINE 10 MG/1
10 CAPSULE ORAL ONCE
Status: COMPLETED | OUTPATIENT
Start: 2019-05-13 | End: 2019-05-13

## 2019-05-13 RX ADMIN — LABETALOL HYDROCHLORIDE 300 MG: 100 TABLET, FILM COATED ORAL at 08:32

## 2019-05-13 RX ADMIN — Medication 10 ML: at 08:37

## 2019-05-13 RX ADMIN — OXYCODONE HYDROCHLORIDE AND ACETAMINOPHEN 2 TABLET: 5; 325 TABLET ORAL at 20:37

## 2019-05-13 RX ADMIN — Medication 10 ML: at 17:42

## 2019-05-13 RX ADMIN — Medication 1 TABLET: at 08:32

## 2019-05-13 RX ADMIN — OXYCODONE HYDROCHLORIDE AND ACETAMINOPHEN 2 TABLET: 5; 325 TABLET ORAL at 09:46

## 2019-05-13 RX ADMIN — NIFEDIPINE 10 MG: 10 CAPSULE ORAL at 00:31

## 2019-05-13 RX ADMIN — LABETALOL HYDROCHLORIDE 300 MG: 100 TABLET, FILM COATED ORAL at 20:18

## 2019-05-13 RX ADMIN — OXYCODONE HYDROCHLORIDE AND ACETAMINOPHEN 2 TABLET: 5; 325 TABLET ORAL at 03:23

## 2019-05-13 RX ADMIN — INSULIN GLARGINE 6 UNITS: 100 INJECTION, SOLUTION SUBCUTANEOUS at 08:33

## 2019-05-13 RX ADMIN — FERROUS SULFATE TAB 325 MG (65 MG ELEMENTAL FE) 325 MG: 325 (65 FE) TAB at 08:32

## 2019-05-13 RX ADMIN — Medication 10 ML: at 08:38

## 2019-05-13 RX ADMIN — Medication 30 ML: at 17:06

## 2019-05-13 RX ADMIN — INSULIN LISPRO 1 UNITS: 100 INJECTION, SOLUTION INTRAVENOUS; SUBCUTANEOUS at 18:50

## 2019-05-13 RX ADMIN — OXYCODONE HYDROCHLORIDE AND ACETAMINOPHEN 2 TABLET: 5; 325 TABLET ORAL at 13:43

## 2019-05-13 RX ADMIN — PROMETHAZINE HYDROCHLORIDE 25 MG: 25 INJECTION INTRAMUSCULAR; INTRAVENOUS at 17:07

## 2019-05-13 RX ADMIN — INSULIN LISPRO 2 UNITS: 100 INJECTION, SOLUTION INTRAVENOUS; SUBCUTANEOUS at 13:13

## 2019-05-13 RX ADMIN — INSULIN LISPRO 2 UNITS: 100 INJECTION, SOLUTION INTRAVENOUS; SUBCUTANEOUS at 08:32

## 2019-05-13 RX ADMIN — INSULIN LISPRO 2 UNITS: 100 INJECTION, SOLUTION INTRAVENOUS; SUBCUTANEOUS at 18:51

## 2019-05-13 NOTE — PROGRESS NOTES
Bedside and Verbal shift change report given to [address] (oncoming nurse) by Carol Shen RN (offgoing nurse). Report included the following information SBAR, Kardex, OR Summary, Procedure Summary, Intake/Output and MAR.

## 2019-05-13 NOTE — PROGRESS NOTES
Nursing to consult anesthesia about central line placement to determine if it needs to be pulled back slightly. Will likely remove tomorrow prior to discharge, but would like to keep it in place overnight in case she needs any additional IV treatments.

## 2019-05-13 NOTE — LACTATION NOTE
Mom continues to pump every 3 hours to stimulate lactogenesis. She states she pumped 15 ml at the last pumping session. I discussed with mom the option for rental of a hospital grade pump, since infant is in the NICU. She will let me know tomorrow if she wants to do so.

## 2019-05-13 NOTE — PROGRESS NOTES
Endocrinology Progress Note Ms Jose Olivas was seen this AM 
She was about to eat breakfast. 
Appears breakfast would have about 30 g carb (cream of wheat), eggs, fuentes, jello. She also had some vanilla wafers Her appetite is very good now. No snacking overnight last night Glucoses reviewed Component GLUCOSE,FAST - POC Latest Ref Rng & Units 65 - 100 mg/dL 5/13/2019 
    7:57  (H)  
5/12/2019 
    11:27  (H)  
5/12/2019 
    6:04 PM 99  
5/12/2019 
    2:41  (H)  
5/12/2019 
    1:16  (H)  
5/12/2019 
    7:34  (H) Insulin: 
Lantus 6 units daily Humalog 2 units with meals (1-2 with snacks - not needed) - this was added over the weekend. Humalog correction Exam: 
Visit Vitals /80 Pulse 70 Temp 98.3 °F (36.8 °C) Gen: sitting up in bed. Alert, NAD Impressions and recommendations: 1. Diabetes type I:  
High yesterday AM was due to snacking night prior Since that time, glucoses have been improved and overall at goal.  
Continue current insulin dosing Reviewed with Ms Jose Olivas how insulin needs are likely to be variable over next few weeks. Discussing how nursing can lower values. Very low post-partum insulin needs may wane as well. Reviewed that we are using roughly a carb ratio of 20-30, so if she snacks on more than 20 g carb, she should request prn Humalog order. 2. HTN: variable values Possible that labetalol 200 mg TID (600 mg/day)  may work better than 300 mg BID, if she metabolizes medication more quickly. I spent 35 minutes on her care today and > 50% of the time was spent in coordination of her care

## 2019-05-13 NOTE — PROGRESS NOTES
[facility]  PROGRESS NOTE Subjective:  
 on the phone,ordering breakfast,said she is doing fine Objective: VITALS SIGNS:   
Visit Vitals /84 (BP 1 Location: Left arm) Pulse 78 Temp 98.5 °F (36.9 °C) Resp 16 Ht 5' 8\" (1.727 m) SpO2 97% Breastfeeding? Unknown BMI 24.27 kg/m² O2 Device: Room air O2 Flow Rate (L/min): 2 l/min Temp (24hrs), Av.3 °F (36.8 °C), Min:98 °F (36.7 °C), Max:98.6 °F (37 °C) PHYSICAL EXAM: 
NAD 
 
DATA REVIEW:  
 
INTAKE / OUTPUT:  
Last shift:      No intake/output data recorded. Last 3 shifts: No intake/output data recorded. No intake or output data in the 24 hours ending 19 0756 LABS:  
Recent Labs 19 
1273 05/10/19 
5542 WBC 12.8* 13.3* HGB 8.1* 7.2* HCT 26.1* 22.6*  
 208 Recent Labs 19 
1825 05/10/19 
9478  139  
K 4.3 4.5  
 108 CO2 23 23 GLU 58* 86 BUN 21* 26* CREA 1.11* 1.30* CA 7.5* 7.8*  
MG  --  4.6* ALB 1.4* 1.5* TBILI 0.1* 0.2 SGOT 24 20 ALT 11* 9* Assessment:  
 
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 Underlying DM nephropathy  
  
Nephrotic syndrome: DM nephropathy complicated by pre-eclampsia 
  
HTN: Labetalol dose decreased 
  
DM1: Poorly controlled. Dr. Cortney Presley (endocrinology) helping manage blood sugars 
  
Anemia: On ferrous sulfate 
  
33wk   delivery on ;YAIP complicated by pre-eclampsia Plan:  
Labile BP but better Creatinine better Will follow Michele Sheldon MD

## 2019-05-13 NOTE — ROUTINE PROCESS
1115: Bedside shift change report given to ANDREW Bhatt RN (oncoming nurse) by Trinity Santos RN (offgoing nurse). Report included the following information SBAR.  
1325: Dr. Mac Sanchez notified of x-ray results. No new orders received at this time. 1535: Dr. Mac Sanchez requesting nurse call anesthesia to verify central line placement is correct and doesn't need to be pulled back. Per Dr. Mac Sanchez, central line needs to stay in place until pt has orders for discharge because \"she is such a hard stick. \" Notified Dr. Mayco Hayden from anesthesia and he verified correct placement and that line does not need to be pulled back. 1645: Pt c/o nausea and requesting IV Phenergan. Education provided to pt about side effects of Phenergan, including drowsiness, but pt still requesting Phenergan. 1700: Phenergan received from pharmacy and brought into pt's room. Pt asleep sitting up in bed, but pt woke up when nurse entered room. Pt still requesting Phenergan. 1800: Pt asleep sitting up in bed awoke when nurse entered room, no complaints of nausea at this time. 1900: Pt c/o blurry peripheral vision that began when she woke up after her nap after phenergan was given. She denies headache, dizziness, or epigastric pain. /96 automatic and 140/88 manual. [facility] Midwife phone who stated they are not covering for pt and to call Dr. Elise Murillo from [facility]. Called Dr. Elise Murillo and she stated she is not covering for pt and to call [facility] Hospitalist phone, Dr. Jackquline Paget. Dr. Jackquline Paget in [address] in surgery currently and Labor and Delivery RN will notify her to call back when she is done in the OR.

## 2019-05-13 NOTE — PROGRESS NOTES
Postpartum Progress Note Subjective: Pt doing well POD3. No acute events overnight. Pain controlled. Lochia less than menses. Tolerating diet w/o N/V. Quintana out, voiding without difficulty. Ambulating without assistance. Passing flatus. Scant edema. Denies f/c, CP, SOB, or other concerns. Breastfeeding without difficulty. Objective: 
Patient Vitals for the past 24 hrs: 
 Temp Pulse Resp BP  
05/13/19 0206    138/84  
05/13/19 0104 98.5 °F (36.9 °C) 78 16 (!) 152/98  
05/12/19 2358    (!) 162/98  
05/12/19 2338 98.6 °F (37 °C) 82 16 (!) 181/104  
05/12/19 1629 98.2 °F (36.8 °C) 92 14 115/76  
05/12/19 0906 98 °F (36.7 °C) 84 14 148/82 Physical Exam: 
Gen-A&O, NAD, resting comfortably CV-regular rate Resp-non-labored Abd-nt, nd, fundus firm below the umbilicus Incision-c/d/i 
-scant blood on pad Ext-trace edema Recent Results (from the past 24 hour(s)) GLUCOSE, POC Collection Time: 05/12/19  1:16 PM  
Result Value Ref Range Glucose (POC) 254 (H) 65 - 100 mg/dL Performed by Shama Arcos GLUCOSE, POC Collection Time: 05/12/19  2:41 PM  
Result Value Ref Range Glucose (POC) 165 (H) 65 - 100 mg/dL Performed by Cheko Be GLUCOSE, POC Collection Time: 05/12/19  6:04 PM  
Result Value Ref Range Glucose (POC) 99 65 - 100 mg/dL Performed by Michael Vazquez GLUCOSE, POC Collection Time: 05/12/19 11:27 PM  
Result Value Ref Range Glucose (POC) 108 (H) 65 - 100 mg/dL Performed by Tiago Mai Assessment/Plan: 
30yo POD3 s/p 1LTCS at 33w2d for cat 3 tracing, in setting of CHTN with SI preE (severe range BPs) and poorly controlled type 1 IDDM (on insulin pump), uncomplicated.  
  
Routine post-op care: 
-PO pain meds 
-advance diet as tolerated 
-remove quintana POD1 
-cont to monitor bleeding 
-stool softeners, antiemetics, benadryl prn 
-SCDs, ambulation, incentive spirometry 
  
 CHTN, w/ SI preE with SF: s/p 24 hr mag, required 1 dose of PO IR nifedipine for severe range BPs overnight 
-BP monitoring  
-labetalol 300mg BID 
-1 week BP check after discharge 
  
IDDM, type 1: frequent DKA, issues with hypoglycemia, brittle diabetic with poor insulin control. Typically on insulin pump. 
-Dr. Valeri Osgood (endocrinologist) managing BG, really appreciate assistance, currently on lantus 6U daily, and 2U lispo with meals, and standing sliding scale 
-BG monitoring 
-D50 prn hypoglycemia 
-goal BG >70 but <200 PNL: B pos / GBS pos / needs pap postpartum  
  
PMH also significant for:  
-vtach (remote), unclear diagnosis, cardiology consult as outpatient recommended 
-chronic kidney disease - plan for nephrology follow up as outpatient postpartum 
-HSV - cont valtrex suppression  
-depression - 1 week mood check 
-tobacco/marijuana abuse history 
-N/V - scopolamine patch and anti-emetics prn (will have to be careful with QT prolonging agents given h/o vtach) -GERD - famotidine PRN  
  
Difficult IV access: now with central line placed 5/9 
  
Postpartum plans: 
-baby in NICU 
-pumping as tolerated 
  
Dispo: continued inpatient monitoring on L&D Raymond Zamudio MD 
[facility] [place]

## 2019-05-14 VITALS
DIASTOLIC BLOOD PRESSURE: 90 MMHG | SYSTOLIC BLOOD PRESSURE: 130 MMHG | OXYGEN SATURATION: 99 % | HEIGHT: 68 IN | RESPIRATION RATE: 16 BRPM | HEART RATE: 88 BPM | BODY MASS INDEX: 24.27 KG/M2 | TEMPERATURE: 98 F

## 2019-05-14 LAB
GLUCOSE BLD STRIP.AUTO-MCNC: 171 MG/DL (ref 65–100)
GLUCOSE BLD STRIP.AUTO-MCNC: 284 MG/DL (ref 65–100)
SERVICE CMNT-IMP: ABNORMAL
SERVICE CMNT-IMP: ABNORMAL

## 2019-05-14 PROCEDURE — 74011250637 HC RX REV CODE- 250/637: Performed by: OBSTETRICS & GYNECOLOGY

## 2019-05-14 PROCEDURE — 74011636637 HC RX REV CODE- 636/637: Performed by: INTERNAL MEDICINE

## 2019-05-14 PROCEDURE — 82962 GLUCOSE BLOOD TEST: CPT

## 2019-05-14 PROCEDURE — 74011000250 HC RX REV CODE- 250: Performed by: OBSTETRICS & GYNECOLOGY

## 2019-05-14 RX ORDER — BACITRACIN 500 [USP'U]/G
OINTMENT TOPICAL 3 TIMES DAILY
Status: DISCONTINUED | OUTPATIENT
Start: 2019-05-14 | End: 2019-05-14 | Stop reason: HOSPADM

## 2019-05-14 RX ORDER — DULOXETIN HYDROCHLORIDE 20 MG/1
20 CAPSULE, DELAYED RELEASE ORAL 2 TIMES DAILY
Qty: 60 CAP | Refills: 2 | Status: SHIPPED | OUTPATIENT
Start: 2019-05-14 | End: 2019-06-13

## 2019-05-14 RX ORDER — OXYCODONE AND ACETAMINOPHEN 5; 325 MG/1; MG/1
1 TABLET ORAL
Qty: 28 TAB | Refills: 0 | Status: SHIPPED | OUTPATIENT
Start: 2019-05-14 | End: 2019-05-24

## 2019-05-14 RX ORDER — DULOXETIN HYDROCHLORIDE 20 MG/1
20 CAPSULE, DELAYED RELEASE ORAL 2 TIMES DAILY
Qty: 60 CAP | Refills: 2 | Status: SHIPPED | OUTPATIENT
Start: 2019-05-14 | End: 2019-05-14 | Stop reason: SDUPTHER

## 2019-05-14 RX ORDER — LABETALOL 300 MG/1
300 TABLET, FILM COATED ORAL EVERY 12 HOURS
Qty: 60 TAB | Refills: 0 | Status: SHIPPED | OUTPATIENT
Start: 2019-05-14 | End: 2019-06-13

## 2019-05-14 RX ADMIN — OXYCODONE HYDROCHLORIDE AND ACETAMINOPHEN 2 TABLET: 5; 325 TABLET ORAL at 07:53

## 2019-05-14 RX ADMIN — INSULIN LISPRO 2 UNITS: 100 INJECTION, SOLUTION INTRAVENOUS; SUBCUTANEOUS at 08:24

## 2019-05-14 RX ADMIN — OXYCODONE HYDROCHLORIDE AND ACETAMINOPHEN 2 TABLET: 5; 325 TABLET ORAL at 11:54

## 2019-05-14 RX ADMIN — BACITRACIN: 500 OINTMENT TOPICAL at 09:49

## 2019-05-14 RX ADMIN — Medication 1 TABLET: at 08:23

## 2019-05-14 RX ADMIN — INSULIN LISPRO 1 UNITS: 100 INJECTION, SOLUTION INTRAVENOUS; SUBCUTANEOUS at 08:25

## 2019-05-14 RX ADMIN — OXYCODONE HYDROCHLORIDE AND ACETAMINOPHEN 2 TABLET: 5; 325 TABLET ORAL at 01:22

## 2019-05-14 RX ADMIN — FERROUS SULFATE TAB 325 MG (65 MG ELEMENTAL FE) 325 MG: 325 (65 FE) TAB at 08:23

## 2019-05-14 RX ADMIN — INSULIN LISPRO 2 UNITS: 100 INJECTION, SOLUTION INTRAVENOUS; SUBCUTANEOUS at 04:47

## 2019-05-14 RX ADMIN — LABETALOL HYDROCHLORIDE 300 MG: 100 TABLET, FILM COATED ORAL at 08:23

## 2019-05-14 RX ADMIN — Medication 10 ML: at 04:31

## 2019-05-14 RX ADMIN — INSULIN LISPRO 2 UNITS: 100 INJECTION, SOLUTION INTRAVENOUS; SUBCUTANEOUS at 01:34

## 2019-05-14 NOTE — PROGRESS NOTES
0800 Received report from Pawel Cheek RN using sbar format   Dr Tfifanie Estes at bedside   ABD dressing taken off by MD   Stated to d/c C-line and discharge home  Discharge teaching told to patient by MD  Patient to see MD in office on Friday or Monday for blood pressure check   Prescription for percocet and labetalol and Cymbalata written and given to patient 46  Dr Juan Alberto Aponte at bedside and gave patient her own orders for her diabetes 0930  Pt stated Dr Juan Alberto Aponte wrote down all her orders for her to start her insulin pump when ever she wanted   IV team notified to pull C-LIne per Dr Fuller Organ request  
Pt refused her Lanus because she would be starting her insulin pump per Dr Juan Alberto Aponte 1130  IV team at bedside to take out C-Line  Pt stated she would be putting on her insulin pump right after discharge and would handle her own insulin 1200  Discharge instructions and prescriptions for percocet and labetalol 
 given to patient and discussed  No further questions per patient  Pt Patient has family that will bring her food   Patient stated she understands her diabetes and has no questions  Pt stated she would take her own blood sugar with her machine  Inn[facility] info given to patient and discussed  No further questions   Pt placed in [employer]

## 2019-05-14 NOTE — DISCHARGE INSTRUCTIONS
Postpartum Support Group-Offering Hope and Help for New Mothers  The Postpartum Support Group meets on the  and  of each month from 9:00am-10:30am in the [address] [place] [address], located on the lobby floor, behind the cafeteria. Breastfeeding Support Group  [employer] [facility] Mother[facility] Group meets the  and  of each month in the [facility] from 10:00-11:00, (located behind [facility] [employer] on the first floor) Meetings are facilitated by board certified lactation consultants and all breastfeeding moms and their infants are invited. POST DELIVERY DISCHARGE INSTRUCTIONS    Name: Venita Zuniga  YOB: 1990  Primary Diagnosis: Active Problems:    Pregnant and not yet delivered in third trimester (2019)        General:     Diet/Diet Restrictions:  Eight 8-ounce glasses of fluid daily (water, juices); avoid excessive caffeine intake. Meals/snacks as desired which are high in fiber and carbohydrates and low in fat and cholesterol. Medications:   Rx for labetalol and percocet provided    Physical Activity / Restrictions / Safety:     Avoid heavy lifting, no more that 8 lbs. For 2-3 weeks; No driving while taking narcotic pain medication. Post  patients should not drive until pain free. No intercourse 4-6 weeks, no douching or tampon use. May resume exercise in 6 weeks. Discharge Instructions/Special Treatment/Home Care Needs:     Continue prenatal vitamins. Continue to use squirt bottle with warm water on your episiotomy after each bathroom use until bleeding stops. If steri-strips applied to your incision, remove in 7 days. Take stool softeners daily. Call your doctor for the following:     Fever over 101 degrees by mouth. Vaginal bleeding heavier than a normal menstrual period or lost larger than a golf ball.   Red streaks or increased swelling of legs, painful red streaks on your breast.  Painful urination, or increased pain, redness or discharge with your incision. Pain Management:     Pain Management:   Take Acetaminophen (Tylenol) or Ibuprofen (Advil, Motrin), as directed for pain. Use a warm Sitz bath 3 times daily to relieve episiotomy or hemorrhoidal discomfort. Heating pad to  incision as needed. For hemorrhoidal discomfort, use Tucks and Anusol cream as needed and directed. Follow-Up Care:     Appointment with MD:   Follow-up Appointments   Procedures    FOLLOW UP VISIT Appointment in: One Week     Standing Status:   Standing     Number of Occurrences:   1     Order Specific Question:   Appointment in     Answer: One Week     Telephone number: [phone]    Signed By: Remedios Rodriguez MD                                                                                                   Date: 2019 Time: 7:23 AM    Patient Education        POSTPARTUM DISCHARGE INSTRUCTIONS       Name:  Stella Wong  YOB: 1990  Admission Diagnosis:  Pregnant and not yet delivered in third trimester [Z34.93]     Discharge Diagnosis:    Problem List as of 2019 Date Reviewed: 2019          Codes Class Noted - Resolved    RESOLVED: DKA (diabetic ketoacidoses) ([facility] ICD-10-CM: E13.10  ICD-9-CM: 250.10 Acute 2013 - 2015        Esophagitis, erosive ICD-10-CM: K22.10  ICD-9-CM: 530.19 Present on Admission 2016 - Present        Pregnant and not yet delivered in third trimester ICD-10-CM: Z34.93  ICD-9-CM: V22.1  2019 - Present        Pregnant ICD-10-CM: Z34.90  ICD-9-CM: V22.2  2019 - Present    Overview Addendum 2019  3:55 PM by Saray Hathaway MD     Primary Provider: Dorina Ron MD ONLY PATIENT - to be co-managed with MFM and OBHG as needed     29 yo  with DG 19 by [facility] (based on first trimester ultrasound in [facility], consistent with 28 wk scan with MFM - Dr. Amol Rucker) vs.  by ultrasound per OSH records.  2 day discrepancy in dating.     IUP: MFM scan 4/11/19 - 28w1d showing EFW 40%ile and posterior placenta, per OSH records prior fetal ECHO wnl, SIZE<DATES on exam 5/2 --> has MFM appt scheduled next week   -records requested from anatomy scan and dating scan  -Tdap: 4/23  -Flu: vaccinated 11/2018  -PNV     Genetics/Carrier screening: late transfer, do not see documentation that this was done     PNL: B pos / ABSC neg / Hgb 8.0, Plts 249 / hiv, hepB, hepC, rubella, syphilis, gc, chl all neg / urine GBS+ / needs pap postpartum (last wnl 3 yrs ago per pt)     PMH significant for:  -IDDM (type 1) - on insulin pump, following with Dr. Eri Hassan, improved control, however, multiple admissions for DKA in the past, BG as high as 552, also with sequelae of nephropathy, gastroparesis, and peripheral neuropathy --> advised follow up with GI for gastroparesis, also will recommend follow up with ophthalmologist and podiatrist at future visit  -CHTN - normotensive today, currently on labetalol 300mg BID, has not been taking procardia XL 30mg since hospital discharge, has had BPs as high as 210s/120s documented earlier in the pregnancy, has been checking BPs at home, mostly normal, highest has been 150/100, but this was an outlier. Pt with likely chronic renal disease and chronic proteinuria. Baseline 24 hr urine protein 4659g per OSH records. PreE labs in hospital overall wnl, will repeat preE labs weekly. Needs close surveillance for SI preE. S/p mag on admission. S/p BMZ. Not currently on ASA 81mg daily due to renal disease. Needs weekly surveillance BPPs with MFM.  Appointment requested 4/23.  -anemia - hgb most recently 8.0, likely iron deficiency and chronic disease, ferritin 8, recommended ferrous sulfate 325mg daily  -?h/o VTACH, heart murmur, and pt reports cardiac arrest (unable to find documentation of this - given extensive medical records), will refer to cardiology  -chronic kidney disease - with recent MARIS --> creat 1.54 in hospital, improved to 1.1 prior to discharge, will refer to nephrology  -depression - currently stable, no meds, has been prescribed cymbalta and celexa 60mg daily in the past  -h/o chronic pain - prior diagnostic laparoscopy wnl, no evidenc eof endometriosis, no current pain complaints  -tobacco and marijuana abuse - counseled on cessation  -HSV - unclear history, reports possible outbreak and was empirically treated, but was never informed of test results, valtrex suppression initiated 32 wks in anticipation of possible need for early delivery     Pregnancy problems:  -N/V of pregnancy - using scopolamine patch and taking promethazine and zofran prn, has also used THC for nausea in the pregnancy  -GERD - taking pepcid daily and tums prn     Delivery/PP plans:  -Breast - concerned about nipple piercings  -NCB vs. Epid? tbd  -Gender/Circ? tbd     Social:  -FOB \"Burke\" and parents supportive  -pt is                      Chronic renal disease ICD-10-CM: N18.9  ICD-9-CM: 585.9  4/13/2019 - Present        Chronic hypertension with superimposed preeclampsia ICD-10-CM: O11.9  ICD-9-CM: 642.70  4/8/2019 - Present        Pre-eclampsia superimposed on chronic hypertension, antepartum ICD-10-CM: O11.9, O10.019  ICD-9-CM: 642.73  4/7/2019 - Present        DKA (diabetic ketoacidoses) ([facility] ICD-10-CM: E13.10  ICD-9-CM: 250.10  8/6/2016 - Present        Sepsis ([facility] ICD-10-CM: A41.9  ICD-9-CM: 038.9, 995.91  7/20/2016 - Present        Nausea and vomiting ICD-10-CM: R11.2  ICD-9-CM: 787.01  5/14/2016 - Present        Epigastric abdominal pain ICD-10-CM: R10.13  ICD-9-CM: 789.06  4/11/2016 - Present        LLQ abdominal pain ICD-10-CM: R10.32  ICD-9-CM: 789.04  4/11/2016 - Present        Diarrhea ICD-10-CM: R19.7  ICD-9-CM: 787.91  4/11/2016 - Present        Weight loss ICD-10-CM: R63.4  ICD-9-CM: 783.21  4/11/2016 - Present        DKA, type 1, not at goal [facility]) ICD-10-CM: E10.10  ICD-9-CM: 250.13  3/8/2016 - Present        Leukocytosis ICD-10-CM: R67.208  ICD-9-CM: 288.60  3/8/2016 - Present        Hypokalemia ICD-10-CM: E87.6  ICD-9-CM: 276.8  8/16/2015 - Present        Hypophosphatemia ICD-10-CM: E83.39  ICD-9-CM: 275.3  8/16/2015 - Present        Nausea ICD-10-CM: R11.0  ICD-9-CM: 787.02  8/15/2015 - Present        Neuropathy ICD-10-CM: G62.9  ICD-9-CM: 355.9  8/15/2015 - Present        Hypertension ICD-10-CM: I10  ICD-9-CM: 401.9  8/15/2015 - Present        GIB (gastrointestinal bleeding) ICD-10-CM: K92.2  ICD-9-CM: 578.9  10/22/2014 - Present        DKA, type 1 ([facility] ICD-10-CM: E10.10  ICD-9-CM: 250.13  6/5/2014 - Present        Viral syndrome ICD-10-CM: B34.9  ICD-9-CM: 079.99  5/18/2013 - Present        Depression (Chronic) ICD-10-CM: F32.9  ICD-9-CM: 472  5/18/2013 - Present        Diabetic peripheral neuropathy associated with type 1 diabetes mellitus ([facility] (Chronic) ICD-10-CM: E10.42  ICD-9-CM: 250.61, 357.2  5/18/2013 - Present        DM type 1 (diabetes mellitus, type 1) ([facility] ICD-10-CM: E10.9  ICD-9-CM: 250.01  11/23/2012 - Present        Chronic abdominal pain (Chronic) ICD-10-CM: R10.9, G89.29  ICD-9-CM: 789.00, 338.29 Chronic 11/23/2012 - Present        Nausea & vomiting ICD-10-CM: R11.2  ICD-9-CM: 787.01  11/23/2012 - Present        UTI (urinary tract infection) ICD-10-CM: N39.0  ICD-9-CM: 599.0  10/9/2012 - Present        Ventricular tachycardia ([facility] ICD-10-CM: I47.2  ICD-9-CM: 427.1  9/3/2012 - Present        SVT (supraventricular tachycardia) ([facility]) ICD-10-CM: I47.1  ICD-9-CM: 427.89  9/3/2012 - Present        Acute renal failure ([facility]) ICD-10-CM: N17.9  ICD-9-CM: 584.9  9/1/2012 - Present        SIRS (systemic inflammatory response syndrome) ([facility] ICD-10-CM: R65.10  ICD-9-CM: 995.90  9/1/2012 - Present        Abnormal LFTs ICD-10-CM: R94.5  ICD-9-CM: 790.6  8/23/2012 - Present        RESOLVED: DKA (diabetic ketoacidoses) ([facility] ICD-10-CM: E13.10  ICD-9-CM: 250.10  4/9/2016 - 4/19/2016        RESOLVED: ARF (acute renal failure) ([facility] ICD-10-CM: N17.9  ICD-9-CM: 584.9  3/8/2016 - 2016        RESOLVED: DKA (diabetic ketoacidoses) ([facility] ICD-10-CM: E13.10  ICD-9-CM: 250.10  2015 - 2016        RESOLVED: DKA (diabetic ketoacidoses) ([facility] ICD-10-CM: E13.10  ICD-9-CM: 250.10  10/30/2015 - 2015        RESOLVED: DKA (diabetic ketoacidosis) ([facility] ICD-10-CM: E13.10  ICD-9-CM: 250.10  2015 - 2015        RESOLVED: Urinary tract infection, site not specified ICD-10-CM: N39.0  ICD-9-CM: 599.0  12/3/2013 - 2015        RESOLVED: DKA (diabetic ketoacidoses) ([facility] ICD-10-CM: E13.10  ICD-9-CM: 250.10  2012 - 2013        RESOLVED: DKA (diabetic ketoacidoses) ([facility] ICD-10-CM: E13.10  ICD-9-CM: 250.10  2012 - 2012        RESOLVED: DKA (diabetic ketoacidoses) ([facility] ICD-10-CM: E13.10  ICD-9-CM: 250.10  2012 - 2013        RESOLVED: Metabolic acidosis [id number]-28-SF: E87.2  ICD-9-CM: 276.2  2012 - 2015        RESOLVED: Leukocytosis, unspecified ICD-10-CM: D36.398  ICD-9-CM: 288.60  2012 - 2015            Attending Physician:  Abdi Winter MD    Delivery Type:   Section: What to Expect at Home    Your Recovery:  A  section, or , is surgery to deliver your baby through a cut, called an incision that the doctor makes in your lower belly and uterus. You may have some pain in your lower belly and need pain medicine for 1 to 2 weeks. You can expect some vaginal bleeding for several weeks. You will probably need about 6 weeks to fully recover. It is important to take it easy while the incision is healing. Avoid heavy lifting, strenuous activities, or exercises that strain the belly muscles while you are recovering. Ask a family member or friend for help with housework, cooking, and shopping. This care sheet gives you a general idea about how long it will take for you to recover. But each person recovers at a different pace.  Follow the steps below to get better as quickly as possible. How can you care for yourself at home? Activity  · Rest when you feel tired. Getting enough sleep will help you recover. · Try to walk each day. Start by walking a little more than you did the day before. Bit by bit, increase the amount you walk. Walking boosts blood flow and helps prevent pneumonia, constipation, and blood clots. · Avoid strenuous activities, such as bicycle riding, jogging, weightlifting, and aerobic exercise, for 6 weeks or until your doctor says it is okay. · Until your doctor says it is okay, do not lift anything heavier than your baby. · Do not do sit-ups or other exercise that strain the belly muscles for 6 weeks or until your doctor says it is okay. · Hold a pillow over your incision when you cough or take deep breaths. This will support your belly and decrease your pain. · You may shower as usual. Pat the incision dry when you are done. · You will have some vaginal bleeding. Wear sanitary pads. Do not douche or use tampons until your doctor says it is okay. · Ask your doctor when you can drive again. · You will probably need to take at least 6 weeks off work. It depends on the type of work you do and how you feel. · Wait until you are healed (about 4 to 6 weeks) before you have sexual intercourse. Your doctor will tell you when it is okay to have sex. · Talk to your doctor about birth control. You can get pregnant even before your period returns. Also, you can get pregnant while you are breast-feeding. Incision care  Your skin is your body's first line of defense against germs, but an incision site leaves an easy way for germs to enter your body. To prevent infection:  · Clean your hands frequently and before and after changing any touching any dressings. · If you have strips of tape on the incision, leave the tape on for a week or until it falls off. · Look at your incision closely every day for any changes.  Contact your doctor if you experience any signs of infection, such as fever or increased redness at the surgical site. · Wash the area daily with warm, soapy water, and pat it dry. Don't use hydrogen peroxide or alcohol, which can slow healing. You may cover the area with a gauze bandage if it weeps or rubs against clothing. Change the bandage every day. · Keep the area clean and dry. Diet  · You can eat your normal diet. If your stomach is upset, try bland, low-fat foods like plain rice, broiled chicken, toast, and yogurt. · Drink plenty of fluids (unless your doctor tells you not to). · You may notice that your bowel movements are not regular right after your surgery. This is common. Try to avoid constipation and straining with bowel movements. You may want to take a fiber supplement every day. If you have not had a bowel movement after a couple of days, ask your doctor about taking a mild laxative. · If you are breast-feeding, do not drink any alcohol. Medicines  · Take pain medicines exactly as directed. · If the doctor gave you a prescription medicine for pain, take it as prescribed. · If you are not taking a prescription pain medicine, ask your doctor if you can take an over-the-counter medicine such as acetaminophen (Tylenol), ibuprofen (Advil, Motrin), or naproxen (Aleve), for cramps. Read and follow all instructions on the label. Do not take aspirin, because it can cause more bleeding. Do not take acetaminophen (Tylenol) and other acetaminophen containing medications (i.e. Percocet) at the same time. · If you think your pain medicine is making you sick to your stomach:  · Take your medicine after meals (unless your doctor has told you not to). · Ask your doctor for a different pain medicine. · If your doctor prescribed antibiotics, take them as directed. Do not stop taking them just because you feel better. You need to take the full course of antibiotics.     Mental Health  · Many women get the \"baby blues\" during the first few days after childbirth. You may lose sleep, feel irritable, and cry easily. You may feel happy one minute and sad the next. Hormone changes are one cause of these emotional changes. Also, the demands of a new baby, along with visits from relatives or other family needs, add to a mother's stress. The \"baby blues\" often peak around the fourth day. Then they ease up in less than 2 weeks. · If your moodiness or anxiety lasts for more than 2 weeks, or if you feel like life is not worth living, you may have postpartum depression. This is different for each mother. Some mothers with serious depression may worry intensely about their infant's well-being. Others may feel distant from their child. Some mothers might even feel that they might harm their baby. A mother may have signs of paranoia, wondering if someone is watching her. · With all the changes in your life, you may not know if you are depressed. Pregnancy sometimes causes changes in how you feel that are similar to the symptoms of depression. · Symptoms of depression include:  · Feeling sad or hopeless and losing interest in daily activities. These are the most common symptoms of depression. · Sleeping too much or not enough. · Feeling tired. You may feel as if you have no energy. · Eating too much or too little. · [employer] ([employer]) offers a Warm line; Chat with the Expert phone sessions; Information and Articles about Pregnancy and Postpartum Mood Disorders; Comprehensive List of Free Support Groups; Knowledgeable local coordinators who will offer support, information, and resources; Guide to Resources on [employer]; Calendar of events in the  mood disorders community; Latest News and Research; and [facility] [address] for [employer]. Remember - You are not alone; You are not to blame; With help, you will be well. 1-340-463-PPD(9268). WWW. POSTPARTUM. NET   · Writing or talking about death, such as writing suicide notes or talking about guns, knives, or pills. Keep the numbers for these national suicide hotlines: 6-690-084-TALK (6-528.706.8205) and 7-120-XYRXNNJ (8-154.468.6464). If you or someone you know talks about suicide or feeling hopeless, get help right away. Other instructions  · If you breast-feed your baby, you may be more comfortable while you are healing if you place the baby so that he or she is not resting on your belly. Try tucking your baby under your arm, with his or her body along the side you will be feeding on. Support your baby's upper body with your arm. With that hand you can control your baby's head to bring his or her mouth to your breast. This is sometimes called the football hold. Follow-up care is a key part of your treatment and safety. Be sure to make and go to all appointments, and call your doctor if you are having problems. It's also a good idea to know your test results and keep a list of the medicines you take. When should you call for help? Call 911 anytime you think you may need emergency care. For example, call if:  · You are thinking of hurting yourself, your baby, or anyone else. · You passed out (lost consciousness). · You have symptoms of a blood clot in your lung (called a pulmonary embolism). These may include:  · Sudden chest pain. · Trouble breathing. · Coughing up blood. Call your doctor now or seek immediate medical care if:    · You have severe vaginal bleeding. · You are soaking through a pad each hour for 2 or more hours. · Your vaginal bleeding seems to be getting heavier or is still bright red 4 days after delivery. · You are dizzy or lightheaded, or you feel like you may faint. · You are vomiting or cannot keep fluids down. · You have a fever. · You have new or more belly pain. · You have loose stitches, or your incision comes open. · You have symptoms of infection, such as:  · Increased pain, swelling, warmth, or redness.   · Red streaks leading from the incision. · Pus draining from the incision. · A fever  · You pass tissue (not just blood). · Your vaginal discharge smells bad. · Your belly feels tender or full and hard. · Your breasts are continuously painful or red. · You feel sad, anxious, or hopeless for more than a few days. · You have sudden, severe pain in your belly. · You have symptoms of a blood clot in your leg (called a deep vein thrombosis), such as:  · Pain in your calf, back of the knee, thigh, or groin. · Redness and swelling in your leg or groin. · You have symptoms of preeclampsia, such as:  · Sudden swelling of your face, hands, or feet. · New vision problems (such as dimness or blurring). · A severe headache. · Your blood pressure is higher than it should be or rises suddenly. · You have new nausea or vomiting. Watch closely for changes in your health, and be sure to contact your doctor if you have any problems. Additional Information:  Learning About Hypertensive Disorders After Childbirth    What is preeclampsia? A woman with preeclampsia has blood pressure that is higher than usual. She may also have other serious symptoms. Preeclampsia can be dangerous. When it is severe, it can cause seizures (eclampsia) or liver or kidney damage. When the liver is affected, some women get HELLP syndrome, a blood-clotting and bleeding problem. HELLP can come on quickly and can be deadly. This is why your doctor checks you and your baby often. Preeclampsia usually occurs after 20 weeks of pregnancy. In rare cases, it is first noted right after childbirth. Most often, it starts near the end of pregnancy and goes away after childbirth. What are the symptoms? Mild preeclampsia usually doesn't cause symptoms. But preeclampsia can cause rapid weight gain and sudden swelling of the hands and face. Severe preeclampsia does cause symptoms.  It can cause a very bad headache and trouble seeing and breathing. It also can cause belly pain. You may also urinate less than usual.    If you have new preeclampsia symptoms after you go home from the hospital, call your doctor right away. What can you expect after you have had preeclampsia? In the hospital  After the baby and the placenta are delivered, preeclampsia usually starts to improve. Most women get better in the first few days after childbirth. After having preeclampsia, you still have a risk of seizures for a day or more after childbirth. (Very rarely, seizures happen later on.) So your doctor may have you take magnesium sulfate for a day or more to prevent seizures. You may also take medicine to lower your blood pressure. When you go home  Your blood pressure will most likely return to normal a few days after delivery. Your doctor will want to check your blood pressure sometime in the first week after you leave the hospital.    Some women still have high blood pressure 6 weeks after childbirth. But most return to normal levels over the long term. · Take and record your blood pressure at home if your doctor tells you to. · Learn the importance of the two measures of blood pressure (such as 120 over 80, or 120/80). The first number is the systolic pressure. This is the force of blood on the artery walls as the heart pumps. The second number is the diastolic pressure. This is the force of blood on the artery walls between heartbeats, when the heart is at rest. You have a choice of monitors to use. Manual monitor: You pump up the cuff and use a stethoscope to listen for your  Pulse. · Electronic monitor: The cuff inflates, and a gauge shows your pulse rate. · To take your blood pressure:  · Ask your doctor to check your blood pressure monitor to be sure that it is accurate and that the cuff fits you. Also ask your doctor to watch you use it, to make sure that you are using it right.   · You should not eat, use tobacco products, or use medicine known to raise blood pressure (such as some nasal decongestant sprays) before you take your blood pressure. · Avoid taking your blood pressure if you have just exercised or are nervous or upset. Rest at least 15 minutes before you take your blood pressure. · Be safe with medicines. If you take medicine, take it exactly as prescribed. Call your doctor if you think you are having a problem with your medicine. · Do not smoke. Quitting smoking will help lower your blood pressure and improve your baby's growth and health. If you need help quitting, talk to your doctor about stop-smoking programs and medicines. These can increase your chances of quitting for good. · Eat a balanced and healthy diet that has lots of fruits and vegetables. Long-term health   After you have had preeclampsia, you have a higher-than-average risk of heart disease, stroke, and kidney disease. This may be because the same things that cause preeclampsia also cause heart and kidney disease. To protect your health, work with your doctor on living a heart-healthy lifestyle and getting the checkups you need. Your doctor may also want you to check your blood pressure at home. Follow-up care is a key part of your treatment and safety. Be sure to make and go to all appointments, and call your doctor if you are having problems. It's also a good idea to know your test results and keep a list of the medicines you take. Gestational Diabetes After Childbirth     Most of the time, gestational diabetes goes away after a baby is born. But if you have had gestational diabetes, you have a greater chance of having it in a future pregnancy and of developing type 2 diabetes. To check for diabetes, you may have a follow-up glucose tolerance test 6 to 12 weeks after your baby is born or after you stop breast-feeding your baby. You may be able to control your blood sugar with a healthy diet and regular exercise.  Staying at a healthy weight also may keep you from getting type 2 diabetes later on. If diet and exercise do not lower your blood sugar enough, you may need to take insulin shots. Insulin is safe during pregnancy. Preventing Infection at Home    We care about preventing infection and avoiding the spread of germs - not only when you are in the hospital but also when you return home. When you return home from the hospital, it's important to take the following steps to help prevent infection and avoid spreading germs that could infect you and others. Ask everyone in your home to follow these guidelines, too. Clean Your Hands  · Clean your hands whenever your hands are visibly dirty, before you eat, before or after touching your mouth, nose or eyes, and before preparing food. Clean them after contact with body fluids, using the restroom, touching animals or changing diapers. · When washing hands, wet them with warm water and work up a lather. Rub hands for at least 15 seconds, then rinse them and pat them dry with a clean towel or paper towel. · When using hand sanitizers, it should take about 15 seconds to rub your hands dry. If not, you probably didn't apply enough . Cover Your Sneeze or Cough  Germs are released into the air whenever you sneeze or cough. To prevent the spread of infection:  · Turn away from other people before coughing or sneezing. · Cover your mouth or nose with a tissue when you cough or sneeze. Put the tissue in the trash. · If you don't have a tissue, cough or sneeze into your upper sleeve, not your hands. · Always clean your hands after coughing or sneezing. Care for Wounds  Your skin is your body's first line of defense against germs, but an open wound leaves an easy way for germs to enter your body. To prevent infection:  · Clean your hands before and after changing wound dressings, and wear gloves to change dressings if recommended by your doctor.   · Take special care with IV lines or other devices inserted into the body. If you must touch them, clean your hands first.  · Follow any specific instructions from your doctor to care for your wounds. Contact your doctor if you experience any signs of infection, such as fever or increased redness at the surgical or wound site. Keep a Clean Home  · Clean or wipe commonly touched hard surfaces like door handles, sinks, tabletops, phones and TV remotes. · Use products labeled \"disinfectant\" to kill harmful bacteria and viruses. · Use a clean cloth or paper towel to clean and dry surfaces. Wiping surfaces with a dirty dishcloth, sponge or towel will only spread germs. · Never share toothbrushes, shukla, drinking glasses, utensils, razor blades, face cloths or bath towels to avoid spreading germs. · Be sure that the linens that you sleep on are clean. · Keep pets away from wounds and wash your hands after touching pets, their toys or bedding. We care about you and your health. Remember, preventing infections is a   team effort between you, your family, friends and health care providers. Postpartum Support    PARENTS:  Are you feeling sad or depressed? Is it difficult for you to enjoy yourself? Do you feel more irritable or tense? Do you feel anxious or panicky? Are you having difficulty bonding with your baby? Do you feel as if you are \"out of control\" or \"going crazy\"? Are you worried that you might hurt your baby or yourself? FAMILIES: Do you worry that something is wrong but don't know how to help? Do you think that your partner or spouse is having problems coping? Are you worried that it may never get better? While many women experience some mild mood change or \"the blues\" during or after the birth of a child, 1 in 9 women experience more significant symptoms of depression or anxiety. 1 in 10 Dads become depressed during the first year. Things you can do  Being a good parent includes taking care of yourself.   If you take care of yourself, you will be able to take better care of your baby and your family. · Talk to a counselor or healthcare provider who has training in  mood and anxiety problems. · Learn as much as you can about pregnancy and postpartum depression and anxiety. · Get support from family and friends. Ask for help when you need it. · Join a support group in your area or online. · Keep active by walking, stretching or whatever form of exercise helps you to feel better. · Get enough rest and time for yourself. · Eat a healthy diet. · Don't give up! It may take more than one try to get the right help you need. These are general instructions for a healthy lifestyle:    No smoking/ No tobacco products/ Avoid exposure to second hand smoke    Surgeon General's Warning:  Quitting smoking now greatly reduces serious risk to your health. Obesity, smoking, and sedentary lifestyle greatly increases your risk for illness    A healthy diet, regular physical exercise & weight monitoring are important for maintaining a healthy lifestyle    Recognize signs and symptoms of STROKE:    F-face looks uneven    A-arms unable to move or move unevenly    S-speech slurred or non-existent    T-time-call 911 as soon as signs and symptoms begin - DO NOT go       back to bed or wait to see if you get better - TIME IS BRAIN. I have had the opportunity to make my options or choices for discharge. I have received and understand these instructions. Marvel Wynne ...

## 2019-05-14 NOTE — ROUTINE PROCESS
0Bedside shift change report given to KELLIE Dominguez, RN (oncoming nurse) by Annita Bhatt RN (offgoing nurse). Report included the following information SBAR.  
 
2020 - Pt's blood pressure was 145/90 automatically and manually before Labetalol. 0126 - Pt's blood sugar was 284. Administered 2U of Humulog.

## 2019-05-14 NOTE — LACTATION NOTE
Mom continues to pump regularly, providing all EBM to the NICU for the infant. She will be staying on [employer]. She told me yesterday that her mom was out looking for a breast pump for her, but told me today that her mother never showed up with it. I suggested that she consider renting the hospital grade pump. She will let us know on the day that she is leaving whether she wants to rent or not.

## 2019-05-14 NOTE — DISCHARGE SUMMARY
Obstetrical Discharge Summary     Name: Joss Perdomo MRN: [id number]  SSN: [id number]    YOB: 1990  Age: 30 y.o. Sex: female      Admit Date: 2019    Discharge Date: 2019     Admitting Physician: Bernard Villarreal MD     Attending Physician:  Marah Painter MD     Admission Diagnoses: Pregnant and not yet delivered in third trimester [Z34.93]    Discharge Diagnoses:   Information for the patient's :  Heather Anglin [[id number]]   Delivery of a 4 lb 8.7 oz (2.06 kg) female infant via , Low Transverse on 5/10/2019 at 3:53 AM  by . Apgars were 7 and 8. Additional Diagnoses:   Hospital Problems  Date Reviewed: 2019          Codes Class Noted POA    Pregnant and not yet delivered in third trimester ICD-10-CM: Z34.93  ICD-9-CM: V22.1  2019 Unknown             Lab Results   Component Value Date/Time    Rubella, External immune 10/20/2018       Hospital Course: Pt was sent from  center for monitoring due to episode of hypoglycemia in the office and for fetal monitoring due to abnormal dopplers. Upon monitoring in L&D, pt was diagnosed with SI preE with severe features (based on elevated severe range BPs requiring IV antihypertensives). She was started on magnesium. Required central line (IJ) for access. Her BG was very labile, ultimately insulin pump had to be paused due to hypoglycemia. Baby ultimately with very non reassuring surveillance and developed cat 3 tracing requiring primary low transverse  section. LBFI was transferred to NICU. Pt did well post-operatively. BG control was managed by endocrine. BPs managed with labetalol 300mg BID postpartum. Pt stable for discharge home POD4 with preE precautions. Will likely room in due to baby in NICU.     Patient Instructions:   Current Discharge Medication List      START taking these medications    Details   oxyCODONE-acetaminophen (PERCOCET) 5-325 mg per tablet Take 1 Tab by mouth every six (6) hours as needed for Pain for up to 7 days. Max Daily Amount: 4 Tabs. Qty: 28 Tab, Refills: 0    Associated Diagnoses: Delivery of pregnancy by  section         CONTINUE these medications which have CHANGED    Details   labetalol (NORMODYNE) 300 mg tablet Take 1 Tab by mouth every twelve (12) hours for 30 days. Qty: 60 Tab, Refills: 0         CONTINUE these medications which have NOT CHANGED    Details   glucose blood VI test strips (CONTOUR NEXT TEST STRIPS) strip Check blood sugar 7-10 times per day, mini med contour next link blood glucose meter      pnv w/o calcium-iron fum-fa (COMPLETENATE) 29 mg iron- 1 mg chew Take 1 Tab by mouth. HUMALOG KWIKPEN INSULIN 100 unit/mL kwikpen       glucagon (GLUCAGON EMERGENCY KIT, HUMAN,) 1 mg injection Glucagon emergency kit, IM injection  Indications: type 1 diabetes, pregnancy         STOP taking these medications       valACYclovir (VALTREX) 500 mg tablet Comments:   Reason for Stopping:         hydrOXYzine HCl (ATARAX) 25 mg tablet Comments:   Reason for Stopping:         doxylamine succinate (UNISOM, DOXYLAMINE,) 25 mg tablet Comments:   Reason for Stopping:         pyridoxine, vitamin B6, (VITAMIN B-6) 25 mg tablet Comments:   Reason for Stopping:         ondansetron hcl (ZOFRAN) 4 mg tablet Comments:   Reason for Stopping:         famotidine (PEPCID) 20 mg tablet Comments:   Reason for Stopping:         promethazine (PHENERGAN) 25 mg tablet Comments:   Reason for Stopping:         scopolamine (TRANSDERM-SCOP) 1 mg over 3 days pt3d Comments:   Reason for Stopping:               Reference my discharge instructions. Follow-up Appointments   Procedures    FOLLOW UP VISIT Appointment in: One Week     Standing Status:   Standing     Number of Occurrences:   1     Order Specific Question:   Appointment in     Answer:    One Week        Signed By:  Baylee Guerra MD     May 14, 2019

## 2019-05-14 NOTE — PROGRESS NOTES
Right IJ central line removed according to hospital policy. Patient tolerated well, no signs or symptoms of complication. Pressure held until the bleeding stopped. Petroleum based ointment applied, sterile occlusive dressing applied. Patient on bedrest until 1145. Patient aware. Call bell in reach. Report to Edgar Carpenter RN VAT

## 2019-05-14 NOTE — PROGRESS NOTES
Endocrinology Progress Note Ms Kristin Cason was seen around 8:30 AM. Glucoses overnight reviewed. She did have some snacking overnight - about 3 sheets of ervin crackers = 20 g carb. Did receive 2 units correction. She is going to be discharged Would like to resume insulin pump. Component GLUCOSE,FAST - POC Latest Ref Rng & Units 65 - 100 mg/dL 5/14/2019 
    8:06  (H)  
5/14/2019 
    1:26  (H)  
5/13/2019 
    6:42  (H)  
5/13/2019 
    1:01  (H)  
5/13/2019 
    7:57  (H) Insulin: 
lantus 6 units Humalog 2 units with meals Humalog correction - 1:50. Plan 1. Diabetes: 
She can resume insulin pump this evening Recommended following pump settings: 
- Basal: 0.2, 9 am 0.25 
- Carbratio: 20  (may need ratio of 15) - Sensitivity: 50 - Target: 140. Will try to see her this evening to help confirm pump settings Recommend temp basal overnight as Lantus will still in system. Recommended temp basal of 10%. Addendum: 
Mrs Kristin Cason was seen at 6:30 pm this evening. Pump settings were updated to reflect recommendations above. **Of note, she was not given any Lantus this AM. We started with basal rate of 0.3 over 24 hours. She was quite concerned glucoses would be high as appetite has increased so much Chose carb ratio of 15. Encouraged her to bolus for all carb intake Will call her tomorrow evening She bolused 4.1 units for lunch today Said glucose was in 150 range prior to lunch 
 
- Basal: 0.3 
- Carbratio: 15 
- Sensitivity: 37 (prior old sensitivity left) - Target: 100- 140. I spent 35 minutes on her care today and > 50% of the time was spent in coordination of her care

## 2019-05-14 NOTE — PROGRESS NOTES
Postpartum Progress Note Subjective: Pt doing well POD4. No acute events overnight. BPs all mild range. One blood glucose in 200s, otherwise reasonable control Pain controlled with percocet. Lochia less than menses. Tolerating diet w/o N/V. Carranza out, voiding without difficulty. Ambulating without assistance. Passing flatus. Still with persistent 2+ bilateral lower extremity edema. Denies f/c, CP, SOB, or other concerns. Pumping for baby [place] in NICU without difficulty. Objective: 
Patient Vitals for the past 24 hrs: 
 Temp Pulse Resp BP SpO2  
05/13/19 2018 97.6 °F (36.4 °C) 78  145/90   
05/13/19 1855    140/88   
05/13/19 1853  78  (!) 152/96   
05/13/19 1715 97.7 °F (36.5 °C) 69 16 121/79   
05/13/19 0918 98.3 °F (36.8 °C) 70 16 141/80 99 % Physical Exam: 
Gen-A&O, NAD, resting comfortably CV-regular rate Resp-non-labored Abd-nt, nd, fundus firm below the umbilicus Incision-c/d/i, small area of denuded skin at left lateral aspect of incision -scant blood on pad Ext-trace edema Recent Results (from the past 24 hour(s)) GLUCOSE, POC Collection Time: 05/13/19  7:57 AM  
Result Value Ref Range Glucose (POC) 126 (H) 65 - 100 mg/dL Performed by Erica Cooper, POC Collection Time: 05/13/19  1:01 PM  
Result Value Ref Range Glucose (POC) 121 (H) 65 - 100 mg/dL Performed by [employer] Vanessa GLUCOSE, POC Collection Time: 05/13/19  6:42 PM  
Result Value Ref Range Glucose (POC) 180 (H) 65 - 100 mg/dL Performed by [employer] GLUCOSE, POC Collection Time: 05/14/19  1:26 AM  
Result Value Ref Range Glucose (POC) 284 (H) 65 - 100 mg/dL Performed by Manny Bentley Assessment/Plan: 
25yo POD4 s/p 1LTCS at 33wks for cat 3 tracing, in setting of CHTN with SI preE (severe range BPs) and poorly controlled type 1 IDDM (on insulin pump), uncomplicated.  
  
Routine post-op care: 
-PO pain meds 
-diabetic diet as tolerated -cont to monitor bleeding 
-stool softeners, antiemetics, benadryl prn 
-SCDs, ambulation, incentive spirometry 
-mupirocin to denuded skin at lateral aspect of incision (from adhesive tape) 
  
CHTN, w/ SI preE with SF: s/p 24 hr mag, no IV medications required in past 24 hrs 
-BP monitoring  
-cont labetalol 300mg BID 
-1 week BP check after discharge 
  
IDDM, type 1: frequent DKA, issues with hypoglycemia, brittle diabetic with poor insulin control. Typically on insulin pump. 
-Dr. Cristal Jules (endocrinologist) managing BG, really appreciate assistance, currently on lantus 6U daily, and 2U lispo with meals, and standing sliding scale 
-BG monitoring 
-D50 prn hypoglycemia 
-goal BG >70 but <200 PNL: B pos / GBS pos / needs pap postpartum  
  
PMH also significant for:  
-vtach (remote), unclear diagnosis, cardiology consult as outpatient recommended 
-chronic kidney disease - plan for nephrology follow up as outpatient postpartum 
-HSV - cont valtrex suppression  
-depression - 1 week mood check, Rx for cymbalta sent to patient pharmacy (pt undecided if she wants to restart this medication which she was taking prior to pregnancy) 
-tobacco/marijuana abuse history 
-N/V - scopolamine patch and anti-emetics prn (will have to be careful with QT prolonging agents given h/o vtach) -GERD - famotidine PRN  
  
Difficult IV access: now with central line placed 5/9 --> needs removal prior to discharge home today 
  
Postpartum plans: 
-baby in NICU 
-pumping as tolerated 
  
Dispo: home today 
-follow up as outpatient in 1 week (for BP and mood check) 
-follow up with endocrine and nephrology as directed 
-d/c with Rx's for percocet and labetalol Criss Amaya MD 
[facility] [place]

## 2019-05-15 ENCOUNTER — TELEPHONE (OUTPATIENT)
Dept: OBGYN CLINIC | Age: 29
End: 2019-05-15

## 2019-05-15 NOTE — TELEPHONE ENCOUNTER
Patient of SP, had to have an emergency  on 5/10/19. She has since been released from [facility] as a patient but is staying there because her baby is there in NICU. She has swelling in legs and left foot has a 25 cent size blister on top of foot. She can not walk to the office and she wants SP to come to the hospital to come see her. Per Dr. Ashley Zhao nurse, needs to be seen in office or ER. If wants to be seen in office, 11 am prompt. Patient has been advised but she just does not know how she would walk over. She will make a phone call and call us back if choses 11 am appointment.

## 2019-05-18 ENCOUNTER — HOSPITAL ENCOUNTER (INPATIENT)
Age: 29
LOS: 6 days | Discharge: HOME | DRG: 776 | End: 2019-05-24
Attending: EMERGENCY MEDICINE | Admitting: FAMILY MEDICINE
Payer: MEDICAID

## 2019-05-18 ENCOUNTER — APPOINTMENT (OUTPATIENT)
Dept: CT IMAGING | Age: 29
DRG: 776 | End: 2019-05-18
Attending: EMERGENCY MEDICINE
Payer: MEDICAID

## 2019-05-18 DIAGNOSIS — I16.9 HYPERTENSIVE CRISIS, UNSPECIFIED: ICD-10-CM

## 2019-05-18 DIAGNOSIS — D64.9 ANEMIA, UNSPECIFIED TYPE: Primary | ICD-10-CM

## 2019-05-18 PROBLEM — I16.0 HYPERTENSIVE URGENCY: Status: ACTIVE | Noted: 2019-05-18

## 2019-05-18 LAB
ALBUMIN SERPL-MCNC: 1.9 G/DL (ref 3.5–5)
ALBUMIN/GLOB SERPL: 0.4 {RATIO} (ref 1.1–2.2)
ALP SERPL-CCNC: 98 U/L (ref 45–117)
ALT SERPL-CCNC: 15 U/L (ref 12–78)
ANION GAP SERPL CALC-SCNC: 7 MMOL/L (ref 5–15)
ANION GAP SERPL CALC-SCNC: 8 MMOL/L (ref 5–15)
APPEARANCE UR: ABNORMAL
AST SERPL-CCNC: 21 U/L (ref 15–37)
BASOPHILS # BLD: 0 K/UL (ref 0–0.1)
BASOPHILS # BLD: 0 K/UL (ref 0–0.1)
BASOPHILS NFR BLD: 0 % (ref 0–1)
BASOPHILS NFR BLD: 0 % (ref 0–1)
BILIRUB SERPL-MCNC: 0.3 MG/DL (ref 0.2–1)
BILIRUB UR QL: NEGATIVE
BUN SERPL-MCNC: 13 MG/DL (ref 6–20)
BUN SERPL-MCNC: 14 MG/DL (ref 6–20)
BUN/CREAT SERPL: 11 (ref 12–20)
BUN/CREAT SERPL: 12 (ref 12–20)
CALCIUM SERPL-MCNC: 8.3 MG/DL (ref 8.5–10.1)
CALCIUM SERPL-MCNC: 9.3 MG/DL (ref 8.5–10.1)
CHLORIDE SERPL-SCNC: 106 MMOL/L (ref 97–108)
CHLORIDE SERPL-SCNC: 108 MMOL/L (ref 97–108)
CO2 SERPL-SCNC: 22 MMOL/L (ref 21–32)
CO2 SERPL-SCNC: 24 MMOL/L (ref 21–32)
COLOR UR: ABNORMAL
COMMENT, HOLDF: NORMAL
COMMENT, HOLDF: NORMAL
CREAT SERPL-MCNC: 1.09 MG/DL (ref 0.55–1.02)
CREAT SERPL-MCNC: 1.28 MG/DL (ref 0.55–1.02)
DIFFERENTIAL METHOD BLD: ABNORMAL
DIFFERENTIAL METHOD BLD: ABNORMAL
EOSINOPHIL # BLD: 0 K/UL (ref 0–0.4)
EOSINOPHIL # BLD: 0.1 K/UL (ref 0–0.4)
EOSINOPHIL NFR BLD: 0 % (ref 0–7)
EOSINOPHIL NFR BLD: 1 % (ref 0–7)
ERYTHROCYTE [DISTWIDTH] IN BLOOD BY AUTOMATED COUNT: 16.6 % (ref 11.5–14.5)
ERYTHROCYTE [DISTWIDTH] IN BLOOD BY AUTOMATED COUNT: 17 % (ref 11.5–14.5)
GASTROCULT GAST QL: POSITIVE
GLOBULIN SER CALC-MCNC: 4.6 G/DL (ref 2–4)
GLUCOSE BLD STRIP.AUTO-MCNC: 107 MG/DL (ref 65–100)
GLUCOSE BLD STRIP.AUTO-MCNC: 123 MG/DL (ref 65–100)
GLUCOSE BLD STRIP.AUTO-MCNC: 228 MG/DL (ref 65–100)
GLUCOSE BLD STRIP.AUTO-MCNC: 271 MG/DL (ref 65–100)
GLUCOSE BLD STRIP.AUTO-MCNC: 409 MG/DL (ref 65–100)
GLUCOSE SERPL-MCNC: 104 MG/DL (ref 65–100)
GLUCOSE SERPL-MCNC: 168 MG/DL (ref 65–100)
GLUCOSE UR STRIP.AUTO-MCNC: 250 MG/DL
HCT VFR BLD AUTO: 21.3 % (ref 35–47)
HCT VFR BLD AUTO: 28.2 % (ref 35–47)
HGB BLD-MCNC: 6.3 G/DL (ref 11.5–16)
HGB BLD-MCNC: 8.8 G/DL (ref 11.5–16)
HGB UR QL STRIP: ABNORMAL
IMM GRANULOCYTES # BLD AUTO: 0.1 K/UL (ref 0–0.04)
IMM GRANULOCYTES # BLD AUTO: 0.1 K/UL (ref 0–0.04)
IMM GRANULOCYTES NFR BLD AUTO: 1 % (ref 0–0.5)
IMM GRANULOCYTES NFR BLD AUTO: 1 % (ref 0–0.5)
KETONES UR QL STRIP.AUTO: 15 MG/DL
LEUKOCYTE ESTERASE UR QL STRIP.AUTO: ABNORMAL
LYMPHOCYTES # BLD: 0.9 K/UL (ref 0.8–3.5)
LYMPHOCYTES # BLD: 1.2 K/UL (ref 0.8–3.5)
LYMPHOCYTES NFR BLD: 13 % (ref 12–49)
LYMPHOCYTES NFR BLD: 17 % (ref 12–49)
MCH RBC QN AUTO: 24.7 PG (ref 26–34)
MCH RBC QN AUTO: 25.7 PG (ref 26–34)
MCHC RBC AUTO-ENTMCNC: 29.6 G/DL (ref 30–36.5)
MCHC RBC AUTO-ENTMCNC: 31.2 G/DL (ref 30–36.5)
MCV RBC AUTO: 82.5 FL (ref 80–99)
MCV RBC AUTO: 83.5 FL (ref 80–99)
MONOCYTES # BLD: 0.5 K/UL (ref 0–1)
MONOCYTES # BLD: 0.6 K/UL (ref 0–1)
MONOCYTES NFR BLD: 7 % (ref 5–13)
MONOCYTES NFR BLD: 8 % (ref 5–13)
NEUTS SEG # BLD: 5.2 K/UL (ref 1.8–8)
NEUTS SEG # BLD: 5.6 K/UL (ref 1.8–8)
NEUTS SEG NFR BLD: 75 % (ref 32–75)
NEUTS SEG NFR BLD: 77 % (ref 32–75)
NITRITE UR QL STRIP.AUTO: NEGATIVE
NRBC # BLD: 0 K/UL (ref 0–0.01)
NRBC # BLD: 0 K/UL (ref 0–0.01)
NRBC BLD-RTO: 0 PER 100 WBC
NRBC BLD-RTO: 0 PER 100 WBC
PH GAST: 2 [PH] (ref 1.5–3.5)
PH UR STRIP: 7.5 [PH] (ref 5–8)
PLATELET # BLD AUTO: 248 K/UL (ref 150–400)
PLATELET # BLD AUTO: 308 K/UL (ref 150–400)
PLATELET COMMENTS,PCOM: ABNORMAL
PMV BLD AUTO: 9.4 FL (ref 8.9–12.9)
PMV BLD AUTO: 9.4 FL (ref 8.9–12.9)
POTASSIUM SERPL-SCNC: 3.8 MMOL/L (ref 3.5–5.1)
POTASSIUM SERPL-SCNC: 3.9 MMOL/L (ref 3.5–5.1)
PROT SERPL-MCNC: 6.5 G/DL (ref 6.4–8.2)
PROT UR STRIP-MCNC: 300 MG/DL
RBC # BLD AUTO: 2.55 M/UL (ref 3.8–5.2)
RBC # BLD AUTO: 3.42 M/UL (ref 3.8–5.2)
RBC MORPH BLD: ABNORMAL
SAMPLES BEING HELD,HOLD: NORMAL
SAMPLES BEING HELD,HOLD: NORMAL
SERVICE CMNT-IMP: ABNORMAL
SODIUM SERPL-SCNC: 137 MMOL/L (ref 136–145)
SODIUM SERPL-SCNC: 138 MMOL/L (ref 136–145)
SP GR UR REFRACTOMETRY: 1.01 (ref 1–1.03)
UROBILINOGEN UR QL STRIP.AUTO: 0.2 EU/DL (ref 0.2–1)
WBC # BLD AUTO: 6.9 K/UL (ref 3.6–11)
WBC # BLD AUTO: 7.4 K/UL (ref 3.6–11)

## 2019-05-18 PROCEDURE — 36415 COLL VENOUS BLD VENIPUNCTURE: CPT

## 2019-05-18 PROCEDURE — 74011250636 HC RX REV CODE- 250/636: Performed by: INTERNAL MEDICINE

## 2019-05-18 PROCEDURE — 74011250637 HC RX REV CODE- 250/637: Performed by: PHYSICIAN ASSISTANT

## 2019-05-18 PROCEDURE — 99285 EMERGENCY DEPT VISIT HI MDM: CPT

## 2019-05-18 PROCEDURE — 30233N1 TRANSFUSION OF NONAUTOLOGOUS RED BLOOD CELLS INTO PERIPHERAL VEIN, PERCUTANEOUS APPROACH: ICD-10-PCS | Performed by: FAMILY MEDICINE

## 2019-05-18 PROCEDURE — 74011636637 HC RX REV CODE- 636/637: Performed by: INTERNAL MEDICINE

## 2019-05-18 PROCEDURE — 36430 TRANSFUSION BLD/BLD COMPNT: CPT

## 2019-05-18 PROCEDURE — 96375 TX/PRO/DX INJ NEW DRUG ADDON: CPT

## 2019-05-18 PROCEDURE — 74011250636 HC RX REV CODE- 250/636: Performed by: PHYSICIAN ASSISTANT

## 2019-05-18 PROCEDURE — 74011250637 HC RX REV CODE- 250/637: Performed by: INTERNAL MEDICINE

## 2019-05-18 PROCEDURE — P9016 RBC LEUKOCYTES REDUCED: HCPCS

## 2019-05-18 PROCEDURE — 82271 OCCULT BLOOD OTHER SOURCES: CPT

## 2019-05-18 PROCEDURE — 82962 GLUCOSE BLOOD TEST: CPT

## 2019-05-18 PROCEDURE — 86923 COMPATIBILITY TEST ELECTRIC: CPT

## 2019-05-18 PROCEDURE — 74011636637 HC RX REV CODE- 636/637: Performed by: FAMILY MEDICINE

## 2019-05-18 PROCEDURE — 74011250637 HC RX REV CODE- 250/637: Performed by: EMERGENCY MEDICINE

## 2019-05-18 PROCEDURE — 85025 COMPLETE CBC W/AUTO DIFF WBC: CPT

## 2019-05-18 PROCEDURE — 96374 THER/PROPH/DIAG INJ IV PUSH: CPT

## 2019-05-18 PROCEDURE — 80053 COMPREHEN METABOLIC PANEL: CPT

## 2019-05-18 PROCEDURE — 65660000000 HC RM CCU STEPDOWN

## 2019-05-18 PROCEDURE — 74011250637 HC RX REV CODE- 250/637: Performed by: FAMILY MEDICINE

## 2019-05-18 PROCEDURE — 96376 TX/PRO/DX INJ SAME DRUG ADON: CPT

## 2019-05-18 PROCEDURE — 70450 CT HEAD/BRAIN W/O DYE: CPT

## 2019-05-18 PROCEDURE — 86900 BLOOD TYPING SEROLOGIC ABO: CPT

## 2019-05-18 PROCEDURE — 74011000250 HC RX REV CODE- 250: Performed by: INTERNAL MEDICINE

## 2019-05-18 PROCEDURE — 81001 URINALYSIS AUTO W/SCOPE: CPT

## 2019-05-18 RX ORDER — SODIUM CHLORIDE 0.9 % (FLUSH) 0.9 %
5-40 SYRINGE (ML) INJECTION AS NEEDED
Status: DISCONTINUED | OUTPATIENT
Start: 2019-05-18 | End: 2019-05-24 | Stop reason: HOSPADM

## 2019-05-18 RX ORDER — ONDANSETRON 2 MG/ML
4 INJECTION INTRAMUSCULAR; INTRAVENOUS
Status: DISCONTINUED | OUTPATIENT
Start: 2019-05-18 | End: 2019-05-21

## 2019-05-18 RX ORDER — SODIUM CHLORIDE 0.9 % (FLUSH) 0.9 %
5-40 SYRINGE (ML) INJECTION EVERY 8 HOURS
Status: DISCONTINUED | OUTPATIENT
Start: 2019-05-18 | End: 2019-05-24 | Stop reason: HOSPADM

## 2019-05-18 RX ORDER — KETOROLAC TROMETHAMINE 30 MG/ML
30 INJECTION, SOLUTION INTRAMUSCULAR; INTRAVENOUS
Status: COMPLETED | OUTPATIENT
Start: 2019-05-18 | End: 2019-05-18

## 2019-05-18 RX ORDER — LABETALOL HCL 20 MG/4 ML
SYRINGE (ML) INTRAVENOUS
Status: DISPENSED
Start: 2019-05-18 | End: 2019-05-18

## 2019-05-18 RX ORDER — KETOROLAC TROMETHAMINE 30 MG/ML
30 INJECTION, SOLUTION INTRAMUSCULAR; INTRAVENOUS
Status: DISPENSED | OUTPATIENT
Start: 2019-05-18 | End: 2019-05-23

## 2019-05-18 RX ORDER — CLONIDINE HYDROCHLORIDE 0.1 MG/1
0.2 TABLET ORAL
Status: COMPLETED | OUTPATIENT
Start: 2019-05-18 | End: 2019-05-18

## 2019-05-18 RX ORDER — ONDANSETRON 2 MG/ML
4 INJECTION INTRAMUSCULAR; INTRAVENOUS
Status: COMPLETED | OUTPATIENT
Start: 2019-05-18 | End: 2019-05-18

## 2019-05-18 RX ORDER — OXYCODONE AND ACETAMINOPHEN 5; 325 MG/1; MG/1
1 TABLET ORAL
Status: DISCONTINUED | OUTPATIENT
Start: 2019-05-18 | End: 2019-05-24 | Stop reason: HOSPADM

## 2019-05-18 RX ORDER — DIPHENHYDRAMINE HYDROCHLORIDE 50 MG/ML
25 INJECTION, SOLUTION INTRAMUSCULAR; INTRAVENOUS
Status: COMPLETED | OUTPATIENT
Start: 2019-05-18 | End: 2019-05-18

## 2019-05-18 RX ORDER — AMLODIPINE BESYLATE 5 MG/1
10 TABLET ORAL DAILY
Status: DISCONTINUED | OUTPATIENT
Start: 2019-05-18 | End: 2019-05-22

## 2019-05-18 RX ORDER — INSULIN LISPRO 100 [IU]/ML
INJECTION, SOLUTION INTRAVENOUS; SUBCUTANEOUS
Status: DISCONTINUED | OUTPATIENT
Start: 2019-05-18 | End: 2019-05-20

## 2019-05-18 RX ORDER — ACETAMINOPHEN 500 MG
1000 TABLET ORAL ONCE
Status: COMPLETED | OUTPATIENT
Start: 2019-05-18 | End: 2019-05-18

## 2019-05-18 RX ORDER — LABETALOL HCL 20 MG/4 ML
20 SYRINGE (ML) INTRAVENOUS
Status: COMPLETED | OUTPATIENT
Start: 2019-05-18 | End: 2019-05-18

## 2019-05-18 RX ORDER — SODIUM CHLORIDE 9 MG/ML
150 INJECTION, SOLUTION INTRAVENOUS CONTINUOUS
Status: DISCONTINUED | OUTPATIENT
Start: 2019-05-18 | End: 2019-05-21

## 2019-05-18 RX ORDER — INSULIN LISPRO 100 [IU]/ML
12 INJECTION, SOLUTION INTRAVENOUS; SUBCUTANEOUS ONCE
Status: COMPLETED | OUTPATIENT
Start: 2019-05-18 | End: 2019-05-18

## 2019-05-18 RX ORDER — DIPHENHYDRAMINE HYDROCHLORIDE 50 MG/ML
INJECTION, SOLUTION INTRAMUSCULAR; INTRAVENOUS
Status: DISPENSED
Start: 2019-05-18 | End: 2019-05-18

## 2019-05-18 RX ORDER — LABETALOL HCL 20 MG/4 ML
10 SYRINGE (ML) INTRAVENOUS
Status: COMPLETED | OUTPATIENT
Start: 2019-05-18 | End: 2019-05-18

## 2019-05-18 RX ORDER — ACETAMINOPHEN 325 MG/1
650 TABLET ORAL
Status: DISCONTINUED | OUTPATIENT
Start: 2019-05-18 | End: 2019-05-24 | Stop reason: HOSPADM

## 2019-05-18 RX ORDER — SODIUM CHLORIDE 9 MG/ML
1000 INJECTION, SOLUTION INTRAVENOUS ONCE
Status: COMPLETED | OUTPATIENT
Start: 2019-05-18 | End: 2019-05-18

## 2019-05-18 RX ORDER — DEXTROSE 50 % IN WATER (D50W) INTRAVENOUS SYRINGE
25-50 AS NEEDED
Status: DISCONTINUED | OUTPATIENT
Start: 2019-05-18 | End: 2019-05-20 | Stop reason: SDUPTHER

## 2019-05-18 RX ORDER — SODIUM CHLORIDE 9 MG/ML
250 INJECTION, SOLUTION INTRAVENOUS AS NEEDED
Status: DISCONTINUED | OUTPATIENT
Start: 2019-05-18 | End: 2019-05-24 | Stop reason: HOSPADM

## 2019-05-18 RX ORDER — ONDANSETRON 2 MG/ML
INJECTION INTRAMUSCULAR; INTRAVENOUS
Status: DISPENSED
Start: 2019-05-18 | End: 2019-05-18

## 2019-05-18 RX ORDER — SWAB
1 SWAB, NON-MEDICATED MISCELLANEOUS DAILY
Status: DISCONTINUED | OUTPATIENT
Start: 2019-05-18 | End: 2019-05-24 | Stop reason: HOSPADM

## 2019-05-18 RX ORDER — CLONIDINE HYDROCHLORIDE 0.2 MG/1
0.2 TABLET ORAL
Status: DISCONTINUED | OUTPATIENT
Start: 2019-05-18 | End: 2019-05-21

## 2019-05-18 RX ORDER — KETOROLAC TROMETHAMINE 30 MG/ML
INJECTION, SOLUTION INTRAMUSCULAR; INTRAVENOUS
Status: DISPENSED
Start: 2019-05-18 | End: 2019-05-18

## 2019-05-18 RX ORDER — DULOXETIN HYDROCHLORIDE 20 MG/1
20 CAPSULE, DELAYED RELEASE ORAL 2 TIMES DAILY
Status: DISCONTINUED | OUTPATIENT
Start: 2019-05-18 | End: 2019-05-24 | Stop reason: HOSPADM

## 2019-05-18 RX ORDER — MAGNESIUM SULFATE 100 %
4 CRYSTALS MISCELLANEOUS AS NEEDED
Status: DISCONTINUED | OUTPATIENT
Start: 2019-05-18 | End: 2019-05-20 | Stop reason: SDUPTHER

## 2019-05-18 RX ORDER — LABETALOL 100 MG/1
400 TABLET, FILM COATED ORAL
Status: COMPLETED | OUTPATIENT
Start: 2019-05-18 | End: 2019-05-18

## 2019-05-18 RX ADMIN — LABETALOL 20 MG/4 ML (5 MG/ML) INTRAVENOUS SYRINGE 20 MG: at 04:13

## 2019-05-18 RX ADMIN — Medication 1 TABLET: at 10:00

## 2019-05-18 RX ADMIN — FAMOTIDINE 20 MG: 10 INJECTION, SOLUTION INTRAVENOUS at 20:37

## 2019-05-18 RX ADMIN — SODIUM CHLORIDE 1000 ML/HR: 900 INJECTION, SOLUTION INTRAVENOUS at 01:31

## 2019-05-18 RX ADMIN — ONDANSETRON 4 MG: 2 INJECTION INTRAMUSCULAR; INTRAVENOUS at 17:45

## 2019-05-18 RX ADMIN — ACETAMINOPHEN 1000 MG: 500 TABLET ORAL at 00:59

## 2019-05-18 RX ADMIN — Medication 10 ML: at 12:44

## 2019-05-18 RX ADMIN — SODIUM CHLORIDE 1000 ML: 900 INJECTION, SOLUTION INTRAVENOUS at 17:05

## 2019-05-18 RX ADMIN — OXYCODONE AND ACETAMINOPHEN 1 TABLET: 5; 325 TABLET ORAL at 13:05

## 2019-05-18 RX ADMIN — SODIUM CHLORIDE 1000 ML: 900 INJECTION, SOLUTION INTRAVENOUS at 15:43

## 2019-05-18 RX ADMIN — OXYCODONE AND ACETAMINOPHEN 1 TABLET: 5; 325 TABLET ORAL at 08:02

## 2019-05-18 RX ADMIN — DIPHENHYDRAMINE HYDROCHLORIDE 25 MG: 50 INJECTION, SOLUTION INTRAMUSCULAR; INTRAVENOUS at 01:24

## 2019-05-18 RX ADMIN — KETOROLAC TROMETHAMINE 30 MG: 30 INJECTION, SOLUTION INTRAMUSCULAR at 17:45

## 2019-05-18 RX ADMIN — ONDANSETRON 4 MG: 2 INJECTION INTRAMUSCULAR; INTRAVENOUS at 01:27

## 2019-05-18 RX ADMIN — Medication 10 ML: at 10:38

## 2019-05-18 RX ADMIN — KETOROLAC TROMETHAMINE 30 MG: 30 INJECTION INTRAMUSCULAR; INTRAVENOUS at 01:26

## 2019-05-18 RX ADMIN — LABETALOL 20 MG/4 ML (5 MG/ML) INTRAVENOUS SYRINGE 10 MG: at 01:28

## 2019-05-18 RX ADMIN — SODIUM CHLORIDE 100 ML/HR: 900 INJECTION, SOLUTION INTRAVENOUS at 20:25

## 2019-05-18 RX ADMIN — Medication 10 ML: at 12:03

## 2019-05-18 RX ADMIN — OXYCODONE AND ACETAMINOPHEN 1 TABLET: 5; 325 TABLET ORAL at 20:37

## 2019-05-18 RX ADMIN — CLONIDINE HYDROCHLORIDE 0.2 MG: 0.1 TABLET ORAL at 03:03

## 2019-05-18 RX ADMIN — LABETALOL HCL 300 MG: 200 TABLET, FILM COATED ORAL at 10:00

## 2019-05-18 RX ADMIN — ONDANSETRON 4 MG: 2 INJECTION INTRAMUSCULAR; INTRAVENOUS at 12:43

## 2019-05-18 RX ADMIN — LABETALOL HCL 300 MG: 200 TABLET, FILM COATED ORAL at 20:38

## 2019-05-18 RX ADMIN — INSULIN LISPRO 3 UNITS: 100 INJECTION, SOLUTION INTRAVENOUS; SUBCUTANEOUS at 12:02

## 2019-05-18 RX ADMIN — AMLODIPINE BESYLATE 10 MG: 5 TABLET ORAL at 13:05

## 2019-05-18 RX ADMIN — DULOXETINE HYDROCHLORIDE 20 MG: 20 CAPSULE, DELAYED RELEASE ORAL at 10:00

## 2019-05-18 RX ADMIN — DULOXETINE HYDROCHLORIDE 20 MG: 20 CAPSULE, DELAYED RELEASE ORAL at 20:29

## 2019-05-18 RX ADMIN — CLONIDINE HYDROCHLORIDE 0.2 MG: 0.2 TABLET ORAL at 14:14

## 2019-05-18 RX ADMIN — LABETALOL HYDROCHLORIDE 400 MG: 100 TABLET, FILM COATED ORAL at 05:36

## 2019-05-18 RX ADMIN — INSULIN LISPRO 12 UNITS: 100 INJECTION, SOLUTION INTRAVENOUS; SUBCUTANEOUS at 15:46

## 2019-05-18 RX ADMIN — KETOROLAC TROMETHAMINE 30 MG: 30 INJECTION, SOLUTION INTRAMUSCULAR at 23:18

## 2019-05-18 NOTE — PROGRESS NOTES
Bedside and Verbal shift change report given to Isha Rosenberg RN (oncoming nurse) by Jessica Runner, RN (offgoing nurse).  Report included the following information SBAR, Kardex, Intake/Output, MAR and Recent Results.

## 2019-05-18 NOTE — PROGRESS NOTES
12: TRANSFER - IN REPORT: 
 
Verbal report received from Pippa(name) on [employer]  being received from ED(unit) for routine progression of care Report consisted of patients Situation, Background, Assessment and  
Recommendations(SBAR). Information from the following report(s) SBAR, Kardex, [facility], Recent Results and Cardiac Rhythm SR was reviewed with the receiving nurse. Opportunity for questions and clarification was provided. 0730: Bedside shift change report given to Meera Millan (oncoming nurse).  Report included the following information SBAR, Kardex, Intake/Output, MAR, Recent Results and Cardiac Rhythm SR.

## 2019-05-18 NOTE — PROGRESS NOTES
Brief: 
 
Discussed case with RN, father and pt at bedside. At time of leaving room, pt reports body aches actually better, not worst;  but will allow Toradols IV as needed, none given as of this note Also, Lab reviewed w father/pt noting excellent chemistries since admission out side of poc bs's;   but to answer concerns about advancing to DKA, will repeat chem now. Heriberto Nair pt on: SSI for  and insulin pump as well . Of note: I had preemptively loaded pt with 2 liters NS from earlier, now into the 2nd liter. RN is instructed to call me hourly as to progress of pt.   
 
Hannah Robert MD 5/18/2019 5:35 PM

## 2019-05-18 NOTE — ED NOTES
TRANSFER - OUT REPORT: 
 
Verbal report given to Gudelia(name) on Cassandra Segundo  being transferred to [facility](unit) for routine progression of care Report consisted of patients Situation, Background, Assessment and  
Recommendations(SBAR). Information from the following report(s) SBAR, ED Summary and Cardiac Rhythm NSR was reviewed with the receiving nurse. Lines:  
Peripheral IV 05/18/19 Left Forearm (Active) Site Assessment Clean, dry, & intact 5/18/2019  2:35 AM  
Phlebitis Assessment 0 5/18/2019  2:35 AM  
Infiltration Assessment 0 5/18/2019  2:35 AM  
Dressing Status Clean, dry, & intact 5/18/2019  2:35 AM  
Hub Color/Line Status Yellow 5/18/2019  2:35 AM  
  
 
Opportunity for questions and clarification was provided. Patient transported with: 
 Registered Nurse

## 2019-05-18 NOTE — H&P
[address] HISTORY AND PHYSICAL Name:  Musa Storm 
MR#:  [id number] :  1990 ACCOUNT #:  [de-identified] ADMIT DATE:  2019 CHIEF COMPLAINT:  Headache. HISTORY OF PRESENT ILLNESS:  A 64-year-old female with past medical history of anemia, chronic pain, depression, type 1 diabetes mellitus, essential hypertension, heart murmur, herpes simplex, peripheral neuropathy, ventricular tachycardia, preeclampsia, peripheral leg edema, presented to the emergency department from home, accompanied by her father, with chief complaint of headache. The patient notes onset of symptoms starting yesterday, which had been 10/10 severe, aggravated with light/ noise (with noted photophobia, phonophobia), associated nausea, and without relief after use of Percocet. She newly has been hospitalized from 2019 to 2019 with hypoglycemia, preeclampsia, uncontrolled hypertension, until a subsequent delivery recent with requiring a low transverse  section. The patient is now postpartum 1 week. She reportedly had required magnesium administration, labetalol during last hospitalization. She reportedly had ongoing issues with severe leg edema and with noted preeclampsia. She was discharged home with labetalol 300 mg p.o. b.i.d. Unfortunately, her blood pressures have been elevated. On arrival to the emergency department, initial recorded vital signs, blood pressure 171/98, heart rate of 102, respiratory rate of 16, and O2 saturation 98% on room air. Blood pressure increased to 188/106. The patient continued to complain of severe headache symptoms. She underwent a workup including a CT of the head without contrast showing no acute intracranial abnormalities. Her labs showed a hemoglobin of 6.3, which is decreased from 8.1 on 2019. Orders have been given to transfuse 1 unit of packed red blood cells.   Unfortunately, during the current evaluation, the patient has been uncooperative, not answering questionings appropriately. Her father notes that he had already consented for the blood transfusion for her to have. While in the emergency department, ED administered Toradol 30 mg IV, Benadryl 25 mg IV, Tylenol 1000 mg p.o. She was given clonidine 0.2 mg p.o., labetalol 10 mg IV, plus labetalol 400 mg p.o. Zofran 4 mg IV was given for nausea. She is now seen for admission to the hospitalist service for continued evaluation and treatment. There are no reports of the patient having any numbness, new onset paraesthesia, slurred speech, facial droop, chest pain, shortness of breath, cough, congestion, palpitations, vomiting, diarrhea, melena, dysuria, hematuria, and calf pain, fever, chills or rash. PAST MEDICAL HISTORY: 
1. Anemia. 2.  Chronic pain. 3.  Depression. 4.  Type 1 diabetes mellitus, uncontrolled, status post insulin pump. 5.  Essential hypertension. 6.  Heart murmur. 7.  Herpes simplex virus in 2017. 8.  Peripheral neuropathy. 9.  Ventricular tachycardia. 10.  Preeclampsia. 11.  Peripheral leg edema. PAST SURGICAL HISTORY: 
1. Exploratory laparoscopy. 2.  Cyst removal from the groin, unspecified. 3.  Low transverse  section. ALLERGIES:  MORPHINE, PENICILLIN. CURRENT MEDICATIONS:  Medication list reviewed and noted on chart records. Taking Last Dose Start Date End Date Provider DULoxetine (CYMBALTA) 20 mg capsule    19  Triny Winston MD  
 Take 1 Cap by mouth two (2) times a day for 30 days. glucagon (GLUCAGON EMERGENCY KIT, HUMAN,) 1 mg injection    18  --  Provider, Historical  
 Glucagon emergency kit, IM injection  Indications: type 1 diabetes, pregnancy  
 glucose blood VI test strips (CONTOUR NEXT TEST STRIPS) strip    18  --  Provider, Historical  
 Check blood sugar 7-10 times per day, mini med contour next link blood glucose meter HUMALOG KWIKPEN INSULIN 100 unit/mL kwikpen    03/13/19  --  Provider, Historical  
   
 Notes:  With Pump  
 labetalol (NORMODYNE) 300 mg tablet    05/14/19 06/13/19  Jun Winston MD  
 Take 1 Tab by mouth every twelve (12) hours for 30 days. oxyCODONE-acetaminophen (PERCOCET) 5-325 mg per tablet    05/14/19 05/21/19 Xavier Pablo MD  
 Take 1 Tab by mouth every six (6) hours as needed for Pain for up to 7 days. Max Daily Amount: 4 Tabs. pnv w/o calcium-iron fum-fa (COMPLETENATE) 29 mg iron- 1 mg chew    10/23/18  --  Provider, Historical  
 Take 1 Tab by mouth. SOCIAL HISTORY:  Former smoker of cigarettes, reportedly quit from 2014. No reports of alcohol. Positive for marijuana. FAMILY HISTORY:  Hypertension in father, diabetes in father, sleep apnea in father. REVIEW OF SYSTEMS:  Pertinent positives as noted in HPI, otherwise negative. PHYSICAL EXAMINATION: 
GENERAL:  The patient in no acute respiratory distress. VITAL SIGNS:  Temperature 98.0 degrees Fahrenheit, blood pressure 161/93, heart rate 88, respiratory rate 16, O2 saturation 97% on room air. PSYCHIATRIC:  The patient was initially somnolent, uncooperative, anxious upon arousal.  Slightly agitated, reluctant to answer questions. Awake, alert, and oriented x3. NEUROLOGIC:  GCS of 15. Best response. Moves extremities x4. Sensation grossly intact without slurred speech or facial droop. HEENT:  Normocephalic, atraumatic. PERRLA. EOMs intact. Sclerae are anicteric. Conjunctivae are clear. Nares are patent. Oropharynx clear. Tongue is midline, not edematous. The patient has a lip ring in place in the lower lip. NECK:  Supple without lymphadenopathy, JVD, carotid bruits, or thyromegaly. Nontender. No acute palpable soft tissue or bony deformity. LYMPHATIC:  Negative for cervical or supraclavicular adenopathy. RESPIRATORY:  Lungs are clear to auscultation bilaterally. CVS:  Heart, regular rate and rhythm. Normal S1, S2 without murmurs, rubs, or gallops. GI:  Abdomen is soft. Tender to palpation mostly in the suprapubic region with the transverse surgical wound site appearing intact without any subsequent drainage or erythema. Normoactive bowel sounds. No rebound, guarding, or rigidity. No auscultated abdominal bruits. No palpable abdominal mass. BACK:  No CVA tenderness. No step-off deformity. MUSCULOSKELETAL:  No acute palpable bony deformity. Negative for calf tenderness. VASCULAR:  2+ radial, 1+ dorsalis pedis pulse without cyanosis, clubbing. She has 3+ pitting edema of bilateral lower extremities. SKIN:  Warm and dry with multiple tattoos. LABORATORY DATA:  Labs are reviewed as follows:  Sodium 137, potassium 3.9, chloride 106, CO2 of 24, BUN 14, creatinine 1.28, glucose 168, anion gap of 7, calcium of 9.3, GFR 59, total bilirubin 0.3, total protein 6.5, albumin is 1.9, ALT of 15, AST of 21, alkaline phosphatase of 98. WBC 6.9, hemoglobin 6.3, hematocrit 21.3, platelets 815, and neutrophils 77%. Urinalysis, leukocyte esterase small, nitrites negative, urobilinogen 0.2, bilirubin negative, blood large, ketones 15, glucose 250, protein 300, pH 7.5, specific gravity 1.015. CT of the head without contrast, no acute findings. IMPRESSION AND PLAN: 
1. Hypertensive urgency. Admit the patient to Telemetry. The patient had been given labetalol and clonidine. Resume back on home medications, labetalol 300 mg p.o. q.12 hours. Provide addition antihypertensive medications as needed. 2.  Peripheral edema - unchanged from baseline. There was concern initially for possible eclampsia. ED physician assistant had reportedly spoken with on-call OB/GYN in regard to the same. It has been noted that the patient had been treated with magnesium and labetalol during last hospitalization and still with recurrences of uncontrolled hypertension. Continue with management. Keep legs elevated at rest. 
3.  Intractable headache. Continue pain management and supportive care. 4.  Nausea. Ordered Zofran 4 mg IV q.6 hours p.r.n. 
5.  Tachycardia, currently resolved. 6.  Anemia. ED reportedly ordered and transfused 1 unit of packed red blood cells. Repeat CBC posttransfusion. 7.  Elevated creatinine. Repeat the renal panel in the a.m. Place on strict I's and O's. 
8.  Type 1 diabetes mellitus. Order stat point-of-care glucose check. Place on Humalog insulin sliding scale coverage and schedule Accu-Chek and check hemoglobin A1c level. Turn off the insulin pump and treat with sliding scale as mentioned. 9.  Depression. Resume back on the patient's home medication. 10.  Chronic pain. Plan as noted above. 11.  Venous thromboembolism prophylaxis. Sequential compression devices to lower extremities. Damián Bain MD [id number] 
D:  05/18/2019 7:55 
T:  05/18/2019 10:42 JOB #:  [id number]

## 2019-05-18 NOTE — PROGRESS NOTES
TRANSFER - IN REPORT: 
 
Verbal report received from reji(name) on Meka Gaston  being received from [facility](unit) for routine progression of care Report consisted of patients Situation, Background, Assessment and  
Recommendations(SBAR). Information from the following report(s) Kardex, ED Summary, Procedure Summary, Intake/Output, MAR and Cardiac Rhythm sr was reviewed with the receiving nurse. Opportunity for questions and clarification was provided. Assessment completed upon patients arrival to unit and care assumed.

## 2019-05-18 NOTE — ED TRIAGE NOTES
Triage:  Pt arrives from home with CC of headache and HTN. Pt reports her she takes labetalol for high blood pressure and has been taking it regularly but today for some reason her pressure has been in the 160s and 170s. Pt also reports headache in bilateral temples. Pt reports this has been going on since about 1930 yesterday.

## 2019-05-18 NOTE — PROGRESS NOTES
1600 Dr Uyen Ramírez in to see patient I spoke with the patient about this. With patient about home insulin pump. Patient wants to resume .  Verifies basil rate and agrees. Patient resumes pump. Pt appear anxious about diabetes. . FSBS 406 12 units coverage given. Patients father comes in and is very upset about care. Dr. Uyen Ramírez called to come to talk with father per his request.  Arrives right away and patients father left unit. Dr. Leodan Benson explained the plan of care with patient. Father returns from L&D where patient was last week. Father very upset Dr. Uyen Ramírez didn't wait. Called supervisor to assist with situation. Called  to come and talk with Father. States I will be there in 10 min. Dr hooper in time stated and sits with family and again explained the plan of care.

## 2019-05-18 NOTE — ED PROVIDER NOTES
20-year-old female with medical history significant for diabetes mellitus and hypertension, one week postpartum status post  with preeclampsia presenting with complaints of headache localized to the temples bilaterally rated 10/10 in intensity without any improvement or exacerbating factors with associated nausea, photophobia and phonophobia. She reports taking Percocet for her abdominal pain but has not helped the headache. She also reports taking her labetalol as prescribed. Family members that does not reports noticing elevated blood pressure in the 160s over 90 about one hour ago. They report blood pressures continue to elevate. She denies any history of similar headaches. She denies any associated fever, chills, neck stiffness, rash, chest pain, shortness of breath, extremity numbness,  extremity weakness, lower extremity edema, vomiting, diarrhea or urinary complaints. Past Medical History:  
Diagnosis Date  Anemia  Chronic pain  Depression  Diabetes type 1, uncontrolled ([facility] [address]  DKA, type 1 ([address]  Essential hypertension  Heart murmur  Herpes simplex virus (HSV) infection 2017  
 positive in blood  Peripheral neuropathy  Ventricular tachycardia ([facility] [address] Past Surgical History:  
Procedure Laterality Date  ABDOMEN SURGERY PROC UNLISTED    
 exploratory laparoscopy  HX CYST REMOVAL    
 groin Family History:  
Problem Relation Age of Onset  No Known Problems Mother  Sleep Apnea Father  Hypertension Father  Diabetes Father  Heart Disease Maternal Grandfather Social History Socioeconomic History  Marital status: SINGLE Spouse name: Not on file  Number of children: Not on file  Years of education: Not on file  Highest education level: Not on file Occupational History  Not on file Social Needs  Financial resource strain: Not on file  Food insecurity:  
  Worry: Not on file Inability: Not on file  Transportation needs:  
  Medical: Not on file Non-medical: Not on file Tobacco Use  Smoking status: Former Smoker Packs/day: 0.25 Years: 8.00 Pack years: 2.00 Types: Cigarettes Last attempt to quit: 10/26/2014 Years since quittin.5  Smokeless tobacco: Never Used Substance and Sexual Activity  Alcohol use: No  
  Alcohol/week: 0.0 oz  Drug use: Yes Frequency: 2.0 times per week Types: Marijuana  Sexual activity: Yes  
  Partners: Male Birth control/protection: None Lifestyle  Physical activity:  
  Days per week: Not on file Minutes per session: Not on file  Stress: Not on file Relationships  Social connections:  
  Talks on phone: Not on file Gets together: Not on file Attends Restoration service: Not on file Active member of club or organization: Not on file Attends meetings of clubs or organizations: Not on file Relationship status: Not on file  Intimate partner violence:  
  Fear of current or ex partner: Not on file Emotionally abused: Not on file Physically abused: Not on file Forced sexual activity: Not on file Other Topics Concern [address] Service Not Asked  Blood Transfusions Not Asked  Caffeine Concern Not Asked  Occupational Exposure Not Asked Mone Priestly Hazards Not Asked  Sleep Concern Not Asked  Stress Concern Not Asked  Weight Concern Not Asked  Special Diet Not Asked  Back Care Not Asked  Exercise Not Asked  Bike Helmet Not Asked  Seat Belt Not Asked  Self-Exams Not Asked Social History Narrative  Not on file ALLERGIES: Morphine and Pcn [penicillins] Review of Systems Constitutional: Negative. Negative for chills and fever. HENT: Negative. Negative for congestion, ear pain, rhinorrhea, sneezing and sore throat. Eyes: Positive for photophobia. Respiratory: Negative for cough, chest tightness and shortness of breath. Cardiovascular: Negative. Negative for chest pain. Gastrointestinal: Negative for constipation, diarrhea, nausea and vomiting. Genitourinary: Negative for difficulty urinating, frequency and urgency. Skin: Negative for rash. Neurological: Positive for headaches. All other systems reviewed and are negative. Vitals:  
 05/18/19 0010 Pulse: (!) 102 SpO2: 98% Physical Exam  
Constitutional: She is oriented to person, place, and time. She appears well-developed and well-nourished. No distress. Adult female appears uncomfortable HENT:  
Head: Normocephalic and atraumatic. Left Ear: External ear normal.  
Eyes: Pupils are equal, round, and reactive to light. Conjunctivae are normal.  
Neck: Normal range of motion. Neck supple. Cardiovascular: Normal rate, regular rhythm and normal heart sounds. Pulmonary/Chest: Effort normal. No respiratory distress. She has no wheezes. Abdominal: Soft. Bowel sounds are normal. She exhibits no distension. There is no tenderness. There is no rebound. Musculoskeletal: Normal range of motion. Neurological: She is alert and oriented to person, place, and time. Skin: Skin is warm and dry. No ecchymosis, no laceration and no lesion noted. Nursing note and vitals reviewed. MDM Number of Diagnoses or Management Options Anemia, unspecified type: Hypertensive crisis, unspecified:  
Diagnosis management comments: 29 yo AAF with complaint of HA 1 week post partum w/ pregnancy complicated by preeclampsia superimposed on chronic hypertension. ? Eclampsia vs ICH/mass vs hypertensive emergency vs migraine amongst others Plan CBC 
CMP 
UA Ct head IVF Analgesia Antiemetic therapy Reassess. April ALEX [facility], [address] Amount and/or Complexity of Data Reviewed Clinical lab tests: ordered and reviewed Tests in the radiology section of CPT®: ordered and reviewed Independent visualization of images, tracings, or specimens: yes Procedures Progress note Labs and imaging reviewed. Ct - for acute process. BP remains elevated. Anemia noted. Delta Rankin Spoke with Ob hospitalist, Dr. Martin Ahumada, discussed HPI. PE findings, labs and imaging, She reports patient has received magnesium already and does not have an indication for repeat magnesium. Believes presentation is more consistent with chronic hypertension rather than eclampsia. She reports patient would  be better managed by medicine. Did advise to give addl 20 mg IV of labetalol and rechcek BP in 20 minutes then can give 400 mg of oral labetalol if BP remains 621-281 systolic/ 90 diastolic. Delta Rankin Hospitalist Nelsy for Admission 4:11 AM 
 
ED Room Number: MI22/20 Patient Name and age:  Fabricio Neat 30 y.o.  female Working Diagnosis: 1. Anemia, unspecified type 2. Hypertensive crisis, unspecified Readmission: yes Isolation Requirements:  yes Recommended Level of Care:  telemetry Code Status:  full Delta Rankin Pt seen and evaluated by Dr. Angelique Bonilla, ED attending, plan of care in ED and plan for admission discussed and agreed upon.  Delta Rankin

## 2019-05-19 ENCOUNTER — APPOINTMENT (OUTPATIENT)
Dept: GENERAL RADIOLOGY | Age: 29
DRG: 776 | End: 2019-05-19
Attending: INTERNAL MEDICINE
Payer: MEDICAID

## 2019-05-19 LAB
ABO + RH BLD: NORMAL
ANION GAP SERPL CALC-SCNC: 9 MMOL/L (ref 5–15)
BASOPHILS # BLD: 0 K/UL (ref 0–0.1)
BASOPHILS NFR BLD: 0 % (ref 0–1)
BLD PROD TYP BPU: NORMAL
BLOOD GROUP ANTIBODIES SERPL: NORMAL
BPU ID: NORMAL
BUN SERPL-MCNC: 13 MG/DL (ref 6–20)
BUN/CREAT SERPL: 12 (ref 12–20)
CALCIUM SERPL-MCNC: 8 MG/DL (ref 8.5–10.1)
CHLORIDE SERPL-SCNC: 106 MMOL/L (ref 97–108)
CO2 SERPL-SCNC: 20 MMOL/L (ref 21–32)
CREAT SERPL-MCNC: 1.06 MG/DL (ref 0.55–1.02)
CROSSMATCH RESULT,%XM: NORMAL
DIFFERENTIAL METHOD BLD: ABNORMAL
EOSINOPHIL # BLD: 0.1 K/UL (ref 0–0.4)
EOSINOPHIL NFR BLD: 2 % (ref 0–7)
ERYTHROCYTE [DISTWIDTH] IN BLOOD BY AUTOMATED COUNT: 16.4 % (ref 11.5–14.5)
GLUCOSE BLD STRIP.AUTO-MCNC: 169 MG/DL (ref 65–100)
GLUCOSE BLD STRIP.AUTO-MCNC: 232 MG/DL (ref 65–100)
GLUCOSE BLD STRIP.AUTO-MCNC: 254 MG/DL (ref 65–100)
GLUCOSE BLD STRIP.AUTO-MCNC: 279 MG/DL (ref 65–100)
GLUCOSE BLD STRIP.AUTO-MCNC: 284 MG/DL (ref 65–100)
GLUCOSE BLD STRIP.AUTO-MCNC: 68 MG/DL (ref 65–100)
GLUCOSE BLD STRIP.AUTO-MCNC: 93 MG/DL (ref 65–100)
GLUCOSE SERPL-MCNC: 258 MG/DL (ref 65–100)
HCT VFR BLD AUTO: 27.2 % (ref 35–47)
HGB BLD-MCNC: 8.4 G/DL (ref 11.5–16)
IMM GRANULOCYTES # BLD AUTO: 0 K/UL (ref 0–0.04)
IMM GRANULOCYTES NFR BLD AUTO: 1 % (ref 0–0.5)
IRON SATN MFR SERPL: 13 % (ref 20–50)
IRON SERPL-MCNC: 26 UG/DL (ref 35–150)
LYMPHOCYTES # BLD: 1.5 K/UL (ref 0.8–3.5)
LYMPHOCYTES NFR BLD: 21 % (ref 12–49)
MCH RBC QN AUTO: 25.8 PG (ref 26–34)
MCHC RBC AUTO-ENTMCNC: 30.9 G/DL (ref 30–36.5)
MCV RBC AUTO: 83.7 FL (ref 80–99)
MONOCYTES # BLD: 0.7 K/UL (ref 0–1)
MONOCYTES NFR BLD: 9 % (ref 5–13)
NEUTS SEG # BLD: 5 K/UL (ref 1.8–8)
NEUTS SEG NFR BLD: 67 % (ref 32–75)
NRBC # BLD: 0 K/UL (ref 0–0.01)
NRBC BLD-RTO: 0 PER 100 WBC
PLATELET # BLD AUTO: 282 K/UL (ref 150–400)
PMV BLD AUTO: 9.4 FL (ref 8.9–12.9)
POTASSIUM SERPL-SCNC: 3.8 MMOL/L (ref 3.5–5.1)
RBC # BLD AUTO: 3.25 M/UL (ref 3.8–5.2)
SERVICE CMNT-IMP: ABNORMAL
SERVICE CMNT-IMP: NORMAL
SERVICE CMNT-IMP: NORMAL
SODIUM SERPL-SCNC: 135 MMOL/L (ref 136–145)
SPECIMEN EXP DATE BLD: NORMAL
STATUS OF UNIT,%ST: NORMAL
TIBC SERPL-MCNC: 208 UG/DL (ref 250–450)
UNIT DIVISION, %UDIV: 0
WBC # BLD AUTO: 7.3 K/UL (ref 3.6–11)

## 2019-05-19 PROCEDURE — 74011250637 HC RX REV CODE- 250/637: Performed by: INTERNAL MEDICINE

## 2019-05-19 PROCEDURE — 74011250636 HC RX REV CODE- 250/636: Performed by: INTERNAL MEDICINE

## 2019-05-19 PROCEDURE — 74011636637 HC RX REV CODE- 636/637: Performed by: FAMILY MEDICINE

## 2019-05-19 PROCEDURE — 82962 GLUCOSE BLOOD TEST: CPT

## 2019-05-19 PROCEDURE — 80048 BASIC METABOLIC PNL TOTAL CA: CPT

## 2019-05-19 PROCEDURE — 36415 COLL VENOUS BLD VENIPUNCTURE: CPT

## 2019-05-19 PROCEDURE — 74011636637 HC RX REV CODE- 636/637: Performed by: INTERNAL MEDICINE

## 2019-05-19 PROCEDURE — 74011000250 HC RX REV CODE- 250: Performed by: INTERNAL MEDICINE

## 2019-05-19 PROCEDURE — 74011250637 HC RX REV CODE- 250/637: Performed by: FAMILY MEDICINE

## 2019-05-19 PROCEDURE — 71045 X-RAY EXAM CHEST 1 VIEW: CPT

## 2019-05-19 PROCEDURE — 65660000000 HC RM CCU STEPDOWN

## 2019-05-19 PROCEDURE — 83540 ASSAY OF IRON: CPT

## 2019-05-19 PROCEDURE — 85025 COMPLETE CBC W/AUTO DIFF WBC: CPT

## 2019-05-19 RX ORDER — MINOXIDIL 2.5 MG/1
7.5 TABLET ORAL 2 TIMES DAILY
Status: DISCONTINUED | OUTPATIENT
Start: 2019-05-19 | End: 2019-05-19

## 2019-05-19 RX ORDER — MINOXIDIL 10 MG/1
5 TABLET ORAL DAILY
Status: DISCONTINUED | OUTPATIENT
Start: 2019-05-19 | End: 2019-05-19

## 2019-05-19 RX ORDER — POTASSIUM CHLORIDE 750 MG/1
40 TABLET, FILM COATED, EXTENDED RELEASE ORAL
Status: COMPLETED | OUTPATIENT
Start: 2019-05-19 | End: 2019-05-19

## 2019-05-19 RX ORDER — MINOXIDIL 10 MG/1
5 TABLET ORAL ONCE
Status: COMPLETED | OUTPATIENT
Start: 2019-05-19 | End: 2019-05-19

## 2019-05-19 RX ADMIN — INSULIN LISPRO 3 UNITS: 100 INJECTION, SOLUTION INTRAVENOUS; SUBCUTANEOUS at 07:00

## 2019-05-19 RX ADMIN — LABETALOL HCL 300 MG: 200 TABLET, FILM COATED ORAL at 23:46

## 2019-05-19 RX ADMIN — FAMOTIDINE 20 MG: 10 INJECTION, SOLUTION INTRAVENOUS at 23:45

## 2019-05-19 RX ADMIN — Medication 10 ML: at 22:00

## 2019-05-19 RX ADMIN — DULOXETINE HYDROCHLORIDE 20 MG: 20 CAPSULE, DELAYED RELEASE ORAL at 08:14

## 2019-05-19 RX ADMIN — MINOXIDIL 5 MG: 10 TABLET ORAL at 10:27

## 2019-05-19 RX ADMIN — AMLODIPINE BESYLATE 10 MG: 5 TABLET ORAL at 08:14

## 2019-05-19 RX ADMIN — Medication 10 ML: at 15:43

## 2019-05-19 RX ADMIN — KETOROLAC TROMETHAMINE 30 MG: 30 INJECTION, SOLUTION INTRAMUSCULAR at 23:45

## 2019-05-19 RX ADMIN — INSULIN LISPRO 4 UNITS: 100 INJECTION, SOLUTION INTRAVENOUS; SUBCUTANEOUS at 12:18

## 2019-05-19 RX ADMIN — OXYCODONE AND ACETAMINOPHEN 1 TABLET: 5; 325 TABLET ORAL at 06:59

## 2019-05-19 RX ADMIN — KETOROLAC TROMETHAMINE 30 MG: 30 INJECTION, SOLUTION INTRAMUSCULAR at 15:42

## 2019-05-19 RX ADMIN — SODIUM CHLORIDE 1000 ML: 900 INJECTION, SOLUTION INTRAVENOUS at 07:01

## 2019-05-19 RX ADMIN — FAMOTIDINE 20 MG: 10 INJECTION, SOLUTION INTRAVENOUS at 08:15

## 2019-05-19 RX ADMIN — POTASSIUM CHLORIDE 40 MEQ: 750 TABLET, EXTENDED RELEASE ORAL at 07:00

## 2019-05-19 RX ADMIN — MINOXIDIL 5 MG: 10 TABLET ORAL at 07:00

## 2019-05-19 RX ADMIN — IRON SUCROSE 300 MG: 20 INJECTION, SOLUTION INTRAVENOUS at 10:29

## 2019-05-19 RX ADMIN — LABETALOL HCL 300 MG: 200 TABLET, FILM COATED ORAL at 08:14

## 2019-05-19 RX ADMIN — CLONIDINE HYDROCHLORIDE 0.2 MG: 0.2 TABLET ORAL at 10:27

## 2019-05-19 RX ADMIN — DULOXETINE HYDROCHLORIDE 20 MG: 20 CAPSULE, DELAYED RELEASE ORAL at 17:54

## 2019-05-19 RX ADMIN — Medication 1 TABLET: at 12:13

## 2019-05-19 NOTE — PROGRESS NOTES
Bedside and Verbal shift change report given to Andreia Jim (oncoming nurse) by Keith Waggoner (offgoing nurse). Report included the following information SBAR, Kardex, Intake/Output, MAR, Accordion, Cardiac Rhythm NSR and Quality Measures.

## 2019-05-19 NOTE — PROGRESS NOTES
Hospitalist Progress Note Kim Villalobos MD 
Answering service: [phone] OR [phone] from in house phone Date of Service:  2019 NAME:  Teddy Deluca :  1990 MRN:  [id number] Admission Summary:  
 
Admitted with htn urgency and anemia Interval history / Subjective:  
   
2019 : 
bs are better 
bp yet up, reviewed bp management meds with nicu md , all ok for breast feeding Pt spirits better as clinical improves 
bp management meds increased today. Assessment & Plan:  
 
htn urgency, yet on aggressive bp management meds, Dm 1 on insulin pump and ssi Iron deficiency anemia, for iron sucross, did have transfusion  Code status: full DVT prophylaxis: SCD's Care Plan discussed with: Patient/Family and Nurse Disposition: Home w/Family and TBD Hospital Problems  Date Reviewed: 2019 Codes Class Noted POA Anemia ICD-10-CM: D64.9 ICD-9-CM: 285.9  2019 Unknown Hypertensive urgency ICD-10-CM: I16.0 ICD-9-CM: 401.9  2019 Unknown Review of Systems: A comprehensive review of systems was negative except for:  some feeling puffy Vital Signs:  
 Last 24hrs VS reviewed since prior progress note. Most recent are: 
Visit Vitals BP (!) 179/114 (BP 1 Location: Right arm, BP Patient Position: At rest) Pulse 79 Temp 98.6 °F (37 °C) Resp 16 Ht 5' 8\" (1.727 m) Wt 69.4 kg (153 lb) SpO2 99% BMI 23.26 kg/m² Intake/Output Summary (Last 24 hours) at 2019 1154 Last data filed at 2019 1700 Gross per 24 hour Intake 2340 ml Output  Net 2340 ml Physical Examination:  
 
 
     
Constitutional:  No acute distress, cooperative, pleasant   
ENT:  Oral mucous moist, oropharynx benign. Neck supple, Resp:  CTA bilaterally. No wheezing/rhonchi/rales. No accessory muscle use CV:  Regular rhythm, normal rate, no murmurs, gallops, rubs GI:  Soft, non distended, non tender. normoactive bowel sounds, no hepatosplenomegaly Musculoskeletal:  No edema, warm, 2+ pulses throughout Neurologic:  Moves all extremities. AAOx3, CN II-XII reviewed Psych:  Good insight, Not anxious nor agitated. Skin:  Good turgor, no rashes or ulcers Data Review:  
 Review and/or order of clinical lab test 
 
 
Labs:  
 
Recent Labs 05/19/19 
0504 05/18/19 2025 WBC 7.3 7.4 HGB 8.4* 8.8* HCT 27.2* 28.2*  
 308 Recent Labs 05/19/19 
0005 05/18/19 2025 05/18/19 
5386 * 138 137  
K 3.8 3.8 3.9  108 106 CO2 20* 22 24 BUN 13 13 14 CREA 1.06* 1.09* 1.28* * 104* 168* CA 8.0* 8.3* 9.3 Recent Labs 05/18/19 
2705 SGOT 21 ALT 15 AP 98 TBILI 0.3 TP 6.5 ALB 1.9*  
GLOB 4.6* No results for input(s): INR, PTP, APTT in the last 72 hours. No lab exists for component: INREXT Recent Labs 05/19/19 
5985 TIBC 208* PSAT 13* Lab Results Component Value Date/Time Folate 26.9 (H) 09/03/2012 05:45 AM  
  
No results for input(s): PH, PCO2, PO2 in the last 72 hours. No results for input(s): CPK, CKNDX, TROIQ in the last 72 hours. No lab exists for component: CPKMB Lab Results Component Value Date/Time Cholesterol, total 160 05/15/2016 12:09 AM  
 HDL Cholesterol 41 05/15/2016 12:09 AM  
 LDL, calculated 98 05/15/2016 12:09 AM  
 Triglyceride 105 05/15/2016 12:09 AM  
 CHOL/HDL Ratio 3.9 05/15/2016 12:09 AM  
 
Lab Results Component Value Date/Time Glucose (POC) 232 (H) 05/19/2019 08:20 AM  
 Glucose (POC) 279 (H) 05/19/2019 06:29 AM  
 Glucose (POC) 169 (H) 05/19/2019 01:20 AM  
 Glucose (POC) 123 (H) 05/18/2019 10:29 PM  
 Glucose (POC) 107 (H) 05/18/2019 08:02 PM  
 
Lab Results Component Value Date/Time  Color YELLOW/STRAW 05/18/2019 03:25 AM  
 Appearance CLOUDY (A) 05/18/2019 03:25 AM  
 Specific gravity 1.015 05/18/2019 03:25 AM  
 Specific gravity 1.010 04/07/2019 07:07 PM  
 pH (UA) 7.5 05/18/2019 03:25 AM  
 Protein 300 (A) 05/18/2019 03:25 AM  
 Glucose 250 (A) 05/18/2019 03:25 AM  
 Ketone 15 (A) 05/18/2019 03:25 AM  
 Bilirubin NEGATIVE  05/18/2019 03:25 AM  
 Urobilinogen 0.2 05/18/2019 03:25 AM  
 Nitrites NEGATIVE  05/18/2019 03:25 AM  
 Leukocyte Esterase SMALL (A) 05/18/2019 03:25 AM  
 Epithelial cells FEW 04/07/2019 07:07 PM  
 Bacteria NEGATIVE  04/07/2019 07:07 PM  
 WBC 5-10 04/07/2019 07:07 PM  
 RBC 0-5 04/07/2019 07:07 PM  
 
 
 
Medications Reviewed:  
 
Current Facility-Administered Medications Medication Dose Route Frequency  iron sucrose (VENOFER) 300 mg in 0.9% sodium chloride 250 mL IVPB  300 mg IntraVENous ONCE  minoxidil (LONITEN) tablet 7.5 mg  7.5 mg Oral BID  
 0.9% sodium chloride infusion 250 mL  250 mL IntraVENous PRN  
 DULoxetine (CYMBALTA) capsule 20 mg  20 mg Oral BID  labetalol (NORMODYNE) tablet 300 mg  300 mg Oral Q12H  
 oxyCODONE-acetaminophen (PERCOCET) 5-325 mg per tablet 1 Tab  1 Tab Oral Q6H PRN  prenatal vit-iron fumarate-fa (PRENATAL PLUS with IRON) tablet 1 Tab  1 Tab Oral DAILY  sodium chloride (NS) flush 5-40 mL  5-40 mL IntraVENous Q8H  
 sodium chloride (NS) flush 5-40 mL  5-40 mL IntraVENous PRN  
 glucose chewable tablet 16 g  4 Tab Oral PRN  
 dextrose (D50W) injection syrg 12.5-25 g  25-50 mL IntraVENous PRN  
 glucagon (GLUCAGEN) injection 1 mg  1 mg IntraMUSCular PRN  
 insulin lispro (HUMALOG) injection   SubCUTAneous AC&HS  amLODIPine (NORVASC) tablet 10 mg  10 mg Oral DAILY  ondansetron (ZOFRAN) injection 4 mg  4 mg IntraVENous Q4H PRN  
 cloNIDine HCl (CATAPRES) tablet 0.2 mg  0.2 mg Oral Q4H PRN  
 0.9% sodium chloride infusion  150 mL/hr IntraVENous CONTINUOUS  
 ketorolac (TORADOL) injection 30 mg  30 mg IntraVENous Q6H PRN  
 acetaminophen (TYLENOL) tablet 650 mg  650 mg Oral Q4H PRN  
  famotidine (PF) (PEPCID) 20 mg in sodium chloride 0.9% 10 mL injection  20 mg IntraVENous Q12H  
 
______________________________________________________________________ EXPECTED LENGTH OF STAY: - - - 
ACTUAL LENGTH OF STAY:          1 Demar Keller MD

## 2019-05-19 NOTE — PROGRESS NOTES
Problem: Falls - Risk of 
Goal: *Absence of Falls Description Document Sydnee Torres Fall Risk and appropriate interventions in the flowsheet. Outcome: Progressing Towards Goal 
  
Problem: Patient Education: Go to Patient Education Activity Goal: Patient/Family Education Outcome: Progressing Towards Goal 
  
Problem: Pain Goal: *Control of Pain Outcome: Progressing Towards Goal 
Goal: *PALLIATIVE CARE:  Alleviation of Pain Outcome: Progressing Towards Goal

## 2019-05-19 NOTE — PROGRESS NOTES
Patients bolus was stopped after 750cc. Patient refuses anymore fluids. 930 patient went to nursery to visit her baby. 1018 patient returns to floor [phone] when patient returns her mood has improved very talkative and receptive to plan of care.

## 2019-05-20 ENCOUNTER — APPOINTMENT (OUTPATIENT)
Dept: GENERAL RADIOLOGY | Age: 29
DRG: 776 | End: 2019-05-20
Attending: ANESTHESIOLOGY
Payer: MEDICAID

## 2019-05-20 ENCOUNTER — TELEPHONE (OUTPATIENT)
Dept: OBGYN CLINIC | Age: 29
End: 2019-05-20

## 2019-05-20 LAB
ANION GAP SERPL CALC-SCNC: 6 MMOL/L (ref 5–15)
ANION GAP SERPL CALC-SCNC: 8 MMOL/L (ref 5–15)
ARTERIAL PATENCY WRIST A: YES
BASE DEFICIT BLD-SCNC: 6 MMOL/L
BASOPHILS # BLD: 0 K/UL (ref 0–0.1)
BASOPHILS NFR BLD: 0 % (ref 0–1)
BDY SITE: ABNORMAL
BUN SERPL-MCNC: 17 MG/DL (ref 6–20)
BUN SERPL-MCNC: 17 MG/DL (ref 6–20)
BUN/CREAT SERPL: 13 (ref 12–20)
BUN/CREAT SERPL: 15 (ref 12–20)
CALCIUM SERPL-MCNC: 7.5 MG/DL (ref 8.5–10.1)
CALCIUM SERPL-MCNC: 7.5 MG/DL (ref 8.5–10.1)
CHLORIDE SERPL-SCNC: 110 MMOL/L (ref 97–108)
CHLORIDE SERPL-SCNC: 112 MMOL/L (ref 97–108)
CO2 SERPL-SCNC: 15 MMOL/L (ref 21–32)
CO2 SERPL-SCNC: 22 MMOL/L (ref 21–32)
CREAT SERPL-MCNC: 1.16 MG/DL (ref 0.55–1.02)
CREAT SERPL-MCNC: 1.26 MG/DL (ref 0.55–1.02)
DIFFERENTIAL METHOD BLD: ABNORMAL
EOSINOPHIL # BLD: 0.2 K/UL (ref 0–0.4)
EOSINOPHIL NFR BLD: 2 % (ref 0–7)
ERYTHROCYTE [DISTWIDTH] IN BLOOD BY AUTOMATED COUNT: 16.9 % (ref 11.5–14.5)
GAS FLOW.O2 O2 DELIVERY SYS: ABNORMAL L/MIN
GLUCOSE BLD STRIP.AUTO-MCNC: 101 MG/DL (ref 65–100)
GLUCOSE BLD STRIP.AUTO-MCNC: 193 MG/DL (ref 65–100)
GLUCOSE BLD STRIP.AUTO-MCNC: 238 MG/DL (ref 65–100)
GLUCOSE BLD STRIP.AUTO-MCNC: 259 MG/DL (ref 65–100)
GLUCOSE BLD STRIP.AUTO-MCNC: 52 MG/DL (ref 65–100)
GLUCOSE BLD STRIP.AUTO-MCNC: 56 MG/DL (ref 65–100)
GLUCOSE BLD STRIP.AUTO-MCNC: 61 MG/DL (ref 65–100)
GLUCOSE BLD STRIP.AUTO-MCNC: 74 MG/DL (ref 65–100)
GLUCOSE BLD STRIP.AUTO-MCNC: 78 MG/DL (ref 65–100)
GLUCOSE SERPL-MCNC: 237 MG/DL (ref 65–100)
GLUCOSE SERPL-MCNC: 47 MG/DL (ref 65–100)
HCO3 BLD-SCNC: 19 MMOL/L (ref 22–26)
HCT VFR BLD AUTO: 24.3 % (ref 35–47)
HGB BLD-MCNC: 7.4 G/DL (ref 11.5–16)
IMM GRANULOCYTES # BLD AUTO: 0.1 K/UL (ref 0–0.04)
IMM GRANULOCYTES NFR BLD AUTO: 1 % (ref 0–0.5)
LACTATE SERPL-SCNC: 1.3 MMOL/L (ref 0.4–2)
LYMPHOCYTES # BLD: 1.7 K/UL (ref 0.8–3.5)
LYMPHOCYTES NFR BLD: 20 % (ref 12–49)
MAGNESIUM SERPL-MCNC: 1.4 MG/DL (ref 1.6–2.4)
MCH RBC QN AUTO: 25.8 PG (ref 26–34)
MCHC RBC AUTO-ENTMCNC: 30.5 G/DL (ref 30–36.5)
MCV RBC AUTO: 84.7 FL (ref 80–99)
MONOCYTES # BLD: 0.6 K/UL (ref 0–1)
MONOCYTES NFR BLD: 7 % (ref 5–13)
NEUTS SEG # BLD: 5.6 K/UL (ref 1.8–8)
NEUTS SEG NFR BLD: 70 % (ref 32–75)
NRBC # BLD: 0 K/UL (ref 0–0.01)
NRBC BLD-RTO: 0 PER 100 WBC
PCO2 BLD: 33.3 MMHG (ref 35–45)
PH BLD: 7.37 [PH] (ref 7.35–7.45)
PLATELET # BLD AUTO: 321 K/UL (ref 150–400)
PMV BLD AUTO: 10 FL (ref 8.9–12.9)
PO2 BLD: 102 MMHG (ref 80–100)
POTASSIUM SERPL-SCNC: 3.9 MMOL/L (ref 3.5–5.1)
POTASSIUM SERPL-SCNC: 4.4 MMOL/L (ref 3.5–5.1)
RBC # BLD AUTO: 2.87 M/UL (ref 3.8–5.2)
SAO2 % BLD: 98 % (ref 92–97)
SERVICE CMNT-IMP: ABNORMAL
SERVICE CMNT-IMP: NORMAL
SERVICE CMNT-IMP: NORMAL
SODIUM SERPL-SCNC: 133 MMOL/L (ref 136–145)
SODIUM SERPL-SCNC: 140 MMOL/L (ref 136–145)
SPECIMEN TYPE: ABNORMAL
WBC # BLD AUTO: 8.1 K/UL (ref 3.6–11)

## 2019-05-20 PROCEDURE — 74011636637 HC RX REV CODE- 636/637: Performed by: INTERNAL MEDICINE

## 2019-05-20 PROCEDURE — 83735 ASSAY OF MAGNESIUM: CPT

## 2019-05-20 PROCEDURE — 74011000250 HC RX REV CODE- 250: Performed by: INTERNAL MEDICINE

## 2019-05-20 PROCEDURE — 87040 BLOOD CULTURE FOR BACTERIA: CPT

## 2019-05-20 PROCEDURE — 85025 COMPLETE CBC W/AUTO DIFF WBC: CPT

## 2019-05-20 PROCEDURE — 82962 GLUCOSE BLOOD TEST: CPT

## 2019-05-20 PROCEDURE — 83605 ASSAY OF LACTIC ACID: CPT

## 2019-05-20 PROCEDURE — 71045 X-RAY EXAM CHEST 1 VIEW: CPT

## 2019-05-20 PROCEDURE — C1751 CATH, INF, PER/CENT/MIDLINE: HCPCS

## 2019-05-20 PROCEDURE — 36556 INSERT NON-TUNNEL CV CATH: CPT

## 2019-05-20 PROCEDURE — 74011250637 HC RX REV CODE- 250/637: Performed by: FAMILY MEDICINE

## 2019-05-20 PROCEDURE — 36600 WITHDRAWAL OF ARTERIAL BLOOD: CPT

## 2019-05-20 PROCEDURE — 74011250637 HC RX REV CODE- 250/637: Performed by: INTERNAL MEDICINE

## 2019-05-20 PROCEDURE — 80320 DRUG SCREEN QUANTALCOHOLS: CPT

## 2019-05-20 PROCEDURE — 80048 BASIC METABOLIC PNL TOTAL CA: CPT

## 2019-05-20 PROCEDURE — 65660000000 HC RM CCU STEPDOWN

## 2019-05-20 PROCEDURE — 02HV33Z INSERTION OF INFUSION DEVICE INTO SUPERIOR VENA CAVA, PERCUTANEOUS APPROACH: ICD-10-PCS | Performed by: FAMILY MEDICINE

## 2019-05-20 PROCEDURE — 82803 BLOOD GASES ANY COMBINATION: CPT

## 2019-05-20 PROCEDURE — 36415 COLL VENOUS BLD VENIPUNCTURE: CPT

## 2019-05-20 PROCEDURE — 74011250636 HC RX REV CODE- 250/636: Performed by: INTERNAL MEDICINE

## 2019-05-20 RX ORDER — MAGNESIUM SULFATE HEPTAHYDRATE 40 MG/ML
2 INJECTION, SOLUTION INTRAVENOUS ONCE
Status: COMPLETED | OUTPATIENT
Start: 2019-05-20 | End: 2019-05-20

## 2019-05-20 RX ORDER — DEXTROSE 50 % IN WATER (D50W) INTRAVENOUS SYRINGE
12.5-25 AS NEEDED
Status: DISCONTINUED | OUTPATIENT
Start: 2019-05-20 | End: 2019-05-24 | Stop reason: HOSPADM

## 2019-05-20 RX ORDER — POTASSIUM CHLORIDE 7.45 MG/ML
10 INJECTION INTRAVENOUS
Status: DISCONTINUED | OUTPATIENT
Start: 2019-05-20 | End: 2019-05-20

## 2019-05-20 RX ORDER — MAGNESIUM SULFATE 100 %
4 CRYSTALS MISCELLANEOUS AS NEEDED
Status: DISCONTINUED | OUTPATIENT
Start: 2019-05-20 | End: 2019-05-24 | Stop reason: HOSPADM

## 2019-05-20 RX ORDER — FAMOTIDINE 20 MG/1
20 TABLET, FILM COATED ORAL 2 TIMES DAILY
Status: DISCONTINUED | OUTPATIENT
Start: 2019-05-20 | End: 2019-05-24 | Stop reason: HOSPADM

## 2019-05-20 RX ADMIN — OXYCODONE AND ACETAMINOPHEN 1 TABLET: 5; 325 TABLET ORAL at 20:09

## 2019-05-20 RX ADMIN — MAGNESIUM SULFATE HEPTAHYDRATE 2 G: 40 INJECTION, SOLUTION INTRAVENOUS at 18:31

## 2019-05-20 RX ADMIN — KETOROLAC TROMETHAMINE 30 MG: 30 INJECTION, SOLUTION INTRAMUSCULAR at 16:42

## 2019-05-20 RX ADMIN — LABETALOL HCL 300 MG: 200 TABLET, FILM COATED ORAL at 20:10

## 2019-05-20 RX ADMIN — LABETALOL HCL 300 MG: 200 TABLET, FILM COATED ORAL at 08:57

## 2019-05-20 RX ADMIN — KETOROLAC TROMETHAMINE 30 MG: 30 INJECTION, SOLUTION INTRAMUSCULAR at 08:57

## 2019-05-20 RX ADMIN — AMLODIPINE BESYLATE 10 MG: 5 TABLET ORAL at 08:57

## 2019-05-20 RX ADMIN — Medication 10 ML: at 22:00

## 2019-05-20 RX ADMIN — SODIUM CHLORIDE 150 ML/HR: 900 INJECTION, SOLUTION INTRAVENOUS at 21:10

## 2019-05-20 RX ADMIN — OXYCODONE AND ACETAMINOPHEN 1 TABLET: 5; 325 TABLET ORAL at 13:37

## 2019-05-20 RX ADMIN — DULOXETINE HYDROCHLORIDE 20 MG: 20 CAPSULE, DELAYED RELEASE ORAL at 17:44

## 2019-05-20 RX ADMIN — FAMOTIDINE 20 MG: 10 INJECTION, SOLUTION INTRAVENOUS at 08:57

## 2019-05-20 RX ADMIN — INSULIN LISPRO 4 UNITS: 100 INJECTION, SOLUTION INTRAVENOUS; SUBCUTANEOUS at 12:29

## 2019-05-20 RX ADMIN — DULOXETINE HYDROCHLORIDE 20 MG: 20 CAPSULE, DELAYED RELEASE ORAL at 08:57

## 2019-05-20 RX ADMIN — FAMOTIDINE 20 MG: 20 TABLET ORAL at 17:44

## 2019-05-20 RX ADMIN — Medication 10 ML: at 16:43

## 2019-05-20 RX ADMIN — Medication 10 ML: at 08:58

## 2019-05-20 RX ADMIN — Medication 1 TABLET: at 09:33

## 2019-05-20 RX ADMIN — SODIUM CHLORIDE 2000 ML: 900 INJECTION, SOLUTION INTRAVENOUS at 10:34

## 2019-05-20 NOTE — PROGRESS NOTES
Patient resting comfortably in bed, complaint of headache that began this morning. Toradol given for incisional plain and headache. PICC team lena labs, difficult stick, able to only get a little in each tube, stuck three times. Not enough for CBC. Dr. Talia Lemus called with critical lab value of CO2 of 15. Saline bolus ordered and hung. ABG ordered, respiratory called. Central line discussed with patient for poor venous access. Called anesthesiology for central line placement. Will take her down to IR when a spot is available. Labs to be drawn when central line is in place. 1553- BG 52, gave 8oz apple juice, rechecked, BG 56, gave apple juice with 3 packets of sugar, rechecked BG 78, 4 oz apple juice given along with gram crackers and peanut butter.

## 2019-05-20 NOTE — TELEPHONE ENCOUNTER
Patient of [address]- consult for SHAHEED Boo from [address] [place] [address] Telemetry Unit at [facility] is calling for consult for possible  infection (per Dr. Irina Rm)    Room 359      Information given to [facility]

## 2019-05-20 NOTE — PERIOP NOTES
TRANSFER - IN REPORT: 
 
Verbal report received from Terrell) on [employer]  being received from [facility](unit) for ordered procedure Report consisted of patients Situation, Background, Assessment and  
Recommendations(SBAR). Information from the following report(s) SBAR, Kardex, ED Summary, Procedure Summary, Intake/Output, MAR and Recent Results was reviewed with the receiving nurse. Opportunity for questions and clarification was provided. Assessment completed upon patients arrival to unit and care assumed. Upon arrival to preop, consent verified, VS obtained. Dr Murali Sarkar placed left subclavian TLC without difficulty. PCXR completed at bedside. TRANSFER - OUT REPORT: 
 
Verbal report given to Sundar Postal RN(name) on [employer]  being transferred to (unit) for routine progression of care Report consisted of patients Situation, Background, Assessment and  
Recommendations(SBAR). Information from the following report(s) SBAR and Procedure Summary was reviewed with the receiving nurse. Lines:  
Triple Lumen 05/20/19 Left Subclavian (Active) Central Line Being Utilized No 5/20/2019  3:00 PM  
Criteria for Appropriate Use Other (comment) 5/20/2019  3:00 PM  
Site Assessment Clean, dry, & intact 5/20/2019  3:00 PM  
Infiltration Assessment 0 5/20/2019  3:00 PM  
Affected Extremity/Extremities Color distal to insertion site pink (or appropriate for race) 5/20/2019  3:00 PM  
Date of Last Dressing Change 05/20/19 5/20/2019  3:00 PM  
Dressing Status Clean, dry, & intact 5/20/2019  3:00 PM  
Dressing Type Transparent;Disk with Chlorhexadine gluconate (CHG) 5/20/2019  3:00 PM  
Proximal Hub Color/Line Status White 5/20/2019  3:00 PM  
Positive Blood Return (Medial Site) Yes 5/20/2019  3:00 PM  
Medial Hub Color/Line Status Blue 5/20/2019  3:00 PM  
Positive Blood Return (Lateral Site) Yes 5/20/2019  3:00 PM  
Distal Hub Color/Line Status Brown 5/20/2019  3:00 PM  
 Positive Blood Return (Site #3) Yes 5/20/2019  3:00 PM  
Alcohol Cap Used Yes 5/20/2019  3:00 PM  
   
Peripheral IV 05/18/19 Left Forearm (Active) Site Assessment Clean, dry, & intact 5/20/2019 11:08 AM  
Phlebitis Assessment 0 5/20/2019 11:08 AM  
Infiltration Assessment 0 5/20/2019 11:08 AM  
Dressing Status Clean, dry, & intact 5/20/2019 11:08 AM  
Dressing Type Transparent 5/20/2019 11:08 AM  
Hub Color/Line Status Yellow;Capped 5/20/2019 11:08 AM  
Action Taken Open ports on tubing capped 5/18/2019  8:25 PM  
Alcohol Cap Used Yes 5/20/2019 11:08 AM  
  
 
Opportunity for questions and clarification was provided. Patient transported with: 
 [employer]

## 2019-05-20 NOTE — PROGRESS NOTES
Pt with non -anion gap metabolic acidosis, not typical of DKA. Will f/u on wbc, lactic acid. Also close f/u of bmp and draw abg Empiric ivf load given as above evaluated. Isaiah Cohen MD 5/20/2019

## 2019-05-20 NOTE — PROGRESS NOTES
Central Line Procedure Note Indication: Inadequate venous access Risks, benefits, alternatives explained and patient agrees to proceed. Patient positioned in Trendelenburg. 7-Step Sterility Protocol followed. (cap, mask sterile gown, sterile gloves, large sterile sheet, hand hygiene, 2% chlorhexidine for cutaneous antisepsis) 5 mL 1% Lidocaine placed at insertion site. Left subclavian cannulated x 1 attempt(s) utilizing the Seldinger technique. 7F, 16 cm, triple lumen placed. Venous cannulation confirmed with column drop test.   
Catheter secured & Biopatch applied. Sterile Tegaderm placed. CXR pending.

## 2019-05-20 NOTE — PROGRESS NOTES
Patient is resting in bed she has a incision at the bikini line the site no redness, swelling, bleeding, or oozing at the site. She does still have pain at the site. 5652 patient was assisted with a CHG bath and linen changed. 6499 patient had labs she was stuck 3 times with no success. IV team to be called for AM labs. 0730 IV team was called and they are going to come and draw the labs for her. 0745 Bedside and Verbal shift change report given to Venita Dudley RN  (oncoming nurse) by Viola Ibarra RN  (offgoing nurse). Report included the following information SBAR, MAR, Recent Results and Med Rec Status.

## 2019-05-20 NOTE — CONSULTS
[facility] Consult    CC: consulted by hospitalist service re: possible wound infection    HPI:   31 yo  now POD10 s/p 1LTCS at 33wks for cat 3 tracing, in setting of CHTN with SI preE with SF (severe range BPs, HAs) and poorly controlled type 1 IDDM (on insulin pump), uncomplicated. Pt now readmitted to medicine service after presenting to ER on  for severe HA and severe range BPs. BP control now improved. On admission pt also with anemia - Hgb 6.3. She received 1U PRBCs with appropriate rise in Hgb. BG labile, as high as 400s and as low as 50s, endocrine has been consulted. Currently with episode of hypoglycemia. Consulted by primary team (Dr. Josue Ann) to r/o wound infection due to low bicarb and concern for non-gap acidosis without clear explanation for cause. Upon evaluating patient, reports abdominal pain has been gradually improving since time of surgery. No current N/V/D. She is passing gas and moving her bowels regularly. She denies foul smelling vaginal discharge, and reports lochia appropriate. Denies any issues with wound (no redness, drainage, swelling/induration/fluctuance, or increased pain). Overall healing well. No fevers/chills - has been afebrile. Pt denies CP, SOB. Currently HA improved, denies vision changes, RUQ pain, or increased edema (in fact lower extremity edema is much improved). Pt is pumping. Denies any breast issues (redness, warmth, pain, or other signs of mastitis). Mood unchanged - on cymbalta. Baby girl \"Reya\" in NICU doing well.        Past Medical History:   Diagnosis Date    Anemia     Chronic pain     Depression     Diabetes type 1, uncontrolled ([address]     DKA, type 1 ([address]     Essential hypertension     Heart murmur     Herpes simplex virus (HSV) infection 2017    positive in blood    Peripheral neuropathy     Ventricular tachycardia ([address]         Past Surgical History:   Procedure Laterality Date    ABDOMEN SURGERY PROC UNLISTED exploratory laparoscopy    HX CYST REMOVAL      groin       Family History   Problem Relation Age of Onset    No Known Problems Mother     Sleep Apnea Father     Hypertension Father     Diabetes Father     Heart Disease Maternal Grandfather        Social History     Socioeconomic History    Marital status: SINGLE     Spouse name: Not on file    Number of children: Not on file    Years of education: Not on file    Highest education level: Not on file   Occupational History    Not on file   Social Needs    Financial resource strain: Not on file    Food insecurity:     Worry: Not on file     Inability: Not on file    Transportation needs:     Medical: Not on file     Non-medical: Not on file   Tobacco Use    Smoking status: Former Smoker     Packs/day: 0.25     Years: 8.00     Pack years: 2.00     Types: Cigarettes     Last attempt to quit: 10/26/2014     Years since quittin.5    Smokeless tobacco: Never Used   Substance and Sexual Activity    Alcohol use: No     Alcohol/week: 0.0 oz    Drug use: Yes     Frequency: 2.0 times per week     Types: Marijuana    Sexual activity: Yes     Partners: Male     Birth control/protection: None   Lifestyle    Physical activity:     Days per week: Not on file     Minutes per session: Not on file    Stress: Not on file   Relationships    Social connections:     Talks on phone: Not on file     Gets together: Not on file     Attends Alevism service: Not on file     Active member of club or organization: Not on file     Attends meetings of clubs or organizations: Not on file     Relationship status: Not on file    Intimate partner violence:     Fear of current or ex partner: Not on file     Emotionally abused: Not on file     Physically abused: Not on file     Forced sexual activity: Not on file   Other Topics Concern     Service Not Asked    Blood Transfusions Not Asked    Caffeine Concern Not Asked    Occupational Exposure Not Asked   Candance Sal[place] Hazards Not Asked    Sleep Concern Not Asked    Stress Concern Not Asked    Weight Concern Not Asked    Special Diet Not Asked    Back Care Not Asked    Exercise Not Asked    Bike Helmet Not Asked   [address] Not Asked    Self-Exams Not Asked   Social History Narrative    Not on file       Prior to Admission medications    Medication Sig Start Date End Date Taking? Authorizing Provider   labetalol (NORMODYNE) 300 mg tablet Take 1 Tab by mouth every twelve (12) hours for 30 days. 5/14/19 6/13/19  Wilbur Sifuentes MD   oxyCODONE-acetaminophen (PERCOCET) 5-325 mg per tablet Take 1 Tab by mouth every six (6) hours as needed for Pain for up to 7 days. Max Daily Amount: 4 Tabs. 5/14/19 5/21/19  Wilbur Sifuentes MD   DULoxetine (CYMBALTA) 20 mg capsule Take 1 Cap by mouth two (2) times a day for 30 days. 5/14/19 6/13/19  Wilbur Sifuentes MD   glucose blood VI test strips (CONTOUR NEXT TEST STRIPS) strip Check blood sugar 7-10 times per day, mini med contour next link blood glucose meter 11/27/18   Provider, Historical   HUMALOG [facility] INSULIN 100 unit/mL kwikpen  3/13/19   Provider, Historical   glucagon (GLUCAGON EMERGENCY KIT, HUMAN,) 1 mg injection Glucagon emergency kit, IM injection  Indications: type 1 diabetes, pregnancy 11/6/18   Provider, Historical   pnv w/o calcium-iron fum-fa (COMPLETENATE) 29 mg iron- 1 mg chew Take 1 Tab by mouth.  10/23/18   Provider, Historical        Allergies   Allergen Reactions    Morphine Other (comments)    Pcn [Penicillins] Hives         Review of Systems - History obtained from the patient-negative for:  Constitutional: weight loss, fever, night sweats  HEENT: hearing loss, earache, congestion, snoring, sorethroat  CV: chest pain, palpitations, edema  Resp: cough, shortness of breath, wheezing  Breast: breast lumps, nipple discharge, galactorrhea  GI: change in bowel habits, abdominal pain, black or bloody stools, nausea resolved  : frequency, dysuria, hematuria, vaginal discharge  MSK: back pain, joint pain, muscle pain  Skin: itching, rash, hives  Neuro: dizziness, headache (resolved), confusion, weakness  Psych: anxiety, depression, change in mood  Heme/lymph: bleeding, bruising, pallor      Visit Vitals  /80 (BP 1 Location: Left arm, BP Patient Position: At rest)   Pulse 89   Temp 97.6 °F (36.4 °C)   Resp 18   Ht 5' 8\" (1.727 m)   Wt 147 lb 12.8 oz (67 kg)   SpO2 99%   Breastfeeding?  Yes   BMI 22.47 kg/m²       PHYSICAL EXAMINATION  Constitutional  · Appearance: well-nourished, well developed, alert, in no acute distress    HENT  · Head and Face: appears normal    Chest  · Respiratory Effort: breathing non-labored  · Auscultation: normal breath sounds    Cardiovascular  · Heart:  · Auscultation: regular rate and rhythm without murmur    Gastrointestinal  · Abdominal Examination: abdomen soft, non-distended, appropriately tender, uterus appropriately involuted for 1 week postpartum, no rebound or guarding, normal bowel sounds, no masses present  · Incision: pfannensteil incision c/d/i, healing well, no evidence of infection (no erythema, drainage, induration/fluctuance/swelling, or significant tenderness)  · Liver and spleen: no hepatomegaly present, spleen not palpable  · Hernias: no hernias identified    Genitourinary: deferred - pt denied any increased bleeding or foul discharge    Skin  · General Inspection: no rash, no lesions identified    Neurologic/Psychiatric  · Mental Status:  · Orientation: grossly oriented to person, place and time  · Mood and Affect: mood normal, affect appropriate        Recent Results (from the past 24 hour(s))   GLUCOSE, POC    Collection Time: 05/19/19 11:21 PM   Result Value Ref Range    Glucose (POC) 284 (H) 65 - 100 mg/dL    Performed by Yessica Culver    METABOLIC PANEL, BASIC    Collection Time: 05/20/19  3:52 AM   Result Value Ref Range    Sodium 133 (L) 136 - 145 mmol/L    Potassium 4.4 3.5 - 5.1 mmol/L    Chloride 112 (H) 97 - 108 mmol/L    CO2 15 (LL) 21 - 32 mmol/L    Anion gap 6 5 - 15 mmol/L    Glucose 237 (H) 65 - 100 mg/dL    BUN 17 6 - 20 MG/DL    Creatinine 1.26 (H) 0.55 - 1.02 MG/DL    BUN/Creatinine ratio 13 12 - 20      GFR est AA >60 >60 ml/min/1.73m2    GFR est non-AA 50 (L) >60 ml/min/1.73m2    Calcium 7.5 (L) 8.5 - 10.1 MG/DL   MAGNESIUM    Collection Time: 05/20/19  3:52 AM   Result Value Ref Range    Magnesium 1.4 (L) 1.6 - 2.4 mg/dL   GLUCOSE, POC    Collection Time: 05/20/19  7:14 AM   Result Value Ref Range    Glucose (POC) 238 (H) 65 - 100 mg/dL    Performed by Vince Flores    GLUCOSE, POC    Collection Time: 05/20/19 11:11 AM   Result Value Ref Range    Glucose (POC) 259 (H) 65 - 100 mg/dL    Performed by Edilma Gates    POC G3 - PUL    Collection Time: 05/20/19 11:43 AM   Result Value Ref Range    pH (POC) 7.366 7.35 - 7.45      pCO2 (POC) 33.3 (L) 35.0 - 45.0 MMHG    pO2 (POC) 102 (H) 80 - 100 MMHG    HCO3 (POC) 19.0 (L) 22 - 26 MMOL/L    sO2 (POC) 98 (H) 92 - 97 %    Base deficit (POC) 6 mmol/L    Site RIGHT RADIAL      Device: ROOM AIR      Allens test (POC) YES      Specimen type (POC) ARTERIAL     GLUCOSE, POC    Collection Time: 05/20/19  3:53 PM   Result Value Ref Range    Glucose (POC) 52 (L) 65 - 100 mg/dL    Performed by Yara Kate    GLUCOSE, POC    Collection Time: 05/20/19  4:06 PM   Result Value Ref Range    Glucose (POC) 56 (L) 65 - 100 mg/dL    Performed by Yara Kate      Assessment/Plan:   28yo POD10 s/p 1LTCS at 33wks for cat 3 tracing, in setting of CHTN with SI preE with SF (severe range BPs) and poorly controlled type 1 IDDM (on insulin pump), uncomplicated. Pt now readmitted for HA, severe range BPs, anemia requiring transfusion 1U PRBCs, poor BG control, acute on chronic renal insufficiency, and now with non-gap acidosis of unclear explanation. Consulted by primary team to r/o wound infection.  Pt afebrile, without elevated WBC or any other clinical signs of wound infx or endometritis on exam at this time. That being said, recommend continued close monitoring of wound as pt is at high risk for wound infx and endometritis given poorly controlled DM. As for severe hypertension in setting of superimposed pre-eclampsia, improved control at this time per primary team regimen, but if unable to control BPs adequately, consider repeat course of IV magnesium for seizure prophylaxis. Would continue cymbalta due to high risk for postpartum anxiety/depression. Pt can continue pumping for baby in NICU, no breast concerns at this time. Thank you for this consult. Please call [facility] at [phone] for any further questions or concerns.      Foster Olea MD  5/20/2019  4:42 PM

## 2019-05-20 NOTE — PROGRESS NOTES
Clinical Pharmacy Note: IV to PO Automatic Conversion Please note: Gracy Torres?s medication famotidine has been changed from IV to PO based on the following critiera: 
 
Patient is taking scheduled oral medications Patient is tolerating tube feeds at goal rate or a full liquid, soft or regular diet This IV to PO conversion is based on the P&[employer] approved automatic conversion policy for eligible patients. Please call with questions.

## 2019-05-20 NOTE — PROGRESS NOTES
Hospitalist Progress Note Demar Keller MD 
Answering service: [phone] OR [phone] from in house phone Date of Service:  2019 NAME:  Randi High :  1990 MRN:  [id number] Admission Summary:  
 
Admitted with htn urgency and anemia Interval history / Subjective:  
   
2019 : 
Pt's bs's mid 200's Pt with non anion gap acidosis per am lab, abg now well compensated. ; endocrine to see Difficulties with blood draws and no early discharge in site given the drop in bicarb. Will obtain additional lab: blood culture/lactic acid/f/u cbc w diff. And additionally ask ob/gyn to f/u as pt yet having pain in c section site in setting of low bicarb; part of eval of sirs if in fact developing. Assessment & Plan:  
 
htn urgency, yet on aggressive bp management meds,  Improved on orals Dm 1 on insulin pump and ssi  With pt trying to carb count and dose meal time insulin,  Endocrine to see pt Low bicarb concerning for non anion gap acidosis, agb compensated. For lactic acid and blood culture ;ob/gyn to see. Iron deficiency anemia, for iron sucross, did have transfusion  Code status: full DVT prophylaxis: SCD's Care Plan discussed with: Patient/Family and Nurse Disposition: Home w/Family and TBD Hospital Problems  Date Reviewed: 2019 Codes Class Noted POA Anemia ICD-10-CM: D64.9 ICD-9-CM: 285.9  2019 Unknown Hypertensive urgency ICD-10-CM: I16.0 ICD-9-CM: 401.9  2019 Unknown Review of Systems:  
 feels excessively sleepy, slight sweaty. No cp no sob, some on going pelvic/abd pain, fair appetite however. No n/v this am  
 
Vital Signs:  
 Last 24hrs VS reviewed since prior progress note. Most recent are: 
Visit Vitals /72 (BP 1 Location: Left arm, BP Patient Position: At rest) Pulse 79 Temp 98.6 °F (37 °C) Resp 16  
 Ht 5' 8\" (1.727 m) Wt 67 kg (147 lb 12.8 oz) SpO2 98% BMI 22.47 kg/m² Intake/Output Summary (Last 24 hours) at 5/20/2019 1239 Last data filed at 5/20/2019 3671 Gross per 24 hour Intake 240 ml Output  Net 240 ml Physical Examination:  
 
 
     
Constitutional:  No acute distress, cooperative, pleasant   
ENT:  Oral mucous moist, oropharynx benign. Neck supple, Resp:  CTA bilaterally. No wheezing/rhonchi/rales. No accessory muscle use CV:  Regular rhythm, normal rate, no murmurs, gallops, rubs GI:  Soft, non distended, non tender. normoactive bowel sounds, no hepatosplenomegaly Musculoskeletal:  No edema, warm, 2+ pulses throughout Neurologic:  Moves all extremities. AAOx3, CN II-XII reviewed Psych:  Good insight, Not anxious nor agitated. Skin:  Good turgor, no rashes or ulcers Data Review:  
 Review and/or order of clinical lab test 
 
 
Labs:  
 
Recent Labs 05/19/19 
0504 05/18/19 2025 WBC 7.3 7.4 HGB 8.4* 8.8* HCT 27.2* 28.2*  
 308 Recent Labs  
  05/20/19 
0352 05/19/19 
0504 05/18/19 2025 * 135* 138  
K 4.4 3.8 3.8 * 106 108 CO2 15* 20* 22 BUN 17 13 13 CREA 1.26* 1.06* 1.09* * 258* 104* CA 7.5* 8.0* 8.3*  
MG 1.4*  --   --   
 
Recent Labs 05/18/19 
3233 SGOT 21 ALT 15 AP 98 TBILI 0.3 TP 6.5 ALB 1.9*  
GLOB 4.6* No results for input(s): INR, PTP, APTT in the last 72 hours. No lab exists for component: INREXT, INREXT Recent Labs 05/19/19 
9397 TIBC 208* PSAT 13* Lab Results Component Value Date/Time Folate 26.9 (H) 09/03/2012 05:45 AM  
  
No results for input(s): PH, PCO2, PO2 in the last 72 hours. No results for input(s): CPK, CKNDX, TROIQ in the last 72 hours. No lab exists for component: CPKMB Lab Results Component Value Date/Time  Cholesterol, total 160 05/15/2016 12:09 AM  
 HDL Cholesterol 41 05/15/2016 12:09 AM  
 LDL, calculated 98 05/15/2016 12:09 AM  
 Triglyceride 105 05/15/2016 12:09 AM  
 CHOL/HDL Ratio 3.9 05/15/2016 12:09 AM  
 
Lab Results Component Value Date/Time Glucose (POC) 259 (H) 05/20/2019 11:11 AM  
 Glucose (POC) 238 (H) 05/20/2019 07:14 AM  
 Glucose (POC) 284 (H) 05/19/2019 11:21 PM  
 Glucose (POC) 93 05/19/2019 03:49 PM  
 Glucose (POC) 68 05/19/2019 02:58 PM  
 
Lab Results Component Value Date/Time Color YELLOW/STRAW 05/18/2019 03:25 AM  
 Appearance CLOUDY (A) 05/18/2019 03:25 AM  
 Specific gravity 1.015 05/18/2019 03:25 AM  
 Specific gravity 1.010 04/07/2019 07:07 PM  
 pH (UA) 7.5 05/18/2019 03:25 AM  
 Protein 300 (A) 05/18/2019 03:25 AM  
 Glucose 250 (A) 05/18/2019 03:25 AM  
 Ketone 15 (A) 05/18/2019 03:25 AM  
 Bilirubin NEGATIVE  05/18/2019 03:25 AM  
 Urobilinogen 0.2 05/18/2019 03:25 AM  
 Nitrites NEGATIVE  05/18/2019 03:25 AM  
 Leukocyte Esterase SMALL (A) 05/18/2019 03:25 AM  
 Epithelial cells FEW 04/07/2019 07:07 PM  
 Bacteria NEGATIVE  04/07/2019 07:07 PM  
 WBC 5-10 04/07/2019 07:07 PM  
 RBC 0-5 04/07/2019 07:07 PM  
 
 
 
Medications Reviewed:  
 
Current Facility-Administered Medications Medication Dose Route Frequency  famotidine (PEPCID) tablet 20 mg  20 mg Oral BID  
 0.9% sodium chloride infusion 250 mL  250 mL IntraVENous PRN  
 DULoxetine (CYMBALTA) capsule 20 mg  20 mg Oral BID  labetalol (NORMODYNE) tablet 300 mg  300 mg Oral Q12H  
 oxyCODONE-acetaminophen (PERCOCET) 5-325 mg per tablet 1 Tab  1 Tab Oral Q6H PRN  prenatal vit-iron fumarate-fa (PRENATAL PLUS with IRON) tablet 1 Tab  1 Tab Oral DAILY  sodium chloride (NS) flush 5-40 mL  5-40 mL IntraVENous Q8H  
 sodium chloride (NS) flush 5-40 mL  5-40 mL IntraVENous PRN  
 glucose chewable tablet 16 g  4 Tab Oral PRN  
 dextrose (D50W) injection syrg 12.5-25 g  25-50 mL IntraVENous PRN  
 glucagon (GLUCAGEN) injection 1 mg  1 mg IntraMUSCular PRN  
  insulin lispro (HUMALOG) injection   SubCUTAneous AC&HS  amLODIPine (NORVASC) tablet 10 mg  10 mg Oral DAILY  ondansetron (ZOFRAN) injection 4 mg  4 mg IntraVENous Q4H PRN  
 cloNIDine HCl (CATAPRES) tablet 0.2 mg  0.2 mg Oral Q4H PRN  
 0.9% sodium chloride infusion  150 mL/hr IntraVENous CONTINUOUS  
 ketorolac (TORADOL) injection 30 mg  30 mg IntraVENous Q6H PRN  
 acetaminophen (TYLENOL) tablet 650 mg  650 mg Oral Q4H PRN  
 
______________________________________________________________________ EXPECTED LENGTH OF STAY: - - - 
ACTUAL LENGTH OF STAY:          2 Reddy Leiva MD

## 2019-05-20 NOTE — DIABETES MGMT
[facility] Progress Note Recommendations/ Comments: BG's . 200 mg/dL. Yesterday ranged 68 mg/dL - 269 mg/dL Per Dr Miles Leal' note 5/20/2019, Endocrinology to see pt She is back on her insulin pump Attempted to see her -staff working with her so will return later. Current hospital DM medication: insulin pump Chart reviewed on [facility]. Patient is a 30 y.o. female with known DM on insulin pump. Known to [facility] from previous admissions 1 week post partum. Admitted for hypertensive urgency A1c:  
Lab Results Component Value Date/Time Hemoglobin A1c 11.1 (H) 08/18/2016 12:38 PM  
 Hemoglobin A1c 11.4 (H) 08/06/2016 02:28 PM  
 
 
Recent Glucose Results:  
Lab Results Component Value Date/Time  (H) 05/20/2019 03:52 AM  
 GLUCPOC 259 (H) 05/20/2019 11:11 AM  
 GLUCPOC 238 (H) 05/20/2019 07:14 AM  
 GLUCPOC 284 (H) 05/19/2019 11:21 PM  
  
 
Lab Results Component Value Date/Time Creatinine 1.26 (H) 05/20/2019 03:52 AM  
 
Estimated Creatinine Clearance: 65.3 mL/min (A) (based on SCr of 1.26 mg/dL (H)). Active Orders Diet DIET CARDIAC Regular PO intake:  
Patient Vitals for the past 72 hrs: 
 % Diet Eaten 05/20/19 0824 90 % 05/18/19 1338 0 % Will continue to follow as needed. Thank you Emmanuelle Boyd RN, CDE Pager [phone] Time spent: 15 min 1500 pt LUDIN

## 2019-05-21 LAB
ACETONE,ACETX: NEGATIVE MG/L
ANION GAP SERPL CALC-SCNC: 7 MMOL/L (ref 5–15)
BASOPHILS # BLD: 0 K/UL (ref 0–0.1)
BASOPHILS NFR BLD: 0 % (ref 0–1)
BUN SERPL-MCNC: 16 MG/DL (ref 6–20)
BUN/CREAT SERPL: 14 (ref 12–20)
CALCIUM SERPL-MCNC: 7.3 MG/DL (ref 8.5–10.1)
CHAIN OF CUSTODY,CHC: NO
CHLORIDE SERPL-SCNC: 110 MMOL/L (ref 97–108)
CO2 SERPL-SCNC: 22 MMOL/L (ref 21–32)
CREAT SERPL-MCNC: 1.11 MG/DL (ref 0.55–1.02)
DIFFERENTIAL METHOD BLD: ABNORMAL
EOSINOPHIL # BLD: 0.2 K/UL (ref 0–0.4)
EOSINOPHIL NFR BLD: 2 % (ref 0–7)
ERYTHROCYTE [DISTWIDTH] IN BLOOD BY AUTOMATED COUNT: 16.9 % (ref 11.5–14.5)
EST. AVERAGE GLUCOSE BLD GHB EST-MCNC: 197 MG/DL
ETHANOL,ETHX: NEGATIVE MG/L
GLUCOSE BLD STRIP.AUTO-MCNC: 165 MG/DL (ref 65–100)
GLUCOSE BLD STRIP.AUTO-MCNC: 196 MG/DL (ref 65–100)
GLUCOSE BLD STRIP.AUTO-MCNC: 197 MG/DL (ref 65–100)
GLUCOSE BLD STRIP.AUTO-MCNC: 201 MG/DL (ref 65–100)
GLUCOSE BLD STRIP.AUTO-MCNC: 203 MG/DL (ref 65–100)
GLUCOSE BLD STRIP.AUTO-MCNC: 253 MG/DL (ref 65–100)
GLUCOSE BLD STRIP.AUTO-MCNC: 88 MG/DL (ref 65–100)
GLUCOSE SERPL-MCNC: 175 MG/DL (ref 65–100)
HBA1C MFR BLD: 8.5 % (ref 4.2–6.3)
HCT VFR BLD AUTO: 23.4 % (ref 35–47)
HGB BLD-MCNC: 7.2 G/DL (ref 11.5–16)
IMM GRANULOCYTES # BLD AUTO: 0.1 K/UL (ref 0–0.04)
IMM GRANULOCYTES NFR BLD AUTO: 1 % (ref 0–0.5)
ISOPROPANOL,ISOPX: NEGATIVE MG/L
LYMPHOCYTES # BLD: 1.4 K/UL (ref 0.8–3.5)
LYMPHOCYTES NFR BLD: 16 % (ref 12–49)
MAGNESIUM SERPL-MCNC: 2.1 MG/DL (ref 1.6–2.4)
MCH RBC QN AUTO: 26.4 PG (ref 26–34)
MCHC RBC AUTO-ENTMCNC: 30.8 G/DL (ref 30–36.5)
MCV RBC AUTO: 85.7 FL (ref 80–99)
METHANOL,METHX: NEGATIVE MG/L
MONOCYTES # BLD: 0.5 K/UL (ref 0–1)
MONOCYTES NFR BLD: 6 % (ref 5–13)
NEUTS SEG # BLD: 7 K/UL (ref 1.8–8)
NEUTS SEG NFR BLD: 75 % (ref 32–75)
NRBC # BLD: 0 K/UL (ref 0–0.01)
NRBC BLD-RTO: 0 PER 100 WBC
PLATELET # BLD AUTO: 299 K/UL (ref 150–400)
PMV BLD AUTO: 9.6 FL (ref 8.9–12.9)
POTASSIUM SERPL-SCNC: 4.5 MMOL/L (ref 3.5–5.1)
RBC # BLD AUTO: 2.73 M/UL (ref 3.8–5.2)
REPORT STATUS,RSTSX: NORMAL
SERVICE CMNT-IMP: ABNORMAL
SERVICE CMNT-IMP: NORMAL
SODIUM SERPL-SCNC: 139 MMOL/L (ref 136–145)
SPECIMEN SOURCE: NORMAL
WBC # BLD AUTO: 9.3 K/UL (ref 3.6–11)

## 2019-05-21 PROCEDURE — 74011250636 HC RX REV CODE- 250/636: Performed by: INTERNAL MEDICINE

## 2019-05-21 PROCEDURE — 85025 COMPLETE CBC W/AUTO DIFF WBC: CPT

## 2019-05-21 PROCEDURE — 74011250637 HC RX REV CODE- 250/637: Performed by: INTERNAL MEDICINE

## 2019-05-21 PROCEDURE — 65410000002 HC RM PRIVATE OB

## 2019-05-21 PROCEDURE — 82962 GLUCOSE BLOOD TEST: CPT

## 2019-05-21 PROCEDURE — 36415 COLL VENOUS BLD VENIPUNCTURE: CPT

## 2019-05-21 PROCEDURE — 74011250637 HC RX REV CODE- 250/637: Performed by: FAMILY MEDICINE

## 2019-05-21 PROCEDURE — 83735 ASSAY OF MAGNESIUM: CPT

## 2019-05-21 PROCEDURE — 83036 HEMOGLOBIN GLYCOSYLATED A1C: CPT

## 2019-05-21 PROCEDURE — 80048 BASIC METABOLIC PNL TOTAL CA: CPT

## 2019-05-21 RX ORDER — METOPROLOL SUCCINATE 25 MG/1
25 TABLET, EXTENDED RELEASE ORAL DAILY
Status: DISCONTINUED | OUTPATIENT
Start: 2019-05-21 | End: 2019-05-21

## 2019-05-21 RX ORDER — LISINOPRIL 5 MG/1
2.5 TABLET ORAL DAILY
Status: DISCONTINUED | OUTPATIENT
Start: 2019-05-21 | End: 2019-05-22

## 2019-05-21 RX ORDER — PROMETHAZINE HYDROCHLORIDE 25 MG/1
12.5 TABLET ORAL
Status: DISCONTINUED | OUTPATIENT
Start: 2019-05-21 | End: 2019-05-24 | Stop reason: HOSPADM

## 2019-05-21 RX ADMIN — PROMETHAZINE HYDROCHLORIDE 12.5 MG: 25 TABLET ORAL at 16:43

## 2019-05-21 RX ADMIN — LABETALOL HCL 300 MG: 200 TABLET, FILM COATED ORAL at 10:05

## 2019-05-21 RX ADMIN — DULOXETINE HYDROCHLORIDE 20 MG: 20 CAPSULE, DELAYED RELEASE ORAL at 08:26

## 2019-05-21 RX ADMIN — KETOROLAC TROMETHAMINE 30 MG: 30 INJECTION, SOLUTION INTRAMUSCULAR at 13:18

## 2019-05-21 RX ADMIN — AMLODIPINE BESYLATE 10 MG: 5 TABLET ORAL at 08:26

## 2019-05-21 RX ADMIN — Medication 20 ML: at 06:00

## 2019-05-21 RX ADMIN — LISINOPRIL 2.5 MG: 5 TABLET ORAL at 16:31

## 2019-05-21 RX ADMIN — Medication 10 ML: at 13:21

## 2019-05-21 RX ADMIN — SODIUM CHLORIDE 150 ML/HR: 900 INJECTION, SOLUTION INTRAVENOUS at 06:17

## 2019-05-21 RX ADMIN — KETOROLAC TROMETHAMINE 30 MG: 30 INJECTION, SOLUTION INTRAMUSCULAR at 07:18

## 2019-05-21 RX ADMIN — KETOROLAC TROMETHAMINE 30 MG: 30 INJECTION, SOLUTION INTRAMUSCULAR at 19:16

## 2019-05-21 RX ADMIN — PROMETHAZINE HYDROCHLORIDE 12.5 MG: 25 TABLET ORAL at 12:48

## 2019-05-21 RX ADMIN — OXYCODONE AND ACETAMINOPHEN 1 TABLET: 5; 325 TABLET ORAL at 16:31

## 2019-05-21 RX ADMIN — Medication 30 ML: at 21:02

## 2019-05-21 RX ADMIN — Medication 1 TABLET: at 08:26

## 2019-05-21 RX ADMIN — DULOXETINE HYDROCHLORIDE 20 MG: 20 CAPSULE, DELAYED RELEASE ORAL at 19:16

## 2019-05-21 RX ADMIN — FAMOTIDINE 20 MG: 20 TABLET ORAL at 19:16

## 2019-05-21 RX ADMIN — FAMOTIDINE 20 MG: 20 TABLET ORAL at 08:26

## 2019-05-21 RX ADMIN — ONDANSETRON 4 MG: 2 INJECTION INTRAMUSCULAR; INTRAVENOUS at 01:03

## 2019-05-21 RX ADMIN — KETOROLAC TROMETHAMINE 30 MG: 30 INJECTION, SOLUTION INTRAMUSCULAR at 01:03

## 2019-05-21 RX ADMIN — OXYCODONE AND ACETAMINOPHEN 1 TABLET: 5; 325 TABLET ORAL at 04:33

## 2019-05-21 NOTE — PROGRESS NOTES
Patient is resting in bed she has incisional pain 7/10 pain medication given. Incision looks great no redness, swelling, drainage, or bleeding at the site. Will continue to monitor patient for changes in her condition. 2200  patient gave herself a bolus 3.7 units based on 30 carbs. 2331 patient is resting in bed she called out said she felt like her BG had dropped. BG checked it was 61 juice and ervin crackers were given recheck in 15 minutes. 2351 recheck BG 74 another juice was given recheck in 15 minutes    0010 recheck BG 88 patient is now ready to go off unit to see her baby. She was put in the wheelchair and  wheeled by her boyfriend. 4972 patient is back on the unit and in her room. 0103 patient having incisional area pain/crampling pain medication given. 0120 paged NP Elli Dean patient reports she is having a fowl order smell with her discharge/valnal bleeding it is dark brown in color. Called [facility] to inquire and they advised me I should call the physician and let them know. Will await call back for further instructions. [address] called back and she said to refer this to OBGYN in the AM and they will probably have to do a exam and get a swab samples to be checked. 5619 patient is resting in bed she is having some incisional area pain medication given blood drawn and sent to lab.     0730 Bedside and Verbal shift change report given to Samantha Velarde RN  (oncoming nurse) by DEE Olsen  (offgoing nurse). Report included the following information SBAR, MAR, Recent Results and Med Rec Status.

## 2019-05-21 NOTE — CONSULTS
Endocrinology Consult    Ms Alec Carmen is a 29 yo woman with type I diabetes recently discharged after delivering her daughter at 29 weeks. She was the admitted over the weekend for anemia and uncontrolled HTN. She uses an insulin pump and I was asked to help with diabetes management as an inpatient. She has received sliding scale Humalog over the weekend and today, in addition to insulin from her pump. Pump settings as of 5/14/2019  - Basal: 0.3 units/hr  - Carb ratio: 15  - Sensitivity: 43 (prior old sensitivity left)  - Target: 100- 140. Ms Alec Carmen increased basal rate this AM to 0.55 units/hr. Low glucose occurred this afternoon due to receiving 4 units correction at lunch. She had bolused for a snack at 10:30 AM, thus some insulin was active at that time. Similar events preceded low yesterday afternoon    Component       GLUCOSE,FAST - POC   Latest Ref Rng & Units       65 - 100 mg/dL   5/20/2019      9:14  (H)   5/20/2019      4:34  (H)   5/20/2019      4:20 PM 78   5/20/2019      4:06 PM 56 (L)   5/20/2019      3:53 PM 52 (L)   5/20/2019      11:11  (H)   5/20/2019      7:14  (H)   5/19/2019      11:21  (H)   5/19/2019      3:49 PM 93   5/19/2019      2:58 PM 68   5/19/2019      12:12  (H)   5/19/2019      8:20  (H)       Plan:  1. Type I diabetes. Would allow her to use her insulin pump to dose insulin  Will discontinue correctional scale Humalog  Lowered carb ratio to 12 to provide more insulin for carbohydrate intake  Continue current/increased basal rate of 0.55. I agree that prior rate of 0.3 was not enough. Encouraged her to follow glucoses closely overnight and to decrease to 0.45 if glucoses are dropping when not eating. Current pump settings  - Basal: 0.55 units/hr  - Carbohydrate ratio: 12 (1 unit for every 12 g carb with meal). - Sensitivity: 43 ( 1 unit to lower glucose 43 mg/dl when glucose is > 140)   - Target: 100- 140.     Will continue to follow along.   I spent 35 minutes on her care today and > 50% of the time was spent in coordination of her care

## 2019-05-21 NOTE — PROGRESS NOTES
Hospitalist Progress Note  Tona Luu MD  Answering service: [phone] OR [phone] from in house phone         Date of Service:  2019  NAME:  Kristen Croft  :  1990  MRN:  [id number]      Admission Summary:    Patient is a 69-year-old female with past medical history of anemia, chronic pain, depression, type 1 diabetes mellitus, essential hypertension, heart murmur, herpes simplex, peripheral neuropathy, ventricular tachycardia, preeclampsia, peripheral leg edema, presented to the emergency department from home, accompanied by her father, with chief complaint of headache. The patient notes onset of symptoms starting yesterday, which had been 10/10 severe, aggravated with light/ noise (with noted photophobia, phonophobia), associated nausea, and without relief after use of Percocet. She newly has been hospitalized from 2019 to 2019 with hypoglycemia, preeclampsia, uncontrolled hypertension, until a subsequent delivery recent with requiring a low transverse  section. The patient is now postpartum 1 week.      Interval history / Subjective:       2019 :  Pt's bs's stable  Discharge put on hold due to elevated CO2 levels yesterday- now normal  No complaints today- mildly elevated BP, appreciate OB eval      Assessment & Plan:     1) essential htn - poorly controlled on admission- BP still elevated- stop IVFLuids and add lisinopril     2) Dm 1 on insulin pump and ssi  With pt trying to carb count and dose meal time insulin,  Endocrine saw patient yesterday- stable glucose levels    3) Low bicarb -resolved- likely due to poor PO intake- normal lactic acid levels, no evidence of infection - monitor     4) Iron deficiency anemia, for iron sucross, did have transfusion     5) nausea- added phenergan    Code status: full   DVT prophylaxis: [address] discussed with: Patient/Family and Nurse  Disposition: Home w/Family and TBD     Hospital Problems  Date Reviewed: 5/2/2019          Codes Class Noted POA    Anemia ICD-10-CM: D64.9  ICD-9-CM: 285.9  5/18/2019 Unknown        Hypertensive urgency ICD-10-CM: I16.0  ICD-9-CM: 401.9  5/18/2019 Unknown                Review of Systems:    feels excessively sleepy, slight sweaty. No cp no sob, some on going pelvic/abd pain, fair appetite however. No n/v this am     Vital Signs:    Last 24hrs VS reviewed since prior progress note. Most recent are:  Visit Vitals  BP (!) 151/95 (BP 1 Location: Left arm, BP Patient Position: At rest)   Pulse 85   Temp 98.2 °F (36.8 °C)   Resp 16   Ht 5' 8\" (1.727 m)   Wt 68.8 kg (151 lb 9.6 oz)   SpO2 99%   Breastfeeding? Yes   BMI 23.05 kg/m²         Intake/Output Summary (Last 24 hours) at 5/21/2019 1226  Last data filed at 5/21/2019 1141  Gross per 24 hour   Intake 3685.83 ml   Output    Net 3685.83 ml        Physical Examination:             Constitutional:  No acute distress, cooperative, pleasant    ENT:  Oral mucous moist, oropharynx benign. Neck supple,    Resp:  CTA bilaterally. No wheezing/rhonchi/rales. No accessory muscle use   CV:  Regular rhythm, normal rate, no murmurs, gallops, rubs    GI:  Soft, non distended, non tender. normoactive bowel sounds, no hepatosplenomegaly     Musculoskeletal:  No edema, warm, 2+ pulses throughout    Neurologic:  Moves all extremities. AAOx3, CN II-XII reviewed     Psych:  Good insight, Not anxious nor agitated.   Skin:  Good turgor, no rashes or ulcers       Data Review:    Review and/or order of clinical lab test      Labs:     Recent Labs     05/21/19  0420 05/20/19  1538   WBC 9.3 8.1   HGB 7.2* 7.4*   HCT 23.4* 24.3*    321     Recent Labs     05/21/19  0420 05/20/19  1538 05/20/19  0352    140 133*   K 4.5 3.9 4.4   * 110* 112*   CO2 22 22 15*   BUN 16 17 17   CREA 1.11* 1.16* 1.26*   * 47* 237*   CA 7.3* 7.5* 7.5*   MG 2.1  --  1.4*     No results for input(s): HOLLYOT, GPT, ALT, AP, TBIL, TBILI, TP, ALB, GLOB, GGT, AML, LPSE in the last 72 hours. No lab exists for component: AMYP, HLPSE  No results for input(s): INR, PTP, APTT in the last 72 hours. No lab exists for component: INREXT, INREXT   Recent Labs     05/19/19  0504   TIBC 208*   PSAT 13*      Lab Results   Component Value Date/Time    Folate 26.9 (H) 09/03/2012 05:45 AM      No results for input(s): PH, PCO2, PO2 in the last 72 hours. No results for input(s): CPK, CKNDX, TROIQ in the last 72 hours.     No lab exists for component: CPKMB  Lab Results   Component Value Date/Time    Cholesterol, total 160 05/15/2016 12:09 AM    HDL Cholesterol 41 05/15/2016 12:09 AM    LDL, calculated 98 05/15/2016 12:09 AM    Triglyceride 105 05/15/2016 12:09 AM    CHOL/HDL Ratio 3.9 05/15/2016 12:09 AM     Lab Results   Component Value Date/Time    Glucose (POC) 165 (H) 05/21/2019 11:00 AM    Glucose (POC) 203 (H) 05/21/2019 07:57 AM    Glucose (POC) 253 (H) 05/21/2019 06:12 AM    Glucose (POC) 197 (H) 05/21/2019 04:35 AM    Glucose (POC) 88 05/21/2019 12:10 AM     Lab Results   Component Value Date/Time    Color YELLOW/STRAW 05/18/2019 03:25 AM    Appearance CLOUDY (A) 05/18/2019 03:25 AM    Specific gravity 1.015 05/18/2019 03:25 AM    Specific gravity 1.010 04/07/2019 07:07 PM    pH (UA) 7.5 05/18/2019 03:25 AM    Protein 300 (A) 05/18/2019 03:25 AM    Glucose 250 (A) 05/18/2019 03:25 AM    Ketone 15 (A) 05/18/2019 03:25 AM    Bilirubin NEGATIVE  05/18/2019 03:25 AM    Urobilinogen 0.2 05/18/2019 03:25 AM    Nitrites NEGATIVE  05/18/2019 03:25 AM    Leukocyte Esterase SMALL (A) 05/18/2019 03:25 AM    Epithelial cells FEW 04/07/2019 07:07 PM    Bacteria NEGATIVE  04/07/2019 07:07 PM    WBC 5-10 04/07/2019 07:07 PM    RBC 0-5 04/07/2019 07:07 PM         Medications Reviewed:     Current Facility-Administered Medications   Medication Dose Route Frequency    promethazine (PHENERGAN) tablet 12.5 mg  12.5 mg Oral Q4H PRN    lisinopril (PRINIVIL, ZESTRIL) tablet 2.5 mg  2.5 mg Oral DAILY    famotidine (PEPCID) tablet 20 mg  20 mg Oral BID    insulin pump (PATIENT SUPPLIED)   SubCUTAneous PRN    glucose chewable tablet 16 g  4 Tab Oral PRN    dextrose (D50W) injection syrg 12.5-25 g  12.5-25 g IntraVENous PRN    glucagon (GLUCAGEN) injection 1 mg  1 mg IntraMUSCular PRN    insulin pump (PATIENT SUPPLIED)   SubCUTAneous CONTINUOUS    0.9% sodium chloride infusion 250 mL  250 mL IntraVENous PRN    DULoxetine (CYMBALTA) capsule 20 mg  20 mg Oral BID    labetalol (NORMODYNE) tablet 300 mg  300 mg Oral Q12H    oxyCODONE-acetaminophen (PERCOCET) 5-325 mg per tablet 1 Tab  1 Tab Oral Q6H PRN    prenatal vit-iron fumarate-fa (PRENATAL PLUS with IRON) tablet 1 Tab  1 Tab Oral DAILY    sodium chloride (NS) flush 5-40 mL  5-40 mL IntraVENous Q8H    sodium chloride (NS) flush 5-40 mL  5-40 mL IntraVENous PRN    amLODIPine (NORVASC) tablet 10 mg  10 mg Oral DAILY    cloNIDine HCl (CATAPRES) tablet 0.2 mg  0.2 mg Oral Q4H PRN    ketorolac (TORADOL) injection 30 mg  30 mg IntraVENous Q6H PRN    acetaminophen (TYLENOL) tablet 650 mg  650 mg Oral Q4H PRN     ______________________________________________________________________  EXPECTED LENGTH OF STAY: 5d 7h  ACTUAL LENGTH OF STAY:          3                 Rigoberto Sheffield MD

## 2019-05-21 NOTE — PROGRESS NOTES
TRANSFER - IN REPORT:    Verbal report received from [facility] RN(name) on [employer]  being received from 3N (unit) for routine post - op      Report consisted of patients Situation, Background, Assessment and   Recommendations(SBAR). Information from the following report(s) SBAR, Procedure Summary, Intake/Output, MAR and Accordion was reviewed with the receiving nurse. Opportunity for questions and clarification was provided. Assessment completed upon patients arrival to unit and care assumed.

## 2019-05-21 NOTE — PROGRESS NOTES
[facility] Consult    CC: consulted by hospitalist service re: possible wound infection    HPI:   29 yo  now POD11 s/p 1LTCS at 33wks for cat 3 tracing, in setting of CHTN with SI preE with SF (severe range BPs, HAs) and poorly controlled type 1 IDDM (on insulin pump), uncomplicated. Pt now readmitted to medicine service after presenting to ER on  for severe HA and severe range BPs. BP control now improved. On admission pt also with anemia - Hgb 6.3. She received 1U PRBCs with appropriate rise in Hgb. BG labile, as high as 400s and as low as 50s, endocrine following. Consulted by primary team (Dr. Domenica Rinne) to r/o wound infection due to low bicarb and concern for non-gap acidosis without clear explanation for cause. Pt doing well this morning. No significant abdominal pain, N/V/D. She is passing gas and moving her bowels regularly. She denies foul smelling vaginal discharge, and reports lochia appropriate. Denies any issues with wound (no redness, drainage, swelling/induration/fluctuance, or increased pain). Overall healing well. No fevers/chills - has been afebrile. Pt denies CP, SOB. Currently without HA, vision changes, RUQ pain, or increased edema (in fact lower extremity edema is much improved). Pt is pumping. Denies any breast issues (redness, warmth, pain, or other signs of mastitis). Mood stable on cymbalta. Baby girl \"Nirmala\" in NICU doing well, will likely go home in 1 week.     Patient Vitals for the past 12 hrs:   Temp Pulse Resp BP SpO2   19 0746 98 °F (36.7 °C) 93 16 144/86 100 %   19 0417 98.4 °F (36.9 °C) 85 16 117/73 97 %   19 0323 98 °F (36.7 °C) 84 14 109/70 96 %   19 2329 97.7 °F (36.5 °C) 87  130/82 100 %     PHYSICAL EXAMINATION  Constitutional  · Appearance: well-nourished, well developed, alert, in no acute distress    HENT  · Head and Face: appears normal    Chest  · Respiratory Effort: breathing non-labored  · Auscultation: normal breath sounds    Cardiovascular  · Heart:  · Auscultation: regular rate and rhythm without murmur    Gastrointestinal  · Abdominal Examination: abdomen soft, non-distended, appropriately tender, uterus appropriately involuted for 1 week postpartum, no rebound or guarding, normal bowel sounds, no masses present  · Incision: pfannensteil incision c/d/i, healing well, no evidence of infection (no erythema, drainage, induration/fluctuance/swelling, or significant tenderness)  · Liver and spleen: no hepatomegaly present, spleen not palpable  · Hernias: no hernias identified    Genitourinary: deferred - pt denied any increased bleeding or foul discharge    Skin  · General Inspection: no rash, no lesions identified    Neurologic/Psychiatric  · Mental Status:  · Orientation: grossly oriented to person, place and time  · Mood and Affect: mood normal, affect appropriate    Recent Results (from the past 12 hour(s))   GLUCOSE, POC    Collection Time: 05/20/19  9:14 PM   Result Value Ref Range    Glucose (POC) 193 (H) 65 - 100 mg/dL    Performed by [address] [place] [address], POC    Collection Time: 05/20/19 11:31 PM   Result Value Ref Range    Glucose (POC) 61 (L) 65 - 100 mg/dL    Performed by [address] [place] [address], POC    Collection Time: 05/20/19 11:51 PM   Result Value Ref Range    Glucose (POC) 74 65 - 100 mg/dL    Performed by Christy Salmeron    GLUCOSE, POC    Collection Time: 05/21/19 12:10 AM   Result Value Ref Range    Glucose (POC) 88 65 - 100 mg/dL    Performed by Christy Salmeron    MAGNESIUM    Collection Time: 05/21/19  4:20 AM   Result Value Ref Range    Magnesium 2.1 1.6 - 2.4 mg/dL   CBC WITH AUTOMATED DIFF    Collection Time: 05/21/19  4:20 AM   Result Value Ref Range    WBC 9.3 3.6 - 11.0 K/uL    RBC 2.73 (L) 3.80 - 5.20 M/uL    HGB 7.2 (L) 11.5 - 16.0 g/dL    HCT 23.4 (L) 35.0 - 47.0 %    MCV 85.7 80.0 - 99.0 FL    MCH 26.4 26.0 - 34.0 PG    MCHC 30.8 30.0 - 36.5 g/dL    RDW 16.9 (H) 11.5 - 14.5 %    PLATELET 476 148 - 400 K/uL    MPV 9.6 8.9 - 12.9 FL    NRBC 0.0 0  WBC    ABSOLUTE NRBC 0.00 0.00 - 0.01 K/uL    NEUTROPHILS 75 32 - 75 %    LYMPHOCYTES 16 12 - 49 %    MONOCYTES 6 5 - 13 %    EOSINOPHILS 2 0 - 7 %    BASOPHILS 0 0 - 1 %    IMMATURE GRANULOCYTES 1 (H) 0.0 - 0.5 %    ABS. NEUTROPHILS 7.0 1.8 - 8.0 K/UL    ABS. LYMPHOCYTES 1.4 0.8 - 3.5 K/UL    ABS. MONOCYTES 0.5 0.0 - 1.0 K/UL    ABS. EOSINOPHILS 0.2 0.0 - 0.4 K/UL    ABS. BASOPHILS 0.0 0.0 - 0.1 K/UL    ABS. IMM. GRANS. 0.1 (H) 0.00 - 0.04 K/UL    DF AUTOMATED     METABOLIC PANEL, BASIC    Collection Time: 05/21/19  4:20 AM   Result Value Ref Range    Sodium 139 136 - 145 mmol/L    Potassium 4.5 3.5 - 5.1 mmol/L    Chloride 110 (H) 97 - 108 mmol/L    CO2 22 21 - 32 mmol/L    Anion gap 7 5 - 15 mmol/L    Glucose 175 (H) 65 - 100 mg/dL    BUN 16 6 - 20 MG/DL    Creatinine 1.11 (H) 0.55 - 1.02 MG/DL    BUN/Creatinine ratio 14 12 - 20      GFR est AA >60 >60 ml/min/1.73m2    GFR est non-AA 57 (L) >60 ml/min/1.73m2    Calcium 7.3 (L) 8.5 - 10.1 MG/DL   HEMOGLOBIN A1C WITH EAG    Collection Time: 05/21/19  4:20 AM   Result Value Ref Range    Hemoglobin A1c 8.5 (H) 4.2 - 6.3 %    Est. average glucose 197 mg/dL   GLUCOSE, POC    Collection Time: 05/21/19  4:35 AM   Result Value Ref Range    Glucose (POC) 197 (H) 65 - 100 mg/dL    Performed by [address] [place] [address], POC    Collection Time: 05/21/19  6:12 AM   Result Value Ref Range    Glucose (POC) 253 (H) 65 - 100 mg/dL    Performed by Merry Ward    GLUCOSE, POC    Collection Time: 05/21/19  7:57 AM   Result Value Ref Range    Glucose (POC) 203 (H) 65 - 100 mg/dL    Performed by Ameena Duran      Assessment/Plan:   28yo POD11 s/p 1LTCS at 33wks for cat 3 tracing, in setting of CHTN with SI preE with SF (severe range BPs) and poorly controlled type 1 IDDM (on insulin pump), uncomplicated.  Pt now readmitted for HA, severe range BPs, anemia requiring transfusion 1U PRBCs, poor BG control, acute on chronic renal insufficiency, and now with non-gap acidosis of unclear explanation. Consulted by primary team to r/o wound infection. Pt afebrile, without elevated WBC or any other clinical signs of wound infx or endometritis on exam at this time. Incision appears to be healing well. That being said, recommend continued close monitoring of wound as pt is at high risk for wound infx and endometritis given poorly controlled DM. As for severe hypertension in setting of superimposed pre-eclampsia, improved control at this time per primary team regimen, but if unable to control BPs adequately, consider repeat course of IV magnesium for seizure prophylaxis. Would continue cymbalta due to high risk for postpartum anxiety/depression, but mood stable at this time. Pt can continue pumping for baby in NICU, no breast concerns at this time. Thank you for this consult. Please call [facility] at [phone] for any further questions or concerns.      Jonna Lopes MD  5/21/2019  8:24 AM

## 2019-05-21 NOTE — PROGRESS NOTES
Endocrinology Progress Note    Ms Amina Rosenberg was seen this afternoon. She lowered basal rate from 0.55 to 0.425 after glucose of 61 yesterday evening at 11:30 pm.  She had bolused for a 30 g meal and for high of 183 around 8:50 pm, so this could have factored into the low as well  Only bolus for food yet today was around 7am.     Appetite is not very good currently. She has some nausea    Her father, who has type II diabetes, was in room with Ms Amina Rosenberg and I answered the questions he had about her kidneys, diet choices, artificial sweeteners, etc       Glucoses noted  Component       GLUCOSE,FAST - POC   Latest Ref Rng & Units       65 - 100 mg/dL   5/21/2019      12:46  (H)   5/21/2019      11:00  (H)   5/21/2019       7:57  (H)   5/21/2019       6:12  (H)   5/21/2019       4:35  (H)   5/21/2019       4:20 AM    5/21/2019      12:10 AM 88   5/20/2019      11:51 PM 74   5/20/2019      11:31 PM 61 (L)   5/20/2019       9:14  (H)   5/20/2019       4:34  (H)   5/20/2019       4:20 PM 78     Current pump settings  - Basal: 0.425  - Carbohydrate ratio: 12 (1 unit for every 12 g carb with meal). - Sensitivity: 43 ( 1 unit to lower glucose 43 mg/dl when glucose is > 140)   - Target: 100- 140. Impression and recommendations    1. Diabetes type I:  - 0.3 units/hr was too little, 0.55 units/hr may be too much, but I suspect 0.425 units/hour may not be enough  -> Basal rate increased to 0.475 units/hr  Current pump settings  - Basal: 0.475  - Carbohydrate ratio: 12   - Sensitivity: 43   - Target: 100- 140.      2. HTN. Continue amlodipine  Do prefer lisinopril over labetalol. Lisinopril to be added today. A once daily regimen of lisinopril + amlodipine would be ideal.      3. Renal insufficieny  - creatinine back to baseline at 1.1  - discussed importance of BP and diabetes control to help keep kidney function stable.      I spent 35 minutes on her care today and > 50% of the time was spent in coordination of her care

## 2019-05-21 NOTE — DIABETES MGMT
DTC Pump and Consult Note    Recommendations/ Comments:  Delivered 1 week ago at 33 wks. Admitted for HTN, anemia  On insulin pump  Hypoglycemia occurred yesterday likely due to stacked bolus by pump and correctional insulin    Dr Ayaan Charles, her Endocrinoloist saw her yesterday and adjustments in pump settings were made as follows:  Current pump settings  - Basal: 0.55 units/hr  - Carbohydrate ratio: 12 (1 unit for every 12 g carb with meal). - Sensitivity: 43 ( 1 unit to lower glucose 43 mg/dl when glucose is > 140)   - Target: 100- 140. If patient needs to be removed from pump for greater than 90 minutes then insulin must be replaced with basal Lantus and Humalog correction scale plus meal bolus or insulin gtt    If patient needs an MRI, CT scan or Xray:   1. Insulin pump must be disconnected at the infusion site and left outside of the         room  2. If using a Sure T or TruSteel infusion set it also must be removed completely. 3. If patient is wearing a continuous glucose monitor (sensor) then it must be             completely removed. Consult received for: []          Assessment of home management     []          Outpatient education     [x]          Insulin pump patient     []          Insulin infusion     []          DKA/HHS    Chart reviewed and initial evaluation complete on [address]  Patient is a 30 y.o. female with known DM on insulin pump at home. Pt is known to DTC from previous admission. Last seen for Consult on 4/8/2019      If no to any of the following the pump should be d/c per hospital policy:  · Patient able to program doctors ordered settings,   yes [x]            No   []                · Patient able to provide pump materials  yes [x]              No   []            · Able to change infusion set and fill a new syringe at least for 3 days ( 2 days if pregnant.   yes [x]              No   []            · Patient will change setting if redness, swelling, blood backing up, pump alerts, \"No Delivery\" alarm, or two or more glucose reading         in a row greater than 250mg/dl    yes [x]              No   []           · Patient able to repeat basal rates [x]              No   []            · Patient able to bolus corrections and meals based on physician orders. yes [x]         No   []              · Assessed and instructed patient on the following: . ·  blood sugar goals, hypoglycemia prevention and treatment and insulin adjustments    Encouraged the following:   F/U with Dr Marga Bello    Provided patient with the following: [x]          Diabetes Self Care Guide               []          CHO counting education materials               [x]          Outpatient DTC contact number    A1c:   Lab Results   Component Value Date/Time    Hemoglobin A1c 8.5 (H) 05/21/2019 04:20 AM       Recent Glucose Results:   Lab Results   Component Value Date/Time     (H) 05/21/2019 04:20 AM    GLU 47 (LL) 05/20/2019 03:38 PM    GLUCPOC 203 (H) 05/21/2019 07:57 AM    GLUCPOC 253 (H) 05/21/2019 06:12 AM    GLUCPOC 197 (H) 05/21/2019 04:35 AM        Lab Results   Component Value Date/Time    Creatinine 1.11 (H) 05/21/2019 04:20 AM     Estimated Creatinine Clearance: 74.1 mL/min (A) (based on SCr of 1.11 mg/dL (H)). Active Orders   Diet    DIET CARDIAC Regular        PO intake:   Patient Vitals for the past 72 hrs:   % Diet Eaten   05/20/19 0824 90 %   05/18/19 1338 0 %       Will continue to follow as needed. Thank you.   Dominguez Still RN, CDE  Pager [phone]      Time spent: 15 min

## 2019-05-21 NOTE — PROGRESS NOTES
1950 Bedside and Verbal shift change report given to Leidy Franz RN (oncoming nurse) by Leonid Fonseca RN (offgoing nurse). Report included the following information SBAR.     2100 Pt feels subclavian IV is leaking and requested dressing change prior to visiting daughter in NICU. Changed dressing.

## 2019-05-21 NOTE — ROUTINE PROCESS
TRANSFER - OUT REPORT: 
 
Verbal report given to María(name) on James Piedra  being transferred to 3 E (unit) for routine progression of care Report consisted of patients Situation, Background, Assessment and  
Recommendations(SBAR). Information from the following report(s) SBAR, Kardex, Intake/Output, MAR, Recent Results, Med Rec Status and Cardiac Rhythm NSR was reviewed with the receiving nurse. Lines:  
Triple Lumen 05/20/19 Left Subclavian (Active) Central Line Being Utilized No 5/20/2019  3:00 PM  
Criteria for Appropriate Use Other (comment) 5/20/2019  3:00 PM  
Site Assessment Clean, dry, & intact 5/20/2019  3:00 PM  
Infiltration Assessment 0 5/20/2019  3:00 PM  
Affected Extremity/Extremities Color distal to insertion site pink (or appropriate for race) 5/20/2019  3:00 PM  
Date of Last Dressing Change 05/20/19 5/20/2019  3:00 PM  
Dressing Status Clean, dry, & intact 5/20/2019  3:00 PM  
Dressing Type Transparent;Disk with Chlorhexadine gluconate (CHG) 5/20/2019  3:00 PM  
Proximal Hub Color/Line Status White 5/20/2019  3:00 PM  
Positive Blood Return (Medial Site) Yes 5/20/2019  3:00 PM  
Medial Hub Color/Line Status Blue 5/20/2019  3:00 PM  
Positive Blood Return (Lateral Site) Yes 5/20/2019  3:00 PM  
Distal Hub Color/Line Status Brown 5/20/2019  3:00 PM  
Positive Blood Return (Site #3) Yes 5/20/2019  3:00 PM  
Alcohol Cap Used Yes 5/20/2019  3:00 PM  
   
Peripheral IV 05/18/19 Left Forearm (Active) Site Assessment Clean, dry, & intact 5/20/2019 11:08 AM  
Phlebitis Assessment 0 5/20/2019 11:08 AM  
Infiltration Assessment 0 5/20/2019 11:08 AM  
Dressing Status Clean, dry, & intact 5/20/2019 11:08 AM  
Dressing Type Transparent 5/20/2019 11:08 AM  
Hub Color/Line Status Yellow;Capped 5/20/2019 11:08 AM  
Action Taken Open ports on tubing capped 5/18/2019  8:25 PM  
Alcohol Cap Used Yes 5/20/2019 11:08 AM  
  
 
Opportunity for questions and clarification was provided. Patient transported with: 
 [employer] acquired by [employer]

## 2019-05-22 ENCOUNTER — TELEPHONE (OUTPATIENT)
Dept: ENDOCRINOLOGY | Age: 29
End: 2019-05-22

## 2019-05-22 LAB
ANION GAP SERPL CALC-SCNC: 4 MMOL/L (ref 5–15)
BASOPHILS # BLD: 0 K/UL (ref 0–0.1)
BASOPHILS NFR BLD: 0 % (ref 0–1)
BUN SERPL-MCNC: 19 MG/DL (ref 6–20)
BUN/CREAT SERPL: 16 (ref 12–20)
CALCIUM SERPL-MCNC: 7.5 MG/DL (ref 8.5–10.1)
CHLORIDE SERPL-SCNC: 111 MMOL/L (ref 97–108)
CO2 SERPL-SCNC: 23 MMOL/L (ref 21–32)
CREAT SERPL-MCNC: 1.19 MG/DL (ref 0.55–1.02)
DIFFERENTIAL METHOD BLD: ABNORMAL
EOSINOPHIL # BLD: 0.3 K/UL (ref 0–0.4)
EOSINOPHIL NFR BLD: 3 % (ref 0–7)
ERYTHROCYTE [DISTWIDTH] IN BLOOD BY AUTOMATED COUNT: 16.9 % (ref 11.5–14.5)
GLUCOSE BLD STRIP.AUTO-MCNC: 124 MG/DL (ref 65–100)
GLUCOSE BLD STRIP.AUTO-MCNC: 207 MG/DL (ref 65–100)
GLUCOSE BLD STRIP.AUTO-MCNC: 216 MG/DL (ref 65–100)
GLUCOSE BLD STRIP.AUTO-MCNC: 301 MG/DL (ref 65–100)
GLUCOSE BLD STRIP.AUTO-MCNC: 313 MG/DL (ref 65–100)
GLUCOSE BLD STRIP.AUTO-MCNC: 313 MG/DL (ref 65–100)
GLUCOSE SERPL-MCNC: 285 MG/DL (ref 65–100)
HCT VFR BLD AUTO: 26.2 % (ref 35–47)
HGB BLD-MCNC: 8 G/DL (ref 11.5–16)
IMM GRANULOCYTES # BLD AUTO: 0.1 K/UL (ref 0–0.04)
IMM GRANULOCYTES NFR BLD AUTO: 1 % (ref 0–0.5)
LYMPHOCYTES # BLD: 1.2 K/UL (ref 0.8–3.5)
LYMPHOCYTES NFR BLD: 14 % (ref 12–49)
MCH RBC QN AUTO: 26.1 PG (ref 26–34)
MCHC RBC AUTO-ENTMCNC: 30.5 G/DL (ref 30–36.5)
MCV RBC AUTO: 85.6 FL (ref 80–99)
MONOCYTES # BLD: 0.4 K/UL (ref 0–1)
MONOCYTES NFR BLD: 4 % (ref 5–13)
NEUTS SEG # BLD: 7 K/UL (ref 1.8–8)
NEUTS SEG NFR BLD: 78 % (ref 32–75)
NRBC # BLD: 0 K/UL (ref 0–0.01)
NRBC BLD-RTO: 0 PER 100 WBC
PLATELET # BLD AUTO: 321 K/UL (ref 150–400)
PMV BLD AUTO: 10.2 FL (ref 8.9–12.9)
POTASSIUM SERPL-SCNC: 5 MMOL/L (ref 3.5–5.1)
RBC # BLD AUTO: 3.06 M/UL (ref 3.8–5.2)
SERVICE CMNT-IMP: ABNORMAL
SODIUM SERPL-SCNC: 138 MMOL/L (ref 136–145)
TSH SERPL DL<=0.05 MIU/L-ACNC: 1.56 UIU/ML (ref 0.36–3.74)
WBC # BLD AUTO: 8.9 K/UL (ref 3.6–11)

## 2019-05-22 PROCEDURE — 74011250636 HC RX REV CODE- 250/636: Performed by: INTERNAL MEDICINE

## 2019-05-22 PROCEDURE — 80048 BASIC METABOLIC PNL TOTAL CA: CPT

## 2019-05-22 PROCEDURE — 74011250637 HC RX REV CODE- 250/637: Performed by: FAMILY MEDICINE

## 2019-05-22 PROCEDURE — 36415 COLL VENOUS BLD VENIPUNCTURE: CPT

## 2019-05-22 PROCEDURE — 36591 DRAW BLOOD OFF VENOUS DEVICE: CPT

## 2019-05-22 PROCEDURE — 74011250637 HC RX REV CODE- 250/637: Performed by: INTERNAL MEDICINE

## 2019-05-22 PROCEDURE — 82962 GLUCOSE BLOOD TEST: CPT

## 2019-05-22 PROCEDURE — 84443 ASSAY THYROID STIM HORMONE: CPT

## 2019-05-22 PROCEDURE — 85025 COMPLETE CBC W/AUTO DIFF WBC: CPT

## 2019-05-22 PROCEDURE — 77030018846 HC SOL IRR STRL H20 ICUM -A

## 2019-05-22 PROCEDURE — 65410000002 HC RM PRIVATE OB

## 2019-05-22 RX ORDER — HYDROCHLOROTHIAZIDE 25 MG/1
25 TABLET ORAL DAILY
Status: DISCONTINUED | OUTPATIENT
Start: 2019-05-23 | End: 2019-05-24 | Stop reason: HOSPADM

## 2019-05-22 RX ORDER — HYDRALAZINE HYDROCHLORIDE 25 MG/1
25 TABLET, FILM COATED ORAL
Status: DISCONTINUED | OUTPATIENT
Start: 2019-05-22 | End: 2019-05-24 | Stop reason: HOSPADM

## 2019-05-22 RX ADMIN — Medication 10 ML: at 01:22

## 2019-05-22 RX ADMIN — PROMETHAZINE HYDROCHLORIDE 12.5 MG: 25 TABLET ORAL at 03:23

## 2019-05-22 RX ADMIN — OXYCODONE AND ACETAMINOPHEN 1 TABLET: 5; 325 TABLET ORAL at 13:02

## 2019-05-22 RX ADMIN — DULOXETINE HYDROCHLORIDE 20 MG: 20 CAPSULE, DELAYED RELEASE ORAL at 18:00

## 2019-05-22 RX ADMIN — LABETALOL HCL 300 MG: 200 TABLET, FILM COATED ORAL at 09:20

## 2019-05-22 RX ADMIN — FAMOTIDINE 20 MG: 20 TABLET ORAL at 09:20

## 2019-05-22 RX ADMIN — Medication 10 ML: at 12:00

## 2019-05-22 RX ADMIN — DULOXETINE HYDROCHLORIDE 20 MG: 20 CAPSULE, DELAYED RELEASE ORAL at 09:20

## 2019-05-22 RX ADMIN — LABETALOL HCL 300 MG: 200 TABLET, FILM COATED ORAL at 20:19

## 2019-05-22 RX ADMIN — Medication 30 ML: at 20:21

## 2019-05-22 RX ADMIN — AMLODIPINE BESYLATE 10 MG: 5 TABLET ORAL at 09:19

## 2019-05-22 RX ADMIN — KETOROLAC TROMETHAMINE 30 MG: 30 INJECTION, SOLUTION INTRAMUSCULAR at 09:40

## 2019-05-22 RX ADMIN — FAMOTIDINE 20 MG: 20 TABLET ORAL at 18:00

## 2019-05-22 RX ADMIN — Medication 10 ML: at 06:01

## 2019-05-22 RX ADMIN — KETOROLAC TROMETHAMINE 30 MG: 30 INJECTION, SOLUTION INTRAMUSCULAR at 21:54

## 2019-05-22 RX ADMIN — Medication 1 TABLET: at 09:20

## 2019-05-22 RX ADMIN — Medication 10 ML: at 21:55

## 2019-05-22 RX ADMIN — LISINOPRIL 2.5 MG: 5 TABLET ORAL at 09:20

## 2019-05-22 RX ADMIN — KETOROLAC TROMETHAMINE 30 MG: 30 INJECTION, SOLUTION INTRAMUSCULAR at 01:22

## 2019-05-22 NOTE — TELEPHONE ENCOUNTER
5/22/2019  10:06 AM      Malika @ [facility] called stating that Ms. Dana Vallejo BS are running between 301-313, please call Kj Yeh at [phone].         Thanks

## 2019-05-22 NOTE — PROGRESS NOTES
Bedside and Verbal shift change report given to Yas Neville RN (oncoming nurse) by Montserrat Juarez RN (offgoing nurse). Report included the following information SBAR, Kardex, Procedure Summary, Intake/Output, MAR, Accordion and Recent Results. 0920- pt BP elevated on assessment, medications due at this time. anesthesiologist called in regards to central line leaking     0927- pt blood glucose 313     0928 - anesthesiologist  at bedside checking central line    0930- pt gave self insulin bolus of 5.6     0946 - Repeat blood glucose check was 313. pt states \" I am not giving myself another bolus\"    0822- MD Carlos Claros notified in regards to pt. Blood glucose levels. Request to recheck on different meter and reach out to endocrinology     3921- Endocrinology called, message left with  who will reach out to [facility]. 1000- 301 blood glucose third check. Pt refuses to give self additional bolus of insulin. Pt eating breakfast and wishes to not have BP recheck taken at this time. 1135- [facility] updated with morning events; request another BS check, updated with BS level of 216. Will call in with patient this afternoon and making adjustments.      1140 - Pt off floor to NICU   1250 - Pt back to room from NICU    1320- blood glucose check was 207,  patient gave 2.7 bolus given

## 2019-05-22 NOTE — PROGRESS NOTES
Problem: Falls - Risk of  Goal: *Absence of Falls  Description  Document [facility] Fall Risk and appropriate interventions in the flowsheet.   Outcome: Progressing Towards Goal     Problem: Patient Education: Go to Patient Education Activity  Goal: Patient/Family Education  Outcome: Progressing Towards Goal

## 2019-05-22 NOTE — DIABETES MGMT
DTC Progress Note    Recommendations/ Comments: Chart review for extreme hyperglycemia - BG's > 300 mg/dL  Met with pt at bedside. She feels it is due to stress. She states he set change was a few days ago - recommended that she change her infusion set today. Dr Cristal Jules saw her 5/21/2019 at which time he increased her basal rate  Current pump settings  - Basal: 0.475  - Makenzie Meehan   - Sensitivity: 43   - Target: 100- 140. Chart reviewed on [employer]. Patient is a 30 y.o. female with known Type 1 DM. Delivered 1 wk ago at 33 wks      A1c:   Lab Results   Component Value Date/Time    Hemoglobin A1c 8.5 (H) 05/21/2019 04:20 AM    Hemoglobin A1c 11.1 (H) 08/18/2016 12:38 PM       Recent Glucose Results:   Lab Results   Component Value Date/Time     (H) 05/22/2019 06:06 AM    GLUCPOC 216 (H) 05/22/2019 11:40 AM    GLUCPOC 301 (H) 05/22/2019 10:00 AM    GLUCPOC 313 (H) 05/22/2019 09:46 AM        Lab Results   Component Value Date/Time    Creatinine 1.19 (H) 05/22/2019 06:06 AM     Estimated Creatinine Clearance: 69.1 mL/min (A) (based on SCr of 1.19 mg/dL (H)). Active Orders   Diet    DIET CARDIAC Regular        PO intake:   Patient Vitals for the past 72 hrs:   % Diet Eaten   05/22/19 1121 100 %   05/20/19 0824 90 %       Will continue to follow as needed.     Thank you  Michele Oliver RN, CDE  Pager [phone]      Time spent: 20 min

## 2019-05-22 NOTE — LACTATION NOTE
Requested to see this mom who delivered at 33 weeks and has been readmitted with blood pressure and diabetes symptoms. She is on Labetalol (L2), Lisinopril (L3) and Norvasc (L3). Meds discussed with NICU nurse practitioner and NICU pharmacist and approval for continued provision of EBM to infant received. Mom in bed with significant other when I arrived in the room. Mom did not make eye contact with me and had a very flat affect. She expressed desire to continue pumping. I reminded her of the importance of consistent pumping to maintain her milk supply and encouraged her to massage her breasts while pumping. She states she will pump as recommended, every 3 hours. She obtained 40 ml at the last pumping session.

## 2019-05-22 NOTE — PROGRESS NOTES
[facility] Consult    A/P:  Pt doing well this morning. Denies any redness or drainage from wound. Post-op pain overall improving. No fevers/chills, or other concerns. BP creeping up again this morning.     Patient Vitals for the past 24 hrs:   Temp Pulse Resp BP SpO2   05/22/19 0850 98.7 °F (37.1 °C) 93 16 (!) 171/107 99 %   05/22/19 0555 98.3 °F (36.8 °C) 92 16 154/87 97 %   05/21/19 2025 98.6 °F (37 °C) 92 16 111/71 98 %   05/21/19 1346 98.8 °F (37.1 °C) 88 16 146/88      PHYSICAL EXAMINATION  Constitutional  · Appearance: well-nourished, well developed, alert, in no acute distress    HENT  · Head and Face: appears normal    Chest  · Respiratory Effort: breathing non-labored  · Auscultation: normal breath sounds    Cardiovascular  · Heart:  · Auscultation: regular rate and rhythm without murmur    Gastrointestinal  · Abdominal Examination: abdomen soft, non-distended, appropriately tender, uterus appropriately involuted for 1 week postpartum, no rebound or guarding, normal bowel sounds, no masses present  · Incision: pfannensteil incision c/d/i, healing well, no evidence of infection (no erythema, drainage, induration/fluctuance/swelling, or significant tenderness)  · Liver and spleen: no hepatomegaly present, spleen not palpable  · Hernias: no hernias identified    Genitourinary: deferred - pt denied any increased bleeding or foul discharge    Skin  · General Inspection: no rash, no lesions identified    Neurologic/Psychiatric  · Mental Status:  · Orientation: grossly oriented to person, place and time  · Mood and Affect: mood normal, affect appropriate    Recent Results (from the past 12 hour(s))   CBC WITH AUTOMATED DIFF    Collection Time: 05/22/19  6:06 AM   Result Value Ref Range    WBC 8.9 3.6 - 11.0 K/uL    RBC 3.06 (L) 3.80 - 5.20 M/uL    HGB 8.0 (L) 11.5 - 16.0 g/dL    HCT 26.2 (L) 35.0 - 47.0 %    MCV 85.6 80.0 - 99.0 FL    MCH 26.1 26.0 - 34.0 PG    MCHC 30.5 30.0 - 36.5 g/dL    RDW 16.9 (H) 11.5 - 14.5 %    PLATELET 757 430 - 668 K/uL    MPV 10.2 8.9 - 12.9 FL    NRBC 0.0 0  WBC    ABSOLUTE NRBC 0.00 0.00 - 0.01 K/uL    NEUTROPHILS 78 (H) 32 - 75 %    LYMPHOCYTES 14 12 - 49 %    MONOCYTES 4 (L) 5 - 13 %    EOSINOPHILS 3 0 - 7 %    BASOPHILS 0 0 - 1 %    IMMATURE GRANULOCYTES 1 (H) 0.0 - 0.5 %    ABS. NEUTROPHILS 7.0 1.8 - 8.0 K/UL    ABS. LYMPHOCYTES 1.2 0.8 - 3.5 K/UL    ABS. MONOCYTES 0.4 0.0 - 1.0 K/UL    ABS. EOSINOPHILS 0.3 0.0 - 0.4 K/UL    ABS. BASOPHILS 0.0 0.0 - 0.1 K/UL    ABS. IMM.  GRANS. 0.1 (H) 0.00 - 0.04 K/UL    DF AUTOMATED     METABOLIC PANEL, BASIC    Collection Time: 05/22/19  6:06 AM   Result Value Ref Range    Sodium 138 136 - 145 mmol/L    Potassium 5.0 3.5 - 5.1 mmol/L    Chloride 111 (H) 97 - 108 mmol/L    CO2 23 21 - 32 mmol/L    Anion gap 4 (L) 5 - 15 mmol/L    Glucose 285 (H) 65 - 100 mg/dL    BUN 19 6 - 20 MG/DL    Creatinine 1.19 (H) 0.55 - 1.02 MG/DL    BUN/Creatinine ratio 16 12 - 20      GFR est AA >60 >60 ml/min/1.73m2    GFR est non-AA 53 (L) >60 ml/min/1.73m2    Calcium 7.5 (L) 8.5 - 10.1 MG/DL   TSH 3RD GENERATION    Collection Time: 05/22/19  6:06 AM   Result Value Ref Range    TSH 1.56 0.36 - 3.74 uIU/mL   GLUCOSE, POC    Collection Time: 05/22/19  9:27 AM   Result Value Ref Range    Glucose (POC) 313 (H) 65 - 100 mg/dL    Performed by Isabela Nj, POC    Collection Time: 05/22/19  9:46 AM   Result Value Ref Range    Glucose (POC) 313 (H) 65 - 100 mg/dL    Performed by Nicanor North DONNA LUIS    GLUCOSE, POC    Collection Time: 05/22/19 10:00 AM   Result Value Ref Range    Glucose (POC) 301 (H) 65 - 100 mg/dL    Performed by Tenzin Cuff    GLUCOSE, POC    Collection Time: 05/22/19 11:40 AM   Result Value Ref Range    Glucose (POC) 216 (H) 65 - 100 mg/dL    Performed by Tenzin Cuff      Assessment/Plan:   25yo POD12 s/p 1LTCS at 33wks for cat 3 tracing, in setting of CHTN with SI preE with SF (severe range BPs) and poorly controlled type 1 IDDM (on insulin pump), uncomplicated. Pt now readmitted for HA, severe range BPs, anemia requiring transfusion 1U PRBCs, poor BG control, acute on chronic renal insufficiency, and now with non-gap acidosis of unclear explanation. Consulted by primary team to r/o wound infection. Pt afebrile, without elevated WBC or any other clinical signs of wound infx or endometritis on exam at this time. Incision appears to be healing well. That being said, recommend continued close monitoring of wound as pt is at high risk for wound infx and endometritis given poorly controlled DM. As for severe hypertension in setting of superimposed pre-eclampsia, improved control at this time per primary team regimen, but if unable to control BPs adequately, consider repeat course of IV magnesium for seizure prophylaxis. Also, it may be worth alerting NICU that patient is on amlodipine and lisinopril, while these are not contraindicated with breastfeeding, there is limited safety data available. Nursing informed and will notify NICU. Would continue cymbalta due to high risk for postpartum anxiety/depression, but mood stable at this time. Pt can continue pumping for baby in NICU, no breast concerns at this time. Thank you for this consult. Please call [facility] at [phone] for any further questions or concerns.      Bernard Villarreal MD  5/22/2019  8:24 AM

## 2019-05-22 NOTE — PROGRESS NOTES
.Endocrinology Progress Note    I am at [facility] office today. Reviewed Ms Torres's chart. I spoke with nurse Nitza Burkett earlier today and then spoke with Ms Brenton Tipton this evening. Ms Brenton Tipton and I reviewed glucoses over last 24 hours. She feels glucoses are elevated due to 'user error' were her words. She admits to having some carbohydrates overnight and not accompanying these by the appropriate insulin boluses. I think her basal rate may need a small increase based on trend in glucose from 6 am to 9:30 am today, but her and I agreed to follow glucoses overnight and reassess tomorrow to confirm this need. As noted above, she felt glucoses were higher due to not bolusing for all carbs coming in rather than for a carb ratio which was not providing enough insulin    Plan  1. Diabetes:  - will be more diligent bolusing for all carbohydrates coming in  - continue monitoring  - reassess tomorrow. No pump setting changes at this time.

## 2019-05-23 LAB
ALBUMIN SERPL-MCNC: 1.8 G/DL (ref 3.5–5)
ALBUMIN/GLOB SERPL: 0.5 {RATIO} (ref 1.1–2.2)
ALP SERPL-CCNC: 90 U/L (ref 45–117)
ALT SERPL-CCNC: 12 U/L (ref 12–78)
ANION GAP SERPL CALC-SCNC: 5 MMOL/L (ref 5–15)
AST SERPL-CCNC: 14 U/L (ref 15–37)
BILIRUB SERPL-MCNC: 0.2 MG/DL (ref 0.2–1)
BUN SERPL-MCNC: 20 MG/DL (ref 6–20)
BUN/CREAT SERPL: 16 (ref 12–20)
CALCIUM SERPL-MCNC: 8.2 MG/DL (ref 8.5–10.1)
CHLORIDE SERPL-SCNC: 109 MMOL/L (ref 97–108)
CO2 SERPL-SCNC: 24 MMOL/L (ref 21–32)
CREAT SERPL-MCNC: 1.22 MG/DL (ref 0.55–1.02)
GLOBULIN SER CALC-MCNC: 3.7 G/DL (ref 2–4)
GLUCOSE BLD STRIP.AUTO-MCNC: 137 MG/DL (ref 65–100)
GLUCOSE BLD STRIP.AUTO-MCNC: 216 MG/DL (ref 65–100)
GLUCOSE BLD STRIP.AUTO-MCNC: 249 MG/DL (ref 65–100)
GLUCOSE SERPL-MCNC: 193 MG/DL (ref 65–100)
LIPASE SERPL-CCNC: 160 U/L (ref 73–393)
POTASSIUM SERPL-SCNC: 4.3 MMOL/L (ref 3.5–5.1)
PROT SERPL-MCNC: 5.5 G/DL (ref 6.4–8.2)
SERVICE CMNT-IMP: ABNORMAL
SODIUM SERPL-SCNC: 138 MMOL/L (ref 136–145)

## 2019-05-23 PROCEDURE — 74011250637 HC RX REV CODE- 250/637: Performed by: FAMILY MEDICINE

## 2019-05-23 PROCEDURE — 65410000002 HC RM PRIVATE OB

## 2019-05-23 PROCEDURE — 80053 COMPREHEN METABOLIC PANEL: CPT

## 2019-05-23 PROCEDURE — 83690 ASSAY OF LIPASE: CPT

## 2019-05-23 PROCEDURE — 82962 GLUCOSE BLOOD TEST: CPT

## 2019-05-23 PROCEDURE — 74011250636 HC RX REV CODE- 250/636: Performed by: FAMILY MEDICINE

## 2019-05-23 PROCEDURE — 74011250637 HC RX REV CODE- 250/637: Performed by: INTERNAL MEDICINE

## 2019-05-23 PROCEDURE — 74011000250 HC RX REV CODE- 250: Performed by: FAMILY MEDICINE

## 2019-05-23 PROCEDURE — 36415 COLL VENOUS BLD VENIPUNCTURE: CPT

## 2019-05-23 PROCEDURE — 74011250636 HC RX REV CODE- 250/636: Performed by: INTERNAL MEDICINE

## 2019-05-23 RX ORDER — KETOROLAC TROMETHAMINE 30 MG/ML
15 INJECTION, SOLUTION INTRAMUSCULAR; INTRAVENOUS
Status: COMPLETED | OUTPATIENT
Start: 2019-05-23 | End: 2019-05-23

## 2019-05-23 RX ORDER — HYDRALAZINE HYDROCHLORIDE 50 MG/1
50 TABLET, FILM COATED ORAL 4 TIMES DAILY
Status: DISCONTINUED | OUTPATIENT
Start: 2019-05-23 | End: 2019-05-23

## 2019-05-23 RX ORDER — HYDRALAZINE HYDROCHLORIDE 25 MG/1
25 TABLET, FILM COATED ORAL EVERY 6 HOURS
Status: DISCONTINUED | OUTPATIENT
Start: 2019-05-24 | End: 2019-05-23

## 2019-05-23 RX ORDER — HYDRALAZINE HYDROCHLORIDE 50 MG/1
50 TABLET, FILM COATED ORAL EVERY 6 HOURS
Status: DISCONTINUED | OUTPATIENT
Start: 2019-05-24 | End: 2019-05-24 | Stop reason: HOSPADM

## 2019-05-23 RX ORDER — HYDRALAZINE HYDROCHLORIDE 25 MG/1
25 TABLET, FILM COATED ORAL 4 TIMES DAILY
Status: DISCONTINUED | OUTPATIENT
Start: 2019-05-23 | End: 2019-05-23

## 2019-05-23 RX ADMIN — Medication 10 ML: at 18:16

## 2019-05-23 RX ADMIN — Medication 10 ML: at 18:01

## 2019-05-23 RX ADMIN — KETOROLAC TROMETHAMINE 15 MG: 30 INJECTION, SOLUTION INTRAMUSCULAR at 18:16

## 2019-05-23 RX ADMIN — Medication 1 TABLET: at 09:59

## 2019-05-23 RX ADMIN — DULOXETINE HYDROCHLORIDE 20 MG: 20 CAPSULE, DELAYED RELEASE ORAL at 18:14

## 2019-05-23 RX ADMIN — DULOXETINE HYDROCHLORIDE 20 MG: 20 CAPSULE, DELAYED RELEASE ORAL at 09:58

## 2019-05-23 RX ADMIN — Medication 10 ML: at 05:20

## 2019-05-23 RX ADMIN — Medication 10 ML: at 13:41

## 2019-05-23 RX ADMIN — LABETALOL HCL 300 MG: 200 TABLET, FILM COATED ORAL at 09:59

## 2019-05-23 RX ADMIN — HYDROCHLOROTHIAZIDE 25 MG: 25 TABLET ORAL at 09:59

## 2019-05-23 RX ADMIN — FAMOTIDINE 20 MG: 20 TABLET ORAL at 18:14

## 2019-05-23 RX ADMIN — LABETALOL HCL 300 MG: 200 TABLET, FILM COATED ORAL at 21:06

## 2019-05-23 RX ADMIN — KETOROLAC TROMETHAMINE 30 MG: 30 INJECTION, SOLUTION INTRAMUSCULAR at 05:19

## 2019-05-23 RX ADMIN — POLYMYXIN B SULFATE, BACITRACIN ZINC, NEOMYCIN SULFATE: 5000; 3.5; 4 OINTMENT TOPICAL at 21:10

## 2019-05-23 RX ADMIN — FAMOTIDINE 20 MG: 20 TABLET ORAL at 09:58

## 2019-05-23 RX ADMIN — HYDRALAZINE HYDROCHLORIDE 25 MG: 25 TABLET, FILM COATED ORAL at 12:19

## 2019-05-23 RX ADMIN — HYDRALAZINE HYDROCHLORIDE 25 MG: 25 TABLET, FILM COATED ORAL at 18:15

## 2019-05-23 NOTE — PROGRESS NOTES
Bedside and Verbal shift change report given to RIKI BhatRN (oncoming nurse) by Remington Crenshaw (offgoing nurse). Report included the following information SBAR, Kardex, MAR and Recent Results. [facility] [facility] NICU in [facility] for rooming inn.    1356  Dr. Ida Wang notified of patient's recent elevated BP's and that hydralazine 25 mg was given once. No new orders received at present. [address] Wang at bedside. Received order to discontinue central line.

## 2019-05-23 NOTE — PROGRESS NOTES
Bedside and Verbal shift change report given to ANDREW Ingram RN (oncoming nurse) by Keith Rubio RN (offgoing nurse). Report included the following information SBAR, Kardex, Procedure Summary, Intake/Output, MAR and Accordion.

## 2019-05-23 NOTE — PROGRESS NOTES
Problem: Falls - Risk of  Goal: *Absence of Falls  Description  Document [facility] Fall Risk and appropriate interventions in the flowsheet.   Outcome: Progressing Towards Goal     Problem: Patient Education: Go to Patient Education Activity  Goal: Patient/Family Education  Outcome: Progressing Towards Goal     Problem: Pain  Goal: *Control of Pain  Outcome: Progressing Towards Goal  Goal: *PALLIATIVE CARE:  Alleviation of Pain  Outcome: Progressing Towards Goal

## 2019-05-23 NOTE — PROGRESS NOTES
Hospitalist Progress Note  Eden Hutchins MD  Answering service: [phone] OR [phone] from in house phone         Date of Service:  2019  NAME:  Yony Rubi  :  1990  MRN:  [id number]      Admission Summary:    Patient is a 55-year-old female with past medical history of anemia, chronic pain, depression, type 1 diabetes mellitus, essential hypertension, heart murmur, herpes simplex, peripheral neuropathy, ventricular tachycardia, preeclampsia, peripheral leg edema, presented to the emergency department from home, accompanied by her father, with chief complaint of headache. The patient notes onset of symptoms starting yesterday, which had been 10/10 severe, aggravated with light/ noise (with noted photophobia, phonophobia), associated nausea, and without relief after use of Percocet. She newly has been hospitalized from 2019 to 2019 with hypoglycemia, preeclampsia, uncontrolled hypertension, until a subsequent delivery recent with requiring a low transverse  section. The patient is now postpartum 1 week.      Interval history / Subjective:       2019 :  Pt's bs's elevated today  No complaints today- mildly elevated BP, appreciate OB eval      Assessment & Plan:     1) essential htn - poorly controlled on admission- BP still elevated- stopped IVFLuids and switched to HCTZ with PRN hydralazine    2) Dm 1 on insulin pump and ssi  With pt trying to carb count and dose meal time insulin,  Endocrine saw patient yesterday- elevated glucose levels today    3) Low bicarb -resolved- likely due to poor PO intake- normal lactic acid levels, no evidence of infection - monitor     4) Iron deficiency anemia, for iron sucross, did have transfusion     5) nausea- added phenergan    Code status: full   DVT prophylaxis: [address] discussed with: Patient/Family and Nurse  Disposition: Home w/Family and TBD     Hospital Problems  Date Reviewed: 5/2/2019          Codes Class Noted POA    Anemia ICD-10-CM: D64.9  ICD-9-CM: 285.9  5/18/2019 Unknown        Hypertensive urgency ICD-10-CM: I16.0  ICD-9-CM: 401.9  5/18/2019 Unknown                Review of Systems:    feels excessively sleepy, slight sweaty. No cp no sob, some on going pelvic/abd pain, fair appetite however. No n/v this am     Vital Signs:    Last 24hrs VS reviewed since prior progress note. Most recent are:  Visit Vitals  /90   Pulse 92   Temp 98.3 °F (36.8 °C)   Resp 16   Ht 5' 8\" (1.727 m)   Wt 68.8 kg (151 lb 9.6 oz)   SpO2 99%   Breastfeeding? Yes   BMI 23.05 kg/m²         Intake/Output Summary (Last 24 hours) at 5/22/2019 2321  Last data filed at 5/22/2019 0915  Gross per 24 hour   Intake 120 ml   Output    Net 120 ml        Physical Examination:             Constitutional:  No acute distress, cooperative, pleasant    ENT:  Oral mucous moist, oropharynx benign. Neck supple,    Resp:  CTA bilaterally. No wheezing/rhonchi/rales. No accessory muscle use   CV:  Regular rhythm, normal rate, no murmurs, gallops, rubs    GI:  Soft, non distended, non tender. normoactive bowel sounds, no hepatosplenomegaly     Musculoskeletal:  No edema, warm, 2+ pulses throughout    Neurologic:  Moves all extremities. AAOx3, CN II-XII reviewed     Psych:  Good insight, Not anxious nor agitated.   Skin:  Good turgor, no rashes or ulcers       Data Review:    Review and/or order of clinical lab test      Labs:     Recent Labs     05/22/19  0606 05/21/19  0420   WBC 8.9 9.3   HGB 8.0* 7.2*   HCT 26.2* 23.4*    299     Recent Labs     05/22/19  0606 05/21/19  0420 05/20/19  1538 05/20/19  0352    139 140 133*   K 5.0 4.5 3.9 4.4   * 110* 110* 112*   CO2 23 22 22 15*   BUN 19 16 17 17   CREA 1.19* 1.11* 1.16* 1.26*   * 175* 47* 237*   CA 7.5* 7.3* 7.5* 7.5*   MG  --  2.1  --  1.4*     No results for input(s): SGOT, GPT, ALT, AP, TBIL, TBILI, TP, ALB, GLOB, GGT, AML, LPSE in the last 72 hours. No lab exists for component: AMYP, HLPSE  No results for input(s): INR, PTP, APTT in the last 72 hours. No lab exists for component: INREXT, INREXT   No results for input(s): FE, TIBC, PSAT, FERR in the last 72 hours. Lab Results   Component Value Date/Time    Folate 26.9 (H) 09/03/2012 05:45 AM      No results for input(s): PH, PCO2, PO2 in the last 72 hours. No results for input(s): CPK, CKNDX, TROIQ in the last 72 hours.     No lab exists for component: CPKMB  Lab Results   Component Value Date/Time    Cholesterol, total 160 05/15/2016 12:09 AM    HDL Cholesterol 41 05/15/2016 12:09 AM    LDL, calculated 98 05/15/2016 12:09 AM    Triglyceride 105 05/15/2016 12:09 AM    CHOL/HDL Ratio 3.9 05/15/2016 12:09 AM     Lab Results   Component Value Date/Time    Glucose (POC) 124 (H) 05/22/2019 08:35 PM    Glucose (POC) 207 (H) 05/22/2019 03:17 PM    Glucose (POC) 216 (H) 05/22/2019 11:40 AM    Glucose (POC) 301 (H) 05/22/2019 10:00 AM    Glucose (POC) 313 (H) 05/22/2019 09:46 AM     Lab Results   Component Value Date/Time    Color YELLOW/STRAW 05/18/2019 03:25 AM    Appearance CLOUDY (A) 05/18/2019 03:25 AM    Specific gravity 1.015 05/18/2019 03:25 AM    Specific gravity 1.010 04/07/2019 07:07 PM    pH (UA) 7.5 05/18/2019 03:25 AM    Protein 300 (A) 05/18/2019 03:25 AM    Glucose 250 (A) 05/18/2019 03:25 AM    Ketone 15 (A) 05/18/2019 03:25 AM    Bilirubin NEGATIVE  05/18/2019 03:25 AM    Urobilinogen 0.2 05/18/2019 03:25 AM    Nitrites NEGATIVE  05/18/2019 03:25 AM    Leukocyte Esterase SMALL (A) 05/18/2019 03:25 AM    Epithelial cells FEW 04/07/2019 07:07 PM    Bacteria NEGATIVE  04/07/2019 07:07 PM    WBC 5-10 04/07/2019 07:07 PM    RBC 0-5 04/07/2019 07:07 PM         Medications Reviewed:     Current Facility-Administered Medications   Medication Dose Route Frequency    [START ON 5/23/2019] hydroCHLOROthiazide (HYDRODIURIL) tablet 25 mg  25 mg Oral DAILY    hydrALAZINE (APRESOLINE) tablet 25 mg  25 mg Oral TID PRN    promethazine (PHENERGAN) tablet 12.5 mg  12.5 mg Oral Q4H PRN    insulin pump (PATIENT SUPPLIED)   SubCUTAneous CONTINUOUS    famotidine (PEPCID) tablet 20 mg  20 mg Oral BID    insulin pump (PATIENT SUPPLIED)   SubCUTAneous PRN    glucose chewable tablet 16 g  4 Tab Oral PRN    dextrose (D50W) injection syrg 12.5-25 g  12.5-25 g IntraVENous PRN    glucagon (GLUCAGEN) injection 1 mg  1 mg IntraMUSCular PRN    0.9% sodium chloride infusion 250 mL  250 mL IntraVENous PRN    DULoxetine (CYMBALTA) capsule 20 mg  20 mg Oral BID    labetalol (NORMODYNE) tablet 300 mg  300 mg Oral Q12H    oxyCODONE-acetaminophen (PERCOCET) 5-325 mg per tablet 1 Tab  1 Tab Oral Q6H PRN    prenatal vit-iron fumarate-fa (PRENATAL PLUS with IRON) tablet 1 Tab  1 Tab Oral DAILY    sodium chloride (NS) flush 5-40 mL  5-40 mL IntraVENous Q8H    sodium chloride (NS) flush 5-40 mL  5-40 mL IntraVENous PRN    ketorolac (TORADOL) injection 30 mg  30 mg IntraVENous Q6H PRN    acetaminophen (TYLENOL) tablet 650 mg  650 mg Oral Q4H PRN     ______________________________________________________________________  EXPECTED LENGTH OF STAY: 5d 7h  ACTUAL LENGTH OF STAY:          4                 Eryn Salmeron MD

## 2019-05-23 NOTE — DIABETES MGMT
DTC Progress Note    Recommendations/ Comments: Chart review for extreme hyperglycemia - BG's > 300 mg/dL yesterday. Improved today but most results remain > 200 mg/dL  She told Dr Marga Bello yesterday that she ate carbs and did not bolus. She also felt that stress was raising her BG's    Endocrinologist, Dr Marga Bello follows the patient daily to review BG's and make adjustments  No setting changes were made on 5/22/2019  Pt instructed to carb count more vigilantly and bolus corerctly    Current pump settings  - Basal: 0.475  - Castillo Offer   - Sensitivity: 43   - Target: 100- 140. Chart reviewed on [employer]. Patient is a 30 y.o. female with known Type 1 DM. Delivered 1 wk ago at 33 wks      A1c:   Lab Results   Component Value Date/Time    Hemoglobin A1c 8.5 (H) 05/21/2019 04:20 AM    Hemoglobin A1c 11.1 (H) 08/18/2016 12:38 PM       Recent Glucose Results:   Lab Results   Component Value Date/Time     (H) 05/23/2019 10:03 AM    GLUCPOC 249 (H) 05/23/2019 12:09 PM    GLUCPOC 216 (H) 05/23/2019 08:56 AM    GLUCPOC 124 (H) 05/22/2019 08:35 PM        Lab Results   Component Value Date/Time    Creatinine 1.22 (H) 05/23/2019 10:03 AM     Estimated Creatinine Clearance: 67.4 mL/min (A) (based on SCr of 1.22 mg/dL (H)). Active Orders   Diet    DIET CARDIAC Regular        PO intake:   Patient Vitals for the past 72 hrs:   % Diet Eaten   05/22/19 1121 100 %       Will continue to follow as needed.     Thank you  Dominguez Still RN, CDE  Pager [phone]      Time spent: 4  min

## 2019-05-23 NOTE — PROGRESS NOTES
2001 Bedside and Verbal shift change report given to Bhumi Moss, RN (oncoming nurse) by Earl Fernandez RN (offgoing nurse). Report included the following information SBAR, Intake/Output, MAR and Accordion. 2019 In pt's room for assessment, reminded her to call me before eating dinner. Pt states she is starting to feel hungry. 2035  - within limits, no insulin given. 2200 CVAD dressing changed due to new, clear drainage. 0110 Pt called out to request feminine pads. She has new vaginal bleeding. Education provided on monitoring to quantify the drainage for reporting to the MD.    7094 Pt called out for second dose of Toradol. 0523 BP has been taken with Regular, Adult size cuff during the entire hospital stay. Current BP: 141/92, Left Arm.    0534 Brought in Small, Adult BP cuff and retook BP. Current BP: 124/84, Left Arm. Will use small BP cuff from now on.

## 2019-05-23 NOTE — PROGRESS NOTES
Problem: Falls - Risk of  Goal: *Absence of Falls  Description  Document Bolivar Thompson Fall Risk and appropriate interventions in the flowsheet.   5/23/2019 0126 by Kaylyn Rodriguez RN  Outcome: Progressing Towards Goal  5/23/2019 0117 by Kaylyn Rodriguez RN  Outcome: Progressing Towards Goal     Problem: Patient Education: Go to Patient Education Activity  Goal: Patient/Family Education  5/23/2019 0126 by Kaylyn Rodriguez, RN  Outcome: Progressing Towards Goal  5/23/2019 0117 by Kaylyn Rodriguez RN  Outcome: Progressing Towards Goal     Problem: Pain  Goal: *Control of Pain  5/23/2019 0126 by Kaylyn Rodriguez RN  Outcome: Progressing Towards Goal  5/23/2019 0117 by Kaylyn Rodriguez RN  Outcome: Progressing Towards Goal  Goal: *PALLIATIVE CARE:  Alleviation of Pain  5/23/2019 0126 by Kaylyn Rodriguez RN  Outcome: Progressing Towards Goal  5/23/2019 0117 by Kaylyn Rodriguez RN  Outcome: Progressing Towards Goal

## 2019-05-23 NOTE — CONSULTS
Wound still appears to be healing well. No evidence of infection or endometritis. Signing off on this patient at this time, but please feel free to contact if any concern for wound infection develops. Will plan to see pt in office one week after hospital discharge. Our office contact number is [phone].      Terell Strong MD  5/23/2019  8:16 AM

## 2019-05-24 VITALS
OXYGEN SATURATION: 99 % | WEIGHT: 151.6 LBS | RESPIRATION RATE: 16 BRPM | HEIGHT: 68 IN | DIASTOLIC BLOOD PRESSURE: 79 MMHG | BODY MASS INDEX: 22.97 KG/M2 | HEART RATE: 91 BPM | SYSTOLIC BLOOD PRESSURE: 118 MMHG | TEMPERATURE: 98.3 F

## 2019-05-24 PROBLEM — R80.9 PROTEINURIA: Status: ACTIVE | Noted: 2019-05-24

## 2019-05-24 PROBLEM — N18.2 CKD (CHRONIC KIDNEY DISEASE) STAGE 2, GFR 60-89 ML/MIN: Status: ACTIVE | Noted: 2019-05-24

## 2019-05-24 LAB
GLUCOSE BLD STRIP.AUTO-MCNC: 213 MG/DL (ref 65–100)
GLUCOSE BLD STRIP.AUTO-MCNC: 301 MG/DL (ref 65–100)
SERVICE CMNT-IMP: ABNORMAL
SERVICE CMNT-IMP: ABNORMAL

## 2019-05-24 PROCEDURE — 74011250637 HC RX REV CODE- 250/637: Performed by: INTERNAL MEDICINE

## 2019-05-24 PROCEDURE — 74011250637 HC RX REV CODE- 250/637: Performed by: FAMILY MEDICINE

## 2019-05-24 PROCEDURE — 82962 GLUCOSE BLOOD TEST: CPT

## 2019-05-24 RX ORDER — LANOLIN ALCOHOL/MO/W.PET/CERES
325 CREAM (GRAM) TOPICAL
Qty: 30 TAB | Refills: 2 | Status: SHIPPED | OUTPATIENT
Start: 2019-05-24

## 2019-05-24 RX ORDER — HYDROCHLOROTHIAZIDE 25 MG/1
25 TABLET ORAL DAILY
Qty: 30 TAB | Refills: 2 | Status: SHIPPED | OUTPATIENT
Start: 2019-05-25

## 2019-05-24 RX ORDER — HYDRALAZINE HYDROCHLORIDE 50 MG/1
50 TABLET, FILM COATED ORAL 3 TIMES DAILY
Qty: 90 TAB | Refills: 3 | Status: SHIPPED | OUTPATIENT
Start: 2019-05-24 | End: 2019-05-28

## 2019-05-24 RX ORDER — PROMETHAZINE HYDROCHLORIDE 12.5 MG/1
12.5 TABLET ORAL
Qty: 30 TAB | Refills: 0 | Status: SHIPPED | OUTPATIENT
Start: 2019-05-24 | End: 2019-10-30

## 2019-05-24 RX ORDER — FAMOTIDINE 20 MG/1
20 TABLET, FILM COATED ORAL
Qty: 30 TAB | Refills: 0 | Status: SHIPPED | OUTPATIENT
Start: 2019-05-24 | End: 2019-10-30 | Stop reason: DRUGHIGH

## 2019-05-24 RX ADMIN — HYDRALAZINE HYDROCHLORIDE 50 MG: 50 TABLET, FILM COATED ORAL at 00:23

## 2019-05-24 RX ADMIN — Medication 1 TABLET: at 09:09

## 2019-05-24 RX ADMIN — HYDROCHLOROTHIAZIDE 25 MG: 25 TABLET ORAL at 09:17

## 2019-05-24 RX ADMIN — LABETALOL HCL 300 MG: 200 TABLET, FILM COATED ORAL at 09:17

## 2019-05-24 RX ADMIN — FAMOTIDINE 20 MG: 20 TABLET ORAL at 09:09

## 2019-05-24 RX ADMIN — HYDRALAZINE HYDROCHLORIDE 50 MG: 50 TABLET, FILM COATED ORAL at 12:45

## 2019-05-24 RX ADMIN — HYDRALAZINE HYDROCHLORIDE 50 MG: 50 TABLET, FILM COATED ORAL at 06:11

## 2019-05-24 RX ADMIN — DULOXETINE HYDROCHLORIDE 20 MG: 20 CAPSULE, DELAYED RELEASE ORAL at 09:17

## 2019-05-24 NOTE — PROGRESS NOTES
Bedside shift change report given to QUANG Mon RN (oncoming nurse) by Yassine Gonzalez RN (offgoing nurse). Report included the following information SBAR, Kardex, Procedure Summary, Intake/Output and MAR.

## 2019-05-24 NOTE — PROGRESS NOTES
Patient listed as not having a primary care physician. Hospital follow-up PCP transitional care appointment has been scheduled with Dr. Janey Goodpasture for Friday, 5/31/19 at 1:15 p.m. Pending patient discharge.   Bear Jim, Care Management Specialist.

## 2019-05-24 NOTE — PROGRESS NOTES
Bedside and Verbal shift change report given to RIKI Bhat RN (oncoming nurse) by Corey Oconnell (offgoing nurse). Report included the following information SBAR, Kardex, MAR and Recent Results. 0912   /98, L arm, sitting in bed. Patient states she has a slight HA, R temple area. Refusing tylenol at this time - has ordered breakfast.  Labetalol and hydrochlorothiazide given at 0917. Patient wants to go home today but states she is anxious and concerned about her blood pressures. 1300  Discharge instructions given and reviewed with patient. All questions answered. RXs given. Family here to take patient and her daughter home. Waiting for discharge instructions from NICU for infant.

## 2019-05-24 NOTE — DISCHARGE SUMMARY
Discharge Summary       PATIENT ID: Stella Wong  MRN: [id number]   YOB: 1990    DATE OF ADMISSION: 2019 12:12 AM    DATE OF DISCHARGE: 19 11:00am  PRIMARY CARE PROVIDER: None     ATTENDING PHYSICIAN: ROSSANA  DISCHARGING PROVIDER: All Wang MD    To contact this individual call [phone] and ask the  to page. If unavailable ask to be transferred the Adult Hospitalist Department. CONSULTATIONS: IP CONSULT TO OB GYN  IP CONSULT TO OB GYN  IP CONSULT TO ENDOCRINOLOGY    PROCEDURES/SURGERIES: Procedure(s):   INFUSION CATHETER INSERTION    ADMITTING [facility] COURSE:   Patient is a 38-year-old female with past medical history of anemia, chronic pain, depression, type 1 diabetes mellitus, essential hypertension, heart murmur, herpes simplex, peripheral neuropathy, ventricular tachycardia, preeclampsia, peripheral leg edema, presented to the emergency department from home, accompanied by her father, with chief complaint of headache.  The patient notes onset of symptoms starting yesterday, which had been 10/10 severe, aggravated with light/ noise (with noted photophobia, phonophobia), associated nausea, and without relief after use of Percocet.  She newly has been hospitalized from 2019 to 2019 with hypoglycemia, preeclampsia, uncontrolled hypertension, until a subsequent delivery recent with requiring a low transverse  section.  The patient is now postpartum 1 week    During the patient's hospital stay, the patient was evaluated/managed/monitored and/or treated for:      DISCHARGE DIAGNOSES / PLAN:       1) essential htn - poorly controlled on admission- BP still elevated- cont HCTZ and schedule hydralazine tid     2) Dm 1 on insulin pump and ssi  With pt trying to carb count and dose meal time insulin,  Endocrine saw patient yesterday- mildly elevated glucose levels today     3) Low bicarb -resolved- likely due to poor PO intake- normal lactic acid levels, no evidence of infection - monitor      4) Iron deficiency anemia,  did have transfusion 5/18, po iron on DC    5) nausea- prn phenergan     ADDITIONAL CARE RECOMMENDATIONS: follow up with neprology    PENDING TEST RESULTS:   At the time of discharge the following test results are still pending: none    FOLLOW UP APPOINTMENTS:    Follow-up Information     Follow up With Specialties Details Why Contact Info    Narciso Pettit MD Internal Medicine On 5/31/2019 Hospital f/u new PCP appointment Friday, 5/31/19 @ 1:15 p.m. Please arrive 15 minutes early with photo ID, insurance card and a listing of all medications. [id number] [address]  [phone]      None    None (139) Patient stated that they have no PCP      Fauzia Nuñez MD Nephrology Schedule an appointment as soon as possible for a visit in 1 week for eval of protein in urine, kidney function while on diuretics, blood pressure management [address] [phone]               DIET: Diabetic Diet      ACTIVITY: Activity as tolerated    WOUND CARE: to central line site left chest    EQUIPMENT needed: baby car seat      DISCHARGE MEDICATIONS:  Current Discharge Medication List      START taking these medications    Details   famotidine (PEPCID) 20 mg tablet Take 1 Tab by mouth two (2) times daily as needed (acid reflux). Indications: gastroesophageal reflux disease  Qty: 30 Tab, Refills: 0      hydrALAZINE (APRESOLINE) 50 mg tablet Take 1 Tab by mouth three (3) times daily. Qty: 90 Tab, Refills: 3      hydroCHLOROthiazide (HYDRODIURIL) 25 mg tablet Take 1 Tab by mouth daily. Qty: 30 Tab, Refills: 2      promethazine (PHENERGAN) 12.5 mg tablet Take 1 Tab by mouth every four (4) hours as needed for Nausea.   Qty: 30 Tab, Refills: 0         CONTINUE these medications which have NOT CHANGED    Details   labetalol (NORMODYNE) 300 mg tablet Take 1 Tab by mouth every twelve (12) hours for 30 days. Qty: 60 Tab, Refills: 0      DULoxetine (CYMBALTA) 20 mg capsule Take 1 Cap by mouth two (2) times a day for 30 days. Qty: 60 Cap, Refills: 2      glucose blood VI test strips (CONTOUR NEXT TEST STRIPS) strip Check blood sugar 7-10 times per day, mini med contour next link blood glucose meter      HUMALOG KWIKPEN INSULIN 100 unit/mL kwikpen       glucagon (GLUCAGON EMERGENCY KIT, HUMAN,) 1 mg injection Glucagon emergency kit, IM injection  Indications: type 1 diabetes, pregnancy      pnv w/o calcium-iron fum-fa (COMPLETENATE) 29 mg iron- 1 mg chew Take 1 Tab by mouth. STOP taking these medications       oxyCODONE-acetaminophen (PERCOCET) 5-325 mg per tablet Comments:   Reason for Stopping:             + will also add rx for ferrous sulfate on DC 325mg po daily    NOTIFY YOUR PHYSICIAN FOR ANY OF THE FOLLOWING:   Fever over 101 degrees for 24 hours. Chest pain, shortness of breath, fever, chills, nausea, vomiting, diarrhea, change in mentation, falling, weakness, bleeding. Severe pain or pain not relieved by medications. Or, any other signs or symptoms that you may have questions about. DISPOSITION:    Home With:   OT  PT  HH  RN       Long term SNF/Inpatient Rehab   X Independent    Hospice    Other:       PATIENT CONDITION AT DISCHARGE:     Functional status    Poor     Deconditioned    X Independent      Cognition   X  Lucid     Forgetful     Dementia      Catheters/lines (plus indication)    [place]     PICC     PEG    X None      Code status   X  Full code     DNR      PHYSICAL EXAMINATION AT DISCHARGE:                                              Constitutional:  No acute distress, cooperative, pleasant    ENT:  Neck without masses    Resp:   No accessory muscle use   CV: RRR    GI:  non distended,    Musculoskeletal:  No edema,    Neurologic:  Moves all extremities. AAOx3,                          Psych:  Good insight, Not anxious nor agitated.     CHRONIC MEDICAL DIAGNOSES:  Patient Active Problem List   Diagnosis Code    Abnormal LFTs R94.5    Acute renal failure (HCC) N17.9    SIRS (systemic inflammatory response syndrome) (HCC) R65.10    Ventricular tachycardia (HCC) I47.2    SVT (supraventricular tachycardia) (HCC) I47.1    UTI (urinary tract infection) N39.0    DM type 1 (diabetes mellitus, type 1) (HCC) E10.9    Chronic abdominal pain R10.9, G89.29    Nausea & vomiting R11.2    Viral syndrome B34.9    Depression F32.9    Diabetic peripheral neuropathy associated with type 1 diabetes mellitus (HCC) E10.42    DKA, type 1 (HCC) E10.10    GIB (gastrointestinal bleeding) K92.2    Nausea R11.0    Neuropathy G62.9    Hypertension I10    Hypokalemia E87.6    Hypophosphatemia E83.39    DKA, type 1, not at goal ([facility] [address] E10.10    Leukocytosis D72.829    Epigastric abdominal pain R10.13    LLQ abdominal pain R10.32    Diarrhea R19.7    Weight loss R63.4    Esophagitis, erosive K22.10    Nausea and vomiting R11.2    Sepsis (HCC) A41.9    DKA (diabetic ketoacidoses) (HCC) E13.10    Pre-eclampsia superimposed on chronic hypertension, antepartum O11.9, O10.019    Chronic hypertension with superimposed preeclampsia O11.9    Chronic renal disease N18.9    Pregnant Z34.90    Pregnant and not yet delivered in third trimester Z34.93    Anemia D64.9    Hypertensive urgency I16.0    CKD (chronic kidney disease) stage 2, GFR 60-89 ml/min N18.2    Proteinuria R80.9       Greater than 35 minutes were spent with the patient on counseling and coordination of care    Signed:   Chandler Snyder MD  5/24/2019  10:50 AM

## 2019-05-24 NOTE — DISCHARGE INSTRUCTIONS
Patient Education   Patient Education   Patient Education        Iron Deficiency Anemia: Care Instructions  Your Care Instructions    Anemia means that you do not have enough red blood cells. Red blood cells carry oxygen around your body. When you have anemia, it can make you pale, weak, and tired. Many things can cause anemia. The most common cause is loss of blood. This can happen if you have heavy menstrual periods. It can also happen if you have bleeding in your stomach or bowel. You can also get anemia if you don't have enough iron in your diet or if it's hard for your body to absorb iron. In some cases, pregnancy causes anemia. That's because a pregnant woman needs more iron. Your doctor may do more tests to find the cause of your anemia. If a disease or other health problem is causing it, your doctor will treat that problem. It's important to follow up with your doctor to make sure that your iron level returns to normal.  Follow-up care is a key part of your treatment and safety. Be sure to make and go to all appointments, and call your doctor if you are having problems. It's also a good idea to know your test results and keep a list of the medicines you take. How can you care for yourself at home? · If your doctor recommended iron pills, take them as directed. ? Try to take the pills on an empty stomach. You can do this about 1 hour before or 2 hours after meals. But you may need to take iron with food to avoid an upset stomach. ? Do not take antacids or drink milk or anything with caffeine within 2 hours of when you take your iron. They can keep your body from absorbing the iron well. ? Vitamin C helps your body absorb iron. You may want to take iron pills with a glass of orange juice or some other food high in vitamin C.  ? Iron pills may cause stomach problems. These include heartburn, nausea, diarrhea, constipation, and cramps.  It can help to drink plenty of fluids and include fruits, vegetables, and fiber in your diet. ? It's normal for iron pills to make your stool a greenish or grayish black. But internal bleeding can also cause dark stool. So it's important to tell your doctor about any color changes. ? Call your doctor if you think you are having a problem with your iron pills. Even after you start to feel better, it will take several months for your body to build up its supply of iron. ? If you miss a pill, don't take a double dose. ? Keep iron pills out of the reach of small children. Too much iron can be very dangerous. · Eat foods with a lot of iron. These include red meat, shellfish, poultry, and eggs. They also include beans, raisins, whole-grain bread, and leafy green vegetables. · Steam your vegetables. This is the best way to prepare them if you want to get as much iron as possible. · Be safe with medicines. Do not take nonsteroidal anti-inflammatory pain relievers unless your doctor tells you to. These include aspirin, naproxen (Aleve), and ibuprofen (Advil, Motrin). · Liquid iron can stain your teeth. But you can mix it with water or juice and drink it with a straw. Then it won't get on your teeth. When should you call for help? Call 911 anytime you think you may need emergency care. For example, call if:    · You passed out (lost consciousness).    Call your doctor now or seek immediate medical care if:    · You are short of breath.     · You are dizzy or light-headed, or you feel like you may faint.     · You have new or worse bleeding.    Watch closely for changes in your health, and be sure to contact your doctor if:    · You feel weaker or more tired than usual.     · You do not get better as expected. Where can you learn more? Go to http://elvis-bernarda.info/. Enter A681 in the search box to learn more about \"Iron Deficiency Anemia: Care Instructions. \"  Current as of: May 6, 2018  Content Version: 11.9  © 2854-2077 [employer]. Care instructions adapted under license by [employer] (which disclaims liability or warranty for this information). If you have questions about a medical condition or this instruction, always ask your healthcare professional. [address] any warranty or liability for your use of this information. Nutrition Tips for Diabetes: After Your Visit  Your Care Instructions  A healthy diet is important to manage diabetes. It helps you lose weight (if you need to) and keep it off. It gives you the nutrition and energy your body needs and helps prevent heart disease. But a diet for diabetes does not mean that you have to eat special foods. You can eat what your family eats, including occasional sweets and other favorites. But you do have to pay attention to how often you eat and how much you eat of certain foods. The right plan for you will give you meals that help you keep your blood sugar at healthy levels. Try to eat a variety of foods and to spread carbohydrate throughout the day. Carbohydrate raises blood sugar higher and more quickly than any other nutrient does. Carbohydrate is found in sugar, breads and cereals, fruit, starchy vegetables such as potatoes and corn, and milk and yogurt. You may want to work with a dietitian or diabetes educator to help you plan meals and snacks. A dietitian or diabetes educator also can help you lose weight if that is one of your goals. The following tips can help you enjoy your meals and stay healthy. Follow-up care is a key part of your treatment and safety. Be sure to make and go to all appointments, and call your doctor if you are having problems. Its also a good idea to know your test results and keep a list of the medicines you take. How can you care for yourself at home? · Learn which foods have carbohydrate and how much carbohydrate to eat.  A dietitian or diabetes educator can help you learn to keep track of how much carbohydrate you eat. · Spread carbohydrate throughout the day. Eat some carbohydrate at all meals, but do not eat too much at any one time. · Plan meals to include food from all the food groups. These are the food groups and some example portion sizes:  ¨ Grains: 1 slice of bread (1 ounce), ½ cup of cooked cereal, and 1/3 cup of cooked pasta or rice. These have about 15 grams of carbohydrate in a serving. Choose whole grains such as whole wheat bread or crackers, oatmeal, and brown rice more often than refined grains. ¨ Fruit: 1 small fresh fruit, such as an apple or orange; ½ of a banana; ½ cup of chopped, cooked, or canned fruit; ½ cup of fruit juice; 1 cup of melon or raspberries; and 2 tablespoons of dried fruit. These have about 15 grams of carbohydrate in a serving. ¨ Dairy: 1 cup of nonfat or low-fat milk and 2/3 cup of plain yogurt. These have about 15 grams of carbohydrate in a serving. ¨ Protein foods: Beef, chicken, turkey, fish, eggs, tofu, cheese, cottage cheese, and peanut butter. A serving size of meat is 3 ounces, which is about the size of a deck of cards. Examples of meat substitute serving sizes (equal to 1 ounce of meat) are 1/4 cup of cottage cheese, 1 egg, 1 tablespoon of peanut butter, and ½ cup of tofu. These have very little or no carbohydrate per serving. ¨ Vegetables: Starchy vegetables such as ½ cup of cooked dried beans, peas, potatoes, or corn have about 15 grams of carbohydrate. Nonstarchy vegetables have very little carbohydrate, such as 1 cup of raw leafy vegetables (such as spinach), ½ cup of other vegetables (cooked or chopped), and 3/4 cup of vegetable juice. · Use the plate format to plan meals. It is a good, quick way to make sure that you have a balanced meal. It also helps you spread carbohydrate throughout the day. You divide your plate by types of foods.  Put vegetables on half the plate, meat or meat substitutes on one-quarter of the plate, and a grain or starchy vegetable (such as brown rice or a potato) in the final quarter of the plate. To this you can add a small piece of fruit and 1 cup of milk or yogurt, depending on how much carbohydrate you are supposed to eat at a meal.  · Talk to your dietitian or diabetes educator about ways to add limited amounts of sweets into your meal plan. You can eat these foods now and then, as long as you include the amount of carbohydrate they have in your daily carbohydrate allowance. · If you drink alcohol, limit it to no more than 1 drink a day for women and 2 drinks a day for men. If you are pregnant, no amount of alcohol is known to be safe. · Protein, fat, and fiber do not raise blood sugar as much as carbohydrate does. If you eat a lot of these nutrients in a meal, your blood sugar will rise more slowly than it would otherwise. · Limit saturated fats, such as those from meat and dairy products. Try to replace it with monounsaturated fat, such as olive oil. This is a healthier choice because people who have diabetes are at higher-than-average risk of heart disease. But use a modest amount of olive oil. A tablespoon of olive oil has 14 grams of fat and 120 calories. · Exercise lowers blood sugar. If you take insulin by shots or pump, you can use less than you would if you were not exercising. Keep in mind that timing matters. If you exercise within 1 hour after a meal, your body may need less insulin for that meal than it would if you exercised 3 hours after the meal. Test your blood sugar to find out how exercise affects your need for insulin. · Exercise on most days of the week. Aim for at least 30 minutes. Exercise helps you stay at a healthy weight and helps your body use insulin. Walking is an easy way to get exercise. Gradually increase the amount you walk every day. You also may want to swim, bike, or do other activities. When you eat out  · Learn to estimate the serving sizes of foods that have carbohydrate.  If you measure food at home, it will be easier to estimate the amount in a serving of restaurant food. · If the meal you order has too much carbohydrate (such as potatoes, corn, or baked beans), ask to have a low-carbohydrate food instead. Ask for a salad or green vegetables. · If you use insulin, check your blood sugar before and after eating out to help you plan how much to eat in the future. · If you eat more carbohydrate at a meal than you had planned, take a walk or do other exercise. This will help lower your blood sugar. Where can you learn more? Go to [employer]  Enter L584 in the search box to learn more about \"Nutrition Tips for Diabetes: After Your Visit. \"   © 5568-0023 [employer]. Care instructions adapted under license by [facility] [employer] (which disclaims liability or warranty for this information). This care instruction is for use with your licensed healthcare professional. If you have questions about a medical condition or this instruction, always ask your healthcare professional. [place] [address] any warranty or liability for your use of this information. Content Version: 06.3.687531; Current as of: June 4, 2014                    Acute High Blood Pressure: Care Instructions  Your Care Instructions    Acute high blood pressure is very high blood pressure. It's a serious problem. Very high blood pressure can damage your brain, heart, eyes, and kidneys. You may have been given medicines to lower your blood pressure. You may have gotten them as pills or through a needle in one of your veins. This is called an IV. And maybe you were given other medicines too. These can be needed when high blood pressure causes other problems. To keep your blood pressure at a lower level, you may need to make healthy lifestyle changes. And you will probably need to take medicines.   Be sure to follow up with your doctor about your blood pressure and what you can do about it.  Follow-up care is a key part of your treatment and safety. Be sure to make and go to all appointments, and call your doctor if you are having problems. It's also a good idea to know your test results and keep a list of the medicines you take. How can you care for yourself at home? · See your doctor as often as he or she recommends. This is to make sure your blood pressure is under control. You will probably go at least 2 times a year. But it may be more often at first.  · Take your blood pressure medicine exactly as prescribed. You may take one or more types. They include diuretics, beta-blockers, ACE inhibitors, calcium channel blockers, and angiotensin II receptor blockers. Call your doctor if you think you are having a problem with your medicine. · If you take blood pressure medicine, talk to your doctor before you take decongestants or anti-inflammatory medicine, such as ibuprofen. These can raise blood pressure. · Learn how to check your blood pressure at home. Check it often. · Ask your doctor if it's okay to drink alcohol. · Talk to your doctor about lifestyle changes that can help blood pressure. These include being active and not smoking. When should you call for help? Call 911 anytime you think you may need emergency care. This may mean having symptoms that suggest that your blood pressure is causing a serious heart or blood vessel problem. Your blood pressure may be over 180/120.   For example, call 911 if:    · You have symptoms of a heart attack. These may include:  ? Chest pain or pressure, or a strange feeling in the chest.  ? Sweating. ? Shortness of breath. ? Nausea or vomiting. ? Pain, pressure, or a strange feeling in the back, neck, jaw, or upper belly or in one or both shoulders or arms. ? Lightheadedness or sudden weakness. ? A fast or irregular heartbeat.     · You have symptoms of a stroke.  These may include:  ? Sudden numbness, tingling, weakness, or loss of movement in your face, arm, or leg, especially on only one side of your body. ? Sudden vision changes. ? Sudden trouble speaking. ? Sudden confusion or trouble understanding simple statements. ? Sudden problems with walking or balance. ? A sudden, severe headache that is different from past headaches.     · You have severe back or belly pain.    Do not wait until your blood pressure comes down on its own. Get help right away.   Call your doctor now or seek immediate care if:    · Your blood pressure is much higher than normal (such as 180/120 or higher), but you don't have symptoms.     · You think high blood pressure is causing symptoms, such as:  ? Severe headache.  ? Blurry vision.    Watch closely for changes in your health, and be sure to contact your doctor if:    · Your blood pressure measures higher than your doctor recommends at least 2 times. That means the top number is higher or the bottom number is higher, or both.     · You think you may be having side effects from your blood pressure medicine. Where can you learn more? Go to http://elvis-bernarda.info/. Enter R892 in the search box to learn more about \"Acute High Blood Pressure: Care Instructions. \"  Current as of: July 22, 2018  Content Version: 11.9  © 9046-8888 [employer]. Care instructions adapted under license by [employer] (which disclaims liability or warranty for this information). If you have questions about a medical condition or this instruction, always ask your healthcare professional. [place] [address] any warranty or liability for your use of this information.

## 2019-05-24 NOTE — PROGRESS NOTES
Reason for Readmission:     Anemia         RRAT Score and Risk Level:     16/ Moderate Risk Level. Patient with 3 IP admissions in the past 12 months. Level of Readmission:    Level 2      Care Conference scheduled:   None needed at this time. Resources/supports as identified by patient/family:    Significant Other       Top Challenges facing patient (as identified by patient/family and CM):     Health Challenges    Finances/Medication cost?     Employed at [employer] is utilized for prescriptions. Transportation   Family to transport at discharge. Support system or lack thereof? Family. Chris Feliciano (Father) [phone] and Maura Guadalupe (Boyfriend) [phone]. Living arrangements? Resides in 2 story home with SO   Self-care/ADLs/Cognition? AOx4, Independent with ADL's and IADL's, No DME utilized in the home. Current Advanced Directive/Advance Care Plan:  Full Code with no advance directive on file. Plan for utilizing home health:   NO             Likelihood of additional readmission:   Moderate, 3 IP admissions in the past 12 months             Transition of Care Plan:    Based on readmission, the patient's previous Plan of Care   has been evaluated and/or modified. The current Transition of Care Plan is:       CM informed plan is for patient to discharge home today. Patient to discharge home with family assistance. Patient requested for CM to assist with scheduling patient a new PCP appointment. CM contacted CM Specialist to assist with appointment. Appointment will be made and placed on AVS.  Patient's family to transport patient home at discharge.     Care Management Interventions  PCP Verified by CM: No  Palliative Care Criteria Met (RRAT>21 & CHF Dx)?: No  Mode of Transport at Discharge: BLS  Transition of Care Consult (CM Consult): (NO CM consults)  Discharge Durable Medical Equipment: No  Physical Therapy Consult: No  Occupational Therapy Consult: No  Speech Therapy Consult: No  Current Support Network: Own Home, Other(Resides with significant other)  Confirm Follow Up Transport: Family  Plan discussed with Pt/Family/Caregiver: Yes  Discharge Location  Discharge Placement: Home with family assistance    QUANG Huff/CRM  Care Management  9:36 AM

## 2019-05-24 NOTE — PROGRESS NOTES
Hospitalist Progress Note  Rigoberto Sheffield MD  Answering service: [phone] OR [phone] from in house phone         Date of Service:  2019  NAME:  Nba Meyers  :  1990  MRN:  [id number]      Admission Summary:    Patient is a 78-year-old female with past medical history of anemia, chronic pain, depression, type 1 diabetes mellitus, essential hypertension, heart murmur, herpes simplex, peripheral neuropathy, ventricular tachycardia, preeclampsia, peripheral leg edema, presented to the emergency department from home, accompanied by her father, with chief complaint of headache. The patient notes onset of symptoms starting yesterday, which had been 10/10 severe, aggravated with light/ noise (with noted photophobia, phonophobia), associated nausea, and without relief after use of Percocet. She newly has been hospitalized from 2019 to 2019 with hypoglycemia, preeclampsia, uncontrolled hypertension, until a subsequent delivery recent with requiring a low transverse  section. The patient is now postpartum 1 week.      Interval history / Subjective:       2019 :  Pt's bs's elevated today  No complaints today- mildly elevated BP, appreciate OB eval   Orders to remove central line today     Assessment & Plan:     1) essential htn - poorly controlled on admission- BP still elevated- cont HCTZ and schedule hydralazine qid    2) Dm 1 on insulin pump and ssi  With pt trying to carb count and dose meal time insulin,  Endocrine saw patient yesterday- mildly elevated glucose levels today    3) Low bicarb -resolved- likely due to poor PO intake- normal lactic acid levels, no evidence of infection - monitor     4) Iron deficiency anemia,  did have transfusion , po iron on DC    5) nausea- prn phenergan    Code status: full   DVT prophylaxis: [address] discussed with: Patient/Family and Nurse  Disposition: Home w/Family and TBD     Hospital Problems  Date Reviewed: 5/2/2019          Codes Class Noted POA    Anemia ICD-10-CM: D64.9  ICD-9-CM: 285.9  5/18/2019 Unknown        Hypertensive urgency ICD-10-CM: I16.0  ICD-9-CM: 401.9  5/18/2019 Unknown                Review of Systems:    feels better this am-     Vital Signs:    Last 24hrs VS reviewed since prior progress note. Most recent are:  Visit Vitals  BP (!) 137/91 (BP 1 Location: Left arm, BP Patient Position: Sitting)   Pulse 96   Temp 98.5 °F (36.9 °C)   Resp 16   Ht 5' 8\" (1.727 m)   Wt 68.8 kg (151 lb 9.6 oz)   SpO2 98%   Breastfeeding? Yes   BMI 23.05 kg/m²         Intake/Output Summary (Last 24 hours) at 5/23/2019 2214  Last data filed at 5/23/2019 1539  Gross per 24 hour   Intake 420 ml   Output    Net 420 ml        Physical Examination:             Constitutional:  No acute distress, cooperative, pleasant    ENT:  Oral mucous moist, oropharynx benign. Neck supple,    Resp:  CTA bilaterally. No wheezing/rhonchi/rales. No accessory muscle use   CV:  Regular rhythm, normal rate, no murmurs, gallops, rubs    GI:  Soft, non distended, non tender. normoactive bowel sounds, no hepatosplenomegaly     Musculoskeletal:  No edema, warm, 2+ pulses throughout    Neurologic:  Moves all extremities. AAOx3, CN II-XII reviewed     Psych:  Good insight, Not anxious nor agitated.   Skin:  Good turgor, no rashes or ulcers       Data Review:    Review and/or order of clinical lab test      Labs:     Recent Labs     05/22/19  0606 05/21/19  0420   WBC 8.9 9.3   HGB 8.0* 7.2*   HCT 26.2* 23.4*    299     Recent Labs     05/23/19  1003 05/22/19  0606 05/21/19  0420    138 139   K 4.3 5.0 4.5   * 111* 110*   CO2 24 23 22   BUN 20 19 16   CREA 1.22* 1.19* 1.11*   * 285* 175*   CA 8.2* 7.5* 7.3*   MG  --   --  2.1     Recent Labs     05/23/19  1003   SGOT 14*   ALT 12   AP 90   TBILI 0.2   TP 5.5*   ALB 1.8*   GLOB 3.7   LPSE 160     No results for input(s): INR, PTP, APTT in the last 72 hours. No lab exists for component: INREXT, INREXT   No results for input(s): FE, TIBC, PSAT, FERR in the last 72 hours. Lab Results   Component Value Date/Time    Folate 26.9 (H) 09/03/2012 05:45 AM      No results for input(s): PH, PCO2, PO2 in the last 72 hours. No results for input(s): CPK, CKNDX, TROIQ in the last 72 hours.     No lab exists for component: CPKMB  Lab Results   Component Value Date/Time    Cholesterol, total 160 05/15/2016 12:09 AM    HDL Cholesterol 41 05/15/2016 12:09 AM    LDL, calculated 98 05/15/2016 12:09 AM    Triglyceride 105 05/15/2016 12:09 AM    CHOL/HDL Ratio 3.9 05/15/2016 12:09 AM     Lab Results   Component Value Date/Time    Glucose (POC) 137 (H) 05/23/2019 06:34 PM    Glucose (POC) 249 (H) 05/23/2019 12:09 PM    Glucose (POC) 216 (H) 05/23/2019 08:56 AM    Glucose (POC) 124 (H) 05/22/2019 08:35 PM    Glucose (POC) 207 (H) 05/22/2019 03:17 PM     Lab Results   Component Value Date/Time    Color YELLOW/STRAW 05/18/2019 03:25 AM    Appearance CLOUDY (A) 05/18/2019 03:25 AM    Specific gravity 1.015 05/18/2019 03:25 AM    Specific gravity 1.010 04/07/2019 07:07 PM    pH (UA) 7.5 05/18/2019 03:25 AM    Protein 300 (A) 05/18/2019 03:25 AM    Glucose 250 (A) 05/18/2019 03:25 AM    Ketone 15 (A) 05/18/2019 03:25 AM    Bilirubin NEGATIVE  05/18/2019 03:25 AM    Urobilinogen 0.2 05/18/2019 03:25 AM    Nitrites NEGATIVE  05/18/2019 03:25 AM    Leukocyte Esterase SMALL (A) 05/18/2019 03:25 AM    Epithelial cells FEW 04/07/2019 07:07 PM    Bacteria NEGATIVE  04/07/2019 07:07 PM    WBC 5-10 04/07/2019 07:07 PM    RBC 0-5 04/07/2019 07:07 PM         Medications Reviewed:     Current Facility-Administered Medications   Medication Dose Route Frequency    neomycin-bacitracin-polymyxin (NEOSPORIN) ointment   Topical BID    [START ON 5/24/2019] hydrALAZINE (APRESOLINE) tablet 25 mg  25 mg Oral Q6H    hydroCHLOROthiazide (HYDRODIURIL) tablet 25 mg  25 mg Oral DAILY    hydrALAZINE (APRESOLINE) tablet 25 mg  25 mg Oral TID PRN    promethazine (PHENERGAN) tablet 12.5 mg  12.5 mg Oral Q4H PRN    insulin pump (PATIENT SUPPLIED)   SubCUTAneous CONTINUOUS    famotidine (PEPCID) tablet 20 mg  20 mg Oral BID    insulin pump (PATIENT SUPPLIED)   SubCUTAneous PRN    glucose chewable tablet 16 g  4 Tab Oral PRN    dextrose (D50W) injection syrg 12.5-25 g  12.5-25 g IntraVENous PRN    glucagon (GLUCAGEN) injection 1 mg  1 mg IntraMUSCular PRN    0.9% sodium chloride infusion 250 mL  250 mL IntraVENous PRN    DULoxetine (CYMBALTA) capsule 20 mg  20 mg Oral BID    labetalol (NORMODYNE) tablet 300 mg  300 mg Oral Q12H    oxyCODONE-acetaminophen (PERCOCET) 5-325 mg per tablet 1 Tab  1 Tab Oral Q6H PRN    prenatal vit-iron fumarate-fa (PRENATAL PLUS with IRON) tablet 1 Tab  1 Tab Oral DAILY    sodium chloride (NS) flush 5-40 mL  5-40 mL IntraVENous Q8H    sodium chloride (NS) flush 5-40 mL  5-40 mL IntraVENous PRN    acetaminophen (TYLENOL) tablet 650 mg  650 mg Oral Q4H PRN     ______________________________________________________________________  EXPECTED LENGTH OF STAY: 5d 7h  ACTUAL LENGTH OF STAY:          5                 Constance Jones MD

## 2019-05-25 ENCOUNTER — HOSPITAL ENCOUNTER (INPATIENT)
Age: 29
LOS: 3 days | Discharge: HOME | DRG: 561 | End: 2019-05-28
Attending: EMERGENCY MEDICINE | Admitting: INTERNAL MEDICINE
Payer: MEDICAID

## 2019-05-25 DIAGNOSIS — R11.2 INTRACTABLE VOMITING WITH NAUSEA, UNSPECIFIED VOMITING TYPE: ICD-10-CM

## 2019-05-25 DIAGNOSIS — Z98.891 H/O: CESAREAN SECTION: ICD-10-CM

## 2019-05-25 DIAGNOSIS — E86.0 DEHYDRATION: Primary | ICD-10-CM

## 2019-05-25 PROBLEM — E11.9 DIABETES MELLITUS (HCC): Status: ACTIVE | Noted: 2019-05-25

## 2019-05-25 PROBLEM — N17.9 AKI (ACUTE KIDNEY INJURY) (HCC): Status: ACTIVE | Noted: 2019-05-25

## 2019-05-25 LAB
ALBUMIN SERPL-MCNC: 2.5 G/DL (ref 3.5–5)
ALBUMIN/GLOB SERPL: 0.5 {RATIO} (ref 1.1–2.2)
ALP SERPL-CCNC: 97 U/L (ref 45–117)
ALT SERPL-CCNC: 18 U/L (ref 12–78)
ANION GAP SERPL CALC-SCNC: 11 MMOL/L (ref 5–15)
APPEARANCE UR: CLEAR
ARTERIAL PATENCY WRIST A: ABNORMAL
AST SERPL-CCNC: 22 U/L (ref 15–37)
BACTERIA URNS QL MICRO: NEGATIVE /HPF
BASE EXCESS BLDV CALC-SCNC: 3 MMOL/L
BASOPHILS # BLD: 0.2 K/UL (ref 0–0.1)
BASOPHILS NFR BLD: 1 % (ref 0–1)
BDY SITE: ABNORMAL
BILIRUB SERPL-MCNC: 0.4 MG/DL (ref 0.2–1)
BILIRUB UR QL: NEGATIVE
BUN SERPL-MCNC: 32 MG/DL (ref 6–20)
BUN/CREAT SERPL: 19 (ref 12–20)
CALCIUM SERPL-MCNC: 9.9 MG/DL (ref 8.5–10.1)
CHLORIDE SERPL-SCNC: 104 MMOL/L (ref 97–108)
CO2 SERPL-SCNC: 24 MMOL/L (ref 21–32)
COLOR UR: ABNORMAL
COMMENT, HOLDF: NORMAL
CREAT SERPL-MCNC: 1.67 MG/DL (ref 0.55–1.02)
DIFFERENTIAL METHOD BLD: ABNORMAL
EOSINOPHIL # BLD: 0.2 K/UL (ref 0–0.4)
EOSINOPHIL NFR BLD: 1 % (ref 0–7)
EPITH CASTS URNS QL MICRO: ABNORMAL /LPF
ERYTHROCYTE [DISTWIDTH] IN BLOOD BY AUTOMATED COUNT: 16.8 % (ref 11.5–14.5)
EST. AVERAGE GLUCOSE BLD GHB EST-MCNC: 160 MG/DL
GAS FLOW.O2 O2 DELIVERY SYS: ABNORMAL L/MIN
GLOBULIN SER CALC-MCNC: 5 G/DL (ref 2–4)
GLUCOSE BLD STRIP.AUTO-MCNC: 166 MG/DL (ref 65–100)
GLUCOSE BLD STRIP.AUTO-MCNC: 183 MG/DL (ref 65–100)
GLUCOSE BLD STRIP.AUTO-MCNC: 190 MG/DL (ref 65–100)
GLUCOSE SERPL-MCNC: 209 MG/DL (ref 65–100)
GLUCOSE UR STRIP.AUTO-MCNC: 250 MG/DL
HBA1C MFR BLD: 7.2 % (ref 4.2–6.3)
HCG SERPL-ACNC: 186 MIU/ML (ref 0–6)
HCO3 BLDV-SCNC: 27.7 MMOL/L (ref 23–28)
HCT VFR BLD AUTO: 28 % (ref 35–47)
HGB BLD-MCNC: 8.5 G/DL (ref 11.5–16)
HGB UR QL STRIP: ABNORMAL
IMM GRANULOCYTES # BLD AUTO: 0.2 K/UL (ref 0–0.04)
IMM GRANULOCYTES NFR BLD AUTO: 1 % (ref 0–0.5)
KETONES UR QL STRIP.AUTO: ABNORMAL MG/DL
LEUKOCYTE ESTERASE UR QL STRIP.AUTO: NEGATIVE
LIPASE SERPL-CCNC: 95 U/L (ref 73–393)
LYMPHOCYTES # BLD: 1.8 K/UL (ref 0.8–3.5)
LYMPHOCYTES NFR BLD: 12 % (ref 12–49)
MCH RBC QN AUTO: 25.9 PG (ref 26–34)
MCHC RBC AUTO-ENTMCNC: 30.4 G/DL (ref 30–36.5)
MCV RBC AUTO: 85.4 FL (ref 80–99)
MONOCYTES # BLD: 0.6 K/UL (ref 0–1)
MONOCYTES NFR BLD: 4 % (ref 5–13)
NEUTS SEG # BLD: 12.4 K/UL (ref 1.8–8)
NEUTS SEG NFR BLD: 81 % (ref 32–75)
NITRITE UR QL STRIP.AUTO: NEGATIVE
NRBC # BLD: 0 K/UL (ref 0–0.01)
NRBC BLD-RTO: 0 PER 100 WBC
PCO2 BLDV: 46.1 MMHG (ref 41–51)
PH BLDV: 7.39 [PH] (ref 7.32–7.42)
PH UR STRIP: 7.5 [PH] (ref 5–8)
PLATELET # BLD AUTO: 636 K/UL (ref 150–400)
PMV BLD AUTO: 10.3 FL (ref 8.9–12.9)
PO2 BLDV: 26 MMHG (ref 25–40)
POTASSIUM SERPL-SCNC: 4.1 MMOL/L (ref 3.5–5.1)
PROT SERPL-MCNC: 7.5 G/DL (ref 6.4–8.2)
PROT UR STRIP-MCNC: 300 MG/DL
RBC # BLD AUTO: 3.28 M/UL (ref 3.8–5.2)
RBC #/AREA URNS HPF: ABNORMAL /HPF (ref 0–5)
RBC MORPH BLD: ABNORMAL
SAMPLES BEING HELD,HOLD: NORMAL
SAO2 % BLDV: 47 % (ref 65–88)
SERVICE CMNT-IMP: ABNORMAL
SODIUM SERPL-SCNC: 139 MMOL/L (ref 136–145)
SP GR UR REFRACTOMETRY: 1.02 (ref 1–1.03)
SPECIMEN TYPE: ABNORMAL
TOTAL RESP. RATE, ITRR: 19
UR CULT HOLD, URHOLD: NORMAL
UROBILINOGEN UR QL STRIP.AUTO: 0.2 EU/DL (ref 0.2–1)
WBC # BLD AUTO: 15.4 K/UL (ref 3.6–11)
WBC URNS QL MICRO: ABNORMAL /HPF (ref 0–4)

## 2019-05-25 PROCEDURE — 83690 ASSAY OF LIPASE: CPT

## 2019-05-25 PROCEDURE — 96365 THER/PROPH/DIAG IV INF INIT: CPT

## 2019-05-25 PROCEDURE — 77030011943

## 2019-05-25 PROCEDURE — 74011250636 HC RX REV CODE- 250/636: Performed by: EMERGENCY MEDICINE

## 2019-05-25 PROCEDURE — 81001 URINALYSIS AUTO W/SCOPE: CPT

## 2019-05-25 PROCEDURE — 85025 COMPLETE CBC W/AUTO DIFF WBC: CPT

## 2019-05-25 PROCEDURE — P9612 CATHETERIZE FOR URINE SPEC: HCPCS

## 2019-05-25 PROCEDURE — 65660000000 HC RM CCU STEPDOWN

## 2019-05-25 PROCEDURE — 74011000250 HC RX REV CODE- 250: Performed by: INTERNAL MEDICINE

## 2019-05-25 PROCEDURE — 84702 CHORIONIC GONADOTROPIN TEST: CPT

## 2019-05-25 PROCEDURE — 94760 N-INVAS EAR/PLS OXIMETRY 1: CPT

## 2019-05-25 PROCEDURE — 82803 BLOOD GASES ANY COMBINATION: CPT

## 2019-05-25 PROCEDURE — 74011250636 HC RX REV CODE- 250/636: Performed by: INTERNAL MEDICINE

## 2019-05-25 PROCEDURE — 36415 COLL VENOUS BLD VENIPUNCTURE: CPT

## 2019-05-25 PROCEDURE — 74011250637 HC RX REV CODE- 250/637: Performed by: EMERGENCY MEDICINE

## 2019-05-25 PROCEDURE — 36600 WITHDRAWAL OF ARTERIAL BLOOD: CPT

## 2019-05-25 PROCEDURE — 80053 COMPREHEN METABOLIC PANEL: CPT

## 2019-05-25 PROCEDURE — 96375 TX/PRO/DX INJ NEW DRUG ADDON: CPT

## 2019-05-25 PROCEDURE — 96361 HYDRATE IV INFUSION ADD-ON: CPT

## 2019-05-25 PROCEDURE — 82962 GLUCOSE BLOOD TEST: CPT

## 2019-05-25 PROCEDURE — 99285 EMERGENCY DEPT VISIT HI MDM: CPT

## 2019-05-25 PROCEDURE — 83036 HEMOGLOBIN GLYCOSYLATED A1C: CPT

## 2019-05-25 RX ORDER — HYDRALAZINE HYDROCHLORIDE 50 MG/1
50 TABLET, FILM COATED ORAL
Status: COMPLETED | OUTPATIENT
Start: 2019-05-25 | End: 2019-05-25

## 2019-05-25 RX ORDER — LABETALOL HYDROCHLORIDE 5 MG/ML
20 INJECTION, SOLUTION INTRAVENOUS
Status: COMPLETED | OUTPATIENT
Start: 2019-05-25 | End: 2019-05-25

## 2019-05-25 RX ORDER — SODIUM CHLORIDE 0.9 % (FLUSH) 0.9 %
5-40 SYRINGE (ML) INJECTION AS NEEDED
Status: DISCONTINUED | OUTPATIENT
Start: 2019-05-25 | End: 2019-05-28 | Stop reason: HOSPADM

## 2019-05-25 RX ORDER — ACETAMINOPHEN 325 MG/1
975 TABLET ORAL
Status: COMPLETED | OUTPATIENT
Start: 2019-05-25 | End: 2019-05-25

## 2019-05-25 RX ORDER — ONDANSETRON 2 MG/ML
4 INJECTION INTRAMUSCULAR; INTRAVENOUS
Status: DISCONTINUED | OUTPATIENT
Start: 2019-05-25 | End: 2019-05-28 | Stop reason: HOSPADM

## 2019-05-25 RX ORDER — DEXTROSE 50 % IN WATER (D50W) INTRAVENOUS SYRINGE
25-50 AS NEEDED
Status: DISCONTINUED | OUTPATIENT
Start: 2019-05-25 | End: 2019-05-27 | Stop reason: SDUPTHER

## 2019-05-25 RX ORDER — MAGNESIUM SULFATE 100 %
4 CRYSTALS MISCELLANEOUS AS NEEDED
Status: DISCONTINUED | OUTPATIENT
Start: 2019-05-25 | End: 2019-05-27 | Stop reason: SDUPTHER

## 2019-05-25 RX ORDER — INSULIN LISPRO 100 [IU]/ML
INJECTION, SOLUTION INTRAVENOUS; SUBCUTANEOUS
Status: DISCONTINUED | OUTPATIENT
Start: 2019-05-25 | End: 2019-05-27

## 2019-05-25 RX ORDER — HEPARIN SODIUM 5000 [USP'U]/ML
5000 INJECTION, SOLUTION INTRAVENOUS; SUBCUTANEOUS EVERY 8 HOURS
Status: DISCONTINUED | OUTPATIENT
Start: 2019-05-25 | End: 2019-05-28 | Stop reason: HOSPADM

## 2019-05-25 RX ORDER — SODIUM CHLORIDE 9 MG/ML
100 INJECTION, SOLUTION INTRAVENOUS CONTINUOUS
Status: DISCONTINUED | OUTPATIENT
Start: 2019-05-25 | End: 2019-05-28 | Stop reason: HOSPADM

## 2019-05-25 RX ORDER — LABETALOL 100 MG/1
300 TABLET, FILM COATED ORAL
Status: COMPLETED | OUTPATIENT
Start: 2019-05-25 | End: 2019-05-25

## 2019-05-25 RX ORDER — SODIUM CHLORIDE 0.9 % (FLUSH) 0.9 %
5-40 SYRINGE (ML) INJECTION EVERY 8 HOURS
Status: DISCONTINUED | OUTPATIENT
Start: 2019-05-25 | End: 2019-05-28 | Stop reason: HOSPADM

## 2019-05-25 RX ORDER — MAGNESIUM SULFATE HEPTAHYDRATE 40 MG/ML
2 INJECTION, SOLUTION INTRAVENOUS ONCE
Status: COMPLETED | OUTPATIENT
Start: 2019-05-25 | End: 2019-05-25

## 2019-05-25 RX ORDER — KETOROLAC TROMETHAMINE 30 MG/ML
30 INJECTION, SOLUTION INTRAMUSCULAR; INTRAVENOUS
Status: COMPLETED | OUTPATIENT
Start: 2019-05-25 | End: 2019-05-25

## 2019-05-25 RX ORDER — ONDANSETRON 2 MG/ML
4 INJECTION INTRAMUSCULAR; INTRAVENOUS
Status: COMPLETED | OUTPATIENT
Start: 2019-05-25 | End: 2019-05-25

## 2019-05-25 RX ORDER — FAMOTIDINE 10 MG/ML
20 INJECTION INTRAVENOUS
Status: COMPLETED | OUTPATIENT
Start: 2019-05-25 | End: 2019-05-25

## 2019-05-25 RX ORDER — HYDRALAZINE HYDROCHLORIDE 20 MG/ML
10 INJECTION INTRAMUSCULAR; INTRAVENOUS
Status: DISCONTINUED | OUTPATIENT
Start: 2019-05-25 | End: 2019-05-28 | Stop reason: HOSPADM

## 2019-05-25 RX ADMIN — ONDANSETRON 4 MG: 2 INJECTION INTRAMUSCULAR; INTRAVENOUS at 17:45

## 2019-05-25 RX ADMIN — LABETALOL HYDROCHLORIDE 300 MG: 100 TABLET, FILM COATED ORAL at 18:30

## 2019-05-25 RX ADMIN — ACETAMINOPHEN 975 MG: 325 TABLET ORAL at 18:58

## 2019-05-25 RX ADMIN — PROCHLORPERAZINE EDISYLATE 5 MG: 5 INJECTION INTRAMUSCULAR; INTRAVENOUS at 22:25

## 2019-05-25 RX ADMIN — LABETALOL 20 MG/4 ML (5 MG/ML) INTRAVENOUS SYRINGE 20 MG: at 18:02

## 2019-05-25 RX ADMIN — HEPARIN SODIUM 5000 UNITS: 5000 INJECTION INTRAVENOUS; SUBCUTANEOUS at 21:03

## 2019-05-25 RX ADMIN — KETOROLAC TROMETHAMINE 30 MG: 60 INJECTION, SOLUTION INTRAMUSCULAR at 23:00

## 2019-05-25 RX ADMIN — ONDANSETRON 4 MG: 2 INJECTION INTRAMUSCULAR; INTRAVENOUS at 21:03

## 2019-05-25 RX ADMIN — SODIUM CHLORIDE 1000 ML: 900 INJECTION, SOLUTION INTRAVENOUS at 17:52

## 2019-05-25 RX ADMIN — HYDRALAZINE HYDROCHLORIDE 50 MG: 50 TABLET, FILM COATED ORAL at 18:30

## 2019-05-25 RX ADMIN — MAGNESIUM SULFATE HEPTAHYDRATE 2 G: 40 INJECTION, SOLUTION INTRAVENOUS at 18:00

## 2019-05-25 RX ADMIN — HYDRALAZINE HYDROCHLORIDE 10 MG: 20 INJECTION INTRAMUSCULAR; INTRAVENOUS at 23:50

## 2019-05-25 RX ADMIN — SODIUM CHLORIDE 150 ML/HR: 900 INJECTION, SOLUTION INTRAVENOUS at 20:15

## 2019-05-25 RX ADMIN — FAMOTIDINE 20 MG: 10 INJECTION, SOLUTION INTRAVENOUS at 17:45

## 2019-05-25 NOTE — ED TRIAGE NOTES
Pt c/o abd pain with n/v. Pt d/c yesterday from [facility] s/p . Pt reports BG has been reading high, administers bolus of insulin via insulin pump.

## 2019-05-25 NOTE — ED NOTES
1950 Bedside shift change report given to [address] [place] [address] (oncoming nurse) by Jose Figueroa (offgoing nurse). Report included the following information SBAR.     2001 Patient had emesis, brown in color approximately 200ml. Dr Martha Phillips at bedside.

## 2019-05-25 NOTE — ED PROVIDER NOTES
30 y.o. female with past medical history significant for heart murmur, DKA, chronic pain, uncontrolled DMT1, HSV infection, HTN, ventricular tachycardia, anemia, depression, and peripheral neuropathy who presents from home via private vehicle with chief complaint of vomiting. Pt's boyfriend reports onset \"0500\" of vomiting accompanied by nausea and abdominal pain. Pt states, \"I can't stop throwing up\". Pt's boyfriend reports pt was discharged from hospital yesterday, and that insulin pump has stayed on since discharge. Pt's boyfriend denies reducing basal rate. Pt's boyfriend reports elevated BGL and states \"it was 224\" PTA. Pt's boyfriend also reports pt has  section on \"5/10\". Pt denies any history of addiction. Pt denies coughing up blood, polyuria, and HA. There are no other acute medical concerns at this time. Severe abd pain. Wants toradol. Old Chart Review:  Pt was last admitted to the hospital from 19 to 19 for poorly controlled HTN, iron deficient anemia, and metabolic acidosis. Pt was continued on HCTZ and started on hydralazine tid. Pt had transfusion on . Pt was seen by endocrine for DMT1 with elevated glucose levels. Pt is on insulin pump and ssi. Pt was discharged on pepcid, hydralazine, HCTZ, and phenergan and was recommended to f/u with PCP and nephrology in 1 week. Pt had  section on 5/10/19. Social hx: Former smoker (quit date: 10/26/2014; 0.25 pcks/day for 8 yrs); No EtOH use; Drug use (marijuana; 2 times/wk)    Note written by Gaurang Lutz, as dictated by Rock Torres DO 5:14 PM       The history is provided by the patient, a significant other and medical records. No  was used.         Past Medical History:   Diagnosis Date    Anemia     Chronic pain     Depression     Diabetes type 1, uncontrolled ([address]     DKA, type 1 ([address]     Essential hypertension     Heart murmur     Herpes simplex virus (HSV) infection  positive in blood    Peripheral neuropathy     Ventricular tachycardia (HCC)        Past Surgical History:   Procedure Laterality Date    ABDOMEN SURGERY PROC UNLISTED      exploratory laparoscopy    HX CYST REMOVAL      groin         Family History:   Problem Relation Age of Onset    No Known Problems Mother     Sleep Apnea Father     Hypertension Father     Diabetes Father     Heart Disease Maternal Grandfather        Social History     Socioeconomic History    Marital status: SINGLE     Spouse name: Not on file    Number of children: Not on file    Years of education: Not on file    Highest education level: Not on file   Occupational History    Not on file   Social Needs    Financial resource strain: Not on file    Food insecurity:     Worry: Not on file     Inability: Not on file    Transportation needs:     Medical: Not on file     Non-medical: Not on file   Tobacco Use    Smoking status: Former Smoker     Packs/day: 0.25     Years: 8.00     Pack years: 2.00     Types: Cigarettes     Last attempt to quit: 10/26/2014     Years since quittin.5    Smokeless tobacco: Never Used   Substance and Sexual Activity    Alcohol use: No     Alcohol/week: 0.0 oz    Drug use: Yes     Frequency: 2.0 times per week     Types: Marijuana    Sexual activity: Yes     Partners: Male     Birth control/protection: None   Lifestyle    Physical activity:     Days per week: Not on file     Minutes per session: Not on file    Stress: Not on file   Relationships    Social connections:     Talks on phone: Not on file     Gets together: Not on file     Attends [employer] service: Not on file     Active member of club or organization: Not on file     Attends meetings of clubs or organizations: Not on file     Relationship status: Not on file    Intimate partner violence:     Fear of current or ex partner: Not on file     Emotionally abused: Not on file     Physically abused: Not on file     Forced sexual activity: Not on file   Other Topics Concern     Service Not Asked    Blood Transfusions Not Asked    Caffeine Concern Not Asked    Occupational Exposure Not Asked    Hobby Hazards Not Asked    Sleep Concern Not Asked    Stress Concern Not Asked    Weight Concern Not Asked    Special Diet Not Asked    Back Care Not Asked    Exercise Not Asked    Bike Helmet Not Asked   [address] Not Asked    Self-Exams Not Asked   Social History Narrative    Not on file         ALLERGIES: Morphine and Pcn [penicillins]    Review of Systems   Constitutional: Negative for chills and fever. Respiratory: Negative for cough and shortness of breath. Cardiovascular: Negative for chest pain. Gastrointestinal: Positive for abdominal pain, nausea and vomiting. Negative for diarrhea. Endocrine: Negative for polyuria. Neurological: Negative for headaches. All other systems reviewed and are negative. Vitals:    05/25/19 1714   BP: (!) 177/113   Pulse: 100   Resp: 18   Temp: 98.1 °F (36.7 °C)   SpO2: 99%   Weight: 69.4 kg (153 lb)   Height: 5' 8\" (1.727 m)            Physical Exam   Constitutional:   Thin, chronically ill appearing   HENT:   Head: Normocephalic. Poor dentition. Dry MM   Eyes: Pupils are equal, round, and reactive to light. Conjunctivae are normal.   Neck: No tracheal deviation present. Cardiovascular: Regular rhythm and intact distal pulses. Tachycardia present. Pulmonary/Chest: Effort normal and breath sounds normal.   Abdominal: Soft. There is tenderness (diffuse). Flat    CS scar CDI   Musculoskeletal: She exhibits no edema. Neurological: She is alert. Skin: Skin is warm and dry. Capillary refill takes less than 2 seconds. Psychiatric: She has a normal mood and affect.         MDM  Number of Diagnoses or Management Options  Dehydration:   Intractable vomiting with nausea, unspecified vomiting type:   Critical Care  Total time providing critical care: 30-74 minutes     30 minutes      Procedures  PROGRESS NOTE:  7:59 PM  Pt vomited brown substance on floor      Hospitalist Rikaclaraluis for Admission  8:06 PM    ED Room Number: OT09/38  Patient Name and age:  Johnathon Fearing 30 y.o.  female  Working Diagnosis:   1. Dehydration    2. Intractable vomiting with nausea, unspecified vomiting type      Readmission: yes  Isolation Requirements:  no  Recommended Level of Care:  med/surg  Code Status:  full       Controlled nausea  Can't have toradol due to Cr right now  lowered BP as well    Labs Reviewed   URINALYSIS W/MICROSCOPIC - Abnormal; Notable for the following components:       Result Value    Protein 300 (*)     Glucose 250 (*)     Ketone TRACE (*)     Blood SMALL (*)     All other components within normal limits   BETA HCG, QT - Abnormal; Notable for the following components:    Beta HCG,  (*)     All other components within normal limits   METABOLIC PANEL, COMPREHENSIVE - Abnormal; Notable for the following components:    Glucose 209 (*)     BUN 32 (*)     Creatinine 1.67 (*)     GFR est AA 43 (*)     GFR est non-AA 36 (*)     Albumin 2.5 (*)     Globulin 5.0 (*)     A-G Ratio 0.5 (*)     All other components within normal limits   CBC WITH AUTOMATED DIFF - Abnormal; Notable for the following components:    WBC 15.4 (*)     RBC 3.28 (*)     HGB 8.5 (*)     HCT 28.0 (*)     MCH 25.9 (*)     RDW 16.8 (*)     PLATELET 272 (*)     NEUTROPHILS 81 (*)     MONOCYTES 4 (*)     IMMATURE GRANULOCYTES 1 (*)     ABS. NEUTROPHILS 12.4 (*)     ABS. BASOPHILS 0.2 (*)     ABS. IMM.  GRANS. 0.2 (*)     All other components within normal limits   POC VENOUS BLOOD GAS - Abnormal; Notable for the following components:    sO2, venous (POC) 47 (*)     All other components within normal limits   GLUCOSE, POC - Abnormal; Notable for the following components:    Glucose (POC) 166 (*)     All other components within normal limits   URINE CULTURE HOLD SAMPLE   LIPASE   VENOUS BLOOD GAS   SAMPLES BEING HELD

## 2019-05-26 ENCOUNTER — APPOINTMENT (OUTPATIENT)
Dept: CT IMAGING | Age: 29
DRG: 561 | End: 2019-05-26
Attending: INTERNAL MEDICINE
Payer: MEDICAID

## 2019-05-26 LAB
ANION GAP SERPL CALC-SCNC: 9 MMOL/L (ref 5–15)
BACTERIA SPEC CULT: NORMAL
BUN SERPL-MCNC: 26 MG/DL (ref 6–20)
BUN/CREAT SERPL: 22 (ref 12–20)
CALCIUM SERPL-MCNC: 8.8 MG/DL (ref 8.5–10.1)
CHLORIDE SERPL-SCNC: 110 MMOL/L (ref 97–108)
CO2 SERPL-SCNC: 23 MMOL/L (ref 21–32)
CREAT SERPL-MCNC: 1.17 MG/DL (ref 0.55–1.02)
ERYTHROCYTE [DISTWIDTH] IN BLOOD BY AUTOMATED COUNT: 16.7 % (ref 11.5–14.5)
GLUCOSE BLD STRIP.AUTO-MCNC: 110 MG/DL (ref 65–100)
GLUCOSE BLD STRIP.AUTO-MCNC: 124 MG/DL (ref 65–100)
GLUCOSE BLD STRIP.AUTO-MCNC: 153 MG/DL (ref 65–100)
GLUCOSE BLD STRIP.AUTO-MCNC: 153 MG/DL (ref 65–100)
GLUCOSE BLD STRIP.AUTO-MCNC: 193 MG/DL (ref 65–100)
GLUCOSE BLD STRIP.AUTO-MCNC: 91 MG/DL (ref 65–100)
GLUCOSE SERPL-MCNC: 166 MG/DL (ref 65–100)
HCT VFR BLD AUTO: 28.2 % (ref 35–47)
HGB BLD-MCNC: 8.7 G/DL (ref 11.5–16)
MCH RBC QN AUTO: 25.8 PG (ref 26–34)
MCHC RBC AUTO-ENTMCNC: 30.9 G/DL (ref 30–36.5)
MCV RBC AUTO: 83.7 FL (ref 80–99)
NRBC # BLD: 0 K/UL (ref 0–0.01)
NRBC BLD-RTO: 0 PER 100 WBC
PLATELET # BLD AUTO: 486 K/UL (ref 150–400)
PMV BLD AUTO: 9.6 FL (ref 8.9–12.9)
POTASSIUM SERPL-SCNC: 3.8 MMOL/L (ref 3.5–5.1)
RBC # BLD AUTO: 3.37 M/UL (ref 3.8–5.2)
SERVICE CMNT-IMP: ABNORMAL
SERVICE CMNT-IMP: NORMAL
SERVICE CMNT-IMP: NORMAL
SODIUM SERPL-SCNC: 142 MMOL/L (ref 136–145)
WBC # BLD AUTO: 10.4 K/UL (ref 3.6–11)

## 2019-05-26 PROCEDURE — 80048 BASIC METABOLIC PNL TOTAL CA: CPT

## 2019-05-26 PROCEDURE — 85027 COMPLETE CBC AUTOMATED: CPT

## 2019-05-26 PROCEDURE — 74176 CT ABD & PELVIS W/O CONTRAST: CPT

## 2019-05-26 PROCEDURE — 74011250637 HC RX REV CODE- 250/637: Performed by: INTERNAL MEDICINE

## 2019-05-26 PROCEDURE — 82962 GLUCOSE BLOOD TEST: CPT

## 2019-05-26 PROCEDURE — 74011636637 HC RX REV CODE- 636/637: Performed by: INTERNAL MEDICINE

## 2019-05-26 PROCEDURE — 83690 ASSAY OF LIPASE: CPT

## 2019-05-26 PROCEDURE — 36415 COLL VENOUS BLD VENIPUNCTURE: CPT

## 2019-05-26 PROCEDURE — 74011250636 HC RX REV CODE- 250/636: Performed by: INTERNAL MEDICINE

## 2019-05-26 PROCEDURE — 65660000000 HC RM CCU STEPDOWN

## 2019-05-26 PROCEDURE — 74011000250 HC RX REV CODE- 250: Performed by: INTERNAL MEDICINE

## 2019-05-26 RX ORDER — HYDROCHLOROTHIAZIDE 25 MG/1
25 TABLET ORAL DAILY
Status: DISCONTINUED | OUTPATIENT
Start: 2019-05-26 | End: 2019-05-28 | Stop reason: HOSPADM

## 2019-05-26 RX ORDER — DULOXETIN HYDROCHLORIDE 20 MG/1
20 CAPSULE, DELAYED RELEASE ORAL 2 TIMES DAILY
Status: DISCONTINUED | OUTPATIENT
Start: 2019-05-26 | End: 2019-05-28 | Stop reason: HOSPADM

## 2019-05-26 RX ORDER — LANOLIN ALCOHOL/MO/W.PET/CERES
325 CREAM (GRAM) TOPICAL
Status: DISCONTINUED | OUTPATIENT
Start: 2019-05-26 | End: 2019-05-28 | Stop reason: HOSPADM

## 2019-05-26 RX ORDER — HYDROMORPHONE HYDROCHLORIDE 1 MG/ML
0.5 INJECTION, SOLUTION INTRAMUSCULAR; INTRAVENOUS; SUBCUTANEOUS
Status: DISCONTINUED | OUTPATIENT
Start: 2019-05-26 | End: 2019-05-27

## 2019-05-26 RX ORDER — HYDRALAZINE HYDROCHLORIDE 50 MG/1
50 TABLET, FILM COATED ORAL 3 TIMES DAILY
Status: DISCONTINUED | OUTPATIENT
Start: 2019-05-26 | End: 2019-05-27

## 2019-05-26 RX ORDER — ACETAMINOPHEN 325 MG/1
650 TABLET ORAL
Status: DISCONTINUED | OUTPATIENT
Start: 2019-05-26 | End: 2019-05-28 | Stop reason: HOSPADM

## 2019-05-26 RX ORDER — HYDROMORPHONE HYDROCHLORIDE 1 MG/ML
0.5 INJECTION, SOLUTION INTRAMUSCULAR; INTRAVENOUS; SUBCUTANEOUS
Status: DISCONTINUED | OUTPATIENT
Start: 2019-05-26 | End: 2019-05-26

## 2019-05-26 RX ADMIN — PROCHLORPERAZINE EDISYLATE 5 MG: 5 INJECTION INTRAMUSCULAR; INTRAVENOUS at 16:35

## 2019-05-26 RX ADMIN — LABETALOL HCL 300 MG: 200 TABLET, FILM COATED ORAL at 20:40

## 2019-05-26 RX ADMIN — DULOXETINE HYDROCHLORIDE 20 MG: 20 CAPSULE, DELAYED RELEASE ORAL at 20:40

## 2019-05-26 RX ADMIN — ONDANSETRON 4 MG: 2 INJECTION INTRAMUSCULAR; INTRAVENOUS at 12:21

## 2019-05-26 RX ADMIN — HYDRALAZINE HYDROCHLORIDE 50 MG: 50 TABLET, FILM COATED ORAL at 09:52

## 2019-05-26 RX ADMIN — FERROUS SULFATE TAB 325 MG (65 MG ELEMENTAL FE) 325 MG: 325 (65 FE) TAB at 09:11

## 2019-05-26 RX ADMIN — Medication 10 ML: at 13:17

## 2019-05-26 RX ADMIN — HYDROMORPHONE HYDROCHLORIDE 0.5 MG: 1 INJECTION, SOLUTION INTRAMUSCULAR; INTRAVENOUS; SUBCUTANEOUS at 14:17

## 2019-05-26 RX ADMIN — HYDROCHLOROTHIAZIDE 25 MG: 25 TABLET ORAL at 09:52

## 2019-05-26 RX ADMIN — HYDROMORPHONE HYDROCHLORIDE 0.5 MG: 1 INJECTION, SOLUTION INTRAMUSCULAR; INTRAVENOUS; SUBCUTANEOUS at 22:03

## 2019-05-26 RX ADMIN — HYDROMORPHONE HYDROCHLORIDE 0.5 MG: 1 INJECTION, SOLUTION INTRAMUSCULAR; INTRAVENOUS; SUBCUTANEOUS at 17:54

## 2019-05-26 RX ADMIN — HEPARIN SODIUM 5000 UNITS: 5000 INJECTION INTRAVENOUS; SUBCUTANEOUS at 12:07

## 2019-05-26 RX ADMIN — HYDRALAZINE HYDROCHLORIDE 50 MG: 50 TABLET, FILM COATED ORAL at 22:04

## 2019-05-26 RX ADMIN — PROCHLORPERAZINE EDISYLATE 5 MG: 5 INJECTION INTRAMUSCULAR; INTRAVENOUS at 03:52

## 2019-05-26 RX ADMIN — SODIUM CHLORIDE 100 ML/HR: 900 INJECTION, SOLUTION INTRAVENOUS at 11:33

## 2019-05-26 RX ADMIN — PROCHLORPERAZINE EDISYLATE 5 MG: 5 INJECTION INTRAMUSCULAR; INTRAVENOUS at 10:03

## 2019-05-26 RX ADMIN — HYDRALAZINE HYDROCHLORIDE 50 MG: 50 TABLET, FILM COATED ORAL at 15:45

## 2019-05-26 RX ADMIN — INSULIN LISPRO 2 UNITS: 100 INJECTION, SOLUTION INTRAVENOUS; SUBCUTANEOUS at 07:25

## 2019-05-26 RX ADMIN — INSULIN LISPRO 2 UNITS: 100 INJECTION, SOLUTION INTRAVENOUS; SUBCUTANEOUS at 12:39

## 2019-05-26 RX ADMIN — PROCHLORPERAZINE EDISYLATE 5 MG: 5 INJECTION INTRAMUSCULAR; INTRAVENOUS at 23:47

## 2019-05-26 RX ADMIN — HYDROMORPHONE HYDROCHLORIDE 0.5 MG: 1 INJECTION, SOLUTION INTRAMUSCULAR; INTRAVENOUS; SUBCUTANEOUS at 09:52

## 2019-05-26 RX ADMIN — ONDANSETRON 4 MG: 2 INJECTION INTRAMUSCULAR; INTRAVENOUS at 22:04

## 2019-05-26 RX ADMIN — HEPARIN SODIUM 5000 UNITS: 5000 INJECTION INTRAVENOUS; SUBCUTANEOUS at 20:40

## 2019-05-26 RX ADMIN — LABETALOL HCL 300 MG: 200 TABLET, FILM COATED ORAL at 09:11

## 2019-05-26 NOTE — PROGRESS NOTES
Hospitalist Progress Note      Hospital summary: Francisco Joshi is a 30 y.o.  female with h/o heart murmur,Diabetes on insulin pump, DKA, chronic pain, uncontrolled DMT1, HSV infection, HTN, ventricular tachycardia, anemia, depression, and peripheral neuropathy,came to ED with complaint of ongoing vomiting. Patient says that she thought that she was in DKA. Patient says that she was recently discharged from hospital because of uncontrolled diabetes. She denies fever or chills. She denies any h/o substance abuse. In ED,patient was hypertensive /113. Lab showed glucose 209,creatinine 1.67. Patient was admitted for dehydration,MARIS due to persistent nausea,vomiting 5/25/2019      Assessment/Plan:  Intractable nausea and vomiting with abd pain  - possible due to gastoparesis  -On compazine iv  -IV fluid  - vomiting subsided but still nauseous.   - lipase negative. - CT abd ordered  - will consider GI consult if symptoms does not improve  - iv dilaudid for pain control     MARIS - improved  -Due to dehydration 2/2 vomiting  -IV fluid  -Follow kidney functions.     Hypertension - uncontrolled  - restarted HCTZ, hydralazine. C/w labetalol  - hydralazine iv prn    Diabetes mellitus type 1  -a1c is 7.2  - SSI   -Pt is on insulin pump     Code status: Full  DVT prophylaxis: Heparin  Disposition: TBD. Home when ready  ----------------------------------------------    CC: Nausea/ vomiting    S: Patient is seen and examined at bedside. She still nauseous but vomiting has subsided. She is c/o severe abd pain and requesting pain meds. Review of Systems:  A comprehensive review of systems was negative. O:  Visit Vitals  /79   Pulse 98   Temp 99 °F (37.2 °C)   Resp 18   Ht 5' 8\" (1.727 m)   Wt 69.4 kg (153 lb)   SpO2 99%   Breastfeeding?  No   BMI 23.26 kg/m²       PHYSICAL EXAM:  Gen: moderate distress due to abd pain  HEENT: anicteric sclerae, normal conjunctiva, oropharynx clear, MM moist  Neck: supple, trachea midline, no adenopathy  Heart: RRR, no MRG, no JVD, no peripheral edema  Lungs: CTA b/l, non-labored respirations  Abd: soft, diffuse tenderness  Extr: warm  Skin: dry, no rash  Neuro: CN II-XII grossly intact, normal speech, moves all extremities  Psych: normal mood, appropriate affect      Intake/Output Summary (Last 24 hours) at 5/26/2019 1427  Last data filed at 5/25/2019 1904  Gross per 24 hour   Intake    Output 900 ml   Net -900 ml        Recent labs & imaging reviewed:  Recent Results (from the past 24 hour(s))   BETA HCG, QT    Collection Time: 05/25/19  5:17 PM   Result Value Ref Range    Beta HCG,  (H) 0 - 6 MIU/ML   METABOLIC PANEL, COMPREHENSIVE    Collection Time: 05/25/19  5:17 PM   Result Value Ref Range    Sodium 139 136 - 145 mmol/L    Potassium 4.1 3.5 - 5.1 mmol/L    Chloride 104 97 - 108 mmol/L    CO2 24 21 - 32 mmol/L    Anion gap 11 5 - 15 mmol/L    Glucose 209 (H) 65 - 100 mg/dL    BUN 32 (H) 6 - 20 MG/DL    Creatinine 1.67 (H) 0.55 - 1.02 MG/DL    BUN/Creatinine ratio 19 12 - 20      GFR est AA 43 (L) >60 ml/min/1.73m2    GFR est non-AA 36 (L) >60 ml/min/1.73m2    Calcium 9.9 8.5 - 10.1 MG/DL    Bilirubin, total 0.4 0.2 - 1.0 MG/DL    ALT (SGPT) 18 12 - 78 U/L    AST (SGOT) 22 15 - 37 U/L    Alk.  phosphatase 97 45 - 117 U/L    Protein, total 7.5 6.4 - 8.2 g/dL    Albumin 2.5 (L) 3.5 - 5.0 g/dL    Globulin 5.0 (H) 2.0 - 4.0 g/dL    A-G Ratio 0.5 (L) 1.1 - 2.2     LIPASE    Collection Time: 05/25/19  5:17 PM   Result Value Ref Range    Lipase 95 73 - 393 U/L   CBC WITH AUTOMATED DIFF    Collection Time: 05/25/19  5:17 PM   Result Value Ref Range    WBC 15.4 (H) 3.6 - 11.0 K/uL    RBC 3.28 (L) 3.80 - 5.20 M/uL    HGB 8.5 (L) 11.5 - 16.0 g/dL    HCT 28.0 (L) 35.0 - 47.0 %    MCV 85.4 80.0 - 99.0 FL    MCH 25.9 (L) 26.0 - 34.0 PG    MCHC 30.4 30.0 - 36.5 g/dL    RDW 16.8 (H) 11.5 - 14.5 %    PLATELET 573 (H) 240 - 400 K/uL    MPV 10.3 8.9 - 12.9 FL    NRBC 0.0 0  WBC ABSOLUTE NRBC 0.00 0.00 - 0.01 K/uL    NEUTROPHILS 81 (H) 32 - 75 %    LYMPHOCYTES 12 12 - 49 %    MONOCYTES 4 (L) 5 - 13 %    EOSINOPHILS 1 0 - 7 %    BASOPHILS 1 0 - 1 %    IMMATURE GRANULOCYTES 1 (H) 0.0 - 0.5 %    ABS. NEUTROPHILS 12.4 (H) 1.8 - 8.0 K/UL    ABS. LYMPHOCYTES 1.8 0.8 - 3.5 K/UL    ABS. MONOCYTES 0.6 0.0 - 1.0 K/UL    ABS. EOSINOPHILS 0.2 0.0 - 0.4 K/UL    ABS. BASOPHILS 0.2 (H) 0.0 - 0.1 K/UL    ABS. IMM. GRANS. 0.2 (H) 0.00 - 0.04 K/UL    DF SMEAR SCANNED      RBC COMMENTS ANISOCYTOSIS  1+       SAMPLES BEING HELD    Collection Time: 05/25/19  5:17 PM   Result Value Ref Range    SAMPLES BEING HELD 1RED, 1BLU     COMMENT        Add-on orders for these samples will be processed based on acceptable specimen integrity and analyte stability, which may vary by analyte. HEMOGLOBIN A1C WITH EAG    Collection Time: 05/25/19  5:17 PM   Result Value Ref Range    Hemoglobin A1c 7.2 (H) 4.2 - 6.3 %    Est. average glucose 160 mg/dL   POC VENOUS BLOOD GAS    Collection Time: 05/25/19  5:34 PM   Result Value Ref Range    Device: ROOM AIR      pH, venous (POC) 7.386 7.32 - 7.42      pCO2, venous (POC) 46.1 41 - 51 MMHG    pO2, venous (POC) 26 25 - 40 mmHg    HCO3, venous (POC) 27.7 23.0 - 28.0 MMOL/L    sO2, venous (POC) 47 (L) 65 - 88 %    Base excess, venous (POC) 3 mmol/L    Allens test (POC) N/A      Total resp.  rate 19      Site OTHER      Specimen type (POC) VENOUS BLOOD     URINALYSIS W/MICROSCOPIC    Collection Time: 05/25/19  7:04 PM   Result Value Ref Range    Color YELLOW/STRAW      Appearance CLEAR CLEAR      Specific gravity 1.017 1.003 - 1.030      pH (UA) 7.5 5.0 - 8.0      Protein 300 (A) NEG mg/dL    Glucose 250 (A) NEG mg/dL    Ketone TRACE (A) NEG mg/dL    Bilirubin NEGATIVE  NEG      Blood SMALL (A) NEG      Urobilinogen 0.2 0.2 - 1.0 EU/dL    Nitrites NEGATIVE  NEG      Leukocyte Esterase NEGATIVE  NEG      WBC 5-10 0 - 4 /hpf    RBC 10-20 0 - 5 /hpf    Epithelial cells FEW FEW /lpf Bacteria NEGATIVE  NEG /hpf   URINE CULTURE HOLD SAMPLE    Collection Time: 05/25/19  7:04 PM   Result Value Ref Range    Urine culture hold        URINE ON HOLD IN MICROBIOLOGY DEPT FOR 3 DAYS. IF UNPRESERVED URINE IS SUBMITTED, IT CANNOT BE USED FOR ADDITIONAL TESTING AFTER 24 HRS, RECOLLECTION WILL BE REQUIRED.    GLUCOSE, POC    Collection Time: 05/25/19  8:14 PM   Result Value Ref Range    Glucose (POC) 166 (H) 65 - 100 mg/dL    Performed by [id number], POC    Collection Time: 05/25/19 10:17 PM   Result Value Ref Range    Glucose (POC) 190 (H) 65 - 100 mg/dL    Performed by [employer], POC    Collection Time: 05/25/19 11:46 PM   Result Value Ref Range    Glucose (POC) 183 (H) 65 - 100 mg/dL    Performed by [employer], POC    Collection Time: 05/26/19 12:49 AM   Result Value Ref Range    Glucose (POC) 153 (H) 65 - 100 mg/dL    Performed by [address], BASIC    Collection Time: 05/26/19  2:25 AM   Result Value Ref Range    Sodium 142 136 - 145 mmol/L    Potassium 3.8 3.5 - 5.1 mmol/L    Chloride 110 (H) 97 - 108 mmol/L    CO2 23 21 - 32 mmol/L    Anion gap 9 5 - 15 mmol/L    Glucose 166 (H) 65 - 100 mg/dL    BUN 26 (H) 6 - 20 MG/DL    Creatinine 1.17 (H) 0.55 - 1.02 MG/DL    BUN/Creatinine ratio 22 (H) 12 - 20      GFR est AA >60 >60 ml/min/1.73m2    GFR est non-AA 54 (L) >60 ml/min/1.73m2    Calcium 8.8 8.5 - 10.1 MG/DL   CBC W/O DIFF    Collection Time: 05/26/19  2:25 AM   Result Value Ref Range    WBC 10.4 3.6 - 11.0 K/uL    RBC 3.37 (L) 3.80 - 5.20 M/uL    HGB 8.7 (L) 11.5 - 16.0 g/dL    HCT 28.2 (L) 35.0 - 47.0 %    MCV 83.7 80.0 - 99.0 FL    MCH 25.8 (L) 26.0 - 34.0 PG    MCHC 30.9 30.0 - 36.5 g/dL    RDW 16.7 (H) 11.5 - 14.5 %    PLATELET 074 (H) 015 - 400 K/uL    MPV 9.6 8.9 - 12.9 FL    NRBC 0.0 0  WBC    ABSOLUTE NRBC 0.00 0.00 - 0.01 K/uL   GLUCOSE, POC    Collection Time: 05/26/19  7:10 AM   Result Value Ref Range    Glucose (POC) 193 (H) 65 - 100 mg/dL    Performed by [id number], POC    Collection Time: 05/26/19 12:32 PM   Result Value Ref Range    Glucose (POC) 153 (H) 65 - 100 mg/dL    Performed by [id number]     05/26/19 0225 05/25/19 1717   WBC 10.4 15.4*   HGB 8.7* 8.5*   HCT 28.2* 28.0*   * 636*     Recent Labs     05/26/19 0225 05/25/19 1717    139   K 3.8 4.1   * 104   CO2 23 24   BUN 26* 32*   CREA 1.17* 1.67*   * 209*   CA 8.8 9.9     Recent Labs     05/25/19 1717   SGOT 22   ALT 18   AP 97   TBILI 0.4   TP 7.5   ALB 2.5*   GLOB 5.0*   LPSE 95     No results for input(s): INR, PTP, APTT in the last 72 hours. No lab exists for component: INREXT   No results for input(s): FE, TIBC, PSAT, FERR in the last 72 hours. Lab Results   Component Value Date/Time    Folate 26.9 (H) 09/03/2012 05:45 AM      No results for input(s): PH, PCO2, PO2 in the last 72 hours. No results for input(s): CPK, CKNDX, TROIQ in the last 72 hours.     No lab exists for component: CPKMB  Lab Results   Component Value Date/Time    Cholesterol, total 160 05/15/2016 12:09 AM    HDL Cholesterol 41 05/15/2016 12:09 AM    LDL, calculated 98 05/15/2016 12:09 AM    Triglyceride 105 05/15/2016 12:09 AM    CHOL/HDL Ratio 3.9 05/15/2016 12:09 AM     Lab Results   Component Value Date/Time    Glucose (POC) 153 (H) 05/26/2019 12:32 PM    Glucose (POC) 193 (H) 05/26/2019 07:10 AM    Glucose (POC) 153 (H) 05/26/2019 12:49 AM    Glucose (POC) 183 (H) 05/25/2019 11:46 PM    Glucose (POC) 190 (H) 05/25/2019 10:17 PM     Lab Results   Component Value Date/Time    Color YELLOW/STRAW 05/25/2019 07:04 PM    Appearance CLEAR 05/25/2019 07:04 PM    Specific gravity 1.017 05/25/2019 07:04 PM    Specific gravity 1.010 04/07/2019 07:07 PM    pH (UA) 7.5 05/25/2019 07:04 PM    Protein 300 (A) 05/25/2019 07:04 PM    Glucose 250 (A) 05/25/2019 07:04 PM    Ketone TRACE (A) 05/25/2019 07:04 PM    Bilirubin NEGATIVE  05/25/2019 07:04 PM    Urobilinogen 0.2 05/25/2019 07:04 PM    Nitrites NEGATIVE  05/25/2019 07:04 PM    Leukocyte Esterase NEGATIVE  05/25/2019 07:04 PM    Epithelial cells FEW 05/25/2019 07:04 PM    Bacteria NEGATIVE  05/25/2019 07:04 PM    WBC 5-10 05/25/2019 07:04 PM    RBC 10-20 05/25/2019 07:04 PM       Med list reviewed  Current Facility-Administered Medications   Medication Dose Route Frequency    ferrous sulfate tablet 325 mg  325 mg Oral ACB    hydrALAZINE (APRESOLINE) tablet 50 mg  50 mg Oral TID    hydroCHLOROthiazide (HYDRODIURIL) tablet 25 mg  25 mg Oral DAILY    acetaminophen (TYLENOL) tablet 650 mg  650 mg Oral Q6H PRN    HYDROmorphone (PF) (DILAUDID) injection 0.5 mg  0.5 mg IntraVENous Q4H PRN    0.9% sodium chloride infusion  100 mL/hr IntraVENous CONTINUOUS    sodium chloride (NS) flush 5-40 mL  5-40 mL IntraVENous Q8H    sodium chloride (NS) flush 5-40 mL  5-40 mL IntraVENous PRN    ondansetron (ZOFRAN) injection 4 mg  4 mg IntraVENous Q4H PRN    heparin (porcine) injection 5,000 Units  5,000 Units SubCUTAneous Q8H    glucose chewable tablet 16 g  4 Tab Oral PRN    dextrose (D50W) injection syrg 12.5-25 g  25-50 mL IntraVENous PRN    glucagon (GLUCAGEN) injection 1 mg  1 mg IntraMUSCular PRN    insulin lispro (HUMALOG) injection   SubCUTAneous AC&HS    labetalol (NORMODYNE) tablet 300 mg  300 mg Oral Q12H    hydrALAZINE (APRESOLINE) 20 mg/mL injection 10 mg  10 mg IntraVENous Q6H PRN    prochlorperazine (COMPAZINE) with saline injection 5 mg  5 mg IntraVENous Q6H PRN       Care Plan discussed with:  Patient/Family and Nurse    Jose Varela MD  Internal Medicine  Date of Service: 5/26/2019

## 2019-05-26 NOTE — H&P
Hospitalist Admission Note    NAME:  Bernardo Ron   :   1990   MRN:   [id number]     Date/Time:  2019 8:44 PM    Subjective:     CHIEF COMPLAINT:    Chief Complaint   Patient presents with    Abdominal Pain       HISTORY OF PRESENT ILLNESS:     Darren Mitchell is a 30 y.o.  female with h/o heart murmur,Diabetes on insulin pump, DKA, chronic pain, uncontrolled DMT1, HSV infection, HTN, ventricular tachycardia, anemia, depression, and peripheral neuropathy,came to ED with complaint of ongoing vomiting. Patient says that she thought that she was in DKA. Patient says that she was recently discharged from hospital because of uncontrolled diabetes. She denies fever or chills. She denies any h/o substance abuse. In ED,patient was hypertensive /113. Lab showed glucose 209,creatinine 1.67. Patient was admitted for dehydration,MARIS due to persistent nausea,vomiting. Past Medical History:   Diagnosis Date    Anemia     Chronic pain     Depression     Diabetes type 1, uncontrolled (HCC)     DKA, type 1 (HCC)     Essential hypertension     Heart murmur     Herpes simplex virus (HSV) infection 2017    positive in blood    Peripheral neuropathy     Ventricular tachycardia (HCC)         Social History     Tobacco Use    Smoking status: Former Smoker     Packs/day: 0.25     Years: 8.00     Pack years: 2.00     Types: Cigarettes     Last attempt to quit: 10/26/2014     Years since quittin.5    Smokeless tobacco: Never Used   Substance Use Topics    Alcohol use: No     Alcohol/week: 0.0 oz        Family History   Problem Relation Age of Onset    No Known Problems Mother     Sleep Apnea Father     Hypertension Father     Diabetes Father     Heart Disease Maternal Grandfather         Allergies   Allergen Reactions    Morphine Other (comments)    Pcn [Penicillins] Hives        Prior to Admission medications    Medication Sig Start Date End Date Taking?  Authorizing Provider   famotidine (PEPCID) 20 mg tablet Take 1 Tab by mouth two (2) times daily as needed (acid reflux). Indications: gastroesophageal reflux disease 5/24/19   Murtaza Saenz MD   hydrALAZINE (APRESOLINE) 50 mg tablet Take 1 Tab by mouth three (3) times daily. 5/24/19   Murtaza Saenz MD   hydroCHLOROthiazide (HYDRODIURIL) 25 mg tablet Take 1 Tab by mouth daily. 5/25/19   Murtaza Saenz MD   promethazine (PHENERGAN) 12.5 mg tablet Take 1 Tab by mouth every four (4) hours as needed for Nausea. 5/24/19   Murtaza Saenz MD   ferrous sulfate 325 mg (65 mg iron) tablet Take 1 Tab by mouth Daily (before breakfast). 5/24/19   Murtaza Saenz MD   labetalol (NORMODYNE) 300 mg tablet Take 1 Tab by mouth every twelve (12) hours for 30 days. 5/14/19 6/13/19  Cierra Villareal MD   DULoxetine (CYMBALTA) 20 mg capsule Take 1 Cap by mouth two (2) times a day for 30 days. 5/14/19 6/13/19  Cierra Villareal MD   glucose blood VI test strips (CONTOUR NEXT TEST STRIPS) strip Check blood sugar 7-10 times per day, mini med contour next link blood glucose meter 11/27/18   Provider, Historical   [facility] INSULIN 100 unit/mL kwikpen  3/13/19   Provider, Historical   glucagon (GLUCAGON EMERGENCY KIT, HUMAN,) 1 mg injection Glucagon emergency kit, IM injection  Indications: type 1 diabetes, pregnancy 11/6/18   Provider, Historical   pnv w/o calcium-iron fum-fa (COMPLETENATE) 29 mg iron- 1 mg chew Take 1 Tab by mouth. 10/23/18   Provider, Historical       REVIEW OF SYSTEMS:    Constitutional:  No fever or weight loss  HEENT:  No headache or visual changes  Cardiovascular:  No chest pain, no palpitations. Respiratory:  No coughing, wheezing, or shortness of breath. GI:  Abdominal pain. Nausea and vomiting. No diarrhea  :  No hematuria or dysuria. No frequency, retention, urinary incontinence.   Skin:  No rashes or moles  Neuro:  No seizures or syncope  Hematological:  No bruising or bleeding  Endocrine:  No diabetes or thyroid disease  Objective:   VITALS: Visit Vitals  BP (!) 146/91   Pulse 94   Temp 98.1 °F (36.7 °C)   Resp 13   Ht 5' 8\" (1.727 m)   Wt 69.4 kg (153 lb)   SpO2 99%   BMI 23.26 kg/m²       O2 Device: Room air    PHYSICAL EXAM:   General:    Lying in bed in no acute distress. Looking weak. HEENT:  Pupils equal.  Sclera anicteric. Conjunctiva pink. Mucous membranes                           moist  Neck:  Supple. Trachea midline. No accessory muscle use. No thyromegaly. No jugular venous distention  CV:                  Regular rate and rhythm. Lungs:   Clear to auscultation bilaterally. No Wheezing or Rhonchi. No rales. Normal to percussion  Abdomen:   Soft, non-tender. Not distended. Bowel sounds normal. No organomegaly  Extremities: No cyanosis. No edema. No clubbing  Neurologic: Alert and oriented X 3. Skin:                Warm and dry. No rashes. LAB DATA REVIEWED:    Recent Results (from the past 24 hour(s))   BETA HCG, QT    Collection Time: 05/25/19  5:17 PM   Result Value Ref Range    Beta HCG,  (H) 0 - 6 MIU/ML   METABOLIC PANEL, COMPREHENSIVE    Collection Time: 05/25/19  5:17 PM   Result Value Ref Range    Sodium 139 136 - 145 mmol/L    Potassium 4.1 3.5 - 5.1 mmol/L    Chloride 104 97 - 108 mmol/L    CO2 24 21 - 32 mmol/L    Anion gap 11 5 - 15 mmol/L    Glucose 209 (H) 65 - 100 mg/dL    BUN 32 (H) 6 - 20 MG/DL    Creatinine 1.67 (H) 0.55 - 1.02 MG/DL    BUN/Creatinine ratio 19 12 - 20      GFR est AA 43 (L) >60 ml/min/1.73m2    GFR est non-AA 36 (L) >60 ml/min/1.73m2    Calcium 9.9 8.5 - 10.1 MG/DL    Bilirubin, total 0.4 0.2 - 1.0 MG/DL    ALT (SGPT) 18 12 - 78 U/L    AST (SGOT) 22 15 - 37 U/L    Alk.  phosphatase 97 45 - 117 U/L    Protein, total 7.5 6.4 - 8.2 g/dL    Albumin 2.5 (L) 3.5 - 5.0 g/dL    Globulin 5.0 (H) 2.0 - 4.0 g/dL    A-G Ratio 0.5 (L) 1.1 - 2.2     LIPASE    Collection Time: 05/25/19  5:17 PM   Result Value Ref Range    Lipase 95 73 - 393 U/L   CBC WITH AUTOMATED DIFF    Collection Time: 05/25/19  5:17 PM   Result Value Ref Range    WBC 15.4 (H) 3.6 - 11.0 K/uL    RBC 3.28 (L) 3.80 - 5.20 M/uL    HGB 8.5 (L) 11.5 - 16.0 g/dL    HCT 28.0 (L) 35.0 - 47.0 %    MCV 85.4 80.0 - 99.0 FL    MCH 25.9 (L) 26.0 - 34.0 PG    MCHC 30.4 30.0 - 36.5 g/dL    RDW 16.8 (H) 11.5 - 14.5 %    PLATELET 895 (H) 426 - 400 K/uL    MPV 10.3 8.9 - 12.9 FL    NRBC 0.0 0  WBC    ABSOLUTE NRBC 0.00 0.00 - 0.01 K/uL    NEUTROPHILS 81 (H) 32 - 75 %    LYMPHOCYTES 12 12 - 49 %    MONOCYTES 4 (L) 5 - 13 %    EOSINOPHILS 1 0 - 7 %    BASOPHILS 1 0 - 1 %    IMMATURE GRANULOCYTES 1 (H) 0.0 - 0.5 %    ABS. NEUTROPHILS 12.4 (H) 1.8 - 8.0 K/UL    ABS. LYMPHOCYTES 1.8 0.8 - 3.5 K/UL    ABS. MONOCYTES 0.6 0.0 - 1.0 K/UL    ABS. EOSINOPHILS 0.2 0.0 - 0.4 K/UL    ABS. BASOPHILS 0.2 (H) 0.0 - 0.1 K/UL    ABS. IMM. GRANS. 0.2 (H) 0.00 - 0.04 K/UL    DF SMEAR SCANNED      RBC COMMENTS ANISOCYTOSIS  1+       SAMPLES BEING HELD    Collection Time: 05/25/19  5:17 PM   Result Value Ref Range    SAMPLES BEING HELD 1RED, 1BLU     COMMENT        Add-on orders for these samples will be processed based on acceptable specimen integrity and analyte stability, which may vary by analyte. POC VENOUS BLOOD GAS    Collection Time: 05/25/19  5:34 PM   Result Value Ref Range    Device: ROOM AIR      pH, venous (POC) 7.386 7.32 - 7.42      pCO2, venous (POC) 46.1 41 - 51 MMHG    pO2, venous (POC) 26 25 - 40 mmHg    HCO3, venous (POC) 27.7 23.0 - 28.0 MMOL/L    sO2, venous (POC) 47 (L) 65 - 88 %    Base excess, venous (POC) 3 mmol/L    Allens test (POC) N/A      Total resp.  rate 19      Site OTHER      Specimen type (POC) VENOUS BLOOD     URINALYSIS W/MICROSCOPIC    Collection Time: 05/25/19  7:04 PM   Result Value Ref Range    Color YELLOW/STRAW      Appearance CLEAR CLEAR      Specific gravity 1.017 1.003 - 1.030      pH (UA) 7.5 5.0 - 8.0      Protein 300 (A) NEG mg/dL    Glucose 250 (A) NEG mg/dL Ketone TRACE (A) NEG mg/dL    Bilirubin NEGATIVE  NEG      Blood SMALL (A) NEG      Urobilinogen 0.2 0.2 - 1.0 EU/dL    Nitrites NEGATIVE  NEG      Leukocyte Esterase NEGATIVE  NEG      WBC 5-10 0 - 4 /hpf    RBC 10-20 0 - 5 /hpf    Epithelial cells FEW FEW /lpf    Bacteria NEGATIVE  NEG /hpf   URINE CULTURE HOLD SAMPLE    Collection Time: 05/25/19  7:04 PM   Result Value Ref Range    Urine culture hold        URINE ON HOLD IN MICROBIOLOGY DEPT FOR 3 DAYS. IF UNPRESERVED URINE IS SUBMITTED, IT CANNOT BE USED FOR ADDITIONAL TESTING AFTER 24 HRS, RECOLLECTION WILL BE REQUIRED. GLUCOSE, POC    Collection Time: 05/25/19  8:14 PM   Result Value Ref Range    Glucose (POC) 166 (H) 65 - 100 mg/dL    Performed by Lynette Amin        Assessment/Plan:      Active Problems:    Intractable nausea and vomiting (5/25/2019)      MARIS (acute kidney injury) ([facility] (5/25/2019)      Dehydration (5/25/2019)      Diabetes mellitus ([facility] (5/25/2019)       ___________________________________________________  PLAN:    1. Intractable nausea and vomiting   -On zofran iv  -IV fluid  -Monitor electrolytes    2. MARIS (acute kidney injury)/Dehydration  -Due to ongoing vomiting  -IV fluid  -Follow kidney functions. 3.Diabetes mellitus  -SSI   -Pt is on insulin pump    4. Hypertension  -Resume routine meds  -Hydralazine iv prn    DVT prophylaxis:sc heparin  Full code  Baseline:walking without help.    ___________________________________________________  Admitting Physician: Qiana Palma MD

## 2019-05-26 NOTE — ROUTINE PROCESS
Bedside and Verbal shift change report given to Mc (oncoming nurse) by Jason Gamez (offgoing nurse). Report included the following information SBAR, Intake/Output, MAR and Cardiac Rhythm NSR.

## 2019-05-26 NOTE — ROUTINE PROCESS
TRANSFER - OUT REPORT: 
 
Verbal report given to Akash Franklin RN(name) on [employer]  being transferred to (unit) for routine progression of care Report consisted of patients Situation, Background, Assessment and  
Recommendations(SBAR). Information from the following report(s) SBAR, ED Summary, [facility] and Recent Results was reviewed with the receiving nurse. Lines:  
Peripheral IV 05/25/19 Right Antecubital (Active) Site Assessment Clean 5/25/2019  6:11 PM  
Phlebitis Assessment 0 5/25/2019  6:11 PM  
Infiltration Assessment 4 5/25/2019  6:11 PM  
Dressing Status Clean, dry, & intact 5/25/2019  6:11 PM  
Dressing Type Transparent 5/25/2019  6:11 PM  
Hub Color/Line Status Pink 5/25/2019  6:11 PM  
Action Taken Blood drawn 5/25/2019  6:11 PM  
  
 
Opportunity for questions and clarification was provided. Patient transported with: 
 Monitor Registered Nurse Althea Richardson RN

## 2019-05-26 NOTE — PROGRESS NOTES
Problem: Diabetes Self-Management  Goal: *Disease process and treatment process  Description  Define diabetes and identify own type of diabetes; list 3 options for treating diabetes. Outcome: Progressing Towards Goal  Goal: *Incorporating nutritional management into lifestyle  Description  Describe effect of type, amount and timing of food on blood glucose; list 3 methods for planning meals. Outcome: Progressing Towards Goal  Goal: *Incorporating physical activity into lifestyle  Description  State effect of exercise on blood glucose levels. Outcome: Progressing Towards Goal  Goal: *Developing strategies to promote health/change behavior  Description  Define the ABC's of diabetes; identify appropriate screenings, schedule and personal plan for screenings. Outcome: Progressing Towards Goal  Goal: *Using medications safely  Description  State effect of diabetes medications on diabetes; name diabetes medication taking, action and side effects. Outcome: Progressing Towards Goal  Goal: *Monitoring blood glucose, interpreting and using results  Description  Identify recommended blood glucose targets  and personal targets. Outcome: Progressing Towards Goal  Goal: *Prevention, detection, treatment of acute complications  Description  List symptoms of hyper- and hypoglycemia; describe how to treat low blood sugar and actions for lowering  high blood glucose level. Outcome: Progressing Towards Goal  Goal: *Prevention, detection and treatment of chronic complications  Description  Define the natural course of diabetes and describe the relationship of blood glucose levels to long term complications of diabetes.   Outcome: Progressing Towards Goal  Goal: *Developing strategies to address psychosocial issues  Description  Describe feelings about living with diabetes; identify support needed and support network  Outcome: Progressing Towards Goal  Goal: *Insulin pump training  Outcome: Progressing Towards Goal  Goal: *Sick day guidelines  Outcome: Progressing Towards Goal  Goal: *Patient Specific Goal (EDIT GOAL, INSERT TEXT)  Outcome: Progressing Towards Goal     Problem: Patient Education: Go to Patient Education Activity  Goal: Patient/Family Education  Outcome: Progressing Towards Goal     Problem: Falls - Risk of  Goal: *Absence of Falls  Description  Document Jd Carranza Fall Risk and appropriate interventions in the flowsheet.   Outcome: Progressing Towards Goal  Note:   Fall Risk Interventions:            Medication Interventions: Patient to call before getting OOB                   Problem: Patient Education: Go to Patient Education Activity  Goal: Patient/Family Education  Outcome: Progressing Towards Goal     Problem: Pain  Goal: *Control of Pain  Outcome: Progressing Towards Goal  Goal: *PALLIATIVE CARE:  Alleviation of Pain  Outcome: Progressing Towards Goal

## 2019-05-27 LAB
ANION GAP SERPL CALC-SCNC: 11 MMOL/L (ref 5–15)
ANION GAP SERPL CALC-SCNC: 9 MMOL/L (ref 5–15)
BASOPHILS # BLD: 0.1 K/UL (ref 0–0.1)
BASOPHILS # BLD: 0.1 K/UL (ref 0–0.1)
BASOPHILS NFR BLD: 1 % (ref 0–1)
BASOPHILS NFR BLD: 1 % (ref 0–1)
BUN SERPL-MCNC: 18 MG/DL (ref 6–20)
BUN SERPL-MCNC: 21 MG/DL (ref 6–20)
BUN/CREAT SERPL: 14 (ref 12–20)
BUN/CREAT SERPL: 18 (ref 12–20)
CALCIUM SERPL-MCNC: 7.8 MG/DL (ref 8.5–10.1)
CALCIUM SERPL-MCNC: 8.3 MG/DL (ref 8.5–10.1)
CHLORIDE SERPL-SCNC: 107 MMOL/L (ref 97–108)
CHLORIDE SERPL-SCNC: 107 MMOL/L (ref 97–108)
CO2 SERPL-SCNC: 19 MMOL/L (ref 21–32)
CO2 SERPL-SCNC: 22 MMOL/L (ref 21–32)
CREAT SERPL-MCNC: 1.14 MG/DL (ref 0.55–1.02)
CREAT SERPL-MCNC: 1.25 MG/DL (ref 0.55–1.02)
DATE LAST DOSE: NORMAL
DIFFERENTIAL METHOD BLD: ABNORMAL
DIFFERENTIAL METHOD BLD: ABNORMAL
EOSINOPHIL # BLD: 0.2 K/UL (ref 0–0.4)
EOSINOPHIL # BLD: 0.2 K/UL (ref 0–0.4)
EOSINOPHIL NFR BLD: 2 % (ref 0–7)
EOSINOPHIL NFR BLD: 3 % (ref 0–7)
ERYTHROCYTE [DISTWIDTH] IN BLOOD BY AUTOMATED COUNT: 16.6 % (ref 11.5–14.5)
ERYTHROCYTE [DISTWIDTH] IN BLOOD BY AUTOMATED COUNT: 16.8 % (ref 11.5–14.5)
GENTAMICIN SERPL-MCNC: 6.9 UG/ML
GLUCOSE BLD STRIP.AUTO-MCNC: 130 MG/DL (ref 65–100)
GLUCOSE BLD STRIP.AUTO-MCNC: 148 MG/DL (ref 65–100)
GLUCOSE BLD STRIP.AUTO-MCNC: 156 MG/DL (ref 65–100)
GLUCOSE BLD STRIP.AUTO-MCNC: 180 MG/DL (ref 65–100)
GLUCOSE SERPL-MCNC: 146 MG/DL (ref 65–100)
GLUCOSE SERPL-MCNC: 156 MG/DL (ref 65–100)
HCT VFR BLD AUTO: 26.8 % (ref 35–47)
HCT VFR BLD AUTO: 27.8 % (ref 35–47)
HGB BLD-MCNC: 8.1 G/DL (ref 11.5–16)
HGB BLD-MCNC: 8.4 G/DL (ref 11.5–16)
IMM GRANULOCYTES # BLD AUTO: 0 K/UL (ref 0–0.04)
IMM GRANULOCYTES # BLD AUTO: 0 K/UL (ref 0–0.04)
IMM GRANULOCYTES NFR BLD AUTO: 0 % (ref 0–0.5)
IMM GRANULOCYTES NFR BLD AUTO: 1 % (ref 0–0.5)
LIPASE SERPL-CCNC: 61 U/L (ref 73–393)
LYMPHOCYTES # BLD: 2.1 K/UL (ref 0.8–3.5)
LYMPHOCYTES # BLD: 2.2 K/UL (ref 0.8–3.5)
LYMPHOCYTES NFR BLD: 25 % (ref 12–49)
LYMPHOCYTES NFR BLD: 27 % (ref 12–49)
MCH RBC QN AUTO: 26.1 PG (ref 26–34)
MCH RBC QN AUTO: 26.1 PG (ref 26–34)
MCHC RBC AUTO-ENTMCNC: 30.2 G/DL (ref 30–36.5)
MCHC RBC AUTO-ENTMCNC: 30.2 G/DL (ref 30–36.5)
MCV RBC AUTO: 86.3 FL (ref 80–99)
MCV RBC AUTO: 86.5 FL (ref 80–99)
MONOCYTES # BLD: 0.6 K/UL (ref 0–1)
MONOCYTES # BLD: 0.7 K/UL (ref 0–1)
MONOCYTES NFR BLD: 7 % (ref 5–13)
MONOCYTES NFR BLD: 9 % (ref 5–13)
NEUTS SEG # BLD: 4.8 K/UL (ref 1.8–8)
NEUTS SEG # BLD: 5.6 K/UL (ref 1.8–8)
NEUTS SEG NFR BLD: 60 % (ref 32–75)
NEUTS SEG NFR BLD: 64 % (ref 32–75)
NRBC # BLD: 0 K/UL (ref 0–0.01)
NRBC # BLD: 0 K/UL (ref 0–0.01)
NRBC BLD-RTO: 0 PER 100 WBC
NRBC BLD-RTO: 0 PER 100 WBC
PLATELET # BLD AUTO: 440 K/UL (ref 150–400)
PLATELET # BLD AUTO: 491 K/UL (ref 150–400)
PMV BLD AUTO: 9.3 FL (ref 8.9–12.9)
PMV BLD AUTO: 9.9 FL (ref 8.9–12.9)
POTASSIUM SERPL-SCNC: 3.7 MMOL/L (ref 3.5–5.1)
POTASSIUM SERPL-SCNC: 3.7 MMOL/L (ref 3.5–5.1)
RBC # BLD AUTO: 3.1 M/UL (ref 3.8–5.2)
RBC # BLD AUTO: 3.22 M/UL (ref 3.8–5.2)
REPORTED DOSE,DOSE: NORMAL UNITS
REPORTED DOSE/TIME,TMG: NORMAL
SERVICE CMNT-IMP: ABNORMAL
SODIUM SERPL-SCNC: 137 MMOL/L (ref 136–145)
SODIUM SERPL-SCNC: 138 MMOL/L (ref 136–145)
WBC # BLD AUTO: 7.8 K/UL (ref 3.6–11)
WBC # BLD AUTO: 8.7 K/UL (ref 3.6–11)

## 2019-05-27 PROCEDURE — 74011250637 HC RX REV CODE- 250/637: Performed by: INTERNAL MEDICINE

## 2019-05-27 PROCEDURE — 85025 COMPLETE CBC W/AUTO DIFF WBC: CPT

## 2019-05-27 PROCEDURE — 74011250636 HC RX REV CODE- 250/636: Performed by: INTERNAL MEDICINE

## 2019-05-27 PROCEDURE — 87205 SMEAR GRAM STAIN: CPT

## 2019-05-27 PROCEDURE — 87147 CULTURE TYPE IMMUNOLOGIC: CPT

## 2019-05-27 PROCEDURE — 94760 N-INVAS EAR/PLS OXIMETRY 1: CPT

## 2019-05-27 PROCEDURE — 80170 ASSAY OF GENTAMICIN: CPT

## 2019-05-27 PROCEDURE — 80048 BASIC METABOLIC PNL TOTAL CA: CPT

## 2019-05-27 PROCEDURE — 65660000000 HC RM CCU STEPDOWN

## 2019-05-27 PROCEDURE — 82962 GLUCOSE BLOOD TEST: CPT

## 2019-05-27 PROCEDURE — 74011250636 HC RX REV CODE- 250/636: Performed by: OBSTETRICS & GYNECOLOGY

## 2019-05-27 PROCEDURE — 74011000250 HC RX REV CODE- 250: Performed by: INTERNAL MEDICINE

## 2019-05-27 PROCEDURE — 36415 COLL VENOUS BLD VENIPUNCTURE: CPT

## 2019-05-27 PROCEDURE — 74011636637 HC RX REV CODE- 636/637: Performed by: INTERNAL MEDICINE

## 2019-05-27 PROCEDURE — 74011000258 HC RX REV CODE- 258: Performed by: OBSTETRICS & GYNECOLOGY

## 2019-05-27 PROCEDURE — 87185 SC STD ENZYME DETCJ PER NZM: CPT

## 2019-05-27 RX ORDER — CLINDAMYCIN PHOSPHATE 900 MG/50ML
900 INJECTION INTRAVENOUS EVERY 8 HOURS
Status: COMPLETED | OUTPATIENT
Start: 2019-05-27 | End: 2019-05-28

## 2019-05-27 RX ORDER — HYDRALAZINE HYDROCHLORIDE 50 MG/1
100 TABLET, FILM COATED ORAL 3 TIMES DAILY
Status: DISCONTINUED | OUTPATIENT
Start: 2019-05-27 | End: 2019-05-28 | Stop reason: HOSPADM

## 2019-05-27 RX ORDER — HYDROMORPHONE HYDROCHLORIDE 1 MG/ML
0.5 INJECTION, SOLUTION INTRAMUSCULAR; INTRAVENOUS; SUBCUTANEOUS
Status: DISCONTINUED | OUTPATIENT
Start: 2019-05-27 | End: 2019-05-28

## 2019-05-27 RX ORDER — CLINDAMYCIN HYDROCHLORIDE 150 MG/1
600 CAPSULE ORAL EVERY 6 HOURS
Status: DISCONTINUED | OUTPATIENT
Start: 2019-05-28 | End: 2019-05-28 | Stop reason: HOSPADM

## 2019-05-27 RX ORDER — OXYCODONE AND ACETAMINOPHEN 5; 325 MG/1; MG/1
1 TABLET ORAL
Status: DISCONTINUED | OUTPATIENT
Start: 2019-05-27 | End: 2019-05-28 | Stop reason: HOSPADM

## 2019-05-27 RX ORDER — DEXTROSE 50 % IN WATER (D50W) INTRAVENOUS SYRINGE
12.5-25 AS NEEDED
Status: DISCONTINUED | OUTPATIENT
Start: 2019-05-27 | End: 2019-05-28 | Stop reason: HOSPADM

## 2019-05-27 RX ORDER — MAGNESIUM SULFATE 100 %
4 CRYSTALS MISCELLANEOUS AS NEEDED
Status: DISCONTINUED | OUTPATIENT
Start: 2019-05-27 | End: 2019-05-28 | Stop reason: HOSPADM

## 2019-05-27 RX ADMIN — INSULIN LISPRO 2 UNITS: 100 INJECTION, SOLUTION INTRAVENOUS; SUBCUTANEOUS at 06:30

## 2019-05-27 RX ADMIN — HYDROCHLOROTHIAZIDE 25 MG: 25 TABLET ORAL at 08:55

## 2019-05-27 RX ADMIN — OXYCODONE HYDROCHLORIDE AND ACETAMINOPHEN 1 TABLET: 5; 325 TABLET ORAL at 17:39

## 2019-05-27 RX ADMIN — DULOXETINE HYDROCHLORIDE 20 MG: 20 CAPSULE, DELAYED RELEASE ORAL at 17:39

## 2019-05-27 RX ADMIN — ONDANSETRON 4 MG: 2 INJECTION INTRAMUSCULAR; INTRAVENOUS at 10:19

## 2019-05-27 RX ADMIN — HYDROMORPHONE HYDROCHLORIDE 0.5 MG: 1 INJECTION, SOLUTION INTRAMUSCULAR; INTRAVENOUS; SUBCUTANEOUS at 12:42

## 2019-05-27 RX ADMIN — HEPARIN SODIUM 5000 UNITS: 5000 INJECTION INTRAVENOUS; SUBCUTANEOUS at 21:28

## 2019-05-27 RX ADMIN — HYDRALAZINE HYDROCHLORIDE 10 MG: 20 INJECTION INTRAMUSCULAR; INTRAVENOUS at 07:38

## 2019-05-27 RX ADMIN — GENTAMICIN SULFATE 325 MG: 40 INJECTION, SOLUTION INTRAMUSCULAR; INTRAVENOUS at 13:03

## 2019-05-27 RX ADMIN — LABETALOL HCL 300 MG: 200 TABLET, FILM COATED ORAL at 21:27

## 2019-05-27 RX ADMIN — DULOXETINE HYDROCHLORIDE 20 MG: 20 CAPSULE, DELAYED RELEASE ORAL at 08:55

## 2019-05-27 RX ADMIN — PROCHLORPERAZINE EDISYLATE 5 MG: 5 INJECTION INTRAMUSCULAR; INTRAVENOUS at 13:50

## 2019-05-27 RX ADMIN — ONDANSETRON 4 MG: 2 INJECTION INTRAMUSCULAR; INTRAVENOUS at 04:23

## 2019-05-27 RX ADMIN — SODIUM CHLORIDE 75 ML/HR: 900 INJECTION, SOLUTION INTRAVENOUS at 10:12

## 2019-05-27 RX ADMIN — PROCHLORPERAZINE EDISYLATE 5 MG: 5 INJECTION INTRAMUSCULAR; INTRAVENOUS at 23:35

## 2019-05-27 RX ADMIN — HYDRALAZINE HYDROCHLORIDE 100 MG: 50 TABLET, FILM COATED ORAL at 15:37

## 2019-05-27 RX ADMIN — CLINDAMYCIN PHOSPHATE 900 MG: 900 INJECTION, SOLUTION INTRAVENOUS at 21:27

## 2019-05-27 RX ADMIN — HEPARIN SODIUM 5000 UNITS: 5000 INJECTION INTRAVENOUS; SUBCUTANEOUS at 04:23

## 2019-05-27 RX ADMIN — PROCHLORPERAZINE EDISYLATE 5 MG: 5 INJECTION INTRAMUSCULAR; INTRAVENOUS at 07:39

## 2019-05-27 RX ADMIN — CLINDAMYCIN PHOSPHATE 900 MG: 900 INJECTION, SOLUTION INTRAVENOUS at 12:07

## 2019-05-27 RX ADMIN — Medication 10 ML: at 13:04

## 2019-05-27 RX ADMIN — HEPARIN SODIUM 5000 UNITS: 5000 INJECTION INTRAVENOUS; SUBCUTANEOUS at 12:12

## 2019-05-27 RX ADMIN — LABETALOL HCL 300 MG: 200 TABLET, FILM COATED ORAL at 08:55

## 2019-05-27 RX ADMIN — HYDROMORPHONE HYDROCHLORIDE 0.5 MG: 1 INJECTION, SOLUTION INTRAMUSCULAR; INTRAVENOUS; SUBCUTANEOUS at 02:00

## 2019-05-27 RX ADMIN — HYDROMORPHONE HYDROCHLORIDE 0.5 MG: 1 INJECTION, SOLUTION INTRAMUSCULAR; INTRAVENOUS; SUBCUTANEOUS at 06:14

## 2019-05-27 RX ADMIN — FERROUS SULFATE TAB 325 MG (65 MG ELEMENTAL FE) 325 MG: 325 (65 FE) TAB at 06:30

## 2019-05-27 RX ADMIN — OXYCODONE HYDROCHLORIDE AND ACETAMINOPHEN 1 TABLET: 5; 325 TABLET ORAL at 10:10

## 2019-05-27 NOTE — PROGRESS NOTES
Hospitalist Progress Note      Hospital summary: Brian Dudley is a 30 y.o.  female with h/o heart murmur,Diabetes on insulin pump, DKA, chronic pain, uncontrolled DMT1, HSV infection, HTN, ventricular tachycardia, anemia, depression, and peripheral neuropathy,came to ED with complaint of ongoing vomiting. Patient says that she thought that she was in DKA. Patient says that she was recently discharged from hospital because of uncontrolled diabetes. She denies fever or chills. She denies any h/o substance abuse. In ED,patient was hypertensive /113. Lab showed glucose 209,creatinine 1.67. Patient was admitted for dehydration,MARIS due to persistent nausea,vomiting 5/25/2019      Assessment/Plan:  Intractable nausea and vomiting with abdominal pain  Postpartum 2 weeks  Hx preeclampsia with c section 5/10  - lower abd pain still present   - vomiting subsided but still nauseous.   - lipase negative. - CT abd: no acute pathology  - IVF, antiemetics, pain meds  - Obgyn consulted  - Discussed with Dr. Shaikh Pry  -  concern for postpartum endometritis   - vaginal cx sent   - started on IV abx Clindamycin and gentamycin     MARIS - improved  -Due to dehydration 2/2 vomiting  -IV fluid  -Follow kidney functions.     Hypertension - uncontrolled  - c/w HCTZ,labetalol. Increased hydralazine  - hydralazine iv prn    Diabetes mellitus type 1  -a1c is 7.2   -Pt is on insulin pump  - DM education    Depression: concern for postpartum depression  - psychiatry consult placed  - c/w cymbalta     Code status: Full  DVT prophylaxis: Heparin  Disposition: TBD. Home when ready  ----------------------------------------------    CC: Nausea/ vomiting    S: Patient is seen and examined at bedside. She still has lower abdomen tenderness. Surgical site looks clean. She c/o vaginal discharge - 2 weeks postpartum. Nausea improved. Discussed with nursing.      Review of Systems:  A comprehensive review of systems was negative. O:  Visit Vitals  /89 (BP 1 Location: Right arm, BP Patient Position: At rest)   Pulse 87   Temp 98.7 °F (37.1 °C)   Resp 16   Ht 5' 8\" (1.727 m)   Wt 65.3 kg (143 lb 15.4 oz)   SpO2 98%   Breastfeeding?  No   BMI 21.89 kg/m²       PHYSICAL EXAM:  Gen: mild distress due to abd pain  HEENT: anicteric sclerae, normal conjunctiva, oropharynx clear, MM moist  Neck: supple, trachea midline, no adenopathy  Heart: RRR, no MRG, no JVD, no peripheral edema  Lungs: CTA b/l, non-labored respirations  Abd: soft, diffuse tenderness - lower abd  Extr: warm  Skin: dry, no rash  Neuro: CN II-XII grossly intact, normal speech, moves all extremities  Psych: low mood       Intake/Output Summary (Last 24 hours) at 5/27/2019 1317  Last data filed at 5/27/2019 0315  Gross per 24 hour   Intake 3806.66 ml   Output    Net 3806.66 ml        Recent labs & imaging reviewed:  Recent Results (from the past 24 hour(s))   GLUCOSE, POC    Collection Time: 05/26/19  4:42 PM   Result Value Ref Range    Glucose (POC) 91 65 - 100 mg/dL    Performed by [facility]    GLUCOSE, POC    Collection Time: 05/26/19  6:25 PM   Result Value Ref Range    Glucose (POC) 110 (H) 65 - 100 mg/dL    Performed by [facility]    GLUCOSE, POC    Collection Time: 05/26/19  9:22 PM   Result Value Ref Range    Glucose (POC) 124 (H) 65 - 100 mg/dL    Performed by [address], BASIC    Collection Time: 05/27/19  5:33 AM   Result Value Ref Range    Sodium 137 136 - 145 mmol/L    Potassium 3.7 3.5 - 5.1 mmol/L    Chloride 107 97 - 108 mmol/L    CO2 19 (L) 21 - 32 mmol/L    Anion gap 11 5 - 15 mmol/L    Glucose 156 (H) 65 - 100 mg/dL    BUN 21 (H) 6 - 20 MG/DL    Creatinine 1.14 (H) 0.55 - 1.02 MG/DL    BUN/Creatinine ratio 18 12 - 20      GFR est AA >60 >60 ml/min/1.73m2    GFR est non-AA 56 (L) >60 ml/min/1.73m2    Calcium 7.8 (L) 8.5 - 10.1 MG/DL   CBC WITH AUTOMATED DIFF    Collection Time: 05/27/19  5:33 AM   Result Value Ref Range    WBC 8.7 3.6 - 11.0 K/uL    RBC 3.22 (L) 3.80 - 5.20 M/uL    HGB 8.4 (L) 11.5 - 16.0 g/dL    HCT 27.8 (L) 35.0 - 47.0 %    MCV 86.3 80.0 - 99.0 FL    MCH 26.1 26.0 - 34.0 PG    MCHC 30.2 30.0 - 36.5 g/dL    RDW 16.8 (H) 11.5 - 14.5 %    PLATELET 969 (H) 183 - 400 K/uL    MPV 9.3 8.9 - 12.9 FL    NRBC 0.0 0  WBC    ABSOLUTE NRBC 0.00 0.00 - 0.01 K/uL    NEUTROPHILS 64 32 - 75 %    LYMPHOCYTES 25 12 - 49 %    MONOCYTES 7 5 - 13 %    EOSINOPHILS 2 0 - 7 %    BASOPHILS 1 0 - 1 %    IMMATURE GRANULOCYTES 1 (H) 0.0 - 0.5 %    ABS. NEUTROPHILS 5.6 1.8 - 8.0 K/UL    ABS. LYMPHOCYTES 2.2 0.8 - 3.5 K/UL    ABS. MONOCYTES 0.6 0.0 - 1.0 K/UL    ABS. EOSINOPHILS 0.2 0.0 - 0.4 K/UL    ABS. BASOPHILS 0.1 0.0 - 0.1 K/UL    ABS. IMM. GRANS. 0.0 0.00 - 0.04 K/UL    DF AUTOMATED     GLUCOSE, POC    Collection Time: 05/27/19  6:26 AM   Result Value Ref Range    Glucose (POC) 156 (H) 65 - 100 mg/dL    Performed by Amy Martin, POC    Collection Time: 05/27/19 11:14 AM   Result Value Ref Range    Glucose (POC) 130 (H) 65 - 100 mg/dL    Performed by Niya Jean      Recent Labs     05/27/19 0533 05/26/19 0225   WBC 8.7 10.4   HGB 8.4* 8.7*   HCT 27.8* 28.2*   * 486*     Recent Labs     05/27/19 0533 05/26/19 0225 05/25/19  1717    142 139   K 3.7 3.8 4.1    110* 104   CO2 19* 23 24   BUN 21* 26* 32*   CREA 1.14* 1.17* 1.67*   * 166* 209*   CA 7.8* 8.8 9.9     Recent Labs     05/26/19 0225 05/25/19  1717   SGOT  --  22   ALT  --  18   AP  --  97   TBILI  --  0.4   TP  --  7.5   ALB  --  2.5*   GLOB  --  5.0*   LPSE 61* 95     No results for input(s): INR, PTP, APTT in the last 72 hours. No lab exists for component: INREXT, INREXT   No results for input(s): FE, TIBC, PSAT, FERR in the last 72 hours. Lab Results   Component Value Date/Time    Folate 26.9 (H) 09/03/2012 05:45 AM      No results for input(s): PH, PCO2, PO2 in the last 72 hours.   No results for input(s): CPK, CKNDX, TROIQ in the last 72 hours.     No lab exists for component: CPKMB  Lab Results   Component Value Date/Time    Cholesterol, total 160 05/15/2016 12:09 AM    HDL Cholesterol 41 05/15/2016 12:09 AM    LDL, calculated 98 05/15/2016 12:09 AM    Triglyceride 105 05/15/2016 12:09 AM    CHOL/HDL Ratio 3.9 05/15/2016 12:09 AM     Lab Results   Component Value Date/Time    Glucose (POC) 130 (H) 05/27/2019 11:14 AM    Glucose (POC) 156 (H) 05/27/2019 06:26 AM    Glucose (POC) 124 (H) 05/26/2019 09:22 PM    Glucose (POC) 110 (H) 05/26/2019 06:25 PM    Glucose (POC) 91 05/26/2019 04:42 PM     Lab Results   Component Value Date/Time    Color YELLOW/STRAW 05/25/2019 07:04 PM    Appearance CLEAR 05/25/2019 07:04 PM    Specific gravity 1.017 05/25/2019 07:04 PM    Specific gravity 1.010 04/07/2019 07:07 PM    pH (UA) 7.5 05/25/2019 07:04 PM    Protein 300 (A) 05/25/2019 07:04 PM    Glucose 250 (A) 05/25/2019 07:04 PM    Ketone TRACE (A) 05/25/2019 07:04 PM    Bilirubin NEGATIVE  05/25/2019 07:04 PM    Urobilinogen 0.2 05/25/2019 07:04 PM    Nitrites NEGATIVE  05/25/2019 07:04 PM    Leukocyte Esterase NEGATIVE  05/25/2019 07:04 PM    Epithelial cells FEW 05/25/2019 07:04 PM    Bacteria NEGATIVE  05/25/2019 07:04 PM    WBC 5-10 05/25/2019 07:04 PM    RBC 10-20 05/25/2019 07:04 PM       Med list reviewed  Current Facility-Administered Medications   Medication Dose Route Frequency    hydrALAZINE (APRESOLINE) tablet 100 mg  100 mg Oral TID    HYDROmorphone (PF) (DILAUDID) injection 0.5 mg  0.5 mg IntraVENous Q6H PRN    oxyCODONE-acetaminophen (PERCOCET) 5-325 mg per tablet 1 Tab  1 Tab Oral Q6H PRN    insulin pump (PATIENT SUPPLIED)   SubCUTAneous PRN    glucose chewable tablet 16 g  4 Tab Oral PRN    dextrose (D50W) injection syrg 12.5-25 g  12.5-25 g IntraVENous PRN    glucagon (GLUCAGEN) injection 1 mg  1 mg IntraMUSCular PRN    clindamycin (CLEOCIN) 900mg D5W 50mL IVPB (premix)  900 mg IntraVENous Q8H    [START ON 5/28/2019] clindamycin (CLEOCIN) capsule 600 mg  600 mg Oral Q6H    gentamicin (GARAMYCIN) 325 mg in 0.9% sodium chloride 100 mL IVPB  325 mg IntraVENous ONCE    Gentamicin - Pharmacy to Dose   Other Rx Dosing/Monitoring    ferrous sulfate tablet 325 mg  325 mg Oral ACB    hydroCHLOROthiazide (HYDRODIURIL) tablet 25 mg  25 mg Oral DAILY    acetaminophen (TYLENOL) tablet 650 mg  650 mg Oral Q6H PRN    DULoxetine (CYMBALTA) capsule 20 mg  20 mg Oral BID    0.9% sodium chloride infusion  75 mL/hr IntraVENous CONTINUOUS    sodium chloride (NS) flush 5-40 mL  5-40 mL IntraVENous Q8H    sodium chloride (NS) flush 5-40 mL  5-40 mL IntraVENous PRN    ondansetron (ZOFRAN) injection 4 mg  4 mg IntraVENous Q4H PRN    heparin (porcine) injection 5,000 Units  5,000 Units SubCUTAneous Q8H    labetalol (NORMODYNE) tablet 300 mg  300 mg Oral Q12H    hydrALAZINE (APRESOLINE) 20 mg/mL injection 10 mg  10 mg IntraVENous Q6H PRN    prochlorperazine (COMPAZINE) with saline injection 5 mg  5 mg IntraVENous Q6H PRN       Care Plan discussed with:  Patient/Family and Nurse    Demetris Rebolledo MD  Internal Medicine  Date of Service: 5/27/2019

## 2019-05-27 NOTE — PROGRESS NOTES
Bedside shift change report given to Caity Crystal (oncoming nurse) by Brianna Bunch (offgoing nurse). Report included the following information SBAR, Kardex, MAR, Accordion and Cardiac Rhythm NSR.

## 2019-05-27 NOTE — PROGRESS NOTES
Problem: Diabetes Self-Management  Goal: *Disease process and treatment process  Description  Define diabetes and identify own type of diabetes; list 3 options for treating diabetes. Outcome: Progressing Towards Goal  Goal: *Incorporating nutritional management into lifestyle  Description  Describe effect of type, amount and timing of food on blood glucose; list 3 methods for planning meals. Outcome: Progressing Towards Goal  Goal: *Incorporating physical activity into lifestyle  Description  State effect of exercise on blood glucose levels. Outcome: Progressing Towards Goal  Goal: *Developing strategies to promote health/change behavior  Description  Define the ABC's of diabetes; identify appropriate screenings, schedule and personal plan for screenings. Outcome: Progressing Towards Goal  Goal: *Using medications safely  Description  State effect of diabetes medications on diabetes; name diabetes medication taking, action and side effects. Outcome: Progressing Towards Goal  Goal: *Monitoring blood glucose, interpreting and using results  Description  Identify recommended blood glucose targets  and personal targets. Outcome: Progressing Towards Goal  Goal: *Prevention, detection, treatment of acute complications  Description  List symptoms of hyper- and hypoglycemia; describe how to treat low blood sugar and actions for lowering  high blood glucose level. Outcome: Progressing Towards Goal  Goal: *Prevention, detection and treatment of chronic complications  Description  Define the natural course of diabetes and describe the relationship of blood glucose levels to long term complications of diabetes.   Outcome: Progressing Towards Goal  Goal: *Developing strategies to address psychosocial issues  Description  Describe feelings about living with diabetes; identify support needed and support network  Outcome: Progressing Towards Goal  Goal: *Insulin pump training  Outcome: Progressing Towards Goal  Goal: *Sick day guidelines  Outcome: Progressing Towards Goal  Goal: *Patient Specific Goal (EDIT GOAL, INSERT TEXT)  Outcome: Progressing Towards Goal     Problem: Patient Education: Go to Patient Education Activity  Goal: Patient/Family Education  Outcome: Progressing Towards Goal     Problem: Falls - Risk of  Goal: *Absence of Falls  Description  Document Jd Carranza Fall Risk and appropriate interventions in the flowsheet.   Outcome: Progressing Towards Goal     Problem: Patient Education: Go to Patient Education Activity  Goal: Patient/Family Education  Outcome: Progressing Towards Goal     Problem: Pain  Goal: *Control of Pain  Outcome: Progressing Towards Goal  Goal: *PALLIATIVE CARE:  Alleviation of Pain  Outcome: Progressing Towards Goal     Problem: Nausea/Vomiting (Adult)  Goal: *Absence of nausea/vomiting  Outcome: Progressing Towards Goal  Goal: *Palliation of nausea/vomiting (Palliative Care)  Outcome: Progressing Towards Goal     Problem: Patient Education: Go to Patient Education Activity  Goal: Patient/Family Education  Outcome: Progressing Towards Goal

## 2019-05-27 NOTE — PROGRESS NOTES
Reason for Readmission: Nausea and vomiting, diabetes on insulin pump. s/p  15 days ago. RRAT Score and Risk Level:  18        Level of Readmission:   Moderate. Patient has been readmitted twice since her first discharge on 19, she was discharged on 19 and readmitted on 19       Care Conference scheduled:  Patient was discussed with Dr. Nestor Cao and during [facility]. Resources/supports as identified by patient/family:  Patient's father, her step mother and boyfriend Mona Watts [phone]        Top Challenges facing patient (as identified by patient/family and CM): Uncontrolled diabetes, a new baby and other health challenges       Finances/Medication cost?   Patient has Medicaid HMO. She works at a [employer] and uses her local [facility] for prescriptions   Transportation   Her father or boyfriend Airam Fulton will provide transportation home. Support system or lack thereof? See above       Living arrangements? Patient lives in a private residence           Self-care/ADLs/Cognition? Patient is alert,  oriented x 4, independent and without any assistive devices. Current Advanced Directive/Advance Care Plan:  Not in file           Plan for utilizing home health:  TBD. Patient may benefit from a home visit to avert another readmission. Likelihood of additional readmission:   Moderate             Transition of Care Plan:  CM met with patient with patient to discuss discharge planning and to verify patient's insurance status. Patient verified that she has Medicaid as stated on her demographics of 19 admission. No discharge barriers voiced by patient. CM will continue to follow as needed. Pretty Stearns MSA, RN, CRM. Care Management Interventions  PCP Verified by CM: Yes  Palliative Care Criteria Met (RRAT>21 & CHF Dx)?: No  Mode of Transport at Discharge:  Other (see comment)(Private car)  Transition of Care Consult (CM Consult): Discharge Planning  MyChart Signup: No  Discharge Durable Medical Equipment: No  Health Maintenance Reviewed: Yes  Physical Therapy Consult: No  Occupational Therapy Consult: No  Speech Therapy Consult: No  Current Support Network:  Other  Confirm Follow Up Transport: Family  Plan discussed with Pt/Family/Caregiver: Yes  Discharge Location  Discharge Placement: Home with family assistance

## 2019-05-27 NOTE — CONSULTS
[facility]    CC: consulted by hospitalist service re: postpartum/post-op abdominal pain    HPI:   31 yo  now 2 weeks s/p 1LTCS (on 5/10/19) at 33wks for cat 3 tracing, in setting of CHTN with SI preE with SF (severe range BPs, HAs) and poorly controlled type 1 IDDM (on insulin pump), uncomplicated. Pt now with second readmission to internal medicine service postpartum. Current admission for intractable nausea/vomiting and generalized abdominal pain. Also with hypertensive urgency, and acute on chronic kidney injury. Upon evaluating patient, reports generalized abdominal pain worse for the past 48 hours. Pt reports increased vaginal bleeding/lochia with slightly foul odor. N/V improved with IV antiemetics. She is passing gas and moving her bowels regularly. Denies any issues with pfannenstiel incision (no redness, drainage, swelling/induration/fluctuance, or increased pain). Overall healing well. Low grade fever on admission Temp [address] - but has otherwise been afebrile. No leukocytosis. Pt denies CP, SOB, HA, denies vision changes, RUQ pain, lower extremity edema resolved. Pt was pumping q3 hours at home, but has not pumped since admission to hospital yesterday (needs pump). Denies any breast issues other than mild engorgement at this time (no redness, warmth, pain, or other signs of mastitis). Still with concerns re: postpartum depression - on cymbalta. Baby girl \"Nirmala\" is home, has been discharged from NICU, and is doing well.        Past Medical History:   Diagnosis Date    Anemia     Chronic pain     Depression     Diabetes type 1, uncontrolled ([address]     DKA, type 1 (HCC)     Essential hypertension     Heart murmur     Herpes simplex virus (HSV) infection 2017    positive in blood    Peripheral neuropathy     Ventricular tachycardia ([address]         Past Surgical History:   Procedure Laterality Date    ABDOMEN SURGERY PROC UNLISTED      exploratory laparoscopy    HX CYST REMOVAL      groin       Family History   Problem Relation Age of Onset    No Known Problems Mother     Sleep Apnea Father     Hypertension Father     Diabetes Father     Heart Disease Maternal Grandfather        Social History     Socioeconomic History    Marital status: SINGLE     Spouse name: Not on file    Number of children: Not on file    Years of education: Not on file    Highest education level: Not on file   Occupational History    Not on file   Social Needs    Financial resource strain: Not on file    Food insecurity:     Worry: Not on file     Inability: Not on file    Transportation needs:     Medical: Not on file     Non-medical: Not on file   Tobacco Use    Smoking status: Former Smoker     Packs/day: 0.25     Years: 8.00     Pack years: 2.00     Types: Cigarettes     Last attempt to quit: 10/26/2014     Years since quittin.5    Smokeless tobacco: Never Used   Substance and Sexual Activity    Alcohol use: No     Alcohol/week: 0.0 oz    Drug use: Yes     Frequency: 2.0 times per week     Types: Marijuana    Sexual activity: Yes     Partners: Male     Birth control/protection: None   Lifestyle    Physical activity:     Days per week: Not on file     Minutes per session: Not on file    Stress: Not on file   Relationships    Social connections:     Talks on phone: Not on file     Gets together: Not on file     Attends Taoism service: Not on file     Active member of club or organization: Not on file     Attends meetings of clubs or organizations: Not on file     Relationship status: Not on file    Intimate partner violence:     Fear of current or ex partner: Not on file     Emotionally abused: Not on file     Physically abused: Not on file     Forced sexual activity: Not on file   Other Topics Concern     Service Not Asked    Blood Transfusions Not Asked    Caffeine Concern Not Asked    Occupational Exposure Not Asked    Hobby Hazards Not Asked    Sleep Concern Not Asked    Stress Concern Not Asked    Weight Concern Not Asked    Special Diet Not Asked    Back Care Not Asked    Exercise Not Asked    Bike Helmet Not Asked   [address] Not Asked    Self-Exams Not Asked   Social History Narrative    Not on file       Prior to Admission medications    Medication Sig Start Date End Date Taking? Authorizing Provider   famotidine (PEPCID) 20 mg tablet Take 1 Tab by mouth two (2) times daily as needed (acid reflux). Indications: gastroesophageal reflux disease 5/24/19   Chapis León MD   hydrALAZINE (APRESOLINE) 50 mg tablet Take 1 Tab by mouth three (3) times daily. 5/24/19   Chapis León MD   hydroCHLOROthiazide (HYDRODIURIL) 25 mg tablet Take 1 Tab by mouth daily. 5/25/19   Chapis León MD   promethazine (PHENERGAN) 12.5 mg tablet Take 1 Tab by mouth every four (4) hours as needed for Nausea. 5/24/19   Chapis León MD   ferrous sulfate 325 mg (65 mg iron) tablet Take 1 Tab by mouth Daily (before breakfast). 5/24/19   Chapis León MD   labetalol (NORMODYNE) 300 mg tablet Take 1 Tab by mouth every twelve (12) hours for 30 days. 5/14/19 6/13/19  Julianne Reese MD   DULoxetine (CYMBALTA) 20 mg capsule Take 1 Cap by mouth two (2) times a day for 30 days. 5/14/19 6/13/19  Julianne Reese MD   glucose blood VI test strips (CONTOUR NEXT TEST STRIPS) strip Check blood sugar 7-10 times per day, mini med contour next link blood glucose meter 11/27/18   Provider, Historical   HUMALOG [facility] INSULIN 100 unit/mL kwikpen  3/13/19   Provider, Historical   glucagon (GLUCAGON EMERGENCY KIT, HUMAN,) 1 mg injection Glucagon emergency kit, IM injection  Indications: type 1 diabetes, pregnancy 11/6/18   Provider, Historical   pnv w/o calcium-iron fum-fa (COMPLETENATE) 29 mg iron- 1 mg chew Take 1 Tab by mouth.  10/23/18   Provider, Historical        Allergies   Allergen Reactions    Morphine Other (comments)    Pcn [Penicillins] Hives         Review of Systems - History obtained from the patient-negative for:  Constitutional: weight loss, fever, night sweats  HEENT: hearing loss, earache, congestion, snoring, sorethroat  CV: chest pain, palpitations, edema  Resp: cough, shortness of breath, wheezing  Breast: breast lumps, nipple discharge, galactorrhea, +engorgement  GI: change in bowel habits, abdominal pain, black or bloody stools, nausea/vomiting, abdominal pain  : frequency, dysuria, hematuria, vaginal discharge  MSK: back pain, joint pain, muscle pain  Skin: itching, rash, hives  Neuro: dizziness, confusion, weakness  Psych: anxiety, depression, change in mood  Heme/lymph: bleeding, bruising, pallor      Visit Vitals  /89 (BP 1 Location: Right arm, BP Patient Position: At rest)   Pulse 87   Temp 98.7 °F (37.1 °C)   Resp 16   Ht 5' 8\" (1.727 m)   Wt 143 lb 15.4 oz (65.3 kg)   SpO2 98%   Breastfeeding?  No   BMI 21.89 kg/m²       PHYSICAL EXAMINATION  Constitutional  · Appearance: well-nourished, well developed, alert, in no acute distress    HENT  · Head and Face: appears normal    Chest  · Respiratory Effort: breathing non-labored  · Auscultation: normal breath sounds    Cardiovascular  · Heart:  · Auscultation: regular rate and rhythm without murmur    Gastrointestinal  · Abdominal Examination: abdomen soft, non-distended, moderately diffusely tender, uterus appropriately involuted for 1 week postpartum, no rebound or guarding, normal bowel sounds, no masses present  · Incision: pfannensteil incision c/d/i, healing well, no evidence of infection (no erythema, drainage, induration/fluctuance/swelling, or significant tenderness)  · Liver and spleen: no hepatomegaly present, spleen not palpable  · Hernias: no hernias identified    Genitourinary: speculum exam with 4-5 scopetes of watery bloody lochia (slightly heavier flow than would be expected), +foul odor, bimanual exam - diffusely tender    Skin  · General Inspection: no rash, no lesions identified    Neurologic/Psychiatric  · Mental Status:  · Orientation: grossly oriented to person, place and time  · Mood and Affect: mood normal, affect appropriate        Recent Results (from the past 24 hour(s))   GLUCOSE, POC    Collection Time: 05/26/19 12:32 PM   Result Value Ref Range    Glucose (POC) 153 (H) 65 - 100 mg/dL    Performed by Marco Antonio [address], POC    Collection Time: 05/26/19  4:42 PM   Result Value Ref Range    Glucose (POC) 91 65 - 100 mg/dL    Performed by Tariq Cole    GLUCOSE, POC    Collection Time: 05/26/19  6:25 PM   Result Value Ref Range    Glucose (POC) 110 (H) 65 - 100 mg/dL    Performed by Tariq Cole    GLUCOSE, POC    Collection Time: 05/26/19  9:22 PM   Result Value Ref Range    Glucose (POC) 124 (H) 65 - 100 mg/dL    Performed by [address], BASIC    Collection Time: 05/27/19  5:33 AM   Result Value Ref Range    Sodium 137 136 - 145 mmol/L    Potassium 3.7 3.5 - 5.1 mmol/L    Chloride 107 97 - 108 mmol/L    CO2 19 (L) 21 - 32 mmol/L    Anion gap 11 5 - 15 mmol/L    Glucose 156 (H) 65 - 100 mg/dL    BUN 21 (H) 6 - 20 MG/DL    Creatinine 1.14 (H) 0.55 - 1.02 MG/DL    BUN/Creatinine ratio 18 12 - 20      GFR est AA >60 >60 ml/min/1.73m2    GFR est non-AA 56 (L) >60 ml/min/1.73m2    Calcium 7.8 (L) 8.5 - 10.1 MG/DL   CBC WITH AUTOMATED DIFF    Collection Time: 05/27/19  5:33 AM   Result Value Ref Range    WBC 8.7 3.6 - 11.0 K/uL    RBC 3.22 (L) 3.80 - 5.20 M/uL    HGB 8.4 (L) 11.5 - 16.0 g/dL    HCT 27.8 (L) 35.0 - 47.0 %    MCV 86.3 80.0 - 99.0 FL    MCH 26.1 26.0 - 34.0 PG    MCHC 30.2 30.0 - 36.5 g/dL    RDW 16.8 (H) 11.5 - 14.5 %    PLATELET 481 (H) 635 - 400 K/uL    MPV 9.3 8.9 - 12.9 FL    NRBC 0.0 0  WBC    ABSOLUTE NRBC 0.00 0.00 - 0.01 K/uL    NEUTROPHILS 64 32 - 75 %    LYMPHOCYTES 25 12 - 49 %    MONOCYTES 7 5 - 13 %    EOSINOPHILS 2 0 - 7 %    BASOPHILS 1 0 - 1 %    IMMATURE GRANULOCYTES 1 (H) 0.0 - 0.5 %    ABS.  NEUTROPHILS 5.6 1.8 - 8.0 K/UL    ABS. LYMPHOCYTES 2.2 0.8 - 3.5 K/UL    ABS. MONOCYTES 0.6 0.0 - 1.0 K/UL    ABS. EOSINOPHILS 0.2 0.0 - 0.4 K/UL    ABS. BASOPHILS 0.1 0.0 - 0.1 K/UL    ABS. IMM. GRANS. 0.0 0.00 - 0.04 K/UL    DF AUTOMATED     GLUCOSE, POC    Collection Time: 05/27/19  6:26 AM   Result Value Ref Range    Glucose (POC) 156 (H) 65 - 100 mg/dL    Performed by Amy Martin, POC    Collection Time: 05/27/19 11:14 AM   Result Value Ref Range    Glucose (POC) 130 (H) 65 - 100 mg/dL    Performed by Lisa Merida      INDICATION: abd pain, nausea      EXAM: CT Abdomen and Pelvis without IV contrast. No oral contrast.  CT dose reduction was achieved through use of a standardized protocol tailored  for this examination and automatic exposure control for dose modulation.     FINDINGS:   No urinary tract stones are seen. There is no hydroureteronephrosis. The kidneys  are normal in size. There is no perirenal fluid or ascites.      Liver shows no apparent significant finding without contrast. Pancreas, adrenal  glands, spleen and aorta show no significant enlargement. No inflammation is  seen. There is no pneumoperitoneum or significant adenopathy.     The bladder is distended. The distal ureters are not dilated. There is no  apparent pelvic mass. The appendix is normal. Bowels are not dilated.     IMPRESSION  No Acute Disease. Assessment/Plan:   30yo 2 weeks post-op s/p 1LTCS (on 5/10/19) at 33wks for cat 3 tracing, in setting of CHTN with SI preE with SF (severe range BPs) and poorly controlled type 1 IDDM (on insulin pump), uncomplicated. Pt now readmitted for N/V and abdominal pain, with additional concerns of hypertensive urgency and acute on chronic renal injury. History and exam today concerning for possible developing postpartum endometritis (given abdominal/uterine tenderness, foul smelling lochia with increased volume, and low-grade temp on admission).      -vaginal culture obtained (aerobic/anaerobic)  -recommend course of IV antibiotics for treatment of presumed endometritis (clindamycin 900mg IV q8 x24 hrs and gentamicin to be renally dosed by pharmacy x24 hrs) --> recommend transitioning to PO antibiotics at hospital discharge (with possible regimen of clindamycin 600mg PO q6hrs x 10 days --> pt with PCN allergy limiting options)  -continue to monitor for any si/sx of worsening infection - fevers, elevated WBC, etc, low threshold for blood cultures  -agree with psych consult due to significant concern for postpartum anxiety/depression and narcotic abuse, currently on cymbalta  -breast pump ordered, advised pt cont pumping q3 hours to avoid engorgement/mastitis  -encourage ongoing BG control and BP management by primary team    Thank you for this consult. Pt to be signed out to [facility] [place] [facility] [place] [facility]) overnight.     Bernard Villarreal MD  5/27/2019  4:42 PM

## 2019-05-27 NOTE — PROGRESS NOTES
Bedside shift change report given to Mc PRICE (oncoming nurse) by Cary Dueñas RN (offgoing nurse).  Report included the following information SBAR, Intake/Output, MAR, Recent Results and Cardiac Rhythm SR.

## 2019-05-27 NOTE — PROGRESS NOTES
Problem: Diabetes Self-Management  Goal: *Disease process and treatment process  Description  Define diabetes and identify own type of diabetes; list 3 options for treating diabetes. Outcome: Progressing Towards Goal  Goal: *Incorporating nutritional management into lifestyle  Description  Describe effect of type, amount and timing of food on blood glucose; list 3 methods for planning meals. Outcome: Progressing Towards Goal  Goal: *Incorporating physical activity into lifestyle  Description  State effect of exercise on blood glucose levels. Outcome: Progressing Towards Goal  Goal: *Developing strategies to promote health/change behavior  Description  Define the ABC's of diabetes; identify appropriate screenings, schedule and personal plan for screenings. Outcome: Progressing Towards Goal  Goal: *Using medications safely  Description  State effect of diabetes medications on diabetes; name diabetes medication taking, action and side effects. Outcome: Progressing Towards Goal  Goal: *Monitoring blood glucose, interpreting and using results  Description  Identify recommended blood glucose targets  and personal targets. Outcome: Progressing Towards Goal  Goal: *Prevention, detection, treatment of acute complications  Description  List symptoms of hyper- and hypoglycemia; describe how to treat low blood sugar and actions for lowering  high blood glucose level. Outcome: Progressing Towards Goal  Goal: *Prevention, detection and treatment of chronic complications  Description  Define the natural course of diabetes and describe the relationship of blood glucose levels to long term complications of diabetes.   Outcome: Progressing Towards Goal  Goal: *Developing strategies to address psychosocial issues  Description  Describe feelings about living with diabetes; identify support needed and support network  Outcome: Progressing Towards Goal  Goal: *Insulin pump training  Outcome: Progressing Towards Goal  Goal: *Sick day guidelines  Outcome: Progressing Towards Goal  Goal: *Patient Specific Goal (EDIT GOAL, INSERT TEXT)  Outcome: Progressing Towards Goal     Problem: Patient Education: Go to Patient Education Activity  Goal: Patient/Family Education  Outcome: Progressing Towards Goal     Problem: Falls - Risk of  Goal: *Absence of Falls  Description  Document Rj Hayes Fall Risk and appropriate interventions in the flowsheet.   Outcome: Progressing Towards Goal     Problem: Patient Education: Go to Patient Education Activity  Goal: Patient/Family Education  Outcome: Progressing Towards Goal     Problem: Pain  Goal: *Control of Pain  Outcome: Progressing Towards Goal  Goal: *PALLIATIVE CARE:  Alleviation of Pain  Outcome: Progressing Towards Goal     Problem: Patient Education: Go to Patient Education Activity  Goal: Patient/Family Education  Outcome: Progressing Towards Goal     Problem: General Medical Care Plan  Goal: *Vital signs within specified parameters  Outcome: Progressing Towards Goal  Goal: *Labs within defined limits  Outcome: Progressing Towards Goal  Goal: *Absence of infection signs and symptoms  Outcome: Progressing Towards Goal  Goal: *Optimal pain control at patient's stated goal  Outcome: Progressing Towards Goal  Goal: *Skin integrity maintained  Outcome: Progressing Towards Goal  Goal: *Fluid volume balance  Outcome: Progressing Towards Goal  Goal: *Optimize nutritional status  Outcome: Progressing Towards Goal  Goal: *Anxiety reduced or absent  Outcome: Progressing Towards Goal  Goal: *Progressive mobility and function (eg: ADL's)  Outcome: Progressing Towards Goal     Problem: Patient Education: Go to Patient Education Activity  Goal: Patient/Family Education  Outcome: Progressing Towards Goal

## 2019-05-27 NOTE — DIABETES MGMT
DTC Consult Note    Recommendations/ Comments: Chart reviewed on Gracy Torres due to consult for hospital glucose management. Patient currently on insulin pump and pump settings are unchanged since discharge Friday per patient. If appropriate, please consider:   - using order set #2774 for insulin pump   - discontinuing Lispro correction (patient should be correcting blood sugars through insulin pump)    Current pump settings  - Basal: 0.475  - Carbohydrate ratio: 12   - Sensitivity: 43   - Target: 100- 140. Set was changed 5/25/19 per patient. She does not have extra supplies, but states she is going home today. Patient is a 30 y.o. female with known Type 1 DM on insulin pump at home. Delivered 5/10/19 at 33 wks      A1c:   Lab Results   Component Value Date/Time    Hemoglobin A1c 7.2 (H) 05/25/2019 05:17 PM    Hemoglobin A1c 8.5 (H) 05/21/2019 04:20 AM       Recent Glucose Results:   Lab Results   Component Value Date/Time     (H) 05/27/2019 05:33 AM    GLUCPOC 156 (H) 05/27/2019 06:26 AM    GLUCPOC 124 (H) 05/26/2019 09:22 PM    GLUCPOC 110 (H) 05/26/2019 06:25 PM        Lab Results   Component Value Date/Time    Creatinine 1.14 (H) 05/27/2019 05:33 AM     Estimated Creatinine Clearance: 72.1 mL/min (A) (based on SCr of 1.14 mg/dL (H)). Active Orders   Diet    DIET DIABETIC CONSISTENT CARB Regular        PO intake:   Patient Vitals for the past 72 hrs:   % Diet Eaten   05/26/19 1606 10 %   05/26/19 1217 5 %       Will continue to follow as needed.     Thank you  Kizzy Rosenberg, MS, RN, CDE    Time spent: 6  min

## 2019-05-27 NOTE — CONSULTS
Psychiatry  Consult    Subjective:     Date of Evaluation:  2019    Reason for Referral:  Johnathon Sweeney was referred to the examiners from psychiatry for post partum depression. History of Presenting Problem: Per ED note, 30 y.o. female with past medical history significant for heart murmur, DKA, chronic pain, uncontrolled DMT1, HSV infection, HTN, ventricular tachycardia, anemia, depression, and peripheral neuropathy who presents from home via private vehicle with chief complaint of vomiting. Pt's boyfriend reports onset \"0500\" of vomiting accompanied by nausea and abdominal pain. Pt states, \"I can't stop throwing up\". Pt's boyfriend reports pt was discharged from hospital yesterday, and that insulin pump has stayed on since discharge. Pt's boyfriend denies reducing basal rate. Pt's boyfriend reports elevated BGL and states \"it was 224\" PTA. Pt's boyfriend also reports pt has  section on \"5/10\". Pt denies any history of addiction. Pt denies coughing up blood, polyuria, and HA. There are no other acute medical concerns at this time. Severe abd pain. Wants toradol. In review of charts/notes, there appears to be history of depression, but no current expressions or concerns that have been observed.     Patient Active Problem List    Diagnosis Date Noted    Esophagitis, erosive 2016     Priority: 2 - Two     Class: Present on Admission    Intractable nausea and vomiting 2019    MARIS (acute kidney injury) ([facility] 2019    Dehydration 2019    Diabetes mellitus ([facility] 2019    CKD (chronic kidney disease) stage 2, GFR 60-89 ml/min 2019    Proteinuria 2019    Anemia 2019    Hypertensive urgency 2019    Pregnant and not yet delivered in third trimester 2019    Pregnant 2019    Chronic renal disease 2019    Chronic hypertension with superimposed preeclampsia 2019    Pre-eclampsia superimposed on chronic hypertension, antepartum 2019    DKA (diabetic ketoacidoses) ([address] 2016    Sepsis ([address] 2016    Nausea and vomiting 2016    Epigastric abdominal pain 2016    LLQ abdominal pain 2016    Diarrhea 2016    Weight loss 2016    DKA, type 1, not at goal [facility]) 2016    Leukocytosis 2016    Hypokalemia 2015    Hypophosphatemia 2015    Nausea 08/15/2015    Neuropathy 08/15/2015    Hypertension 08/15/2015    GIB (gastrointestinal bleeding) 10/22/2014    DKA, type 1 ([address] 2014    Viral syndrome 2013    Depression 2013    Diabetic peripheral neuropathy associated with type 1 diabetes mellitus ([address] 2013    DM type 1 (diabetes mellitus, type 1) ([address] 2012    Chronic abdominal pain 2012     Class: Chronic    Nausea & vomiting 2012    UTI (urinary tract infection) 10/09/2012    Ventricular tachycardia ([address] 2012    SVT (supraventricular tachycardia) ([address] 2012    Acute renal failure ([address] 2012    SIRS (systemic inflammatory response syndrome) (HCC) 2012    Abnormal LFTs 2012     Past Medical History:   Diagnosis Date    Anemia     Chronic pain     Depression     Diabetes type 1, uncontrolled ([address]     DKA, type 1 ([address]     Essential hypertension     Heart murmur     Herpes simplex virus (HSV) infection 2017    positive in blood    Peripheral neuropathy     Ventricular tachycardia ([address]       Family History   Problem Relation Age of Onset    No Known Problems Mother     Sleep Apnea Father     Hypertension Father     Diabetes Father     Heart Disease Maternal Grandfather       Social History     Tobacco Use    Smoking status: Former Smoker     Packs/day: 0.25     Years: 8.00     Pack years: 2.00     Types: Cigarettes     Last attempt to quit: 10/26/2014     Years since quittin.5    Smokeless tobacco: Never Used   Substance Use Topics    Alcohol use:  No Alcohol/week: 0.0 oz     Past Surgical History:   Procedure Laterality Date    ABDOMEN SURGERY PROC UNLISTED      exploratory laparoscopy    HX CYST REMOVAL      groin      Prior to Admission medications    Medication Sig Start Date End Date Taking? Authorizing Provider   famotidine (PEPCID) 20 mg tablet Take 1 Tab by mouth two (2) times daily as needed (acid reflux). Indications: gastroesophageal reflux disease 5/24/19   Chapis León MD   hydrALAZINE (APRESOLINE) 50 mg tablet Take 1 Tab by mouth three (3) times daily. 5/24/19   Chapis León MD   hydroCHLOROthiazide (HYDRODIURIL) 25 mg tablet Take 1 Tab by mouth daily. 5/25/19   Chapis León MD   promethazine (PHENERGAN) 12.5 mg tablet Take 1 Tab by mouth every four (4) hours as needed for Nausea. 5/24/19   Chapis León MD   ferrous sulfate 325 mg (65 mg iron) tablet Take 1 Tab by mouth Daily (before breakfast). 5/24/19   Chapis León MD   labetalol (NORMODYNE) 300 mg tablet Take 1 Tab by mouth every twelve (12) hours for 30 days. 5/14/19 6/13/19  Julianne Reese MD   DULoxetine (CYMBALTA) 20 mg capsule Take 1 Cap by mouth two (2) times a day for 30 days. 5/14/19 6/13/19  Julianne Reese MD   glucose blood VI test strips (CONTOUR NEXT TEST STRIPS) strip Check blood sugar 7-10 times per day, mini med contour next link blood glucose meter 11/27/18   Provider, Historical   HUMALOG [facility] INSULIN 100 unit/mL kwikpen  3/13/19   Provider, Historical   glucagon (GLUCAGON EMERGENCY KIT, HUMAN,) 1 mg injection Glucagon emergency kit, IM injection  Indications: type 1 diabetes, pregnancy 11/6/18   Provider, Historical   pnv w/o calcium-iron fum-fa (COMPLETENATE) 29 mg iron- 1 mg chew Take 1 Tab by mouth.  10/23/18   Provider, Historical     Allergies   Allergen Reactions    Morphine Other (comments)    Pcn [Penicillins] Hives          Objective:     Patient Vitals for the past 8 hrs:   BP Temp Pulse Resp SpO2   05/27/19 1141 133/89 98.7 °F (37.1 °C) 87 16 98 %   05/27/19 0855 110/63  86     05/27/19 0719 (!) 203/120 98.7 °F (37.1 °C) 90 18 99 %       Mental Status exam: WNL except for    Sensorium  oriented to time, place and person   Orientation person, place, time/date and situation   Relations cooperative   Eye Contact appropriate   Appearance:  age appropriate   Motor Behavior:  within normal limits   Speech:  normal pitch, normal volume and non-pressured   Vocabulary average   Thought Process: within normal limits   Thought Content free of delusions and free of hallucinations   Suicidal ideations none   Homicidal ideations none   Mood:  euthymic   Affect:  mood-congruent   Memory recent  adequate   Memory remote:  adequate   Concentration:  adequate   Abstraction:  abstract   Insight:  good   Reliability good   Judgment:  good       Clinical Interview: Ms. Brenton Tipton was seen today for psychiatric consult, in the presence of her significant other, and agreed to speak with this provider. She was awake, alert, oriented. Was laying down in bed, appeared to be in mild to moderate discomfort. Seemed somewhat confused as to reason for psych consult. Patient admits to history of depression and takes \"cymbalta\", but denies any current s/s. SO reports that she has been struggling with \"being sick\" towards the end of the pregnancy through the present time. Despite this, he feels that she has been \"actually happy\" and she reported that she felt this was true. She delivered her daughter 15 days ago. Since, she denies any depressive or anxiety s/s, denies any odd or peculiar thoughts r/t her baby, and denies any SI/HI/SIB/AVH. Neither patient nor her SO had any further questions.         Impression:      Active Problems:    Intractable nausea and vomiting (5/25/2019)      MARIS (acute kidney injury) ([facility] (5/25/2019)      Dehydration (5/25/2019)      Diabetes mellitus ([facility] (5/25/2019)          Plan:     Recommendations for Treatment/Conditions:  Outpatient follow up recommended after release  No further psychiatric interventions recommended           Thank you for this consult, and for the opportunity to participate in the care of Ms. Aimee Waggoner. I certify that this patients inpatient psychiatric hospital services furnished since the previous certification were, and continue to be, required for treatment that could reasonably be expected to improve the patient's condition, or for diagnostic study, and that the patient continues to need, on a daily basis, active treatment furnished directly by or requiring the supervision of inpatient psychiatric facility personnel. In addition, the hospital records show that services furnished were intensive treatment services, admission or related services, or equivalent services. 

## 2019-05-27 NOTE — PROGRESS NOTES
Pharmacist Note - Aminoglycoside Dosing (Extended Interval)    Consult provided for this 30 y.o. female to dose gentamicin (medication) for an indication of postpartum endometritis. Antibiotic regimen(s): gent, clindamycin    Recent Labs     19  0533 19  0225 19  1717   WBC 8.7 10.4 15.4*   CREA 1.14* 1.17* 1.67*   BUN 21* 26* 32*     Frequency of BMP: daily through   Dosing weight: 65.3 kg  Estimated CrCl = 72 ml/min. Temp (24hrs), Av.9 °F (37.2 °C), Min:98.4 °F (36.9 °C), Max:99.6 °F (37.6 °C)    Cultures:   wound - pending    Therapy will be initiated with a dose of 325 mg IV x 1 dose (approximately 5 mg/kg for Gentamicin). A random level will be obtained 8-12 hours post-dose to determine an appropriate dosing interval. Pharmacy will follow patient daily and order levels / make dose adjustments as appropriate.     Aminoglycoside Dosing Guide

## 2019-05-28 VITALS
OXYGEN SATURATION: 100 % | HEART RATE: 81 BPM | TEMPERATURE: 99.1 F | SYSTOLIC BLOOD PRESSURE: 125 MMHG | RESPIRATION RATE: 16 BRPM | DIASTOLIC BLOOD PRESSURE: 82 MMHG | BODY MASS INDEX: 23.19 KG/M2 | HEIGHT: 68 IN | WEIGHT: 153 LBS

## 2019-05-28 LAB
GLUCOSE BLD STRIP.AUTO-MCNC: 187 MG/DL (ref 65–100)
SERVICE CMNT-IMP: ABNORMAL

## 2019-05-28 PROCEDURE — 74011250637 HC RX REV CODE- 250/637: Performed by: INTERNAL MEDICINE

## 2019-05-28 PROCEDURE — 82962 GLUCOSE BLOOD TEST: CPT

## 2019-05-28 PROCEDURE — 74011250636 HC RX REV CODE- 250/636: Performed by: INTERNAL MEDICINE

## 2019-05-28 PROCEDURE — 74011250637 HC RX REV CODE- 250/637: Performed by: OBSTETRICS & GYNECOLOGY

## 2019-05-28 PROCEDURE — 74011250636 HC RX REV CODE- 250/636: Performed by: OBSTETRICS & GYNECOLOGY

## 2019-05-28 RX ORDER — OXYCODONE AND ACETAMINOPHEN 5; 325 MG/1; MG/1
1 TABLET ORAL
Qty: 4 TAB | Refills: 0 | Status: SHIPPED | OUTPATIENT
Start: 2019-05-28 | End: 2019-06-07

## 2019-05-28 RX ORDER — METRONIDAZOLE 500 MG/1
500 TABLET ORAL 2 TIMES DAILY
Qty: 18 TAB | Refills: 0 | Status: SHIPPED | OUTPATIENT
Start: 2019-05-28 | End: 2019-06-07

## 2019-05-28 RX ORDER — CLINDAMYCIN HYDROCHLORIDE 300 MG/1
600 CAPSULE ORAL EVERY 6 HOURS
Qty: 72 CAP | Refills: 0 | Status: SHIPPED | OUTPATIENT
Start: 2019-05-28 | End: 2019-06-07

## 2019-05-28 RX ORDER — HYDRALAZINE HYDROCHLORIDE 100 MG/1
100 TABLET, FILM COATED ORAL 3 TIMES DAILY
Qty: 90 TAB | Refills: 0 | Status: SHIPPED | OUTPATIENT
Start: 2019-05-28 | End: 2019-10-30 | Stop reason: DRUGHIGH

## 2019-05-28 RX ADMIN — SODIUM CHLORIDE 1000 ML: 900 INJECTION, SOLUTION INTRAVENOUS at 11:29

## 2019-05-28 RX ADMIN — ONDANSETRON 4 MG: 2 INJECTION INTRAMUSCULAR; INTRAVENOUS at 05:10

## 2019-05-28 RX ADMIN — SODIUM CHLORIDE 75 ML/HR: 900 INJECTION, SOLUTION INTRAVENOUS at 02:41

## 2019-05-28 RX ADMIN — DULOXETINE HYDROCHLORIDE 20 MG: 20 CAPSULE, DELAYED RELEASE ORAL at 08:20

## 2019-05-28 RX ADMIN — ONDANSETRON 4 MG: 2 INJECTION INTRAMUSCULAR; INTRAVENOUS at 11:31

## 2019-05-28 RX ADMIN — CLINDAMYCIN HYDROCHLORIDE 600 MG: 150 CAPSULE ORAL at 10:32

## 2019-05-28 RX ADMIN — HYDROCHLOROTHIAZIDE 25 MG: 25 TABLET ORAL at 08:20

## 2019-05-28 RX ADMIN — HYDRALAZINE HYDROCHLORIDE 100 MG: 50 TABLET, FILM COATED ORAL at 08:20

## 2019-05-28 RX ADMIN — HYDROMORPHONE HYDROCHLORIDE 0.5 MG: 1 INJECTION, SOLUTION INTRAMUSCULAR; INTRAVENOUS; SUBCUTANEOUS at 02:41

## 2019-05-28 RX ADMIN — LABETALOL HCL 300 MG: 200 TABLET, FILM COATED ORAL at 09:22

## 2019-05-28 RX ADMIN — PROCHLORPERAZINE EDISYLATE 5 MG: 5 INJECTION INTRAMUSCULAR; INTRAVENOUS at 06:45

## 2019-05-28 RX ADMIN — FERROUS SULFATE TAB 325 MG (65 MG ELEMENTAL FE) 325 MG: 325 (65 FE) TAB at 06:45

## 2019-05-28 RX ADMIN — HYDROMORPHONE HYDROCHLORIDE 0.5 MG: 1 INJECTION, SOLUTION INTRAMUSCULAR; INTRAVENOUS; SUBCUTANEOUS at 09:22

## 2019-05-28 RX ADMIN — HEPARIN SODIUM 5000 UNITS: 5000 INJECTION INTRAVENOUS; SUBCUTANEOUS at 04:41

## 2019-05-28 RX ADMIN — Medication 10 ML: at 13:09

## 2019-05-28 RX ADMIN — OXYCODONE HYDROCHLORIDE AND ACETAMINOPHEN 1 TABLET: 5; 325 TABLET ORAL at 11:44

## 2019-05-28 RX ADMIN — HEPARIN SODIUM 5000 UNITS: 5000 INJECTION INTRAVENOUS; SUBCUTANEOUS at 13:09

## 2019-05-28 RX ADMIN — CLINDAMYCIN PHOSPHATE 900 MG: 900 INJECTION, SOLUTION INTRAVENOUS at 04:41

## 2019-05-28 RX ADMIN — HYDRALAZINE HYDROCHLORIDE 100 MG: 50 TABLET, FILM COATED ORAL at 15:33

## 2019-05-28 NOTE — PROGRESS NOTES
Bedside shift change report given to Kathleen (oncoming nurse) by Alec Way (offgoing nurse). Report included the following information SBAR, Kardex, MAR, Accordion and Cardiac Rhythm NSR.

## 2019-05-28 NOTE — PROGRESS NOTES
2130 Patient requested for morning labs to be drawn together with a 10 pm lab draw and stated, 'I know i'm a hard stick and have been stuck too many times already. '

## 2019-05-28 NOTE — DISCHARGE INSTRUCTIONS
Please bring this form with you to show your primary care provider at your follow-up appointment. Primary care provider:  Dr. Annabella Albarran    Discharging provider:  Lesa Hahn MD    You have been admitted to the hospital with the following diagnoses:  · Dehydration [E86.0]    FOLLOW-UP CARE RECOMMENDATIONS:    APPOINTMENTS:  · Follow-up with primary care provider, new on 5/31/2019   · ObGyn in 1 week  · Dr. Bee Holguin in 1 month    FOLLOW-UP TESTS recommended: bmp in 3 days    PENDING TEST RESULTS:  At the time of your discharge the following test results are still pending: vaginal culture  Please make sure you review these results with your outpatient follow-up provider(s). SYMPTOMS to watch for: chest pain, shortness of breath, fever, chills, nausea, vomiting, diarrhea, change in mentation, falling, weakness, bleeding. DIET/what to eat:  Diabetic Diet    ACTIVITY:  Activity as tolerated    What to do if new or unexpected symptoms occur? If you experience any of the above symptoms (or should other concerns or questions arise after discharge) please call your primary care physician. Return to the emergency room if you cannot get hold of your doctor. · It is very important that you keep your follow-up appointment(s). · Please bring discharge papers, medication list (and/or medication bottles) to your follow-up appointments for review by your outpatient provider(s). · Please check the list of medications and be sure it includes every medication (even non-prescription medications) that your provider wants you to take. · It is important that you take the medication exactly as they are prescribed. · Keep your medication in the bottles provided by the pharmacist and keep a list of the medication names, dosages, and times to be taken in your wallet. · Do not take other medications without consulting your doctor.    · If you have any questions about your medications or other instructions, please talk to your nurse or care provider before you leave the hospital.    I understand that if any problems occur once I am at home I am to contact my physician. These instructions were explained to me and I had the opportunity to ask questions.

## 2019-05-28 NOTE — PROGRESS NOTES
Problem: Diabetes Self-Management  Goal: *Disease process and treatment process  Description  Define diabetes and identify own type of diabetes; list 3 options for treating diabetes. Outcome: Resolved/Met  Goal: *Incorporating nutritional management into lifestyle  Description  Describe effect of type, amount and timing of food on blood glucose; list 3 methods for planning meals. Outcome: Resolved/Met  Goal: *Incorporating physical activity into lifestyle  Description  State effect of exercise on blood glucose levels. Outcome: Resolved/Met  Goal: *Developing strategies to promote health/change behavior  Description  Define the ABC's of diabetes; identify appropriate screenings, schedule and personal plan for screenings. Outcome: Resolved/Met  Goal: *Using medications safely  Description  State effect of diabetes medications on diabetes; name diabetes medication taking, action and side effects. Outcome: Resolved/Met  Goal: *Monitoring blood glucose, interpreting and using results  Description  Identify recommended blood glucose targets  and personal targets. Outcome: Resolved/Met  Goal: *Prevention, detection, treatment of acute complications  Description  List symptoms of hyper- and hypoglycemia; describe how to treat low blood sugar and actions for lowering  high blood glucose level. Outcome: Resolved/Met  Goal: *Prevention, detection and treatment of chronic complications  Description  Define the natural course of diabetes and describe the relationship of blood glucose levels to long term complications of diabetes.   Outcome: Resolved/Met  Goal: *Developing strategies to address psychosocial issues  Description  Describe feelings about living with diabetes; identify support needed and support network  Outcome: Resolved/Met  Goal: *Insulin pump training  Outcome: Resolved/Met  Goal: *Sick day guidelines  Outcome: Resolved/Met  Goal: *Patient Specific Goal (EDIT GOAL, INSERT TEXT)  Outcome: Resolved/Met Problem: Patient Education: Go to Patient Education Activity  Goal: Patient/Family Education  Outcome: Resolved/Met     Problem: Falls - Risk of  Goal: *Absence of Falls  Description  Document Tia Manus Fall Risk and appropriate interventions in the flowsheet.   Outcome: Resolved/Met     Problem: Patient Education: Go to Patient Education Activity  Goal: Patient/Family Education  Outcome: Resolved/Met     Problem: Pain  Goal: *Control of Pain  Outcome: Resolved/Met  Goal: *PALLIATIVE CARE:  Alleviation of Pain  Outcome: Resolved/Met     Problem: Nausea/Vomiting (Adult)  Goal: *Absence of nausea/vomiting  Outcome: Resolved/Met  Goal: *Palliation of nausea/vomiting (Palliative Care)  Outcome: Resolved/Met     Problem: Patient Education: Go to Patient Education Activity  Goal: Patient/Family Education  Outcome: Resolved/Met     Problem: General Medical Care Plan  Goal: *Vital signs within specified parameters  Outcome: Resolved/Met  Goal: *Labs within defined limits  Outcome: Resolved/Met  Goal: *Absence of infection signs and symptoms  Outcome: Resolved/Met  Goal: *Optimal pain control at patient's stated goal  Outcome: Resolved/Met  Goal: *Skin integrity maintained  Outcome: Resolved/Met  Goal: *Fluid volume balance  Outcome: Resolved/Met  Goal: *Optimize nutritional status  Outcome: Resolved/Met  Goal: *Anxiety reduced or absent  Outcome: Resolved/Met  Goal: *Progressive mobility and function (eg: ADL's)  Outcome: Resolved/Met     Problem: Patient Education: Go to Patient Education Activity  Goal: Patient/Family Education  Outcome: Resolved/Met     Problem: Patient Education: Go to Patient Education Activity  Goal: Patient/Family Education  Outcome: Resolved/Met     Problem: Heart Failure: Day 1  Goal: Off Pathway (Use only if patient is Off Pathway)  Outcome: Resolved/Met  Goal: Activity/Safety  Outcome: Resolved/Met  Goal: Consults, if ordered  Outcome: Resolved/Met  Goal: Diagnostic Test/Procedures  Outcome: Resolved/Met  Goal: Nutrition/Diet  Outcome: Resolved/Met  Goal: Discharge Planning  Outcome: Resolved/Met  Goal: Medications  Outcome: Resolved/Met  Goal: Respiratory  Outcome: Resolved/Met  Goal: Treatments/Interventions/Procedures  Outcome: Resolved/Met  Goal: Psychosocial  Outcome: Resolved/Met  Goal: *Oxygen saturation within defined limits  Outcome: Resolved/Met  Goal: *Hemodynamically stable  Outcome: Resolved/Met  Goal: *Optimal pain control at patient's stated goal  Outcome: Resolved/Met  Goal: *Anxiety reduced or absent  Outcome: Resolved/Met     Problem: Heart Failure: Day 2  Goal: Off Pathway (Use only if patient is Off Pathway)  Outcome: Resolved/Met  Goal: Activity/Safety  Outcome: Resolved/Met  Goal: Consults, if ordered  Outcome: Resolved/Met  Goal: Diagnostic Test/Procedures  Outcome: Resolved/Met  Goal: Nutrition/Diet  Outcome: Resolved/Met  Goal: Discharge Planning  Outcome: Resolved/Met  Goal: Medications  Outcome: Resolved/Met  Goal: Respiratory  Outcome: Resolved/Met  Goal: Treatments/Interventions/Procedures  Outcome: Resolved/Met  Goal: Psychosocial  Outcome: Resolved/Met  Goal: *Oxygen saturation within defined limits  Outcome: Resolved/Met  Goal: *Hemodynamically stable  Outcome: Resolved/Met  Goal: *Optimal pain control at patient's stated goal  Outcome: Resolved/Met  Goal: *Anxiety reduced or absent  Outcome: Resolved/Met  Goal: *Demonstrates progressive activity  Outcome: Resolved/Met     Problem: Heart Failure: Day 3  Goal: Off Pathway (Use only if patient is Off Pathway)  Outcome: Resolved/Met  Goal: Activity/Safety  Outcome: Resolved/Met  Goal: Diagnostic Test/Procedures  Outcome: Resolved/Met  Goal: Nutrition/Diet  Outcome: Resolved/Met  Goal: Discharge Planning  Outcome: Resolved/Met  Goal: Medications  Outcome: Resolved/Met  Goal: Respiratory  Outcome: Resolved/Met  Goal: Treatments/Interventions/Procedures  Outcome: Resolved/Met  Goal: Psychosocial  Outcome: Resolved/Met  Goal: *Oxygen saturation within defined limits  Outcome: Resolved/Met  Goal: *Hemodynamically stable  Outcome: Resolved/Met  Goal: *Optimal pain control at patient's stated goal  Outcome: Resolved/Met  Goal: *Anxiety reduced or absent  Outcome: Resolved/Met  Goal: *Demonstrates progressive activity  Outcome: Resolved/Met     Problem: Heart Failure: Day 4  Goal: Off Pathway (Use only if patient is Off Pathway)  Outcome: Resolved/Met  Goal: Activity/Safety  Outcome: Resolved/Met  Goal: Diagnostic Test/Procedures  Outcome: Resolved/Met  Goal: Nutrition/Diet  Outcome: Resolved/Met  Goal: Discharge Planning  Outcome: Resolved/Met  Goal: Medications  Outcome: Resolved/Met  Goal: Respiratory  Outcome: Resolved/Met  Goal: Treatments/Interventions/Procedures  Outcome: Resolved/Met  Goal: Psychosocial  Outcome: Resolved/Met  Goal: *Oxygen saturation within defined limits  Outcome: Resolved/Met  Goal: *Hemodynamically stable  Outcome: Resolved/Met  Goal: *Optimal pain control at patient's stated goal  Outcome: Resolved/Met  Goal: *Anxiety reduced or absent  Outcome: Resolved/Met  Goal: *Demonstrates progressive activity  Outcome: Resolved/Met     Problem: Heart Failure: Day 5  Goal: Off Pathway (Use only if patient is Off Pathway)  Outcome: Resolved/Met  Goal: Activity/Safety  Outcome: Resolved/Met  Goal: Diagnostic Test/Procedures  Outcome: Resolved/Met  Goal: Nutrition/Diet  Outcome: Resolved/Met  Goal: Discharge Planning  Outcome: Resolved/Met  Goal: Medications  Outcome: Resolved/Met  Goal: Respiratory  Outcome: Resolved/Met  Goal: Treatments/Interventions/Procedures  Outcome: Resolved/Met  Goal: Psychosocial  Outcome: Resolved/Met     Problem: Heart Failure: Discharge Outcomes  Goal: *Demonstrates ability to perform prescribed activity without shortness of breath or discomfort  Outcome: Resolved/Met  Goal: *Left ventricular function assessment completed prior to or during stay, or planned for post-discharge  Outcome: Resolved/Met  Goal: *ACEI prescribed if LVEF less than 40% and no contraindications or ARB prescribed  Outcome: Resolved/Met  Goal: *Verbalizes understanding and describes prescribed diet  Outcome: Resolved/Met  Goal: *Verbalizes understanding/describes prescribed medications  Outcome: Resolved/Met  Goal: *Describes available resources and support systems  Description  (eg: Home Health, Palliative Care, Advanced Medical Directive)  Outcome: Resolved/Met  Goal: *Describes smoking cessation resources  Outcome: Resolved/Met  Goal: *Understands and describes signs and symptoms to report to providers(Stroke Metric)  Outcome: Resolved/Met  Goal: *Describes/verbalizes understanding of follow-up/return appt  Description  (eg: to physicians, diabetes treatment coordinator, and other resources  Outcome: Resolved/Met  Goal: *Describes importance of continuing daily weights and changes to report to physician  Outcome: Resolved/Met

## 2019-05-28 NOTE — PROGRESS NOTES
Problem: Diabetes Self-Management  Goal: *Disease process and treatment process  Description  Define diabetes and identify own type of diabetes; list 3 options for treating diabetes. Outcome: Progressing Towards Goal  Goal: *Incorporating nutritional management into lifestyle  Description  Describe effect of type, amount and timing of food on blood glucose; list 3 methods for planning meals. Outcome: Progressing Towards Goal  Goal: *Incorporating physical activity into lifestyle  Description  State effect of exercise on blood glucose levels. Outcome: Progressing Towards Goal  Goal: *Developing strategies to promote health/change behavior  Description  Define the ABC's of diabetes; identify appropriate screenings, schedule and personal plan for screenings. Outcome: Progressing Towards Goal  Goal: *Using medications safely  Description  State effect of diabetes medications on diabetes; name diabetes medication taking, action and side effects. Outcome: Progressing Towards Goal  Goal: *Monitoring blood glucose, interpreting and using results  Description  Identify recommended blood glucose targets  and personal targets. Outcome: Progressing Towards Goal  Goal: *Prevention, detection, treatment of acute complications  Description  List symptoms of hyper- and hypoglycemia; describe how to treat low blood sugar and actions for lowering  high blood glucose level. Outcome: Progressing Towards Goal  Goal: *Prevention, detection and treatment of chronic complications  Description  Define the natural course of diabetes and describe the relationship of blood glucose levels to long term complications of diabetes.   Outcome: Progressing Towards Goal  Goal: *Developing strategies to address psychosocial issues  Description  Describe feelings about living with diabetes; identify support needed and support network  Outcome: Progressing Towards Goal  Goal: *Insulin pump training  Outcome: Progressing Towards Goal  Goal: *Sick day guidelines  Outcome: Progressing Towards Goal  Goal: *Patient Specific Goal (EDIT GOAL, INSERT TEXT)  Outcome: Progressing Towards Goal     Problem: Patient Education: Go to Patient Education Activity  Goal: Patient/Family Education  Outcome: Progressing Towards Goal     Problem: Falls - Risk of  Goal: *Absence of Falls  Description  Document Jd Carranza Fall Risk and appropriate interventions in the flowsheet.   Outcome: Progressing Towards Goal     Problem: Patient Education: Go to Patient Education Activity  Goal: Patient/Family Education  Outcome: Progressing Towards Goal     Problem: Pain  Goal: *Control of Pain  Outcome: Progressing Towards Goal  Goal: *PALLIATIVE CARE:  Alleviation of Pain  Outcome: Progressing Towards Goal     Problem: Nausea/Vomiting (Adult)  Goal: *Absence of nausea/vomiting  Outcome: Progressing Towards Goal  Goal: *Palliation of nausea/vomiting (Palliative Care)  Outcome: Progressing Towards Goal     Problem: Patient Education: Go to Patient Education Activity  Goal: Patient/Family Education  Outcome: Progressing Towards Goal     Problem: General Medical Care Plan  Goal: *Vital signs within specified parameters  Outcome: Progressing Towards Goal  Goal: *Labs within defined limits  Outcome: Progressing Towards Goal  Goal: *Absence of infection signs and symptoms  Outcome: Progressing Towards Goal  Goal: *Optimal pain control at patient's stated goal  Outcome: Progressing Towards Goal  Goal: *Skin integrity maintained  Outcome: Progressing Towards Goal  Goal: *Fluid volume balance  Outcome: Progressing Towards Goal  Goal: *Optimize nutritional status  Outcome: Progressing Towards Goal  Goal: *Anxiety reduced or absent  Outcome: Progressing Towards Goal  Goal: *Progressive mobility and function (eg: ADL's)  Outcome: Progressing Towards Goal     Problem: Patient Education: Go to Patient Education Activity  Goal: Patient/Family Education  Outcome: Progressing Towards Goal

## 2019-05-28 NOTE — PROGRESS NOTES
Pharmacist Note - Vancomycin Dosing  Therapy day 1  Indication: postpartum endometritis  Current regimen: 325 mg IV x 1 dose (approximately 5 mg/kg)    A Random Level resulted at 6.9 mcg/mL which was obtained 9 hrs post-dose. The appropriate dosing interval (extended interval therapy) for this patient would be q 48 h. Plan: Change to 325 mg IV q 48 h . Pharmacy will continue to monitor this patient daily for changes in clinical status and renal function.

## 2019-05-28 NOTE — PROGRESS NOTES
Discharge orders placed by Dr. Feliz Cesar. Instructions and med rec reviewed w/ pt. Prescription (x1) given to pt. Follow up appointments discussed. Questions/concerns addressed at this time. Telemetry removed and returned. PIV discontinued earlier. Personal belongings gathered w/ pt. Volunteer safely escorted pt off unit in wheelchair and assisted by family member.

## 2019-05-28 NOTE — PROGRESS NOTES
Spiritual Care Partner Volunteer visited patient in Rm 445 on 5/28/19. Documented by:   Chaplain Graham MDiv, MACE  [phone])

## 2019-05-28 NOTE — PROGRESS NOTES
[facility]    CC: consulted by hospitalist service re: postpartum/post-op abdominal pain    HPI:   29 yo  now 2.5 weeks s/p 1LTCS (on 5/10/19) at 33wks for cat 3 tracing, in setting of CHTN with SI preE with SF (severe range BPs, HAs) and poorly controlled type 1 IDDM (on insulin pump), uncomplicated. Pt now with second readmission to internal medicine service postpartum. Current admission for intractable nausea/vomiting and generalized abdominal pain. Also with hypertensive urgency, and acute on chronic kidney injury. Concern for possible endometritis on exam . Pt was started on IV antibiotics (gent/clinda) x 24 hrs. Today, pt reports pain overall improved, though did request IV pain medication this morning. Lochia decreased from yesterday. No fevers/chills or other signs/symptoms of worsening infection. N/V improved and she is passing gas and moving her bowels regularly. Denies any issues with pfannenstiel incision (no redness, drainage, swelling/induration/fluctuance, or increased pain). Overall healing well. No leukocytosis. Pt denies CP, SOB, HA, denies vision changes, RUQ pain, lower extremity edema resolved. Pumping to express breast milk. Denies any breast issues other than mild engorgement at this time (no redness, warmth, pain, or other signs of mastitis). Still with concerns re: postpartum depression - on cymbalta. Spoke with psychiatrist. A[place] girl \"Nirmala\" is home, has been discharged from NICU, and is doing well.        Past Medical History:   Diagnosis Date    Anemia     Chronic pain     Depression     Diabetes type 1, uncontrolled ([address]     DKA, type 1 (HCC)     Essential hypertension     Heart murmur     Herpes simplex virus (HSV) infection 2017    positive in blood    Peripheral neuropathy     Ventricular tachycardia ([address]         Past Surgical History:   Procedure Laterality Date    ABDOMEN SURGERY PROC UNLISTED      exploratory laparoscopy    HX CYST REMOVAL      groin       Family History   Problem Relation Age of Onset    No Known Problems Mother     Sleep Apnea Father     Hypertension Father     Diabetes Father     Heart Disease Maternal Grandfather        Social History     Socioeconomic History    Marital status: SINGLE     Spouse name: Not on file    Number of children: Not on file    Years of education: Not on file    Highest education level: Not on file   Occupational History    Not on file   Social Needs    Financial resource strain: Not on file    Food insecurity:     Worry: Not on file     Inability: Not on file    Transportation needs:     Medical: Not on file     Non-medical: Not on file   Tobacco Use    Smoking status: Former Smoker     Packs/day: 0.25     Years: 8.00     Pack years: 2.00     Types: Cigarettes     Last attempt to quit: 10/26/2014     Years since quittin.5    Smokeless tobacco: Never Used   Substance and Sexual Activity    Alcohol use: No     Alcohol/week: 0.0 oz    Drug use: Yes     Frequency: 2.0 times per week     Types: Marijuana    Sexual activity: Yes     Partners: Male     Birth control/protection: None   Lifestyle    Physical activity:     Days per week: Not on file     Minutes per session: Not on file    Stress: Not on file   Relationships    Social connections:     Talks on phone: Not on file     Gets together: Not on file     Attends Mandaen service: Not on file     Active member of club or organization: Not on file     Attends meetings of clubs or organizations: Not on file     Relationship status: Not on file    Intimate partner violence:     Fear of current or ex partner: Not on file     Emotionally abused: Not on file     Physically abused: Not on file     Forced sexual activity: Not on file   Other Topics Concern     Service Not Asked    Blood Transfusions Not Asked    Caffeine Concern Not Asked    Occupational Exposure Not Asked    Hobby Hazards Not Asked    Sleep Concern Not Asked    Stress Concern Not Asked    Weight Concern Not Asked    Special Diet Not Asked    Back Care Not Asked    Exercise Not Asked    Bike Helmet Not Asked   [address] Not Asked    Self-Exams Not Asked   Social History Narrative    Not on file       Prior to Admission medications    Medication Sig Start Date End Date Taking? Authorizing Provider   famotidine (PEPCID) 20 mg tablet Take 1 Tab by mouth two (2) times daily as needed (acid reflux). Indications: gastroesophageal reflux disease 5/24/19   Kendrick Zhang MD   hydrALAZINE (APRESOLINE) 50 mg tablet Take 1 Tab by mouth three (3) times daily. 5/24/19   Kendrick Zhang MD   hydroCHLOROthiazide (HYDRODIURIL) 25 mg tablet Take 1 Tab by mouth daily. 5/25/19   Kendrick Zhang MD   promethazine (PHENERGAN) 12.5 mg tablet Take 1 Tab by mouth every four (4) hours as needed for Nausea. 5/24/19   Kendrick Zhang MD   ferrous sulfate 325 mg (65 mg iron) tablet Take 1 Tab by mouth Daily (before breakfast). 5/24/19   Kendrick Zhang MD   labetalol (NORMODYNE) 300 mg tablet Take 1 Tab by mouth every twelve (12) hours for 30 days. 5/14/19 6/13/19  Amirah Caraballo MD   DULoxetine (CYMBALTA) 20 mg capsule Take 1 Cap by mouth two (2) times a day for 30 days. 5/14/19 6/13/19  Amirah Caraballo MD   glucose blood VI test strips (CONTOUR NEXT TEST STRIPS) strip Check blood sugar 7-10 times per day, mini med contour next link blood glucose meter 11/27/18   Provider, Historical   HUMALOG [facility] INSULIN 100 unit/mL kwikpen  3/13/19   Provider, Historical   glucagon (GLUCAGON EMERGENCY KIT, HUMAN,) 1 mg injection Glucagon emergency kit, IM injection  Indications: type 1 diabetes, pregnancy 11/6/18   Provider, Historical   pnv w/o calcium-iron fum-fa (COMPLETENATE) 29 mg iron- 1 mg chew Take 1 Tab by mouth.  10/23/18   Provider, Historical        Allergies   Allergen Reactions    Morphine Other (comments)    Pcn [Penicillins] Hives         Review of Systems - History obtained from the patient-negative for:  Constitutional: weight loss, fever, night sweats  HEENT: hearing loss, earache, congestion, snoring, sorethroat  CV: chest pain, palpitations, edema  Resp: cough, shortness of breath, wheezing  Breast: breast lumps, nipple discharge, galactorrhea,   GI: change in bowel habits, abdominal pain, black or bloody stools, abdominal pain improved  : frequency, dysuria, hematuria, vaginal discharge  MSK: back pain, joint pain, muscle pain  Skin: itching, rash, hives  Neuro: dizziness, confusion, weakness  Psych: anxiety, depression, change in mood  Heme/lymph: bleeding, bruising, pallor      Visit Vitals  /89 (BP 1 Location: Right arm, BP Patient Position: At rest)   Pulse 86   Temp 98.6 °F (37 °C)   Resp 16   Ht 5' 8\" (1.727 m)   Wt 153 lb (69.4 kg)   SpO2 98%   Breastfeeding?  No   BMI 23.26 kg/m²       PHYSICAL EXAMINATION  Constitutional  · Appearance: well-nourished, well developed, alert, in no acute distress    HENT  · Head and Face: appears normal    Chest  · Respiratory Effort: breathing non-labored  · Auscultation: normal breath sounds    Cardiovascular  · Heart:  · Auscultation: regular rate and rhythm without murmur    Gastrointestinal  · Abdominal Examination: abdomen soft, non-distended, moderately diffusely tender, uterus appropriately involuted for 1 week postpartum, no rebound or guarding, normal bowel sounds, no masses present  · Incision: pfannensteil incision c/d/i, healing well, no evidence of infection (no erythema, drainage, induration/fluctuance/swelling, or significant tenderness)  · Liver and spleen: no hepatomegaly present, spleen not palpable  · Hernias: no hernias identified    Genitourinary: deferred today, just scant red-brown lochia on pad, slight odor    Skin  · General Inspection: no rash, no lesions identified    Neurologic/Psychiatric  · Mental Status:  · Orientation: grossly oriented to person, place and time  · Mood and Affect: mood normal, affect appropriate        Recent Results (from the past 24 hour(s))   GLUCOSE, POC    Collection Time: 05/27/19  4:05 PM   Result Value Ref Range    Glucose (POC) 180 (H) 65 - 100 mg/dL    Performed by Prince Knight, POC    Collection Time: 05/27/19  9:19 PM   Result Value Ref Range    Glucose (POC) 148 (H) 65 - 100 mg/dL    Performed by Oliva Leos, RANDOM    Collection Time: 05/27/19 10:06 PM   Result Value Ref Range    Gentamicin,random 6.9 ug/ml    Reported dose date: NOT PROVIDED      Reported dose time: NOT PROVIDED      Reported dose: NOT PROVIDED UNITS   GLUCOSE, POC    Collection Time: 05/28/19  6:51 AM   Result Value Ref Range    Glucose (POC) 187 (H) 65 - 100 mg/dL    Performed by Kelly FOX (CON)      INDICATION: abd pain, nausea      EXAM: CT Abdomen and Pelvis without IV contrast. No oral contrast.  CT dose reduction was achieved through use of a standardized protocol tailored  for this examination and automatic exposure control for dose modulation.     FINDINGS:   No urinary tract stones are seen. There is no hydroureteronephrosis. The kidneys  are normal in size. There is no perirenal fluid or ascites.      Liver shows no apparent significant finding without contrast. Pancreas, adrenal  glands, spleen and aorta show no significant enlargement. No inflammation is  seen. There is no pneumoperitoneum or significant adenopathy.     The bladder is distended. The distal ureters are not dilated. There is no  apparent pelvic mass. The appendix is normal. Bowels are not dilated.     IMPRESSION  No Acute Disease.       Vaginal culture collected 5/27:  Special Requests:      Preliminary   NO SPECIAL REQUESTS    GRAM STAIN FEW WBCS SEEN      Preliminary   GRAM STAIN      Preliminary   4+ GRAM NEGATIVE RODS    GRAM STAIN      Preliminary   3+ GRAM POSITIVE COCCI IN PAIRS    Culture result:      Preliminary   CULTURE IN PROGRESS,FURTHER UPDATES TO FOLLOW Assessment/Plan:   28yo 2.5 weeks post-op s/p 1LTCS (on 5/10/19) at 33wks for cat 3 tracing, in setting of CHTN with SI preE with SF (severe range BPs) and poorly controlled type 1 IDDM (on insulin pump), uncomplicated. Pt now readmitted for N/V and abdominal pain, with additional concerns of hypertensive urgency and acute on chronic renal injury. Currently being treated for possible postpartum endometritis (given abdominal/uterine tenderness, foul smelling lochia with increased volume, and low-grade temp 5/27). -vaginal culture obtained (aerobic/anaerobic) --> showing GNRs and GPCs on prelim read --> final report pending  -s/p 24 hrs of IV gent/clinda --> reasonable to transition to PO regimen of antibiotics in preparation for discharge as pt has remained afebrile, with normal WBC, and pain improved --> discussed antibiotic selection with primary team, decision to transition to PO clindamycin 60mg PO q6 and flagyl for a total of 10 days  -consider transition to PO pain medications at this time, discussed minimizing narcotic use due to concern for possible developing dependency  -continue to monitor for any si/sx of worsening infection - fevers, elevated WBC, etc, low threshold for blood cultures  -agree with psych consult due to significant concern for postpartum anxiety/depression and narcotic abuse, currently on cymbalta  -cont pumping q3 hours to avoid engorgement/mastitis  -encourage ongoing BG control and BP management by primary team     Thank you for this consult. If pt discharged, recommend outpatient follow up in our office in 1 week. Pt can call [phone] for appointment.      Chin Sidhu MD  5/28/2019  4:42 PM

## 2019-05-28 NOTE — PROGRESS NOTES
Problem: Diabetes Self-Management  Goal: *Disease process and treatment process  Description  Define diabetes and identify own type of diabetes; list 3 options for treating diabetes. Outcome: Progressing Towards Goal  Goal: *Incorporating nutritional management into lifestyle  Description  Describe effect of type, amount and timing of food on blood glucose; list 3 methods for planning meals. Outcome: Progressing Towards Goal  Goal: *Incorporating physical activity into lifestyle  Description  State effect of exercise on blood glucose levels. Outcome: Progressing Towards Goal  Goal: *Developing strategies to promote health/change behavior  Description  Define the ABC's of diabetes; identify appropriate screenings, schedule and personal plan for screenings. Outcome: Progressing Towards Goal  Goal: *Using medications safely  Description  State effect of diabetes medications on diabetes; name diabetes medication taking, action and side effects. Outcome: Progressing Towards Goal  Goal: *Monitoring blood glucose, interpreting and using results  Description  Identify recommended blood glucose targets  and personal targets. Outcome: Progressing Towards Goal  Goal: *Prevention, detection, treatment of acute complications  Description  List symptoms of hyper- and hypoglycemia; describe how to treat low blood sugar and actions for lowering  high blood glucose level. Outcome: Progressing Towards Goal  Goal: *Prevention, detection and treatment of chronic complications  Description  Define the natural course of diabetes and describe the relationship of blood glucose levels to long term complications of diabetes.   Outcome: Progressing Towards Goal  Goal: *Developing strategies to address psychosocial issues  Description  Describe feelings about living with diabetes; identify support needed and support network  Outcome: Progressing Towards Goal  Goal: *Insulin pump training  Outcome: Progressing Towards Goal  Goal: *Sick day guidelines  Outcome: Progressing Towards Goal  Goal: *Patient Specific Goal (EDIT GOAL, INSERT TEXT)  Outcome: Progressing Towards Goal     Problem: Patient Education: Go to Patient Education Activity  Goal: Patient/Family Education  Outcome: Progressing Towards Goal     Problem: Falls - Risk of  Goal: *Absence of Falls  Description  Document [place] Getting Fall Risk and appropriate interventions in the flowsheet.   Outcome: Progressing Towards Goal     Problem: Patient Education: Go to Patient Education Activity  Goal: Patient/Family Education  Outcome: Progressing Towards Goal     Problem: Pain  Goal: *Control of Pain  Outcome: Progressing Towards Goal  Goal: *PALLIATIVE CARE:  Alleviation of Pain  Outcome: Progressing Towards Goal     Problem: Nausea/Vomiting (Adult)  Goal: *Absence of nausea/vomiting  Outcome: Progressing Towards Goal  Goal: *Palliation of nausea/vomiting (Palliative Care)  Outcome: Progressing Towards Goal     Problem: Patient Education: Go to Patient Education Activity  Goal: Patient/Family Education  Outcome: Progressing Towards Goal     Problem: General Medical Care Plan  Goal: *Vital signs within specified parameters  Outcome: Progressing Towards Goal  Goal: *Labs within defined limits  Outcome: Progressing Towards Goal  Goal: *Absence of infection signs and symptoms  Outcome: Progressing Towards Goal  Goal: *Optimal pain control at patient's stated goal  Outcome: Progressing Towards Goal  Goal: *Skin integrity maintained  Outcome: Progressing Towards Goal  Goal: *Fluid volume balance  Outcome: Progressing Towards Goal  Goal: *Optimize nutritional status  Outcome: Progressing Towards Goal  Goal: *Anxiety reduced or absent  Outcome: Progressing Towards Goal  Goal: *Progressive mobility and function (eg: ADL's)  Outcome: Progressing Towards Goal     Problem: Patient Education: Go to Patient Education Activity  Goal: Patient/Family Education  Outcome: Progressing Towards Goal     Problem: Patient Education: Go to Patient Education Activity  Goal: Patient/Family Education  Outcome: Progressing Towards Goal     Problem: Heart Failure: Day 1  Goal: Off Pathway (Use only if patient is Off Pathway)  Outcome: Progressing Towards Goal  Goal: Activity/Safety  Outcome: Progressing Towards Goal  Goal: Consults, if ordered  Outcome: Progressing Towards Goal  Goal: Diagnostic Test/Procedures  Outcome: Progressing Towards Goal  Goal: Nutrition/Diet  Outcome: Progressing Towards Goal  Goal: Discharge Planning  Outcome: Progressing Towards Goal  Goal: Medications  Outcome: Progressing Towards Goal  Goal: Respiratory  Outcome: Progressing Towards Goal  Goal: Treatments/Interventions/Procedures  Outcome: Progressing Towards Goal  Goal: Psychosocial  Outcome: Progressing Towards Goal  Goal: *Oxygen saturation within defined limits  Outcome: Progressing Towards Goal  Goal: *Hemodynamically stable  Outcome: Progressing Towards Goal  Goal: *Optimal pain control at patient's stated goal  Outcome: Progressing Towards Goal  Goal: *Anxiety reduced or absent  Outcome: Progressing Towards Goal

## 2019-05-28 NOTE — PROGRESS NOTES
Attempted to schedule hospital specialist appointment with Dr. Jer Matta. Office nurse will contact patient with follow up appointment information.   Mikey Storm, Care Management Specialist.

## 2019-05-28 NOTE — DISCHARGE SUMMARY
Discharge Summary     Patient:  Michael Iyer       MRN: [id number]       YOB: 1990       Age: 30 y.o. Date of admission:  2019    Date of discharge:  2019    Primary care provider: Dr. Ekaterina Gong provider:  Bard Andrei MD    Discharging provider:  Katarina Schmitt [address] [phone]. If unavailable, call [phone] and ask the  to page the triage hospitalist.    Consultations  · IP CONSULT TO HOSPITALIST  · IP CONSULT TO OB GYN  · IP CONSULT TO PSYCHIATRY    Procedures  · * No surgery found *    Discharge destination: Home. The patient is stable for discharge. Admission diagnosis  · Dehydration [E86.0]    Current Discharge Medication List      START taking these medications    Details   clindamycin (CLEOCIN) 300 mg capsule Take 2 Caps by mouth every six (6) hours for 9 days. Qty: 72 Cap, Refills: 0      metroNIDAZOLE (FLAGYL) 500 mg tablet Take 1 Tab by mouth two (2) times a day for 9 days. Qty: 18 Tab, Refills: 0      oxyCODONE-acetaminophen (PERCOCET) 5-325 mg per tablet Take 1 Tab by mouth every eight (8) hours as needed for Pain for up to 3 days. Max Daily Amount: 3 Tabs. Qty: 4 Tab, Refills: 0    Associated Diagnoses: H/O:  section         CONTINUE these medications which have CHANGED    Details   hydrALAZINE (APRESOLINE) 100 mg tablet Take 1 Tab by mouth three (3) times daily. Qty: 90 Tab, Refills: 0         CONTINUE these medications which have NOT CHANGED    Details   famotidine (PEPCID) 20 mg tablet Take 1 Tab by mouth two (2) times daily as needed (acid reflux). Indications: gastroesophageal reflux disease  Qty: 30 Tab, Refills: 0      hydroCHLOROthiazide (HYDRODIURIL) 25 mg tablet Take 1 Tab by mouth daily.   Qty: 30 Tab, Refills: 2      promethazine (PHENERGAN) 12.5 mg tablet Take 1 Tab by mouth every four (4) hours as needed for Nausea. Qty: 30 Tab, Refills: 0      ferrous sulfate 325 mg (65 mg iron) tablet Take 1 Tab by mouth Daily (before breakfast). Qty: 30 Tab, Refills: 2      labetalol (NORMODYNE) 300 mg tablet Take 1 Tab by mouth every twelve (12) hours for 30 days. Qty: 60 Tab, Refills: 0      DULoxetine (CYMBALTA) 20 mg capsule Take 1 Cap by mouth two (2) times a day for 30 days. Qty: 60 Cap, Refills: 2      glucose blood VI test strips (CONTOUR NEXT TEST STRIPS) strip Check blood sugar 7-10 times per day, mini med contour next link blood glucose meter      HUMALOG KWIKPEN INSULIN 100 unit/mL kwikpen       glucagon (GLUCAGON EMERGENCY KIT, HUMAN,) 1 mg injection Glucagon emergency kit, IM injection  Indications: type 1 diabetes, pregnancy      pnv w/o calcium-iron fum-fa (COMPLETENATE) 29 mg iron- 1 mg chew Take 1 Tab by mouth. Follow-up Information     Follow up With Specialties Details Why Contact Info    Tashi Ortiz MD Endocrinology  Office will contact patient with follow up appointment. [address]      Fahad Ayers MD Internal Medicine On 5/24/2019 Hospital f/u new PCP appointment Friday, 5/31/19 @ 1:15 p.m. Please arrive 15 minutes early with photo ID, insurance card and a listing of all medications. [address]      Tashi Ortiz MD Endocrinology In 1 month  [facility]  [facility]      Amirah Caraballo MD Obstetrics & Gynecology In 1 week  [address]  [phone]            Final discharge diagnoses and brief hospital course  Gracy is a 30 y.o.  female with h/o heart murmur,Diabetes on insulin pump, DKA, chronic pain, uncontrolled DMT1, HSV infection, HTN, ventricular tachycardia, anemia, depression, and peripheral neuropathy,came to ED with complaint of ongoing vomiting. Patient says that she thought that she was in [place] says that she was recently discharged from hospital because of uncontrolled diabetes. She denies fever or chills. She denies any h/o substance abuse. In ED,patient was hypertensive /113. Lab showed glucose 209,creatinine 1.67. Patient was admitted for dehydration,MARIS due to persistent nausea,vomiting 5/25/2019    Intractable nausea and vomiting with abdominal pain - resolved  Postpartum 2 weeks  Hx preeclampsia with c section 5/10  - lower abd pain still present   - vomiting subsided but still nauseous.   - lipase negative. - CT abd: no acute pathology  - IVF, antiemetics, pain meds  - Obgyn on board  -  concern for postpartum endometritis   - vaginal cx : prelim report: gram neg rods and gram positive cocci  - c/w  IV abx Clindamycin and gentamycin   - Discussed with Dr. Marcio Garcia: ok with dc on po abx clindamycin and flagyl x 10 days     MARIS - mildly worsened  2/2 gentamycin  -Due to dehydration 2/2 vomiting  -IV fluid, IVF fluid bolus given  -Follow kidney functions.     Hypertension - controlled  - c/w HCTZ,labetalol, hydralazine  - hydralazine iv prn     Diabetes mellitus type 1  -a1c is 7.2   -Pt is on insulin pump  - DM education  - follows with Dr. Bennett Batres as outpt     Depression: - c/w cymbalta    Recommendations:  New Pcp f/u in 3 days  Obgyn in 1 week  Endocrinology in 1 month  Po abx in 10 days           Physical examination at discharge  Visit Vitals  /82 (BP 1 Location: Right arm, BP Patient Position: At rest)   Pulse 81   Temp 99.1 °F (37.3 °C)   Resp 16   Ht 5' 8\" (1.727 m)   Wt 69.4 kg (153 lb)   SpO2 100%   Breastfeeding?  No   BMI 23.26 kg/m²     Gen: NAD  HEENT: anicteric sclerae, normal conjunctiva, oropharynx clear, MM moist  Neck: supple, trachea midline, no adenopathy  Heart: RRR, no MRG, no JVD, no peripheral edema  Lungs: CTA b/l, non-labored respirations  Abd: soft non tender  Extr: warm  Skin: dry, no rash  Neuro: CN II-XII grossly intact, normal speech, moves all extremities  Psych: normal mood         Pertinent imaging studies:    none    Recent Labs     05/27/19  0945 05/27/19  0533 05/26/19 0225   WBC 7.8 8.7 10.4   HGB 8.1* 8.4* 8.7*   HCT 26.8* 27.8* 28.2*   * 440* 486*     Recent Labs     05/27/19  0945 05/27/19  0533 05/26/19 0225    137 142   K 3.7 3.7 3.8    107 110*   CO2 22 19* 23   BUN 18 21* 26*   CREA 1.25* 1.14* 1.17*   * 156* 166*   CA 8.3* 7.8* 8.8     Recent Labs     05/26/19 0225 05/25/19  1717   SGOT  --  22   AP  --  97   TP  --  7.5   ALB  --  2.5*   GLOB  --  5.0*   LPSE 61* 95     No results for input(s): INR, PTP, APTT in the last 72 hours. No lab exists for component: INREXT   No results for input(s): FE, TIBC, PSAT, FERR in the last 72 hours. No results for input(s): PH, PCO2, PO2 in the last 72 hours. No results for input(s): CPK, CKMB in the last 72 hours.     No lab exists for component: TROPONINI  No components found for: [place]    Chronic Diagnoses:    Problem List as of 5/28/2019 Date Reviewed: 5/25/2019          Codes Class Noted - Resolved    RESOLVED: DKA (diabetic ketoacidoses) ([facility] ICD-10-CM: E13.10  ICD-9-CM: 250.10 Acute 5/28/2013 - 8/16/2015        Esophagitis, erosive ICD-10-CM: K22.10  ICD-9-CM: 530.19 Present on Admission 4/13/2016 - Present        Intractable nausea and vomiting ICD-10-CM: R11.2  ICD-9-CM: 536.2  5/25/2019 - Present        MARIS (acute kidney injury) ([facility] ICD-10-CM: N17.9  ICD-9-CM: 584.9  5/25/2019 - Present        Dehydration ICD-10-CM: E86.0  ICD-9-CM: 276.51  5/25/2019 - Present        Diabetes mellitus ([facility] ICD-10-CM: E11.9  ICD-9-CM: 250.00  5/25/2019 - Present        CKD (chronic kidney disease) stage 2, GFR 60-89 ml/min ICD-10-CM: N18.2  ICD-9-CM: 585.2  5/24/2019 - Present        Proteinuria ICD-10-CM: R80.9  ICD-9-CM: 791.0  5/24/2019 - Present        Anemia ICD-10-CM: D64.9  ICD-9-CM: 285.9  5/18/2019 - Present        Hypertensive urgency ICD-10-CM: I16.0  ICD-9-CM: 401.9  2019 - Present        Pregnant and not yet delivered in third trimester ICD-10-CM: Z34.93  ICD-9-CM: V22.1  2019 - Present        Pregnant ICD-10-CM: Z34.90  ICD-9-CM: V22.2  2019 - Present    Overview Addendum 2019  3:55 PM by Marah Painter MD     Primary Provider: Deedee Butterfield MD ONLY PATIENT - to be co-managed with MFM and OBHG as needed     31 yo  with DG 19 by [facility] (based on first trimester ultrasound in [facility], consistent with 28 wk scan with MFM - Dr. Mamie Nielson) vs.  by ultrasound per OSH records. 2 day discrepancy in dating.     IUP: MFM scan 19 - 28w1d showing EFW 40%ile and posterior placenta, per OSH records prior fetal ECHO wnl, SIZE<DATES on exam  --> has MFM appt scheduled next week   -records requested from anatomy scan and dating scan  -Tdap:   -Flu: vaccinated 2018  -PNV     Genetics/Carrier screening: late transfer, do not see documentation that this was done     PNL: B pos / ABSC neg / Hgb 8.0, Plts 249 / hiv, hepB, hepC, rubella, syphilis, gc, chl all neg / urine GBS+ / needs pap postpartum (last wnl 3 yrs ago per pt)     PMH significant for:  -IDDM (type 1) - on insulin pump, following with Dr. Cristal Jules, improved control, however, multiple admissions for DKA in the past, BG as high as 552, also with sequelae of nephropathy, gastroparesis, and peripheral neuropathy --> advised follow up with GI for gastroparesis, also will recommend follow up with ophthalmologist and podiatrist at future visit  -CHTN - normotensive today, currently on labetalol 300mg BID, has not been taking procardia XL 30mg since hospital discharge, has had BPs as high as 210s/120s documented earlier in the pregnancy, has been checking BPs at home, mostly normal, highest has been 150/100, but this was an outlier. Pt with likely chronic renal disease and chronic proteinuria. Baseline 24 hr urine protein 4659g per OSH records.  PreE labs in hospital overall wnl, will repeat preE labs weekly. Needs close surveillance for SI preE. S/p mag on admission. S/p BMZ. Not currently on ASA 81mg daily due to renal disease. Needs weekly surveillance BPPs with MFM.  Appointment requested 4/23.  -anemia - hgb most recently 8.0, likely iron deficiency and chronic disease, ferritin 8, recommended ferrous sulfate 325mg daily  -?h/o VTACH, heart murmur, and pt reports cardiac arrest (unable to find documentation of this - given extensive medical records), will refer to cardiology  -chronic kidney disease - with recent MARIS --> creat 1.54 in hospital, improved to 1.1 prior to discharge, will refer to nephrology  -depression - currently stable, no meds, has been prescribed cymbalta and celexa 60mg daily in the past  -h/o chronic pain - prior diagnostic laparoscopy wnl, no evidenc eof endometriosis, no current pain complaints  -tobacco and marijuana abuse - counseled on cessation  -HSV - unclear history, reports possible outbreak and was empirically treated, but was never informed of test results, valtrex suppression initiated 32 wks in anticipation of possible need for early delivery     Pregnancy problems:  -N/V of pregnancy - using scopolamine patch and taking promethazine and zofran prn, has also used THC for nausea in the pregnancy  -GERD - taking pepcid daily and tums prn     Delivery/PP plans:  -Breast - concerned about nipple piercings  -NCB vs. Epid? tbd  -Gender/Circ? tbd     Social:  -FOB \"[place]\" and parents supportive  -pt is                      Chronic renal disease ICD-10-CM: N18.9  ICD-9-CM: 585.9  4/13/2019 - Present        Chronic hypertension with superimposed preeclampsia ICD-10-CM: O11.9  ICD-9-CM: 642.70  4/8/2019 - Present        Pre-eclampsia superimposed on chronic hypertension, antepartum ICD-10-CM: O11.9, O10.019  ICD-9-CM: 642.73  4/7/2019 - Present        DKA (diabetic ketoacidoses) ([facility] ICD-10-CM: E13.10  ICD-9-CM: 250.10  8/6/2016 - Present        Sepsis [facility]) ICD-10-CM: A41.9  ICD-9-CM: 038.9, 995.91  7/20/2016 - Present        Nausea and vomiting ICD-10-CM: R11.2  ICD-9-CM: 787.01  5/14/2016 - Present        Epigastric abdominal pain ICD-10-CM: R10.13  ICD-9-CM: 789.06  4/11/2016 - Present        LLQ abdominal pain ICD-10-CM: R10.32  ICD-9-CM: 789.04  4/11/2016 - Present        Diarrhea ICD-10-CM: R19.7  ICD-9-CM: 787.91  4/11/2016 - Present        Weight loss ICD-10-CM: R63.4  ICD-9-CM: 783.21  4/11/2016 - Present        DKA, type 1, not at goal [facility]) ICD-10-CM: E10.10  ICD-9-CM: 250.13  3/8/2016 - Present        Leukocytosis ICD-10-CM: D72.829  ICD-9-CM: 288.60  3/8/2016 - Present        Hypokalemia ICD-10-CM: E87.6  ICD-9-CM: 276.8  8/16/2015 - Present        Hypophosphatemia ICD-10-CM: E83.39  ICD-9-CM: 275.3  8/16/2015 - Present        Nausea ICD-10-CM: R11.0  ICD-9-CM: 787.02  8/15/2015 - Present        Neuropathy ICD-10-CM: G62.9  ICD-9-CM: 355.9  8/15/2015 - Present        Hypertension ICD-10-CM: I10  ICD-9-CM: 401.9  8/15/2015 - Present        GIB (gastrointestinal bleeding) ICD-10-CM: K92.2  ICD-9-CM: 578.9  10/22/2014 - Present        DKA, type 1 ([address] ICD-10-CM: E10.10  ICD-9-CM: 250.13  6/5/2014 - Present        Viral syndrome ICD-10-CM: B34.9  ICD-9-CM: 079.99  5/18/2013 - Present        Depression (Chronic) ICD-10-CM: F32.9  ICD-9-CM: 591  5/18/2013 - Present        Diabetic peripheral neuropathy associated with type 1 diabetes mellitus ([facility] (Chronic) ICD-10-CM: E10.42  ICD-9-CM: 250.61, 357.2  5/18/2013 - Present        DM type 1 (diabetes mellitus, type 1) ([facility] ICD-10-CM: E10.9  ICD-9-CM: 250.01  11/23/2012 - Present        Chronic abdominal pain (Chronic) ICD-10-CM: R10.9, G89.29  ICD-9-CM: 789.00, 338.29 Chronic 11/23/2012 - Present        Nausea & vomiting ICD-10-CM: R11.2  ICD-9-CM: 787.01  11/23/2012 - Present        UTI (urinary tract infection) ICD-10-CM: N39.0  ICD-9-CM: 599.0  10/9/2012 - Present        Ventricular tachycardia ([facility] ICD-10-CM: I47.2  ICD-9-CM: 427.1  9/3/2012 - Present        SVT (supraventricular tachycardia) ([facility]) ICD-10-CM: I47.1  ICD-9-CM: 427.89  9/3/2012 - Present        Acute renal failure ([facility]) ICD-10-CM: N17.9  ICD-9-CM: 584.9  9/1/2012 - Present        SIRS (systemic inflammatory response syndrome) ([facility] ICD-10-CM: R65.10  ICD-9-CM: 995.90  9/1/2012 - Present        Abnormal LFTs ICD-10-CM: R94.5  ICD-9-CM: 790.6  8/23/2012 - Present        RESOLVED: DKA (diabetic ketoacidoses) ([facility] ICD-10-CM: E13.10  ICD-9-CM: 250.10  4/9/2016 - 4/19/2016        RESOLVED: ARF (acute renal failure) ([facility] ICD-10-CM: N17.9  ICD-9-CM: 584.9  3/8/2016 - 4/19/2016        RESOLVED: DKA (diabetic ketoacidoses) ([facility] ICD-10-CM: E13.10  ICD-9-CM: 250.10  11/28/2015 - 4/19/2016        RESOLVED: DKA (diabetic ketoacidoses) ([facility] ICD-10-CM: E13.10  ICD-9-CM: 250.10  10/30/2015 - 11/25/2015        RESOLVED: DKA (diabetic ketoacidosis) ([facility] ICD-10-CM: E13.10  ICD-9-CM: 250.10  8/30/2015 - 11/25/2015        RESOLVED: Urinary tract infection, site not specified ICD-10-CM: N39.0  ICD-9-CM: 599.0  12/3/2013 - 8/16/2015        RESOLVED: DKA (diabetic ketoacidoses) ([facility] ICD-10-CM: E13.10  ICD-9-CM: 250.10  12/26/2012 - 5/28/2013        RESOLVED: DKA (diabetic ketoacidoses) ([facility] ICD-10-CM: E13.10  ICD-9-CM: 250.10  12/17/2012 - 12/21/2012        RESOLVED: DKA (diabetic ketoacidoses) ([facility] ICD-10-CM: E13.10  ICD-9-CM: 250.10  8/23/2012 - 2/28/2013        RESOLVED: Metabolic acidosis [facility]-18-VX: E87.2  ICD-9-CM: 276.2  8/23/2012 - 8/16/2015        RESOLVED: Leukocytosis, unspecified ICD-10-CM: N05.865  ICD-9-CM: 288.60  8/23/2012 - 11/25/2015              Time spent on discharge related activities today greater than 30 minutes.       Signed:  Bruno Lee MD                 Hospitalist, Internal Medicine      Cc: None

## 2019-05-30 ENCOUNTER — APPOINTMENT (OUTPATIENT)
Dept: CT IMAGING | Age: 29
DRG: 561 | End: 2019-05-30
Attending: FAMILY MEDICINE
Payer: MEDICAID

## 2019-05-30 ENCOUNTER — HOSPITAL ENCOUNTER (INPATIENT)
Age: 29
LOS: 7 days | Discharge: HOME | DRG: 561 | End: 2019-06-07
Attending: EMERGENCY MEDICINE | Admitting: HOSPITALIST
Payer: MEDICAID

## 2019-05-30 ENCOUNTER — TELEPHONE (OUTPATIENT)
Dept: OBGYN CLINIC | Age: 29
End: 2019-05-30

## 2019-05-30 DIAGNOSIS — R11.15 NON-INTRACTABLE CYCLICAL VOMITING WITH NAUSEA: Primary | ICD-10-CM

## 2019-05-30 DIAGNOSIS — R10.84 ABDOMINAL PAIN, GENERALIZED: ICD-10-CM

## 2019-05-30 PROBLEM — R11.10 VOMITING: Status: ACTIVE | Noted: 2019-05-30

## 2019-05-30 LAB
ALBUMIN SERPL-MCNC: 2.6 G/DL (ref 3.5–5)
ALBUMIN/GLOB SERPL: 0.6 {RATIO} (ref 1.1–2.2)
ALP SERPL-CCNC: 84 U/L (ref 45–117)
ALT SERPL-CCNC: 19 U/L (ref 12–78)
ANION GAP SERPL CALC-SCNC: 13 MMOL/L (ref 5–15)
APPEARANCE UR: CLEAR
AST SERPL-CCNC: 23 U/L (ref 15–37)
BACTERIA SPEC CULT: ABNORMAL
BACTERIA URNS QL MICRO: NEGATIVE /HPF
BASOPHILS # BLD: 0.1 K/UL (ref 0–0.1)
BASOPHILS NFR BLD: 1 % (ref 0–1)
BILIRUB SERPL-MCNC: 0.4 MG/DL (ref 0.2–1)
BILIRUB UR QL: NEGATIVE
BUN SERPL-MCNC: 16 MG/DL (ref 6–20)
BUN/CREAT SERPL: 10 (ref 12–20)
CALCIUM SERPL-MCNC: 10.3 MG/DL (ref 8.5–10.1)
CALCIUM SERPL-MCNC: 10.8 MG/DL (ref 8.5–10.1)
CHLORIDE SERPL-SCNC: 104 MMOL/L (ref 97–108)
CO2 SERPL-SCNC: 22 MMOL/L (ref 21–32)
COLOR UR: ABNORMAL
COMMENT, HOLDF: NORMAL
COMMENT, HOLDF: NORMAL
CREAT SERPL-MCNC: 1.57 MG/DL (ref 0.55–1.02)
DIFFERENTIAL METHOD BLD: ABNORMAL
EOSINOPHIL # BLD: 0.1 K/UL (ref 0–0.4)
EOSINOPHIL NFR BLD: 1 % (ref 0–7)
EPITH CASTS URNS QL MICRO: ABNORMAL /LPF
ERYTHROCYTE [DISTWIDTH] IN BLOOD BY AUTOMATED COUNT: 16.2 % (ref 11.5–14.5)
EST. AVERAGE GLUCOSE BLD GHB EST-MCNC: 166 MG/DL
GLOBULIN SER CALC-MCNC: 4.2 G/DL (ref 2–4)
GLUCOSE BLD STRIP.AUTO-MCNC: 286 MG/DL (ref 65–100)
GLUCOSE BLD STRIP.AUTO-MCNC: 327 MG/DL (ref 65–100)
GLUCOSE BLD STRIP.AUTO-MCNC: 381 MG/DL (ref 65–100)
GLUCOSE SERPL-MCNC: 289 MG/DL (ref 65–100)
GLUCOSE UR STRIP.AUTO-MCNC: 500 MG/DL
GRAM STN SPEC: ABNORMAL
HBA1C MFR BLD: 7.4 % (ref 4.2–6.3)
HCT VFR BLD AUTO: 32.3 % (ref 35–47)
HGB BLD-MCNC: 9.9 G/DL (ref 11.5–16)
HGB UR QL STRIP: ABNORMAL
HYALINE CASTS URNS QL MICRO: ABNORMAL /LPF (ref 0–5)
IMM GRANULOCYTES # BLD AUTO: 0.1 K/UL (ref 0–0.04)
IMM GRANULOCYTES NFR BLD AUTO: 1 % (ref 0–0.5)
KETONES UR QL STRIP.AUTO: 15 MG/DL
LACTATE SERPL-SCNC: 1 MMOL/L (ref 0.4–2)
LEUKOCYTE ESTERASE UR QL STRIP.AUTO: NEGATIVE
LIPASE SERPL-CCNC: 59 U/L (ref 73–393)
LYMPHOCYTES # BLD: 1 K/UL (ref 0.8–3.5)
LYMPHOCYTES NFR BLD: 9 % (ref 12–49)
MCH RBC QN AUTO: 25.6 PG (ref 26–34)
MCHC RBC AUTO-ENTMCNC: 30.7 G/DL (ref 30–36.5)
MCV RBC AUTO: 83.7 FL (ref 80–99)
MONOCYTES # BLD: 0.6 K/UL (ref 0–1)
MONOCYTES NFR BLD: 5 % (ref 5–13)
NEUTS SEG # BLD: 9.4 K/UL (ref 1.8–8)
NEUTS SEG NFR BLD: 83 % (ref 32–75)
NITRITE UR QL STRIP.AUTO: NEGATIVE
NRBC # BLD: 0 K/UL (ref 0–0.01)
NRBC BLD-RTO: 0 PER 100 WBC
PH UR STRIP: 6.5 [PH] (ref 5–8)
PLATELET # BLD AUTO: 543 K/UL (ref 150–400)
PMV BLD AUTO: 10 FL (ref 8.9–12.9)
POTASSIUM SERPL-SCNC: 3.2 MMOL/L (ref 3.5–5.1)
PROT SERPL-MCNC: 6.8 G/DL (ref 6.4–8.2)
PROT UR STRIP-MCNC: 300 MG/DL
PTH-INTACT SERPL-MCNC: 7.1 PG/ML (ref 18.4–88)
RBC # BLD AUTO: 3.86 M/UL (ref 3.8–5.2)
RBC #/AREA URNS HPF: ABNORMAL /HPF (ref 0–5)
SAMPLES BEING HELD,HOLD: NORMAL
SAMPLES BEING HELD,HOLD: NORMAL
SERVICE CMNT-IMP: ABNORMAL
SODIUM SERPL-SCNC: 139 MMOL/L (ref 136–145)
SP GR UR REFRACTOMETRY: 1.01 (ref 1–1.03)
UA: UC IF INDICATED,UAUC: ABNORMAL
UROBILINOGEN UR QL STRIP.AUTO: 0.2 EU/DL (ref 0.2–1)
WBC # BLD AUTO: 11.2 K/UL (ref 3.6–11)
WBC URNS QL MICRO: ABNORMAL /HPF (ref 0–4)

## 2019-05-30 PROCEDURE — 74011250636 HC RX REV CODE- 250/636: Performed by: FAMILY MEDICINE

## 2019-05-30 PROCEDURE — 99284 EMERGENCY DEPT VISIT MOD MDM: CPT

## 2019-05-30 PROCEDURE — 74011250636 HC RX REV CODE- 250/636: Performed by: EMERGENCY MEDICINE

## 2019-05-30 PROCEDURE — 36600 WITHDRAWAL OF ARTERIAL BLOOD: CPT

## 2019-05-30 PROCEDURE — 74176 CT ABD & PELVIS W/O CONTRAST: CPT

## 2019-05-30 PROCEDURE — 99218 HC RM OBSERVATION: CPT

## 2019-05-30 PROCEDURE — 96376 TX/PRO/DX INJ SAME DRUG ADON: CPT

## 2019-05-30 PROCEDURE — 85025 COMPLETE CBC W/AUTO DIFF WBC: CPT

## 2019-05-30 PROCEDURE — 96361 HYDRATE IV INFUSION ADD-ON: CPT

## 2019-05-30 PROCEDURE — 81001 URINALYSIS AUTO W/SCOPE: CPT

## 2019-05-30 PROCEDURE — 74011250636 HC RX REV CODE- 250/636

## 2019-05-30 PROCEDURE — 83970 ASSAY OF PARATHORMONE: CPT

## 2019-05-30 PROCEDURE — 36415 COLL VENOUS BLD VENIPUNCTURE: CPT

## 2019-05-30 PROCEDURE — 74011250637 HC RX REV CODE- 250/637: Performed by: FAMILY MEDICINE

## 2019-05-30 PROCEDURE — 82803 BLOOD GASES ANY COMBINATION: CPT

## 2019-05-30 PROCEDURE — 83036 HEMOGLOBIN GLYCOSYLATED A1C: CPT

## 2019-05-30 PROCEDURE — 96375 TX/PRO/DX INJ NEW DRUG ADDON: CPT

## 2019-05-30 PROCEDURE — 82962 GLUCOSE BLOOD TEST: CPT

## 2019-05-30 PROCEDURE — 87147 CULTURE TYPE IMMUNOLOGIC: CPT

## 2019-05-30 PROCEDURE — 74011000258 HC RX REV CODE- 258: Performed by: FAMILY MEDICINE

## 2019-05-30 PROCEDURE — 80053 COMPREHEN METABOLIC PANEL: CPT

## 2019-05-30 PROCEDURE — 96366 THER/PROPH/DIAG IV INF ADDON: CPT

## 2019-05-30 PROCEDURE — 74011636637 HC RX REV CODE- 636/637: Performed by: NURSE PRACTITIONER

## 2019-05-30 PROCEDURE — 74011636637 HC RX REV CODE- 636/637: Performed by: FAMILY MEDICINE

## 2019-05-30 PROCEDURE — 96372 THER/PROPH/DIAG INJ SC/IM: CPT

## 2019-05-30 PROCEDURE — 83690 ASSAY OF LIPASE: CPT

## 2019-05-30 PROCEDURE — 87040 BLOOD CULTURE FOR BACTERIA: CPT

## 2019-05-30 PROCEDURE — 83605 ASSAY OF LACTIC ACID: CPT

## 2019-05-30 PROCEDURE — 96374 THER/PROPH/DIAG INJ IV PUSH: CPT

## 2019-05-30 PROCEDURE — 96367 TX/PROPH/DG ADDL SEQ IV INF: CPT

## 2019-05-30 PROCEDURE — 87086 URINE CULTURE/COLONY COUNT: CPT

## 2019-05-30 PROCEDURE — 96365 THER/PROPH/DIAG IV INF INIT: CPT

## 2019-05-30 RX ORDER — KETOROLAC TROMETHAMINE 30 MG/ML
15 INJECTION, SOLUTION INTRAMUSCULAR; INTRAVENOUS
Status: DISCONTINUED | OUTPATIENT
Start: 2019-05-30 | End: 2019-05-31

## 2019-05-30 RX ORDER — INSULIN LISPRO 100 [IU]/ML
INJECTION, SOLUTION INTRAVENOUS; SUBCUTANEOUS EVERY 6 HOURS
Status: DISCONTINUED | OUTPATIENT
Start: 2019-05-30 | End: 2019-06-02

## 2019-05-30 RX ORDER — ONDANSETRON 2 MG/ML
INJECTION INTRAMUSCULAR; INTRAVENOUS
Status: DISPENSED
Start: 2019-05-30 | End: 2019-05-31

## 2019-05-30 RX ORDER — ONDANSETRON 2 MG/ML
8 INJECTION INTRAMUSCULAR; INTRAVENOUS
Status: COMPLETED | OUTPATIENT
Start: 2019-05-30 | End: 2019-05-30

## 2019-05-30 RX ORDER — DEXTROSE 50 % IN WATER (D50W) INTRAVENOUS SYRINGE
25-50 AS NEEDED
Status: DISCONTINUED | OUTPATIENT
Start: 2019-05-30 | End: 2019-06-07 | Stop reason: HOSPADM

## 2019-05-30 RX ORDER — SODIUM CHLORIDE 9 MG/ML
100 INJECTION, SOLUTION INTRAVENOUS CONTINUOUS
Status: DISCONTINUED | OUTPATIENT
Start: 2019-05-30 | End: 2019-05-31

## 2019-05-30 RX ORDER — POTASSIUM CHLORIDE 14.9 MG/ML
10 INJECTION INTRAVENOUS ONCE
Status: COMPLETED | OUTPATIENT
Start: 2019-05-30 | End: 2019-05-30

## 2019-05-30 RX ORDER — INSULIN LISPRO 100 [IU]/ML
9 INJECTION, SOLUTION INTRAVENOUS; SUBCUTANEOUS ONCE
Status: COMPLETED | OUTPATIENT
Start: 2019-05-30 | End: 2019-05-30

## 2019-05-30 RX ORDER — INSULIN LISPRO 100 [IU]/ML
INJECTION, SOLUTION INTRAVENOUS; SUBCUTANEOUS CONTINUOUS
COMMUNITY

## 2019-05-30 RX ORDER — SODIUM CHLORIDE 0.9 % (FLUSH) 0.9 %
5-40 SYRINGE (ML) INJECTION AS NEEDED
Status: DISCONTINUED | OUTPATIENT
Start: 2019-05-30 | End: 2019-06-07 | Stop reason: HOSPADM

## 2019-05-30 RX ORDER — DEXTROSE 50 % IN WATER (D50W) INTRAVENOUS SYRINGE
25-50 AS NEEDED
Status: DISCONTINUED | OUTPATIENT
Start: 2019-05-30 | End: 2019-05-30 | Stop reason: SDUPTHER

## 2019-05-30 RX ORDER — KETOROLAC TROMETHAMINE 30 MG/ML
15 INJECTION, SOLUTION INTRAMUSCULAR; INTRAVENOUS
Status: COMPLETED | OUTPATIENT
Start: 2019-05-30 | End: 2019-05-30

## 2019-05-30 RX ORDER — HYDRALAZINE HYDROCHLORIDE 50 MG/1
100 TABLET, FILM COATED ORAL 3 TIMES DAILY
Status: DISCONTINUED | OUTPATIENT
Start: 2019-05-30 | End: 2019-06-07 | Stop reason: HOSPADM

## 2019-05-30 RX ORDER — OXYCODONE AND ACETAMINOPHEN 5; 325 MG/1; MG/1
1 TABLET ORAL
Status: DISCONTINUED | OUTPATIENT
Start: 2019-05-30 | End: 2019-06-07 | Stop reason: HOSPADM

## 2019-05-30 RX ORDER — LANOLIN ALCOHOL/MO/W.PET/CERES
325 CREAM (GRAM) TOPICAL
Status: DISCONTINUED | OUTPATIENT
Start: 2019-05-31 | End: 2019-06-07 | Stop reason: HOSPADM

## 2019-05-30 RX ORDER — HEPARIN SODIUM 5000 [USP'U]/ML
5000 INJECTION, SOLUTION INTRAVENOUS; SUBCUTANEOUS EVERY 8 HOURS
Status: DISCONTINUED | OUTPATIENT
Start: 2019-05-30 | End: 2019-06-07 | Stop reason: HOSPADM

## 2019-05-30 RX ORDER — KETOROLAC TROMETHAMINE 30 MG/ML
INJECTION, SOLUTION INTRAMUSCULAR; INTRAVENOUS
Status: DISPENSED
Start: 2019-05-30 | End: 2019-05-31

## 2019-05-30 RX ORDER — DULOXETIN HYDROCHLORIDE 20 MG/1
20 CAPSULE, DELAYED RELEASE ORAL 2 TIMES DAILY
Status: DISCONTINUED | OUTPATIENT
Start: 2019-05-30 | End: 2019-06-07 | Stop reason: HOSPADM

## 2019-05-30 RX ORDER — HYDRALAZINE HYDROCHLORIDE 20 MG/ML
10 INJECTION INTRAMUSCULAR; INTRAVENOUS
Status: DISCONTINUED | OUTPATIENT
Start: 2019-05-30 | End: 2019-06-07 | Stop reason: HOSPADM

## 2019-05-30 RX ORDER — MAGNESIUM SULFATE 100 %
4 CRYSTALS MISCELLANEOUS AS NEEDED
Status: DISCONTINUED | OUTPATIENT
Start: 2019-05-30 | End: 2019-05-30 | Stop reason: SDUPTHER

## 2019-05-30 RX ORDER — INSULIN LISPRO 100 [IU]/ML
4 INJECTION, SOLUTION INTRAVENOUS; SUBCUTANEOUS ONCE
Status: COMPLETED | OUTPATIENT
Start: 2019-05-30 | End: 2019-05-30

## 2019-05-30 RX ORDER — METRONIDAZOLE 500 MG/100ML
500 INJECTION, SOLUTION INTRAVENOUS EVERY 8 HOURS
Status: DISCONTINUED | OUTPATIENT
Start: 2019-05-30 | End: 2019-05-31

## 2019-05-30 RX ORDER — ONDANSETRON 2 MG/ML
4 INJECTION INTRAMUSCULAR; INTRAVENOUS
Status: DISCONTINUED | OUTPATIENT
Start: 2019-05-30 | End: 2019-06-07 | Stop reason: HOSPADM

## 2019-05-30 RX ORDER — HYDROMORPHONE HYDROCHLORIDE 1 MG/ML
INJECTION, SOLUTION INTRAMUSCULAR; INTRAVENOUS; SUBCUTANEOUS
Status: DISPENSED
Start: 2019-05-30 | End: 2019-05-31

## 2019-05-30 RX ORDER — CLINDAMYCIN PHOSPHATE 600 MG/50ML
600 INJECTION INTRAVENOUS EVERY 6 HOURS
Status: DISCONTINUED | OUTPATIENT
Start: 2019-05-30 | End: 2019-05-31

## 2019-05-30 RX ORDER — SODIUM CHLORIDE 0.9 % (FLUSH) 0.9 %
5-40 SYRINGE (ML) INJECTION EVERY 8 HOURS
Status: DISCONTINUED | OUTPATIENT
Start: 2019-05-30 | End: 2019-06-07 | Stop reason: HOSPADM

## 2019-05-30 RX ORDER — LABETALOL 100 MG/1
300 TABLET, FILM COATED ORAL EVERY 12 HOURS
Status: DISCONTINUED | OUTPATIENT
Start: 2019-05-30 | End: 2019-06-07 | Stop reason: HOSPADM

## 2019-05-30 RX ORDER — HYDROMORPHONE HYDROCHLORIDE 1 MG/ML
1 INJECTION, SOLUTION INTRAMUSCULAR; INTRAVENOUS; SUBCUTANEOUS ONCE
Status: COMPLETED | OUTPATIENT
Start: 2019-05-30 | End: 2019-05-30

## 2019-05-30 RX ORDER — MAGNESIUM SULFATE 100 %
4 CRYSTALS MISCELLANEOUS AS NEEDED
Status: DISCONTINUED | OUTPATIENT
Start: 2019-05-30 | End: 2019-06-07 | Stop reason: HOSPADM

## 2019-05-30 RX ORDER — INSULIN LISPRO 100 [IU]/ML
INJECTION, SOLUTION INTRAVENOUS; SUBCUTANEOUS EVERY 6 HOURS
Status: DISCONTINUED | OUTPATIENT
Start: 2019-05-30 | End: 2019-05-30

## 2019-05-30 RX ADMIN — SODIUM CHLORIDE 1000 ML: 900 INJECTION, SOLUTION INTRAVENOUS at 15:50

## 2019-05-30 RX ADMIN — ONDANSETRON 4 MG: 2 INJECTION INTRAMUSCULAR; INTRAVENOUS at 20:31

## 2019-05-30 RX ADMIN — SODIUM CHLORIDE 100 ML/HR: 900 INJECTION, SOLUTION INTRAVENOUS at 17:00

## 2019-05-30 RX ADMIN — HYDRALAZINE HYDROCHLORIDE 10 MG: 20 INJECTION, SOLUTION INTRAMUSCULAR; INTRAVENOUS at 18:07

## 2019-05-30 RX ADMIN — HYDROMORPHONE HYDROCHLORIDE 1 MG: 1 INJECTION, SOLUTION INTRAMUSCULAR; INTRAVENOUS; SUBCUTANEOUS at 15:21

## 2019-05-30 RX ADMIN — CLINDAMYCIN PHOSPHATE 600 MG: 600 INJECTION, SOLUTION INTRAVENOUS at 17:31

## 2019-05-30 RX ADMIN — SODIUM CHLORIDE 100 ML/HR: 900 INJECTION, SOLUTION INTRAVENOUS at 23:01

## 2019-05-30 RX ADMIN — METRONIDAZOLE 500 MG: 500 INJECTION, SOLUTION INTRAVENOUS at 18:48

## 2019-05-30 RX ADMIN — SODIUM CHLORIDE 1000 ML: 900 INJECTION, SOLUTION INTRAVENOUS at 13:29

## 2019-05-30 RX ADMIN — ONDANSETRON 8 MG: 2 INJECTION INTRAMUSCULAR; INTRAVENOUS at 13:18

## 2019-05-30 RX ADMIN — CEFTRIAXONE 1 G: 1 INJECTION, POWDER, FOR SOLUTION INTRAMUSCULAR; INTRAVENOUS at 20:23

## 2019-05-30 RX ADMIN — INSULIN LISPRO 9 UNITS: 100 INJECTION, SOLUTION INTRAVENOUS; SUBCUTANEOUS at 19:49

## 2019-05-30 RX ADMIN — KETOROLAC TROMETHAMINE 15 MG: 30 INJECTION, SOLUTION INTRAMUSCULAR at 13:21

## 2019-05-30 RX ADMIN — OXYCODONE AND ACETAMINOPHEN 1 TABLET: 5; 325 TABLET ORAL at 20:36

## 2019-05-30 RX ADMIN — HEPARIN SODIUM 5000 UNITS: 5000 INJECTION INTRAVENOUS; SUBCUTANEOUS at 18:46

## 2019-05-30 RX ADMIN — KETOROLAC TROMETHAMINE 15 MG: 30 INJECTION, SOLUTION INTRAMUSCULAR; INTRAVENOUS at 18:45

## 2019-05-30 RX ADMIN — INSULIN LISPRO 4 UNITS: 100 INJECTION, SOLUTION INTRAVENOUS; SUBCUTANEOUS at 21:42

## 2019-05-30 RX ADMIN — CLINDAMYCIN PHOSPHATE 600 MG: 600 INJECTION, SOLUTION INTRAVENOUS at 23:01

## 2019-05-30 RX ADMIN — POTASSIUM CHLORIDE 10 MEQ: 200 INJECTION, SOLUTION INTRAVENOUS at 21:27

## 2019-05-30 RX ADMIN — HYDRALAZINE HYDROCHLORIDE 10 MG: 20 INJECTION, SOLUTION INTRAMUSCULAR; INTRAVENOUS at 21:11

## 2019-05-30 RX ADMIN — Medication 10 ML: at 18:53

## 2019-05-30 NOTE — CONSULTS
[facility]    CC: consulted by hospitalist Dr. Beatrice Artis   postpartum/post-op abdominal pain    HPI:   31 yo  now 3 weeks s/p 1LTCS (on 5/10/19) at 33wks, in setting of CHTN with superimposed preeclampsia and poorly controlled type 1 IDDM (on insulin pump). She was discharged earlier this week with suspected endometritis. Pt now with third readmission to internal medicine service postpartum. Current admission for intractable nausea/vomiting and generalized abdominal pain. Also with hypertensive urgency, and acute on chronic kidney injury. Patient on stretcher in ER. Reports ~1 day worsening abdominal pain and nausea/vomiting. Lochia unchanged. No fevers/chills or other signs/symptoms of worsening infection. Reports regular bowel movements. Passing gas. Pt denies CP, SOB, HA, denies vision changes, RUQ pain. Reports BG >200 at home.       Past Medical History:   Diagnosis Date    Anemia     Chronic pain     Depression     Diabetes type 1, uncontrolled ([facility] [address]     DKA, type 1 (HCC)     Essential hypertension     Heart murmur     Herpes simplex virus (HSV) infection 2017    positive in blood    Peripheral neuropathy     Ventricular tachycardia (HCC)         Past Surgical History:   Procedure Laterality Date    ABDOMEN SURGERY PROC UNLISTED      exploratory laparoscopy    HX CYST REMOVAL      groin       Family History   Problem Relation Age of Onset    No Known Problems Mother     Sleep Apnea Father     Hypertension Father     Diabetes Father     Heart Disease Maternal Grandfather        Social History     Socioeconomic History    Marital status: SINGLE     Spouse name: Not on file    Number of children: Not on file    Years of education: Not on file    Highest education level: Not on file   Occupational History    Not on file   Social Needs    Financial resource strain: Not on file    Food insecurity:     Worry: Not on file     Inability: Not on file   Abisai Armendariz Transportation needs:     Medical: Not on file     Non-medical: Not on file   Tobacco Use    Smoking status: Former Smoker     Packs/day: 0.25     Years: 8.00     Pack years: 2.00     Types: Cigarettes     Last attempt to quit: 10/26/2014     Years since quittin.5    Smokeless tobacco: Never Used   Substance and Sexual Activity    Alcohol use: No     Alcohol/week: 0.0 oz    Drug use: Yes     Frequency: 2.0 times per week     Types: Marijuana    Sexual activity: Yes     Partners: Male     Birth control/protection: None   Lifestyle    Physical activity:     Days per week: Not on file     Minutes per session: Not on file    Stress: Not on file   Relationships    Social connections:     Talks on phone: Not on file     Gets together: Not on file     Attends Protestant service: Not on file     Active member of club or organization: Not on file     Attends meetings of clubs or organizations: Not on file     Relationship status: Not on file    Intimate partner violence:     Fear of current or ex partner: Not on file     Emotionally abused: Not on file     Physically abused: Not on file     Forced sexual activity: Not on file   Other Topics Concern     Service Not Asked    Blood Transfusions Not Asked    Caffeine Concern Not Asked    Occupational Exposure Not Asked   [employer] [employer]ards Not Asked    Sleep Concern Not Asked    Stress Concern Not Asked    Weight Concern Not Asked    Special Diet Not Asked    Back Care Not Asked    Exercise Not Asked    Bike Helmet Not Asked   [address] Not Asked    Self-Exams Not Asked   Social History Narrative    Not on file       Prior to Admission medications    Medication Sig Start Date End Date Taking? Authorizing Provider   insulin lispro (HUMALOG U-100 INSULIN) 100 unit/mL injection by SubCUTAneous route. With insulin pump   Yes Provider, Historical   hydrALAZINE (APRESOLINE) 100 mg tablet Take 1 Tab by mouth three (3) times daily.  19  Yes Conor June Livingston MD   clindamycin (CLEOCIN) 300 mg capsule Take 2 Caps by mouth every six (6) hours for 9 days. 5/28/19 6/6/19 Yes Katelyn Fuentes MD   metroNIDAZOLE (FLAGYL) 500 mg tablet Take 1 Tab by mouth two (2) times a day for 9 days. 5/28/19 6/6/19 Yes Katelyn Fuentes MD   oxyCODONE-acetaminophen (PERCOCET) 5-325 mg per tablet Take 1 Tab by mouth every eight (8) hours as needed for Pain for up to 3 days. Max Daily Amount: 3 Tabs. 5/28/19 5/31/19 Yes Katelyn Fuentes MD   famotidine (PEPCID) 20 mg tablet Take 1 Tab by mouth two (2) times daily as needed (acid reflux). Indications: gastroesophageal reflux disease 5/24/19  Yes Dorothy Maher MD   hydroCHLOROthiazide (HYDRODIURIL) 25 mg tablet Take 1 Tab by mouth daily. 5/25/19  Yes Dorothy Maher MD   promethazine (PHENERGAN) 12.5 mg tablet Take 1 Tab by mouth every four (4) hours as needed for Nausea. 5/24/19  Yes Dorothy Maher MD   ferrous sulfate 325 mg (65 mg iron) tablet Take 1 Tab by mouth Daily (before breakfast). 5/24/19  Yes Dorothy Maher MD   labetalol (NORMODYNE) 300 mg tablet Take 1 Tab by mouth every twelve (12) hours for 30 days. 5/14/19 6/13/19 Yes Diana Rollins MD   DULoxetine (CYMBALTA) 20 mg capsule Take 1 Cap by mouth two (2) times a day for 30 days. 5/14/19 6/13/19 Yes Diana Rollins MD   glucagon (GLUCAGON EMERGENCY KIT, HUMAN,) 1 mg injection Glucagon emergency kit, IM injection  Indications: type 1 diabetes, pregnancy 11/6/18  Yes Provider, Historical   pnv w/o calcium-iron fum-fa (COMPLETENATE) 29 mg iron- 1 mg chew Take 1 Tab by mouth.  10/23/18   Provider, Historical        Allergies   Allergen Reactions    Morphine Other (comments)    Pcn [Penicillins] Hives     Tolerates ceftriaxone, cephalexin         Review of Systems - History obtained from the patient-negative for:  Constitutional: weight loss, fever, night sweats  HEENT: hearing loss, earache, congestion, snoring, sorethroat  CV: chest pain, palpitations, edema  Resp: cough, shortness of breath, wheezing  Breast: breast lumps, nipple discharge, galactorrhea,   GI: change in bowel habits, abdominal pain, black or bloody stools, abdominal pain improved  : frequency, dysuria, hematuria, vaginal discharge  MSK: back pain, joint pain, muscle pain  Skin: itching, rash, hives  Neuro: dizziness, confusion, weakness  Psych: anxiety, depression, change in mood  Heme/lymph: bleeding, bruising, pallor      Visit Vitals  BP (!) 194/104 (BP 1 Location: Left arm, BP Patient Position: At rest;Supine)   Pulse (!) 104   Temp 98.5 °F (36.9 °C)   Resp 16   SpO2 98%       PHYSICAL EXAMINATION  Constitutional  · Appearance: well-nourished, well developed, alert, in no acute distress    HENT  · Head and Face: appears normal    Chest  · Respiratory Effort: breathing non-labored  · Auscultation: normal breath sounds    Cardiovascular  · Heart:  · Auscultation: regular rate and rhythm without murmur    Gastrointestinal  · Abdominal Examination: abdomen soft, non-distended, appropriately tender, uterus appropriately involuted, no rebound or guarding, normal bowel sounds, no masses present  · Incision: pfannensteil incision clean, dry, intact, healing, no erythema, drainage, induration  · Liver and spleen: no hepatomegaly present, spleen not palpable  · Hernias: no hernias identified    Genitourinary: deferred today    Skin  · General Inspection: no rash, no lesions identified    Neurologic/Psychiatric  · Mental Status:  · Orientation: grossly oriented to person, place and time  · Mood and Affect: mood normal, affect appropriate        Recent Results (from the past 24 hour(s))   GLUCOSE, POC    Collection Time: 05/30/19 12:08 PM   Result Value Ref Range    Glucose (POC) 286 (H) 65 - 100 mg/dL    Performed by Sherif Dang    CBC WITH AUTOMATED DIFF    Collection Time: 05/30/19 12:23 PM   Result Value Ref Range    WBC 11.2 (H) 3.6 - 11.0 K/uL    RBC 3.86 3.80 - 5.20 M/uL    HGB 9.9 (L) 11.5 - 16.0 g/dL HCT 32.3 (L) 35.0 - 47.0 %    MCV 83.7 80.0 - 99.0 FL    MCH 25.6 (L) 26.0 - 34.0 PG    MCHC 30.7 30.0 - 36.5 g/dL    RDW 16.2 (H) 11.5 - 14.5 %    PLATELET 294 (H) 451 - 400 K/uL    MPV 10.0 8.9 - 12.9 FL    NRBC 0.0 0  WBC    ABSOLUTE NRBC 0.00 0.00 - 0.01 K/uL    NEUTROPHILS 83 (H) 32 - 75 %    LYMPHOCYTES 9 (L) 12 - 49 %    MONOCYTES 5 5 - 13 %    EOSINOPHILS 1 0 - 7 %    BASOPHILS 1 0 - 1 %    IMMATURE GRANULOCYTES 1 (H) 0.0 - 0.5 %    ABS. NEUTROPHILS 9.4 (H) 1.8 - 8.0 K/UL    ABS. LYMPHOCYTES 1.0 0.8 - 3.5 K/UL    ABS. MONOCYTES 0.6 0.0 - 1.0 K/UL    ABS. EOSINOPHILS 0.1 0.0 - 0.4 K/UL    ABS. BASOPHILS 0.1 0.0 - 0.1 K/UL    ABS. IMM. GRANS. 0.1 (H) 0.00 - 0.04 K/UL    DF AUTOMATED     METABOLIC PANEL, COMPREHENSIVE    Collection Time: 05/30/19 12:23 PM   Result Value Ref Range    Sodium 139 136 - 145 mmol/L    Potassium 3.2 (L) 3.5 - 5.1 mmol/L    Chloride 104 97 - 108 mmol/L    CO2 22 21 - 32 mmol/L    Anion gap 13 5 - 15 mmol/L    Glucose 289 (H) 65 - 100 mg/dL    BUN 16 6 - 20 MG/DL    Creatinine 1.57 (H) 0.55 - 1.02 MG/DL    BUN/Creatinine ratio 10 (L) 12 - 20      GFR est AA 47 (L) >60 ml/min/1.73m2    GFR est non-AA 38 (L) >60 ml/min/1.73m2    Calcium 10.8 (H) 8.5 - 10.1 MG/DL    Bilirubin, total 0.4 0.2 - 1.0 MG/DL    ALT (SGPT) 19 12 - 78 U/L    AST (SGOT) 23 15 - 37 U/L    Alk. phosphatase 84 45 - 117 U/L    Protein, total 6.8 6.4 - 8.2 g/dL    Albumin 2.6 (L) 3.5 - 5.0 g/dL    Globulin 4.2 (H) 2.0 - 4.0 g/dL    A-G Ratio 0.6 (L) 1.1 - 2.2     SAMPLES BEING HELD    Collection Time: 05/30/19 12:23 PM   Result Value Ref Range    SAMPLES BEING HELD 1RD,1BL     COMMENT        Add-on orders for these samples will be processed based on acceptable specimen integrity and analyte stability, which may vary by analyte.    LIPASE    Collection Time: 05/30/19 12:23 PM   Result Value Ref Range    Lipase 59 (L) 73 - 393 U/L   LACTIC ACID    Collection Time: 05/30/19  2:49 PM   Result Value Ref Range Lactic acid 1.0 0.4 - 2.0 MMOL/L   URINALYSIS W/ REFLEX CULTURE    Collection Time: 05/30/19  2:49 PM   Result Value Ref Range    Color YELLOW/STRAW      Appearance CLEAR CLEAR      Specific gravity 1.014 1.003 - 1.030      pH (UA) 6.5 5.0 - 8.0      Protein 300 (A) NEG mg/dL    Glucose 500 (A) NEG mg/dL    Ketone 15 (A) NEG mg/dL    Bilirubin NEGATIVE  NEG      Blood MODERATE (A) NEG      Urobilinogen 0.2 0.2 - 1.0 EU/dL    Nitrites NEGATIVE  NEG      Leukocyte Esterase NEGATIVE  NEG      WBC 0-4 0 - 4 /hpf    RBC 20-50 0 - 5 /hpf    Epithelial cells FEW FEW /lpf    Bacteria NEGATIVE  NEG /hpf    UA:UC IF INDICATED CULTURE NOT INDICATED BY UA RESULT CNI      Hyaline cast 0-2 0 - 5 /lpf   SAMPLES BEING HELD    Collection Time: 05/30/19  2:49 PM   Result Value Ref Range    SAMPLES BEING HELD 1RED 1BLU 1PST 1LAV     COMMENT        Add-on orders for these samples will be processed based on acceptable specimen integrity and analyte stability, which may vary by analyte. PTH INTACT    Collection Time: 05/30/19  2:49 PM   Result Value Ref Range    Calcium 10.3 (H) 8.5 - 10.1 MG/DL    PTH, Intact PENDING pg/mL   HEMOGLOBIN A1C WITH EAG    Collection Time: 05/30/19  2:49 PM   Result Value Ref Range    Hemoglobin A1c 7.4 (H) 4.2 - 6.3 %    Est. average glucose 166 mg/dL   POC EG7    Collection Time: 05/30/19  4:17 PM   Result Value Ref Range    Calcium, ionized (POC) 1.39 (H) 1.12 - 1.32 mmol/L    pH (POC) 7.332 (L) 7.35 - 7.45      pCO2 (POC) 41.2 35.0 - 45.0 MMHG    pO2 (POC) 44 (LL) 80 - 100 MMHG    HCO3 (POC) 21.8 (L) 22 - 26 MMOL/L    Base deficit (POC) 4 mmol/L    sO2 (POC) 76 (L) 92 - 97 %    Site LEFT RADIAL      Device: ROOM AIR      Allens test (POC) N/A      Specimen type (POC) OTHER      Total resp.  rate 14       INDICATION: abd pain, nausea      EXAM: CT Abdomen and Pelvis without IV contrast. No oral contrast.  CT dose reduction was achieved through use of a standardized protocol tailored  for this examination and automatic exposure control for dose modulation.     FINDINGS:   No urinary tract stones are seen. There is no hydroureteronephrosis. The kidneys  are normal in size. There is no perirenal fluid or ascites.      Liver shows no apparent significant finding without contrast. Pancreas, adrenal  glands, spleen and aorta show no significant enlargement. No inflammation is  seen. There is no pneumoperitoneum or significant adenopathy.     The bladder is distended. The distal ureters are not dilated. There is no  apparent pelvic mass. The appendix is normal. Bowels are not dilated.     IMPRESSION  No Acute Disease. Vaginal culture collected 5/27:  Special Requests:      Preliminary   NO SPECIAL REQUESTS    GRAM STAIN FEW WBCS SEEN      Preliminary   GRAM STAIN      Preliminary   4+ GRAM NEGATIVE RODS    GRAM STAIN      Preliminary   3+ GRAM POSITIVE COCCI IN PAIRS    Culture result:      Preliminary   CULTURE IN PROGRESS,FURTHER UPDATES TO FOLLOW          Assessment/Plan:   30yo 3weeks post-op s/p 1LTCS     Agree with restarting triple IV antibiotics to cover endometritis. May consider Gentamycin (pharmacist to dose) instead of flagyl-as GI upset is side effect of flagyl. CT unremarkable for acute pelvic process. Would send blood and urine cultures now. BP and DM mgmt per primary team.   PO narcotic for pain if possible. Continue breast pumping. Thank you for this consult.      Sangeetha Paredes MD  5/30/2019  4:42 PM

## 2019-05-30 NOTE — PROGRESS NOTES
Admission Medication Reconciliation:    Information obtained from: rx query, chart review, discharge instructions 5/28/19    Significant PMH/Disease States:   Past Medical History:   Diagnosis Date    Anemia     Chronic pain     Depression     Diabetes type 1, uncontrolled ([facility] [address]     DKA, type 1 ([facility] [address]     Essential hypertension     Heart murmur     Herpes simplex virus (HSV) infection 2017    positive in blood    Peripheral neuropathy     Ventricular tachycardia [facility])        Chief Complaint for this Admission:  high blood sugar    Allergies:  Morphine and Pcn [penicillins]    Prior to Admission Medications:   Prior to Admission Medications   Prescriptions Last Dose Informant Patient Reported? Taking? DULoxetine (CYMBALTA) 20 mg capsule   No Yes   Sig: Take 1 Cap by mouth two (2) times a day for 30 days. clindamycin (CLEOCIN) 300 mg capsule   No Yes   Sig: Take 2 Caps by mouth every six (6) hours for 9 days. famotidine (PEPCID) 20 mg tablet   No Yes   Sig: Take 1 Tab by mouth two (2) times daily as needed (acid reflux). Indications: gastroesophageal reflux disease   ferrous sulfate 325 mg (65 mg iron) tablet   No Yes   Sig: Take 1 Tab by mouth Daily (before breakfast). glucagon (GLUCAGON EMERGENCY KIT, HUMAN,) 1 mg injection   Yes Yes   Sig: Glucagon emergency kit, IM injection  Indications: type 1 diabetes, pregnancy   hydrALAZINE (APRESOLINE) 100 mg tablet   No Yes   Sig: Take 1 Tab by mouth three (3) times daily. hydroCHLOROthiazide (HYDRODIURIL) 25 mg tablet   No Yes   Sig: Take 1 Tab by mouth daily. insulin lispro (HUMALOG U-100 INSULIN) 100 unit/mL injection   Yes Yes   Sig: by SubCUTAneous route. With insulin pump   labetalol (NORMODYNE) 300 mg tablet   No Yes   Sig: Take 1 Tab by mouth every twelve (12) hours for 30 days. metroNIDAZOLE (FLAGYL) 500 mg tablet   No Yes   Sig: Take 1 Tab by mouth two (2) times a day for 9 days.    oxyCODONE-acetaminophen (PERCOCET) 5-325 mg per tablet   No Yes   Sig: Take 1 Tab by mouth every eight (8) hours as needed for Pain for up to 3 days. Max Daily Amount: 3 Tabs. pnv w/o calcium-iron fum-fa (COMPLETENATE) 29 mg iron- 1 mg chew Unknown at Unknown time  Yes No   Sig: Take 1 Tab by mouth.   promethazine (PHENERGAN) 12.5 mg tablet   No Yes   Sig: Take 1 Tab by mouth every four (4) hours as needed for Nausea. Facility-Administered Medications: None         Comments/Recommendations: No changes made to the above medication list at this time. Unable to obtain last doses from patient.

## 2019-05-30 NOTE — PROGRESS NOTES
1700 TRANSFER - IN REPORT:    Verbal report received from Gene Choi [facility] (name) on [employer]  being received from ER (unit) for routine progression of care      Report consisted of patients Situation, Background, Assessment and   Recommendations(SBAR). Information from the following report(s) SBAR, MAR and Accordion was reviewed with the receiving nurse. Opportunity for questions and clarification was provided. Assessment completed upon patients arrival to unit and care assumed. 1701 In pt's room for assessment. Pt reports fall in the last week or two. States she got lightheaded and fell. 1732 In pt's room for vitals, BP on left arm was 200s/120s. Asked pt to reposition and took again. New BP is 203/120. Will give patient pain medication/hydralazine and reassess BP.    1740 Paged hospitalist.    Kuldip Angel Spoke with Dr. Iliana Skinner regarding concerns. New orders received. Received instructions to use Hydralazine PRN (IV) instead of scheduled PO Hydralazine with elevated BPs.    1831 Verbally spoke with Dr. Angela Navarro. Pt was seen by another practice during previous admission. MD asked that consult be called to original group. Consult reordered    [address] [facility] [address]. Navarro Model to request consult. 1918 . Paged [address] with Dr. Iliana Skinner regarding BS. New order entered for 9 units and a recheck in 1 hour. Gave 9 units and passed the 1 hour recheck information to following nurse.

## 2019-05-30 NOTE — CONSULTS
Will see her shortly       [facility]  Office :[phone]  Fax: [phone]

## 2019-05-30 NOTE — ROUTINE PROCESS
TRANSFER - OUT REPORT:    Verbal report given to Bartolo Champagne RN (name) on [employer]  being transferred to [facility] (unit) for routine progression of care       Report consisted of patients Situation, Background, Assessment and   Recommendations(SBAR). Information from the following report(s) SBAR, Kardex, ED Summary, Intake/Output and Recent Results was reviewed with the receiving nurse. Lines:   Peripheral IV 05/30/19 Right Wrist (Active)   Site Assessment Clean, dry, & intact 5/30/2019 12:26 PM   Phlebitis Assessment 0 5/30/2019 12:26 PM   Infiltration Assessment 0 5/30/2019 12:26 PM   Dressing Status Clean, dry, & intact 5/30/2019 12:26 PM   Dressing Type Transparent 5/30/2019 12:26 PM   Hub Color/Line Status Pink;Capped;Flushed;Patent 5/30/2019 12:26 PM   Action Taken Blood drawn 5/30/2019 12:26 PM       Peripheral IV 27/30/48 Right Basilic (Active)   Site Assessment Clean, dry, & intact 5/30/2019  2:05 PM   Phlebitis Assessment 0 5/30/2019  2:05 PM   Infiltration Assessment 0 5/30/2019  2:05 PM   Dressing Status Clean, dry, & intact 5/30/2019  2:05 PM   Dressing Type Transparent 5/30/2019  2:05 PM   Hub Color/Line Status Green;Flushed 5/30/2019  2:05 PM   Alcohol Cap Used Yes 5/30/2019  2:05 PM        Opportunity for questions and clarification was provided.       Patient transported with:  Transportation

## 2019-05-30 NOTE — PROGRESS NOTES
Reason for Readmission:     Worsening nausea,  5/10/19. Discharged two days ago on po Keflex, Flagyl, and Clindamycin. RRAT Score and Risk Level:     High Risk for Readmission    19-19    19-19 Anemia  19-19 Intractable nausea and vomiting     Level of Readmission:    Level III      Care Conference scheduled: To be determined       Resources/supports as identified by patient/family:   Lives with father, father of baby involved in baby's life. Cousin and Aunt at bedside. Top Challenges facing patient (as identified by patient/family and CM): Finances/Medication cost?     Meds at [employer]      Father drives  Support system or lack thereof? Good Support system, family support  Living arrangements? Lives with father and Father of baby  Self-care/ADLs/Cognition? Independent prior to admission       Current Advanced Directive/Advance Care Plan:  No advance care plan on file           Plan for utilizing home health: To be determined             Likelihood of additional readmission:   High likelihood of readmission             Transition of Care Plan:    Based on readmission, the patient's previous Plan of [place] [address] is a 30year old female to [facility] [place] [facility] with worsening complaints of nausea and vomiting. ED workup: hypertensive, tachycardia, white blood count elevated, consulted hospitalist for evaluation. Verified face sheet and demographics.         Care Management Interventions  PCP Verified by CM: No(GYN MD:  Dr. Afshan Bal)  Palliative Care Criteria Met (RRAT>21 & CHF Dx)?: No  Transition of Care Consult (CM Consult): Discharge Planning(RRAT, Readmission,  19/19 C- Section, 19-19  - Anemia   19-19  Intractable Nausea and Vomiting )  MyChart Signup: No  Discharge Durable Medical Equipment: No  Health Maintenance Reviewed: Yes  Physical Therapy Consult: No  Occupational Therapy Consult: No  Speech Therapy Consult: No  Current Support Network: Lives with Caregiver(Lives with father, Ivan Beltran and father of baby - Torrez Captain. Aunt and cousin at bedside.)  Plan discussed with Pt/Family/Caregiver: Yes(Met with patient in ED, Aunt, and cousin at bedside.  )    Father is taking care of infant. Discussed hospital to home visit/follow up. Care Management will continue to follow for discharge planning.      Miguelangel Martinez RN, BSN, [facility]  ED Care Management  [phone]

## 2019-05-30 NOTE — H&P
[address]  HISTORY AND PHYSICAL    Name:  Christine Kyle  MR#:  [id number]  :  1990  ACCOUNT #:  [de-identified]  ADMIT DATE:  2019      CHIEF COMPLAINT:  Abdominal pain, nausea, and vomiting. HISTORY OF PRESENT ILLNESS:  The patient is a 47-year-old female with past medical history of chronic pain syndrome, diabetes mellitus type 2, hypertension, and depression, presents to the hospital with the above-mentioned symptoms. The patient has had multiple ER visits in the last 12 months. Per ED physician, the patient was seen multiple times for abdominal pain during recent pregnancy. The patient had a  done on 05/10/2019. She was subsequently admitted at [facility] from 2019 to 2019. At that time, the patient was found to be in hypertensive urgency and was concerned for preeclampsia, also had dehydration and headache. The patient's symptoms improved and she was discharged on hydralazine and hydrochlorothiazide. The patient came back the following day on 2019 for nausea, vomiting, and abdominal pain. The patient thought that she was in diabetic ketoacidosis, but was not and was admitted for dehydration and MARIS. The patient had a CT of the abdomen and pelvis done which was essentially normal.  OB was consulted and they thought that the patient had postpartum endometritis and the patient was started on clindamycin and gentamicin while in the hospital.  The patient was discharged on 2019 and was started on Keflex 500 mg q.i.d., clindamycin, and Flagyl. The patient reports that since last night she has had increased nausea and vomiting and severe abdominal pain. The patient reports her sugars have been running high in 200s and 300s. She also started having some mild diarrhea.   The patient came to the ER and when I went to evaluate the patient, she is in fetal position in the bed, is complaining of severe abdominal pain, complains of nausea, and reports that \"the pain is unbearable. \"  The patient reports that she also had some vaginal bleeding and vaginal discharge associated with her symptoms. The patient denies any other complaints or problems. Denies any headache, blurry vision, sore throat, trouble swallowing, trouble with speech, any shortness of breath, chest pain, cough, fever, chills, urinary symptoms, focal or generalized neurological weakness, recent travels, sick contacts, falls, injuries, hematemesis, melena, hemoptysis, hematuria, or any other concerns or problems. PAST MEDICAL HISTORY:  See above. HOME MEDICATIONS:  Currently, the patient is on:  1. Insulin pump. 2.  Hydralazine 100 mg b.i.d.  3.  Clindamycin 600 mg every 6 hours. 4.  Metronidazole 500 mg b.i.d.  5.  Percocet 5/325 mg every 8 hours. 6.  Pepcid 20 mg b.i.d.  7.  Hydrochlorothiazide 25 mg daily. 8.  Ferrous sulfate. 9.  Labetalol 300 mg b.i.d. 10.  Cymbalta 20 mg daily. SOCIAL HISTORY:  The patient is a former smoker. No alcohol. Smokes marijuana occasionally. ALLERGIES:  MORPHINE AND PENICILLIN, BUT CAN TOLERATE DILAUDID. REVIEW OF SYMPTOMS:  All systems were reviewed and found to be essentially negative except for the symptoms mentioned above. FAMILY HISTORY:  Father has a history of sleep apnea, hypertension, and diabetes. PHYSICAL EXAMINATION:  VITAL SIGNS:  Temperature 99, pulse 116, respiratory rate 18, blood pressure 154/100, pulse ox 98% on room air. GENERAL:  Alert x3, awake, moderately distressed, pleasant female, appears to be stated age, currently in tears. HEENT:  Pupils are equal and reactive to light. Dry mucous membranes. Tympanic membranes are clear. NECK:  Supple. CHEST:  Clear to auscultation bilaterally. CORONARY:  S1 and S2 are heard. ABDOMEN:  Soft, tender to  palpation diffusely. No rebound, no guarding. Bowel sounds are hypoactive. EXTREMITIES:  No clubbing, no cyanosis, no edema.   NEURO/PSYCH: Pleasant mood and affect. Limited exam as the patient not cooperating well with the exam.  Moves all 4. Strength 5/5. Cranial nerves II through XII could not be tested. SKIN:  Warm. LABORATORY DATA:  White count 11.2, hemoglobin 9.9, hematocrit 32.3, and platelets 213. Urine shows no signs of infection. Sodium 139, potassium 3.2, chloride 104, bicarb 22, anion gap 13, glucose 289, BUN 16, creatinine 1.57, calcium 10.8, bili total 0.48, ALT 19, AST 23, alkaline phosphatase 84, lipase 59. Albumin is 2.6. Corrected calcium is 11.9. ASSESSMENT AND PLAN:  1. Abdominal pain with nausea and vomiting, unclear etiology, but concern for hypercalcemia causing similar symptoms. The patient will be admitted on a telemetry bed. We will start the patient on aggressive IV hydration and also please see below we will provide antiemetics, pain control. We will try to avoid opioid analgesics. I spoke with Dr. Martha Munoz from [facility] and he will be evaluating the patient. He recommends getting a stat CT scan of the abdomen and pelvis, which is being ordered. Further intervention per hospital course. Continue to closely monitor. Reassess as needed. They were concerned for endometritis. Thus, we will continue the patient on IV cephalosporin, IV clindamycin, and IV Flagyl. Clear liquid diet and further intervention per hospital course. Continue to monitor. 2.  Hypercalcemia. I spoke with Dr. Ariadna Avendaño from Nephrology. We will provide aggressive IV hydration. We will get PTH level. We will get ionized calcium level and await Nephrology input. May consider starting the patient on bisphosphonates. Continue to closely monitor and further intervention per hospital course. Reassess as needed. 3.  Diabetes mellitus type 1, poorly controlled. The patient has stopped her insulin pump due to her present medical condition. We will provide sliding scale NovoLog insulin. We will hold basal insulin for now.   I am not sure as the patient is not eating or drinking much, but if not in check, may considering adding basal insulin and we will continue to closely monitor. Further intervention per hospital course. 4.  Vaginal bleeding, postpartum. I spoke with Dr. James Sanchez from [facility] and he will be evaluating the patient. Further intervention per hospital course. Continue to closely monitor. Repeat H and H in the morning. 5.  Hypertension, poorly controlled probably because of the patient not taking her oral medication. We will provide hydralazine p.r.n. and continue to monitor. Further intervention per hospital course. 6.  Hypokalemia. We will replace potassium. 7.  Chronic kidney disease stage III. Continue home medications and continue to monitor. Avoid nephrotoxic medication. 8.  Gastrointestinal and deep venous thrombosis prophylaxis. The patient will be on heparin.       Sable Lesches, MD [id number]  D:  05/30/2019 16:00  T:  05/30/2019 17:30  JOB #:  [id number]

## 2019-05-30 NOTE — CONSULTS
Rec'd consult for hypercalcemia and MARIS  Pt has DM-1  Ct IV hydration.  Ca coming down  Formal consult to follow    Amber Orlando MD  [address] [facility]

## 2019-05-30 NOTE — PROGRESS NOTES
Problem: Pressure Injury - Risk of  Goal: *Prevention of pressure injury  Description  Document Doroteo Scale and appropriate interventions in the flowsheet. Outcome: Progressing Towards Goal     Problem: Patient Education: Go to Patient Education Activity  Goal: Patient/Family Education  Outcome: Progressing Towards Goal     Problem: Falls - Risk of  Goal: *Absence of Falls  Description  Document Clide Failing Fall Risk and appropriate interventions in the flowsheet.   Outcome: Progressing Towards Goal     Problem: Patient Education: Go to Patient Education Activity  Goal: Patient/Family Education  Outcome: Progressing Towards Goal

## 2019-05-30 NOTE — TELEPHONE ENCOUNTER
Dr. Norma Aponte Patient:    Lobito Meza calling from [facility] ER stating that the patient is being admitted by the hospitalist Dr. Allison Michael wants to speak with the work-in MD.  Dr. Allison Michael is requesting a return call at [phone].

## 2019-05-30 NOTE — ED PROVIDER NOTES
30 y.o. female with past medical history significant for chronic pain, DMT1, HTN, and depression who presents from home via private vehicle with chief complaint of nausea. The patient has 13 local ED visits in the last 12 months, per Juventino Goldberg. She was seen multiple times for abdominal pain during her recent pregnancy. Patient had a  5/10/19. She was subsequently admitted here 19-19 for dehydration and headache and hypertensive crisis. Patient was discharged to start pepcid, hydralazine, HCTZ, and phenergan. The following day, the patient represented to this ED for nausea, vomiting, and abdominal pain, thought she was in DKA. She was not in DKA, but admitted for dehydration and MARIS. Her CT scan was normal. OB was concerned for postpartum endometritis, treated with clindamycin and gentamycin. The patient was discharged home 2 days ago on keflex 500mg QID for 2 weeks, clindamycin, flagyl, and percocet. Patient arrives today again for concerns of DKA, as she has had continued nausea, vomiting, and abdominal pain since last night. Her sugars have been running high, \"in the 200-300s\" at home. Patient also reports having diarrhea. She denies any urinary sx. There are no other acute medical concerns at this time. Social hx: Former smoker (Quit ); No EtOH use; Marijuana use  OB/GYN: Lynsey Hernandes MD    Note written by Gaurang Michelle, as dictated by Marshall Marquez MD 12:40 PM    The history is provided by the patient, medical records and a friend. No  was used.         Past Medical History:   Diagnosis Date    Anemia     Chronic pain     Depression     Diabetes type 1, uncontrolled (HCC)     DKA, type 1 (HCC)     Essential hypertension     Heart murmur     Herpes simplex virus (HSV) infection 2017    positive in blood    Peripheral neuropathy     Ventricular tachycardia ([address]        Past Surgical History:   Procedure Laterality Date    ABDOMEN SURGERY PROC UNLISTED      exploratory laparoscopy    HX CYST REMOVAL      groin         Family History:   Problem Relation Age of Onset    No Known Problems Mother     Sleep Apnea Father     Hypertension Father     Diabetes Father     Heart Disease Maternal Grandfather        Social History     Socioeconomic History    Marital status: SINGLE     Spouse name: Not on file    Number of children: Not on file    Years of education: Not on file    Highest education level: Not on file   Occupational History    Not on file   Social Needs    Financial resource strain: Not on file    Food insecurity:     Worry: Not on file     Inability: Not on file    Transportation needs:     Medical: Not on file     Non-medical: Not on file   Tobacco Use    Smoking status: Former Smoker     Packs/day: 0.25     Years: 8.00     Pack years: 2.00     Types: Cigarettes     Last attempt to quit: 10/26/2014     Years since quittin.5    Smokeless tobacco: Never Used   Substance and Sexual Activity    Alcohol use: No     Alcohol/week: 0.0 oz    Drug use: Yes     Frequency: 2.0 times per week     Types: Marijuana    Sexual activity: Yes     Partners: Male     Birth control/protection: None   Lifestyle    Physical activity:     Days per week: Not on file     Minutes per session: Not on file    Stress: Not on file   Relationships    Social connections:     Talks on phone: Not on file     Gets together: Not on file     Attends Yarsanism service: Not on file     Active member of club or organization: Not on file     Attends meetings of clubs or organizations: Not on file     Relationship status: Not on file    Intimate partner violence:     Fear of current or ex partner: Not on file     Emotionally abused: Not on file     Physically abused: Not on file     Forced sexual activity: Not on file   Other Topics Concern     Service Not Asked    Blood Transfusions Not Asked    Caffeine Concern Not Asked    Occupational Exposure Not Asked   Jos[employer] [place] Hazards Not Asked    Sleep Concern Not Asked    Stress Concern Not Asked    Weight Concern Not Asked    Special Diet Not Asked    Back Care Not Asked    Exercise Not Asked    Bike Helmet Not Asked   [address] Not Asked    Self-Exams Not Asked   Social History Narrative    Not on file         ALLERGIES: Morphine and Pcn [penicillins]    Review of Systems   Constitutional: Negative for activity change, appetite change and fatigue. HENT: Negative for ear pain, facial swelling, sore throat and trouble swallowing. Eyes: Negative for pain, discharge and visual disturbance. Respiratory: Negative for chest tightness, shortness of breath and wheezing. Cardiovascular: Negative for chest pain and palpitations. Gastrointestinal: Positive for abdominal pain, diarrhea, nausea and vomiting. Negative for blood in stool. Genitourinary: Negative for decreased urine volume, difficulty urinating, dysuria, flank pain, frequency, hematuria and urgency. Musculoskeletal: Negative for arthralgias, joint swelling, myalgias and neck pain. Skin: Negative for color change and rash. Neurological: Negative for dizziness, weakness, numbness and headaches. Hematological: Negative for adenopathy. Does not bruise/bleed easily. Psychiatric/Behavioral: Negative for behavioral problems, confusion and sleep disturbance. All other systems reviewed and are negative. Vitals:    05/30/19 1209   BP: (!) 154/100   Pulse: (!) 116   Resp: 18   Temp: 99.1 °F (37.3 °C)   SpO2: 98%            Physical Exam   Constitutional: She is oriented to person, place, and time. She appears well-developed and well-nourished. No distress. Tearful. HENT:   Head: Normocephalic and atraumatic. Nose: Nose normal.   Mouth/Throat: Oropharynx is clear and moist.   Eyes: Pupils are equal, round, and reactive to light. Conjunctivae and EOM are normal. No scleral icterus. Neck: Normal range of motion. Neck supple. Cardiovascular: Regular rhythm, normal heart sounds and intact distal pulses. Tachycardia present. Exam reveals no gallop and no friction rub. No murmur heard. Tachycardic. Pulmonary/Chest: Effort normal and breath sounds normal. No respiratory distress. She has no wheezes. She has no rales. She exhibits no tenderness. Abdominal: Soft. Bowel sounds are normal. She exhibits no distension and no mass. There is generalized tenderness. There is no rebound and no guarding. Mild diffuse abdominal tenderness. Musculoskeletal: Normal range of motion. She exhibits no edema or tenderness. Neurological: She is alert and oriented to person, place, and time. No cranial nerve deficit. Coordination normal.   Skin: Skin is warm and dry. No rash noted. No erythema. Psychiatric: She has a normal mood and affect. Her behavior is normal. Judgment and thought content normal.   Nursing note and vitals reviewed. Note written by Gaurang Benitez, as dictated by Linda Smallwood MD 12:40 PM    MDM  Number of Diagnoses or Management Options  Abdominal pain, generalized: established and worsening  Non-intractable cyclical vomiting with nausea: established and worsening     Amount and/or Complexity of Data Reviewed  Clinical lab tests: ordered and reviewed  Decide to obtain previous medical records or to obtain history from someone other than the patient: yes  Review and summarize past medical records: yes  Discuss the patient with other providers: yes    Risk of Complications, Morbidity, and/or Mortality  Presenting problems: high  Diagnostic procedures: moderate  Management options: high    Patient Progress  Patient progress: stable         Procedures      1:51 PM  Lab work does not show DKA. Awaiting UA and lactic acid. The patient had a recurrence last night of severe abdominal pain with intractable nausea and vomiting. There has been no fever. She continues to vomit throughout the course of today.  The pain has persisted. Patient's white count is elevated she has been on 2 antibiotics at home. Medications at home are not helping with her symptoms. We'll consult the hospitalist for evaluation of possible readmission. Hospitalist Nelsy for Admission  2:23 PM    ED Room Number: ER16/16  Patient Name and age:  Neelam García Works 30 y.o.  female  Working Diagnosis:   1. Non-intractable cyclical vomiting with nausea    2.  Abdominal pain, generalized      Readmission: yes  Isolation Requirements:  no  Recommended Level of Care:  med/surg  Code Status:  Full

## 2019-05-31 PROBLEM — N71.9 ENDOMETRITIS: Status: ACTIVE | Noted: 2019-05-31

## 2019-05-31 LAB
ANION GAP SERPL CALC-SCNC: 19 MMOL/L (ref 5–15)
BASOPHILS # BLD: 0.1 K/UL (ref 0–0.1)
BASOPHILS NFR BLD: 0 % (ref 0–1)
BUN SERPL-MCNC: 20 MG/DL (ref 6–20)
BUN/CREAT SERPL: 13 (ref 12–20)
CALCIUM SERPL-MCNC: 8.5 MG/DL (ref 8.5–10.1)
CHLORIDE SERPL-SCNC: 104 MMOL/L (ref 97–108)
CO2 SERPL-SCNC: 16 MMOL/L (ref 21–32)
CREAT SERPL-MCNC: 1.49 MG/DL (ref 0.55–1.02)
DIFFERENTIAL METHOD BLD: ABNORMAL
EOSINOPHIL # BLD: 0.1 K/UL (ref 0–0.4)
EOSINOPHIL NFR BLD: 0 % (ref 0–7)
ERYTHROCYTE [DISTWIDTH] IN BLOOD BY AUTOMATED COUNT: 17 % (ref 11.5–14.5)
GLUCOSE BLD STRIP.AUTO-MCNC: 148 MG/DL (ref 65–100)
GLUCOSE BLD STRIP.AUTO-MCNC: 213 MG/DL (ref 65–100)
GLUCOSE BLD STRIP.AUTO-MCNC: 238 MG/DL (ref 65–100)
GLUCOSE BLD STRIP.AUTO-MCNC: 293 MG/DL (ref 65–100)
GLUCOSE BLD STRIP.AUTO-MCNC: 311 MG/DL (ref 65–100)
GLUCOSE BLD STRIP.AUTO-MCNC: 443 MG/DL (ref 65–100)
GLUCOSE SERPL-MCNC: 322 MG/DL (ref 65–100)
HCT VFR BLD AUTO: 29 % (ref 35–47)
HGB BLD-MCNC: 8.6 G/DL (ref 11.5–16)
IMM GRANULOCYTES # BLD AUTO: 0.1 K/UL (ref 0–0.04)
IMM GRANULOCYTES NFR BLD AUTO: 1 % (ref 0–0.5)
LYMPHOCYTES # BLD: 1.4 K/UL (ref 0.8–3.5)
LYMPHOCYTES NFR BLD: 9 % (ref 12–49)
MCH RBC QN AUTO: 25.7 PG (ref 26–34)
MCHC RBC AUTO-ENTMCNC: 29.7 G/DL (ref 30–36.5)
MCV RBC AUTO: 86.6 FL (ref 80–99)
MONOCYTES # BLD: 0.9 K/UL (ref 0–1)
MONOCYTES NFR BLD: 6 % (ref 5–13)
NEUTS SEG # BLD: 12.8 K/UL (ref 1.8–8)
NEUTS SEG NFR BLD: 84 % (ref 32–75)
NRBC # BLD: 0 K/UL (ref 0–0.01)
NRBC BLD-RTO: 0 PER 100 WBC
PLATELET # BLD AUTO: 492 K/UL (ref 150–400)
PMV BLD AUTO: 9.9 FL (ref 8.9–12.9)
POTASSIUM SERPL-SCNC: 2.9 MMOL/L (ref 3.5–5.1)
RBC # BLD AUTO: 3.35 M/UL (ref 3.8–5.2)
SERVICE CMNT-IMP: ABNORMAL
SODIUM SERPL-SCNC: 139 MMOL/L (ref 136–145)
WBC # BLD AUTO: 15.3 K/UL (ref 3.6–11)

## 2019-05-31 PROCEDURE — 74011250636 HC RX REV CODE- 250/636: Performed by: NURSE PRACTITIONER

## 2019-05-31 PROCEDURE — 74011250637 HC RX REV CODE- 250/637: Performed by: NURSE PRACTITIONER

## 2019-05-31 PROCEDURE — 74011636637 HC RX REV CODE- 636/637: Performed by: FAMILY MEDICINE

## 2019-05-31 PROCEDURE — 80048 BASIC METABOLIC PNL TOTAL CA: CPT

## 2019-05-31 PROCEDURE — 96376 TX/PRO/DX INJ SAME DRUG ADON: CPT

## 2019-05-31 PROCEDURE — 74011636637 HC RX REV CODE- 636/637: Performed by: HOSPITALIST

## 2019-05-31 PROCEDURE — 74011250636 HC RX REV CODE- 250/636: Performed by: HOSPITALIST

## 2019-05-31 PROCEDURE — 65660000000 HC RM CCU STEPDOWN

## 2019-05-31 PROCEDURE — 82962 GLUCOSE BLOOD TEST: CPT

## 2019-05-31 PROCEDURE — 74011250637 HC RX REV CODE- 250/637: Performed by: FAMILY MEDICINE

## 2019-05-31 PROCEDURE — 96375 TX/PRO/DX INJ NEW DRUG ADDON: CPT

## 2019-05-31 PROCEDURE — 74011000250 HC RX REV CODE- 250: Performed by: NURSE PRACTITIONER

## 2019-05-31 PROCEDURE — 74011000258 HC RX REV CODE- 258: Performed by: INTERNAL MEDICINE

## 2019-05-31 PROCEDURE — 85025 COMPLETE CBC W/AUTO DIFF WBC: CPT

## 2019-05-31 PROCEDURE — 36415 COLL VENOUS BLD VENIPUNCTURE: CPT

## 2019-05-31 PROCEDURE — 99218 HC RM OBSERVATION: CPT

## 2019-05-31 PROCEDURE — 96372 THER/PROPH/DIAG INJ SC/IM: CPT

## 2019-05-31 PROCEDURE — 74011250636 HC RX REV CODE- 250/636

## 2019-05-31 PROCEDURE — 74011250636 HC RX REV CODE- 250/636: Performed by: FAMILY MEDICINE

## 2019-05-31 PROCEDURE — 96366 THER/PROPH/DIAG IV INF ADDON: CPT

## 2019-05-31 PROCEDURE — 74011250637 HC RX REV CODE- 250/637: Performed by: HOSPITALIST

## 2019-05-31 PROCEDURE — 74011250636 HC RX REV CODE- 250/636: Performed by: INTERNAL MEDICINE

## 2019-05-31 RX ORDER — FENTANYL CITRATE 50 UG/ML
25 INJECTION, SOLUTION INTRAMUSCULAR; INTRAVENOUS
Status: DISCONTINUED | OUTPATIENT
Start: 2019-05-31 | End: 2019-06-01

## 2019-05-31 RX ORDER — DIPHENHYDRAMINE HYDROCHLORIDE 50 MG/ML
25 INJECTION, SOLUTION INTRAMUSCULAR; INTRAVENOUS ONCE
Status: COMPLETED | OUTPATIENT
Start: 2019-05-31 | End: 2019-05-31

## 2019-05-31 RX ORDER — FENTANYL CITRATE 50 UG/ML
INJECTION, SOLUTION INTRAMUSCULAR; INTRAVENOUS
Status: DISPENSED
Start: 2019-05-31 | End: 2019-05-31

## 2019-05-31 RX ORDER — INSULIN LISPRO 100 [IU]/ML
15 INJECTION, SOLUTION INTRAVENOUS; SUBCUTANEOUS ONCE
Status: COMPLETED | OUTPATIENT
Start: 2019-05-31 | End: 2019-05-31

## 2019-05-31 RX ORDER — ACETAMINOPHEN 325 MG/1
650 TABLET ORAL
Status: DISCONTINUED | OUTPATIENT
Start: 2019-05-31 | End: 2019-06-07 | Stop reason: HOSPADM

## 2019-05-31 RX ORDER — SUCRALFATE 1 G/1
1 TABLET ORAL
Status: DISCONTINUED | OUTPATIENT
Start: 2019-05-31 | End: 2019-06-07 | Stop reason: HOSPADM

## 2019-05-31 RX ORDER — SODIUM CHLORIDE AND POTASSIUM CHLORIDE .9; .15 G/100ML; G/100ML
SOLUTION INTRAVENOUS CONTINUOUS
Status: DISCONTINUED | OUTPATIENT
Start: 2019-05-31 | End: 2019-06-04

## 2019-05-31 RX ORDER — POTASSIUM CHLORIDE 14.9 MG/ML
10 INJECTION INTRAVENOUS
Status: COMPLETED | OUTPATIENT
Start: 2019-05-31 | End: 2019-05-31

## 2019-05-31 RX ORDER — INSULIN GLARGINE 100 [IU]/ML
12 INJECTION, SOLUTION SUBCUTANEOUS
Status: DISCONTINUED | OUTPATIENT
Start: 2019-05-31 | End: 2019-06-02

## 2019-05-31 RX ORDER — POTASSIUM CHLORIDE 750 MG/1
40 TABLET, FILM COATED, EXTENDED RELEASE ORAL
Status: COMPLETED | OUTPATIENT
Start: 2019-05-31 | End: 2019-05-31

## 2019-05-31 RX ORDER — FENTANYL CITRATE 50 UG/ML
25 INJECTION, SOLUTION INTRAMUSCULAR; INTRAVENOUS ONCE
Status: COMPLETED | OUTPATIENT
Start: 2019-05-31 | End: 2019-05-31

## 2019-05-31 RX ADMIN — HEPARIN SODIUM 5000 UNITS: 5000 INJECTION INTRAVENOUS; SUBCUTANEOUS at 00:24

## 2019-05-31 RX ADMIN — MEROPENEM 500 MG: 500 INJECTION, POWDER, FOR SOLUTION INTRAVENOUS at 18:02

## 2019-05-31 RX ADMIN — CLINDAMYCIN PHOSPHATE 600 MG: 600 INJECTION, SOLUTION INTRAVENOUS at 10:51

## 2019-05-31 RX ADMIN — INSULIN LISPRO 2 UNITS: 100 INJECTION, SOLUTION INTRAVENOUS; SUBCUTANEOUS at 00:24

## 2019-05-31 RX ADMIN — ACETAMINOPHEN 650 MG: 325 TABLET ORAL at 00:25

## 2019-05-31 RX ADMIN — DIPHENHYDRAMINE HYDROCHLORIDE 25 MG: 50 INJECTION, SOLUTION INTRAMUSCULAR; INTRAVENOUS at 04:23

## 2019-05-31 RX ADMIN — ONDANSETRON 4 MG: 2 INJECTION INTRAMUSCULAR; INTRAVENOUS at 03:47

## 2019-05-31 RX ADMIN — POTASSIUM CHLORIDE 10 MEQ: 200 INJECTION, SOLUTION INTRAVENOUS at 10:55

## 2019-05-31 RX ADMIN — ONDANSETRON 4 MG: 2 INJECTION INTRAMUSCULAR; INTRAVENOUS at 00:24

## 2019-05-31 RX ADMIN — METRONIDAZOLE 500 MG: 500 INJECTION, SOLUTION INTRAVENOUS at 09:33

## 2019-05-31 RX ADMIN — METRONIDAZOLE 500 MG: 500 INJECTION, SOLUTION INTRAVENOUS at 01:34

## 2019-05-31 RX ADMIN — DULOXETINE HYDROCHLORIDE 20 MG: 20 CAPSULE, DELAYED RELEASE ORAL at 18:02

## 2019-05-31 RX ADMIN — ONDANSETRON 4 MG: 2 INJECTION INTRAMUSCULAR; INTRAVENOUS at 18:02

## 2019-05-31 RX ADMIN — INSULIN LISPRO 4 UNITS: 100 INJECTION, SOLUTION INTRAVENOUS; SUBCUTANEOUS at 23:45

## 2019-05-31 RX ADMIN — POTASSIUM CHLORIDE 40 MEQ: 750 TABLET, FILM COATED, EXTENDED RELEASE ORAL at 10:55

## 2019-05-31 RX ADMIN — OXYCODONE AND ACETAMINOPHEN 1 TABLET: 5; 325 TABLET ORAL at 23:47

## 2019-05-31 RX ADMIN — DULOXETINE HYDROCHLORIDE 20 MG: 20 CAPSULE, DELAYED RELEASE ORAL at 09:41

## 2019-05-31 RX ADMIN — HYDRALAZINE HYDROCHLORIDE 10 MG: 20 INJECTION, SOLUTION INTRAMUSCULAR; INTRAVENOUS at 07:01

## 2019-05-31 RX ADMIN — ONDANSETRON 4 MG: 2 INJECTION INTRAMUSCULAR; INTRAVENOUS at 13:51

## 2019-05-31 RX ADMIN — Medication 10 ML: at 14:00

## 2019-05-31 RX ADMIN — MEROPENEM 500 MG: 500 INJECTION, POWDER, FOR SOLUTION INTRAVENOUS at 23:49

## 2019-05-31 RX ADMIN — KETOROLAC TROMETHAMINE 15 MG: 30 INJECTION, SOLUTION INTRAMUSCULAR; INTRAVENOUS at 11:04

## 2019-05-31 RX ADMIN — INSULIN GLARGINE 12 UNITS: 100 INJECTION, SOLUTION SUBCUTANEOUS at 23:45

## 2019-05-31 RX ADMIN — ONDANSETRON 4 MG: 2 INJECTION INTRAMUSCULAR; INTRAVENOUS at 23:42

## 2019-05-31 RX ADMIN — OXYCODONE AND ACETAMINOPHEN 1 TABLET: 5; 325 TABLET ORAL at 04:24

## 2019-05-31 RX ADMIN — LABETALOL HYDROCHLORIDE 300 MG: 200 TABLET, FILM COATED ORAL at 09:40

## 2019-05-31 RX ADMIN — SUCRALFATE 1 G: 1 TABLET ORAL at 23:47

## 2019-05-31 RX ADMIN — HEPARIN SODIUM 5000 UNITS: 5000 INJECTION INTRAVENOUS; SUBCUTANEOUS at 09:32

## 2019-05-31 RX ADMIN — Medication 10 ML: at 22:00

## 2019-05-31 RX ADMIN — OXYCODONE AND ACETAMINOPHEN 1 TABLET: 5; 325 TABLET ORAL at 14:41

## 2019-05-31 RX ADMIN — KETOROLAC TROMETHAMINE 15 MG: 30 INJECTION, SOLUTION INTRAMUSCULAR; INTRAVENOUS at 02:44

## 2019-05-31 RX ADMIN — HYDRALAZINE HYDROCHLORIDE 100 MG: 50 TABLET, FILM COATED ORAL at 09:32

## 2019-05-31 RX ADMIN — INSULIN LISPRO 3 UNITS: 100 INJECTION, SOLUTION INTRAVENOUS; SUBCUTANEOUS at 12:30

## 2019-05-31 RX ADMIN — ONDANSETRON 4 MG: 2 INJECTION INTRAMUSCULAR; INTRAVENOUS at 08:24

## 2019-05-31 RX ADMIN — SODIUM CHLORIDE AND POTASSIUM CHLORIDE: 9; 1.49 INJECTION, SOLUTION INTRAVENOUS at 14:30

## 2019-05-31 RX ADMIN — CLINDAMYCIN PHOSPHATE 600 MG: 600 INJECTION, SOLUTION INTRAVENOUS at 08:24

## 2019-05-31 RX ADMIN — LABETALOL HYDROCHLORIDE 300 MG: 200 TABLET, FILM COATED ORAL at 23:46

## 2019-05-31 RX ADMIN — FENTANYL CITRATE 25 MCG: 50 INJECTION, SOLUTION INTRAMUSCULAR; INTRAVENOUS at 01:35

## 2019-05-31 RX ADMIN — HYDRALAZINE HYDROCHLORIDE 100 MG: 50 TABLET, FILM COATED ORAL at 23:47

## 2019-05-31 RX ADMIN — INSULIN LISPRO 15 UNITS: 100 INJECTION, SOLUTION INTRAVENOUS; SUBCUTANEOUS at 19:19

## 2019-05-31 RX ADMIN — POTASSIUM CHLORIDE 10 MEQ: 200 INJECTION, SOLUTION INTRAVENOUS at 11:30

## 2019-05-31 RX ADMIN — HYDRALAZINE HYDROCHLORIDE 100 MG: 50 TABLET, FILM COATED ORAL at 17:40

## 2019-05-31 RX ADMIN — POTASSIUM CHLORIDE 10 MEQ: 200 INJECTION, SOLUTION INTRAVENOUS at 12:45

## 2019-05-31 RX ADMIN — PROCHLORPERAZINE EDISYLATE 10 MG: 5 INJECTION INTRAMUSCULAR; INTRAVENOUS at 04:23

## 2019-05-31 RX ADMIN — HEPARIN SODIUM 5000 UNITS: 5000 INJECTION INTRAVENOUS; SUBCUTANEOUS at 17:41

## 2019-05-31 RX ADMIN — INSULIN LISPRO 5 UNITS: 100 INJECTION, SOLUTION INTRAVENOUS; SUBCUTANEOUS at 06:50

## 2019-05-31 NOTE — CONSULTS
Infectious Disease Consult    Today's Date: 2019   Admit Date: 2019    Impression:   ·  earlier in the month  · Admitted with nausea and vomiting  · Leukocytosis and abdominal pain--concerns over endometritis  · Hypertension     Plan:   · Follow up cultures and studies  · Adjust antibiotic therapy  · Group will check cultures over weekend--call if she needs to be seen    Anti-infectives:   ·     Subjective:   Date of Consultation:  May 31, 2019  Referring Physician: Dr Lutz General    Patient is a 30 y.o. female who had a C section earlier in the month. She is a poorly controlled type 1 diabetic. She is admitted with complaints of pain, nausea and vomiting. She had been treated for endometritis earlier in the month. She also has complaints of diarrhea, non-bloody. She has a reported pcn allergy, but she does tolerate cephalosporins. She is on IV antibiotic therapy and we are asked to see her in consultation.      Patient Active Problem List   Diagnosis Code    Abnormal LFTs R94.5    Acute renal failure (HCC) N17.9    SIRS (systemic inflammatory response syndrome) ([facility]) R65.10    Ventricular tachycardia ([facility]) I47.2    SVT (supraventricular tachycardia) ([facility]) I47.1    UTI (urinary tract infection) N39.0    DM type 1 (diabetes mellitus, type 1) ([facility]) E10.9    Chronic abdominal pain R10.9, G89.29    Nausea & vomiting R11.2    Viral syndrome B34.9    Depression F32.9    Diabetic peripheral neuropathy associated with type 1 diabetes mellitus (HCC) E10.42    DKA, type 1 (HCC) E10.10    GIB (gastrointestinal bleeding) K92.2    Nausea R11.0    Neuropathy G62.9    Hypertension I10    Hypokalemia E87.6    Hypophosphatemia E83.39    DKA, type 1, not at goal ([facility] E10.10    Leukocytosis D72.829    Epigastric abdominal pain R10.13    LLQ abdominal pain R10.32    Diarrhea R19.7    Weight loss R63.4    Esophagitis, erosive K22.10    Nausea and vomiting R11.2    Sepsis ([facility]) A41.9    DKA (diabetic ketoacidoses) ([facility]) E13.10    Pre-eclampsia superimposed on chronic hypertension, antepartum O11.9, O10.019    Chronic hypertension with superimposed preeclampsia O11.9    Chronic renal disease N18.9    Pregnant Z34.90    Pregnant and not yet delivered in third trimester Z34.93    Anemia D64.9    Hypertensive urgency I16.0    CKD (chronic kidney disease) stage 2, GFR 60-89 ml/min N18.2    Proteinuria R80.9    Intractable nausea and vomiting R11.2    MARIS (acute kidney injury) ([facility]) N17.9    Dehydration E86.0    Diabetes mellitus ([facility]) E11.9    Vomiting R11.10    Endometritis N71.9     Past Medical History:   Diagnosis Date    Anemia     Chronic pain     Depression     Diabetes type 1, uncontrolled ([facility]     DKA, type 1 ([facility])     Essential hypertension     Heart murmur     Herpes simplex virus (HSV) infection 2017    positive in blood    Peripheral neuropathy     Ventricular tachycardia ([facility]       Family History   Problem Relation Age of Onset    No Known Problems Mother     Sleep Apnea Father     Hypertension Father     Diabetes Father     Heart Disease Maternal Grandfather       Social History     Tobacco Use    Smoking status: Former Smoker     Packs/day: 0.25     Years: 8.00     Pack years: 2.00     Types: Cigarettes     Last attempt to quit: 10/26/2014     Years since quittin.5    Smokeless tobacco: Never Used   Substance Use Topics    Alcohol use: No     Alcohol/week: 0.0 oz     Past Surgical History:   Procedure Laterality Date    ABDOMEN SURGERY PROC UNLISTED      exploratory laparoscopy    HX CYST REMOVAL      groin      Prior to Admission medications    Medication Sig Start Date End Date Taking? Authorizing Provider   insulin lispro (HUMALOG U-100 INSULIN) 100 unit/mL injection by SubCUTAneous route. With insulin pump   Yes Provider, Historical   hydrALAZINE (APRESOLINE) 100 mg tablet Take 1 Tab by mouth three (3) times daily.  19  Yes Yayo Kerr MD   clindamycin (CLEOCIN) 300 mg capsule Take 2 Caps by mouth every six (6) hours for 9 days. 5/28/19 6/6/19 Yes Nat Zimmer MD   metroNIDAZOLE (FLAGYL) 500 mg tablet Take 1 Tab by mouth two (2) times a day for 9 days. 5/28/19 6/6/19 Yes Nat Zimmer MD   oxyCODONE-acetaminophen (PERCOCET) 5-325 mg per tablet Take 1 Tab by mouth every eight (8) hours as needed for Pain for up to 3 days. Max Daily Amount: 3 Tabs. 5/28/19 5/31/19 Yes Nat Zimmer MD   famotidine (PEPCID) 20 mg tablet Take 1 Tab by mouth two (2) times daily as needed (acid reflux). Indications: gastroesophageal reflux disease 5/24/19  Yes Valente Sargent MD   hydroCHLOROthiazide (HYDRODIURIL) 25 mg tablet Take 1 Tab by mouth daily. 5/25/19  Yes Valente Sargent MD   promethazine (PHENERGAN) 12.5 mg tablet Take 1 Tab by mouth every four (4) hours as needed for Nausea. 5/24/19  Yes Valente Sargent MD   ferrous sulfate 325 mg (65 mg iron) tablet Take 1 Tab by mouth Daily (before breakfast). 5/24/19  Yes Valente Sargent MD   labetalol (NORMODYNE) 300 mg tablet Take 1 Tab by mouth every twelve (12) hours for 30 days. 5/14/19 6/13/19 Yes Hector Posadas MD   DULoxetine (CYMBALTA) 20 mg capsule Take 1 Cap by mouth two (2) times a day for 30 days. 5/14/19 6/13/19 Yes Hector Posadas MD   glucagon (GLUCAGON EMERGENCY KIT, HUMAN,) 1 mg injection Glucagon emergency kit, IM injection  Indications: type 1 diabetes, pregnancy 11/6/18  Yes Provider, Historical   pnv w/o calcium-iron fum-fa (COMPLETENATE) 29 mg iron- 1 mg chew Take 1 Tab by mouth.  10/23/18   Provider, Historical       Allergies   Allergen Reactions    Morphine Other (comments)    Pcn [Penicillins] Hives     Tolerates ceftriaxone, cephalexin        Review of Systems:  Respiratory: negative  Cardiovascular: negative  Gastrointestinal: positive for nausea and vomiting    Objective:     Visit Vitals  /82 (BP 1 Location: Left arm, BP Patient Position: At rest)   Pulse (!) 101 Temp 98.5 °F (36.9 °C)   Resp 16   Wt 60.1 kg (132 lb 7.9 oz)   SpO2 98%   BMI 20.15 kg/m²     Temp (24hrs), Av.6 °F (37 °C), Min:98 °F (36.7 °C), Max:99.1 °F (37.3 °C)       Lines:  Peripheral IV:       Physical Exam:  General:  mild distress  Lungs:  clear to auscultation bilaterally  Heart:  regular rate and rhythm  Abdomen:  soft, non-tender.  Bowel sounds normal. No masses,  no organomegaly, uterus doesn't seem particularly tender  Skin:  no rash or abnormalities    Data Review:     CBC:  Recent Labs     19  0743 19  1223   WBC 15.3* 11.2*   GRANS 84* 83*   MONOS 6 5   EOS 0 1   ANEU 12.8* 9.4*   ABL 1.4 1.0   HGB 8.6* 9.9*   HCT 29.0* 32.3*   * 543*       BMP:  Recent Labs     19  0743 19  1223   CREA 1.49* 1.57*   BUN 20 16    139   K 2.9* 3.2*    104   CO2 16* 22   AGAP 19* 13   * 289*       LFTS:  Recent Labs     19  1223   TBILI 0.4   ALT 19   SGOT 23   AP 84   TP 6.8   ALB 2.6*       Microbiology:     All Micro Results     Procedure Component Value Units Date/Time    CULTURE, BLOOD, PAIRED [[id number]] Collected:  19 144    Order Status:  Completed Specimen:  Blood Updated:  19 0956     Special Requests: NO SPECIAL REQUESTS        Culture result: NO GROWTH AFTER 18 HOURS       CULTURE, URINE [[id number]] Collected:  19 144    Order Status:  Completed Specimen:  Cath Urine Updated:  19 2343          Imaging:   Reviewed     Signed By: Chauncey Lira MD     May 31, 2019

## 2019-05-31 NOTE — ROUTINE PROCESS
Bedside and Verbal shift change report given to Alec Parry RN  (oncoming nurse) by Kentrell Martell RN  (offgoing nurse). Report included the following information SBAR, Kardex, ED Summary, Procedure Summary, Intake/Output, MAR, Recent Results, Med Rec Status, Cardiac Rhythm ST.  and Alarm Parameters .

## 2019-05-31 NOTE — PROGRESS NOTES
Problem: Falls - Risk of  Goal: *Absence of Falls  Description  Document Lesia Fields Fall Risk and appropriate interventions in the flowsheet.   Outcome: Progressing Towards Goal     Problem: Patient Education: Go to Patient Education Activity  Goal: Patient/Family Education  Outcome: Progressing Towards Goal

## 2019-05-31 NOTE — PROGRESS NOTES
Ob/Gyn Progress note    31 yo  now 3 weeks s/p 1LTCS (on 5/10/19) at 33wks, in setting of CHTN with superimposed preeclampsia and poorly controlled type 1 IDDM (on insulin pump). She was discharged earlier this week with suspected endometritis. Pt now with third readmission to internal medicine service postpartum. Current admission for intractable nausea/vomiting and generalized abdominal pain. Also with hypertensive urgency, and acute on chronic kidney injury. Reports pain is improved with pain medication. Has not vomited today, but vom 4 times last night. Nausea improved. Lochia unchanged. No fevers/chills or other signs/symptoms of worsening infection. No bowel movement since yesterday. Pt denies CP, SOB, HA, denies vision changes, RUQ pain.         Past Medical History:   Diagnosis Date    Anemia     Chronic pain     Depression     Diabetes type 1, uncontrolled ([address]     DKA, type 1 (HCC)     Essential hypertension     Heart murmur     Herpes simplex virus (HSV) infection 2017    positive in blood    Peripheral neuropathy     Ventricular tachycardia (HCC)         Past Surgical History:   Procedure Laterality Date    ABDOMEN SURGERY PROC UNLISTED      exploratory laparoscopy    HX CYST REMOVAL      groin       Family History   Problem Relation Age of Onset    No Known Problems Mother     Sleep Apnea Father     Hypertension Father     Diabetes Father     Heart Disease Maternal Grandfather        Social History     Socioeconomic History    Marital status: SINGLE     Spouse name: Not on file    Number of children: Not on file    Years of education: Not on file    Highest education level: Not on file   Occupational History    Not on file   Social Needs    Financial resource strain: Not on file    Food insecurity:     Worry: Not on file     Inability: Not on file    Transportation needs:     Medical: Not on file     Non-medical: Not on file   Tobacco Use    Smoking status: Former Smoker     Packs/day: 0.25     Years: 8.00     Pack years: 2.00     Types: Cigarettes     Last attempt to quit: 10/26/2014     Years since quittin.5    Smokeless tobacco: Never Used   Substance and Sexual Activity    Alcohol use: No     Alcohol/week: 0.0 oz    Drug use: Yes     Frequency: 2.0 times per week     Types: Marijuana    Sexual activity: Yes     Partners: Male     Birth control/protection: None   Lifestyle    Physical activity:     Days per week: Not on file     Minutes per session: Not on file    Stress: Not on file   Relationships    Social connections:     Talks on phone: Not on file     Gets together: Not on file     Attends Jew service: Not on file     Active member of club or organization: Not on file     Attends meetings of clubs or organizations: Not on file     Relationship status: Not on file    Intimate partner violence:     Fear of current or ex partner: Not on file     Emotionally abused: Not on file     Physically abused: Not on file     Forced sexual activity: Not on file   Other Topics Concern     Service Not Asked    Blood Transfusions Not Asked    Caffeine Concern Not Asked    Occupational Exposure Not Asked   Carli [place] Hazards Not Asked    Sleep Concern Not Asked    Stress Concern Not Asked    Weight Concern Not Asked    Special Diet Not Asked    Back Care Not Asked    Exercise Not Asked    Bike Helmet Not Asked   [address] Not Asked    Self-Exams Not Asked   Social History Narrative    Not on file       Prior to Admission medications    Medication Sig Start Date End Date Taking? Authorizing Provider   insulin lispro (HUMALOG U-100 INSULIN) 100 unit/mL injection by SubCUTAneous route. With insulin pump   Yes Provider, Historical   hydrALAZINE (APRESOLINE) 100 mg tablet Take 1 Tab by mouth three (3) times daily. 19  Yes Stephanie Rahman MD   clindamycin (CLEOCIN) 300 mg capsule Take 2 Caps by mouth every six (6) hours for 9 days.  19 6/6/19 Yes Gilberto Ly MD   metroNIDAZOLE (FLAGYL) 500 mg tablet Take 1 Tab by mouth two (2) times a day for 9 days. 5/28/19 6/6/19 Yes Gilberto Ly MD   oxyCODONE-acetaminophen (PERCOCET) 5-325 mg per tablet Take 1 Tab by mouth every eight (8) hours as needed for Pain for up to 3 days. Max Daily Amount: 3 Tabs. 5/28/19 5/31/19 Yes Gilberto Ly MD   famotidine (PEPCID) 20 mg tablet Take 1 Tab by mouth two (2) times daily as needed (acid reflux). Indications: gastroesophageal reflux disease 5/24/19  Yes Chapis León MD   hydroCHLOROthiazide (HYDRODIURIL) 25 mg tablet Take 1 Tab by mouth daily. 5/25/19  Yes Chapis León MD   promethazine (PHENERGAN) 12.5 mg tablet Take 1 Tab by mouth every four (4) hours as needed for Nausea. 5/24/19  Yes Chapis León MD   ferrous sulfate 325 mg (65 mg iron) tablet Take 1 Tab by mouth Daily (before breakfast). 5/24/19  Yes Chapis León MD   labetalol (NORMODYNE) 300 mg tablet Take 1 Tab by mouth every twelve (12) hours for 30 days. 5/14/19 6/13/19 Yes Julianne Reese MD   DULoxetine (CYMBALTA) 20 mg capsule Take 1 Cap by mouth two (2) times a day for 30 days. 5/14/19 6/13/19 Yes Julianne Reese MD   glucagon (GLUCAGON EMERGENCY KIT, HUMAN,) 1 mg injection Glucagon emergency kit, IM injection  Indications: type 1 diabetes, pregnancy 11/6/18  Yes Provider, Historical   pnv w/o calcium-iron fum-fa (COMPLETENATE) 29 mg iron- 1 mg chew Take 1 Tab by mouth. 10/23/18   Provider, Historical        Allergies   Allergen Reactions    Morphine Other (comments)    Pcn [Penicillins] Hives     Tolerates ceftriaxone, cephalexin       Visit Vitals  /78 (BP 1 Location: Left arm, BP Patient Position: At rest)   Pulse (!) 107   Temp 98 °F (36.7 °C)   Resp 17   Wt 132 lb 7.9 oz (60.1 kg)   SpO2 98%   BMI 20.15 kg/m²       PHYSICAL EXAMINATION  Constitutional  · Appearance: well-nourished, well developed, alert, in no acute distress.  Aroused from sleep.    HENT  · Head and Face: appears normal  ·   ·     Gastrointestinal  · Abdominal Examination: abdomen soft, non-distended, appropriately tender, uterus appropriately involuted, no rebound or guarding, normal bowel sounds, no masses present  · Incision: pfannensteil incision clean, dry, intact, healing, no erythema, drainage, induration  · Liver and spleen: no hepatomegaly present, spleen not palpable  · Hernias: no hernias identified    Genitourinary: deferred today    Skin  · General Inspection: no rash, no lesions identified    Neurologic/Psychiatric  · Mental Status:  · Orientation: grossly oriented to person, place and time  · Mood and Affect: mood normal, affect appropriate        Recent Results (from the past 24 hour(s))   LACTIC ACID    Collection Time: 05/30/19  2:49 PM   Result Value Ref Range    Lactic acid 1.0 0.4 - 2.0 MMOL/L   CULTURE, BLOOD, PAIRED    Collection Time: 05/30/19  2:49 PM   Result Value Ref Range    Special Requests: NO SPECIAL REQUESTS      Culture result: NO GROWTH AFTER 18 HOURS     URINALYSIS W/ REFLEX CULTURE    Collection Time: 05/30/19  2:49 PM   Result Value Ref Range    Color YELLOW/STRAW      Appearance CLEAR CLEAR      Specific gravity 1.014 1.003 - 1.030      pH (UA) 6.5 5.0 - 8.0      Protein 300 (A) NEG mg/dL    Glucose 500 (A) NEG mg/dL    Ketone 15 (A) NEG mg/dL    Bilirubin NEGATIVE  NEG      Blood MODERATE (A) NEG      Urobilinogen 0.2 0.2 - 1.0 EU/dL    Nitrites NEGATIVE  NEG      Leukocyte Esterase NEGATIVE  NEG      WBC 0-4 0 - 4 /hpf    RBC 20-50 0 - 5 /hpf    Epithelial cells FEW FEW /lpf    Bacteria NEGATIVE  NEG /hpf    UA:UC IF INDICATED CULTURE NOT INDICATED BY UA RESULT CNI      Hyaline cast 0-2 0 - 5 /lpf   SAMPLES BEING HELD    Collection Time: 05/30/19  2:49 PM   Result Value Ref Range    SAMPLES BEING HELD 1RED 1BLU 1PST 1LAV     COMMENT        Add-on orders for these samples will be processed based on acceptable specimen integrity and analyte stability, which may vary by analyte. PTH INTACT    Collection Time: 05/30/19  2:49 PM   Result Value Ref Range    Calcium 10.3 (H) 8.5 - 10.1 MG/DL    PTH, Intact 7.1 (L) 18.4 - 88.0 pg/mL   HEMOGLOBIN A1C WITH EAG    Collection Time: 05/30/19  2:49 PM   Result Value Ref Range    Hemoglobin A1c 7.4 (H) 4.2 - 6.3 %    Est. average glucose 166 mg/dL   POC EG7    Collection Time: 05/30/19  4:17 PM   Result Value Ref Range    Calcium, ionized (POC) 1.39 (H) 1.12 - 1.32 mmol/L    pH (POC) 7.332 (L) 7.35 - 7.45      pCO2 (POC) 41.2 35.0 - 45.0 MMHG    pO2 (POC) 44 (LL) 80 - 100 MMHG    HCO3 (POC) 21.8 (L) 22 - 26 MMOL/L    Base deficit (POC) 4 mmol/L    sO2 (POC) 76 (L) 92 - 97 %    Site LEFT RADIAL      Device: ROOM AIR      Allens test (POC) N/A      Specimen type (POC) OTHER      Total resp.  rate 14     GLUCOSE, POC    Collection Time: 05/30/19  7:15 PM   Result Value Ref Range    Glucose (POC) 381 (H) 65 - 100 mg/dL    Performed by Patrick Rangel Dr, POC    Collection Time: 05/30/19  9:10 PM   Result Value Ref Range    Glucose (POC) 327 (H) 65 - 100 mg/dL    Performed by [address] [place] [address], POC    Collection Time: 05/31/19 12:04 AM   Result Value Ref Range    Glucose (POC) 148 (H) 65 - 100 mg/dL    Performed by Jesse Jimenez    GLUCOSE, POC    Collection Time: 05/31/19  6:20 AM   Result Value Ref Range    Glucose (POC) 293 (H) 65 - 100 mg/dL    Performed by [place] Tony    METABOLIC PANEL, BASIC    Collection Time: 05/31/19  7:43 AM   Result Value Ref Range    Sodium 139 136 - 145 mmol/L    Potassium 2.9 (L) 3.5 - 5.1 mmol/L    Chloride 104 97 - 108 mmol/L    CO2 16 (L) 21 - 32 mmol/L    Anion gap 19 (H) 5 - 15 mmol/L    Glucose 322 (H) 65 - 100 mg/dL    BUN 20 6 - 20 MG/DL    Creatinine 1.49 (H) 0.55 - 1.02 MG/DL    BUN/Creatinine ratio 13 12 - 20      GFR est AA 49 (L) >60 ml/min/1.73m2    GFR est non-AA 41 (L) >60 ml/min/1.73m2    Calcium 8.5 8.5 - 10.1 MG/DL   CBC WITH AUTOMATED DIFF Collection Time: 05/31/19  7:43 AM   Result Value Ref Range    WBC 15.3 (H) 3.6 - 11.0 K/uL    RBC 3.35 (L) 3.80 - 5.20 M/uL    HGB 8.6 (L) 11.5 - 16.0 g/dL    HCT 29.0 (L) 35.0 - 47.0 %    MCV 86.6 80.0 - 99.0 FL    MCH 25.7 (L) 26.0 - 34.0 PG    MCHC 29.7 (L) 30.0 - 36.5 g/dL    RDW 17.0 (H) 11.5 - 14.5 %    PLATELET 341 (H) 304 - 400 K/uL    MPV 9.9 8.9 - 12.9 FL    NRBC 0.0 0  WBC    ABSOLUTE NRBC 0.00 0.00 - 0.01 K/uL    NEUTROPHILS 84 (H) 32 - 75 %    LYMPHOCYTES 9 (L) 12 - 49 %    MONOCYTES 6 5 - 13 %    EOSINOPHILS 0 0 - 7 %    BASOPHILS 0 0 - 1 %    IMMATURE GRANULOCYTES 1 (H) 0.0 - 0.5 %    ABS. NEUTROPHILS 12.8 (H) 1.8 - 8.0 K/UL    ABS. LYMPHOCYTES 1.4 0.8 - 3.5 K/UL    ABS. MONOCYTES 0.9 0.0 - 1.0 K/UL    ABS. EOSINOPHILS 0.1 0.0 - 0.4 K/UL    ABS. BASOPHILS 0.1 0.0 - 0.1 K/UL    ABS. IMM. GRANS. 0.1 (H) 0.00 - 0.04 K/UL    DF AUTOMATED     GLUCOSE, POC    Collection Time: 05/31/19  9:40 AM   Result Value Ref Range    Glucose (POC) 238 (H) 65 - 100 mg/dL    Performed by Tay Nassar, POC    Collection Time: 05/31/19 11:41 AM   Result Value Ref Range    Glucose (POC) 213 (H) 65 - 100 mg/dL    Performed by Ruthie Garvey      INDICATION: abd pain, nausea      EXAM: CT Abdomen and Pelvis without IV contrast. No oral contrast.  CT dose reduction was achieved through use of a standardized protocol tailored  for this examination and automatic exposure control for dose modulation.     FINDINGS:   No urinary tract stones are seen. There is no hydroureteronephrosis. The kidneys  are normal in size. There is no perirenal fluid or ascites.      Liver shows no apparent significant finding without contrast. Pancreas, adrenal  glands, spleen and aorta show no significant enlargement. No inflammation is  seen. There is no pneumoperitoneum or significant adenopathy.     The bladder is distended. The distal ureters are not dilated. There is no  apparent pelvic mass.  The appendix is normal. Bowels are not dilated.     IMPRESSION  No Acute Disease. Vaginal culture collected 5/27:  Special Requests:      Preliminary   NO SPECIAL REQUESTS    GRAM STAIN FEW WBCS SEEN      Preliminary   GRAM STAIN      Preliminary   4+ GRAM NEGATIVE RODS    GRAM STAIN      Preliminary   3+ GRAM POSITIVE COCCI IN PAIRS    Culture result:      Preliminary   CULTURE IN PROGRESS,FURTHER UPDATES TO FOLLOW          Assessment/Plan:   30yo 3weeks post-op s/p 1LTCS     Exam unremarkable. Continue antibiotics to cover endometritis. May consider Gentamycin (pharmacist to dose) instead of flagyl-as GI upset is side effect of flagyl. If leukocytosis worsens tomorrow, consider infectious disease consult. CT unremarkable for acute pelvic process. Urine cx pending. Blood cx negative x1d  BP and DM mgmt per primary team.   Concern for narcotic dependence/drug seeking  Continue breast pumping. Psych consult today.  consult if not already done.       Stefanie Alexandra MD  5/31/2019  4:42 PM

## 2019-05-31 NOTE — CONSULTS
Psychiatry Consult Follow Up Note    Reason for consult: post partum depression  Please see full consult note written on  5/27/2019    IMPRESSION:   Ms Fawad Burnham is being seen for psych reconsult for complaints mentioned above. She reports that she is having a lot of abdominal pain. She denies being depressed or anxious. She states that she is happy about her baby. She does not think that she is going through feelings of sadness. She feels cymbalta is working and declined to make any dose changes. Dx: Adjustment disorder, unspecified    RECCS:    Patient denies si hi or avh. Denies feeling depressed or any hopeless themes. She prefers to stay on current dose of cymbalta. Follow up with outpatient provider as soon as she is able. She can call [facility] at [phone] for medication management. Please discharge to home once medically stable.     Thank you for this kind consult.    -------------------------------------------------------------------------------------------------------------------  Clinical summary of events since last encounter:    Meds:  Current Facility-Administered Medications   Medication Dose Route Frequency    acetaminophen (TYLENOL) tablet 650 mg  650 mg Oral Q6H PRN    0.9% sodium chloride with KCl 20 mEq/L infusion   IntraVENous CONTINUOUS    fentaNYL citrate (PF) injection 25 mcg  25 mcg IntraVENous Q4H PRN    DULoxetine (CYMBALTA) capsule 20 mg  20 mg Oral BID    ferrous sulfate tablet 325 mg  325 mg Oral ACB    hydrALAZINE (APRESOLINE) tablet 100 mg  100 mg Oral TID    labetalol (NORMODYNE) tablet 300 mg  300 mg Oral Q12H    oxyCODONE-acetaminophen (PERCOCET) 5-325 mg per tablet 1 Tab  1 Tab Oral Q8H PRN    clindamycin (CLEOCIN) 600mg D5W 50mL IVPB (premix)  600 mg IntraVENous Q6H    metroNIDAZOLE (FLAGYL) IVPB premix 500 mg  500 mg IntraVENous Q8H    sodium chloride (NS) flush 5-40 mL  5-40 mL IntraVENous Q8H    sodium chloride (NS) flush 5-40 mL  5-40 mL IntraVENous PRN    heparin (porcine) injection 5,000 Units  5,000 Units SubCUTAneous Q8H    ondansetron (ZOFRAN) injection 4 mg  4 mg IntraVENous Q4H PRN    cefTRIAXone (ROCEPHIN) 1 g in 0.9% sodium chloride (MBP/ADV) 50 mL  1 g IntraVENous Q24H    glucose chewable tablet 16 g  4 Tab Oral PRN    dextrose (D50W) injection syrg 12.5-25 g  25-50 mL IntraVENous PRN    glucagon (GLUCAGEN) injection 1 mg  1 mg IntraMUSCular PRN    insulin lispro (HUMALOG) injection   SubCUTAneous Q6H    hydrALAZINE (APRESOLINE) 20 mg/mL injection 10 mg  10 mg IntraVENous Q6H PRN         Vital Signs:  Blood pressure 132/82, pulse (!) 101, temperature 98.5 °F (36.9 °C), resp. rate 16, weight 60.1 kg (132 lb 7.9 oz), SpO2 98 %, not currently breastfeeding.     Labs: (reviewed/updated 5/31/2019)  Recent Results (from the past 24 hour(s))   LACTIC ACID    Collection Time: 05/30/19  2:49 PM   Result Value Ref Range    Lactic acid 1.0 0.4 - 2.0 MMOL/L   CULTURE, BLOOD, PAIRED    Collection Time: 05/30/19  2:49 PM   Result Value Ref Range    Special Requests: NO SPECIAL REQUESTS      Culture result: NO GROWTH AFTER 18 HOURS     URINALYSIS W/ REFLEX CULTURE    Collection Time: 05/30/19  2:49 PM   Result Value Ref Range    Color YELLOW/STRAW      Appearance CLEAR CLEAR      Specific gravity 1.014 1.003 - 1.030      pH (UA) 6.5 5.0 - 8.0      Protein 300 (A) NEG mg/dL    Glucose 500 (A) NEG mg/dL    Ketone 15 (A) NEG mg/dL    Bilirubin NEGATIVE  NEG      Blood MODERATE (A) NEG      Urobilinogen 0.2 0.2 - 1.0 EU/dL    Nitrites NEGATIVE  NEG      Leukocyte Esterase NEGATIVE  NEG      WBC 0-4 0 - 4 /hpf    RBC 20-50 0 - 5 /hpf    Epithelial cells FEW FEW /lpf    Bacteria NEGATIVE  NEG /hpf    UA:UC IF INDICATED CULTURE NOT INDICATED BY UA RESULT CNI      Hyaline cast 0-2 0 - 5 /lpf   SAMPLES BEING HELD    Collection Time: 05/30/19  2:49 PM   Result Value Ref Range    SAMPLES BEING HELD 1RED 1BLU 1PST 1LAV     COMMENT        Add-on orders for these samples will be processed based on acceptable specimen integrity and analyte stability, which may vary by analyte. PTH INTACT    Collection Time: 05/30/19  2:49 PM   Result Value Ref Range    Calcium 10.3 (H) 8.5 - 10.1 MG/DL    PTH, Intact 7.1 (L) 18.4 - 88.0 pg/mL   HEMOGLOBIN A1C WITH EAG    Collection Time: 05/30/19  2:49 PM   Result Value Ref Range    Hemoglobin A1c 7.4 (H) 4.2 - 6.3 %    Est. average glucose 166 mg/dL   POC EG7    Collection Time: 05/30/19  4:17 PM   Result Value Ref Range    Calcium, ionized (POC) 1.39 (H) 1.12 - 1.32 mmol/L    pH (POC) 7.332 (L) 7.35 - 7.45      pCO2 (POC) 41.2 35.0 - 45.0 MMHG    pO2 (POC) 44 (LL) 80 - 100 MMHG    HCO3 (POC) 21.8 (L) 22 - 26 MMOL/L    Base deficit (POC) 4 mmol/L    sO2 (POC) 76 (L) 92 - 97 %    Site LEFT RADIAL      Device: ROOM AIR      Allens test (POC) N/A      Specimen type (POC) OTHER      Total resp.  rate 14     GLUCOSE, POC    Collection Time: 05/30/19  7:15 PM   Result Value Ref Range    Glucose (POC) 381 (H) 65 - 100 mg/dL    Performed by Patrick Rangel Dr, POC    Collection Time: 05/30/19  9:10 PM   Result Value Ref Range    Glucose (POC) 327 (H) 65 - 100 mg/dL    Performed by [address] [place] [address], POC    Collection Time: 05/31/19 12:04 AM   Result Value Ref Range    Glucose (POC) 148 (H) 65 - 100 mg/dL    Performed by Bryant Schmitz    GLUCOSE, POC    Collection Time: 05/31/19  6:20 AM   Result Value Ref Range    Glucose (POC) 293 (H) 65 - 100 mg/dL    Performed by Bryant Schmitz    METABOLIC PANEL, BASIC    Collection Time: 05/31/19  7:43 AM   Result Value Ref Range    Sodium 139 136 - 145 mmol/L    Potassium 2.9 (L) 3.5 - 5.1 mmol/L    Chloride 104 97 - 108 mmol/L    CO2 16 (L) 21 - 32 mmol/L    Anion gap 19 (H) 5 - 15 mmol/L    Glucose 322 (H) 65 - 100 mg/dL    BUN 20 6 - 20 MG/DL    Creatinine 1.49 (H) 0.55 - 1.02 MG/DL    BUN/Creatinine ratio 13 12 - 20      GFR est AA 49 (L) >60 ml/min/1.73m2    GFR est non-AA 41 (L) >60 ml/min/1.73m2    Calcium 8.5 8.5 - 10.1 MG/DL   CBC WITH AUTOMATED DIFF    Collection Time: 05/31/19  7:43 AM   Result Value Ref Range    WBC 15.3 (H) 3.6 - 11.0 K/uL    RBC 3.35 (L) 3.80 - 5.20 M/uL    HGB 8.6 (L) 11.5 - 16.0 g/dL    HCT 29.0 (L) 35.0 - 47.0 %    MCV 86.6 80.0 - 99.0 FL    MCH 25.7 (L) 26.0 - 34.0 PG    MCHC 29.7 (L) 30.0 - 36.5 g/dL    RDW 17.0 (H) 11.5 - 14.5 %    PLATELET 519 (H) 609 - 400 K/uL    MPV 9.9 8.9 - 12.9 FL    NRBC 0.0 0  WBC    ABSOLUTE NRBC 0.00 0.00 - 0.01 K/uL    NEUTROPHILS 84 (H) 32 - 75 %    LYMPHOCYTES 9 (L) 12 - 49 %    MONOCYTES 6 5 - 13 %    EOSINOPHILS 0 0 - 7 %    BASOPHILS 0 0 - 1 %    IMMATURE GRANULOCYTES 1 (H) 0.0 - 0.5 %    ABS. NEUTROPHILS 12.8 (H) 1.8 - 8.0 K/UL    ABS. LYMPHOCYTES 1.4 0.8 - 3.5 K/UL    ABS. MONOCYTES 0.9 0.0 - 1.0 K/UL    ABS. EOSINOPHILS 0.1 0.0 - 0.4 K/UL    ABS. BASOPHILS 0.1 0.0 - 0.1 K/UL    ABS. IMM. GRANS. 0.1 (H) 0.00 - 0.04 K/UL    DF AUTOMATED     GLUCOSE, POC    Collection Time: 05/31/19  9:40 AM   Result Value Ref Range    Glucose (POC) 238 (H) 65 - 100 mg/dL    Performed by Roseline Worley, POC    Collection Time: 05/31/19 11:41 AM   Result Value Ref Range    Glucose (POC) 213 (H) 65 - 100 mg/dL    Performed by Cleveland Jane      No results found for: VALF2, VALAC, VALP, VALPR, DS6, CRBAM, CRBAMP, CARB2, XCRBAM  No results found for: [facility]    Radiology (reviewed/updated 5/31/2019)  Ct Head Wo Cont    Result Date: 5/18/2019  EXAM: CT HEAD WO CONT INDICATION: headache COMPARISON: 2015. CONTRAST: None. TECHNIQUE: Unenhanced CT of the head was performed using 5 mm images. Brain and bone windows were generated. CT dose reduction was achieved through use of a standardized protocol tailored for this examination and automatic exposure control for dose modulation. FINDINGS: The ventricles and sulci are normal in size, shape and configuration and midline. There is no significant white matter disease.  There is no intracranial hemorrhage, extra-axial collection, mass, mass effect or midline shift. The basilar cisterns are open. No acute infarct is identified. The bone windows demonstrate no abnormalities. The visualized portions of the paranasal sinuses and mastoid air cells are clear. IMPRESSION: No acute findings. Ct Abd Pelv Wo Cont    Result Date: 5/30/2019  CT ABDOMEN AND PELVIS WITHOUT CONTRAST. 5/30/2019 4:38 PM INDICATION: Abdominal pain. COMPARISON: 5/26/2019, 7/20/2016, 12/29/2015. TECHNIQUE: CT of the abdomen and pelvis was performed without contrast. Evaluation of solid organs is less sensitive without IV contrast. CT dose reduction was achieved through use of a standardized protocol tailored for this examination and automatic exposure control for dose modulation. Adaptive statistical iterative reconstruction (ASIR) was utilized. FINDINGS: Abdomen: The distal esophageal wall is thickened, measuring up to 9 mm. There is a small hiatal hernia, and refluxed fluid in the distal esophagus. The lung bases are clear. The heart size is normal. A subcapsular, segment 4A, hypodense hepatic lesion (3-25) is indeterminate, but stable from 2015. 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 Incidental note is made of a junctional cortical defect. The unenhanced stomach, duodenum, gallbladder, pancreas, spleen, adrenals, and left kidney are normal. Pelvis: The unenhanced small bowel, ileocecal junction, appendix, and colon are normal. The bladder is distended. Incidental note is made of small anterior wall fibroids in the lower uterine segment. Trace free pelvic fluid is likely physiologic in a premenopausal woman. No free air and no abdominopelvic lymphadenopathy. Lucency in the S2 vertebral body (602-80) is stable from 2016. IMPRESSION: Distal esophageal wall thickening. Small hiatal hernia.     Ct Abd Pelv Wo Cont    Result Date: 5/26/2019  INDICATION: abd pain, nausea EXAM: CT Abdomen and Pelvis without IV contrast. No oral contrast. CT dose reduction was achieved through use of a standardized protocol tailored for this examination and automatic exposure control for dose modulation. FINDINGS: No urinary tract stones are seen. There is no hydroureteronephrosis. The kidneys are normal in size. There is no perirenal fluid or ascites. Liver shows no apparent significant finding without contrast. Pancreas, adrenal glands, spleen and aorta show no significant enlargement. No inflammation is seen. There is no pneumoperitoneum or significant adenopathy. The bladder is distended. The distal ureters are not dilated. There is no apparent pelvic mass. The appendix is normal. Bowels are not dilated. No Acute Disease. [facility]    Result Date: 4/9/2019  EXAM:  [facility] INDICATION: Nausea and vomiting. Pregnant. COMPARISON:  Ultrasound 8/8/2016 TECHNIQUE:  Limited abdominal ultrasound. FINDINGS: Liver: Echogenicity is within normal limits. No focal liver lesion. Main portal vein flow: Toward the liver with a velocity of 29 cm/s. Diameter of 1.2 cm. Fluid: There is trace perinephric fluid. Gallbladder: Within normal limits. No gallstones. No gallbladder wall thickening or pericholecystic fluid. Negative sonographic Gomez sign. Bile ducts: There is no intra or extrahepatic biliary ductal dilatation. The common bile duct measures 4 mm. Pancreas: The visualized portions are within normal limits. Kidneys: Right length: 11.6 cm. There is trace pelviectasis. IMPRESSION: Trace perinephric fluid is nonspecific. Trace right kidney pelviectasis can be physiologic during pregnancy. No other acute abnormality is seen in the right upper abdomen. Xr Chest Port    Result Date: 5/20/2019  EXAM: XR CHEST PORT INDICATION: post line placement COMPARISON: 5/19/2019 FINDINGS: A portable AP radiograph of the chest was obtained at 1511 hours. The patient is on a cardiac monitor. The left subclavian catheter overlies the SVC. Heart size normal. Lungs are clear. IMPRESSION: 1. No pneumothorax. Xr Chest Port    Result Date: 5/19/2019  EXAM: XR CHEST PORT INDICATION: Tachycardia, hypertension COMPARISON: 5/13/2019 FINDINGS: A portable AP radiograph of the chest was obtained at 1114 hours. The patient is on a cardiac monitor. The lungs are clear. The cardiac and mediastinal contours and pulmonary vascularity are normal.  The bones and soft tissues are grossly within normal limits. IMPRESSION: Normal chest.    Xr Chest Port    Result Date: 5/13/2019  EXAM:  XR CHEST PORT INDICATION:  Central line placement COMPARISON: April 8, 2019 TECHNIQUE: AP portable upright chest view FINDINGS: Right internal jugular central venous line terminates in the upper right atrium. No pneumothorax. Lungs are clear. No pleural effusion or pneumothorax. Heart size is normal.     IMPRESSION: Right transjugular central venous line terminating in the upper right atrium. No pneumothorax. Xr Chest Port    Result Date: 4/8/2019  EXAM: XR CHEST PORT INDICATION: central line placement COMPARISON: 3/31/2017 FINDINGS: A portable AP radiograph of the chest was obtained at 0341 hours. The lungs are clear. The cardiac and mediastinal contours and pulmonary vascularity are normal.  The bones and soft tissues are grossly within normal limits. The CVP line terminates at the cavoatrial junction. There is no pneumothorax. IMPRESSION: No acute cardiopulmonary process seen        Mental Status Exam:  Judy appearance:   Johnathon Sweeney is a 30 y.o. BLACK OR  female who wears hospital apparel. Eye contact: limited eye contact  Speech: Spontaneous  Affect : Constricted  Mood: \"I'm in pain\"  Thought Process: Logical, goal directed  Perception: Denies any AH or VH. Thought Content: Denies any SI or Plan  Insight: Partial  Judgement: Fair  Cognition: Intact grossly.         Length of psychotherapy session: 15 minutes     Total Patient Care Time Spent: 35 minutes : (Coordinated treatment team rounds conducted with patient present; discussions with nurses, , pharmacist,  held; counseling time with patient, individual psychotherapy with patient; and discussions with family members; chart reviewed in full including consultant notes, ancillary staff notes, vitals and labs reviewed in full).     Greater than 50% of total patient care time included counseling    Signed:  Hannah Jovel NP  5/31/2019

## 2019-05-31 NOTE — PROGRESS NOTES
Problem: Diabetes Self-Management  Goal: *Disease process and treatment process  Description  Define diabetes and identify own type of diabetes; list 3 options for treating diabetes. Outcome: Progressing Towards Goal  Goal: *Developing strategies to promote health/change behavior  Description  Define the ABC's of diabetes; identify appropriate screenings, schedule and personal plan for screenings. Outcome: Progressing Towards Goal  Goal: *Monitoring blood glucose, interpreting and using results  Description  Identify recommended blood glucose targets  and personal targets. Outcome: Progressing Towards Goal     Problem: Pressure Injury - Risk of  Goal: *Prevention of pressure injury  Description  Document Doroteo Scale and appropriate interventions in the flowsheet. Outcome: Progressing Towards Goal  Note:   Pressure Injury Interventions: Activity Interventions: Increase time out of bed         Nutrition Interventions: Document food/fluid/supplement intake                     Problem: Falls - Risk of  Goal: *Absence of Falls  Description  Document Tia Manus Fall Risk and appropriate interventions in the flowsheet.   Outcome: Progressing Towards Goal

## 2019-05-31 NOTE — DIABETES MGMT
DTC Progress Note    Recommendations/ Comments: Chart reviewed on Gracy Torres due to hyperglycemia. Most BG's > 250 mg/dL. She is on an insulin pump at home but took herself off of it per H&P. She is followed by Nova Segovia MD Endcrinologist  She was just discharged 5/28/2019    If appropriate please consider  Addition of basal insulin - start with Lantus 12 units (this is how much she would receive via her pump) and then titrate as needed    Addendum: Dr Justin Malave regarding above    Current hospital medication:  Lispro normal sensitivity correction scale    Per DTC note on 5/27/2019 pump settings  - Basal: 0.475  - Mercedez Baumgarten   - Sensitivity: 43   - Target: 100- 140. If she goes back onto her pump while in the hospital please use:   - using order set #8601 for insulin pump  - adding lispro 3 untis AC TID and   - discontinuing Lispro correction (patient should be correcting blood sugars through insulin pump)          Patient is a 30 y.o. female with known Type 1 DM on insulin pump at home. Delivered 5/10/19 at 33 wks. This is her third hospitalization since delivery      A1c:   Lab Results   Component Value Date/Time    Hemoglobin A1c 7.4 (H) 05/30/2019 02:49 PM    Hemoglobin A1c 7.2 (H) 05/25/2019 05:17 PM       Recent Glucose Results:   Lab Results   Component Value Date/Time     (H) 05/31/2019 07:43 AM     (H) 05/30/2019 12:23 PM    GLUCPOC 238 (H) 05/31/2019 09:40 AM    GLUCPOC 293 (H) 05/31/2019 06:20 AM    GLUCPOC 148 (H) 05/31/2019 12:04 AM        Lab Results   Component Value Date/Time    Creatinine 1.49 (H) 05/31/2019 07:43 AM     Estimated Creatinine Clearance: 51.9 mL/min (A) (based on SCr of 1.49 mg/dL (H)). Active Orders   Diet    DIET CLEAR LIQUID        PO intake:   Patient Vitals for the past 72 hrs:   % Diet Eaten   05/30/19 2023 0 %       Will continue to follow as needed.     Thank you  Italia Parry RN, CDE  Pager [phone]    Time spent: 8  Min    Addendum  Consult received for assessment of home management  Attempted to see pt  Pump is on her bedside table.   She had her eyes closed, was crying and moaning and would not answer questions about how she has been doing at home  She c/o pian, nausea and wanted ice chips- I let her nurse know  Would manage her DM with Lantus for basal insulin, lispro correction and once she is eating, add lispro AC while hospitalized    DTC will continue to follow    Miguel Melo RN, CDE    15 min

## 2019-05-31 NOTE — CONSULTS
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 Suspect pre-renal state 2 to n/v/d. Underlying DM nephropathy -> baseline serum Cr 1.1 to 1.2mg/dl.     Nephrotic syndrome: DM nephropathy complicated by recent pre-eclampsia (delivered on 5/10/19)-> last spot urine prot/cr ratio was 4.3gm (19)     Hypercalcemia: 2 to pre-renal state compounded by Tums use-> improving with IVF    N/V/D    Hypokalemia: supplemented    Metabolic acidosis: Rising anion gap. Concerning for DKA    HTN: fluctuating.     DM1: Poorly controlled the past. More recent HgbA1c better. Hx of DKA in the past (multiple episodes). Followed by Dr. Marga Bello (endocrinology)      Anemia: On ferrous sulfate    Plan/Recommendations:  Repeat BMP now. INcrease IV NS to 100cc/hr  Need to control BS. Supplement KCL-> given IV /PO KCl earlier  Stop Toradol (received multiple doses over last 24hrs)  Appears to have pain med seeking behavior  Strict I/Os,avoid nephrotoxins  Am labs    Discussed with patient and RN    Thanks for the consultation. Renal service will follow patient with you. Please contact me with any questions or concerns. Initial Consult note         Patient name: Diego Moscoso  MR no: [id number]  Date of admission: 2019  Date of consultation: 2019  Requested by: Dr. Luis Dimas  Reason for consult: MARIS/Hypercalcemia    Patient seen and examined. History obtained from patient and chart review. Relevant labs, data and notes reviewed. HPI: Diego Moscoso is a 30 y.o. female with PMH significant for CKD stage 2/3, DM1 (since age 13), HTN who recently delivered a 33wk  baby on 5/10/19 -> post op course complicated by postpartum endometriosis presented with n/v/d x1 day. Admission labs significant for Serum Cr 1.57mg/dl and Ca 10.8. Patient reports taking Tums at home. C/o generalized weakness along with significant abd pain.  Ct to tell me her pain is very poorly controlled at this time. PMH:  Past Medical History:   Diagnosis Date    Anemia     Chronic pain     Depression     Diabetes type 1, uncontrolled ([facility] [address]     DKA, type 1 ([facility]     Essential hypertension     Heart murmur     Herpes simplex virus (HSV) infection 2017    positive in blood    Peripheral neuropathy     Ventricular tachycardia (HCC)      PSH:  Past Surgical History:   Procedure Laterality Date    ABDOMEN SURGERY PROC UNLISTED      exploratory laparoscopy    HX CYST REMOVAL      groin       Social history:   Social History     Tobacco Use    Smoking status: Former Smoker     Packs/day: 0.25     Years: 8.00     Pack years: 2.00     Types: Cigarettes     Last attempt to quit: 10/26/2014     Years since quittin.5    Smokeless tobacco: Never Used   Substance Use Topics    Alcohol use: No     Alcohol/week: 0.0 oz    Drug use: Yes     Frequency: 2.0 times per week     Types: Marijuana       Family history:  No history of CKD or ESRD in the family.      Allergies   Allergen Reactions    Morphine Other (comments)    Pcn [Penicillins] Hives     Tolerates ceftriaxone, cephalexin       Current Facility-Administered Medications   Medication Dose Route Frequency Last Dose    acetaminophen (TYLENOL) tablet 650 mg  650 mg Oral Q6H  mg at 19 0025    0.9% sodium chloride with KCl 20 mEq/L infusion   IntraVENous CONTINUOUS      fentaNYL citrate (PF) injection 25 mcg  25 mcg IntraVENous Q4H PRN      insulin glargine (LANTUS) injection 12 Units  12 Units SubCUTAneous QHS      sucralfate (CARAFATE) tablet 1 g  1 g Oral AC&HS      meropenem (MERREM) 500 mg in 0.9% sodium chloride (MBP/ADV) 50 mL  0.5 g IntraVENous Q6H      DULoxetine (CYMBALTA) capsule 20 mg  20 mg Oral BID 20 mg at 19 0941    ferrous sulfate tablet 325 mg  325 mg Oral ACB Stopped at 19 0730    hydrALAZINE (APRESOLINE) tablet 100 mg  100 mg Oral  mg at 19 1740    labetalol (Maria M Giuseppe) tablet 300 mg  300 mg Oral Q12H 300 mg at 05/31/19 0940    oxyCODONE-acetaminophen (PERCOCET) 5-325 mg per tablet 1 Tab  1 Tab Oral Q8H PRN 1 Tab at 05/31/19 1441    sodium chloride (NS) flush 5-40 mL  5-40 mL IntraVENous Q8H 10 mL at 05/31/19 1400    sodium chloride (NS) flush 5-40 mL  5-40 mL IntraVENous PRN      heparin (porcine) injection 5,000 Units  5,000 Units SubCUTAneous Q8H 5,000 Units at 05/31/19 1741    ondansetron (ZOFRAN) injection 4 mg  4 mg IntraVENous Q4H PRN 4 mg at 05/31/19 1351    glucose chewable tablet 16 g  4 Tab Oral PRN      dextrose (D50W) injection syrg 12.5-25 g  25-50 mL IntraVENous PRN      glucagon (GLUCAGEN) injection 1 mg  1 mg IntraMUSCular PRN      insulin lispro (HUMALOG) injection   SubCUTAneous Q6H 3 Units at 05/31/19 1230    hydrALAZINE (APRESOLINE) 20 mg/mL injection 10 mg  10 mg IntraVENous Q6H PRN 10 mg at 05/31/19 0701       ROS (besides HPI):    General: No fever. +Fatigue. No weight changes  ENT: No hearing loss or visual changes  Cardiovascular: No Chest pain  Pulmonary: No SOB  GI: + abdominal pain. + Nausea/Vomiting/Diarrhea. No blood in stool  : No blood in urine. No foamy or cloudy urine  Musculoskeletal: No joint swelling or redness. No morning stiffness  Endocrine: no cold or heat intolerance  Psych: denies anxiety or depression  Neuro: No light headedness or dizziness    Objective   Visit Vitals  /82 (BP 1 Location: Left arm, BP Patient Position: At rest)   Pulse (!) 101   Temp 98.5 °F (36.9 °C)   Resp 16   Wt 60.1 kg (132 lb 7.9 oz)   SpO2 98%   BMI 20.15 kg/m²       Physical Exam:    Gen: In distress    HEENT: AT/NC, EOMI, moist mucous membrane, no scleral icterus    Neck: no JVD, no cervical lymphadenopathy, no carotid bruit    Lungs/Chest wall: Breath sounds normal. Symmetrical chest wall expansion. No accessory muscle use. Clear to auscultation    Cardiovascular: Normal S1/S2, normal rate, regular rhythm.      Abdomen: soft, NT, ND, BS+, no HSM    Ext: no clubbing or cyanosis. No edema    Skin: multiple tattoos    : no CVA tenderness    CNS: alert awake. Answers appropriately.      Labs/Data:    Lab Results   Component Value Date/Time    Sodium 139 05/31/2019 07:43 AM    Potassium 2.9 (L) 05/31/2019 07:43 AM    Chloride 104 05/31/2019 07:43 AM    CO2 16 (L) 05/31/2019 07:43 AM    Anion gap 19 (H) 05/31/2019 07:43 AM    Glucose 322 (H) 05/31/2019 07:43 AM    BUN 20 05/31/2019 07:43 AM    Creatinine 1.49 (H) 05/31/2019 07:43 AM    BUN/Creatinine ratio 13 05/31/2019 07:43 AM    GFR est AA 49 (L) 05/31/2019 07:43 AM    GFR est non-AA 41 (L) 05/31/2019 07:43 AM    Calcium 8.5 05/31/2019 07:43 AM       Lab Results   Component Value Date/Time    WBC 15.3 (H) 05/31/2019 07:43 AM    Hemoglobin (POC) 15.6 05/13/2014 11:59 PM    HGB 8.6 (L) 05/31/2019 07:43 AM    Hematocrit (POC) 46 05/13/2014 11:59 PM    HCT 29.0 (L) 05/31/2019 07:43 AM    PLATELET 015 (H) 75/53/5957 07:43 AM    MCV 86.6 05/31/2019 07:43 AM       Urine analysis:   Results for orders placed or performed in visit on 12/03/13   AMB POC URINALYSIS DIP STICK MANUAL W/O MICRO     Status: None   Result Value Ref Range Status    Color (UA POC) Yellow  Final    Clarity (UA POC) Slightly Cloudy  Final    Glucose (UA POC) 2+ Negative Final    Bilirubin (UA POC) 3+ Negative Final    Ketones (UA POC) 2+ Negative Final    Specific gravity (UA POC) 1.020 1.001 - 1.035 Final    Blood (UA POC) 2+ Negative Final    pH (UA POC) 6.0 4.6 - 8.0 Final    Protein (UA POC) Trace Negative mg/dL Final    Urobilinogen (UA POC) 0.2 mg/dL 0.2 - 1 Final    Nitrites (UA POC) Negative Negative Final    Leukocyte esterase (UA POC) 1+ Negative Final           No components found for: SPEP, UPEP  No results found for: PUQ, PROTU2, PROTU1, BJP1, CPE1, IMEL1, MET2  Lab Results   Component Value Date/Time    Microalb/Creat ratio (ug/mg creat.) 58.9 (H) 01/05/2016 09:50 AM         Intake/Output Summary (Last 24 hours) at 5/31/2019 [address] filed at 5/30/2019 2023  Gross per 24 hour   Intake 110 ml   Output    Net 110 ml       Wt Readings from Last 3 Encounters:   05/31/19 60.1 kg (132 lb 7.9 oz)   05/28/19 69.4 kg (153 lb)   05/21/19 68.8 kg (151 lb 9.6 oz)       Signed by:  Jossie Gallardo MD  Nephrology and Hypertension  [facility]

## 2019-05-31 NOTE — PROGRESS NOTES
Hospitalist Progress Note  Viktoriya Breaux MD  Answering service: [phone] OR [phone] from in house phone      Date of Service:  2019  NAME:  Yony Rubi  :  1990  MRN:  [id number]      Admission Summary:    75-year-old female with past medical history of chronic pain syndrome, diabetes mellitus type 2, hypertension, and depression, presents to the hospital with Abdominal pain, nausea, and vomiting    Interval history / Subjective:     Patient Was seen in followup of  Abdominal pains  She Reports abdominal pain is not controlled. Assessment & Plan:     1. Abdominal pains with endometritis workign diagnosis on admission - on Abx. Gyn consulted. ID consult - follow up clinical status. Ct scan Noted - no fluid collections. 2. Dm 1 with hyperglycemia - on insulin Pump. Start long acting insulin to compensate. DTC consult. 3. ? Distal Esophageal Wall thickening on Ct scan - due to esophagitis? -  review of previous notes suggest history of gastroparesis with poorly controlled Dm, ordered carafate. Will request Gi consult. 4. HTN - on Hydralazine, labetalol  5. Hyercalcemia - Likely due to dehydration. Appreciate renal eval. Improved. 6. Prior H.o THC use - check drug screen urine. 7. Hypokalemia - replete. 8. Depression - ?post partum, psychiatry eval     Code status: Full  DVT prophylaxis: Heparin    Telemetry reviewed:   normal sinus rhythm      Risk of deterioration: medium      Total time spent with patient: [address] discussed with: Patient/Family and Nurse  Disposition: TBD     Hospital Problems  Date Reviewed: 2019          Codes Class Noted POA    Endometritis ICD-10-CM: N71.9  ICD-9-CM: 615.9  2019 Unknown        * (Principal) Vomiting ICD-10-CM: R11.10  ICD-9-CM: 787.03  2019 Unknown                Review of Systems:   Review of Systems   Constitutional: Negative. HENT: Negative. Eyes: Negative. Respiratory: Negative. Cardiovascular: Negative. Gastrointestinal: Positive for abdominal pain and nausea. Negative for blood in stool and melena. Genitourinary: Negative. Musculoskeletal: Negative. Skin: Negative. Neurological: Negative. Endo/Heme/Allergies: Negative. Psychiatric/Behavioral: Negative for suicidal ideas. The patient is nervous/anxious. Vital Signs:    Last 24hrs VS reviewed since prior progress note. Most recent are:  Visit Vitals  /82 (BP 1 Location: Left arm, BP Patient Position: At rest)   Pulse (!) 101   Temp 98.5 °F (36.9 °C)   Resp 16   Wt 60.1 kg (132 lb 7.9 oz)   SpO2 98%   BMI 20.15 kg/m²         Intake/Output Summary (Last 24 hours) at 5/31/2019 1619  Last data filed at 5/30/2019 2023  Gross per 24 hour   Intake 210 ml   Output    Net 210 ml        Physical Examination:             Constitutional:  No acute distress, cooperative, pleasant    ENT:  Oral mucous moist, oropharynx benign. Neck supple,    Resp:  CTA bilaterally. No wheezing/rhonchi/rales. No accessory muscle use   CV:  Regular rhythm, normal rate, no murmurs, gallops, rubs    GI:  Soft, non distended, non tender. normoactive bowel sounds, no hepatosplenomegaly     Musculoskeletal:  No edema, warm, 2+ pulses throughout    Neurologic:  Moves all extremities. AAOx3, CN II-XII reviewed     Skin:  multiple tattoes noted.         Data Review:    Review and/or order of clinical lab test  Review and/or order of tests in the radiology section of CPT      Labs:     Recent Labs     05/31/19  0743 05/30/19  1223   WBC 15.3* 11.2*   HGB 8.6* 9.9*   HCT 29.0* 32.3*   * 543*     Recent Labs     05/31/19  0743 05/30/19  1449 05/30/19  1223     --  139   K 2.9*  --  3.2*     --  104   CO2 16*  --  22   BUN 20  --  16   CREA 1.49*  --  1.57*   *  --  289*   CA 8.5 10.3* 10.8*     Recent Labs     05/30/19  1223   SGOT 23   ALT 19   AP 84   TBILI 0.4   TP 6.8   ALB 2.6*   GLOB 4.2*   LPSE 59*     No results for input(s): INR, PTP, APTT in the last 72 hours. No lab exists for component: INREXT   No results for input(s): FE, TIBC, PSAT, FERR in the last 72 hours. Lab Results   Component Value Date/Time    Folate 26.9 (H) 09/03/2012 05:45 AM      No results for input(s): PH, PCO2, PO2 in the last 72 hours. No results for input(s): CPK, CKNDX, TROIQ in the last 72 hours.     No lab exists for component: CPKMB  Lab Results   Component Value Date/Time    Cholesterol, total 160 05/15/2016 12:09 AM    HDL Cholesterol 41 05/15/2016 12:09 AM    LDL, calculated 98 05/15/2016 12:09 AM    Triglyceride 105 05/15/2016 12:09 AM    CHOL/HDL Ratio 3.9 05/15/2016 12:09 AM     Lab Results   Component Value Date/Time    Glucose (POC) 213 (H) 05/31/2019 11:41 AM    Glucose (POC) 238 (H) 05/31/2019 09:40 AM    Glucose (POC) 293 (H) 05/31/2019 06:20 AM    Glucose (POC) 148 (H) 05/31/2019 12:04 AM    Glucose (POC) 327 (H) 05/30/2019 09:10 PM     Lab Results   Component Value Date/Time    Color YELLOW/STRAW 05/30/2019 02:49 PM    Appearance CLEAR 05/30/2019 02:49 PM    Specific gravity 1.014 05/30/2019 02:49 PM    Specific gravity 1.010 04/07/2019 07:07 PM    pH (UA) 6.5 05/30/2019 02:49 PM    Protein 300 (A) 05/30/2019 02:49 PM    Glucose 500 (A) 05/30/2019 02:49 PM    Ketone 15 (A) 05/30/2019 02:49 PM    Bilirubin NEGATIVE  05/30/2019 02:49 PM    Urobilinogen 0.2 05/30/2019 02:49 PM    Nitrites NEGATIVE  05/30/2019 02:49 PM    Leukocyte Esterase NEGATIVE  05/30/2019 02:49 PM    Epithelial cells FEW 05/30/2019 02:49 PM    Bacteria NEGATIVE  05/30/2019 02:49 PM    WBC 0-4 05/30/2019 02:49 PM    RBC 20-50 05/30/2019 02:49 PM         Medications Reviewed:     Current Facility-Administered Medications   Medication Dose Route Frequency    acetaminophen (TYLENOL) tablet 650 mg  650 mg Oral Q6H PRN    0.9% sodium chloride with KCl 20 mEq/L infusion   IntraVENous CONTINUOUS    fentaNYL citrate (PF) injection 25 mcg  25 mcg IntraVENous Q4H PRN    DULoxetine (CYMBALTA) capsule 20 mg  20 mg Oral BID    ferrous sulfate tablet 325 mg  325 mg Oral ACB    hydrALAZINE (APRESOLINE) tablet 100 mg  100 mg Oral TID    labetalol (NORMODYNE) tablet 300 mg  300 mg Oral Q12H    oxyCODONE-acetaminophen (PERCOCET) 5-325 mg per tablet 1 Tab  1 Tab Oral Q8H PRN    clindamycin (CLEOCIN) 600mg D5W 50mL IVPB (premix)  600 mg IntraVENous Q6H    metroNIDAZOLE (FLAGYL) IVPB premix 500 mg  500 mg IntraVENous Q8H    sodium chloride (NS) flush 5-40 mL  5-40 mL IntraVENous Q8H    sodium chloride (NS) flush 5-40 mL  5-40 mL IntraVENous PRN    heparin (porcine) injection 5,000 Units  5,000 Units SubCUTAneous Q8H    ondansetron (ZOFRAN) injection 4 mg  4 mg IntraVENous Q4H PRN    cefTRIAXone (ROCEPHIN) 1 g in 0.9% sodium chloride (MBP/ADV) 50 mL  1 g IntraVENous Q24H    glucose chewable tablet 16 g  4 Tab Oral PRN    dextrose (D50W) injection syrg 12.5-25 g  25-50 mL IntraVENous PRN    glucagon (GLUCAGEN) injection 1 mg  1 mg IntraMUSCular PRN    insulin lispro (HUMALOG) injection   SubCUTAneous Q6H    hydrALAZINE (APRESOLINE) 20 mg/mL injection 10 mg  10 mg IntraVENous Q6H PRN     ______________________________________________________________________  EXPECTED LENGTH OF STAY: - - -  ACTUAL LENGTH OF STAY:          0                 Nataliya Woody MD   Patient doesn't want family notified.

## 2019-05-31 NOTE — PROGRESS NOTES
Bedside and Verbal shift change report given to Maximiliano Camarillo RN (oncoming nurse) by Duane Oh RN (offgoing nurse).  Report included the following information SBAR, Kardex, ED Summary, MAR, Recent Results, Med Rec Status and Cardiac Rhythm ST.

## 2019-05-31 NOTE — PROGRESS NOTES
Bedside shift change report given to QUANG Mon RN (oncoming nurse) by Eleuterio Miranda RN (offgoing nurse). Report included the following information SBAR, Kardex, ED Summary, Intake/Output, MAR and Accordion. 2115: Pt BP checked, 164/99. RN administered IVP hydralazine as per orders. RN checked pt BG, 327. MD called, RN received orders for 4 units of insulin once and transfer pt to tele floor. Will continue to monitor. 2130: BP checked 162/95. RN will continue to monitor. 2150: BP checked 155/91. RN will continue to monitor. 2300: Pt sleeping. RN will continue to monitor. TRANSFER - OUT REPORT:    Verbal report given to [address] [place] [address] Avenue, RN(name) on [employer]  being transferred to [facility](unit) for change in patient condition(telemetry required)       Report consisted of patients Situation, Background, Assessment and   Recommendations(SBAR). Information from the following report(s) SBAR, Kardex, ED Summary, Intake/Output and MAR was reviewed with the receiving nurse. Lines:   Peripheral IV 05/30/19 Right Wrist (Active)   Site Assessment Clean, dry, & intact 5/30/2019  8:23 PM   Phlebitis Assessment 0 5/30/2019  8:23 PM   Infiltration Assessment 0 5/30/2019  8:23 PM   Dressing Status Clean, dry, & intact 5/30/2019  8:23 PM   Dressing Type Transparent;Tape 5/30/2019  8:23 PM   Hub Color/Line Status Pink;Capped 5/30/2019  8:23 PM   Action Taken Open ports on tubing capped 5/30/2019  8:23 PM   Alcohol Cap Used Yes 5/30/2019  8:23 PM       Peripheral IV 45/31/43 Right Basilic (Active)   Site Assessment Clean, dry, & intact 5/30/2019  8:23 PM   Phlebitis Assessment 0 5/30/2019  8:23 PM   Infiltration Assessment 0 5/30/2019  8:23 PM   Dressing Status Clean, dry, & intact 5/30/2019  8:23 PM   Dressing Type Transparent;Tape 5/30/2019  8:23 PM   Hub Color/Line Status Green; Infusing 5/30/2019  8:23 PM   Action Taken Open ports on tubing capped 5/30/2019  8:23 PM   Alcohol Cap Used Yes 5/30/2019  8:23 PM Opportunity for questions and clarification was provided.       Patient transported with:   Registered Nurse

## 2019-05-31 NOTE — PROGRESS NOTES
TRANSFER - IN REPORT:    Verbal report received from DEE [employer](name) on [employer]  being received from 3rd floor (unit) for routine progression of care      Report consisted of patients Situation, Background, Assessment and   Recommendations(SBAR). Information from the following report(s) SBAR, Kardex, ED Summary, Procedure Summary, Intake/Output, MAR, Recent Results and Cardiac Rhythm ST was reviewed with the receiving nurse. Opportunity for questions and clarification was provided. Assessment completed upon patients arrival to unit and care assumed.

## 2019-06-01 LAB
ALBUMIN SERPL-MCNC: 1.9 G/DL (ref 3.5–5)
ALBUMIN/GLOB SERPL: 0.6 {RATIO} (ref 1.1–2.2)
ALP SERPL-CCNC: 65 U/L (ref 45–117)
ALT SERPL-CCNC: 11 U/L (ref 12–78)
ANION GAP SERPL CALC-SCNC: 13 MMOL/L (ref 5–15)
AST SERPL-CCNC: 9 U/L (ref 15–37)
BACTERIA SPEC CULT: ABNORMAL
BASOPHILS # BLD: 0.1 K/UL (ref 0–0.1)
BASOPHILS NFR BLD: 1 % (ref 0–1)
BILIRUB SERPL-MCNC: 0.4 MG/DL (ref 0.2–1)
BUN SERPL-MCNC: 31 MG/DL (ref 6–20)
BUN/CREAT SERPL: 17 (ref 12–20)
CALCIUM SERPL-MCNC: 7.8 MG/DL (ref 8.5–10.1)
CC UR VC: ABNORMAL
CHLORIDE SERPL-SCNC: 109 MMOL/L (ref 97–108)
CO2 SERPL-SCNC: 17 MMOL/L (ref 21–32)
CREAT SERPL-MCNC: 1.83 MG/DL (ref 0.55–1.02)
DIFFERENTIAL METHOD BLD: ABNORMAL
EOSINOPHIL # BLD: 0 K/UL (ref 0–0.4)
EOSINOPHIL NFR BLD: 0 % (ref 0–7)
ERYTHROCYTE [DISTWIDTH] IN BLOOD BY AUTOMATED COUNT: 17.4 % (ref 11.5–14.5)
GLOBULIN SER CALC-MCNC: 3.3 G/DL (ref 2–4)
GLUCOSE BLD STRIP.AUTO-MCNC: 189 MG/DL (ref 65–100)
GLUCOSE BLD STRIP.AUTO-MCNC: 190 MG/DL (ref 65–100)
GLUCOSE BLD STRIP.AUTO-MCNC: 210 MG/DL (ref 65–100)
GLUCOSE SERPL-MCNC: 174 MG/DL (ref 65–100)
HCT VFR BLD AUTO: 27.5 % (ref 35–47)
HGB BLD-MCNC: 7.8 G/DL (ref 11.5–16)
IMM GRANULOCYTES # BLD AUTO: 0.1 K/UL (ref 0–0.04)
IMM GRANULOCYTES NFR BLD AUTO: 1 % (ref 0–0.5)
LYMPHOCYTES # BLD: 1.9 K/UL (ref 0.8–3.5)
LYMPHOCYTES NFR BLD: 13 % (ref 12–49)
MAGNESIUM SERPL-MCNC: 1.3 MG/DL (ref 1.6–2.4)
MCH RBC QN AUTO: 26 PG (ref 26–34)
MCHC RBC AUTO-ENTMCNC: 28.4 G/DL (ref 30–36.5)
MCV RBC AUTO: 91.7 FL (ref 80–99)
MONOCYTES # BLD: 1.3 K/UL (ref 0–1)
MONOCYTES NFR BLD: 9 % (ref 5–13)
NEUTS SEG # BLD: 11 K/UL (ref 1.8–8)
NEUTS SEG NFR BLD: 76 % (ref 32–75)
NRBC # BLD: 0 K/UL (ref 0–0.01)
NRBC BLD-RTO: 0 PER 100 WBC
PHOSPHATE SERPL-MCNC: 2.8 MG/DL (ref 2.6–4.7)
PLATELET # BLD AUTO: 479 K/UL (ref 150–400)
PMV BLD AUTO: 9.9 FL (ref 8.9–12.9)
POTASSIUM SERPL-SCNC: 4.3 MMOL/L (ref 3.5–5.1)
PROT SERPL-MCNC: 5.2 G/DL (ref 6.4–8.2)
RBC # BLD AUTO: 3 M/UL (ref 3.8–5.2)
RBC MORPH BLD: ABNORMAL
SERVICE CMNT-IMP: ABNORMAL
SODIUM SERPL-SCNC: 139 MMOL/L (ref 136–145)
WBC # BLD AUTO: 14.4 K/UL (ref 3.6–11)

## 2019-06-01 PROCEDURE — 74011250637 HC RX REV CODE- 250/637: Performed by: HOSPITALIST

## 2019-06-01 PROCEDURE — 74011250636 HC RX REV CODE- 250/636: Performed by: NURSE PRACTITIONER

## 2019-06-01 PROCEDURE — 74011250636 HC RX REV CODE- 250/636: Performed by: INTERNAL MEDICINE

## 2019-06-01 PROCEDURE — 36415 COLL VENOUS BLD VENIPUNCTURE: CPT

## 2019-06-01 PROCEDURE — 83735 ASSAY OF MAGNESIUM: CPT

## 2019-06-01 PROCEDURE — 74011250636 HC RX REV CODE- 250/636: Performed by: FAMILY MEDICINE

## 2019-06-01 PROCEDURE — 82962 GLUCOSE BLOOD TEST: CPT

## 2019-06-01 PROCEDURE — 65660000000 HC RM CCU STEPDOWN

## 2019-06-01 PROCEDURE — 80053 COMPREHEN METABOLIC PANEL: CPT

## 2019-06-01 PROCEDURE — 74011250637 HC RX REV CODE- 250/637: Performed by: FAMILY MEDICINE

## 2019-06-01 PROCEDURE — 85025 COMPLETE CBC W/AUTO DIFF WBC: CPT

## 2019-06-01 PROCEDURE — 74011636637 HC RX REV CODE- 636/637: Performed by: FAMILY MEDICINE

## 2019-06-01 PROCEDURE — 74011000250 HC RX REV CODE- 250: Performed by: NURSE PRACTITIONER

## 2019-06-01 PROCEDURE — 84100 ASSAY OF PHOSPHORUS: CPT

## 2019-06-01 PROCEDURE — 74011636637 HC RX REV CODE- 636/637: Performed by: HOSPITALIST

## 2019-06-01 PROCEDURE — 74011000258 HC RX REV CODE- 258: Performed by: INTERNAL MEDICINE

## 2019-06-01 RX ORDER — DIPHENHYDRAMINE HYDROCHLORIDE 50 MG/ML
12.5 INJECTION, SOLUTION INTRAMUSCULAR; INTRAVENOUS ONCE
Status: COMPLETED | OUTPATIENT
Start: 2019-06-01 | End: 2019-06-01

## 2019-06-01 RX ORDER — MAGNESIUM SULFATE HEPTAHYDRATE 40 MG/ML
2 INJECTION, SOLUTION INTRAVENOUS ONCE
Status: COMPLETED | OUTPATIENT
Start: 2019-06-01 | End: 2019-06-01

## 2019-06-01 RX ORDER — FENTANYL CITRATE 50 UG/ML
25 INJECTION, SOLUTION INTRAMUSCULAR; INTRAVENOUS
Status: DISCONTINUED | OUTPATIENT
Start: 2019-06-01 | End: 2019-06-01

## 2019-06-01 RX ADMIN — DIPHENHYDRAMINE HYDROCHLORIDE 12.5 MG: 50 INJECTION, SOLUTION INTRAMUSCULAR; INTRAVENOUS at 21:49

## 2019-06-01 RX ADMIN — SUCRALFATE 1 G: 1 TABLET ORAL at 07:30

## 2019-06-01 RX ADMIN — DIPHENHYDRAMINE HYDROCHLORIDE 12.5 MG: 50 INJECTION, SOLUTION INTRAMUSCULAR; INTRAVENOUS at 04:27

## 2019-06-01 RX ADMIN — HYDRALAZINE HYDROCHLORIDE 100 MG: 50 TABLET, FILM COATED ORAL at 15:11

## 2019-06-01 RX ADMIN — SODIUM CHLORIDE AND POTASSIUM CHLORIDE: 9; 1.49 INJECTION, SOLUTION INTRAVENOUS at 21:02

## 2019-06-01 RX ADMIN — DULOXETINE HYDROCHLORIDE 20 MG: 20 CAPSULE, DELAYED RELEASE ORAL at 18:39

## 2019-06-01 RX ADMIN — MEROPENEM 500 MG: 500 INJECTION, POWDER, FOR SOLUTION INTRAVENOUS at 12:00

## 2019-06-01 RX ADMIN — PROCHLORPERAZINE EDISYLATE 10 MG: 5 INJECTION INTRAMUSCULAR; INTRAVENOUS at 21:49

## 2019-06-01 RX ADMIN — HEPARIN SODIUM 5000 UNITS: 5000 INJECTION INTRAVENOUS; SUBCUTANEOUS at 18:38

## 2019-06-01 RX ADMIN — PROCHLORPERAZINE EDISYLATE 10 MG: 5 INJECTION INTRAMUSCULAR; INTRAVENOUS at 04:28

## 2019-06-01 RX ADMIN — INSULIN LISPRO 3 UNITS: 100 INJECTION, SOLUTION INTRAVENOUS; SUBCUTANEOUS at 18:39

## 2019-06-01 RX ADMIN — LABETALOL HYDROCHLORIDE 300 MG: 200 TABLET, FILM COATED ORAL at 09:25

## 2019-06-01 RX ADMIN — FERROUS SULFATE TAB 325 MG (65 MG ELEMENTAL FE) 325 MG: 325 (65 FE) TAB at 09:25

## 2019-06-01 RX ADMIN — ONDANSETRON 4 MG: 2 INJECTION INTRAMUSCULAR; INTRAVENOUS at 18:38

## 2019-06-01 RX ADMIN — SUCRALFATE 1 G: 1 TABLET ORAL at 12:16

## 2019-06-01 RX ADMIN — INSULIN GLARGINE 12 UNITS: 100 INJECTION, SOLUTION SUBCUTANEOUS at 21:10

## 2019-06-01 RX ADMIN — ONDANSETRON 4 MG: 2 INJECTION INTRAMUSCULAR; INTRAVENOUS at 15:11

## 2019-06-01 RX ADMIN — SODIUM CHLORIDE AND POTASSIUM CHLORIDE: 9; 1.49 INJECTION, SOLUTION INTRAVENOUS at 06:00

## 2019-06-01 RX ADMIN — ONDANSETRON 4 MG: 2 INJECTION INTRAMUSCULAR; INTRAVENOUS at 23:08

## 2019-06-01 RX ADMIN — Medication 10 ML: at 06:00

## 2019-06-01 RX ADMIN — HYDRALAZINE HYDROCHLORIDE 100 MG: 50 TABLET, FILM COATED ORAL at 21:01

## 2019-06-01 RX ADMIN — OXYCODONE AND ACETAMINOPHEN 1 TABLET: 5; 325 TABLET ORAL at 18:39

## 2019-06-01 RX ADMIN — MAGNESIUM SULFATE HEPTAHYDRATE 2 G: 40 INJECTION, SOLUTION INTRAVENOUS at 15:11

## 2019-06-01 RX ADMIN — MEROPENEM 500 MG: 500 INJECTION, POWDER, FOR SOLUTION INTRAVENOUS at 18:47

## 2019-06-01 RX ADMIN — SUCRALFATE 1 G: 1 TABLET ORAL at 18:47

## 2019-06-01 RX ADMIN — LABETALOL HYDROCHLORIDE 300 MG: 200 TABLET, FILM COATED ORAL at 21:01

## 2019-06-01 RX ADMIN — HYDRALAZINE HYDROCHLORIDE 100 MG: 50 TABLET, FILM COATED ORAL at 09:25

## 2019-06-01 RX ADMIN — DULOXETINE HYDROCHLORIDE 20 MG: 20 CAPSULE, DELAYED RELEASE ORAL at 09:25

## 2019-06-01 RX ADMIN — MEROPENEM 500 MG: 500 INJECTION, POWDER, FOR SOLUTION INTRAVENOUS at 06:04

## 2019-06-01 RX ADMIN — HEPARIN SODIUM 5000 UNITS: 5000 INJECTION INTRAVENOUS; SUBCUTANEOUS at 09:29

## 2019-06-01 RX ADMIN — SUCRALFATE 1 G: 1 TABLET ORAL at 21:01

## 2019-06-01 RX ADMIN — OXYCODONE AND ACETAMINOPHEN 1 TABLET: 5; 325 TABLET ORAL at 09:58

## 2019-06-01 RX ADMIN — INSULIN LISPRO 2 UNITS: 100 INJECTION, SOLUTION INTRAVENOUS; SUBCUTANEOUS at 08:05

## 2019-06-01 RX ADMIN — INSULIN LISPRO 2 UNITS: 100 INJECTION, SOLUTION INTRAVENOUS; SUBCUTANEOUS at 12:17

## 2019-06-01 NOTE — ROUTINE PROCESS
TRANSFER - OUT REPORT:    Verbal report given to [facility] (name) on [employer]  being transferred to [facility] (unit) for routine progression of care       Report consisted of patients Situation, Background, Assessment and   Recommendations(SBAR). Information from the following report(s) SBAR, Kardex, ED Summary, Procedure Summary, Intake/Output, MAR, Recent Results, Med Rec Status, Cardiac Rhythm ST.  and Alarm Parameters  was reviewed with the receiving nurse. Lines:   Peripheral IV 05/30/19 Right Wrist (Active)   Site Assessment Clean, dry, & intact 6/1/2019 12:00 PM   Phlebitis Assessment 0 6/1/2019 12:00 PM   Infiltration Assessment 0 6/1/2019 12:00 PM   Dressing Status Clean, dry, & intact 6/1/2019 12:00 PM   Dressing Type Transparent;Tape 6/1/2019 12:00 PM   Hub Color/Line Status Pink;Capped 6/1/2019 12:00 PM   Action Taken Open ports on tubing capped 6/1/2019 12:00 PM   Alcohol Cap Used Yes 6/1/2019 12:00 PM       Peripheral IV 46/61/25 Right Basilic (Active)   Site Assessment Clean, dry, & intact 6/1/2019 12:00 PM   Phlebitis Assessment 0 6/1/2019 12:00 PM   Infiltration Assessment 0 6/1/2019 12:00 PM   Dressing Status Clean, dry, & intact 6/1/2019 12:00 PM   Dressing Type Transparent;Tape 6/1/2019 12:00 PM   Hub Color/Line Status Green;Capped 6/1/2019 12:00 PM   Action Taken Open ports on tubing capped 6/1/2019 12:00 PM   Alcohol Cap Used Yes 6/1/2019 12:00 PM        Opportunity for questions and clarification was provided.       Patient transported with:   Tech  CHART

## 2019-06-01 NOTE — PROGRESS NOTES
Bedside and Verbal shift change report given to Valentine Scheuermann, RN (oncoming nurse) by Alexandru Brewer RN (offgoing nurse). Report included the following information SBAR, Kardex, Procedure Summary, Intake/Output, MAR, Recent Results and Cardiac Rhythm NSR/ST.

## 2019-06-01 NOTE — PROGRESS NOTES
TRANSFER - IN REPORT:    Verbal report received from [facility] RN(name) on [email or web address]  being received from [facility] (unit) for routine progression of care      Report consisted of patients Situation, Background, Assessment and   Recommendations(SBAR). Information from the following report(s) SBAR, Kardex, [facility] and Recent Results was reviewed with the receiving nurse. Opportunity for questions and clarification was provided. Assessment completed upon patients arrival to unit and care assumed.

## 2019-06-01 NOTE — PROGRESS NOTES
Hospitalist Progress Note  Margie Guerrero MD  Answering service: [phone] OR [phone] from in house phone      Date of Service:  2019  NAME:  Mert Jefferson  :  1990  MRN:  [id number]      Admission Summary:    53-year-old female with past medical history of chronic pain syndrome, diabetes mellitus type 2, hypertension, and depression, presents to the hospital with Abdominal pain, nausea, and vomiting    Interval history / Subjective:     Patient Was seen in followup of  Abdominal pains  She Reports abdominal pain is better controlled  Denies fevers or chills. Assessment & Plan:     1. Abdominal pains with endometritis workign diagnosis on admission - on Abx. Gyn consulted. ID consult - follow up clinical status. Ct scan on admission Noted - no fluid collections. 2. Dm 1 with hyperglycemia - on insulin Pump. On long acting insulin  3. . DTC consult. 4. ? Distal Esophageal Wall thickening on Ct scan - due to esophagitis? -  review of previous notes suggest history of gastroparesis with poorly controlled Dm, ordered carafate. Will request Gi consult. 5. HTN - on Hydralazine, labetalol  6. Hyercalcemia - Likely due to dehydration. Appreciate renal eval. Improved. 7. MARIS on CKD 2 - monitor and avoid Nephrotoxins  8. Prior H.o THC use - check drug screen urine. 9. Hypokalemia - replete. 10. Depression - ?post partum, psychiatry eval - not Suicidal.  11. Pain Control - try to wean off narcotics, D. W pt and Father who is requesting to wean off all narcotics. Code status: Full  DVT prophylaxis: Heparin    Telemetry reviewed:   normal sinus rhythm      Risk of deterioration: medium      Total time spent with patient: 30 Minutes   Care Plan discussed with: Patient/Family and Nurse  Disposition: TBD   D/w Pt and father (pt gave permission to discuss care) in agreement with tapering off narcotics.       Hospital Problems  Date Reviewed: 5/25/2019          Codes Class Noted POA    Endometritis ICD-10-CM: N71.9  ICD-9-CM: 615.9  5/31/2019 Unknown        * (Principal) Vomiting ICD-10-CM: R11.10  ICD-9-CM: 787.03  5/30/2019 Unknown                Review of Systems:   Review of Systems   Constitutional: Negative. HENT: Negative. Eyes: Negative. Respiratory: Negative. Cardiovascular: Negative. Gastrointestinal: Positive for abdominal pain and nausea. Negative for blood in stool and melena. Genitourinary: Negative. Musculoskeletal: Negative. Skin: Negative. Neurological: Negative. Endo/Heme/Allergies: Negative. Psychiatric/Behavioral: Negative for suicidal ideas. The patient is nervous/anxious. Vital Signs:    Last 24hrs VS reviewed since prior progress note. Most recent are:  Visit Vitals  BP (!) (P) 157/98 (BP 1 Location: Right arm, BP Patient Position: At rest)   Pulse (!) (P) 101   Temp (P) 98.5 °F (36.9 °C)   Resp (P) 15   Wt 60.1 kg (132 lb 7.9 oz)   SpO2 97%   BMI 20.15 kg/m²       No intake or output data in the 24 hours ending 06/01/19 1206     Physical Examination:             Constitutional:  No acute distress, cooperative, pleasant    ENT:  Oral mucous moist, oropharynx benign. Neck supple,    Resp:  CTA bilaterally. No wheezing/rhonchi/rales. No accessory muscle use   CV:  Regular rhythm, normal rate, no murmurs, gallops, rubs    GI:  Soft, non distended, non tender. normoactive bowel sounds, no hepatosplenomegaly     Musculoskeletal:  No edema, warm, 2+ pulses throughout    Neurologic:  Moves all extremities. AAOx3, CN II-XII reviewed     Skin:  multiple tattoes noted.         Data Review:    Review and/or order of clinical lab test  Review and/or order of tests in the radiology section of CPT      Labs:     Recent Labs     06/01/19  0500 05/31/19  0743   WBC 14.4* 15.3*   HGB 7.8* 8.6*   HCT 27.5* 29.0*   * 492*     Recent Labs     06/01/19  0500 05/31/19  0743 05/30/19  1442 05/30/19  1223    139  --  139   K 4.3 2.9*  --  3.2*   * 104  --  104   CO2 17* 16*  --  22   BUN 31* 20  --  16   CREA 1.83* 1.49*  --  1.57*   * 322*  --  289*   CA 7.8* 8.5 10.3* 10.8*   MG 1.3*  --   --   --    PHOS 2.8  --   --   --      Recent Labs     06/01/19  0500 05/30/19  1223   SGOT 9* 23   ALT 11* 19   AP 65 84   TBILI 0.4 0.4   TP 5.2* 6.8   ALB 1.9* 2.6*   GLOB 3.3 4.2*   LPSE  --  59*     No results for input(s): INR, PTP, APTT in the last 72 hours. No lab exists for component: INREXT, INREXT   No results for input(s): FE, TIBC, PSAT, FERR in the last 72 hours. Lab Results   Component Value Date/Time    Folate 26.9 (H) 09/03/2012 05:45 AM      No results for input(s): PH, PCO2, PO2 in the last 72 hours. No results for input(s): CPK, CKNDX, TROIQ in the last 72 hours.     No lab exists for component: CPKMB  Lab Results   Component Value Date/Time    Cholesterol, total 160 05/15/2016 12:09 AM    HDL Cholesterol 41 05/15/2016 12:09 AM    LDL, calculated 98 05/15/2016 12:09 AM    Triglyceride 105 05/15/2016 12:09 AM    CHOL/HDL Ratio 3.9 05/15/2016 12:09 AM     Lab Results   Component Value Date/Time    Glucose (POC) 189 (H) 06/01/2019 11:55 AM    Glucose (POC) 190 (H) 06/01/2019 06:06 AM    Glucose (POC) 311 (H) 05/31/2019 11:05 PM    Glucose (POC) 443 (H) 05/31/2019 05:30 PM    Glucose (POC) 213 (H) 05/31/2019 11:41 AM     Lab Results   Component Value Date/Time    Color YELLOW/STRAW 05/30/2019 02:49 PM    Appearance CLEAR 05/30/2019 02:49 PM    Specific gravity 1.014 05/30/2019 02:49 PM    Specific gravity 1.010 04/07/2019 07:07 PM    pH (UA) 6.5 05/30/2019 02:49 PM    Protein 300 (A) 05/30/2019 02:49 PM    Glucose 500 (A) 05/30/2019 02:49 PM    Ketone 15 (A) 05/30/2019 02:49 PM    Bilirubin NEGATIVE  05/30/2019 02:49 PM    Urobilinogen 0.2 05/30/2019 02:49 PM    Nitrites NEGATIVE  05/30/2019 02:49 PM    Leukocyte Esterase NEGATIVE  05/30/2019 02:49 PM    Epithelial cells FEW 05/30/2019 02:49 PM    Bacteria NEGATIVE  05/30/2019 02:49 PM    WBC 0-4 05/30/2019 02:49 PM    RBC 20-50 05/30/2019 02:49 PM         Medications Reviewed:     Current Facility-Administered Medications   Medication Dose Route Frequency    fentaNYL citrate (PF) injection 25 mcg  25 mcg IntraVENous Q8H PRN    acetaminophen (TYLENOL) tablet 650 mg  650 mg Oral Q6H PRN    0.9% sodium chloride with KCl 20 mEq/L infusion   IntraVENous CONTINUOUS    insulin glargine (LANTUS) injection 12 Units  12 Units SubCUTAneous QHS    sucralfate (CARAFATE) tablet 1 g  1 g Oral AC&HS    meropenem (MERREM) 500 mg in 0.9% sodium chloride (MBP/ADV) 50 mL  0.5 g IntraVENous Q6H    DULoxetine (CYMBALTA) capsule 20 mg  20 mg Oral BID    ferrous sulfate tablet 325 mg  325 mg Oral ACB    hydrALAZINE (APRESOLINE) tablet 100 mg  100 mg Oral TID    labetalol (NORMODYNE) tablet 300 mg  300 mg Oral Q12H    oxyCODONE-acetaminophen (PERCOCET) 5-325 mg per tablet 1 Tab  1 Tab Oral Q8H PRN    sodium chloride (NS) flush 5-40 mL  5-40 mL IntraVENous Q8H    sodium chloride (NS) flush 5-40 mL  5-40 mL IntraVENous PRN    heparin (porcine) injection 5,000 Units  5,000 Units SubCUTAneous Q8H    ondansetron (ZOFRAN) injection 4 mg  4 mg IntraVENous Q4H PRN    glucose chewable tablet 16 g  4 Tab Oral PRN    dextrose (D50W) injection syrg 12.5-25 g  25-50 mL IntraVENous PRN    glucagon (GLUCAGEN) injection 1 mg  1 mg IntraMUSCular PRN    insulin lispro (HUMALOG) injection   SubCUTAneous Q6H    hydrALAZINE (APRESOLINE) 20 mg/mL injection 10 mg  10 mg IntraVENous Q6H PRN     ______________________________________________________________________  EXPECTED LENGTH OF STAY: - - -  ACTUAL LENGTH OF STAY:          1                 Eli Santillan MD   Patient doesn't want family notified.

## 2019-06-01 NOTE — PROGRESS NOTES
RENAL  PROGRESS NOTE        Subjective:   resting  Objective:   VITALS SIGNS:    Visit Vitals  BP (!) 157/98 (BP 1 Location: Right arm, BP Patient Position: At rest)   Pulse (!) 101   Temp 98.5 °F (36.9 °C)   Resp 15   Wt 60.1 kg (132 lb 7.9 oz)   SpO2 97%   BMI 20.15 kg/m²       O2 Device: Room air       Temp (24hrs), Av.5 °F (36.9 °C), Min:98.5 °F (36.9 °C), Max:98.6 °F (37 °C)         PHYSICAL EXAM:    resting  DATA REVIEW:     INTAKE / OUTPUT:   Last shift:      No intake/output data recorded. Last 3 shifts:  1901 -  0700  In: 10 [P.O.:10]  Out: -   No intake or output data in the 24 hours ending 19 1447      LABS:   Recent Labs     19  0500 19  0743 19  1223   WBC 14.4* 15.3* 11.2*   HGB 7.8* 8.6* 9.9*   HCT 27.5* 29.0* 32.3*   * 492* 543*     Recent Labs     19  0500 19  0743 19  1449 19  1223    139  --  139   K 4.3 2.9*  --  3.2*   * 104  --  104   CO2 17* 16*  --  22   * 322*  --  289*   BUN 31* 20  --  16   CREA 1.83* 1.49*  --  1.57*   CA 7.8* 8.5 10.3* 10.8*   MG 1.3*  --   --   --    PHOS 2.8  --   --   --    ALB 1.9*  --   --  2.6*   TBILI 0.4  --   --  0.4   SGOT 9*  --   --  23   ALT 11*  --   --  19           Assessment:       MARIS on CKD stage 2/3:creat up    Nephrotic syndrome: DM nephropathy complicated by recent pre-eclampsia (delivered on 5/10/19)-> last spot urine prot/cr ratio was 4.3gm (19)   Hypercalcemia: better  N/V/D  Metabolic acidosis:high AG  HTN: fluctuating.   DM1:  Poorly controlled the pas    Anemia     Plan:   BS control  Monitor renal function  Mg supplement  Lola Manzano MD

## 2019-06-01 NOTE — PROGRESS NOTES
Problem: Diabetes Self-Management  Goal: *Disease process and treatment process  Description  Define diabetes and identify own type of diabetes; list 3 options for treating diabetes. Outcome: Progressing Towards Goal  Goal: *Incorporating nutritional management into lifestyle  Description  Describe effect of type, amount and timing of food on blood glucose; list 3 methods for planning meals. Outcome: Progressing Towards Goal  Goal: *Incorporating physical activity into lifestyle  Description  State effect of exercise on blood glucose levels. Outcome: Progressing Towards Goal  Goal: *Developing strategies to promote health/change behavior  Description  Define the ABC's of diabetes; identify appropriate screenings, schedule and personal plan for screenings. Outcome: Progressing Towards Goal  Goal: *Using medications safely  Description  State effect of diabetes medications on diabetes; name diabetes medication taking, action and side effects. Outcome: Progressing Towards Goal  Goal: *Monitoring blood glucose, interpreting and using results  Description  Identify recommended blood glucose targets  and personal targets. Outcome: Progressing Towards Goal  Goal: *Prevention, detection, treatment of acute complications  Description  List symptoms of hyper- and hypoglycemia; describe how to treat low blood sugar and actions for lowering  high blood glucose level. Outcome: Progressing Towards Goal  Goal: *Prevention, detection and treatment of chronic complications  Description  Define the natural course of diabetes and describe the relationship of blood glucose levels to long term complications of diabetes.   Outcome: Progressing Towards Goal  Goal: *Developing strategies to address psychosocial issues  Description  Describe feelings about living with diabetes; identify support needed and support network  Outcome: Progressing Towards Goal  Goal: *Insulin pump training  Outcome: Progressing Towards Goal  Goal: *Sick day guidelines  Outcome: Progressing Towards Goal  Goal: *Patient Specific Goal (EDIT GOAL, INSERT TEXT)  Outcome: Progressing Towards Goal     Problem: Patient Education: Go to Patient Education Activity  Goal: Patient/Family Education  Outcome: Progressing Towards Goal     Problem: Pressure Injury - Risk of  Goal: *Prevention of pressure injury  Description  Document Doroteo Scale and appropriate interventions in the flowsheet. Outcome: Progressing Towards Goal     Problem: Patient Education: Go to Patient Education Activity  Goal: Patient/Family Education  Outcome: Progressing Towards Goal     Problem: Falls - Risk of  Goal: *Absence of Falls  Description  Document Jeremy Lowell Fall Risk and appropriate interventions in the flowsheet.   Outcome: Progressing Towards Goal     Problem: Patient Education: Go to Patient Education Activity  Goal: Patient/Family Education  Outcome: Progressing Towards Goal     Problem: Pain  Goal: *Control of Pain  Outcome: Progressing Towards Goal  Goal: *PALLIATIVE CARE:  Alleviation of Pain  Outcome: Progressing Towards Goal     Problem: Patient Education: Go to Patient Education Activity  Goal: Patient/Family Education  Outcome: Progressing Towards Goal

## 2019-06-01 NOTE — PROGRESS NOTES
Problem: Diabetes Self-Management  Goal: *Disease process and treatment process  Description  Define diabetes and identify own type of diabetes; list 3 options for treating diabetes. Outcome: Progressing Towards Goal  Goal: *Incorporating nutritional management into lifestyle  Description  Describe effect of type, amount and timing of food on blood glucose; list 3 methods for planning meals. Outcome: Progressing Towards Goal  Goal: *Incorporating physical activity into lifestyle  Description  State effect of exercise on blood glucose levels. Outcome: Progressing Towards Goal  Goal: *Developing strategies to promote health/change behavior  Description  Define the ABC's of diabetes; identify appropriate screenings, schedule and personal plan for screenings. Outcome: Progressing Towards Goal  Goal: *Using medications safely  Description  State effect of diabetes medications on diabetes; name diabetes medication taking, action and side effects. Outcome: Progressing Towards Goal  Goal: *Monitoring blood glucose, interpreting and using results  Description  Identify recommended blood glucose targets  and personal targets. Outcome: Progressing Towards Goal  Goal: *Prevention, detection, treatment of acute complications  Description  List symptoms of hyper- and hypoglycemia; describe how to treat low blood sugar and actions for lowering  high blood glucose level. Outcome: Progressing Towards Goal  Goal: *Prevention, detection and treatment of chronic complications  Description  Define the natural course of diabetes and describe the relationship of blood glucose levels to long term complications of diabetes.   Outcome: Progressing Towards Goal  Goal: *Developing strategies to address psychosocial issues  Description  Describe feelings about living with diabetes; identify support needed and support network  Outcome: Progressing Towards Goal  Goal: *Sick day guidelines  Outcome: Progressing Towards Goal  Goal: *Patient Specific Goal (EDIT GOAL, INSERT TEXT)  Outcome: Progressing Towards Goal     Problem: Patient Education: Go to Patient Education Activity  Goal: Patient/Family Education  Outcome: Progressing Towards Goal

## 2019-06-01 NOTE — PROGRESS NOTES
Day #2 of Meropenem   Indication:  Leukocytosis and abdominal pain--concerns over endometritis  Current regimen:  500 mg q6h  Abx regimen: Monotherapy   Recent Labs     19  0500 19  0743 19  1223   WBC 14.4* 15.3* 11.2*   CREA 1.83* 1.49* 1.57*   BUN 31* 20 16     Est CrCl: 42ml/min;    Temp (24hrs), Av.5 °F (36.9 °C), Min:98.5 °F (36.9 °C), Max:98.6 °F (37 °C)    Cultures:   Urine-Group B step  Blood-NGTD    Plan: Change to 500 mg IV q8h for CrCl 26 - 49 ml/min

## 2019-06-02 ENCOUNTER — APPOINTMENT (OUTPATIENT)
Dept: NUCLEAR MEDICINE | Age: 29
DRG: 561 | End: 2019-06-02
Attending: HOSPITALIST
Payer: MEDICAID

## 2019-06-02 ENCOUNTER — APPOINTMENT (OUTPATIENT)
Dept: GENERAL RADIOLOGY | Age: 29
DRG: 561 | End: 2019-06-02
Attending: HOSPITALIST
Payer: MEDICAID

## 2019-06-02 LAB
ANION GAP SERPL CALC-SCNC: 10 MMOL/L (ref 5–15)
BASOPHILS # BLD: 0.1 K/UL (ref 0–0.1)
BASOPHILS NFR BLD: 0 % (ref 0–1)
BUN SERPL-MCNC: 20 MG/DL (ref 6–20)
BUN/CREAT SERPL: 16 (ref 12–20)
CALCIUM SERPL-MCNC: 7.7 MG/DL (ref 8.5–10.1)
CHLORIDE SERPL-SCNC: 111 MMOL/L (ref 97–108)
CO2 SERPL-SCNC: 19 MMOL/L (ref 21–32)
CREAT SERPL-MCNC: 1.27 MG/DL (ref 0.55–1.02)
D DIMER PPP FEU-MCNC: 1.83 MG/L FEU (ref 0–0.65)
DIFFERENTIAL METHOD BLD: ABNORMAL
EOSINOPHIL # BLD: 0.1 K/UL (ref 0–0.4)
EOSINOPHIL NFR BLD: 0 % (ref 0–7)
ERYTHROCYTE [DISTWIDTH] IN BLOOD BY AUTOMATED COUNT: 17.2 % (ref 11.5–14.5)
GLUCOSE BLD STRIP.AUTO-MCNC: 159 MG/DL (ref 65–100)
GLUCOSE BLD STRIP.AUTO-MCNC: 162 MG/DL (ref 65–100)
GLUCOSE BLD STRIP.AUTO-MCNC: 182 MG/DL (ref 65–100)
GLUCOSE BLD STRIP.AUTO-MCNC: 188 MG/DL (ref 65–100)
GLUCOSE BLD STRIP.AUTO-MCNC: 196 MG/DL (ref 65–100)
GLUCOSE SERPL-MCNC: 156 MG/DL (ref 65–100)
HCT VFR BLD AUTO: 24.4 % (ref 35–47)
HGB BLD-MCNC: 7.5 G/DL (ref 11.5–16)
IMM GRANULOCYTES # BLD AUTO: 0.1 K/UL (ref 0–0.04)
IMM GRANULOCYTES NFR BLD AUTO: 1 % (ref 0–0.5)
LYMPHOCYTES # BLD: 1.4 K/UL (ref 0.8–3.5)
LYMPHOCYTES NFR BLD: 8 % (ref 12–49)
MCH RBC QN AUTO: 26.3 PG (ref 26–34)
MCHC RBC AUTO-ENTMCNC: 30.7 G/DL (ref 30–36.5)
MCV RBC AUTO: 85.6 FL (ref 80–99)
MONOCYTES # BLD: 1 K/UL (ref 0–1)
MONOCYTES NFR BLD: 6 % (ref 5–13)
NEUTS SEG # BLD: 14.3 K/UL (ref 1.8–8)
NEUTS SEG NFR BLD: 85 % (ref 32–75)
NRBC # BLD: 0 K/UL (ref 0–0.01)
NRBC BLD-RTO: 0 PER 100 WBC
PLATELET # BLD AUTO: 417 K/UL (ref 150–400)
PMV BLD AUTO: 9.5 FL (ref 8.9–12.9)
POTASSIUM SERPL-SCNC: 4 MMOL/L (ref 3.5–5.1)
RBC # BLD AUTO: 2.85 M/UL (ref 3.8–5.2)
SERVICE CMNT-IMP: ABNORMAL
SODIUM SERPL-SCNC: 140 MMOL/L (ref 136–145)
WBC # BLD AUTO: 17 K/UL (ref 3.6–11)

## 2019-06-02 PROCEDURE — 74011250636 HC RX REV CODE- 250/636: Performed by: NURSE PRACTITIONER

## 2019-06-02 PROCEDURE — 36600 WITHDRAWAL OF ARTERIAL BLOOD: CPT

## 2019-06-02 PROCEDURE — 85025 COMPLETE CBC W/AUTO DIFF WBC: CPT

## 2019-06-02 PROCEDURE — A9558 XE133 XENON 10MCI: HCPCS

## 2019-06-02 PROCEDURE — 74011000258 HC RX REV CODE- 258: Performed by: INTERNAL MEDICINE

## 2019-06-02 PROCEDURE — 71045 X-RAY EXAM CHEST 1 VIEW: CPT

## 2019-06-02 PROCEDURE — 74011250636 HC RX REV CODE- 250/636: Performed by: INTERNAL MEDICINE

## 2019-06-02 PROCEDURE — 80048 BASIC METABOLIC PNL TOTAL CA: CPT

## 2019-06-02 PROCEDURE — 85379 FIBRIN DEGRADATION QUANT: CPT

## 2019-06-02 PROCEDURE — 74011000250 HC RX REV CODE- 250: Performed by: NURSE PRACTITIONER

## 2019-06-02 PROCEDURE — 74011250637 HC RX REV CODE- 250/637: Performed by: FAMILY MEDICINE

## 2019-06-02 PROCEDURE — 74011250637 HC RX REV CODE- 250/637: Performed by: HOSPITALIST

## 2019-06-02 PROCEDURE — 74011000250 HC RX REV CODE- 250: Performed by: HOSPITALIST

## 2019-06-02 PROCEDURE — 74011636637 HC RX REV CODE- 636/637: Performed by: HOSPITALIST

## 2019-06-02 PROCEDURE — 36415 COLL VENOUS BLD VENIPUNCTURE: CPT

## 2019-06-02 PROCEDURE — 74011250636 HC RX REV CODE- 250/636: Performed by: FAMILY MEDICINE

## 2019-06-02 PROCEDURE — 82962 GLUCOSE BLOOD TEST: CPT

## 2019-06-02 PROCEDURE — 74011636637 HC RX REV CODE- 636/637: Performed by: FAMILY MEDICINE

## 2019-06-02 PROCEDURE — 74011250636 HC RX REV CODE- 250/636: Performed by: HOSPITALIST

## 2019-06-02 PROCEDURE — 65660000000 HC RM CCU STEPDOWN

## 2019-06-02 RX ORDER — INSULIN GLARGINE 100 [IU]/ML
15 INJECTION, SOLUTION SUBCUTANEOUS
Status: DISCONTINUED | OUTPATIENT
Start: 2019-06-02 | End: 2019-06-03

## 2019-06-02 RX ORDER — INSULIN LISPRO 100 [IU]/ML
2 INJECTION, SOLUTION INTRAVENOUS; SUBCUTANEOUS ONCE
Status: COMPLETED | OUTPATIENT
Start: 2019-06-02 | End: 2019-06-02

## 2019-06-02 RX ORDER — INSULIN LISPRO 100 [IU]/ML
INJECTION, SOLUTION INTRAVENOUS; SUBCUTANEOUS
Status: DISCONTINUED | OUTPATIENT
Start: 2019-06-02 | End: 2019-06-03

## 2019-06-02 RX ADMIN — SODIUM CHLORIDE AND POTASSIUM CHLORIDE: 9; 1.49 INJECTION, SOLUTION INTRAVENOUS at 23:59

## 2019-06-02 RX ADMIN — OXYCODONE AND ACETAMINOPHEN 1 TABLET: 5; 325 TABLET ORAL at 01:51

## 2019-06-02 RX ADMIN — Medication 10 ML: at 22:00

## 2019-06-02 RX ADMIN — INSULIN LISPRO 2 UNITS: 100 INJECTION, SOLUTION INTRAVENOUS; SUBCUTANEOUS at 17:09

## 2019-06-02 RX ADMIN — SUCRALFATE 1 G: 1 TABLET ORAL at 12:34

## 2019-06-02 RX ADMIN — INSULIN LISPRO 2 UNITS: 100 INJECTION, SOLUTION INTRAVENOUS; SUBCUTANEOUS at 06:41

## 2019-06-02 RX ADMIN — ONDANSETRON 4 MG: 2 INJECTION INTRAMUSCULAR; INTRAVENOUS at 10:24

## 2019-06-02 RX ADMIN — SUCRALFATE 1 G: 1 TABLET ORAL at 06:25

## 2019-06-02 RX ADMIN — OXYCODONE AND ACETAMINOPHEN 1 TABLET: 5; 325 TABLET ORAL at 18:47

## 2019-06-02 RX ADMIN — MEROPENEM 500 MG: 500 INJECTION, POWDER, FOR SOLUTION INTRAVENOUS at 22:26

## 2019-06-02 RX ADMIN — HYDRALAZINE HYDROCHLORIDE 100 MG: 50 TABLET, FILM COATED ORAL at 17:08

## 2019-06-02 RX ADMIN — LABETALOL HYDROCHLORIDE 300 MG: 200 TABLET, FILM COATED ORAL at 08:49

## 2019-06-02 RX ADMIN — MEROPENEM 500 MG: 500 INJECTION, POWDER, FOR SOLUTION INTRAVENOUS at 12:33

## 2019-06-02 RX ADMIN — PROCHLORPERAZINE EDISYLATE 5 MG: 5 INJECTION INTRAMUSCULAR; INTRAVENOUS at 18:47

## 2019-06-02 RX ADMIN — PROCHLORPERAZINE EDISYLATE 5 MG: 5 INJECTION INTRAMUSCULAR; INTRAVENOUS at 23:53

## 2019-06-02 RX ADMIN — PROCHLORPERAZINE EDISYLATE 5 MG: 5 INJECTION INTRAMUSCULAR; INTRAVENOUS at 03:09

## 2019-06-02 RX ADMIN — DULOXETINE HYDROCHLORIDE 20 MG: 20 CAPSULE, DELAYED RELEASE ORAL at 17:08

## 2019-06-02 RX ADMIN — OXYCODONE AND ACETAMINOPHEN 1 TABLET: 5; 325 TABLET ORAL at 10:24

## 2019-06-02 RX ADMIN — HYDRALAZINE HYDROCHLORIDE 100 MG: 50 TABLET, FILM COATED ORAL at 08:50

## 2019-06-02 RX ADMIN — SUCRALFATE 1 G: 1 TABLET ORAL at 22:23

## 2019-06-02 RX ADMIN — MEROPENEM 500 MG: 500 INJECTION, POWDER, FOR SOLUTION INTRAVENOUS at 03:10

## 2019-06-02 RX ADMIN — HEPARIN SODIUM 5000 UNITS: 5000 INJECTION INTRAVENOUS; SUBCUTANEOUS at 17:09

## 2019-06-02 RX ADMIN — DULOXETINE HYDROCHLORIDE 20 MG: 20 CAPSULE, DELAYED RELEASE ORAL at 08:50

## 2019-06-02 RX ADMIN — INSULIN GLARGINE 15 UNITS: 100 INJECTION, SOLUTION SUBCUTANEOUS at 22:23

## 2019-06-02 RX ADMIN — INSULIN LISPRO 2 UNITS: 100 INJECTION, SOLUTION INTRAVENOUS; SUBCUTANEOUS at 00:07

## 2019-06-02 RX ADMIN — HEPARIN SODIUM 5000 UNITS: 5000 INJECTION INTRAVENOUS; SUBCUTANEOUS at 08:50

## 2019-06-02 RX ADMIN — LABETALOL HYDROCHLORIDE 300 MG: 200 TABLET, FILM COATED ORAL at 22:23

## 2019-06-02 RX ADMIN — SODIUM CHLORIDE AND POTASSIUM CHLORIDE: 9; 1.49 INJECTION, SOLUTION INTRAVENOUS at 10:22

## 2019-06-02 RX ADMIN — SUCRALFATE 1 G: 1 TABLET ORAL at 17:08

## 2019-06-02 RX ADMIN — HEPARIN SODIUM 5000 UNITS: 5000 INJECTION INTRAVENOUS; SUBCUTANEOUS at 00:07

## 2019-06-02 RX ADMIN — PROCHLORPERAZINE EDISYLATE 5 MG: 5 INJECTION INTRAMUSCULAR; INTRAVENOUS at 12:33

## 2019-06-02 RX ADMIN — FERROUS SULFATE TAB 325 MG (65 MG ELEMENTAL FE) 325 MG: 325 (65 FE) TAB at 06:25

## 2019-06-02 RX ADMIN — HYDRALAZINE HYDROCHLORIDE 100 MG: 50 TABLET, FILM COATED ORAL at 22:23

## 2019-06-02 RX ADMIN — INSULIN LISPRO 2 UNITS: 100 INJECTION, SOLUTION INTRAVENOUS; SUBCUTANEOUS at 12:34

## 2019-06-02 NOTE — PROGRESS NOTES
Pt requesting compazine for nausea. Notified attending who wants it cleared with OB, spoke with Dr Po Vasquez with [facility] and said compazine was ok to give. She also said if patient hasnt been pumping every few hours since admission, her milk has probably dried up. Will continue to monitor.

## 2019-06-02 NOTE — PROGRESS NOTES
Hospitalist Progress Note  Lucia Rodriguez MD  Answering service: [phone] OR [phone] from in house phone      Date of Service:  2019  NAME:  Esther Iyer  :  1990  MRN:  [id number]      Admission Summary:    77-year-old female with past medical history of chronic pain syndrome, diabetes mellitus type 2, hypertension, and depression, presents to the hospital with Abdominal pain, nausea, and vomiting    Interval history / Subjective:     Patient Was seen in followup of  Abdominal pains  She Reports abdominal pain is better controlled  Denies fevers or chills. Reports chest pain with Inspiration - on and off since yesterday      Assessment & Plan:     1. Abdominal pains with endometritis workign diagnosis on admission - on Abx. Gyn consulted. ID consult - follow up clinical status. Ct scan on admission Noted - no fluid collections. 2. Chest pains  - cxr neg. D dimer elevated - check Vq scan and duplex. 3. Dm 1 with hyperglycemia - on insulin Pump. On long acting insulin, adjusted dosing  4. . DTC consult. 5. ? Distal Esophageal Wall thickening on Ct scan - due to esophagitis? -  review of previous notes suggest history of gastroparesis with poorly controlled Dm, ordered carafate. Request Gi consult. 6. HTN - on Hydralazine, labetalol  7. Hyercalcemia - Likely due to dehydration. Appreciate renal eval. Improved. 8. MARIS on CKD 2 - monitor and avoid Nephrotoxins  9. Prior H.o THC use - check drug screen urine. 10. Hypokalemia - replete. 11. Depression - ?post partum, psychiatry eval - not Suicidal.  12. Pain Control - try to wean off narcotics, D. W pt and Father who is requesting to wean off all narcotics.      Code status: Full  DVT prophylaxis: Heparin    Telemetry reviewed:   normal sinus rhythm      Risk of deterioration: medium      Total time spent with patient: [address] discussed with: Patient/Family and Nurse  Disposition: TBD   D/w Pt and father (pt gave permission to discuss care) in agreement with tapering off narcotics. Hospital Problems  Date Reviewed: 5/25/2019          Codes Class Noted POA    Endometritis ICD-10-CM: N71.9  ICD-9-CM: 615.9  5/31/2019 Unknown        * (Principal) Vomiting ICD-10-CM: R11.10  ICD-9-CM: 787.03  5/30/2019 Unknown                Review of Systems:   Review of Systems   Constitutional: Negative. HENT: Negative. Eyes: Negative. Respiratory: Negative. Cardiovascular: Negative. Gastrointestinal: Positive for abdominal pain and nausea. Negative for blood in stool and melena. Genitourinary: Negative. Musculoskeletal: Negative. Skin: Negative. Neurological: Negative. Endo/Heme/Allergies: Negative. Psychiatric/Behavioral: Negative for suicidal ideas. The patient is nervous/anxious. Vital Signs:    Last 24hrs VS reviewed since prior progress note. Most recent are:  Visit Vitals  /77   Pulse (!) 107   Temp 98.3 °F (36.8 °C)   Resp 16   Wt 60.2 kg (132 lb 11.5 oz)   SpO2 98%   BMI 20.18 kg/m²       No intake or output data in the 24 hours ending 06/02/19 1249     Physical Examination:             Constitutional:  No acute distress, cooperative, pleasant    ENT:  Oral mucous moist, oropharynx benign. Neck supple,    Resp:  CTA bilaterally. No wheezing/rhonchi/rales. No accessory muscle use   CV:  Regular rhythm, normal rate, no murmurs, gallops, rubs    GI:  Soft, non distended, non tender. normoactive bowel sounds, no hepatosplenomegaly     Musculoskeletal:  No edema, warm, 2+ pulses throughout    Neurologic:  Moves all extremities. AAOx3, CN II-XII reviewed     Skin:  multiple tattoes noted.         Data Review:    Review and/or order of clinical lab test  Review and/or order of tests in the radiology section of CPT      Labs:     Recent Labs     06/02/19  0159 06/01/19  0500   WBC 17.0* 14.4*   HGB 7.5* 7.8*   HCT 24.4* 27.5*   * 479*     Recent Labs     06/02/19  0159 06/01/19  0500 05/31/19  0743    139 139   K 4.0 4.3 2.9*   * 109* 104   CO2 19* 17* 16*   BUN 20 31* 20   CREA 1.27* 1.83* 1.49*   * 174* 322*   CA 7.7* 7.8* 8.5   MG  --  1.3*  --    PHOS  --  2.8  --      Recent Labs     06/01/19  0500   SGOT 9*   ALT 11*   AP 65   TBILI 0.4   TP 5.2*   ALB 1.9*   GLOB 3.3     No results for input(s): INR, PTP, APTT in the last 72 hours. No lab exists for component: INREXT, INREXT   No results for input(s): FE, TIBC, PSAT, FERR in the last 72 hours. Lab Results   Component Value Date/Time    Folate 26.9 (H) 09/03/2012 05:45 AM      No results for input(s): PH, PCO2, PO2 in the last 72 hours. No results for input(s): CPK, CKNDX, TROIQ in the last 72 hours.     No lab exists for component: CPKMB  Lab Results   Component Value Date/Time    Cholesterol, total 160 05/15/2016 12:09 AM    HDL Cholesterol 41 05/15/2016 12:09 AM    LDL, calculated 98 05/15/2016 12:09 AM    Triglyceride 105 05/15/2016 12:09 AM    CHOL/HDL Ratio 3.9 05/15/2016 12:09 AM     Lab Results   Component Value Date/Time    Glucose (POC) 182 (H) 06/02/2019 12:09 PM    Glucose (POC) 159 (H) 06/02/2019 06:34 AM    Glucose (POC) 188 (H) 06/02/2019 12:03 AM    Glucose (POC) 210 (H) 06/01/2019 06:14 PM    Glucose (POC) 189 (H) 06/01/2019 11:55 AM     Lab Results   Component Value Date/Time    Color YELLOW/STRAW 05/30/2019 02:49 PM    Appearance CLEAR 05/30/2019 02:49 PM    Specific gravity 1.014 05/30/2019 02:49 PM    Specific gravity 1.010 04/07/2019 07:07 PM    pH (UA) 6.5 05/30/2019 02:49 PM    Protein 300 (A) 05/30/2019 02:49 PM    Glucose 500 (A) 05/30/2019 02:49 PM    Ketone 15 (A) 05/30/2019 02:49 PM    Bilirubin NEGATIVE  05/30/2019 02:49 PM    Urobilinogen 0.2 05/30/2019 02:49 PM    Nitrites NEGATIVE  05/30/2019 02:49 PM    Leukocyte Esterase NEGATIVE  05/30/2019 02:49 PM    Epithelial cells FEW 05/30/2019 02:49 PM    Bacteria NEGATIVE  05/30/2019 02:49 PM    WBC 0-4 05/30/2019 02:49 PM    RBC 20-50 05/30/2019 02:49 PM         Medications Reviewed:     Current Facility-Administered Medications   Medication Dose Route Frequency    prochlorperazine (COMPAZINE) with saline injection 5 mg  5 mg IntraVENous Q6H PRN    insulin lispro (HUMALOG) injection   SubCUTAneous AC&HS    insulin glargine (LANTUS) injection 15 Units  15 Units SubCUTAneous QHS    meropenem (MERREM) 500 mg in 0.9% sodium chloride (MBP/ADV) 50 mL  0.5 g IntraVENous Q8H    acetaminophen (TYLENOL) tablet 650 mg  650 mg Oral Q6H PRN    0.9% sodium chloride with KCl 20 mEq/L infusion   IntraVENous CONTINUOUS    sucralfate (CARAFATE) tablet 1 g  1 g Oral AC&HS    DULoxetine (CYMBALTA) capsule 20 mg  20 mg Oral BID    ferrous sulfate tablet 325 mg  325 mg Oral ACB    hydrALAZINE (APRESOLINE) tablet 100 mg  100 mg Oral TID    labetalol (NORMODYNE) tablet 300 mg  300 mg Oral Q12H    oxyCODONE-acetaminophen (PERCOCET) 5-325 mg per tablet 1 Tab  1 Tab Oral Q8H PRN    sodium chloride (NS) flush 5-40 mL  5-40 mL IntraVENous Q8H    sodium chloride (NS) flush 5-40 mL  5-40 mL IntraVENous PRN    heparin (porcine) injection 5,000 Units  5,000 Units SubCUTAneous Q8H    ondansetron (ZOFRAN) injection 4 mg  4 mg IntraVENous Q4H PRN    glucose chewable tablet 16 g  4 Tab Oral PRN    dextrose (D50W) injection syrg 12.5-25 g  25-50 mL IntraVENous PRN    glucagon (GLUCAGEN) injection 1 mg  1 mg IntraMUSCular PRN    hydrALAZINE (APRESOLINE) 20 mg/mL injection 10 mg  10 mg IntraVENous Q6H PRN     ______________________________________________________________________  EXPECTED LENGTH OF STAY: - - -  ACTUAL LENGTH OF STAY:          2                 Viktoriya Breaux MD   Patient doesn't want family notified.

## 2019-06-03 ENCOUNTER — APPOINTMENT (OUTPATIENT)
Dept: VASCULAR SURGERY | Age: 29
DRG: 561 | End: 2019-06-03
Attending: HOSPITALIST
Payer: MEDICAID

## 2019-06-03 ENCOUNTER — ANESTHESIA (OUTPATIENT)
Dept: ENDOSCOPY | Age: 29
DRG: 561 | End: 2019-06-03
Payer: MEDICAID

## 2019-06-03 ENCOUNTER — ANESTHESIA EVENT (OUTPATIENT)
Dept: ENDOSCOPY | Age: 29
DRG: 561 | End: 2019-06-03
Payer: MEDICAID

## 2019-06-03 LAB
AMPHET UR QL SCN: NEGATIVE
ANION GAP SERPL CALC-SCNC: 7 MMOL/L (ref 5–15)
BARBITURATES UR QL SCN: NEGATIVE
BASOPHILS # BLD: 0 K/UL (ref 0–0.1)
BASOPHILS NFR BLD: 0 % (ref 0–1)
BENZODIAZ UR QL: NEGATIVE
BUN SERPL-MCNC: 9 MG/DL (ref 6–20)
BUN/CREAT SERPL: 9 (ref 12–20)
CALCIUM SERPL-MCNC: 7.9 MG/DL (ref 8.5–10.1)
CANNABINOIDS UR QL SCN: NEGATIVE
CHLORIDE SERPL-SCNC: 106 MMOL/L (ref 97–108)
CO2 SERPL-SCNC: 21 MMOL/L (ref 21–32)
COCAINE UR QL SCN: NEGATIVE
CREAT SERPL-MCNC: 1 MG/DL (ref 0.55–1.02)
DIFFERENTIAL METHOD BLD: ABNORMAL
DRUG SCRN COMMENT,DRGCM: NORMAL
EOSINOPHIL # BLD: 0.4 K/UL (ref 0–0.4)
EOSINOPHIL NFR BLD: 3 % (ref 0–7)
ERYTHROCYTE [DISTWIDTH] IN BLOOD BY AUTOMATED COUNT: 16.8 % (ref 11.5–14.5)
GLUCOSE BLD STRIP.AUTO-MCNC: 134 MG/DL (ref 65–100)
GLUCOSE BLD STRIP.AUTO-MCNC: 179 MG/DL (ref 65–100)
GLUCOSE BLD STRIP.AUTO-MCNC: 191 MG/DL (ref 65–100)
GLUCOSE BLD STRIP.AUTO-MCNC: 241 MG/DL (ref 65–100)
GLUCOSE SERPL-MCNC: 238 MG/DL (ref 65–100)
HCT VFR BLD AUTO: 24.6 % (ref 35–47)
HGB BLD-MCNC: 7.5 G/DL (ref 11.5–16)
IMM GRANULOCYTES # BLD AUTO: 0.1 K/UL (ref 0–0.04)
IMM GRANULOCYTES NFR BLD AUTO: 0 % (ref 0–0.5)
LYMPHOCYTES # BLD: 1.5 K/UL (ref 0.8–3.5)
LYMPHOCYTES NFR BLD: 13 % (ref 12–49)
MCH RBC QN AUTO: 26.5 PG (ref 26–34)
MCHC RBC AUTO-ENTMCNC: 30.5 G/DL (ref 30–36.5)
MCV RBC AUTO: 86.9 FL (ref 80–99)
METHADONE UR QL: NEGATIVE
MONOCYTES # BLD: 0.7 K/UL (ref 0–1)
MONOCYTES NFR BLD: 6 % (ref 5–13)
NEUTS SEG # BLD: 8.8 K/UL (ref 1.8–8)
NEUTS SEG NFR BLD: 78 % (ref 32–75)
NRBC # BLD: 0 K/UL (ref 0–0.01)
NRBC BLD-RTO: 0 PER 100 WBC
OPIATES UR QL: NEGATIVE
PCP UR QL: NEGATIVE
PLATELET # BLD AUTO: 358 K/UL (ref 150–400)
PMV BLD AUTO: 9.9 FL (ref 8.9–12.9)
POTASSIUM SERPL-SCNC: 4.2 MMOL/L (ref 3.5–5.1)
RBC # BLD AUTO: 2.83 M/UL (ref 3.8–5.2)
SERVICE CMNT-IMP: ABNORMAL
SODIUM SERPL-SCNC: 134 MMOL/L (ref 136–145)
WBC # BLD AUTO: 11.6 K/UL (ref 3.6–11)

## 2019-06-03 PROCEDURE — 74011250636 HC RX REV CODE- 250/636: Performed by: ANESTHESIOLOGY

## 2019-06-03 PROCEDURE — 77030021593 HC FCPS BIOP ENDOSC BSC -A: Performed by: INTERNAL MEDICINE

## 2019-06-03 PROCEDURE — 88305 TISSUE EXAM BY PATHOLOGIST: CPT

## 2019-06-03 PROCEDURE — 74011250636 HC RX REV CODE- 250/636: Performed by: HOSPITALIST

## 2019-06-03 PROCEDURE — 74011250636 HC RX REV CODE- 250/636: Performed by: FAMILY MEDICINE

## 2019-06-03 PROCEDURE — 74011250636 HC RX REV CODE- 250/636

## 2019-06-03 PROCEDURE — 76040000019: Performed by: INTERNAL MEDICINE

## 2019-06-03 PROCEDURE — 36600 WITHDRAWAL OF ARTERIAL BLOOD: CPT

## 2019-06-03 PROCEDURE — 82962 GLUCOSE BLOOD TEST: CPT

## 2019-06-03 PROCEDURE — C9113 INJ PANTOPRAZOLE SODIUM, VIA: HCPCS | Performed by: INTERNAL MEDICINE

## 2019-06-03 PROCEDURE — 74011250636 HC RX REV CODE- 250/636: Performed by: INTERNAL MEDICINE

## 2019-06-03 PROCEDURE — 93970 EXTREMITY STUDY: CPT

## 2019-06-03 PROCEDURE — 88312 SPECIAL STAINS GROUP 1: CPT

## 2019-06-03 PROCEDURE — 74011000250 HC RX REV CODE- 250: Performed by: HOSPITALIST

## 2019-06-03 PROCEDURE — 74011636637 HC RX REV CODE- 636/637: Performed by: HOSPITALIST

## 2019-06-03 PROCEDURE — 74011000258 HC RX REV CODE- 258: Performed by: INTERNAL MEDICINE

## 2019-06-03 PROCEDURE — 74011250636 HC RX REV CODE- 250/636: Performed by: NURSE PRACTITIONER

## 2019-06-03 PROCEDURE — 76060000031 HC ANESTHESIA FIRST 0.5 HR: Performed by: INTERNAL MEDICINE

## 2019-06-03 PROCEDURE — 74011000258 HC RX REV CODE- 258: Performed by: HOSPITALIST

## 2019-06-03 PROCEDURE — 80307 DRUG TEST PRSMV CHEM ANLYZR: CPT

## 2019-06-03 PROCEDURE — 74011250637 HC RX REV CODE- 250/637: Performed by: HOSPITALIST

## 2019-06-03 PROCEDURE — 88112 CYTOPATH CELL ENHANCE TECH: CPT

## 2019-06-03 PROCEDURE — 74011636637 HC RX REV CODE- 636/637: Performed by: NURSE PRACTITIONER

## 2019-06-03 PROCEDURE — C9113 INJ PANTOPRAZOLE SODIUM, VIA: HCPCS | Performed by: NURSE PRACTITIONER

## 2019-06-03 PROCEDURE — 85025 COMPLETE CBC W/AUTO DIFF WBC: CPT

## 2019-06-03 PROCEDURE — 94760 N-INVAS EAR/PLS OXIMETRY 1: CPT

## 2019-06-03 PROCEDURE — 86923 COMPATIBILITY TEST ELECTRIC: CPT

## 2019-06-03 PROCEDURE — 65270000029 HC RM PRIVATE

## 2019-06-03 PROCEDURE — 36415 COLL VENOUS BLD VENIPUNCTURE: CPT

## 2019-06-03 PROCEDURE — 0DB38ZX EXCISION OF LOWER ESOPHAGUS, VIA NATURAL OR ARTIFICIAL OPENING ENDOSCOPIC, DIAGNOSTIC: ICD-10-PCS | Performed by: INTERNAL MEDICINE

## 2019-06-03 PROCEDURE — 0DB78ZX EXCISION OF STOMACH, PYLORUS, VIA NATURAL OR ARTIFICIAL OPENING ENDOSCOPIC, DIAGNOSTIC: ICD-10-PCS | Performed by: INTERNAL MEDICINE

## 2019-06-03 PROCEDURE — 74011000258 HC RX REV CODE- 258

## 2019-06-03 PROCEDURE — 0DB98ZX EXCISION OF DUODENUM, VIA NATURAL OR ARTIFICIAL OPENING ENDOSCOPIC, DIAGNOSTIC: ICD-10-PCS | Performed by: INTERNAL MEDICINE

## 2019-06-03 PROCEDURE — 86900 BLOOD TYPING SEROLOGIC ABO: CPT

## 2019-06-03 PROCEDURE — 74011250637 HC RX REV CODE- 250/637: Performed by: FAMILY MEDICINE

## 2019-06-03 PROCEDURE — 80048 BASIC METABOLIC PNL TOTAL CA: CPT

## 2019-06-03 RX ORDER — SODIUM CHLORIDE 0.9 % (FLUSH) 0.9 %
5-40 SYRINGE (ML) INJECTION EVERY 8 HOURS
Status: DISCONTINUED | OUTPATIENT
Start: 2019-06-03 | End: 2019-06-07 | Stop reason: HOSPADM

## 2019-06-03 RX ORDER — LIDOCAINE HYDROCHLORIDE 20 MG/ML
INJECTION, SOLUTION EPIDURAL; INFILTRATION; INTRACAUDAL; PERINEURAL AS NEEDED
Status: DISCONTINUED | OUTPATIENT
Start: 2019-06-03 | End: 2019-06-03 | Stop reason: HOSPADM

## 2019-06-03 RX ORDER — INSULIN GLARGINE 100 [IU]/ML
20 INJECTION, SOLUTION SUBCUTANEOUS
Status: DISCONTINUED | OUTPATIENT
Start: 2019-06-03 | End: 2019-06-06

## 2019-06-03 RX ORDER — NALOXONE HYDROCHLORIDE 0.4 MG/ML
0.4 INJECTION, SOLUTION INTRAMUSCULAR; INTRAVENOUS; SUBCUTANEOUS
Status: DISCONTINUED | OUTPATIENT
Start: 2019-06-03 | End: 2019-06-03 | Stop reason: HOSPADM

## 2019-06-03 RX ORDER — DEXTROMETHORPHAN/PSEUDOEPHED 2.5-7.5/.8
1.2 DROPS ORAL
Status: DISCONTINUED | OUTPATIENT
Start: 2019-06-03 | End: 2019-06-03 | Stop reason: HOSPADM

## 2019-06-03 RX ORDER — PANTOPRAZOLE SODIUM 40 MG/10ML
40 INJECTION, POWDER, LYOPHILIZED, FOR SOLUTION INTRAVENOUS EVERY 12 HOURS
Status: DISCONTINUED | OUTPATIENT
Start: 2019-06-03 | End: 2019-06-07 | Stop reason: CLARIF

## 2019-06-03 RX ORDER — PROPOFOL 10 MG/ML
INJECTION, EMULSION INTRAVENOUS AS NEEDED
Status: DISCONTINUED | OUTPATIENT
Start: 2019-06-03 | End: 2019-06-03 | Stop reason: HOSPADM

## 2019-06-03 RX ORDER — EPINEPHRINE 0.1 MG/ML
1 INJECTION INTRACARDIAC; INTRAVENOUS
Status: DISCONTINUED | OUTPATIENT
Start: 2019-06-03 | End: 2019-06-03 | Stop reason: HOSPADM

## 2019-06-03 RX ORDER — PANTOPRAZOLE SODIUM 40 MG/10ML
40 INJECTION, POWDER, LYOPHILIZED, FOR SOLUTION INTRAVENOUS DAILY
Status: DISCONTINUED | OUTPATIENT
Start: 2019-06-03 | End: 2019-06-03

## 2019-06-03 RX ORDER — SODIUM CHLORIDE 0.9 % (FLUSH) 0.9 %
5-40 SYRINGE (ML) INJECTION AS NEEDED
Status: DISCONTINUED | OUTPATIENT
Start: 2019-06-03 | End: 2019-06-07 | Stop reason: HOSPADM

## 2019-06-03 RX ORDER — ATROPINE SULFATE 0.1 MG/ML
0.5 INJECTION INTRAVENOUS
Status: DISCONTINUED | OUTPATIENT
Start: 2019-06-03 | End: 2019-06-03 | Stop reason: HOSPADM

## 2019-06-03 RX ORDER — LABETALOL HCL 20 MG/4 ML
20 SYRINGE (ML) INTRAVENOUS ONCE
Status: COMPLETED | OUTPATIENT
Start: 2019-06-03 | End: 2019-06-03

## 2019-06-03 RX ORDER — SODIUM CHLORIDE 9 MG/ML
INJECTION, SOLUTION INTRAVENOUS
Status: DISCONTINUED | OUTPATIENT
Start: 2019-06-03 | End: 2019-06-03 | Stop reason: HOSPADM

## 2019-06-03 RX ORDER — INSULIN GLARGINE 100 [IU]/ML
10 INJECTION, SOLUTION SUBCUTANEOUS ONCE
Status: COMPLETED | OUTPATIENT
Start: 2019-06-03 | End: 2019-06-03

## 2019-06-03 RX ORDER — FLUMAZENIL 0.1 MG/ML
0.2 INJECTION INTRAVENOUS
Status: DISCONTINUED | OUTPATIENT
Start: 2019-06-03 | End: 2019-06-03 | Stop reason: HOSPADM

## 2019-06-03 RX ORDER — INSULIN LISPRO 100 [IU]/ML
INJECTION, SOLUTION INTRAVENOUS; SUBCUTANEOUS EVERY 6 HOURS
Status: DISCONTINUED | OUTPATIENT
Start: 2019-06-04 | End: 2019-06-06 | Stop reason: ALTCHOICE

## 2019-06-03 RX ORDER — SODIUM CHLORIDE 9 MG/ML
50 INJECTION, SOLUTION INTRAVENOUS CONTINUOUS
Status: DISPENSED | OUTPATIENT
Start: 2019-06-03 | End: 2019-06-03

## 2019-06-03 RX ADMIN — LABETALOL 20 MG/4 ML (5 MG/ML) INTRAVENOUS SYRINGE 10 MG: at 18:27

## 2019-06-03 RX ADMIN — DULOXETINE HYDROCHLORIDE 20 MG: 20 CAPSULE, DELAYED RELEASE ORAL at 19:06

## 2019-06-03 RX ADMIN — Medication 10 ML: at 06:38

## 2019-06-03 RX ADMIN — SODIUM CHLORIDE AND POTASSIUM CHLORIDE: 9; 1.49 INJECTION, SOLUTION INTRAVENOUS at 23:30

## 2019-06-03 RX ADMIN — PROCHLORPERAZINE EDISYLATE 5 MG: 5 INJECTION INTRAMUSCULAR; INTRAVENOUS at 14:52

## 2019-06-03 RX ADMIN — PANTOPRAZOLE SODIUM 40 MG: 40 INJECTION, POWDER, FOR SOLUTION INTRAVENOUS at 12:04

## 2019-06-03 RX ADMIN — HYDRALAZINE HYDROCHLORIDE 100 MG: 50 TABLET, FILM COATED ORAL at 09:07

## 2019-06-03 RX ADMIN — PROPOFOL 90 MG: 10 INJECTION, EMULSION INTRAVENOUS at 17:31

## 2019-06-03 RX ADMIN — Medication 10 ML: at 14:52

## 2019-06-03 RX ADMIN — LIDOCAINE HYDROCHLORIDE 20 MG: 20 INJECTION, SOLUTION EPIDURAL; INFILTRATION; INTRACAUDAL; PERINEURAL at 17:31

## 2019-06-03 RX ADMIN — OXYCODONE AND ACETAMINOPHEN 1 TABLET: 5; 325 TABLET ORAL at 22:14

## 2019-06-03 RX ADMIN — PANTOPRAZOLE SODIUM 40 MG: 40 INJECTION, POWDER, FOR SOLUTION INTRAVENOUS at 19:04

## 2019-06-03 RX ADMIN — INSULIN LISPRO 3 UNITS: 100 INJECTION, SOLUTION INTRAVENOUS; SUBCUTANEOUS at 06:47

## 2019-06-03 RX ADMIN — PROCHLORPERAZINE EDISYLATE 5 MG: 5 INJECTION INTRAMUSCULAR; INTRAVENOUS at 22:22

## 2019-06-03 RX ADMIN — MEROPENEM 500 MG: 500 INJECTION, POWDER, FOR SOLUTION INTRAVENOUS at 19:04

## 2019-06-03 RX ADMIN — FERROUS SULFATE TAB 325 MG (65 MG ELEMENTAL FE) 325 MG: 325 (65 FE) TAB at 06:37

## 2019-06-03 RX ADMIN — PROCHLORPERAZINE EDISYLATE 5 MG: 5 INJECTION INTRAMUSCULAR; INTRAVENOUS at 06:37

## 2019-06-03 RX ADMIN — INSULIN GLARGINE 10 UNITS: 100 INJECTION, SOLUTION SUBCUTANEOUS at 22:12

## 2019-06-03 RX ADMIN — MEROPENEM 500 MG: 500 INJECTION, POWDER, FOR SOLUTION INTRAVENOUS at 23:30

## 2019-06-03 RX ADMIN — Medication 10 ML: at 19:05

## 2019-06-03 RX ADMIN — LABETALOL HYDROCHLORIDE 300 MG: 200 TABLET, FILM COATED ORAL at 09:07

## 2019-06-03 RX ADMIN — DULOXETINE HYDROCHLORIDE 20 MG: 20 CAPSULE, DELAYED RELEASE ORAL at 09:07

## 2019-06-03 RX ADMIN — SUCRALFATE 1 G: 1 TABLET ORAL at 06:37

## 2019-06-03 RX ADMIN — INSULIN LISPRO 2 UNITS: 100 INJECTION, SOLUTION INTRAVENOUS; SUBCUTANEOUS at 12:03

## 2019-06-03 RX ADMIN — INSULIN LISPRO 2 UNITS: 100 INJECTION, SOLUTION INTRAVENOUS; SUBCUTANEOUS at 23:31

## 2019-06-03 RX ADMIN — SUCRALFATE 1 G: 1 TABLET ORAL at 22:14

## 2019-06-03 RX ADMIN — MEROPENEM 500 MG: 500 INJECTION, POWDER, FOR SOLUTION INTRAVENOUS at 03:03

## 2019-06-03 RX ADMIN — HYDRALAZINE HYDROCHLORIDE 10 MG: 20 INJECTION, SOLUTION INTRAMUSCULAR; INTRAVENOUS at 22:18

## 2019-06-03 RX ADMIN — OXYCODONE AND ACETAMINOPHEN 1 TABLET: 5; 325 TABLET ORAL at 14:52

## 2019-06-03 RX ADMIN — ONDANSETRON 4 MG: 2 INJECTION INTRAMUSCULAR; INTRAVENOUS at 10:15

## 2019-06-03 RX ADMIN — PROPOFOL 30 MG: 10 INJECTION, EMULSION INTRAVENOUS at 17:36

## 2019-06-03 RX ADMIN — HYDRALAZINE HYDROCHLORIDE 100 MG: 50 TABLET, FILM COATED ORAL at 19:05

## 2019-06-03 RX ADMIN — PROPOFOL 30 MG: 10 INJECTION, EMULSION INTRAVENOUS at 17:33

## 2019-06-03 RX ADMIN — OXYCODONE AND ACETAMINOPHEN 1 TABLET: 5; 325 TABLET ORAL at 03:03

## 2019-06-03 RX ADMIN — Medication 10 ML: at 22:16

## 2019-06-03 RX ADMIN — PROPOFOL 20 MG: 10 INJECTION, EMULSION INTRAVENOUS at 17:32

## 2019-06-03 RX ADMIN — SODIUM CHLORIDE: 9 INJECTION, SOLUTION INTRAVENOUS at 17:19

## 2019-06-03 RX ADMIN — MEROPENEM 500 MG: 500 INJECTION, POWDER, FOR SOLUTION INTRAVENOUS at 12:04

## 2019-06-03 NOTE — PROGRESS NOTES
Bedside shift change report given to MARINE CASEY [facility], RN (oncoming nurse) by Lino Pablo, DEE (offgoing nurse).  Report included the following information SBAR, Kardex, Intake/Output and MAR.       1445: Spoke with hospitalist, said it was ok for me to give Pt oral pain medicine although she is NPO

## 2019-06-03 NOTE — PERIOP NOTES
TRANSFER - OUT REPORT:    Verbal report given to Mehreen García Works  being transferred to [facility](unit) for routine progression of care       Report consisted of patients Situation, Background, Assessment and   Recommendations(SBAR). Information from the following report(s) SBAR was reviewed with the receiving nurse. Lines:   Peripheral IV 05/30/19 Right Wrist (Active)   Site Assessment Clean, dry, & intact 6/3/2019  1:36 AM   Phlebitis Assessment 0 6/3/2019  1:36 AM   Infiltration Assessment 0 6/3/2019  1:36 AM   Dressing Status Clean, dry, & intact 6/3/2019  1:36 AM   Dressing Type Transparent 6/3/2019  1:36 AM   Hub Color/Line Status Capped 6/3/2019  1:36 AM   Action Taken Open ports on tubing capped 6/3/2019  1:36 AM   Alcohol Cap Used Yes 6/3/2019  1:36 AM       Peripheral IV 37/16/92 Right Basilic (Active)   Site Assessment Clean, dry, & intact 6/3/2019  9:02 AM   Phlebitis Assessment 0 6/3/2019  9:02 AM   Infiltration Assessment 0 6/3/2019  9:02 AM   Dressing Status Clean, dry, & intact 6/3/2019  9:02 AM   Dressing Type Tape;Transparent 6/3/2019  9:02 AM   Hub Color/Line Status Green; Infusing 6/3/2019  9:02 AM   Action Taken Open ports on tubing capped 6/3/2019  9:02 AM   Alcohol Cap Used Yes 6/3/2019  9:02 AM        Opportunity for questions and clarification was provided.       Patient transported with:   [facility]

## 2019-06-03 NOTE — PROGRESS NOTES
Hospitalist Progress Note  Ronna Christina MD  Answering service: [phone] OR [phone] from in house phone      Date of Service:  6/3/2019  NAME:  Coleen Dial  :  1990  MRN:  [id number]      Admission Summary:    17-year-old female with past medical history of chronic pain syndrome, diabetes mellitus type 2, hypertension, and depression, presents to the hospital with Abdominal pain, nausea, and vomiting    Interval history / Subjective:     Patient Was seen in followup of  Abdominal pains  She Reports abdominal pain is better controlled  Denies pain now  Feels better. Assessment & Plan:     1. Abdominal pains with endometritis workign diagnosis on admission - on Abx. Gyn consulted. ID consult - follow up clinical status. Ct scan on admission Noted - no fluid collections. D/W gyn - no further plans. Breast milk stopping is physiological and nothing we can do now to make it pump again. Ok to use compazine for nausea. 2. Dm 1 with hyperglycemia - on insulin Pump. On long acting insulin, adjusted dosing today,. DTC consult. 3. ? Distal Esophageal Wall thickening on Ct scan - due to esophagitis? -  review of previous notes suggest history of gastroparesis with poorly controlled Dm, ordered carafate. Request Gi consult. 4. Chest pains / - cxr neg. D dimer elevated - check Vq scan negative. , duplex pending. Appears atypical in nature. 5. HTN - uncontrolled on admission. ,?non compliance - better controlled on Hydralazine, labetalol    6. Hyercalcemia - Likely due to dehydration. Appreciate renal eval. Improved. 7. MARIS on CKD 2 - monitor and avoid Nephrotoxins, improved with hydration    8. Prior H.o THC use - check drug screen urine    9. Hypokalemia - replete. And recheck     10. Depression - post partum, psychiatry eval - not Suicidal.    11. Pain Control - try to wean off narcotics, D. W pt and Father who is requesting to wean off all narcotics. They are in agreement. Code status: Full  DVT prophylaxis: Heparin    Telemetry reviewed:   normal sinus rhythm      Risk of deterioration: medium      Total time spent with patient: [address] discussed with: Patient/Family and Nurse  Disposition: Home w/Family and in 2 days once symptoms of gastroparesis controlled. Hospital Problems  Date Reviewed: 5/25/2019          Codes Class Noted POA    Endometritis ICD-10-CM: N71.9  ICD-9-CM: 615.9  5/31/2019 Unknown        * (Principal) Vomiting ICD-10-CM: R11.10  ICD-9-CM: 787.03  5/30/2019 Unknown                Review of Systems:   Review of Systems   Constitutional: Negative. HENT: Negative. Eyes: Negative. Respiratory: Negative. Cardiovascular: Negative. Gastrointestinal: Positive for abdominal pain and nausea. Negative for blood in stool and melena. Genitourinary: Negative. Musculoskeletal: Negative. Skin: Negative. Neurological: Negative. Endo/Heme/Allergies: Negative. Psychiatric/Behavioral: Negative for suicidal ideas. The patient is nervous/anxious. Vital Signs:    Last 24hrs VS reviewed since prior progress note. Most recent are:  Visit Vitals  BP (!) 144/92 (BP 1 Location: Left arm, BP Patient Position: At rest)   Pulse (!) 103   Temp 98.5 °F (36.9 °C)   Resp 16   Wt 59.2 kg (130 lb 8 oz)   SpO2 97%   BMI 19.84 kg/m²       No intake or output data in the 24 hours ending 06/03/19 0809     Physical Examination:             Constitutional:  No acute distress, cooperative, pleasant    ENT:  Oral mucous moist, oropharynx benign. Neck supple,    Resp:  CTA bilaterally. No wheezing/rhonchi/rales. No accessory muscle use   CV:  Regular rhythm, normal rate, no murmurs, gallops, rubs    GI:  Soft, non distended, non tender. normoactive bowel sounds, no hepatosplenomegaly     Musculoskeletal:  No edema, warm, 2+ pulses throughout    Neurologic:  Moves all extremities.   AAOx3, CN II-XII reviewed     Skin:  multiple tattoes noted. Data Review:    Review and/or order of clinical lab test  Review and/or order of tests in the radiology section of [facility]      Labs:     Recent Labs     06/03/19  0507 06/02/19  0159   WBC 11.6* 17.0*   HGB 7.5* 7.5*   HCT 24.6* 24.4*    417*     Recent Labs     06/03/19  0507 06/02/19  0159 06/01/19  0500   * 140 139   K 4.2 4.0 4.3    111* 109*   CO2 21 19* 17*   BUN 9 20 31*   CREA 1.00 1.27* 1.83*   * 156* 174*   CA 7.9* 7.7* 7.8*   MG  --   --  1.3*   PHOS  --   --  2.8     Recent Labs     06/01/19  0500   SGOT 9*   ALT 11*   AP 65   TBILI 0.4   TP 5.2*   ALB 1.9*   GLOB 3.3     No results for input(s): INR, PTP, APTT in the last 72 hours. No lab exists for component: INREXT, INREXT   No results for input(s): FE, TIBC, PSAT, FERR in the last 72 hours. Lab Results   Component Value Date/Time    Folate 26.9 (H) 09/03/2012 05:45 AM      No results for input(s): PH, PCO2, PO2 in the last 72 hours. No results for input(s): CPK, CKNDX, TROIQ in the last 72 hours.     No lab exists for component: CPKMB  Lab Results   Component Value Date/Time    Cholesterol, total 160 05/15/2016 12:09 AM    HDL Cholesterol 41 05/15/2016 12:09 AM    LDL, calculated 98 05/15/2016 12:09 AM    Triglyceride 105 05/15/2016 12:09 AM    CHOL/HDL Ratio 3.9 05/15/2016 12:09 AM     Lab Results   Component Value Date/Time    Glucose (POC) 241 (H) 06/03/2019 06:43 AM    Glucose (POC) 196 (H) 06/02/2019 09:55 PM    Glucose (POC) 162 (H) 06/02/2019 04:27 PM    Glucose (POC) 182 (H) 06/02/2019 12:09 PM    Glucose (POC) 159 (H) 06/02/2019 06:34 AM     Lab Results   Component Value Date/Time    Color YELLOW/STRAW 05/30/2019 02:49 PM    Appearance CLEAR 05/30/2019 02:49 PM    Specific gravity 1.014 05/30/2019 02:49 PM    Specific gravity 1.010 04/07/2019 07:07 PM    pH (UA) 6.5 05/30/2019 02:49 PM    Protein 300 (A) 05/30/2019 02:49 PM    Glucose 500 (A) 05/30/2019 02:49 PM Ketone 15 (A) 05/30/2019 02:49 PM    Bilirubin NEGATIVE  05/30/2019 02:49 PM    Urobilinogen 0.2 05/30/2019 02:49 PM    Nitrites NEGATIVE  05/30/2019 02:49 PM    Leukocyte Esterase NEGATIVE  05/30/2019 02:49 PM    Epithelial cells FEW 05/30/2019 02:49 PM    Bacteria NEGATIVE  05/30/2019 02:49 PM    WBC 0-4 05/30/2019 02:49 PM    RBC 20-50 05/30/2019 02:49 PM         Medications Reviewed:     Current Facility-Administered Medications   Medication Dose Route Frequency    prochlorperazine (COMPAZINE) with saline injection 5 mg  5 mg IntraVENous Q6H PRN    insulin lispro (HUMALOG) injection   SubCUTAneous AC&HS    insulin glargine (LANTUS) injection 15 Units  15 Units SubCUTAneous QHS    meropenem (MERREM) 500 mg in 0.9% sodium chloride (MBP/ADV) 50 mL  0.5 g IntraVENous Q8H    acetaminophen (TYLENOL) tablet 650 mg  650 mg Oral Q6H PRN    0.9% sodium chloride with KCl 20 mEq/L infusion   IntraVENous CONTINUOUS    sucralfate (CARAFATE) tablet 1 g  1 g Oral AC&HS    DULoxetine (CYMBALTA) capsule 20 mg  20 mg Oral BID    ferrous sulfate tablet 325 mg  325 mg Oral ACB    hydrALAZINE (APRESOLINE) tablet 100 mg  100 mg Oral TID    labetalol (NORMODYNE) tablet 300 mg  300 mg Oral Q12H    oxyCODONE-acetaminophen (PERCOCET) 5-325 mg per tablet 1 Tab  1 Tab Oral Q8H PRN    sodium chloride (NS) flush 5-40 mL  5-40 mL IntraVENous Q8H    sodium chloride (NS) flush 5-40 mL  5-40 mL IntraVENous PRN    heparin (porcine) injection 5,000 Units  5,000 Units SubCUTAneous Q8H    ondansetron (ZOFRAN) injection 4 mg  4 mg IntraVENous Q4H PRN    glucose chewable tablet 16 g  4 Tab Oral PRN    dextrose (D50W) injection syrg 12.5-25 g  25-50 mL IntraVENous PRN    glucagon (GLUCAGEN) injection 1 mg  1 mg IntraMUSCular PRN    hydrALAZINE (APRESOLINE) 20 mg/mL injection 10 mg  10 mg IntraVENous Q6H PRN     ______________________________________________________________________  EXPECTED LENGTH OF STAY: - - -  ACTUAL LENGTH OF STAY:          3                 Alycia Borrego MD   Patient doesn't want family notified.

## 2019-06-03 NOTE — PROGRESS NOTES
RENAL  PROGRESS NOTE        Subjective:   Unable to find in room  Objective:   VITALS SIGNS:    Visit Vitals  BP (!) 146/98 (BP 1 Location: Left arm, BP Patient Position: At rest)   Pulse (!) 102   Temp 98.2 °F (36.8 °C)   Resp 16   Wt 59.2 kg (130 lb 8 oz)   SpO2 98%   BMI 19.84 kg/m²       O2 Device: Room air       Temp (24hrs), Av.6 °F (37 °C), Min:98.2 °F (36.8 °C), Max:99 °F (37.2 °C)         PHYSICAL EXAM:       DATA REVIEW:     INTAKE / OUTPUT:   Last shift:      No intake/output data recorded. Last 3 shifts: No intake/output data recorded. No intake or output data in the 24 hours ending 19 0936      LABS:   Recent Labs     19  0507 19  0159 19  0500   WBC 11.6* 17.0* 14.4*   HGB 7.5* 7.5* 7.8*   HCT 24.6* 24.4* 27.5*    417* 479*     Recent Labs     19  0507 19  0159 19  0500   * 140 139   K 4.2 4.0 4.3    111* 109*   CO2 21 19* 17*   * 156* 174*   BUN 9 20 31*   CREA 1.00 1.27* 1.83*   CA 7.9* 7.7* 7.8*   MG  --   --  1.3*   PHOS  --   --  2.8   ALB  --   --  1.9*   TBILI  --   --  0.4   SGOT  --   --  9*   ALT  --   --  11*           Assessment:       MARIS on CKD stage 2/3:creat better  Nephrotic syndrome: DM nephropathy complicated by recent pre-eclampsia (delivered on 5/10/19)-> last spot urine prot/cr ratio was 4.3gm (19)   Hypercalcemia: better  N/V/D  Metabolic acidosis:high AG  HTN: fluctuating.   DM1:  Poorly controlled the pas    Anemia     Plan:   Creatinine much better  Noticed V/Q scan  Lola Manzano MD

## 2019-06-03 NOTE — PERIOP NOTES

## 2019-06-03 NOTE — CONSULTS
[facility].  [address] [address] [address], [address]  [phone]                     GI CONSULTATION NOTE  Toby Juarez, AGACNP-BC    NAME:  Mert Jefferson   :   1990   MRN:   [id number]       Referring Provider: Dr. Moses Osborne Date: 6/3/2019     Chief Complaint: n/v, abdominal pain    History of Present Illness:  30year old female with IDDM, gastroparesis, anemia, htn, preeclampia s/p  5/10/19 admitted for recurrent nausea, vomiting and abdominal pain. She has a history of chronic abdominal pain and intractable nausea and vomiting leading to extensive work up including EGD, and a negative exploratory laparotomy. She reports has had no relief from vomiting since having her daughter. Denies blood in vomit. Once she starts vomiting she cant stop. Epigastric pain is constant. She has been taking PPI daily and oral Iron. Denies alcohol, cigarettes or other illicit drugs. She is reporting black stool today. Previous endoscopies:  EGD 2012-200cc bilious gastric residual, minimal gastritis, moderate distal candida esophagitis-biopsy      EGD 10/2014-Diffuse ulceration in the distal esophagus extending proximally for 6 cm c/w LA Grade D erosive esophagitis;  Mucosa was friable. Bilious liquid present in stomach. Distal body and antrum with linear erosions/shallow ulcerations present without bleeding. Gastric, biopsy:Minimal chronic superficial gastritis with mucosal edema and patchy hemorrhage. Esophagus, biopsy: Ulcerative esophagitis. Negative for viral cytopathic effect and malignancy. GMS stain shows rare fungal hyphae of uncertain significance     EGD 2016-proximal esopahgus there was patchy white exudate from the crico to about 30 cm.  From 37 cm distally there was bluish necrotic debris and more severe yellow white exudate.  The proximal exudate looked like severe candida.    Esophagus, biopsy: Exudate, consistent with esophagitis (specimen consists entirely of exudate without mucosa). GMS stain shows fungal yeast and pseudohyphae suggestive of Candida esophagitis       PMH:  Past Medical History:   Diagnosis Date    Anemia     Chronic pain     Depression     [facility] type [facility] uncontrolled [facility] [facility]     DKA, type 1 ([facility] [address]     Essential hypertension     Heart murmur     Herpes simplex virus (HSV) infection 2017    positive in blood    Peripheral neuropathy     Ventricular tachycardia (HCC)        PSH:  Past Surgical History:   Procedure Laterality Date    ABDOMEN SURGERY PROC UNLISTED      exploratory laparoscopy    HX CYST REMOVAL      groin       Allergies: Allergies   Allergen Reactions    Morphine Other (comments)    Pcn [Penicillins] Hives     Tolerates ceftriaxone, cephalexin       Home Medications:  Prior to Admission Medications   Prescriptions Last Dose Informant Patient Reported? Taking? DULoxetine (CYMBALTA) 20 mg capsule   No Yes   Sig: Take 1 Cap by mouth two (2) times a day for 30 days. clindamycin (CLEOCIN) 300 mg capsule   No Yes   Sig: Take 2 Caps by mouth every six (6) hours for 9 days. famotidine (PEPCID) 20 mg tablet   No Yes   Sig: Take 1 Tab by mouth two (2) times daily as needed (acid reflux). Indications: gastroesophageal reflux disease   ferrous sulfate 325 mg (65 mg iron) tablet   No Yes   Sig: Take 1 Tab by mouth Daily (before breakfast). glucagon (GLUCAGON EMERGENCY KIT, HUMAN,) 1 mg injection   Yes Yes   Sig: Glucagon emergency kit, IM injection  Indications: type 1 diabetes, pregnancy   hydrALAZINE (APRESOLINE) 100 mg tablet   No Yes   Sig: Take 1 Tab by mouth three (3) times daily. hydroCHLOROthiazide (HYDRODIURIL) 25 mg tablet   No Yes   Sig: Take 1 Tab by mouth daily. insulin lispro (HUMALOG U-100 INSULIN) 100 unit/mL injection   Yes Yes   Sig: by SubCUTAneous route. With insulin pump   labetalol (NORMODYNE) 300 mg tablet   No Yes   Sig: Take 1 Tab by mouth every twelve (12) hours for 30 days. metroNIDAZOLE (FLAGYL) 500 mg tablet   No Yes   Sig: Take 1 Tab by mouth two (2) times a day for 9 days. oxyCODONE-acetaminophen (PERCOCET) 5-325 mg per tablet   No Yes   Sig: Take 1 Tab by mouth every eight (8) hours as needed for Pain for up to 3 days. Max Daily Amount: 3 Tabs. pnv w/o calcium-iron fum-fa (COMPLETENATE) 29 mg iron- 1 mg chew Unknown at Unknown time  Yes No   Sig: Take 1 Tab by mouth.   promethazine (PHENERGAN) 12.5 mg tablet   No Yes   Sig: Take 1 Tab by mouth every four (4) hours as needed for Nausea.       Facility-Administered Medications: None       Hospital Medications:  Current Facility-Administered Medications   Medication Dose Route Frequency    prochlorperazine (COMPAZINE) with saline injection 5 mg  5 mg IntraVENous Q6H PRN    insulin lispro (HUMALOG) injection   SubCUTAneous AC&HS    insulin glargine (LANTUS) injection 15 Units  15 Units SubCUTAneous QHS    meropenem (MERREM) 500 mg in 0.9% sodium chloride (MBP/ADV) 50 mL  0.5 g IntraVENous Q8H    acetaminophen (TYLENOL) tablet 650 mg  650 mg Oral Q6H PRN    0.9% sodium chloride with KCl 20 mEq/L infusion   IntraVENous CONTINUOUS    sucralfate (CARAFATE) tablet 1 g  1 g Oral AC&HS    DULoxetine (CYMBALTA) capsule 20 mg  20 mg Oral BID    ferrous sulfate tablet 325 mg  325 mg Oral ACB    hydrALAZINE (APRESOLINE) tablet 100 mg  100 mg Oral TID    labetalol (NORMODYNE) tablet 300 mg  300 mg Oral Q12H    oxyCODONE-acetaminophen (PERCOCET) 5-325 mg per tablet 1 Tab  1 Tab Oral Q8H PRN    sodium chloride (NS) flush 5-40 mL  5-40 mL IntraVENous Q8H    sodium chloride (NS) flush 5-40 mL  5-40 mL IntraVENous PRN    heparin (porcine) injection 5,000 Units  5,000 Units SubCUTAneous Q8H    ondansetron (ZOFRAN) injection 4 mg  4 mg IntraVENous Q4H PRN    glucose chewable tablet 16 g  4 Tab Oral PRN    dextrose (D50W) injection syrg 12.5-25 g  25-50 mL IntraVENous PRN    glucagon (GLUCAGEN) injection 1 mg  1 mg IntraMUSCular PRN    hydrALAZINE (APRESOLINE) 20 mg/mL injection 10 mg  10 mg IntraVENous Q6H PRN       Social History:  Social History     Tobacco Use    Smoking status: Former Smoker     Packs/day: 0.25     Years: 8.00     Pack years: 2.00     Types: Cigarettes     Last attempt to quit: 10/26/2014     Years since quittin.6    Smokeless tobacco: Never Used   Substance Use Topics    Alcohol use: No     Alcohol/week: 0.0 oz       Family History:  Family History   Problem Relation Age of Onset    No Known Problems Mother     Sleep Apnea Father     Hypertension Father     Diabetes Father     Heart Disease Maternal Grandfather        Review of Systems:    Constitutional: negative fever, negative chills, negative weight loss  Eyes:   negative visual changes  ENT:   negative sore throat, tongue or lip swelling  Respiratory:  negative cough, negative dyspnea  Cards:  negative for chest pain, palpitations, lower extremity edema  GI:   See HPI  :  negative for frequency, dysuria  Integument:  negative for rash and pruritus  Heme:  negative for easy bruising and gum/nose bleeding  Musculoskel: negative for myalgias, back pain and muscle weakness  Neuro:  negative for headaches, dizziness, vertigo  Psych: negative for feelings of anxiety, depression      Objective:     Patient Vitals for the past 8 hrs:   BP Temp Pulse Resp SpO2 Weight   19 0912     98 %    19 0833 (!) 146/98 98.2 °F (36.8 °C) (!) 102 16 98 %    19 0537      59.2 kg (130 lb 8 oz)     No intake/output data recorded. No intake/output data recorded. PHYSICAL EXAM:  General: Well developed, Well nourished. Alert, cooperative, no acute distress.  Multiple skin tattoos. HEENT: Normocephalic, Atraumatic. PERRLA, EOMI. Anicteric sclerae. Lungs:  CTA Bilaterally. No Wheezing/Rhonchi/Rales. Heart:  Regular rate and rhythm, No murmur, No Rubs, No Gallops  Abdomen: Soft, non-distended, tender on light palpation to epigastrium.  +Bowel sounds, no hepatomegaly  Extremities: No cyanosis,clubing or edema  Neurologic:  Alert and oriented X 3. No acute neurological distress. Psych:   Good insight. Not anxious nor agitated. Data Review     Recent Labs     06/03/19  0507 06/02/19  0159   WBC 11.6* 17.0*   HGB 7.5* 7.5*   HCT 24.6* 24.4*    417*     Recent Labs     06/03/19  0507 06/02/19  0159 06/01/19  0500   * 140 139   K 4.2 4.0 4.3    111* 109*   CO2 21 19* 17*   BUN 9 20 31*   CREA 1.00 1.27* 1.83*   * 156* 174*   PHOS  --   --  2.8   CA 7.9* 7.7* 7.8*     Recent Labs     06/01/19  0500   SGOT 9*   AP 65   TP 5.2*   ALB 1.9*   GLOB 3.3     No results for input(s): INR, PTP, APTT in the last 72 hours. No lab exists for component: INREXT     Imaging studies reviewed        Assessment:   1. Intractable n/v with epigastric pain. CT shows distal esophageal wall thickening. Differentials include esophagitis, gastroparesis, gastritis, PUD. 2. Anemia is chronic with baseline ~10. She is taking PO iron, hgb today is 7.5. Rule out GI source.     Patient Active Problem List   Diagnosis Code    Abnormal LFTs R94.5    Acute renal failure (HCC) N17.9    SIRS (systemic inflammatory response syndrome) (HCC) R65.10    Ventricular tachycardia (HCC) I47.2    SVT (supraventricular tachycardia) (HCC) I47.1    UTI (urinary tract infection) N39.0    DM type 1 (diabetes mellitus, type 1) (HCC) E10.9    Chronic abdominal pain R10.9, G89.29    Nausea & vomiting R11.2    Viral syndrome B34.9    Depression F32.9    Diabetic peripheral neuropathy associated with type 1 diabetes mellitus (HCC) E10.42    DKA, type 1 (HCC) E10.10    GIB (gastrointestinal bleeding) K92.2    Nausea R11.0    Neuropathy G62.9    Hypertension I10    Hypokalemia E87.6    Hypophosphatemia E83.39    DKA, type 1, not at goal ([address] E10.10    Leukocytosis D72.829    Epigastric abdominal pain R10.13    LLQ abdominal pain R10.32    Diarrhea R19.7    Weight loss R63.4    Esophagitis, erosive K22.10    Nausea and vomiting R11.2    Sepsis (HCC) A41.9    DKA (diabetic ketoacidoses) ([facility]) E13.10    Pre-eclampsia superimposed on chronic hypertension, antepartum O11.9, O10.019    Chronic hypertension with superimposed preeclampsia O11.9    Chronic renal disease N18.9    Pregnant Z34.90    Pregnant and not yet delivered in third trimester Z34.93    Anemia D64.9    Hypertensive urgency I16.0    CKD (chronic kidney disease) stage 2, GFR 60-89 ml/min N18.2    Proteinuria R80.9    Intractable nausea and vomiting R11.2    MARIS (acute kidney injury) ([facility] [address] N17.9    Dehydration E86.0    Diabetes mellitus (HCC) E11.9    Vomiting R11.10    Endometritis N71.9                  Plan:   -NPO for EGD later today. Depending results consider GES. -Supportive care per primary team.  -Discussed with Dr. Laura Bryant  -Will follow along with you  -Thank you kindly for allowing us to participate in the care of this patient      I have personally reviewed the history and independently examined the patient. I have reviewed the chart and agree with the documentation recorded by the Mid Level Provider, including the assessment, treatment plan, and disposition.       Jona Woodson MD

## 2019-06-03 NOTE — ANESTHESIA POSTPROCEDURE EVALUATION
Post-Anesthesia Evaluation and Assessment    Patient: Karyna Edwards MRN: [id number]  SSN: [id number]    YOB: 1990  Age: 30 y.o. Sex: female      I have evaluated the patient and they are stable and ready for discharge from the PACU. Cardiovascular Function/Vital Signs  Visit Vitals  BP (!) 173/108   Pulse 98   Temp 36.8 °C (98.3 °F)   Resp 19   Wt 59.2 kg (130 lb 8 oz)   SpO2 100%   BMI 19.84 kg/m²       Patient is status post MAC anesthesia for Procedure(s):  ESOPHAGOGASTRODUODENOSCOPY (EGD)  ESOPHAGOGASTRODUODENAL (EGD) BIOPSY  ENDOSCOPIC BRUSHING. Nausea/Vomiting: None    Postoperative hydration reviewed and adequate. Pain:  Pain Scale 1: Numeric (0 - 10) (06/03/19 1802)  Pain Intensity 1: 0 (06/03/19 1802)   Managed    Neurological Status:   Neuro  Neurologic State: Alert (06/03/19 4491)  Orientation Level: Oriented X4 (06/03/19 0902)  Cognition: Appropriate decision making; Appropriate for age attention/concentration; Appropriate safety awareness; Follows commands (06/03/19 0902)  Speech: Clear (06/03/19 0902)  LUE Motor Response: Purposeful (06/03/19 0902)  LLE Motor Response: Purposeful (06/03/19 0902)  RUE Motor Response: Purposeful (06/03/19 0902)  RLE Motor Response: Purposeful (06/03/19 0902)   At baseline    Mental Status, Level of Consciousness: Alert and  oriented to person, place, and time    Pulmonary Status:   O2 Device: Room air (06/03/19 1802)   Adequate oxygenation and airway patent    Complications related to anesthesia: None    Post-anesthesia assessment completed. No concerns    Signed By: David Toribio MD     Ayleen 3, 2019              Procedure(s):  ESOPHAGOGASTRODUODENOSCOPY (EGD)  ESOPHAGOGASTRODUODENAL (EGD) BIOPSY  ENDOSCOPIC BRUSHING. MAC    <BSHSIANPOST>    Vitals Value Taken Time   /107 6/3/2019  6:21 PM   Temp     Pulse 99 6/3/2019  6:23 PM   Resp 17 6/3/2019  6:23 PM   SpO2 100 % 6/3/2019  6:10 PM   Vitals shown include unvalidated device data.

## 2019-06-03 NOTE — CONSULTS
Ob/Gyn Progress note    31 yo  now 3 weeks s/p 1LTCS (on 5/10/19) at 33wks, in setting of CHTN with superimposed preeclampsia and poorly controlled type 1 IDDM (on insulin pump). She was discharged earlier this week with suspected endometritis. Pt now with third readmission to internal medicine service postpartum. Current admission for intractable nausea/vomiting and generalized abdominal pain. Also with hypertensive urgency, and acute on chronic kidney injury. Pt denies any pelvic pain at this time, she does note some epigastric discomfort and reports she had chest discomfort yesterday. Still with some nausea. Regular BMs. Felt lightheaded upon standing. No fevers/chills or other signs/symptoms of worsening infection. Lochia appropriate, changes pad 2-3 times per day. Denies heavy bleeding or passage of clots. Pt denies SOB, HA, denies vision changes, RUQ pain. No longer pumping, reports milk drying up as she has not been pumping in the hospital, denies engorgement, redness or pain of breasts.      Past Medical History:   Diagnosis Date    Anemia     Chronic pain     Depression     Diabetes type 1, uncontrolled ([address]     DKA, type 1 ([address]     Essential hypertension     Heart murmur     Herpes simplex virus (HSV) infection 2017    positive in blood    Peripheral neuropathy     Ventricular tachycardia (HCC)         Past Surgical History:   Procedure Laterality Date    ABDOMEN SURGERY PROC UNLISTED      exploratory laparoscopy    HX CYST REMOVAL      groin       Family History   Problem Relation Age of Onset    No Known Problems Mother     Sleep Apnea Father     Hypertension Father     Diabetes Father     Heart Disease Maternal Grandfather        Social History     Socioeconomic History    Marital status: SINGLE     Spouse name: Not on file    Number of children: Not on file    Years of education: Not on file    Highest education level: Not on file   Occupational History    Not on file   Social Needs    Financial resource strain: Not on file    Food insecurity:     Worry: Not on file     Inability: Not on file    Transportation needs:     Medical: Not on file     Non-medical: Not on file   Tobacco Use    Smoking status: Former Smoker     Packs/day: 0.25     Years: 8.00     Pack years: 2.00     Types: Cigarettes     Last attempt to quit: 10/26/2014     Years since quittin.6    Smokeless tobacco: Never Used   Substance and Sexual Activity    Alcohol use: No     Alcohol/week: 0.0 oz    Drug use: Yes     Frequency: 2.0 times per week     Types: Marijuana    Sexual activity: Yes     Partners: Male     Birth control/protection: None   Lifestyle    Physical activity:     Days per week: Not on file     Minutes per session: Not on file    Stress: Not on file   Relationships    Social connections:     Talks on phone: Not on file     Gets together: Not on file     Attends [employer] service: Not on file     Active member of club or organization: Not on file     Attends meetings of clubs or organizations: Not on file     Relationship status: Not on file    Intimate partner violence:     Fear of current or ex partner: Not on file     Emotionally abused: Not on file     Physically abused: Not on file     Forced sexual activity: Not on file   Other Topics Concern     Service Not Asked    Blood Transfusions Not Asked    Caffeine Concern Not Asked    Occupational Exposure Not Asked   Ellender Share Hazards Not Asked    Sleep Concern Not Asked    Stress Concern Not Asked    Weight Concern Not Asked    Special Diet Not Asked    Back Care Not Asked    Exercise Not Asked    Bike Helmet Not Asked    Seat Belt Not Asked    Self-Exams Not Asked   Social History Narrative    Not on file     Current Facility-Administered Medications   Medication Dose Route Frequency Provider Last Rate Last Dose    prochlorperazine (COMPAZINE) with saline injection 5 mg  5 mg IntraVENous Q6H PRN Pam Abdalla MD   5 mg at 06/03/19 4663    insulin lispro (HUMALOG) injection   SubCUTAneous AC&HS Pam Abdalla MD   3 Units at 06/03/19 0647    insulin glargine (LANTUS) injection 15 Units  15 Units SubCUTAneous QHS Pam Abdalla MD   15 Units at 06/02/19 2223    meropenem (MERREM) 500 mg in 0.9% sodium chloride (MBP/ADV) 50 mL  0.5 g IntraVENous Q8H Adam Pratt  mL/hr at 06/03/19 0303 500 mg at 06/03/19 0303    acetaminophen (TYLENOL) tablet 650 mg  650 mg Oral Q6H PRN Russell Prasad NP   650 mg at 05/31/19 0025    0.9% sodium chloride with KCl 20 mEq/L infusion   IntraVENous CONTINUOUS Berl Model, Gerson TAM  mL/hr at 06/02/19 2359      sucralfate (CARAFATE) tablet 1 g  1 g Oral AC&HS Pam Abdalla MD   1 g at 06/03/19 5806    DULoxetine (CYMBALTA) capsule 20 mg  20 mg Oral BID Shana Galo MD   20 mg at 06/03/19 0394    ferrous sulfate tablet 325 mg  325 mg Oral ACB Shana Galo MD   325 mg at 06/03/19 6081    hydrALAZINE (APRESOLINE) tablet 100 mg  100 mg Oral TID Shana Galo MD   100 mg at 06/03/19 0907    labetalol (NORMODYNE) tablet 300 mg  300 mg Oral Q12H Shana Galo MD   300 mg at 06/03/19 0907    oxyCODONE-acetaminophen (PERCOCET) 5-325 mg per tablet 1 Tab  1 Tab Oral Q8H PRN Shana Galo MD   1 Tab at 06/03/19 0303    sodium chloride (NS) flush 5-40 mL  5-40 mL IntraVENous Q8H Shana Galo MD   10 mL at 06/03/19 9401    sodium chloride (NS) flush 5-40 mL  5-40 mL IntraVENous PRN Shana Galo MD        heparin (porcine) injection 5,000 Units  5,000 Units SubCUTAneous Valeriy Turpin MD   Stopped at 06/03/19 0100    ondansetron (ZOFRAN) injection 4 mg  4 mg IntraVENous Q4H PRN Shana Galo MD   4 mg at 06/02/19 1024    glucose chewable tablet 16 g  4 Tab Oral PRN Shana Galo MD        dextrose (D50W) injection syrg 12.5-25 g  25-50 mL IntraVENous PRN Shana Galo MD        glucagon (GLUCAGEN) injection 1 mg  1 mg IntraMUSCular PRN Azra Medina MD        hydrALAZINE (APRESOLINE) 20 mg/mL injection 10 mg  10 mg IntraVENous Q6H PRN Azra Medina MD   10 mg at 05/31/19 0701       Allergies   Allergen Reactions    Morphine Other (comments)    Pcn [Penicillins] Hives     Tolerates ceftriaxone, cephalexin       Patient Vitals for the past 12 hrs:   Temp Pulse Resp BP SpO2   06/03/19 0912     98 %   06/03/19 0833 98.2 °F (36.8 °C) (!) 102 16 (!) 146/98 98 %   06/03/19 0227 98.5 °F (36.9 °C) (!) 103 16 (!) 144/92 97 %   06/03/19 0136     94 %     PHYSICAL EXAMINATION  Constitutional  Appearance: well-nourished, well developed, alert, in no acute distress.      HENT  · Head and Face: appears normal    Gastrointestinal  · Abdominal Examination: abdomen soft, non-distended, appropriately tender, uterus appropriately involuted, no rebound or guarding, normal bowel sounds, no masses present  · Incision: pfannensteil incision clean, dry, intact, healing, no erythema, drainage, induration  · Liver and spleen: no hepatomegaly present, spleen not palpable  · Hernias: no hernias identified    Genitourinary: deferred today    Skin  · General Inspection: no rash, no lesions identified    Neurologic/Psychiatric  · Mental Status:  · Orientation: grossly oriented to person, place and time  Mood and Affect: mood normal, flat affect (baseline)    Recent Results (from the past 24 hour(s))   GLUCOSE, POC    Collection Time: 06/02/19 12:09 PM   Result Value Ref Range    Glucose (POC) 182 (H) 65 - 100 mg/dL    Performed by Wilda STAHL DIMER    Collection Time: 06/02/19 12:58 PM   Result Value Ref Range    D-dimer 1.83 (H) 0.00 - 0.65 mg/L FEU   GLUCOSE, POC    Collection Time: 06/02/19  4:27 PM   Result Value Ref Range    Glucose (POC) 162 (H) 65 - 100 mg/dL    Performed by [address], POC    Collection Time: 06/02/19  9:55 PM   Result Value Ref Range    Glucose (POC) 196 (H) 65 - 100 mg/dL    Performed by Tanner Dorsey CBC WITH AUTOMATED DIFF    Collection Time: 06/03/19  5:07 AM   Result Value Ref Range    WBC 11.6 (H) 3.6 - 11.0 K/uL    RBC 2.83 (L) 3.80 - 5.20 M/uL    HGB 7.5 (L) 11.5 - 16.0 g/dL    HCT 24.6 (L) 35.0 - 47.0 %    MCV 86.9 80.0 - 99.0 FL    MCH 26.5 26.0 - 34.0 PG    MCHC 30.5 30.0 - 36.5 g/dL    RDW 16.8 (H) 11.5 - 14.5 %    PLATELET 225 052 - 171 K/uL    MPV 9.9 8.9 - 12.9 FL    NRBC 0.0 0  WBC    ABSOLUTE NRBC 0.00 0.00 - 0.01 K/uL    NEUTROPHILS 78 (H) 32 - 75 %    LYMPHOCYTES 13 12 - 49 %    MONOCYTES 6 5 - 13 %    EOSINOPHILS 3 0 - 7 %    BASOPHILS 0 0 - 1 %    IMMATURE GRANULOCYTES 0 0.0 - 0.5 %    ABS. NEUTROPHILS 8.8 (H) 1.8 - 8.0 K/UL    ABS. LYMPHOCYTES 1.5 0.8 - 3.5 K/UL    ABS. MONOCYTES 0.7 0.0 - 1.0 K/UL    ABS. EOSINOPHILS 0.4 0.0 - 0.4 K/UL    ABS. BASOPHILS 0.0 0.0 - 0.1 K/UL    ABS. IMM. GRANS. 0.1 (H) 0.00 - 0.04 K/UL    DF AUTOMATED     METABOLIC PANEL, BASIC    Collection Time: 06/03/19  5:07 AM   Result Value Ref Range    Sodium 134 (L) 136 - 145 mmol/L    Potassium 4.2 3.5 - 5.1 mmol/L    Chloride 106 97 - 108 mmol/L    CO2 21 21 - 32 mmol/L    Anion gap 7 5 - 15 mmol/L    Glucose 238 (H) 65 - 100 mg/dL    BUN 9 6 - 20 MG/DL    Creatinine 1.00 0.55 - 1.02 MG/DL    BUN/Creatinine ratio 9 (L) 12 - 20      GFR est AA >60 >60 ml/min/1.73m2    GFR est non-AA >60 >60 ml/min/1.73m2    Calcium 7.9 (L) 8.5 - 10.1 MG/DL   GLUCOSE, POC    Collection Time: 06/03/19  6:43 AM   Result Value Ref Range    Glucose (POC) 241 (H) 65 - 100 mg/dL    Performed by [facility]        CT ABDOMEN AND PELVIS WITHOUT CONTRAST. 5/30/2019 4:38 PM   FINDINGS:  Abdomen: The distal esophageal wall is thickened, measuring up to 9 mm. There is  a small hiatal hernia, and refluxed fluid in the distal esophagus. The lung  bases are clear. The heart size is normal. A subcapsular, segment 4A, hypodense  hepatic lesion (3-25) is indeterminate, but stable from 2015.  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 Incidental note is made of a junctional cortical defect. The  unenhanced stomach, duodenum, gallbladder, pancreas, spleen, adrenals, and left  kidney are normal.     Pelvis: The unenhanced small bowel, ileocecal junction, appendix, and colon are  normal. The bladder is distended. Incidental note is made of small anterior wall  fibroids in the lower uterine segment. Trace free pelvic fluid is likely  physiologic in a premenopausal woman. No free air and no abdominopelvic  lymphadenopathy. Lucency in the S2 vertebral body (602-80) is stable from 2016.     IMPRESSION:   Distal esophageal wall thickening. Small hiatal hernia. Urine Culture 5/30 - 1000 colonies/mL Group B strep  Blood Culture 5/30 - no growth after 4 days  Wound culture 5/27 - Group B Strep, heavy mixed anaerobic GPCs and anaerobic GNRs, beta lactamase positive    Elevated D-dimer --> CXR and VQ scan normal 6/2      Assessment/Plan:   30yo with CHTN and poorly controlled DM1, now 3 weeks post-op s/p 1LTCS. Pt currently being treated empirically for endometritis, though currently benign exam, leukocytosis resolving and pt has remained afebrile with minimal lochia, thus not a clear cut diagnosis in this patient. Currently most of abdominal pain is epigastric in nature, less suspicious for gynecologic source of pain, possibly more suspicious for GI source of pain given esophageal wall thickening and small hiatal hernia on recent CT scan. GBS positive urine and vaginal cultures.     -cont meropenem per ID recommendations  -consider iron supplementation for anemia, constipation does not seem to be an issue for this pt, but would also encourage stool softeners  -BP and DM mgmt per primary team  -concern for narcotic dependence/drug seeking  -no longer breast pumping due to low supply, thus encouraged supportive bra, cool compresses, monitoring for mastitis/engorgement/clogged ducts while weaning   -mood at baseline, psych has been consulted previously   - consult if not already done      Baylee Guerra MD  6/3/2019  4:42 PM

## 2019-06-03 NOTE — PROGRESS NOTES
Bedside shift change report given to Ivanna Woodard (oncoming nurse) by Iliana Dozier (offgoing nurse). Report included the following information SBAR, Kardex and MAR.

## 2019-06-03 NOTE — ROUTINE PROCESS
Wilberto Salomon  1990  [id number]    Situation:  Verbal report received from: Edson Shepard  Procedure: Procedure(s):  ESOPHAGOGASTRODUODENOSCOPY (EGD)  ESOPHAGOGASTRODUODENAL (EGD) BIOPSY  ENDOSCOPIC BRUSHING    Background:    Preoperative diagnosis: Epigastric pain  Anemia  Postoperative diagnosis: Gastrits  Esophagitis    :  Dr. Rodriguez   Assistant(s): Endoscopy RN-1: Domenico Pickering RN  Endoscopy RN-2: Tiago Cortez RN    Specimens:   ID Type Source Tests Collected by Time Destination   1 : duodenum Preservative Duodenum  Molly Jefferson MD 6/3/2019 1742 Pathology   2 : gastric Preservative Gastric  Molly Jefferson MD 6/3/2019 1742 Pathology   3 : esophagus Preservative Esophagus, Distal  Molly Jefferson MD 6/3/2019 1742 Pathology     H. Pylori  no    Assessment:  Intra-procedure medications        Anesthesia gave intra-procedure sedation and medications, see anesthesia flow sheet yes    Intravenous fluids: NS@ KVO     Vital signs stable     Abdominal assessment: round and soft     Recommendation:  Discharge patient per MD order.   Return  to 210  Family or Friend  Permission to share finding with family or friend yes

## 2019-06-03 NOTE — DIABETES MGMT
[facility]    [facility] Consult Note    Recommendations/ Comments: Hyperglycemia, this admission initially 200 mg/dL - 400 mg/dL. Today results are > 190 mg/dL on Lantus 15 units at bedtime  Received 8 units of correction insulin yesterday   NPO for EGD later today  Admitted with intractable abd pain,  N&V    Please consider the following:  Increase Lantus to 20 units daily  Once eating, observe po intake to evaluate need for     Messaged Dr Prince Del Valle regarding above    Current hospital DM medication:   Lantus 15 units daily  Lispro normal sensitivity correction scael      DTC will continue to follow patient as needed. ____________________________    Consult received for:   [x]           Hospital Medication Recommendations                 [x]           Hospital Blood Glucose Management    Chart reviewed and initial evaluation complete on Gracy Torres. Patient is a 30 y.o. female with known DM on Medtr[employer] insulin pump PTA. She is followed by Dr Richie Stoll, Endocrinologist  Pt is well know to DTC from previous admissions   5/10/2019   Third admission since delivery N&V and intractable abd pain  MARIS on CKD 2  HTN  Gastroparesis  Depression      A1c:   Lab Results   Component Value Date/Time    Hemoglobin A1c 7.4 (H) 2019 02:49 PM       Recent Glucose Results:   Lab Results   Component Value Date/Time     (H) 2019 05:07 AM    GLUCPOC 191 (H) 2019 11:54 AM    GLUCPOC 241 (H) 2019 06:43 AM    GLUCPOC 196 (H) 2019 09:55 PM        Lab Results   Component Value Date/Time    Creatinine 1.00 2019 05:07 AM       Active Orders   Diet    DIET NPO        PO intake: No data found. Thank you.   Franko Quigley RN, CDE      Time spent: 7 min

## 2019-06-03 NOTE — PROGRESS NOTES
RENAL  PROGRESS NOTE        Subjective:   Seen on HD, BP stable, using catheter-avf- deep  Objective:   VITALS SIGNS:    Visit Vitals  BP (!) 146/98 (BP 1 Location: Left arm, BP Patient Position: At rest)   Pulse (!) 102   Temp 98.2 °F (36.8 °C)   Resp 16   Wt 59.2 kg (130 lb 8 oz)   SpO2 98%   BMI 19.84 kg/m²       O2 Device: Room air       Temp (24hrs), Av.6 °F (37 °C), Min:98.2 °F (36.8 °C), Max:99 °F (37.2 °C)         PHYSICAL EXAM:  WDWN obese WM in NARD  No rales, PC in place  RRR  Leg bandaged AVF with T/B (L) antecubital       DATA REVIEW:     INTAKE / OUTPUT:   Last shift:      No intake/output data recorded. Last 3 shifts: No intake/output data recorded. No intake or output data in the 24 hours ending 19 1329      LABS:   Recent Labs     19  0507 19  0159 19  0500   WBC 11.6* 17.0* 14.4*   HGB 7.5* 7.5* 7.8*   HCT 24.6* 24.4* 27.5*    417* 479*     Recent Labs     19  0507 19  0159 19  0500   * 140 139   K 4.2 4.0 4.3    111* 109*   CO2 21 19* 17*   * 156* 174*   BUN 9 20 31*   CREA 1.00 1.27* 1.83*   CA 7.9* 7.7* 7.8*   MG  --   --  1.3*   PHOS  --   --  2.8   ALB  --   --  1.9*   TBILI  --   --  0.4   SGOT  --   --  9*   ALT  --   --  11*           Assessment:       MARIS on CKD stage 2/3:creat better  Nephrotic syndrome: DM nephropathy   Hypercalcemia: better  N/V/D  Metabolic acidosis:h  HTN: fluctuating.   DM1:    Anemia     Plan:   Seen on hd, bp stable  D/W dr. Romy CALDERON  Social conundrum. HD MWF this week (off schedule)-will get back on T//S prior to [employer].  Neno Ashley MD

## 2019-06-03 NOTE — PROGRESS NOTES
Bedside and Verbal shift change report given to Karoline Abreu RN (oncoming nurse) by Joelle Gross RN (offgoing nurse). Report included the following information SBAR, Kardex, MAR and Recent Results.

## 2019-06-03 NOTE — PROCEDURES
[address] [facility].  [address] [facility] 140 [facility], [address]  [phone]                            NAME:  Coleen Dial   :   1990   MRN:   [id number]     Date/Time:  6/3/2019 5:41 PM    Esophagogastroduodenoscopy (EGD) Procedure Note    :  Elissa Claros MD    Surgical Assistant: None    Implants: none    Referring Provider:  None    Anethesia/Sedation:  MAC anesthesia    Procedure Details     After infom consent was obtained for the procedure, with all risks and benefits of procedure explained the patient was taken to the endoscopy suite and placed in the left lateral decubitus position. Following sequential administration of sedation as per above, the [id number] gastroscope was inserted into the mouth and advanced under direct vision to second portion of the duodenum. A careful inspection was made as the gastroscope was withdrawn, including a retroflexed view of the proximal stomach; findings and interventions are described below. Findings:  Esophagus: Severe ulceration and LA grade E esophagitis was noted in lower third of esophagus and at the GE junction. Lot of whitish exudate was present. At the GE junction, necrotic appearing area was noted in the ulcerated esophagus. A medium sliding hiatal hernia was noted. Stomach: Patchy areas of erythema and superficial erosions were noted in the antrum. Duodenum/jejunum: Mildly flattened mucosal folds were noted      Therapies:  biopsy of esophagus in lower third portion  biopsy of stomach antrum  biopsy of duodenal second portion  brushing for fungus lower third esophagus    Specimens:   biopsy of esophagus in lower third portion  biopsy of stomach antrum  biopsy of duodenal second portion  brushing for fungus lower third esophagus           EBL: minimal    Complications:   None; patient tolerated the procedure well.            Impression:    See Postoperative diagnosis above    Recommendations:  -Follow clinical symptoms and laboratory studies for evidence of rebleeding. ,   -Keep NPO.,   -High dose IV Protonix twice daily  -Start TPN as she is malnourished  -Monitor clinical clourse    Discharge disposition:  Continue care in hospital    Yanira Moreno MD

## 2019-06-03 NOTE — PROGRESS NOTES
Problem: Falls - Risk of  Goal: *Absence of Falls  Description  Document Srinath Baez Fall Risk and appropriate interventions in the flowsheet.   Outcome: Progressing Towards Goal

## 2019-06-03 NOTE — ANESTHESIA PREPROCEDURE EVALUATION
Relevant Problems   No relevant active problems       Anesthetic History     PONV          Review of Systems / Medical History  Patient summary reviewed, nursing notes reviewed and pertinent labs reviewed    Pulmonary  Within defined limits                 Neuro/Psych         Psychiatric history     Cardiovascular    Hypertension        Dysrhythmias : SVT        Comments: Hx vtach   GI/Hepatic/Renal     GERD      PUD    Comments: Hx gastroparesis Endo/Other    Diabetes    Anemia     Other Findings   Comments: Chronic nausea         Physical Exam    Airway  Mallampati: II  TM Distance: 4 - 6 cm  Neck ROM: normal range of motion   Mouth opening: Normal     Cardiovascular    Rhythm: regular  Rate: normal         Dental    Dentition: Lower dentition intact and Upper dentition intact     Pulmonary  Breath sounds clear to auscultation               Abdominal  GI exam deferred       Other Findings            Anesthetic Plan    ASA: 3  Anesthesia type: MAC            Anesthetic plan and risks discussed with: Patient

## 2019-06-03 NOTE — PROGRESS NOTES
Day #3 of Meropenem  Indication:  endometritis  Current regimen:  500 mg IV Q8H    Recent Labs     19  0507 19  0159 19  0500   WBC 11.6* 17.0* 14.4*   CREA 1.00 1.27* 1.83*   BUN 9 20 31*     Est CrCl: ~75-80 ml/min; UO: not recorded ml/kg/hr  Temp (24hrs), Av.6 °F (37 °C), Min:98.2 °F (36.8 °C), Max:99 °F (37.2 °C)    Plan: Change to Meropenem 500 mg IV Q6H for CrCl > 50 ml/min per hospital renal dosing protocol.

## 2019-06-03 NOTE — PERIOP NOTES
TRANSFER - OUT REPORT:    Verbal report given to Mehreen García Works  being transferred to [facility](unit) for routine progression of care       Report consisted of patients Situation, Background, Assessment and   Recommendations(SBAR). Information from the following report(s) SBAR was reviewed with the receiving nurse. Lines:   Peripheral IV 05/30/19 Right Wrist (Active)   Site Assessment Clean, dry, & intact 6/3/2019  1:36 AM   Phlebitis Assessment 0 6/3/2019  1:36 AM   Infiltration Assessment 0 6/3/2019  1:36 AM   Dressing Status Clean, dry, & intact 6/3/2019  1:36 AM   Dressing Type Transparent 6/3/2019  1:36 AM   Hub Color/Line Status Capped 6/3/2019  1:36 AM   Action Taken Open ports on tubing capped 6/3/2019  1:36 AM   Alcohol Cap Used Yes 6/3/2019  1:36 AM       Peripheral IV 93/62/87 Right Basilic (Active)   Site Assessment Clean, dry, & intact 6/3/2019  9:02 AM   Phlebitis Assessment 0 6/3/2019  9:02 AM   Infiltration Assessment 0 6/3/2019  9:02 AM   Dressing Status Clean, dry, & intact 6/3/2019  9:02 AM   Dressing Type Tape;Transparent 6/3/2019  9:02 AM   Hub Color/Line Status Green; Infusing 6/3/2019  9:02 AM   Action Taken Open ports on tubing capped 6/3/2019  9:02 AM   Alcohol Cap Used Yes 6/3/2019  9:02 AM        Opportunity for questions and clarification was provided.       Patient transported with:   [place]

## 2019-06-04 ENCOUNTER — APPOINTMENT (OUTPATIENT)
Dept: GENERAL RADIOLOGY | Age: 29
DRG: 561 | End: 2019-06-04
Attending: NURSE PRACTITIONER
Payer: MEDICAID

## 2019-06-04 LAB
ANION GAP SERPL CALC-SCNC: 5 MMOL/L (ref 5–15)
BACTERIA SPEC CULT: NORMAL
BASOPHILS # BLD: 0 K/UL (ref 0–0.1)
BASOPHILS NFR BLD: 0 % (ref 0–1)
BUN SERPL-MCNC: 8 MG/DL (ref 6–20)
BUN/CREAT SERPL: 9 (ref 12–20)
CALCIUM SERPL-MCNC: 8.3 MG/DL (ref 8.5–10.1)
CHLORIDE SERPL-SCNC: 109 MMOL/L (ref 97–108)
CO2 SERPL-SCNC: 25 MMOL/L (ref 21–32)
CREAT SERPL-MCNC: 0.87 MG/DL (ref 0.55–1.02)
DIFFERENTIAL METHOD BLD: ABNORMAL
EOSINOPHIL # BLD: 0.5 K/UL (ref 0–0.4)
EOSINOPHIL NFR BLD: 6 % (ref 0–7)
ERYTHROCYTE [DISTWIDTH] IN BLOOD BY AUTOMATED COUNT: 16.9 % (ref 11.5–14.5)
GLUCOSE BLD STRIP.AUTO-MCNC: 118 MG/DL (ref 65–100)
GLUCOSE BLD STRIP.AUTO-MCNC: 141 MG/DL (ref 65–100)
GLUCOSE BLD STRIP.AUTO-MCNC: 169 MG/DL (ref 65–100)
GLUCOSE BLD STRIP.AUTO-MCNC: 45 MG/DL (ref 65–100)
GLUCOSE SERPL-MCNC: 62 MG/DL (ref 65–100)
HCT VFR BLD AUTO: 23.6 % (ref 35–47)
HGB BLD-MCNC: 7.2 G/DL (ref 11.5–16)
IMM GRANULOCYTES # BLD AUTO: 0.1 K/UL (ref 0–0.04)
IMM GRANULOCYTES NFR BLD AUTO: 1 % (ref 0–0.5)
LYMPHOCYTES # BLD: 1.8 K/UL (ref 0.8–3.5)
LYMPHOCYTES NFR BLD: 20 % (ref 12–49)
MCH RBC QN AUTO: 26.1 PG (ref 26–34)
MCHC RBC AUTO-ENTMCNC: 30.5 G/DL (ref 30–36.5)
MCV RBC AUTO: 85.5 FL (ref 80–99)
MONOCYTES # BLD: 0.7 K/UL (ref 0–1)
MONOCYTES NFR BLD: 8 % (ref 5–13)
NEUTS SEG # BLD: 5.7 K/UL (ref 1.8–8)
NEUTS SEG NFR BLD: 65 % (ref 32–75)
NRBC # BLD: 0 K/UL (ref 0–0.01)
NRBC BLD-RTO: 0 PER 100 WBC
PLATELET # BLD AUTO: 333 K/UL (ref 150–400)
PMV BLD AUTO: 9.7 FL (ref 8.9–12.9)
POTASSIUM SERPL-SCNC: 3.5 MMOL/L (ref 3.5–5.1)
RBC # BLD AUTO: 2.76 M/UL (ref 3.8–5.2)
SERVICE CMNT-IMP: ABNORMAL
SERVICE CMNT-IMP: NORMAL
SODIUM SERPL-SCNC: 139 MMOL/L (ref 136–145)
WBC # BLD AUTO: 8.8 K/UL (ref 3.6–11)

## 2019-06-04 PROCEDURE — 74011000258 HC RX REV CODE- 258: Performed by: HOSPITALIST

## 2019-06-04 PROCEDURE — 74011636637 HC RX REV CODE- 636/637: Performed by: NURSE PRACTITIONER

## 2019-06-04 PROCEDURE — 74011250637 HC RX REV CODE- 250/637: Performed by: HOSPITALIST

## 2019-06-04 PROCEDURE — 36573 INSJ PICC RS&I 5 YR+: CPT | Performed by: FAMILY MEDICINE

## 2019-06-04 PROCEDURE — 74011250636 HC RX REV CODE- 250/636: Performed by: FAMILY MEDICINE

## 2019-06-04 PROCEDURE — 77030020365 HC SOL INJ SOD CL 0.9% 50ML

## 2019-06-04 PROCEDURE — 74011250636 HC RX REV CODE- 250/636: Performed by: HOSPITALIST

## 2019-06-04 PROCEDURE — 05JY3ZZ INSPECTION OF UPPER VEIN, PERCUTANEOUS APPROACH: ICD-10-PCS | Performed by: FAMILY MEDICINE

## 2019-06-04 PROCEDURE — 74011000250 HC RX REV CODE- 250: Performed by: HOSPITALIST

## 2019-06-04 PROCEDURE — 77030020847 HC STATLOK BARD -A

## 2019-06-04 PROCEDURE — 82962 GLUCOSE BLOOD TEST: CPT

## 2019-06-04 PROCEDURE — 36415 COLL VENOUS BLD VENIPUNCTURE: CPT

## 2019-06-04 PROCEDURE — 74011000250 HC RX REV CODE- 250: Performed by: FAMILY MEDICINE

## 2019-06-04 PROCEDURE — 74011250637 HC RX REV CODE- 250/637: Performed by: FAMILY MEDICINE

## 2019-06-04 PROCEDURE — 74011636637 HC RX REV CODE- 636/637: Performed by: HOSPITALIST

## 2019-06-04 PROCEDURE — 74011636320 HC RX REV CODE- 636/320: Performed by: RADIOLOGY

## 2019-06-04 PROCEDURE — 65270000029 HC RM PRIVATE

## 2019-06-04 PROCEDURE — 74011636637 HC RX REV CODE- 636/637: Performed by: FAMILY MEDICINE

## 2019-06-04 PROCEDURE — C1751 CATH, INF, PER/CENT/MIDLINE: HCPCS

## 2019-06-04 PROCEDURE — 80048 BASIC METABOLIC PNL TOTAL CA: CPT

## 2019-06-04 PROCEDURE — 74011250637 HC RX REV CODE- 250/637: Performed by: INTERNAL MEDICINE

## 2019-06-04 PROCEDURE — C9113 INJ PANTOPRAZOLE SODIUM, VIA: HCPCS | Performed by: INTERNAL MEDICINE

## 2019-06-04 PROCEDURE — 74220 X-RAY XM ESOPHAGUS 1CNTRST: CPT

## 2019-06-04 PROCEDURE — 85025 COMPLETE CBC W/AUTO DIFF WBC: CPT

## 2019-06-04 PROCEDURE — 74011250636 HC RX REV CODE- 250/636: Performed by: INTERNAL MEDICINE

## 2019-06-04 PROCEDURE — 74011000258 HC RX REV CODE- 258: Performed by: FAMILY MEDICINE

## 2019-06-04 PROCEDURE — 76937 US GUIDE VASCULAR ACCESS: CPT

## 2019-06-04 RX ORDER — DOXYCYCLINE HYCLATE 100 MG
100 TABLET ORAL EVERY 12 HOURS
Status: DISCONTINUED | OUTPATIENT
Start: 2019-06-04 | End: 2019-06-07 | Stop reason: HOSPADM

## 2019-06-04 RX ORDER — MAGNESIUM SULFATE HEPTAHYDRATE 40 MG/ML
2 INJECTION, SOLUTION INTRAVENOUS ONCE
Status: COMPLETED | OUTPATIENT
Start: 2019-06-04 | End: 2019-06-04

## 2019-06-04 RX ADMIN — DULOXETINE HYDROCHLORIDE 20 MG: 20 CAPSULE, DELAYED RELEASE ORAL at 18:28

## 2019-06-04 RX ADMIN — HYDRALAZINE HYDROCHLORIDE 100 MG: 50 TABLET, FILM COATED ORAL at 21:42

## 2019-06-04 RX ADMIN — PROCHLORPERAZINE EDISYLATE 5 MG: 5 INJECTION INTRAMUSCULAR; INTRAVENOUS at 14:42

## 2019-06-04 RX ADMIN — PANTOPRAZOLE SODIUM 40 MG: 40 INJECTION, POWDER, FOR SOLUTION INTRAVENOUS at 21:41

## 2019-06-04 RX ADMIN — SUCRALFATE 1 G: 1 TABLET ORAL at 12:13

## 2019-06-04 RX ADMIN — Medication 10 ML: at 14:43

## 2019-06-04 RX ADMIN — Medication 10 ML: at 21:43

## 2019-06-04 RX ADMIN — MEROPENEM 500 MG: 500 INJECTION, POWDER, FOR SOLUTION INTRAVENOUS at 12:13

## 2019-06-04 RX ADMIN — PANTOPRAZOLE SODIUM 40 MG: 40 INJECTION, POWDER, FOR SOLUTION INTRAVENOUS at 08:32

## 2019-06-04 RX ADMIN — SUCRALFATE 1 G: 1 TABLET ORAL at 21:42

## 2019-06-04 RX ADMIN — DULOXETINE HYDROCHLORIDE 20 MG: 20 CAPSULE, DELAYED RELEASE ORAL at 08:32

## 2019-06-04 RX ADMIN — DOXYCYCLINE HYCLATE 100 MG: 100 TABLET, COATED ORAL at 21:42

## 2019-06-04 RX ADMIN — SUCRALFATE 1 G: 1 TABLET ORAL at 06:18

## 2019-06-04 RX ADMIN — INSULIN LISPRO 2 UNITS: 100 INJECTION, SOLUTION INTRAVENOUS; SUBCUTANEOUS at 18:28

## 2019-06-04 RX ADMIN — INSULIN LISPRO 2 UNITS: 100 INJECTION, SOLUTION INTRAVENOUS; SUBCUTANEOUS at 02:00

## 2019-06-04 RX ADMIN — MEROPENEM 500 MG: 500 INJECTION, POWDER, FOR SOLUTION INTRAVENOUS at 06:17

## 2019-06-04 RX ADMIN — HYDRALAZINE HYDROCHLORIDE 100 MG: 50 TABLET, FILM COATED ORAL at 16:17

## 2019-06-04 RX ADMIN — HYDRALAZINE HYDROCHLORIDE 100 MG: 50 TABLET, FILM COATED ORAL at 08:32

## 2019-06-04 RX ADMIN — MAGNESIUM SULFATE HEPTAHYDRATE 2 G: 40 INJECTION, SOLUTION INTRAVENOUS at 14:42

## 2019-06-04 RX ADMIN — PROCHLORPERAZINE EDISYLATE 5 MG: 5 INJECTION INTRAMUSCULAR; INTRAVENOUS at 04:16

## 2019-06-04 RX ADMIN — LABETALOL HYDROCHLORIDE 300 MG: 200 TABLET, FILM COATED ORAL at 08:32

## 2019-06-04 RX ADMIN — CALCIUM GLUCONATE: 94 INJECTION, SOLUTION INTRAVENOUS at 18:28

## 2019-06-04 RX ADMIN — HEPARIN SODIUM 5000 UNITS: 5000 INJECTION INTRAVENOUS; SUBCUTANEOUS at 16:17

## 2019-06-04 RX ADMIN — SODIUM CHLORIDE AND POTASSIUM CHLORIDE: 9; 1.49 INJECTION, SOLUTION INTRAVENOUS at 16:17

## 2019-06-04 RX ADMIN — SUCRALFATE 1 G: 1 TABLET ORAL at 16:17

## 2019-06-04 RX ADMIN — DEXTROSE MONOHYDRATE 25 G: 500 INJECTION PARENTERAL at 06:18

## 2019-06-04 RX ADMIN — IOHEXOL 50 ML: 350 INJECTION, SOLUTION INTRAVENOUS at 13:13

## 2019-06-04 RX ADMIN — INSULIN GLARGINE 20 UNITS: 100 INJECTION, SOLUTION SUBCUTANEOUS at 23:16

## 2019-06-04 RX ADMIN — OXYCODONE AND ACETAMINOPHEN 1 TABLET: 5; 325 TABLET ORAL at 16:20

## 2019-06-04 RX ADMIN — LABETALOL HYDROCHLORIDE 300 MG: 200 TABLET, FILM COATED ORAL at 21:42

## 2019-06-04 RX ADMIN — FERROUS SULFATE TAB 325 MG (65 MG ELEMENTAL FE) 325 MG: 325 (65 FE) TAB at 06:18

## 2019-06-04 RX ADMIN — PROCHLORPERAZINE EDISYLATE 5 MG: 5 INJECTION INTRAMUSCULAR; INTRAVENOUS at 22:27

## 2019-06-04 NOTE — PROGRESS NOTES
Spiritual Care Partner Volunteer visited patient in Rm 215 on 6/4/19. Documented by:   Chaplain Graham MDiv, MACE  [phone] PRAY [phone])

## 2019-06-04 NOTE — PROGRESS NOTES
ID Progress Note  2019    Subjective:     Trouble with IV access    Objective:     Antibiotics:  1. Merrem      Vitals:   Visit Vitals  BP (!) 153/94 (BP 1 Location: Right arm, BP Patient Position: At rest)   Pulse 99   Temp 98.5 °F (36.9 °C)   Resp 17   Ht 5' 8\" (1.727 m)   Wt 60.3 kg (132 lb 13.6 oz)   SpO2 99%   BMI 20.20 kg/m²        Tmax:  Temp (24hrs), Av.7 °F (37.1 °C), Min:98.3 °F (36.8 °C), Max:99.2 °F (37.3 °C)      Exam:  Lungs:  clear to auscultation bilaterally  Heart:  regular rate and rhythm  Abdomen:  soft, non-tender. Bowel sounds normal. No masses,  no organomegaly    Labs:      Recent Labs     19  0318 19  0507 19  0159   WBC 8.8 11.6* 17.0*   HGB 7.2* 7.5* 7.5*    358 417*   BUN 8 9 20   CREA 0.87 1.00 1.27*       Cultures:     Lab Results   Component Value Date/Time    Specimen Description: URINE 2014 02:06 AM    Specimen Description: NARES 2014 06:41 PM    Specimen Description: URINE 2014 02:56 PM     Lab Results   Component Value Date/Time    Culture result: NO GROWTH 5 DAYS 2019 02:49 PM    Culture result: (A) 2019 02:49 PM     STREPTOCOCCI, BETA HEMOLYTIC GROUP B Penicillin and ampicillin are drugs of choice for treatment of beta-hemolytic streptococcal infections. Susceptibility testing of penicillins and beta-lactams approved by the FDA for treatment of beta-hemolytic streptococcal infections need not be performed routinely, because nonsusceptible isolates are extremely rare. CLSI     Culture result: (A) 2019 11:40 AM     FEW STREPTOCOCCI, BETA HEMOLYTIC GROUP B Penicillin and ampicillin are drugs of choice for treatment of beta-hemolytic streptococcal infections. Susceptibility testing of penicillins and beta-lactams approved by the FDA for treatment of beta-hemolytic streptococcal infections need not be performed routinely, because nonsusceptible isolates are extremely rare.  CLSI     Culture result: LIGHT MIXED SKIN MARSHALL ISOLATED 05/27/2019 11:40 AM    Culture result: (A) 05/27/2019 11:40 AM     HEAVY  MIXED  ANAEROBIC GRAM POSITIVE COCCI  AND  ANAEROBIC GRAM NEGATIVE RODS  BETA LACTAMASE POSITIVE         Radiology:     Line/Insert Date:           Assessment:     1. ?endometritis  2. IV access issues  3. Gastroparesis     Objective:     1.  Change to oral doxycycline for another week    Katharina Valencia MD

## 2019-06-04 NOTE — DIABETES MGMT
[facility]    [facility] Follow Up Consult Note    Recommendations/ Comments: Pt with hypoglycemia - BG 45 mg/dL this AM  NPO for procedure; Lantus decreased to 10 units last night per MD one-time order  Previously BG's were elevated 200 mg/dL - 300 mg/dL  Creatinine improved    Please consider the following: If remains NPO, decrease Lantus to 8 units at bedtime    Current hospital DM medication:   Lantus 15 units daily  Lispro normal sensitivity correction scale    Challenging case as BG's are so variable. DTC will continue to follow. DTC will continue to follow patient as needed. ____________________________    Consult received for:   [x]           Hospital Medication Recommendations                 [x]           Hospital Blood Glucose Management    Chart reviewed and initial evaluation complete on Gracy Torres. Patient is a 30 y.o. female with known DM on [employer] insulin pump PTA. She is followed by Dr Ifeanyi Boggs, Endocrinologist   Post partum - delivered by  5/10/2019  Pt is well know to DTC from previous admissions  Third admission since delivery N&V and intractable abd pain  MARIS on CKD 2  HTN  Gastroparesis  Depression      A1c:   Lab Results   Component Value Date/Time    Hemoglobin A1c 7.4 (H) 2019 02:49 PM       Recent Glucose Results:   Lab Results   Component Value Date/Time    GLU 62 (L) 2019 03:18 AM    GLUCPOC 118 (H) 2019 12:07 PM    GLUCPOC 169 (H) 2019 06:43 AM    GLUCPOC 45 (LL) 2019 06:12 AM        Lab Results   Component Value Date/Time    Creatinine 0.87 2019 03:18 AM       Active Orders   Diet    DIET NPO Except Meds        PO intake: No data found. Thank you.   Thurman Schwab RN, CDE      Time spent: 5 min

## 2019-06-04 NOTE — PROGRESS NOTES
Care manager participated in [id number] rounds this am and per nursing patient underwent an EGD. Care management will follow for transitions of care needs.

## 2019-06-04 NOTE — PROCEDURES
Attempted left upper basilic accessed using maximum barrier precaution and sterile technique,PICC placed only to 24 cm sebas of the 44 cm catheter due to resistance despite repositioning and flushing technique. Case aborted. Dressing applied. Right upper arm unable to be used due to painful swelling from previous IV infiltration. RN made aware.

## 2019-06-04 NOTE — CONSULTS
Ob/Gyn Progress note    29 yo  now 3 weeks s/p 1LTCS (on 5/10/19) at 33wks, in setting of CHTN with superimposed preeclampsia and poorly controlled type 1 IDDM (on insulin pump). She was discharged earlier this week with suspected endometritis. Pt now with third readmission to internal medicine service postpartum. Current admission for intractable nausea/vomiting and generalized abdominal pain. Also with hypertensive urgency, and acute on chronic kidney injury. Pt denies any pelvic pain at this time, epigastric discomfort somewhat improved this morning. Pt had endoscopy yesterday which showed esophagitis. Nausea improved. Regular BMs, one slightly loose stool yesterday. No fevers/chills or other signs/symptoms of worsening infection. Lochia appropriate, changed pad 1-2 times yesterday, not completely saturated. Denies heavy bleeding or passage of clots. Pt denies SOB, HA, denies vision changes, RUQ pain. No longer pumping, reports milk drying up as she has not been pumping in the hospital, denies engorgement, redness or pain of breasts. Patient Vitals for the past 12 hrs:   Temp Pulse Resp BP SpO2   19 0239 98.7 °F (37.1 °C) (!) 102 18 136/86 98 %   19 2250  92  117/81    19 2214     98 %   19 2109 98.9 °F (37.2 °C) (!) 101 18 (!) 159/102 98 %     PHYSICAL EXAMINATION  Constitutional  Appearance: well-nourished, well developed, alert, in no acute distress.      HENT  · Head and Face: appears normal    Gastrointestinal  · Abdominal Examination: abdomen soft, non-distended, appropriately tender, uterus appropriately involuted, no rebound or guarding, normal bowel sounds, no masses present  · Incision: pfannensteil incision clean, dry, intact, healing, no erythema, drainage, induration  · Liver and spleen: no hepatomegaly present, spleen not palpable  · Hernias: no hernias identified    Genitourinary: deferred today    Skin  · General Inspection: no rash, no lesions identified    Neurologic/Psychiatric  · Mental Status:  · Orientation: grossly oriented to person, place and time  Mood and Affect: mood normal, flat affect (baseline)    Recent Results (from the past 24 hour(s))   GLUCOSE, POC    Collection Time: 06/03/19 11:54 AM   Result Value Ref Range    Glucose (POC) 191 (H) 65 - 100 mg/dL    Performed by Anupama Sanches    TYPE & SCREEN    Collection Time: 06/03/19  3:01 PM   Result Value Ref Range    Crossmatch Expiration 06/06/2019     ABO/Rh(D) B POSITIVE     Antibody screen NEG    DRUG SCREEN, URINE    Collection Time: 06/03/19  3:07 PM   Result Value Ref Range    AMPHETAMINES NEGATIVE  NEG      BARBITURATES NEGATIVE  NEG      BENZODIAZEPINES NEGATIVE  NEG      COCAINE NEGATIVE  NEG      METHADONE NEGATIVE  NEG      OPIATES NEGATIVE  NEG      PCP(PHENCYCLIDINE) NEGATIVE  NEG      THC (TH-CANNABINOL) NEGATIVE  NEG      Drug screen comment (NOTE)    GLUCOSE, POC    Collection Time: 06/03/19  4:13 PM   Result Value Ref Range    Glucose (POC) 134 (H) 65 - 100 mg/dL    Performed by Pam Elkins    GLUCOSE, POC    Collection Time: 06/03/19 10:51 PM   Result Value Ref Range    Glucose (POC) 179 (H) 65 - 100 mg/dL    Performed by Crystal Navarro  Float    CBC WITH AUTOMATED DIFF    Collection Time: 06/04/19  3:18 AM   Result Value Ref Range    WBC 8.8 3.6 - 11.0 K/uL    RBC 2.76 (L) 3.80 - 5.20 M/uL    HGB 7.2 (L) 11.5 - 16.0 g/dL    HCT 23.6 (L) 35.0 - 47.0 %    MCV 85.5 80.0 - 99.0 FL    MCH 26.1 26.0 - 34.0 PG    MCHC 30.5 30.0 - 36.5 g/dL    RDW 16.9 (H) 11.5 - 14.5 %    PLATELET 893 296 - 811 K/uL    MPV 9.7 8.9 - 12.9 FL    NRBC 0.0 0  WBC    ABSOLUTE NRBC 0.00 0.00 - 0.01 K/uL    NEUTROPHILS 65 32 - 75 %    LYMPHOCYTES 20 12 - 49 %    MONOCYTES 8 5 - 13 %    EOSINOPHILS 6 0 - 7 %    BASOPHILS 0 0 - 1 %    IMMATURE GRANULOCYTES 1 (H) 0.0 - 0.5 %    ABS. NEUTROPHILS 5.7 1.8 - 8.0 K/UL    ABS. LYMPHOCYTES 1.8 0.8 - 3.5 K/UL    ABS.  MONOCYTES 0.7 0.0 - 1.0 K/UL ABS. EOSINOPHILS 0.5 (H) 0.0 - 0.4 K/UL    ABS. BASOPHILS 0.0 0.0 - 0.1 K/UL    ABS. IMM. GRANS. 0.1 (H) 0.00 - 0.04 K/UL    DF AUTOMATED     METABOLIC PANEL, BASIC    Collection Time: 06/04/19  3:18 AM   Result Value Ref Range    Sodium 139 136 - 145 mmol/L    Potassium 3.5 3.5 - 5.1 mmol/L    Chloride 109 (H) 97 - 108 mmol/L    CO2 25 21 - 32 mmol/L    Anion gap 5 5 - 15 mmol/L    Glucose 62 (L) 65 - 100 mg/dL    BUN 8 6 - 20 MG/DL    Creatinine 0.87 0.55 - 1.02 MG/DL    BUN/Creatinine ratio 9 (L) 12 - 20      GFR est AA >60 >60 ml/min/1.73m2    GFR est non-AA >60 >60 ml/min/1.73m2    Calcium 8.3 (L) 8.5 - 10.1 MG/DL   GLUCOSE, POC    Collection Time: 06/04/19  6:12 AM   Result Value Ref Range    Glucose (POC) 45 (LL) 65 - 100 mg/dL    Performed by [address], POC    Collection Time: 06/04/19  6:43 AM   Result Value Ref Range    Glucose (POC) 169 (H) 65 - 100 mg/dL    Performed by Crystal Navarro  [facility]        CT ABDOMEN AND PELVIS WITHOUT CONTRAST. 5/30/2019 4:38 PM   FINDINGS:  Abdomen: The distal esophageal wall is thickened, measuring up to 9 mm. There is  a small hiatal hernia, and refluxed fluid in the distal esophagus. The lung  bases are clear. The heart size is normal. A subcapsular, segment 4A, hypodense  hepatic lesion (3-25) is indeterminate, but stable from 2015. 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 Incidental note is made of a junctional cortical defect. The  unenhanced stomach, duodenum, gallbladder, pancreas, spleen, adrenals, and left  kidney are normal.     Pelvis: The unenhanced small bowel, ileocecal junction, appendix, and colon are  normal. The bladder is distended. Incidental note is made of small anterior wall  fibroids in the lower uterine segment. Trace free pelvic fluid is likely  physiologic in a premenopausal woman. No free air and no abdominopelvic  lymphadenopathy.  Lucency in the S2 vertebral body (260-85) is stable from 2016.     IMPRESSION:   Distal esophageal wall thickening. Small hiatal hernia. Urine Culture 5/30 - 1000 colonies/mL Group B strep  Blood Culture 5/30 - no growth after 4 days  Wound culture 5/27 - Group B Strep, heavy mixed anaerobic GPCs and anaerobic GNRs, beta lactamase positive    Elevated D-dimer --> CXR and VQ scan normal 6/2      Assessment/Plan:   28yo with CHTN and poorly controlled DM1, now 3 weeks post-op s/p 1LTCS. Pt currently being treated empirically for endometritis, though currently benign exam, leukocytosis resolved and pt has remained afebrile with minimal lochia, thus not a clear cut diagnosis in this patient. Currently most of abdominal pain is epigastric in nature, less suspicious for gynecologic source of pain, favor esophagitis/hiatal hernia as more likely causes of discomfort. GBS positive urine and vaginal cultures.    -antibiotics per ID recommendations  -consider additional evaluation of anemia (hgb gradually downtrending, normocytic, vaginal bleeding light and seems appropriate for 3 wks postpartum, unlikely to explain anemia, can consider iron supplementation)  -BP and DM mgmt per primary team  -concern for narcotic dependence/drug seeking  -no longer breast pumping due to low supply, thus encouraged supportive bra, cool compresses, monitoring for mastitis/engorgement/clogged ducts while weaning   -mood at baseline, psych has been consulted previously    At this time, pt seems stable from a obstetric/gynecologic standpoint. Please contact [phone] if you require further ob-gyn assistance with this patient. Will plan to see pt in office 1 week after hospital discharge. Please advise her to call office for appointment. Thank you.       Dioni Roach MD  6/4/2019  4:42 PM

## 2019-06-04 NOTE — PROGRESS NOTES
Hospitalist Progress Note  Jose Angel Armenta MD  Answering service: [phone] OR [phone] from in house phone      Date of Service:  2019  NAME:  Gene Chavira  :  1990  MRN:  [id number]      Admission Summary:    33-year-old female with past medical history of chronic pain syndrome, diabetes mellitus type 2, hypertension, and depression, presents to the hospital with Abdominal pain, nausea, and vomiting    Interval history / Subjective:   Patient Was seen in followup for  Abdominal pains  She Reports abdominal pain is better controlled  Had gastrograffin swallow study today  GI recommending NPO and starting TPN     Assessment & Plan:     1. Abdominal pain- multifactorial- due to hiatal hernia, ulcerations of esophagus and GE junction, with ulcerated areas- noted on EGD- gastrograffin swallow study today- GI recs for NPO exc meds and IV PPI, start TPN- will write initial orders for this, consult for PICC line with plans for outpatient TPN. Do not believe that there is enough evidence for infectious emdometritis to continue IV abx- will DC meropenem and monitor wbc and temp. 2. Dm 1 with hyperglycemia - was on insulin Pump. Currently on lantus insulin,  Dose recently adjusted due to hypoglycemia - plans to stop this and start insulin pump in am and adjust for hyperglycemia with TPN    3. Chest pains / - cxr neg. D dimer elevated -  Vq scan negative. , duplex negative for DVT- likely related to findings in #1    4. HTN - uncontrolled on admission.- better controlled on Hydralazine, labetalol    5. Hyercalcemia - Likely due to dehydration. Appreciate renal eval. Improved. 6. MARIS on CKD 2 - monitor and avoid Nephrotoxins, improved with hydration    7. Hypomagnesemia - replete. And recheck     8. Depression - post partum, psychiatry eval - resume home med    9. Pain Control - try to wean off narcotics, D. W pt and Father who is requesting to wean off all narcotics. They are in agreement. Code status: Full  DVT prophylaxis: Heparin    Telemetry reviewed:   normal sinus rhythm      Risk of deterioration: medium      Total time spent with patient: [address] discussed with: Patient/Family and Nurse  Disposition: Home w/Family and in 2 days once symptoms of gastroparesis controlled. Hospital Problems  Date Reviewed: 6/3/2019          Codes Class Noted POA    Endometritis ICD-10-CM: N71.9  ICD-9-CM: 615.9  5/31/2019 Unknown        * (Principal) Vomiting ICD-10-CM: R11.10  ICD-9-CM: 787.03  5/30/2019 Unknown                Review of Systems:     Review of Systems   Constitutional: Negative. HENT: Negative. Eyes: Negative. Respiratory: Negative. Cardiovascular: Negative. Gastrointestinal: Positive for abdominal pain and nausea. Negative for blood in stool and melena. Genitourinary: Negative. Musculoskeletal: Negative. Skin: Negative. Neurological: Negative. Endo/Heme/Allergies: Negative. Psychiatric/Behavioral: Negative for suicidal ideas. Vital Signs:    Last 24hrs VS reviewed since prior progress note. Most recent are:  Visit Vitals  /76 (BP 1 Location: Left arm, BP Patient Position: At rest)   Pulse 95   Temp 98.7 °F (37.1 °C)   Resp 17   Ht 5' 8\" (1.727 m)   Wt 60.3 kg (132 lb 13.6 oz)   SpO2 96%   BMI 20.20 kg/m²         Intake/Output Summary (Last 24 hours) at 6/4/2019 1433  Last data filed at 6/3/2019 1739  Gross per 24 hour   Intake 100 ml   Output    Net 100 ml        Physical Examination:             Constitutional:  No acute distress, cooperative, pleasant    ENT:  Oral mucous moist, oropharynx benign. Neck supple,    Resp:  CTA bilaterally. No wheezing/rhonchi/rales. No accessory muscle use   CV:  Regular rhythm, normal rate, no murmurs, gallops, rubs    GI:  Soft, non distended, non tender.  normoactive bowel sounds, no hepatosplenomegaly     Musculoskeletal:  No edema, warm, 2+ pulses throughout    Neurologic:  Moves all extremities. AAOx3, CN II-XII reviewed     Skin:  multiple tattoes noted. Data Review:    Review and/or order of clinical lab test  Review and/or order of tests in the radiology section of [facility]      Labs:     Recent Labs     06/04/19 0318 06/03/19  0507   WBC 8.8 11.6*   HGB 7.2* 7.5*   HCT 23.6* 24.6*    358     Recent Labs     06/04/19 0318 06/03/19  0507 06/02/19  0159    134* 140   K 3.5 4.2 4.0   * 106 111*   CO2 25 21 19*   BUN 8 9 20   CREA 0.87 1.00 1.27*   GLU 62* 238* 156*   CA 8.3* 7.9* 7.7*     No results for input(s): SGOT, GPT, ALT, AP, TBIL, TBILI, TP, ALB, GLOB, GGT, AML, LPSE in the last 72 hours. No lab exists for component: AMYP, HLPSE  No results for input(s): INR, PTP, APTT in the last 72 hours. No lab exists for component: INREXT, INREXT   No results for input(s): FE, TIBC, PSAT, FERR in the last 72 hours. Lab Results   Component Value Date/Time    Folate 26.9 (H) 09/03/2012 05:45 AM      No results for input(s): PH, PCO2, PO2 in the last 72 hours. No results for input(s): CPK, CKNDX, TROIQ in the last 72 hours.     No lab exists for component: CPKMB  Lab Results   Component Value Date/Time    Cholesterol, total 160 05/15/2016 12:09 AM    HDL Cholesterol 41 05/15/2016 12:09 AM    LDL, calculated 98 05/15/2016 12:09 AM    Triglyceride 105 05/15/2016 12:09 AM    CHOL/HDL Ratio 3.9 05/15/2016 12:09 AM     Lab Results   Component Value Date/Time    Glucose (POC) 118 (H) 06/04/2019 12:07 PM    Glucose (POC) 169 (H) 06/04/2019 06:43 AM    Glucose (POC) 45 (LL) 06/04/2019 06:12 AM    Glucose (POC) 179 (H) 06/03/2019 10:51 PM    Glucose (POC) 134 (H) 06/03/2019 04:13 PM     Lab Results   Component Value Date/Time    Color YELLOW/STRAW 05/30/2019 02:49 PM    Appearance CLEAR 05/30/2019 02:49 PM    Specific gravity 1.014 05/30/2019 02:49 PM    Specific gravity 1.010 04/07/2019 07:07 PM    pH (UA) 6.5 05/30/2019 02:49 PM Protein 300 (A) 05/30/2019 02:49 PM    Glucose 500 (A) 05/30/2019 02:49 PM    Ketone 15 (A) 05/30/2019 02:49 PM    Bilirubin NEGATIVE  05/30/2019 02:49 PM    Urobilinogen 0.2 05/30/2019 02:49 PM    Nitrites NEGATIVE  05/30/2019 02:49 PM    Leukocyte Esterase NEGATIVE  05/30/2019 02:49 PM    Epithelial cells FEW 05/30/2019 02:49 PM    Bacteria NEGATIVE  05/30/2019 02:49 PM    WBC 0-4 05/30/2019 02:49 PM    RBC 20-50 05/30/2019 02:49 PM         Medications Reviewed:     Current Facility-Administered Medications   Medication Dose Route Frequency    TPN ADULT - PERIPHERAL   IntraVENous CONTINUOUS    magnesium sulfate 2 g/50 ml IVPB (premix or compounded)  2 g IntraVENous ONCE    insulin glargine (LANTUS) injection 20 Units  20 Units SubCUTAneous QHS    meropenem (MERREM) 500 mg in 0.9% sodium chloride (MBP/ADV) 50 mL  0.5 g IntraVENous Q6H    sodium chloride (NS) flush 5-40 mL  5-40 mL IntraVENous Q8H    sodium chloride (NS) flush 5-40 mL  5-40 mL IntraVENous PRN    pantoprazole (PROTONIX) injection 40 mg  40 mg IntraVENous Q12H    insulin lispro (HUMALOG) injection   SubCUTAneous Q6H    prochlorperazine (COMPAZINE) with saline injection 5 mg  5 mg IntraVENous Q6H PRN    acetaminophen (TYLENOL) tablet 650 mg  650 mg Oral Q6H PRN    0.9% sodium chloride with KCl 20 mEq/L infusion   IntraVENous CONTINUOUS    sucralfate (CARAFATE) tablet 1 g  1 g Oral AC&HS    DULoxetine (CYMBALTA) capsule 20 mg  20 mg Oral BID    ferrous sulfate tablet 325 mg  325 mg Oral ACB    hydrALAZINE (APRESOLINE) tablet 100 mg  100 mg Oral TID    labetalol (NORMODYNE) tablet 300 mg  300 mg Oral Q12H    oxyCODONE-acetaminophen (PERCOCET) 5-325 mg per tablet 1 Tab  1 Tab Oral Q8H PRN    sodium chloride (NS) flush 5-40 mL  5-40 mL IntraVENous Q8H    sodium chloride (NS) flush 5-40 mL  5-40 mL IntraVENous PRN    heparin (porcine) injection 5,000 Units  5,000 Units SubCUTAneous Q8H    ondansetron (ZOFRAN) injection 4 mg  4 mg IntraVENous Q4H PRN    glucose chewable tablet 16 g  4 Tab Oral PRN    dextrose (D50W) injection syrg 12.5-25 g  25-50 mL IntraVENous PRN    glucagon (GLUCAGEN) injection 1 mg  1 mg IntraMUSCular PRN    hydrALAZINE (APRESOLINE) 20 mg/mL injection 10 mg  10 mg IntraVENous Q6H PRN     ______________________________________________________________________  EXPECTED LENGTH OF STAY: - - -  ACTUAL LENGTH OF STAY:          4                 Maikol Pierre MD   Patient doesn't want family notified.

## 2019-06-04 NOTE — ROUTINE PROCESS
Bedside shift change report given to rojas ku rn (oncoming nurse) by merly rn (offgoing nurse). Report included the following information SBAR and Kardex.

## 2019-06-04 NOTE — PROGRESS NOTES
NUTRITION COMPLETE ASSESSMENT    RECOMMENDATIONS:   1. Plans for PN, start PPN since only peripheral access with:   - 4.25%AA, D10 @ 63ml/hr   - include 100mg thiamine    2. If electrolytes controlled tomorrow increase to full TPN of:   - 5%AA, D20 @ 63ml/hr + 500ml, 20% lipids 3x/week    - consider 20 units of insulin/liter (0.1unit/g of CHO), continue thiamine    3. At risk for refeeding syndrome:   - check Mg and Phos with BMP for 1st three days of TPN   - replete electrolytes PRN    4. Adjust insulin per DTC recommendations     Interventions/Plan:   Food/Nutrient Delivery:          Initiate parenteral nutrition  Nutrition Education:(supplements/GERD diet)      Assessment:   Reason for Assessment: [x]NPO/Clear Liquid /[x]At Nutrition Risk    Diet: NPO  Supplements: none  Nutritionally Significant Medications: [x] Reviewed & Includes: cymbalta, iron, lantus (20 units), SSI, merrem, protonix,      Pre-Hospitalization:  Usual Appetite: Fair  Diet at Home: regular(1-2meal per day)  Vitamins/Supplements: No    Current Hospitalization:   Appetite:    PO Ability: Independent Average po intake:NPO  Average supplements intake:        Subjective:  \"I could eat some if I wasn't nauseous, but if I was nauseous it just would all come up. I was doing maybe 1-2 meals and snacks per day. I have tried the Glucerna (chocolate) in the past.\"  Pt agreeable to supplements when cleared for diet. Objective:  Pt admitted for vomiting. PMHx: DM1 (insulin pump at home). N/V starting even during pregnancy but worse since  delivery on 5/10. EGD yesterday showing severe esophagitis with necrotic ulceration. GI recommending NPO with TPN for unclear amount of time. Carafate slurry and IV protonix rx. Only with peripheral access today so plans to start PPN tonight with switch to TPN tomorrow (see above) - discussed with MD. Provides: 5%AA, D20 @ 63ml/hr + 500ml, 20% lipids 3x/week.  Provides: 1758kcal, 76g protein, 302g CHO (GIR = 3.48mg/kg/min), 1500ml fluid. With hx of DM1 consider starting insulin dose of 0.1units/g of CHO = 20units/liter. DTC following. Include 100mg thiamine/day since pt at risk for refeeding syndrome. Check Phos and Mg in addition to BMP for 1st three days of TPN. Wt trends not a good representation of intake since recent pregnancy. Pt reports usual pre-pregnancy weight of 135#. Continue daily weights while on TPN. Estimated Nutrition Needs:   Kcals/day: 0782 Kcals/day(1765-1900kcal)  Protein: 72 g(72-84g (1.2-1.4g/kg))  Fluid: 1800 ml(1ml/kcal)  Based On: [place] Jeor(x 1.3-1.4)  Weight Used: Actual wt(60.3kg)    Pt expected to meet estimated nutrient needs:  [x]   Yes - with nutrition support     []  No [] Unable to predict at this time  Nutrition Diagnosis:   1. Inadequate protein-energy intake related to altered GI fx as evidenced by severe esophagitis; N/V/reflux PTA; poor PO x 3+ weeks    2.  Inadequate oral intake related to severe esophagitis as evidenced by NPO with PN to start    Goals:     TPN meeting at least 90% needs in 2-3 days; wt maintenance     Monitoring & Evaluation:    - Total energy intake, Enteral/parenteral nutrition intake, Carbohydrate intake   - Weight/weight change, GI, Electrolyte and renal profile  Previous Nutrition Goals Met:   N/A  Previous Recommendations:    N/A    Education & Discharge Needs:   [] None Identified   [x] Identified and addressed    [x] Participated in care plan, discharge planning, and/or interdisciplinary rounds        Cultural, Jain and ethnic food preferences identified: None    Skin Integrity: [x]Intact  []Other  Edema: [x]None []Other  Last BM: 6/3  Food Allergies: [x]None []Other  Diet Restrictions: Cultural/Zoroastrian Preference(s): None     Anthropometrics:    Weight Loss Metrics 6/3/2019 5/28/2019 5/21/2019 5/9/2019 5/2/2019 4/23/2019 4/18/2019   Today's Wt 132 lb 13.6 oz 153 lb 151 lb 9.6 oz - 159 lb 9.6 oz 160 lb 163 lb   BMI 20.2 kg/m2 23.26 kg/m2 23.05 kg/m2 24.27 kg/m2 24.27 kg/m2 24.33 kg/m2 24.78 kg/m2      Weight Source: Bed  Height: 5' 8\" (172.7 cm),    Body mass index is 20.2 kg/m².      IBW : 63.5 kg (140 lb), % IBW (Calculated): 94.89 %  Usual Body Weight: 59.9 kg (132 lb)(opre-pregnancy),      Labs:    Lab Results   Component Value Date/Time    Sodium 139 06/04/2019 03:18 AM    Potassium 3.5 06/04/2019 03:18 AM    Chloride 109 (H) 06/04/2019 03:18 AM    CO2 25 06/04/2019 03:18 AM    Glucose 62 (L) 06/04/2019 03:18 AM    BUN 8 06/04/2019 03:18 AM    Creatinine 0.87 06/04/2019 03:18 AM    Calcium 8.3 (L) 06/04/2019 03:18 AM    Magnesium 1.3 (L) 06/01/2019 05:00 AM    Phosphorus 2.8 06/01/2019 05:00 AM    Albumin 1.9 (L) 06/01/2019 05:00 AM     Lab Results   Component Value Date/Time    Hemoglobin A1c 7.4 (H) 05/30/2019 02:49 PM    Hemoglobin A1c (POC) 11.7 (A) 12/03/2013 01:36 PM     Jayda Goel RD Pager #[phone] or [phone]

## 2019-06-04 NOTE — PROGRESS NOTES
Problem: Falls - Risk of  Goal: *Absence of Falls  Description  Document Bienvenido Artis Fall Risk and appropriate interventions in the flowsheet.   Outcome: Progressing Towards Goal

## 2019-06-04 NOTE — PROGRESS NOTES
[address]  [address] [place], [address]  [phone]                GI PROGRESS NOTE  Evelia Rachael AGACNP-BC      NAME:   Yony Rubi   :    1990   MRN:    [id number]     Assessment/Plan     1. Severe esophagitis w/ necrotic appearing ulceration at [facility] on EGD 6/3. Biopsies pending  -40mg PPI BID  -NPO, nutrition consult, recommend TPN due to malnutrition, obtain esophagram in interim  -Supportive care per primary team   2.  Anemia secondary to above and malnutrition       Patient Active Problem List   Diagnosis Code    Abnormal LFTs R94.5    Acute renal failure (HCC) N17.9    SIRS (systemic inflammatory response syndrome) ([facility]) R65.10    Ventricular tachycardia (HCC) I47.2    SVT (supraventricular tachycardia) ([facility]) I47.1    UTI (urinary tract infection) N39.0    DM type 1 (diabetes mellitus, type 1) ([facility]) E10.9    Chronic abdominal pain R10.9, G89.29    Nausea & vomiting R11.2    Viral syndrome B34.9    Depression F32.9    Diabetic peripheral neuropathy associated with type 1 diabetes mellitus ([facility]) E10.42    DKA, type 1 (HCC) E10.10    GIB (gastrointestinal bleeding) K92.2    Nausea R11.0    Neuropathy G62.9    Hypertension I10    Hypokalemia E87.6    Hypophosphatemia E83.39    DKA, type 1, not at goal ([address] E10.10    Leukocytosis D72.829    Epigastric abdominal pain R10.13    LLQ abdominal pain R10.32    Diarrhea R19.7    Weight loss R63.4    Esophagitis, erosive K22.10    Nausea and vomiting R11.2    Sepsis (HCC) A41.9    DKA (diabetic ketoacidoses) ([facility]) E13.10    Pre-eclampsia superimposed on chronic hypertension, antepartum O11.9, O10.019    Chronic hypertension with superimposed preeclampsia O11.9    Chronic renal disease N18.9    Pregnant Z34.90    Pregnant and not yet delivered in third trimester Z34.93    Anemia D64.9    Hypertensive urgency I16.0    CKD (chronic kidney disease) stage 2, GFR 60-89 ml/min N18.2    Proteinuria R80.9    Intractable nausea and vomiting R11.2    MARIS (acute kidney injury) ([facility] N17.9    Dehydration E86.0    Diabetes mellitus (HCC) E11.9    Vomiting R11.10    Endometritis N71.9           Subjective:     Her abdominal pain is feeling a little better. Objective:     VITALS:   Last 24hrs VS reviewed since prior hospitalist progress note. Most recent are:  Visit Vitals  BP (!) 153/94 (BP 1 Location: Right arm, BP Patient Position: At rest)   Pulse 99   Temp 98.5 °F (36.9 °C)   Resp 17   Ht 5' 8\" (1.727 m)   Wt 60.3 kg (132 lb 13.6 oz)   SpO2 99%   BMI 20.20 kg/m²       Intake/Output Summary (Last 24 hours) at 6/4/2019 1536  Last data filed at 6/3/2019 1739  Gross per 24 hour   Intake 100 ml   Output    Net 100 ml        PHYSICAL EXAM:  General:          Well developed, Well nourished. Alert, cooperative, no acute distress.  Multiple skin tattoos. HEENT:           Normocephalic, Atraumatic. PERRLA, EOMI. Anicteric sclerae. Lungs:             CTA Bilaterally. No Wheezing/Rhonchi/Rales. Heart:              Regular rate and rhythm, No murmur, No Rubs, No Gallops  Abdomen:        Soft, non-distended, tender on light palpation to epigastrium.  +Bowel sounds, no hepatomegaly  Extremities:     No cyanosis,clubing or edema  Neurologic:      Alert and oriented X 3. No acute neurological distress. Psych:             Good insight. Not anxious nor agitated.            Lab Data   Recent Results (from the past 12 hour(s))   GLUCOSE, POC    Collection Time: 06/04/19  6:12 AM   Result Value Ref Range    Glucose (POC) 45 (LL) 65 - 100 mg/dL    Performed by [address], POC    Collection Time: 06/04/19  6:43 AM   Result Value Ref Range    Glucose (POC) 169 (H) 65 - 100 mg/dL    Performed by Conard Baltazar    GLUCOSE, POC    Collection Time: 06/04/19 12:07 PM   Result Value Ref Range    Glucose (POC) 118 (H) 65 - 100 mg/dL    Performed by Katerin Sullivan Medications: Reviewed    PMH/ reviewed - no change compared to H&P  Mid-Level Provider: Burton Allan NP   Date/Time:  6/4/2019      I have personally reviewed the history and independently examined the patient. I have reviewed the chart and agree with the documentation recorded by the Mid Level Provider, including the assessment, treatment plan, and disposition.       Anthony Jones MD

## 2019-06-05 LAB
ANION GAP SERPL CALC-SCNC: 7 MMOL/L (ref 5–15)
BASOPHILS # BLD: 0 K/UL (ref 0–0.1)
BASOPHILS NFR BLD: 0 % (ref 0–1)
BUN SERPL-MCNC: 9 MG/DL (ref 6–20)
BUN/CREAT SERPL: 10 (ref 12–20)
CALCIUM SERPL-MCNC: 7.7 MG/DL (ref 8.5–10.1)
CHLORIDE SERPL-SCNC: 111 MMOL/L (ref 97–108)
CO2 SERPL-SCNC: 22 MMOL/L (ref 21–32)
CREAT SERPL-MCNC: 0.86 MG/DL (ref 0.55–1.02)
DIFFERENTIAL METHOD BLD: ABNORMAL
EOSINOPHIL # BLD: 0.5 K/UL (ref 0–0.4)
EOSINOPHIL NFR BLD: 7 % (ref 0–7)
ERYTHROCYTE [DISTWIDTH] IN BLOOD BY AUTOMATED COUNT: 16.9 % (ref 11.5–14.5)
GLUCOSE BLD STRIP.AUTO-MCNC: 149 MG/DL (ref 65–100)
GLUCOSE BLD STRIP.AUTO-MCNC: 161 MG/DL (ref 65–100)
GLUCOSE BLD STRIP.AUTO-MCNC: 171 MG/DL (ref 65–100)
GLUCOSE BLD STRIP.AUTO-MCNC: 226 MG/DL (ref 65–100)
GLUCOSE SERPL-MCNC: 143 MG/DL (ref 65–100)
HCT VFR BLD AUTO: 21.5 % (ref 35–47)
HCT VFR BLD AUTO: 28.4 % (ref 35–47)
HGB BLD-MCNC: 6.6 G/DL (ref 11.5–16)
HGB BLD-MCNC: 9 G/DL (ref 11.5–16)
IMM GRANULOCYTES # BLD AUTO: 0.1 K/UL (ref 0–0.04)
IMM GRANULOCYTES NFR BLD AUTO: 1 % (ref 0–0.5)
LYMPHOCYTES # BLD: 1.7 K/UL (ref 0.8–3.5)
LYMPHOCYTES NFR BLD: 23 % (ref 12–49)
MCH RBC QN AUTO: 26.3 PG (ref 26–34)
MCHC RBC AUTO-ENTMCNC: 30.7 G/DL (ref 30–36.5)
MCV RBC AUTO: 85.7 FL (ref 80–99)
MONOCYTES # BLD: 0.6 K/UL (ref 0–1)
MONOCYTES NFR BLD: 9 % (ref 5–13)
NEUTS SEG # BLD: 4.3 K/UL (ref 1.8–8)
NEUTS SEG NFR BLD: 60 % (ref 32–75)
NRBC # BLD: 0 K/UL (ref 0–0.01)
NRBC BLD-RTO: 0 PER 100 WBC
PLATELET # BLD AUTO: 321 K/UL (ref 150–400)
PMV BLD AUTO: 10 FL (ref 8.9–12.9)
POTASSIUM SERPL-SCNC: 3.8 MMOL/L (ref 3.5–5.1)
RBC # BLD AUTO: 2.51 M/UL (ref 3.8–5.2)
RBC MORPH BLD: ABNORMAL
SERVICE CMNT-IMP: ABNORMAL
SODIUM SERPL-SCNC: 140 MMOL/L (ref 136–145)
WBC # BLD AUTO: 7.2 K/UL (ref 3.6–11)

## 2019-06-05 PROCEDURE — 02HV33Z INSERTION OF INFUSION DEVICE INTO SUPERIOR VENA CAVA, PERCUTANEOUS APPROACH: ICD-10-PCS | Performed by: FAMILY MEDICINE

## 2019-06-05 PROCEDURE — 74011250637 HC RX REV CODE- 250/637: Performed by: HOSPITALIST

## 2019-06-05 PROCEDURE — C1751 CATH, INF, PER/CENT/MIDLINE: HCPCS

## 2019-06-05 PROCEDURE — 74011250637 HC RX REV CODE- 250/637: Performed by: NURSE PRACTITIONER

## 2019-06-05 PROCEDURE — 74011250636 HC RX REV CODE- 250/636: Performed by: INTERNAL MEDICINE

## 2019-06-05 PROCEDURE — 74011000250 HC RX REV CODE- 250: Performed by: HOSPITALIST

## 2019-06-05 PROCEDURE — 74011000250 HC RX REV CODE- 250: Performed by: FAMILY MEDICINE

## 2019-06-05 PROCEDURE — 74011636637 HC RX REV CODE- 636/637: Performed by: HOSPITALIST

## 2019-06-05 PROCEDURE — 74011636637 HC RX REV CODE- 636/637: Performed by: NURSE PRACTITIONER

## 2019-06-05 PROCEDURE — C9113 INJ PANTOPRAZOLE SODIUM, VIA: HCPCS | Performed by: INTERNAL MEDICINE

## 2019-06-05 PROCEDURE — 85018 HEMOGLOBIN: CPT

## 2019-06-05 PROCEDURE — 74011250636 HC RX REV CODE- 250/636: Performed by: FAMILY MEDICINE

## 2019-06-05 PROCEDURE — 85025 COMPLETE CBC W/AUTO DIFF WBC: CPT

## 2019-06-05 PROCEDURE — 74011636637 HC RX REV CODE- 636/637: Performed by: FAMILY MEDICINE

## 2019-06-05 PROCEDURE — 65270000029 HC RM PRIVATE

## 2019-06-05 PROCEDURE — 30233N1 TRANSFUSION OF NONAUTOLOGOUS RED BLOOD CELLS INTO PERIPHERAL VEIN, PERCUTANEOUS APPROACH: ICD-10-PCS | Performed by: FAMILY MEDICINE

## 2019-06-05 PROCEDURE — 36573 INSJ PICC RS&I 5 YR+: CPT | Performed by: FAMILY MEDICINE

## 2019-06-05 PROCEDURE — 80048 BASIC METABOLIC PNL TOTAL CA: CPT

## 2019-06-05 PROCEDURE — 74011250636 HC RX REV CODE- 250/636: Performed by: HOSPITALIST

## 2019-06-05 PROCEDURE — 36430 TRANSFUSION BLD/BLD COMPNT: CPT

## 2019-06-05 PROCEDURE — 76937 US GUIDE VASCULAR ACCESS: CPT

## 2019-06-05 PROCEDURE — 74011000258 HC RX REV CODE- 258: Performed by: FAMILY MEDICINE

## 2019-06-05 PROCEDURE — P9016 RBC LEUKOCYTES REDUCED: HCPCS

## 2019-06-05 PROCEDURE — 74011250637 HC RX REV CODE- 250/637: Performed by: FAMILY MEDICINE

## 2019-06-05 PROCEDURE — 77030020365 HC SOL INJ SOD CL 0.9% 50ML

## 2019-06-05 PROCEDURE — 36600 WITHDRAWAL OF ARTERIAL BLOOD: CPT

## 2019-06-05 PROCEDURE — 36415 COLL VENOUS BLD VENIPUNCTURE: CPT

## 2019-06-05 PROCEDURE — 74011250637 HC RX REV CODE- 250/637: Performed by: INTERNAL MEDICINE

## 2019-06-05 PROCEDURE — 77030020847 HC STATLOK BARD -A

## 2019-06-05 RX ORDER — SODIUM CHLORIDE 9 MG/ML
250 INJECTION, SOLUTION INTRAVENOUS AS NEEDED
Status: DISCONTINUED | OUTPATIENT
Start: 2019-06-05 | End: 2019-06-07 | Stop reason: HOSPADM

## 2019-06-05 RX ORDER — FLUCONAZOLE 100 MG/1
200 TABLET ORAL
Status: COMPLETED | OUTPATIENT
Start: 2019-06-05 | End: 2019-06-05

## 2019-06-05 RX ORDER — FLUCONAZOLE 100 MG/1
100 TABLET ORAL DAILY
Status: DISCONTINUED | OUTPATIENT
Start: 2019-06-06 | End: 2019-06-07 | Stop reason: HOSPADM

## 2019-06-05 RX ADMIN — INSULIN GLARGINE 20 UNITS: 100 INJECTION, SOLUTION SUBCUTANEOUS at 22:19

## 2019-06-05 RX ADMIN — Medication 10 ML: at 05:42

## 2019-06-05 RX ADMIN — SUCRALFATE 1 G: 1 TABLET ORAL at 12:37

## 2019-06-05 RX ADMIN — Medication 10 ML: at 22:20

## 2019-06-05 RX ADMIN — ONDANSETRON 4 MG: 2 INJECTION INTRAMUSCULAR; INTRAVENOUS at 23:31

## 2019-06-05 RX ADMIN — LABETALOL HYDROCHLORIDE 300 MG: 200 TABLET, FILM COATED ORAL at 22:19

## 2019-06-05 RX ADMIN — HEPARIN SODIUM 5000 UNITS: 5000 INJECTION INTRAVENOUS; SUBCUTANEOUS at 01:06

## 2019-06-05 RX ADMIN — SUCRALFATE 1 G: 1 TABLET ORAL at 07:31

## 2019-06-05 RX ADMIN — DULOXETINE HYDROCHLORIDE 20 MG: 20 CAPSULE, DELAYED RELEASE ORAL at 10:04

## 2019-06-05 RX ADMIN — PROCHLORPERAZINE EDISYLATE 5 MG: 5 INJECTION INTRAMUSCULAR; INTRAVENOUS at 19:21

## 2019-06-05 RX ADMIN — LABETALOL HYDROCHLORIDE 300 MG: 200 TABLET, FILM COATED ORAL at 10:04

## 2019-06-05 RX ADMIN — INSULIN LISPRO 2 UNITS: 100 INJECTION, SOLUTION INTRAVENOUS; SUBCUTANEOUS at 19:17

## 2019-06-05 RX ADMIN — FLUCONAZOLE 200 MG: 100 TABLET ORAL at 12:36

## 2019-06-05 RX ADMIN — OXYCODONE AND ACETAMINOPHEN 1 TABLET: 5; 325 TABLET ORAL at 12:37

## 2019-06-05 RX ADMIN — SUCRALFATE 1 G: 1 TABLET ORAL at 22:00

## 2019-06-05 RX ADMIN — PANTOPRAZOLE SODIUM 40 MG: 40 INJECTION, POWDER, FOR SOLUTION INTRAVENOUS at 10:04

## 2019-06-05 RX ADMIN — PROCHLORPERAZINE EDISYLATE 5 MG: 5 INJECTION INTRAMUSCULAR; INTRAVENOUS at 04:16

## 2019-06-05 RX ADMIN — DOXYCYCLINE HYCLATE 100 MG: 100 TABLET, COATED ORAL at 10:03

## 2019-06-05 RX ADMIN — OXYCODONE AND ACETAMINOPHEN 1 TABLET: 5; 325 TABLET ORAL at 01:06

## 2019-06-05 RX ADMIN — PROCHLORPERAZINE EDISYLATE 5 MG: 5 INJECTION INTRAMUSCULAR; INTRAVENOUS at 10:04

## 2019-06-05 RX ADMIN — FERROUS SULFATE TAB 325 MG (65 MG ELEMENTAL FE) 325 MG: 325 (65 FE) TAB at 07:31

## 2019-06-05 RX ADMIN — INSULIN LISPRO 3 UNITS: 100 INJECTION, SOLUTION INTRAVENOUS; SUBCUTANEOUS at 07:31

## 2019-06-05 RX ADMIN — HYDRALAZINE HYDROCHLORIDE 100 MG: 50 TABLET, FILM COATED ORAL at 19:18

## 2019-06-05 RX ADMIN — DULOXETINE HYDROCHLORIDE 20 MG: 20 CAPSULE, DELAYED RELEASE ORAL at 19:18

## 2019-06-05 RX ADMIN — SUCRALFATE 1 G: 1 TABLET ORAL at 19:19

## 2019-06-05 RX ADMIN — HYDRALAZINE HYDROCHLORIDE 100 MG: 50 TABLET, FILM COATED ORAL at 10:03

## 2019-06-05 RX ADMIN — OXYCODONE AND ACETAMINOPHEN 1 TABLET: 5; 325 TABLET ORAL at 20:20

## 2019-06-05 RX ADMIN — DOXYCYCLINE HYCLATE 100 MG: 100 TABLET, COATED ORAL at 22:19

## 2019-06-05 RX ADMIN — PANTOPRAZOLE SODIUM 40 MG: 40 INJECTION, POWDER, FOR SOLUTION INTRAVENOUS at 22:19

## 2019-06-05 RX ADMIN — INSULIN LISPRO 2 UNITS: 100 INJECTION, SOLUTION INTRAVENOUS; SUBCUTANEOUS at 12:35

## 2019-06-05 RX ADMIN — CALCIUM GLUCONATE: 94 INJECTION, SOLUTION INTRAVENOUS at 19:21

## 2019-06-05 NOTE — PROGRESS NOTES
2:19 PM  Chart reviewed. Patient reviewed in [address] [facility]. Patient not medically stable for discharge at this time. Transition of care plan is for patient to discharge home with family assistance once symptoms are controlled. CM will continue to follow and assist with disposition needs as they arise.     QUANG Avalos/YORDY  Care Management

## 2019-06-05 NOTE — WOUND CARE
WOCN Note:     New consult for bullae to right wrist.    Chart shows:  Admitted for nausea & vomiting  History of DM, depression, endometriosis, gastroparesis, esophageal ulceration    Assessment:   Communicative, continent and mobile. Bed: [place]  Patient reports no pain. 1. Right inner wrist with scattered multiple bullae all the same size each ~0.5 x 0.5 x 0 cm all clear-fluid filled and without surrounding erythema. Covered with Mepilex border to keep name band from irritating or rupturing bullae. Recommendations:    Maintain Mepilex border or change as needed to protect bullae from rupture. Discussed above plan with patient & RN.     Transition of Care: Plan to follow weekly and as needed while admitted to hospital.     TU ChavarriaN, RN, [employer]  Certified Wound, Ostomy, Continence Nurse  office [phone]  pager [phone] or call  to page

## 2019-06-05 NOTE — PROGRESS NOTES
ID Progress Note  2019    Subjective:     She seems to be feeling some better today    Objective:     Antibiotics:  1. Merrem      Vitals:   Visit Vitals  /71 (BP 1 Location: Right arm, BP Patient Position: At rest)   Pulse 89   Temp 98.5 °F (36.9 °C)   Resp 16   Ht 5' 8\" (1.727 m)   Wt 65.8 kg (145 lb)   SpO2 97%   BMI 22.05 kg/m²        Tmax:  Temp (24hrs), Av °F (36.7 °C), Min:97.7 °F (36.5 °C), Max:98.6 °F (37 °C)      Exam:  Lungs:  clear to auscultation bilaterally  Heart:  regular rate and rhythm  Abdomen:  soft, non-tender. Bowel sounds normal. No masses,  no organomegaly    Labs:      Recent Labs     19  0847 19  0103 19  0318 19  0507   WBC  --  7.2 8.8 11.6*   HGB 9.0* 6.6* 7.2* 7.5*   PLT  --  321 333 358   BUN  --  9 8 9   CREA  --  0.86 0.87 1.00       Cultures:     Lab Results   Component Value Date/Time    Specimen Description: URINE 2014 02:06 AM    Specimen Description: NARES 2014 06:41 PM    Specimen Description: URINE 2014 02:56 PM     Lab Results   Component Value Date/Time    Culture result: NO GROWTH 5 DAYS 2019 02:49 PM    Culture result: (A) 2019 02:49 PM     STREPTOCOCCI, BETA HEMOLYTIC GROUP B Penicillin and ampicillin are drugs of choice for treatment of beta-hemolytic streptococcal infections. Susceptibility testing of penicillins and beta-lactams approved by the FDA for treatment of beta-hemolytic streptococcal infections need not be performed routinely, because nonsusceptible isolates are extremely rare. CLSI     Culture result: (A) 2019 11:40 AM     FEW STREPTOCOCCI, BETA HEMOLYTIC GROUP B Penicillin and ampicillin are drugs of choice for treatment of beta-hemolytic streptococcal infections. Susceptibility testing of penicillins and beta-lactams approved by the FDA for treatment of beta-hemolytic streptococcal infections need not be performed routinely, because nonsusceptible isolates are extremely rare. CLSI 2012    Culture result: LIGHT MIXED SKIN MARSHALL ISOLATED 05/27/2019 11:40 AM    Culture result: (A) 05/27/2019 11:40 AM     HEAVY  MIXED  ANAEROBIC GRAM POSITIVE COCCI  AND  ANAEROBIC GRAM NEGATIVE RODS  BETA LACTAMASE POSITIVE         Radiology:     Line/Insert Date:           Assessment:     1. ?endometritis  2. IV access issues  3. Gastroparesis     Objective:     1.  Change to oral doxycycline for another week    Paul Toribio MD

## 2019-06-05 NOTE — PROGRESS NOTES
Problem: Diabetes Self-Management  Goal: *Disease process and treatment process  Description  Define diabetes and identify own type of diabetes; list 3 options for treating diabetes. Outcome: Progressing Towards Goal  Goal: *Incorporating nutritional management into lifestyle  Description  Describe effect of type, amount and timing of food on blood glucose; list 3 methods for planning meals. Outcome: Progressing Towards Goal  Goal: *Incorporating physical activity into lifestyle  Description  State effect of exercise on blood glucose levels. Outcome: Progressing Towards Goal  Goal: *Developing strategies to promote health/change behavior  Description  Define the ABC's of diabetes; identify appropriate screenings, schedule and personal plan for screenings. Outcome: Progressing Towards Goal  Goal: *Using medications safely  Description  State effect of diabetes medications on diabetes; name diabetes medication taking, action and side effects. Outcome: Progressing Towards Goal  Goal: *Monitoring blood glucose, interpreting and using results  Description  Identify recommended blood glucose targets  and personal targets. Outcome: Progressing Towards Goal  Goal: *Prevention, detection, treatment of acute complications  Description  List symptoms of hyper- and hypoglycemia; describe how to treat low blood sugar and actions for lowering  high blood glucose level. Outcome: Progressing Towards Goal  Goal: *Prevention, detection and treatment of chronic complications  Description  Define the natural course of diabetes and describe the relationship of blood glucose levels to long term complications of diabetes.   Outcome: Progressing Towards Goal  Goal: *Developing strategies to address psychosocial issues  Description  Describe feelings about living with diabetes; identify support needed and support network  Outcome: Progressing Towards Goal  Goal: *Insulin pump training  Outcome: Progressing Towards Goal  Goal: *Sick day guidelines  Outcome: Progressing Towards Goal  Goal: *Patient Specific Goal (EDIT GOAL, INSERT TEXT)  Outcome: Progressing Towards Goal     Problem: Patient Education: Go to Patient Education Activity  Goal: Patient/Family Education  Outcome: Progressing Towards Goal     Problem: Pressure Injury - Risk of  Goal: *Prevention of pressure injury  Description  Document Doroteo Scale and appropriate interventions in the flowsheet.   Outcome: Progressing Towards Goal     Problem: Patient Education: Go to Patient Education Activity  Goal: Patient/Family Education  Outcome: Progressing Towards Goal

## 2019-06-05 NOTE — PROCEDURES
PICC Placement Note    PRE-PROCEDURE VERIFICATION    Order for PICC verified. Verbal Consent obtained from patient after risks, benefits, and procedure was explained. Time given to answer questions and/or concerns. Consent obtained 6/4/19. Correct Procedure: yes  Correct Site:  yes  Temperature: Temp: 98 °F (36.7 °C)(2 hour transfusion vitals  ), Temperature Source: Temp Source: Axillary  Recent Labs     06/05/19  0103   BUN 9   CREA 0.86      WBC 7.2     Allergies: Morphine and Pcn [penicillins]  Education materials, including PICC Booklet, for PICC Care given to patient: yes. See Patient Education activity for further details. PROCEDURE DETAIL  A double lumen PICC line was started for vascular access, TPN, nurse/physician request and Home IV Therapy. The following documentation is in addition to the PICC properties in the lines/airways flowsheet :  Lot #: [id number]  Was xylocaine 1% used intradermally:  yes  Catheter Length: 34 cm  External Catheter Length: 0 cm  Vein Selection for PICC:right brachial  Central Line Bundle followed no  Complication Related to Insertion: none    The placement was verified by ECG/[employer]: The tip location is in the superior vena cava. See ECG results for PICC tip placement. Report given to Gentry Nava RN. Line is okay to use.     TU EidN, RN, [facility]-BC  Vascular Access Team

## 2019-06-05 NOTE — DIABETES MGMT
[facility]    [facility] Follow Up Consult Note    Recommendations/ Comments:  NPO for procedure. Hypoglycemia yesterday, likely due to stacking of correction insulin. Noted Lantus increased to 20 units, previously 15 units. Noted pt on full liquid diet now. If appropriate, consider the following:  Changing correction insulin to ACHS   Observe blood sugars for potential need for decreasing Lantus while on full liquid diet. Current hospital DM medication:   Lantus 20 units daily  Lispro normal sensitivity correction scale every 6 hours       DTC will continue to follow patient as needed. ____________________________    Consult received for:   [x]           Hospital Medication Recommendations                 [x]           Hospital Blood Glucose Management    Chart reviewed and initial evaluation complete on Gracy Torres. Patient is a 30 y.o. female with known DM on [employer] insulin pump PTA. She is followed by Dr Johana Dykes, Endocrinologist   Post partum - delivered by  5/10/2019  Pt is well know to DTC from previous admissions  Third admission since delivery N&V and intractable abd pain  MARIS on CKD 2  HTN  Gastroparesis  Depression      A1c:   Lab Results   Component Value Date/Time    Hemoglobin A1c 7.4 (H) 2019 02:49 PM       Recent Glucose Results:   Lab Results   Component Value Date/Time     (H) 2019 01:03 AM    GLUCPOC 226 (H) 2019 06:53 AM    GLUCPOC 149 (H) 2019 11:08 PM    GLUCPOC 141 (H) 2019 05:58 PM        Lab Results   Component Value Date/Time    Creatinine 0.86 2019 01:03 AM       Active Orders   Diet    DIET FULL LIQUID        PO intake: No data found. Thank you.   Chet Duran RD, [phone]  Pager: [phone]     Time spent: 5 min

## 2019-06-05 NOTE — PROGRESS NOTES
[facility] Progress note    Name: Karyna Edwards  YOB: 1990  MRN: [id number]  Admission Date: 5/30/2019 12:23 PM    Date of service: 6/5/2019 2:42 AM    Subjective:   RN called, pt Hgb 6.6. Objective:   Pt seen, discussed Hgb. Pt reports she has gotten blood transfusions before. Risks/benefits and blood consent form reviewed and signed. All questions answered. Assessment & Plan:     Transfuse 1u PRBCs. Recheck Hgb 2h after transfusion.        Charlie Prasad NP  [phone] or [place]

## 2019-06-05 NOTE — PROGRESS NOTES
[address]  [address]  [place], [address]  [phone]                GI PROGRESS NOTE  Dianna Childress, [facility]      NAME:   Diego Moscoso   :    1990   MRN:    [id number]     Assessment/Plan     1. Severe esophagitis w/ necrotic appearing ulceration at GEJ on EGD 6/3. Biopsies pending  -40mg PPI BID  -advance to fulls, GERD diet, recommend TPN due to malnutrition until adequately taking PO  -Supportive care per primary team   -Candida on brushing, Diflucan x 14 days  2. Anemia secondary to above and malnutrition-hgb 6.6 transfused x 1 today.  -monitor h/h, transfuse as necessary.      Patient Active Problem List   Diagnosis Code    Abnormal LFTs R94.5    Acute renal failure (HCC) N17.9    SIRS (systemic inflammatory response syndrome) ([facility]) R65.10    Ventricular tachycardia ([facility]) I47.2    SVT (supraventricular tachycardia) ([facility]) I47.1    UTI (urinary tract infection) N39.0    DM type 1 (diabetes mellitus, type 1) (HCC) E10.9    Chronic abdominal pain R10.9, G89.29    Nausea & vomiting R11.2    Viral syndrome B34.9    Depression F32.9    Diabetic peripheral neuropathy associated with type 1 diabetes mellitus (HCC) E10.42    DKA, type 1 (HCC) E10.10    GIB (gastrointestinal bleeding) K92.2    Nausea R11.0    Neuropathy G62.9    Hypertension I10    Hypokalemia E87.6    Hypophosphatemia E83.39    DKA, type 1, not at goal ([facility] [address] E10.10    Leukocytosis D72.829    Epigastric abdominal pain R10.13    LLQ abdominal pain R10.32    Diarrhea R19.7    Weight loss R63.4    Esophagitis, erosive K22.10    Nausea and vomiting R11.2    Sepsis (HCC) A41.9    DKA (diabetic ketoacidoses) ([facility]) E13.10    Pre-eclampsia superimposed on chronic hypertension, antepartum O11.9, O10.019    Chronic hypertension with superimposed preeclampsia O11.9    Chronic renal disease N18.9    Pregnant Z34.90    Pregnant and not yet delivered in third trimester Z34.93    Anemia D64.9    Hypertensive urgency I16.0    CKD (chronic kidney disease) stage 2, GFR 60-89 ml/min N18.2    Proteinuria R80.9    Intractable nausea and vomiting R11.2    MARIS (acute kidney injury) ([facility] [address] N17.9    Dehydration E86.0    Diabetes mellitus (HCC) E11.9    Vomiting R11.10    Endometritis N71.9           Subjective:     +nausea. Tolerated minimal full liquids tray. Pain improving. Objective:     VITALS:   Last 24hrs VS reviewed since prior hospitalist progress note. Most recent are:  Visit Vitals  /83 (BP 1 Location: Right arm)   Pulse 85   Temp 98 °F (36.7 °C)   Resp 16   Ht 5' 8\" (1.727 m)   Wt 65.8 kg (145 lb)   SpO2 98%   BMI 22.05 kg/m²     No intake or output data in the 24 hours ending 06/05/19 1002     PHYSICAL EXAM:  General:          Well developed, Well nourished. Alert, cooperative, no acute distress.  Multiple skin tattoos.   HEENT:           Normocephalic, Atraumatic. PERRLA, EOMI. Anicteric sclerae. Lungs:             CTA Bilaterally. No Wheezing/Rhonchi/Rales. Heart:              Regular rate and rhythm, No murmur, No Rubs, No Gallops  Abdomen:        Soft, non-distended, tender on light palpation to epigastrium.  +Bowel sounds, no hepatomegaly  Extremities:     No cyanosis,clubing or edema  Neurologic:      Alert and oriented X 3.  No acute neurological distress. Psych:             Good insight. Not anxious nor agitated.            Lab Data   Recent Results (from the past 12 hour(s))   GLUCOSE, POC    Collection Time: 06/04/19 11:08 PM   Result Value Ref Range    Glucose (POC) 149 (H) 65 - 100 mg/dL    Performed by [email or web address]    CBC WITH AUTOMATED DIFF    Collection Time: 06/05/19  1:03 AM   Result Value Ref Range    WBC 7.2 3.6 - 11.0 K/uL    RBC 2.51 (L) 3.80 - 5.20 M/uL    HGB 6.6 (L) 11.5 - 16.0 g/dL    HCT 21.5 (L) 35.0 - 47.0 %    MCV 85.7 80.0 - 99.0 FL    MCH 26.3 26.0 - 34.0 PG    MCHC 30.7 30.0 - 36.5 g/dL    RDW 16.9 (H) 11.5 - 14.5 %    PLATELET 783 520 - 400 K/uL    MPV 10.0 8.9 - 12.9 FL    NRBC 0.0 0  WBC    ABSOLUTE NRBC 0.00 0.00 - 0.01 K/uL    NEUTROPHILS 60 32 - 75 %    LYMPHOCYTES 23 12 - 49 %    MONOCYTES 9 5 - 13 %    EOSINOPHILS 7 0 - 7 %    BASOPHILS 0 0 - 1 %    IMMATURE GRANULOCYTES 1 (H) 0.0 - 0.5 %    ABS. NEUTROPHILS 4.3 1.8 - 8.0 K/UL    ABS. LYMPHOCYTES 1.7 0.8 - 3.5 K/UL    ABS. MONOCYTES 0.6 0.0 - 1.0 K/UL    ABS. EOSINOPHILS 0.5 (H) 0.0 - 0.4 K/UL    ABS. BASOPHILS 0.0 0.0 - 0.1 K/UL    ABS. IMM.  GRANS. 0.1 (H) 0.00 - 0.04 K/UL    DF SMEAR SCANNED      RBC COMMENTS ANISOCYTOSIS  1+        RBC COMMENTS HYPOCHROMIA  1+        RBC COMMENTS OVALOCYTES  PRESENT       METABOLIC PANEL, BASIC    Collection Time: 06/05/19  1:03 AM   Result Value Ref Range    Sodium 140 136 - 145 mmol/L    Potassium 3.8 3.5 - 5.1 mmol/L    Chloride 111 (H) 97 - 108 mmol/L    CO2 22 21 - 32 mmol/L    Anion gap 7 5 - 15 mmol/L    Glucose 143 (H) 65 - 100 mg/dL    BUN 9 6 - 20 MG/DL    Creatinine 0.86 0.55 - 1.02 MG/DL    BUN/Creatinine ratio 10 (L) 12 - 20      GFR est AA >60 >60 ml/min/1.73m2    GFR est non-AA >60 >60 ml/min/1.73m2    Calcium 7.7 (L) 8.5 - 10.1 MG/DL   GLUCOSE, POC    Collection Time: 06/05/19  6:53 AM   Result Value Ref Range    Glucose (POC) 226 (H) 65 - 100 mg/dL    Performed by [facility]    HGB & HCT    Collection Time: 06/05/19  8:47 AM   Result Value Ref Range    HGB 9.0 (L) 11.5 - 16.0 g/dL    HCT 28.4 (L) 35.0 - 47.0 %         Medications: Reviewed    PMH/ reviewed - no change compared to H&P  Mid-Level Provider: Aliyah Antonio NP   Date/Time:  6/5/2019

## 2019-06-05 NOTE — PROGRESS NOTES
Hospitalist Progress Note  Jamey Santiago MD  Answering service: [phone] OR [phone] from in house phone      Date of Service:  2019  NAME:  Randi High  :  1990  MRN:  [id number]      Admission Summary:    78-year-old female with past medical history of chronic pain syndrome, diabetes mellitus type 2, hypertension, and depression, presents to the hospital with Abdominal pain, nausea, and vomiting    Interval history / Subjective:   Patient Was seen in followup for  Abdominal pains  She Reports abdominal pain is better controlled  Had gastrograffin swallow study yesterday  GI recommending NPO and starting TPN     Assessment & Plan:         1. Abdominal pain- multifactorial- due to hiatal hernia, endometritis, ulcerations of esophagus and GE junction, with ulcerated areas- noted on EGD-  -  gastrograffin swallow study on 19 showing \"Imaging findings consistent with reported history of distal esophageal/gastroesophageal junction ulcer. There is no evidence of leak -   - GI recs full liquid diet,  IV PPI, started TPN yesterday- consult for PICC line with plans for outpatient TPN. -Pt also under tx for infectious endometritis-  off meropenem and now on PO doxycycline  2. Dm 1 with hyperglycemia - was on insulin Pump. Currently on lantus insulin,  Dose recently adjusted due to hypoglycemia - plans to stop this and start insulin pump today and adjust for hyperglycemia with TPN    3. Chest pains  - cxr neg. D dimer elevated -  Vq scan negative. , duplex negative for DVT- likely related to findings in #1    4. HTN - uncontrolled on admission.- better controlled on Hydralazine, labetalol    5. Hyercalcemia - Likely due to dehydration. Appreciate renal eval. Improved. 6. MARIS on CKD 2 - monitor and avoid Nephrotoxins, improved with hydration    7. Hypomagnesemia - replete. And rechecking     8.  Depression - post partum, psychiatry eval - resume home med    9. Pain Control - try to wean off narcotics, D. W pt and Father who is requesting to wean off all narcotics. They are in agreement. Code status: Full  DVT prophylaxis: Heparin    Telemetry reviewed:   normal sinus rhythm      Risk of deterioration: medium      Total time spent with patient: [address] discussed with: Patient/Family and Nurse  Disposition: Home w/Family and in 2 days once symptoms of gastroparesis controlled. Hospital Problems  Date Reviewed: 6/3/2019          Codes Class Noted POA    Endometritis ICD-10-CM: N71.9  ICD-9-CM: 615.9  5/31/2019 Unknown        * (Principal) Vomiting ICD-10-CM: R11.10  ICD-9-CM: 787.03  5/30/2019 Unknown                Review of Systems:     Review of Systems   Constitutional: Negative. HENT: Negative. Eyes: Negative. Respiratory: Negative. Cardiovascular: Negative. Gastrointestinal: Positive for abdominal pain and nausea. Negative for blood in stool and melena. Genitourinary: Negative. Musculoskeletal: Negative. Skin: Negative. Neurological: Negative. Endo/Heme/Allergies: Negative. Psychiatric/Behavioral: Negative for suicidal ideas. Vital Signs:    Last 24hrs VS reviewed since prior progress note. Most recent are:  Visit Vitals  /83 (BP 1 Location: Right arm)   Pulse 85   Temp 98 °F (36.7 °C)   Resp 16   Ht 5' 8\" (1.727 m)   Wt 65.8 kg (145 lb)   SpO2 98%   BMI 22.05 kg/m²       No intake or output data in the 24 hours ending 06/05/19 0847     Physical Examination:             Constitutional:  No acute distress, cooperative, pleasant    ENT:  Oral mucous moist, oropharynx benign. Neck supple,    Resp:  CTA bilaterally. No wheezing/rhonchi/rales. No accessory muscle use   CV:  Regular rhythm, normal rate, no murmurs, gallops, rubs    GI:  Soft, non distended, non tender.  normoactive bowel sounds, no hepatosplenomegaly     Musculoskeletal:  No edema, warm, 2+ pulses throughout Neurologic:  Moves all extremities. AAOx3, CN II-XII reviewed     Skin:  multiple tattoes noted. Data Review:    Review and/or order of clinical lab test  Review and/or order of tests in the radiology section of [facility]      Labs:     Recent Labs     06/05/19 0103 06/04/19 0318   WBC 7.2 8.8   HGB 6.6* 7.2*   HCT 21.5* 23.6*    333     Recent Labs     06/05/19 0103 06/04/19 0318 06/03/19  0507    139 134*   K 3.8 3.5 4.2   * 109* 106   CO2 22 25 21   BUN 9 8 9   CREA 0.86 0.87 1.00   * 62* 238*   CA 7.7* 8.3* 7.9*     No results for input(s): SGOT, GPT, ALT, AP, TBIL, TBILI, TP, ALB, GLOB, GGT, AML, LPSE in the last 72 hours. No lab exists for component: AMYP, HLPSE  No results for input(s): INR, PTP, APTT in the last 72 hours. No lab exists for component: INREXT, INREXT   No results for input(s): FE, TIBC, PSAT, FERR in the last 72 hours. Lab Results   Component Value Date/Time    Folate 26.9 (H) 09/03/2012 05:45 AM      No results for input(s): PH, PCO2, PO2 in the last 72 hours. No results for input(s): CPK, CKNDX, TROIQ in the last 72 hours.     No lab exists for component: CPKMB  Lab Results   Component Value Date/Time    Cholesterol, total 160 05/15/2016 12:09 AM    HDL Cholesterol 41 05/15/2016 12:09 AM    LDL, calculated 98 05/15/2016 12:09 AM    Triglyceride 105 05/15/2016 12:09 AM    CHOL/HDL Ratio 3.9 05/15/2016 12:09 AM     Lab Results   Component Value Date/Time    Glucose (POC) 226 (H) 06/05/2019 06:53 AM    Glucose (POC) 149 (H) 06/04/2019 11:08 PM    Glucose (POC) 141 (H) 06/04/2019 05:58 PM    Glucose (POC) 118 (H) 06/04/2019 12:07 PM    Glucose (POC) 169 (H) 06/04/2019 06:43 AM     Lab Results   Component Value Date/Time    Color YELLOW/STRAW 05/30/2019 02:49 PM    Appearance CLEAR 05/30/2019 02:49 PM    Specific gravity 1.014 05/30/2019 02:49 PM    Specific gravity 1.010 04/07/2019 07:07 PM    pH (UA) 6.5 05/30/2019 02:49 PM    Protein 300 (A) 05/30/2019 02:49 PM    Glucose 500 (A) 05/30/2019 02:49 PM    Ketone 15 (A) 05/30/2019 02:49 PM    Bilirubin NEGATIVE  05/30/2019 02:49 PM    Urobilinogen 0.2 05/30/2019 02:49 PM    Nitrites NEGATIVE  05/30/2019 02:49 PM    Leukocyte Esterase NEGATIVE  05/30/2019 02:49 PM    Epithelial cells FEW 05/30/2019 02:49 PM    Bacteria NEGATIVE  05/30/2019 02:49 PM    WBC 0-4 05/30/2019 02:49 PM    RBC 20-50 05/30/2019 02:49 PM         Medications Reviewed:     Current Facility-Administered Medications   Medication Dose Route Frequency    0.9% sodium chloride infusion 250 mL  250 mL IntraVENous PRN    TPN ADULT - PERIPHERAL   IntraVENous CONTINUOUS    doxycycline (VIBRA-TABS) tablet 100 mg  100 mg Oral Q12H    insulin glargine (LANTUS) injection 20 Units  20 Units SubCUTAneous QHS    sodium chloride (NS) flush 5-40 mL  5-40 mL IntraVENous Q8H    sodium chloride (NS) flush 5-40 mL  5-40 mL IntraVENous PRN    pantoprazole (PROTONIX) injection 40 mg  40 mg IntraVENous Q12H    insulin lispro (HUMALOG) injection   SubCUTAneous Q6H    prochlorperazine (COMPAZINE) with saline injection 5 mg  5 mg IntraVENous Q6H PRN    acetaminophen (TYLENOL) tablet 650 mg  650 mg Oral Q6H PRN    sucralfate (CARAFATE) tablet 1 g  1 g Oral AC&HS    DULoxetine (CYMBALTA) capsule 20 mg  20 mg Oral BID    ferrous sulfate tablet 325 mg  325 mg Oral ACB    hydrALAZINE (APRESOLINE) tablet 100 mg  100 mg Oral TID    labetalol (NORMODYNE) tablet 300 mg  300 mg Oral Q12H    oxyCODONE-acetaminophen (PERCOCET) 5-325 mg per tablet 1 Tab  1 Tab Oral Q8H PRN    sodium chloride (NS) flush 5-40 mL  5-40 mL IntraVENous Q8H    sodium chloride (NS) flush 5-40 mL  5-40 mL IntraVENous PRN    heparin (porcine) injection 5,000 Units  5,000 Units SubCUTAneous Q8H    ondansetron (ZOFRAN) injection 4 mg  4 mg IntraVENous Q4H PRN    glucose chewable tablet 16 g  4 Tab Oral PRN    dextrose (D50W) injection syrg 12.5-25 g  25-50 mL IntraVENous PRN    glucagon (GLUCAGEN) injection 1 mg  1 mg IntraMUSCular PRN    hydrALAZINE (APRESOLINE) 20 mg/mL injection 10 mg  10 mg IntraVENous Q6H PRN     ______________________________________________________________________  EXPECTED LENGTH OF STAY: - - -  ACTUAL LENGTH OF STAY:          5                 Bryce Lam MD   Patient doesn't want family notified.

## 2019-06-05 NOTE — PROGRESS NOTES
Problem: Falls - Risk of  Goal: *Absence of Falls  Description  Document Monet Cardona Fall Risk and appropriate interventions in the flowsheet.   Outcome: Progressing Towards Goal     Problem: Patient Education: Go to Patient Education Activity  Goal: Patient/Family Education  Outcome: Progressing Towards Goal

## 2019-06-05 NOTE — ROUTINE PROCESS
Bedside shift change report given to Ike (oncoming nurse) by Maribel Garrett (offgoing nurse). Report included the following information SBAR, Kardex, Intake/Output, MAR and Recent Results.

## 2019-06-06 LAB
ABO + RH BLD: NORMAL
ANION GAP SERPL CALC-SCNC: 4 MMOL/L (ref 5–15)
BASOPHILS # BLD: 0 K/UL (ref 0–0.1)
BASOPHILS NFR BLD: 1 % (ref 0–1)
BLD PROD TYP BPU: NORMAL
BLOOD GROUP ANTIBODIES SERPL: NORMAL
BPU ID: NORMAL
BUN SERPL-MCNC: 15 MG/DL (ref 6–20)
BUN/CREAT SERPL: 15 (ref 12–20)
CALCIUM SERPL-MCNC: 8.2 MG/DL (ref 8.5–10.1)
CHLORIDE SERPL-SCNC: 111 MMOL/L (ref 97–108)
CO2 SERPL-SCNC: 24 MMOL/L (ref 21–32)
CREAT SERPL-MCNC: 1.01 MG/DL (ref 0.55–1.02)
CROSSMATCH RESULT,%XM: NORMAL
DIFFERENTIAL METHOD BLD: ABNORMAL
EOSINOPHIL # BLD: 0.6 K/UL (ref 0–0.4)
EOSINOPHIL NFR BLD: 7 % (ref 0–7)
ERYTHROCYTE [DISTWIDTH] IN BLOOD BY AUTOMATED COUNT: 16.8 % (ref 11.5–14.5)
GLUCOSE BLD STRIP.AUTO-MCNC: 115 MG/DL (ref 65–100)
GLUCOSE BLD STRIP.AUTO-MCNC: 141 MG/DL (ref 65–100)
GLUCOSE BLD STRIP.AUTO-MCNC: 181 MG/DL (ref 65–100)
GLUCOSE BLD STRIP.AUTO-MCNC: 329 MG/DL (ref 65–100)
GLUCOSE BLD STRIP.AUTO-MCNC: 99 MG/DL (ref 65–100)
GLUCOSE SERPL-MCNC: 107 MG/DL (ref 65–100)
HCT VFR BLD AUTO: 26.5 % (ref 35–47)
HGB BLD-MCNC: 8 G/DL (ref 11.5–16)
IMM GRANULOCYTES # BLD AUTO: 0.1 K/UL (ref 0–0.04)
IMM GRANULOCYTES NFR BLD AUTO: 1 % (ref 0–0.5)
LYMPHOCYTES # BLD: 2 K/UL (ref 0.8–3.5)
LYMPHOCYTES NFR BLD: 24 % (ref 12–49)
MCH RBC QN AUTO: 26.3 PG (ref 26–34)
MCHC RBC AUTO-ENTMCNC: 30.2 G/DL (ref 30–36.5)
MCV RBC AUTO: 87.2 FL (ref 80–99)
MONOCYTES # BLD: 0.8 K/UL (ref 0–1)
MONOCYTES NFR BLD: 10 % (ref 5–13)
NEUTS SEG # BLD: 4.9 K/UL (ref 1.8–8)
NEUTS SEG NFR BLD: 57 % (ref 32–75)
NRBC # BLD: 0 K/UL (ref 0–0.01)
NRBC BLD-RTO: 0 PER 100 WBC
PLATELET # BLD AUTO: 335 K/UL (ref 150–400)
PMV BLD AUTO: 10 FL (ref 8.9–12.9)
POTASSIUM SERPL-SCNC: 4.3 MMOL/L (ref 3.5–5.1)
RBC # BLD AUTO: 3.04 M/UL (ref 3.8–5.2)
SERVICE CMNT-IMP: ABNORMAL
SERVICE CMNT-IMP: NORMAL
SODIUM SERPL-SCNC: 139 MMOL/L (ref 136–145)
SPECIMEN EXP DATE BLD: NORMAL
STATUS OF UNIT,%ST: NORMAL
UNIT DIVISION, %UDIV: 0
WBC # BLD AUTO: 8.5 K/UL (ref 3.6–11)

## 2019-06-06 PROCEDURE — 65270000029 HC RM PRIVATE

## 2019-06-06 PROCEDURE — 74011000258 HC RX REV CODE- 258: Performed by: FAMILY MEDICINE

## 2019-06-06 PROCEDURE — 80048 BASIC METABOLIC PNL TOTAL CA: CPT

## 2019-06-06 PROCEDURE — 74011250637 HC RX REV CODE- 250/637: Performed by: NURSE PRACTITIONER

## 2019-06-06 PROCEDURE — 74011000250 HC RX REV CODE- 250: Performed by: FAMILY MEDICINE

## 2019-06-06 PROCEDURE — C9113 INJ PANTOPRAZOLE SODIUM, VIA: HCPCS | Performed by: INTERNAL MEDICINE

## 2019-06-06 PROCEDURE — 85025 COMPLETE CBC W/AUTO DIFF WBC: CPT

## 2019-06-06 PROCEDURE — 36415 COLL VENOUS BLD VENIPUNCTURE: CPT

## 2019-06-06 PROCEDURE — 74011636637 HC RX REV CODE- 636/637: Performed by: NURSE PRACTITIONER

## 2019-06-06 PROCEDURE — 74011250637 HC RX REV CODE- 250/637: Performed by: FAMILY MEDICINE

## 2019-06-06 PROCEDURE — 74011250636 HC RX REV CODE- 250/636: Performed by: INTERNAL MEDICINE

## 2019-06-06 PROCEDURE — 82962 GLUCOSE BLOOD TEST: CPT

## 2019-06-06 PROCEDURE — 74011250637 HC RX REV CODE- 250/637: Performed by: HOSPITALIST

## 2019-06-06 PROCEDURE — 74011250636 HC RX REV CODE- 250/636: Performed by: FAMILY MEDICINE

## 2019-06-06 PROCEDURE — 74011250637 HC RX REV CODE- 250/637: Performed by: INTERNAL MEDICINE

## 2019-06-06 RX ORDER — DEXTROSE 50 % IN WATER (D50W) INTRAVENOUS SYRINGE
12.5-25 AS NEEDED
Status: DISCONTINUED | OUTPATIENT
Start: 2019-06-06 | End: 2019-06-07 | Stop reason: HOSPADM

## 2019-06-06 RX ADMIN — Medication 10 ML: at 21:36

## 2019-06-06 RX ADMIN — Medication 10 ML: at 06:06

## 2019-06-06 RX ADMIN — HYDRALAZINE HYDROCHLORIDE 100 MG: 50 TABLET, FILM COATED ORAL at 08:35

## 2019-06-06 RX ADMIN — FERROUS SULFATE TAB 325 MG (65 MG ELEMENTAL FE) 325 MG: 325 (65 FE) TAB at 07:27

## 2019-06-06 RX ADMIN — PANTOPRAZOLE SODIUM 40 MG: 40 INJECTION, POWDER, FOR SOLUTION INTRAVENOUS at 21:36

## 2019-06-06 RX ADMIN — SUCRALFATE 1 G: 1 TABLET ORAL at 07:27

## 2019-06-06 RX ADMIN — OXYCODONE AND ACETAMINOPHEN 1 TABLET: 5; 325 TABLET ORAL at 19:12

## 2019-06-06 RX ADMIN — DULOXETINE HYDROCHLORIDE 20 MG: 20 CAPSULE, DELAYED RELEASE ORAL at 08:35

## 2019-06-06 RX ADMIN — SUCRALFATE 1 G: 1 TABLET ORAL at 21:36

## 2019-06-06 RX ADMIN — SUCRALFATE 1 G: 1 TABLET ORAL at 11:29

## 2019-06-06 RX ADMIN — HYDRALAZINE HYDROCHLORIDE 100 MG: 50 TABLET, FILM COATED ORAL at 21:36

## 2019-06-06 RX ADMIN — INSULIN LISPRO 2 UNITS: 100 INJECTION, SOLUTION INTRAVENOUS; SUBCUTANEOUS at 07:27

## 2019-06-06 RX ADMIN — LABETALOL HYDROCHLORIDE 300 MG: 200 TABLET, FILM COATED ORAL at 08:35

## 2019-06-06 RX ADMIN — DOXYCYCLINE HYCLATE 100 MG: 100 TABLET, COATED ORAL at 21:35

## 2019-06-06 RX ADMIN — DULOXETINE HYDROCHLORIDE 20 MG: 20 CAPSULE, DELAYED RELEASE ORAL at 17:32

## 2019-06-06 RX ADMIN — CALCIUM GLUCONATE: 94 INJECTION, SOLUTION INTRAVENOUS at 19:07

## 2019-06-06 RX ADMIN — LABETALOL HYDROCHLORIDE 300 MG: 200 TABLET, FILM COATED ORAL at 21:35

## 2019-06-06 RX ADMIN — OXYCODONE AND ACETAMINOPHEN 1 TABLET: 5; 325 TABLET ORAL at 11:29

## 2019-06-06 RX ADMIN — PANTOPRAZOLE SODIUM 40 MG: 40 INJECTION, POWDER, FOR SOLUTION INTRAVENOUS at 08:35

## 2019-06-06 RX ADMIN — DOXYCYCLINE HYCLATE 100 MG: 100 TABLET, COATED ORAL at 08:35

## 2019-06-06 RX ADMIN — INSULIN LISPRO 2 UNITS: 100 INJECTION, SOLUTION INTRAVENOUS; SUBCUTANEOUS at 12:00

## 2019-06-06 RX ADMIN — SUCRALFATE 1 G: 1 TABLET ORAL at 17:32

## 2019-06-06 RX ADMIN — HYDRALAZINE HYDROCHLORIDE 100 MG: 50 TABLET, FILM COATED ORAL at 17:32

## 2019-06-06 RX ADMIN — FLUCONAZOLE 100 MG: 100 TABLET ORAL at 08:35

## 2019-06-06 NOTE — PROGRESS NOTES
[address]  [address] [facility] [address]  [address], [address]  [phone]                GI PROGRESS NOTE  Ross Elkins, AGACN-BC      NAME:   Luz Mobley   :    1990   MRN:    [id number]     Assessment/Plan     1. Severe esophagitis w/ necrotic appearing ulceration at GEJ on EGD 6/3.  -40mg PPI BID  -advance to GI lite, GERD diet, can wean TPN when adequately taking PO  -Supportive care per primary team   -Candida on brushing, Diflucan x 14 days  -ambulate  2. Anemia secondary to above and malnutrition-hgb 8.0 transfused x 1 today.  -monitor h/h, transfuse as necessary.      Patient Active Problem List   Diagnosis Code    Abnormal LFTs R94.5    Acute renal failure (HCC) N17.9    SIRS (systemic inflammatory response syndrome) ([facility]) R65.10    Ventricular tachycardia ([facility]) I47.2    SVT (supraventricular tachycardia) ([facility]) I47.1    UTI (urinary tract infection) N39.0    DM type 1 (diabetes mellitus, type 1) ([facility]) E10.9    Chronic abdominal pain R10.9, G89.29    Nausea & vomiting R11.2    Viral syndrome B34.9    Depression F32.9    Diabetic peripheral neuropathy associated with type 1 diabetes mellitus (HCC) E10.42    DKA, type 1 (HCC) E10.10    GIB (gastrointestinal bleeding) K92.2    Nausea R11.0    Neuropathy G62.9    Hypertension I10    Hypokalemia E87.6    Hypophosphatemia E83.39    DKA, type 1, not at goal ([facility] [address] E10.10    Leukocytosis D72.829    Epigastric abdominal pain R10.13    LLQ abdominal pain R10.32    Diarrhea R19.7    Weight loss R63.4    Esophagitis, erosive K22.10    Nausea and vomiting R11.2    Sepsis ([facility]) A41.9    DKA (diabetic ketoacidoses) ([facility]) E13.10    Pre-eclampsia superimposed on chronic hypertension, antepartum O11.9, O10.019    Chronic hypertension with superimposed preeclampsia O11.9    Chronic renal disease N18.9    Pregnant Z34.90    Pregnant and not yet delivered in third trimester Z34.93    Anemia D64.9    Hypertensive urgency I16.0    CKD (chronic kidney disease) stage 2, GFR 60-89 ml/min N18.2    Proteinuria R80.9    Intractable nausea and vomiting R11.2    MARIS (acute kidney injury) ([facility] [address] N17.9    Dehydration E86.0    Diabetes mellitus (HCC) E11.9    Vomiting R11.10    Endometritis N71.9           Subjective:     Abdominal pain nausea and vomiting is better. Would like to eat more. Objective:     VITALS:   Last 24hrs VS reviewed since prior hospitalist progress note. Most recent are:  Visit Vitals  BP (!) 154/106 (BP 1 Location: Left arm, BP Patient Position: At rest)   Pulse 89   Temp 98.3 °F (36.8 °C)   Resp 16   Ht 5' 8\" (1.727 m)   Wt 65.5 kg (144 lb 6.4 oz)   SpO2 99%   BMI 21.96 kg/m²       Intake/Output Summary (Last 24 hours) at 6/6/2019 0938  Last data filed at 6/5/2019 1319  Gross per 24 hour   Intake 240 ml   Output    Net 240 ml        PHYSICAL EXAM:  General:          Well developed, Well nourished. Alert, cooperative, no acute distress.  Multiple skin tattoos.   HEENT:           Normocephalic, Atraumatic. PERRLA, EOMI. Anicteric sclerae. Lungs:             CTA Bilaterally. No Wheezing/Rhonchi/Rales. Heart:              Regular rate and rhythm, No murmur, No Rubs, No Gallops  Abdomen:        Soft, non-distended, tender on light palpation to epigastrium.  +Bowel sounds, no hepatomegaly  Extremities:     No cyanosis,clubing or edema  Neurologic:      Alert and oriented X 3.  No acute neurological distress. Psych:             Good insight. Not anxious nor agitated.          Lab Data   Recent Results (from the past 12 hour(s))   GLUCOSE, POC    Collection Time: 06/06/19 12:03 AM   Result Value Ref Range    Glucose (POC) 115 (H) 65 - 100 mg/dL    Performed by Po Watts    CBC WITH AUTOMATED DIFF    Collection Time: 06/06/19 12:48 AM   Result Value Ref Range    WBC 8.5 3.6 - 11.0 K/uL    RBC 3.04 (L) 3.80 - 5.20 M/uL    HGB 8.0 (L) 11.5 - 16.0 g/dL    HCT 26.5 (L) 35.0 - 47.0 %    MCV 87.2 80.0 - 99.0 FL    MCH 26.3 26.0 - 34.0 PG    MCHC 30.2 30.0 - 36.5 g/dL    RDW 16.8 (H) 11.5 - 14.5 %    PLATELET 152 228 - 658 K/uL    MPV 10.0 8.9 - 12.9 FL    NRBC 0.0 0  WBC    ABSOLUTE NRBC 0.00 0.00 - 0.01 K/uL    NEUTROPHILS 57 32 - 75 %    LYMPHOCYTES 24 12 - 49 %    MONOCYTES 10 5 - 13 %    EOSINOPHILS 7 0 - 7 %    BASOPHILS 1 0 - 1 %    IMMATURE GRANULOCYTES 1 (H) 0.0 - 0.5 %    ABS. NEUTROPHILS 4.9 1.8 - 8.0 K/UL    ABS. LYMPHOCYTES 2.0 0.8 - 3.5 K/UL    ABS. MONOCYTES 0.8 0.0 - 1.0 K/UL    ABS. EOSINOPHILS 0.6 (H) 0.0 - 0.4 K/UL    ABS. BASOPHILS 0.0 0.0 - 0.1 K/UL    ABS. IMM.  GRANS. 0.1 (H) 0.00 - 0.04 K/UL    DF AUTOMATED     METABOLIC PANEL, BASIC    Collection Time: 06/06/19 12:48 AM   Result Value Ref Range    Sodium 139 136 - 145 mmol/L    Potassium 4.3 3.5 - 5.1 mmol/L    Chloride 111 (H) 97 - 108 mmol/L    CO2 24 21 - 32 mmol/L    Anion gap 4 (L) 5 - 15 mmol/L    Glucose 107 (H) 65 - 100 mg/dL    BUN 15 6 - 20 MG/DL    Creatinine 1.01 0.55 - 1.02 MG/DL    BUN/Creatinine ratio 15 12 - 20      GFR est AA >60 >60 ml/min/1.73m2    GFR est non-AA >60 >60 ml/min/1.73m2    Calcium 8.2 (L) 8.5 - 10.1 MG/DL   GLUCOSE, POC    Collection Time: 06/06/19  6:52 AM   Result Value Ref Range    Glucose (POC) 181 (H) 65 - 100 mg/dL    Performed by Anuja Arrieta          Medications: Reviewed    PMH/ reviewed - no change compared to H&P  Mid-Level Provider: Jones Schmitz NP   Date/Time:  6/6/2019

## 2019-06-06 NOTE — PROGRESS NOTES
Problem: Falls - Risk of  Goal: *Absence of Falls  Description  Document Bishop Cade Fall Risk and appropriate interventions in the flowsheet.   Outcome: Progressing Towards Goal     Problem: Patient Education: Go to Patient Education Activity  Goal: Patient/Family Education  Outcome: Progressing Towards Goal

## 2019-06-06 NOTE — PROGRESS NOTES
Chart reviewed, patient discussed in IDT rounds this am. Has Pic line and TPN running with hope of dc later today. Cm contacted Dr Yadiel Carty, he would like to dc today, however in hs conversation with GI plan to keep on TPN and observe here to see if gastroparesis improves. Patient is Medicaid pending and if dc to home with TPN will need to find a nutrition company to accept this which will take some time, and physician to follow TPN as OP. Per Dr Yadiel Carty, patient will most likely remain in hospital with TPN and will re-assess Monday. Cm will continue to follow and place appropriate referrals.     Frieda Bishop [facility]

## 2019-06-06 NOTE — DIABETES MGMT
DTC Insulin Pump Note    Recommendations/ Comments: Chart reviewed on Gracy Torres due to notification that patient was back on her insulin pump. Noted that patient had received Lantus last night at 1019. Also noted that patient is on TPN. Spoke with patient at about 1300 and she is setting a temp basal at 1/2 dose (0.3 units/hr) until tonight at 10 pm.  If appropriate please consider   - discontinuing Lispro correction scale. Patient will correct via pump   Dr Inna Novoa regarding above    Current hospital medication:  Insulin pump  Per conversation with patient, insulin pump settings:  - Basal: 0.6 (set at 0.3 until 10 pm tonight)  - Jane Loya   - Sensitivity: 43   - Target: 100- 140. Patient is a 30 y.o. female with known Type 1 DM on insulin pump at home. Delivered 5/10/19 at 33 wks. This is her third hospitalization since delivery      A1c:   Lab Results   Component Value Date/Time    Hemoglobin A1c 7.4 (H) 05/30/2019 02:49 PM    Hemoglobin A1c 7.2 (H) 05/25/2019 05:17 PM       Recent Glucose Results:   Lab Results   Component Value Date/Time     (H) 06/06/2019 12:48 AM    GLUCPOC 99 06/06/2019 12:30 PM    GLUCPOC 181 (H) 06/06/2019 06:52 AM    GLUCPOC 115 (H) 06/06/2019 12:03 AM        Lab Results   Component Value Date/Time    Creatinine 1.01 06/06/2019 12:48 AM     Estimated Creatinine Clearance: 81.4 mL/min (based on SCr of 1.01 mg/dL). Active Orders   Diet    DIET DIABETIC WITH OPTIONS Consistent Carb 1800kcal; Regular; 50GM Fat; Low Fiber        PO intake:   Patient Vitals for the past 72 hrs:   % Diet Eaten   06/06/19 1246 100 %   06/06/19 1022 100 %   06/05/19 1319 35 %       Will continue to follow as needed.     Thank you    Damaris Hdez MS, RN, CDE    Time Spent: 12 min

## 2019-06-06 NOTE — PROGRESS NOTES
Hospitalist Progress Note  Kaylyn Bobo MD  Answering service: [phone] OR [phone] from in house phone      Date of Service:  2019  NAME:  Wei Dunn  :  1990  MRN:  [id number]      Admission Summary:    40-year-old female with past medical history of chronic pain syndrome, diabetes mellitus type 2, hypertension, and depression, presents to the hospital with Abdominal pain, nausea, and vomiting    Interval history / Subjective:   Patient Was seen in followup for  Abdominal pain  She Reports abdominal pain is better controlled  Has PICC line and getting TPN  Stopping lantus today and orders for patient to resume her home insulin pump     Assessment & Plan:       1. Abdominal pain- multifactorial- due to hiatal hernia, endometritis, ulcerations of esophagus and GE junction with ulcerated areas- noted on EGD-  -  gastrograffin swallow study on 19 showing \"Imaging findings consistent with reported history of distal esophageal/gastroesophageal junction ulcer. There is no evidence of leak - Candida on brushing, Diflucan x 14 days  - GI recs GI lite diet,  IV PPI, TPN   -Pt also under tx for infectious endometritis-  off meropenem and now on PO doxycycline  2. Dm 1 with hyperglycemia - stop lantus ,start insulin pump today and adjust for hyperglycemia with TPN    3. Chest pains  - cxr neg. D dimer elevated -  Vq scan negative. , duplex negative for DVT- likely related to findings in #1    4. HTN - uncontrolled on admission.- better controlled on Hydralazine, labetalol    5. Hyercalcemia - Likely due to dehydration. Appreciate renal eval. Improved. 6. MARIS on CKD 2 - monitor and avoid Nephrotoxins, improved with hydration    7. Hypomagnesemia - replete. And recheck    8. Depression - post partum, psychiatry eval - cont cymbalta home med    9. Pain Control - try to wean off narcotics, D. W pt and Father who is requesting to wean off all narcotics. They are in agreement. Code status: Full  DVT prophylaxis: Heparin    Telemetry reviewed:   normal sinus rhythm      Risk of deterioration: medium      Total time spent with patient: [address] discussed with: Patient/Family and Nurse  Disposition: Home w/Family and in 2 days once symptoms of gastroparesis controlled. Hospital Problems  Date Reviewed: 6/3/2019          Codes Class Noted POA    Endometritis ICD-10-CM: N71.9  ICD-9-CM: 615.9  5/31/2019 Unknown        * (Principal) Vomiting ICD-10-CM: R11.10  ICD-9-CM: 787.03  5/30/2019 Unknown                Review of Systems:     Review of Systems   Constitutional: Negative. HENT: Negative. Eyes: Negative. Respiratory: Negative. Cardiovascular: Negative. Gastrointestinal: mild distention   Genitourinary: Negative. Musculoskeletal: Negative. Skin: Negative. Neurological: Negative. Endo/Heme/Allergies: Negative. Psychiatric/Behavioral: Negative for suicidal ideas. Vital Signs:    Last 24hrs VS reviewed since prior progress note. Most recent are:  Visit Vitals  BP (!) 154/106 (BP 1 Location: Left arm, BP Patient Position: At rest)   Pulse 89   Temp 98.3 °F (36.8 °C)   Resp 16   Ht 5' 8\" (1.727 m)   Wt 65.5 kg (144 lb 6.4 oz)   SpO2 99%   BMI 21.96 kg/m²         Intake/Output Summary (Last 24 hours) at 6/6/2019 4460  Last data filed at 6/5/2019 1319  Gross per 24 hour   Intake 240 ml   Output    Net 240 ml        Physical Examination:             Constitutional:  No acute distress, cooperative, pleasant    ENT:  Oral mucous moist, oropharynx benign. Neck supple,    Resp:  CTA bilaterally. No wheezing/rhonchi/rales. No accessory muscle use   CV:  Regular rhythm, normal rate, no murmurs, gallops, rubs    GI:  Soft, non distended, non tender.  normoactive bowel sounds, no hepatosplenomegaly     Musculoskeletal:  No edema, warm, 2+ pulses throughout, PICC line in right arm- mild swelling prior to PICC- stable    Neurologic:  Moves all extremities. AAOx3, CN II-XII reviewed     Skin:  multiple tattoes noted. Data Review:    Review and/or order of clinical lab test  Review and/or order of tests in the radiology section of [facility]      Labs:     Recent Labs     06/06/19  0048 06/05/19  0847 06/05/19  0103   WBC 8.5  --  7.2   HGB 8.0* 9.0* 6.6*   HCT 26.5* 28.4* 21.5*     --  321     Recent Labs     06/06/19  0048 06/05/19  0103 06/04/19  0318    140 139   K 4.3 3.8 3.5   * 111* 109*   CO2 24 22 25   BUN 15 9 8   CREA 1.01 0.86 0.87   * 143* 62*   CA 8.2* 7.7* 8.3*     No results for input(s): SGOT, GPT, ALT, AP, TBIL, TBILI, TP, ALB, GLOB, GGT, AML, LPSE in the last 72 hours. No lab exists for component: AMYP, HLPSE  No results for input(s): INR, PTP, APTT in the last 72 hours. No lab exists for component: INREXT, INREXT   No results for input(s): FE, TIBC, PSAT, FERR in the last 72 hours. Lab Results   Component Value Date/Time    Folate 26.9 (H) 09/03/2012 05:45 AM      No results for input(s): PH, PCO2, PO2 in the last 72 hours. No results for input(s): CPK, CKNDX, TROIQ in the last 72 hours.     No lab exists for component: CPKMB  Lab Results   Component Value Date/Time    Cholesterol, total 160 05/15/2016 12:09 AM    HDL Cholesterol 41 05/15/2016 12:09 AM    LDL, calculated 98 05/15/2016 12:09 AM    Triglyceride 105 05/15/2016 12:09 AM    CHOL/HDL Ratio 3.9 05/15/2016 12:09 AM     Lab Results   Component Value Date/Time    Glucose (POC) 181 (H) 06/06/2019 06:52 AM    Glucose (POC) 115 (H) 06/06/2019 12:03 AM    Glucose (POC) 171 (H) 06/05/2019 06:33 PM    Glucose (POC) 161 (H) 06/05/2019 12:26 PM    Glucose (POC) 226 (H) 06/05/2019 06:53 AM     Lab Results   Component Value Date/Time    Color YELLOW/STRAW 05/30/2019 02:49 PM    Appearance CLEAR 05/30/2019 02:49 PM    Specific gravity 1.014 05/30/2019 02:49 PM    Specific gravity 1.010 04/07/2019 07:07 PM    pH (UA) 6.5 05/30/2019 02:49 PM    Protein 300 (A) 05/30/2019 02:49 PM    Glucose 500 (A) 05/30/2019 02:49 PM    Ketone 15 (A) 05/30/2019 02:49 PM    Bilirubin NEGATIVE  05/30/2019 02:49 PM    Urobilinogen 0.2 05/30/2019 02:49 PM    Nitrites NEGATIVE  05/30/2019 02:49 PM    Leukocyte Esterase NEGATIVE  05/30/2019 02:49 PM    Epithelial cells FEW 05/30/2019 02:49 PM    Bacteria NEGATIVE  05/30/2019 02:49 PM    WBC 0-4 05/30/2019 02:49 PM    RBC 20-50 05/30/2019 02:49 PM         Medications Reviewed:     Current Facility-Administered Medications   Medication Dose Route Frequency    dextrose (D50W) injection syrg 12.5-25 g  12.5-25 g IntraVENous PRN    insulin pump (PATIENT SUPPLIED)   SubCUTAneous PRN    0.9% sodium chloride infusion 250 mL  250 mL IntraVENous PRN    fluconazole (DIFLUCAN) tablet 100 mg  100 mg Oral DAILY    TPN ADULT - PERIPHERAL   IntraVENous CONTINUOUS    doxycycline (VIBRA-TABS) tablet 100 mg  100 mg Oral Q12H    sodium chloride (NS) flush 5-40 mL  5-40 mL IntraVENous Q8H    sodium chloride (NS) flush 5-40 mL  5-40 mL IntraVENous PRN    pantoprazole (PROTONIX) injection 40 mg  40 mg IntraVENous Q12H    insulin lispro (HUMALOG) injection   SubCUTAneous Q6H    prochlorperazine (COMPAZINE) with saline injection 5 mg  5 mg IntraVENous Q6H PRN    acetaminophen (TYLENOL) tablet 650 mg  650 mg Oral Q6H PRN    sucralfate (CARAFATE) tablet 1 g  1 g Oral AC&HS    DULoxetine (CYMBALTA) capsule 20 mg  20 mg Oral BID    ferrous sulfate tablet 325 mg  325 mg Oral ACB    hydrALAZINE (APRESOLINE) tablet 100 mg  100 mg Oral TID    labetalol (NORMODYNE) tablet 300 mg  300 mg Oral Q12H    oxyCODONE-acetaminophen (PERCOCET) 5-325 mg per tablet 1 Tab  1 Tab Oral Q8H PRN    sodium chloride (NS) flush 5-40 mL  5-40 mL IntraVENous Q8H    sodium chloride (NS) flush 5-40 mL  5-40 mL IntraVENous PRN    heparin (porcine) injection 5,000 Units  5,000 Units SubCUTAneous Q8H    ondansetron (ZOFRAN) injection 4 mg  4 mg IntraVENous Q4H PRN    glucose chewable tablet 16 g  4 Tab Oral PRN    dextrose (D50W) injection syrg 12.5-25 g  25-50 mL IntraVENous PRN    glucagon (GLUCAGEN) injection 1 mg  1 mg IntraMUSCular PRN    hydrALAZINE (APRESOLINE) 20 mg/mL injection 10 mg  10 mg IntraVENous Q6H PRN     ______________________________________________________________________  EXPECTED LENGTH OF STAY: - - -  ACTUAL LENGTH OF STAY:          6                 All Wang MD   Patient doesn't want family notified.

## 2019-06-06 NOTE — DIABETES MGMT
[facility]    [facility] Follow Up Consult Note    Recommendations/ Comments: Chart reviewed for follow up. Note pt on home insulin pump    Please consider the following:  Discontinue correction insulin while pt on home insulin pump    Current hospital DM medication:   Home insulin pump  Humalog for correction, normal sensitivity scale every 6 hours    Challenging case as BG's are so variable. DTC will continue to follow. DTC will continue to follow patient as needed. ____________________________    Consult received for:   [x]           Hospital Medication Recommendations                 [x]           Hospital Blood Glucose Management    Chart reviewed and initial evaluation complete on Gracy YAN Brian. Patient is a 30 y.o. female with known DM on [employer] insulin pump PTA. She is followed by Dr Araceli Bansal, Endocrinologist   Post partum - delivered by  5/10/2019  Pt is well know to DTC from previous admissions  Third admission since delivery N&V and intractable abd pain  MARIS on CKD 2  HTN  Gastroparesis  Depression      A1c:   Lab Results   Component Value Date/Time    Hemoglobin A1c 7.4 (H) 2019 02:49 PM       Recent Glucose Results:   Lab Results   Component Value Date/Time     (H) 2019 12:48 AM    GLUCPOC 181 (H) 2019 06:52 AM    GLUCPOC 115 (H) 2019 12:03 AM    GLUCPOC 171 (H) 2019 06:33 PM        Lab Results   Component Value Date/Time    Creatinine 1.01 2019 12:48 AM       Active Orders   Diet    DIET GI LITE (POST SURGICAL)        PO intake:   Patient Vitals for the past 72 hrs:   % Diet Eaten   19 1319 35 %       Thank you.     Juan Best RD, [address]  Pager: [phone]     Time spent: 5 min

## 2019-06-06 NOTE — PROGRESS NOTES
NUTRITION brief    Recommendations:   1. Continue current TPN until consuming at least 60% needs orally  2. Encourage PO intake will meals and Glucerna   - family can bring foods from home  3. If gastroparesis suspected would GES be indicated this admit? 4. Insulin/insulin pump recommendations per DTC       Diet: GI lite  TPN: 5%AA, D20 @ 63ml/hr - no lipids  Supplements: none  Meal intake:  Patient Vitals for the past 100 hrs:   % Diet Eaten   06/05/19 1319 35 %     Chart reviewed for TPN check. TPN advanced to goal rate but no lipids added, however now that diet advanced can hold on ordering lipids. Agree with continuing TPN for now until oral intake improves. Pt visited today. Reports suspected gastroparesis in the past with multiple gastric-emptying studies which were negative - however does not remember when last completed. Barium swallow to evaluate for leak did show \"diminished motility. \"     In the case that may help with increasing oral intake, reviewed concepts of gastroparesis diet of low fiber, low fat, liquids as needed. Handouts give. Basic GERD diet also reviewed. Glucerna ordered TID (660kcal, 30g protein). Will continue to follow for PO intake, supplement consumption, BG, and TPN. See previous note for more details. Estimated Nutrition Needs:   Kcals/day: 3409 Kcals/day(1765-1900kcal)  Protein: 72 g(72-84g (1.2-1.4g/kg))  Fluid: 1800 ml(1ml/kcal)  Based On:  [place] Jeor(x 1.3-1.4)  Weight Used: Actual wt(60.3kg)    Jayda Goel, RD [address] , Pager #[phone] or [phone]

## 2019-06-07 VITALS
SYSTOLIC BLOOD PRESSURE: 107 MMHG | WEIGHT: 147.71 LBS | DIASTOLIC BLOOD PRESSURE: 71 MMHG | RESPIRATION RATE: 18 BRPM | HEIGHT: 68 IN | HEART RATE: 90 BPM | BODY MASS INDEX: 22.39 KG/M2 | TEMPERATURE: 97.9 F | OXYGEN SATURATION: 97 %

## 2019-06-07 PROBLEM — K44.9 HIATAL HERNIA: Status: ACTIVE | Noted: 2019-06-07

## 2019-06-07 PROBLEM — E46 MALNUTRITION (HCC): Status: ACTIVE | Noted: 2019-06-07

## 2019-06-07 PROBLEM — K27.9 PUD (PEPTIC ULCER DISEASE): Status: ACTIVE | Noted: 2019-06-07

## 2019-06-07 LAB
ANION GAP SERPL CALC-SCNC: 6 MMOL/L (ref 5–15)
BUN SERPL-MCNC: 26 MG/DL (ref 6–20)
BUN/CREAT SERPL: 21 (ref 12–20)
CALCIUM SERPL-MCNC: 8.6 MG/DL (ref 8.5–10.1)
CHLORIDE SERPL-SCNC: 107 MMOL/L (ref 97–108)
CO2 SERPL-SCNC: 24 MMOL/L (ref 21–32)
CREAT SERPL-MCNC: 1.21 MG/DL (ref 0.55–1.02)
GLUCOSE BLD STRIP.AUTO-MCNC: 238 MG/DL (ref 65–100)
GLUCOSE BLD STRIP.AUTO-MCNC: 349 MG/DL (ref 65–100)
GLUCOSE SERPL-MCNC: 185 MG/DL (ref 65–100)
MAGNESIUM SERPL-MCNC: 1.7 MG/DL (ref 1.6–2.4)
PHOSPHATE SERPL-MCNC: 3.4 MG/DL (ref 2.6–4.7)
POTASSIUM SERPL-SCNC: 4.6 MMOL/L (ref 3.5–5.1)
SERVICE CMNT-IMP: ABNORMAL
SERVICE CMNT-IMP: ABNORMAL
SODIUM SERPL-SCNC: 137 MMOL/L (ref 136–145)

## 2019-06-07 PROCEDURE — 74011000250 HC RX REV CODE- 250

## 2019-06-07 PROCEDURE — 74011250637 HC RX REV CODE- 250/637: Performed by: FAMILY MEDICINE

## 2019-06-07 PROCEDURE — 74011000250 HC RX REV CODE- 250: Performed by: HOSPITALIST

## 2019-06-07 PROCEDURE — 74011250636 HC RX REV CODE- 250/636: Performed by: HOSPITALIST

## 2019-06-07 PROCEDURE — 84100 ASSAY OF PHOSPHORUS: CPT

## 2019-06-07 PROCEDURE — 36415 COLL VENOUS BLD VENIPUNCTURE: CPT

## 2019-06-07 PROCEDURE — C9113 INJ PANTOPRAZOLE SODIUM, VIA: HCPCS | Performed by: INTERNAL MEDICINE

## 2019-06-07 PROCEDURE — 80048 BASIC METABOLIC PNL TOTAL CA: CPT

## 2019-06-07 PROCEDURE — 74011250637 HC RX REV CODE- 250/637: Performed by: INTERNAL MEDICINE

## 2019-06-07 PROCEDURE — 74011250636 HC RX REV CODE- 250/636: Performed by: INTERNAL MEDICINE

## 2019-06-07 PROCEDURE — 74011250637 HC RX REV CODE- 250/637: Performed by: NURSE PRACTITIONER

## 2019-06-07 PROCEDURE — 74011250637 HC RX REV CODE- 250/637: Performed by: HOSPITALIST

## 2019-06-07 PROCEDURE — 82962 GLUCOSE BLOOD TEST: CPT

## 2019-06-07 PROCEDURE — 83735 ASSAY OF MAGNESIUM: CPT

## 2019-06-07 RX ORDER — BACITRACIN 500 UNIT/G
PACKET (EA) TOPICAL
Status: COMPLETED
Start: 2019-06-07 | End: 2019-06-07

## 2019-06-07 RX ORDER — DOXYCYCLINE HYCLATE 100 MG
100 TABLET ORAL EVERY 12 HOURS
Qty: 14 TAB | Refills: 0 | Status: SHIPPED | OUTPATIENT
Start: 2019-06-07 | End: 2019-10-30

## 2019-06-07 RX ORDER — FLUCONAZOLE 100 MG/1
100 TABLET ORAL DAILY
Qty: 7 TAB | Refills: 0 | Status: SHIPPED | OUTPATIENT
Start: 2019-06-08 | End: 2019-06-15

## 2019-06-07 RX ORDER — PANTOPRAZOLE SODIUM 40 MG/1
40 TABLET, DELAYED RELEASE ORAL
Status: DISCONTINUED | OUTPATIENT
Start: 2019-06-07 | End: 2019-06-07 | Stop reason: HOSPADM

## 2019-06-07 RX ORDER — OMEPRAZOLE 40 MG/1
40 CAPSULE, DELAYED RELEASE ORAL DAILY
Qty: 90 CAP | Refills: 2 | Status: SHIPPED | OUTPATIENT
Start: 2019-06-07 | End: 2019-10-30 | Stop reason: DRUGHIGH

## 2019-06-07 RX ADMIN — Medication 10 ML: at 06:39

## 2019-06-07 RX ADMIN — BACITRACIN 1 PACKET: 500 OINTMENT TOPICAL at 14:07

## 2019-06-07 RX ADMIN — OXYCODONE AND ACETAMINOPHEN 1 TABLET: 5; 325 TABLET ORAL at 09:19

## 2019-06-07 RX ADMIN — HYDRALAZINE HYDROCHLORIDE 100 MG: 50 TABLET, FILM COATED ORAL at 09:19

## 2019-06-07 RX ADMIN — DULOXETINE HYDROCHLORIDE 20 MG: 20 CAPSULE, DELAYED RELEASE ORAL at 09:19

## 2019-06-07 RX ADMIN — PROCHLORPERAZINE EDISYLATE 5 MG: 5 INJECTION INTRAMUSCULAR; INTRAVENOUS at 02:30

## 2019-06-07 RX ADMIN — FERROUS SULFATE TAB 325 MG (65 MG ELEMENTAL FE) 325 MG: 325 (65 FE) TAB at 06:39

## 2019-06-07 RX ADMIN — DOXYCYCLINE HYCLATE 100 MG: 100 TABLET, COATED ORAL at 09:19

## 2019-06-07 RX ADMIN — Medication 10 ML: at 14:07

## 2019-06-07 RX ADMIN — PANTOPRAZOLE SODIUM 40 MG: 40 INJECTION, POWDER, FOR SOLUTION INTRAVENOUS at 09:18

## 2019-06-07 RX ADMIN — SUCRALFATE 1 G: 1 TABLET ORAL at 06:39

## 2019-06-07 RX ADMIN — FLUCONAZOLE 100 MG: 100 TABLET ORAL at 09:19

## 2019-06-07 RX ADMIN — LABETALOL HYDROCHLORIDE 300 MG: 200 TABLET, FILM COATED ORAL at 09:18

## 2019-06-07 RX ADMIN — SUCRALFATE 1 G: 1 TABLET ORAL at 11:16

## 2019-06-07 RX ADMIN — Medication 10 ML: at 14:08

## 2019-06-07 NOTE — PROGRESS NOTES
Bedside shift change report given to Edmund Trejo RN (oncoming nurse) by Alessia Arambula RN (offgoing nurse). Report included the following information SBAR, Kardex, MAR and Recent Results.

## 2019-06-07 NOTE — PROGRESS NOTES
Hospitalist Progress Note  Tona Luu MD  Answering service: [phone] OR [phone] from in house phone      Date of Service:  2019  NAME:  Kristen Croft  :  1990  MRN:  [id number]      Admission Summary:    78-year-old female with past medical history of chronic pain syndrome, diabetes mellitus type 2, hypertension, and depression, presents to the hospital with Abdominal pain, nausea, and vomiting    Interval history / Subjective:   Patient Was seen in followup for  Abdominal pain  Has PICC line and getting TPN  Hyperglycemia this am due to cutting back on basal rate insulin pump     Assessment & Plan:       1. Abdominal pain- multifactorial- due to hiatal hernia, endometritis, ulcerations of esophagus and GE junction with ulcerated areas- noted on EGD-  -  gastrograffin swallow study on 19 showing \"Imaging findings consistent with reported history of distal esophageal/gastroesophageal junction ulcer. There is no evidence of leak - Candida on brushing, Diflucan x 14 days  - GI recs GI lite diet,  IV PPI, TPN   -Pt also under tx for infectious endometritis-  off meropenem and now on PO doxycycline  2. Dm 1 with hyperglycemia - cont insulin pump ,hyperglycemia -wean TPN    3. Chest pains  - cxr neg. D dimer elevated -  Vq scan negative. , duplex negative for DVT- likely related to findings in #1    4. HTN - uncontrolled on admission.- better controlled on Hydralazine, labetalol    5. Hyercalcemia - Likely due to dehydration. Appreciate renal eval. Improved. 6. MARIS on CKD 2 - monitor and avoid Nephrotoxins, improved with hydration    7. Hypomagnesemia - replete. And recheck    8. Depression - post partum, psychiatry eval - cont cymbalta home med    9. Pain Control - try to wean off narcotics, D. W pt and Father who is requesting to wean off all narcotics. They are in agreement.      Code status: Full  DVT prophylaxis: Heparin    Telemetry reviewed:   normal sinus rhythm      Risk of deterioration: medium      Total time spent with patient: [address] discussed with: Patient/Family and Nurse  Disposition: Home w/Family and in 2 days once symptoms of gastroparesis controlled. Hospital Problems  Date Reviewed: 6/3/2019          Codes Class Noted POA    Endometritis ICD-10-CM: N71.9  ICD-9-CM: 615.9  5/31/2019 Unknown        * (Principal) Vomiting ICD-10-CM: R11.10  ICD-9-CM: 787.03  5/30/2019 Unknown                Review of Systems:     Review of Systems - unable to complete- pt sleeping this am    Vital Signs:    Last 24hrs VS reviewed since prior progress note. Most recent are:  Visit Vitals  /85 (BP 1 Location: Left arm, BP Patient Position: Sitting)   Pulse 99   Temp 98.4 °F (36.9 °C)   Resp 18   Ht 5' 8\" (1.727 m)   Wt 67 kg (147 lb 11.3 oz)   SpO2 99%   BMI 22.46 kg/m²         Intake/Output Summary (Last 24 hours) at 6/7/2019 0913  Last data filed at 6/6/2019 1800  Gross per 24 hour   Intake 480 ml   Output    Net 480 ml        Physical Examination:             Constitutional:  No acute distress, cooperative, pleasant    ENT:  Oral mucous moist, oropharynx benign. Neck supple,    Resp:  CTA bilaterally. No wheezing/rhonchi/rales. No accessory muscle use   CV:  Regular rhythm, normal rate, no murmurs, gallops, rubs    GI:  Soft, non distended, non tender. normoactive bowel sounds, no hepatosplenomegaly     Musculoskeletal:  No edema, warm, 2+ pulses throughout, PICC line in right arm- mild swelling prior to PICC- stable    Neurologic:  Moves all extremities. AAOx3, CN II-XII reviewed     Skin:  multiple tattoes noted.         Data Review:    Review and/or order of clinical lab test  Review and/or order of tests in the radiology section of [facility]      Labs:     Recent Labs     06/06/19  0048 06/05/19  0847 06/05/19  0103   WBC 8.5  --  7.2   HGB 8.0* 9.0* 6.6*   HCT 26.5* 28.4* 21.5*     --  321 Recent Labs     06/07/19  0144 06/06/19  0048 06/05/19  0103    139 140   K 4.6 4.3 3.8    111* 111*   CO2 24 24 22   BUN 26* 15 9   CREA 1.21* 1.01 0.86   * 107* 143*   CA 8.6 8.2* 7.7*   MG 1.7  --   --    PHOS 3.4  --   --      No results for input(s): SGOT, GPT, ALT, AP, TBIL, TBILI, TP, ALB, GLOB, GGT, AML, LPSE in the last 72 hours. No lab exists for component: AMYP, HLPSE  No results for input(s): INR, PTP, APTT in the last 72 hours. No lab exists for component: INREXT, INREXT   No results for input(s): FE, TIBC, PSAT, FERR in the last 72 hours. Lab Results   Component Value Date/Time    Folate 26.9 (H) 09/03/2012 05:45 AM      No results for input(s): PH, PCO2, PO2 in the last 72 hours. No results for input(s): CPK, CKNDX, TROIQ in the last 72 hours.     No lab exists for component: CPKMB  Lab Results   Component Value Date/Time    Cholesterol, total 160 05/15/2016 12:09 AM    HDL Cholesterol 41 05/15/2016 12:09 AM    LDL, calculated 98 05/15/2016 12:09 AM    Triglyceride 105 05/15/2016 12:09 AM    CHOL/HDL Ratio 3.9 05/15/2016 12:09 AM     Lab Results   Component Value Date/Time    Glucose (POC) 349 (H) 06/07/2019 06:24 AM    Glucose (POC) 329 (H) 06/06/2019 11:41 PM    Glucose (POC) 141 (H) 06/06/2019 05:31 PM    Glucose (POC) 99 06/06/2019 12:30 PM    Glucose (POC) 181 (H) 06/06/2019 06:52 AM     Lab Results   Component Value Date/Time    Color YELLOW/STRAW 05/30/2019 02:49 PM    Appearance CLEAR 05/30/2019 02:49 PM    Specific gravity 1.014 05/30/2019 02:49 PM    Specific gravity 1.010 04/07/2019 07:07 PM    pH (UA) 6.5 05/30/2019 02:49 PM    Protein 300 (A) 05/30/2019 02:49 PM    Glucose 500 (A) 05/30/2019 02:49 PM    Ketone 15 (A) 05/30/2019 02:49 PM    Bilirubin NEGATIVE  05/30/2019 02:49 PM    Urobilinogen 0.2 05/30/2019 02:49 PM    Nitrites NEGATIVE  05/30/2019 02:49 PM    Leukocyte Esterase NEGATIVE  05/30/2019 02:49 PM    Epithelial cells FEW 05/30/2019 02:49 PM Bacteria NEGATIVE  05/30/2019 02:49 PM    WBC 0-4 05/30/2019 02:49 PM    RBC 20-50 05/30/2019 02:49 PM         Medications Reviewed:     Current Facility-Administered Medications   Medication Dose Route Frequency    TPN ADULT - CENTRAL   IntraVENous CONTINUOUS    TPN ADULT - CENTRAL   IntraVENous CONTINUOUS    dextrose (D50W) injection syrg 12.5-25 g  12.5-25 g IntraVENous PRN    insulin pump (PATIENT SUPPLIED) (Patient Supplied)   SubCUTAneous PRN    0.9% sodium chloride infusion 250 mL  250 mL IntraVENous PRN    fluconazole (DIFLUCAN) tablet 100 mg  100 mg Oral DAILY    doxycycline (VIBRA-TABS) tablet 100 mg  100 mg Oral Q12H    sodium chloride (NS) flush 5-40 mL  5-40 mL IntraVENous Q8H    sodium chloride (NS) flush 5-40 mL  5-40 mL IntraVENous PRN    pantoprazole (PROTONIX) injection 40 mg  40 mg IntraVENous Q12H    prochlorperazine (COMPAZINE) with saline injection 5 mg  5 mg IntraVENous Q6H PRN    acetaminophen (TYLENOL) tablet 650 mg  650 mg Oral Q6H PRN    sucralfate (CARAFATE) tablet 1 g  1 g Oral AC&HS    DULoxetine (CYMBALTA) capsule 20 mg  20 mg Oral BID    ferrous sulfate tablet 325 mg  325 mg Oral ACB    hydrALAZINE (APRESOLINE) tablet 100 mg  100 mg Oral TID    labetalol (NORMODYNE) tablet 300 mg  300 mg Oral Q12H    oxyCODONE-acetaminophen (PERCOCET) 5-325 mg per tablet 1 Tab  1 Tab Oral Q8H PRN    sodium chloride (NS) flush 5-40 mL  5-40 mL IntraVENous Q8H    sodium chloride (NS) flush 5-40 mL  5-40 mL IntraVENous PRN    heparin (porcine) injection 5,000 Units  5,000 Units SubCUTAneous Q8H    ondansetron (ZOFRAN) injection 4 mg  4 mg IntraVENous Q4H PRN    glucose chewable tablet 16 g  4 Tab Oral PRN    dextrose (D50W) injection syrg 12.5-25 g  25-50 mL IntraVENous PRN    glucagon (GLUCAGEN) injection 1 mg  1 mg IntraMUSCular PRN    hydrALAZINE (APRESOLINE) 20 mg/mL injection 10 mg  10 mg IntraVENous Q6H PRN ______________________________________________________________________  EXPECTED LENGTH OF STAY: - - -  ACTUAL LENGTH OF STAY:          7                 Patricia Garcia MD   Patient doesn't want family notified.

## 2019-06-07 NOTE — PROGRESS NOTES
Clinical Pharmacy Note: IV to PO Automatic Conversion  Please note: Gracy Torres?s medication(s) (Protonix) has/have been changed from IV to PO based on the following critiera:    Patient is taking scheduled oral medications  Patient is tolerating tube feeds at goal rate or a full liquid, soft or regular diet    This IV to PO conversion is based on the [employer] approved automatic conversion policy for eligible patients. Please call with questions.

## 2019-06-07 NOTE — PROGRESS NOTES
[address]  [address] [place]  [place], [address]  [phone]                GI PROGRESS NOTE  Leeroy Turpin, AGACNShriners Hospital for Children      NAME:   Coleen Dial   :    1990   MRN:    [id number]     Assessment/Plan     1. Severe esophagitis w/ necrotic appearing ulceration at GEJ on EGD 6/3.  -Continue 40mg PPI BID will need this at least 8-12 weeks. Follow in GI clinic in 1 week. -Candida on brushing, Diflucan x 14 days until complete  -Gastroparesis diet (5-6 small meals/day. Low fat)  -Discussed with Dr. Mini Darling    2. Anemia secondary to above and malnutrition-hgb 8.0 transfused x 1 today.  -monitor h/h, transfuse as necessary.      Patient Active Problem List   Diagnosis Code    Abnormal LFTs R94.5    Acute renal failure (HCC) N17.9    SIRS (systemic inflammatory response syndrome) ([facility]) R65.10    Ventricular tachycardia ([facility]) I47.2    SVT (supraventricular tachycardia) ([facility]) I47.1    UTI (urinary tract infection) N39.0    DM type 1 (diabetes mellitus, type 1) ([facility]) E10.9    Chronic abdominal pain R10.9, G89.29    Nausea & vomiting R11.2    Viral syndrome B34.9    Depression F32.9    Diabetic peripheral neuropathy associated with type 1 diabetes mellitus (HCC) E10.42    DKA, type 1 (HCC) E10.10    GIB (gastrointestinal bleeding) K92.2    Nausea R11.0    Neuropathy G62.9    Hypertension I10    Hypokalemia E87.6    Hypophosphatemia E83.39    DKA, type 1, not at goal ([facility] E10.10    Leukocytosis D72.829    Epigastric abdominal pain R10.13    LLQ abdominal pain R10.32    Diarrhea R19.7    Weight loss R63.4    Esophagitis, erosive K22.10    Nausea and vomiting R11.2    Sepsis (HCC) A41.9    DKA (diabetic ketoacidoses) ([facility]) E13.10    Pre-eclampsia superimposed on chronic hypertension, antepartum O11.9, O10.019    Chronic hypertension with superimposed preeclampsia O11.9    Chronic renal disease N18.9    Pregnant Z34.90    Pregnant and not yet delivered in third trimester Z34.93    Anemia D64.9    Hypertensive urgency I16.0    CKD (chronic kidney disease) stage 2, GFR 60-89 ml/min N18.2    Proteinuria R80.9    Intractable nausea and vomiting R11.2    MARIS (acute kidney injury) ([facility] [address] N17.9    Dehydration E86.0    Diabetes mellitus (HCC) E11.9    Vomiting R11.10    Endometritis N71.9           Subjective:     Seen up walking around room. Slight nausea but no vomiting and tolerating PO          Objective:     VITALS:   Last 24hrs VS reviewed since prior hospitalist progress note. Most recent are:  Visit Vitals  /85 (BP 1 Location: Left arm, BP Patient Position: Sitting)   Pulse 99   Temp 98.4 °F (36.9 °C)   Resp 18   Ht 5' 8\" (1.727 m)   Wt 67 kg (147 lb 11.3 oz)   SpO2 99%   BMI 22.46 kg/m²       Intake/Output Summary (Last 24 hours) at 6/7/2019 1102  Last data filed at 6/6/2019 1800  Gross per 24 hour   Intake 480 ml   Output    Net 480 ml        PHYSICAL EXAM:  General:          Well developed, Well nourished. Alert, cooperative, no acute distress.  Multiple skin tattoos.   HEENT:           Normocephalic, Atraumatic. PERRLA, EOMI. Anicteric sclerae. Lungs:             CTA Bilaterally. No Wheezing/Rhonchi/Rales. Heart:              Regular rate and rhythm, No murmur, No Rubs, No Gallops  Abdomen:        Soft, non-distended, tender on light palpation to epigastrium.  +Bowel sounds, no hepatomegaly  Extremities:     No cyanosis,clubing or edema  Neurologic:      Alert and oriented X 3.  No acute neurological distress. Psych:             Good insight. Not anxious nor agitated.            Lab Data   Recent Results (from the past 12 hour(s))   GLUCOSE, POC    Collection Time: 06/06/19 11:41 PM   Result Value Ref Range    Glucose (POC) 329 (H) 65 - 100 mg/dL    Performed by Marsh Nyhan    METABOLIC PANEL, BASIC    Collection Time: 06/07/19  1:44 AM   Result Value Ref Range    Sodium 137 136 - 145 mmol/L    Potassium 4.6 3.5 - 5.1 mmol/L    Chloride 107 97 - 108 mmol/L    CO2 24 21 - 32 mmol/L    Anion gap 6 5 - 15 mmol/L    Glucose 185 (H) 65 - 100 mg/dL    BUN 26 (H) 6 - 20 MG/DL    Creatinine 1.21 (H) 0.55 - 1.02 MG/DL    BUN/Creatinine ratio 21 (H) 12 - 20      GFR est AA >60 >60 ml/min/1.73m2    GFR est non-AA 52 (L) >60 ml/min/1.73m2    Calcium 8.6 8.5 - 10.1 MG/DL   MAGNESIUM    Collection Time: 06/07/19  1:44 AM   Result Value Ref Range    Magnesium 1.7 1.6 - 2.4 mg/dL   PHOSPHORUS    Collection Time: 06/07/19  1:44 AM   Result Value Ref Range    Phosphorus 3.4 2.6 - 4.7 MG/DL   GLUCOSE, POC    Collection Time: 06/07/19  6:24 AM   Result Value Ref Range    Glucose (POC) 349 (H) 65 - 100 mg/dL    Performed by [facility]          Medications: Reviewed    PM/ reviewed - no change compared to H&P  Mid-Level Provider: Hilary Arroyo NP   Date/Time:  6/7/2019

## 2019-06-07 NOTE — DISCHARGE SUMMARY
Discharge Summary       PATIENT ID: Cain Cordero  MRN: [id number]   YOB: 1990    DATE OF ADMISSION: 5/30/2019 12:23 PM    DATE OF DISCHARGE: 6/7/19 2:30pm   PRIMARY CARE PROVIDER: None     ATTENDING PHYSICIAN: ROSSANA  DISCHARGING PROVIDER: Kassi Lemons MD    To contact this individual call [phone] and ask the  to page. If unavailable ask to be transferred the Adult Hospitalist Department. CONSULTATIONS: IP CONSULT TO NEPHROLOGY  IP CONSULT TO NEPHROLOGY  IP CONSULT TO INFECTIOUS DISEASES  IP CONSULT TO PSYCHIATRY  IP CONSULT TO GASTROENTEROLOGY  IP CONSULT TO OB GYN    PROCEDURES/SURGERIES: Procedure(s):  ESOPHAGOGASTRODUODENOSCOPY (EGD)  ESOPHAGOGASTRODUODENAL (EGD) BIOPSY  ENDOSCOPIC BRUSHING    ADMITTING DIAGNOSES & HOSPITAL COURSE:   25-year-old female with past medical history of chronic pain syndrome, diabetes mellitus type 2, hypertension, and depression, presents to the hospital with Abdominal pain, nausea, and vomiting    DISCHARGE DIAGNOSES / PLAN:      1. Abdominal pain- multifactorial- due to hiatal hernia, endometritis, ulcerations of esophagus and GE junction with ulcerated areas- noted on EGD-  -  gastrograffin swallow study on 6/5/19 showing \"Imaging findings consistent with reported history of distal esophageal/gastroesophageal junction ulcer. There is no evidence of leak - Candida on brushing, Diflucan x 14 days  - GI recs GI lite diet,  IV PPI, She was on TPN with  PICC line for a few days here in the hospital but she has no insurance to continue this on DC and GI did not recommend further TPN   -Pt also under tx for infectious endometritis-  off meropenem and now on PO doxycycline for 7 more days on DC  2. Dm 1 with hyperglycemia - cont insulin pump ,hyperglycemia -wean TPN     3. Chest pains 6/2 - cxr neg. D dimer elevated -  Vq scan negative. , duplex negative for DVT- likely related to findings in #1     4.  HTN - uncontrolled on admission.- better controlled on Hydralazine, labetalol     5. Hyercalcemia - Likely due to dehydration. Appreciate renal eval. Improved.     6. MARIS on CKD 2 - monitor and avoid Nephrotoxins, improved with hydration     7. Hypomagnesemia - repleted     8. Depression - post partum, psychiatry eval - cont cymbalta home med     9. Pain Control - try to wean off narcotics, D. W pt and Father who is requesting to wean off all narcotics. They are in agreement. ADDITIONAL CARE RECOMMENDATIONS:none    PENDING TEST RESULTS:   At the time of discharge the following test results are still pending: EGD biopsy results    FOLLOW UP APPOINTMENTS:    Follow-up Information     Follow up With Specialties Details Why Contact Info    None    None ([phone]) Patient stated that they have no PCP           Follow up with GI for EGD biopsy results and to discuss repeat EGD  With regular PCP for blood pressure management  With nephrology- for protein in urine and elevated creatinine  With endocrinology for diabetes management    DIET: GI soft diet- small regular meals throughout the day    ACTIVITY: Activity as tolerated    WOUND CARE: to right arm PICC site    EQUIPMENT needed: none      DISCHARGE MEDICATIONS:  Current Discharge Medication List      START taking these medications    Details   doxycycline (VIBRA-TABS) 100 mg tablet Take 1 Tab by mouth every twelve (12) hours. Qty: 14 Tab, Refills: 0      fluconazole (DIFLUCAN) 100 mg tablet Take 1 Tab by mouth daily for 7 days. FDA advises cautious prescribing of oral fluconazole in pregnancy. Qty: 7 Tab, Refills: 0      omeprazole (PRILOSEC) 40 mg capsule Take 1 Cap by mouth daily. Take 1 cap twice daily for 14 days, then decrease to 1 cap daily  Indications: ulcer of the duodenum, inflammation of the esophagus with erosion  Qty: 90 Cap, Refills: 2         CONTINUE these medications which have NOT CHANGED    Details   insulin lispro (HUMALOG U-100 INSULIN) 100 unit/mL injection by SubCUTAneous route.  With insulin pump      hydrALAZINE (APRESOLINE) 100 mg tablet Take 1 Tab by mouth three (3) times daily. Qty: 90 Tab, Refills: 0      famotidine (PEPCID) 20 mg tablet Take 1 Tab by mouth two (2) times daily as needed (acid reflux). Indications: gastroesophageal reflux disease  Qty: 30 Tab, Refills: 0      hydroCHLOROthiazide (HYDRODIURIL) 25 mg tablet Take 1 Tab by mouth daily. Qty: 30 Tab, Refills: 2      promethazine (PHENERGAN) 12.5 mg tablet Take 1 Tab by mouth every four (4) hours as needed for Nausea. Qty: 30 Tab, Refills: 0      ferrous sulfate 325 mg (65 mg iron) tablet Take 1 Tab by mouth Daily (before breakfast). Qty: 30 Tab, Refills: 2      labetalol (NORMODYNE) 300 mg tablet Take 1 Tab by mouth every twelve (12) hours for 30 days. Qty: 60 Tab, Refills: 0      DULoxetine (CYMBALTA) 20 mg capsule Take 1 Cap by mouth two (2) times a day for 30 days. Qty: 60 Cap, Refills: 2      glucagon (GLUCAGON EMERGENCY KIT, HUMAN,) 1 mg injection Glucagon emergency kit, IM injection  Indications: type 1 diabetes, pregnancy      pnv w/o calcium-iron fum-fa (COMPLETENATE) 29 mg iron- 1 mg chew Take 1 Tab by mouth. STOP taking these medications       clindamycin (CLEOCIN) 300 mg capsule Comments:   Reason for Stopping:         metroNIDAZOLE (FLAGYL) 500 mg tablet Comments:   Reason for Stopping:         oxyCODONE-acetaminophen (PERCOCET) 5-325 mg per tablet Comments:   Reason for Stopping:                 NOTIFY YOUR PHYSICIAN FOR ANY OF THE FOLLOWING:   Fever over 101 degrees for 24 hours. Chest pain, shortness of breath, fever, chills, nausea, vomiting, diarrhea, change in mentation, falling, weakness, bleeding. Severe pain or pain not relieved by medications. Or, any other signs or symptoms that you may have questions about.     DISPOSITION:    Home With:   OT  PT  HH  RN       Long term SNF/Inpatient Rehab   X Independent    Hospice    Other:       PATIENT CONDITION AT DISCHARGE:     Functional status Poor     Deconditioned    X Independent      Cognition   X  Lucid     Forgetful     Dementia      Catheters/lines (plus indication)    [place]     PICC     PEG    X None      Code status   X  Full code     DNR      PHYSICAL EXAMINATION AT DISCHARGE:    Patient Vitals for the past 24 hrs:   Temp Pulse Resp BP SpO2   06/07/19 1343 97.9 °F (36.6 °C) 90 18 107/71 97 %   06/07/19 0840 98.4 °F (36.9 °C) 99 18 124/85 99 %   06/07/19 0301 98.4 °F (36.9 °C) 93 16 137/81 97 %   06/06/19 2132 98.5 °F (36.9 °C) 90 16 (!) 152/95 99 %   06/06/19 1503 98.8 °F (37.1 °C) 93 16 129/84 99 %                                                     Constitutional:  No acute distress, cooperative, pleasant    ENT:  Oral mucous moist, oropharynx benign. Neck supple,    Resp:  CTA bilaterally. No wheezing/rhonchi/rales. No accessory muscle use   CV:  Regular rhythm, normal rate, no murmurs, gallops, rubs    GI:  Soft, non distended, non tender. normoactive bowel sounds, no hepatosplenomegaly     Musculoskeletal:  No edema, warm, 2+ pulses throughout, PICC line in right arm- mild swelling prior to PICC- stable    Neurologic:  Moves all extremities.   AAOx3, CN II-XII reviewed                         Skin:  multiple tattoes noted.          Recent Results (from the past 24 hour(s))   GLUCOSE, POC    Collection Time: 06/06/19  5:31 PM   Result Value Ref Range    Glucose (POC) 141 (H) 65 - 100 mg/dL    Performed by Elenor Marisela    GLUCOSE, POC    Collection Time: 06/06/19 11:41 PM   Result Value Ref Range    Glucose (POC) 329 (H) 65 - 100 mg/dL    Performed by Maximiliano Sweeney    METABOLIC PANEL, BASIC    Collection Time: 06/07/19  1:44 AM   Result Value Ref Range    Sodium 137 136 - 145 mmol/L    Potassium 4.6 3.5 - 5.1 mmol/L    Chloride 107 97 - 108 mmol/L    CO2 24 21 - 32 mmol/L    Anion gap 6 5 - 15 mmol/L    Glucose 185 (H) 65 - 100 mg/dL    BUN 26 (H) 6 - 20 MG/DL    Creatinine 1.21 (H) 0.55 - 1.02 MG/DL    BUN/Creatinine ratio 21 (H) 12 - 20      GFR est AA >60 >60 ml/min/1.73m2    GFR est non-AA 52 (L) >60 ml/min/1.73m2    Calcium 8.6 8.5 - 10.1 MG/DL   MAGNESIUM    Collection Time: 06/07/19  1:44 AM   Result Value Ref Range    Magnesium 1.7 1.6 - 2.4 mg/dL   PHOSPHORUS    Collection Time: 06/07/19  1:44 AM   Result Value Ref Range    Phosphorus 3.4 2.6 - 4.7 MG/DL   GLUCOSE, POC    Collection Time: 06/07/19  6:24 AM   Result Value Ref Range    Glucose (POC) 349 (H) 65 - 100 mg/dL    Performed by Eladia Fowler    GLUCOSE, POC    Collection Time: 06/07/19 12:20 PM   Result Value Ref Range    Glucose (POC) 238 (H) 65 - 100 mg/dL    Performed by [facility] [address]:  Problem List as of 6/7/2019 Date Reviewed: 6/3/2019          Codes Class Noted - Resolved    RESOLVED: DKA (diabetic ketoacidoses) ([facility] ICD-10-CM: E13.10  ICD-9-CM: 250.10 Acute 5/28/2013 - 8/16/2015        Esophagitis, erosive ICD-10-CM: K22.10  ICD-9-CM: 530.19 Present on Admission 4/13/2016 - Present        Hiatal hernia ICD-10-CM: K44.9  ICD-9-CM: 553.3  6/7/2019 - Present        PUD (peptic ulcer disease) ICD-10-CM: K27.9  ICD-9-CM: 533.90  6/7/2019 - Present        Malnutrition ([facility] ICD-10-CM: E46  ICD-9-CM: 263.9  6/7/2019 - Present        Endometritis ICD-10-CM: N71.9  ICD-9-CM: 615.9  5/31/2019 - Present        * (Principal) Vomiting ICD-10-CM: R11.10  ICD-9-CM: 787.03  5/30/2019 - Present        Intractable nausea and vomiting ICD-10-CM: R11.2  ICD-9-CM: 536.2  5/25/2019 - Present        MARIS (acute kidney injury) ([facility] ICD-10-CM: N17.9  ICD-9-CM: 584.9  5/25/2019 - Present        Dehydration ICD-10-CM: E86.0  ICD-9-CM: 276.51  5/25/2019 - Present        Diabetes mellitus ([facility] ICD-10-CM: E11.9  ICD-9-CM: 250.00  5/25/2019 - Present        CKD (chronic kidney disease) stage 2, GFR 60-89 ml/min ICD-10-CM: N18.2  ICD-9-CM: 585.2  5/24/2019 - Present        Proteinuria ICD-10-CM: R80.9  ICD-9-CM: 791.0  5/24/2019 - Present        Anemia ICD-10-CM: D64.9  ICD-9-CM: 285.9  2019 - Present        Hypertensive urgency ICD-10-CM: I16.0  ICD-9-CM: 401.9  2019 - Present        Pregnant and not yet delivered in third trimester ICD-10-CM: Z34.93  ICD-9-CM: V22.1  2019 - Present        Pregnant ICD-10-CM: Z34.90  ICD-9-CM: V22.2  2019 - Present    Overview Addendum 2019  3:55 PM by Abdi Winter MD     Primary Provider: Francia Alba MD ONLY PATIENT - to be co-managed with MFM and OBHG as needed     29 yo  with DG 19 by stated EDC (based on first trimester ultrasound in [facility], consistent with 28 wk scan with MFM - Dr. Casimiro Arias) vs.  by ultrasound per OSH records. 2 day discrepancy in dating.     IUP: MFM scan 19 - 28w1d showing EFW 40%ile and posterior placenta, per OSH records prior fetal ECHO wnl, SIZE<DATES on exam  --> has MFM appt scheduled next week   -records requested from anatomy scan and dating scan  -Tdap:   -Flu: vaccinated 2018  -PNV     Genetics/Carrier screening: late transfer, do not see documentation that this was done     PNL: B pos / ABSC neg / Hgb 8.0, Plts 249 / hiv, hepB, hepC, rubella, syphilis, gc, chl all neg / urine GBS+ / needs pap postpartum (last wnl 3 yrs ago per pt)     PMH significant for:  -IDDM (type 1) - on insulin pump, following with Dr. Rui Esquivel, improved control, however, multiple admissions for DKA in the past, BG as high as 552, also with sequelae of nephropathy, gastroparesis, and peripheral neuropathy --> advised follow up with GI for gastroparesis, also will recommend follow up with ophthalmologist and podiatrist at future visit  -CHTN - normotensive today, currently on labetalol 300mg BID, has not been taking procardia XL 30mg since hospital discharge, has had BPs as high as 210s/120s documented earlier in the pregnancy, has been checking BPs at home, mostly normal, highest has been 150/100, but this was an outlier.  Pt with likely chronic renal disease and chronic proteinuria. Baseline 24 hr urine protein 4659g per OSH records. PreE labs in hospital overall wnl, will repeat preE labs weekly. Needs close surveillance for SI preE. S/p mag on admission. S/p BMZ. Not currently on ASA 81mg daily due to renal disease. Needs weekly surveillance BPPs with MFM.  Appointment requested 4/23.  -anemia - hgb most recently 8.0, likely iron deficiency and chronic disease, ferritin 8, recommended ferrous sulfate 325mg daily  -?h/o VTACH, heart murmur, and pt reports cardiac arrest (unable to find documentation of this - given extensive medical records), will refer to cardiology  -chronic kidney disease - with recent MARIS --> creat 1.54 in hospital, improved to 1.1 prior to discharge, will refer to nephrology  -depression - currently stable, no meds, has been prescribed cymbalta and celexa 60mg daily in the past  -h/o chronic pain - prior diagnostic laparoscopy wnl, no evidenc eof endometriosis, no current pain complaints  -tobacco and marijuana abuse - counseled on cessation  -HSV - unclear history, reports possible outbreak and was empirically treated, but was never informed of test results, valtrex suppression initiated 32 wks in anticipation of possible need for early delivery     Pregnancy problems:  -N/V of pregnancy - using scopolamine patch and taking promethazine and zofran prn, has also used THC for nausea in the pregnancy  -GERD - taking pepcid daily and tums prn     Delivery/PP plans:  -Breast - concerned about nipple piercings  -NCB vs. Epid? tbd  -Gender/Circ? tbd     Social:  -FOB \"Bur[place]\" and parents supportive  -pt is                      Chronic renal disease ICD-10-CM: N18.9  ICD-9-CM: 585.9  4/13/2019 - Present        Chronic hypertension with superimposed preeclampsia ICD-10-CM: O11.9  ICD-9-CM: 642.70  4/8/2019 - Present        Pre-eclampsia superimposed on chronic hypertension, antepartum ICD-10-CM: O11.9, O10.019  ICD-9-CM: 642.73  4/7/2019 - Present DKA (diabetic ketoacidoses) ([facility] ICD-10-CM: E13.10  ICD-9-CM: 250.10  8/6/2016 - Present        Sepsis ([facility] ICD-10-CM: A41.9  ICD-9-CM: 038.9, 995.91  7/20/2016 - Present        Nausea and vomiting ICD-10-CM: R11.2  ICD-9-CM: 787.01  5/14/2016 - Present        Epigastric abdominal pain ICD-10-CM: R10.13  ICD-9-CM: 789.06  4/11/2016 - Present        LLQ abdominal pain ICD-10-CM: R10.32  ICD-9-CM: 789.04  4/11/2016 - Present        Diarrhea ICD-10-CM: R19.7  ICD-9-CM: 787.91  4/11/2016 - Present        Weight loss ICD-10-CM: R63.4  ICD-9-CM: 783.21  4/11/2016 - Present        DKA, type 1, not at goal [facility]) ICD-10-CM: E10.10  ICD-9-CM: 250.13  3/8/2016 - Present        Leukocytosis ICD-10-CM: D72.829  ICD-9-CM: 288.60  3/8/2016 - Present        Hypokalemia ICD-10-CM: E87.6  ICD-9-CM: 276.8  8/16/2015 - Present        Hypophosphatemia ICD-10-CM: E83.39  ICD-9-CM: 275.3  8/16/2015 - Present        Nausea ICD-10-CM: R11.0  ICD-9-CM: 787.02  8/15/2015 - Present        Neuropathy ICD-10-CM: G62.9  ICD-9-CM: 355.9  8/15/2015 - Present        Hypertension ICD-10-CM: I10  ICD-9-CM: 401.9  8/15/2015 - Present        GIB (gastrointestinal bleeding) ICD-10-CM: K92.2  ICD-9-CM: 578.9  10/22/2014 - Present        DKA, type 1 ([place] [address].) ICD-10-CM: E10.10  ICD-9-CM: 250.13  6/5/2014 - Present        Viral syndrome ICD-10-CM: B34.9  ICD-9-CM: 079.99  5/18/2013 - Present        Depression (Chronic) ICD-10-CM: F32.9  ICD-9-CM: 035  5/18/2013 - Present        Diabetic peripheral neuropathy associated with type 1 diabetes mellitus ([facility] (Chronic) ICD-10-CM: E10.42  ICD-9-CM: 250.61, 357.2  5/18/2013 - Present        DM type 1 (diabetes mellitus, type 1) ([facility] ICD-10-CM: E10.9  ICD-9-CM: 250.01  11/23/2012 - Present        Chronic abdominal pain (Chronic) ICD-10-CM: R10.9, G89.29  ICD-9-CM: 789.00, 338.29 Chronic 11/23/2012 - Present        Nausea & vomiting ICD-10-CM: R11.2  ICD-9-CM: 787.01  11/23/2012 - Present        UTI (urinary tract infection) ICD-10-CM: N39.0  ICD-9-CM: 599.0  10/9/2012 - Present        Ventricular tachycardia ([facility] ICD-10-CM: I47.2  ICD-9-CM: 427.1  9/3/2012 - Present        SVT (supraventricular tachycardia) ([facility]) ICD-10-CM: I47.1  ICD-9-CM: 427.89  9/3/2012 - Present        Acute renal failure ([facility]) ICD-10-CM: N17.9  ICD-9-CM: 584.9  9/1/2012 - Present        SIRS (systemic inflammatory response syndrome) ([facility] ICD-10-CM: R65.10  ICD-9-CM: 995.90  9/1/2012 - Present        Abnormal LFTs ICD-10-CM: R94.5  ICD-9-CM: 790.6  8/23/2012 - Present        RESOLVED: DKA (diabetic ketoacidoses) ([facility] ICD-10-CM: E13.10  ICD-9-CM: 250.10  4/9/2016 - 4/19/2016        RESOLVED: ARF (acute renal failure) ([facility] ICD-10-CM: N17.9  ICD-9-CM: 584.9  3/8/2016 - 4/19/2016        RESOLVED: DKA (diabetic ketoacidoses) ([facility] ICD-10-CM: E13.10  ICD-9-CM: 250.10  11/28/2015 - 4/19/2016        RESOLVED: DKA (diabetic ketoacidoses) ([facility] ICD-10-CM: E13.10  ICD-9-CM: 250.10  10/30/2015 - 11/25/2015        RESOLVED: DKA (diabetic ketoacidosis) ([facility] ICD-10-CM: E13.10  ICD-9-CM: 250.10  8/30/2015 - 11/25/2015        RESOLVED: Urinary tract infection, site not specified ICD-10-CM: N39.0  ICD-9-CM: 599.0  12/3/2013 - 8/16/2015        RESOLVED: DKA (diabetic ketoacidoses) ([facility] ICD-10-CM: E13.10  ICD-9-CM: 250.10  12/26/2012 - 5/28/2013        RESOLVED: DKA (diabetic ketoacidoses) ([facility] ICD-10-CM: E13.10  ICD-9-CM: 250.10  12/17/2012 - 12/21/2012        RESOLVED: DKA (diabetic ketoacidoses) ([facility] ICD-10-CM: E13.10  ICD-9-CM: 250.10  8/23/2012 - 2/28/2013        RESOLVED: Metabolic acidosis LUJ-13-MG: E87.2  ICD-9-CM: 276.2  8/23/2012 - 8/16/2015        RESOLVED: Leukocytosis, unspecified ICD-10-CM: N12.627  ICD-9-CM: 288.60  8/23/2012 - 11/25/2015              Greater than 35 minutes were spent with the patient on counseling and coordination of care    Signed:   Jamey Santiago MD  6/7/2019  2:36 PM

## 2019-06-07 NOTE — DIABETES MGMT
DTC Insulin Pump Follow Up    Recommendations/ Comments: Chart reviewed on [facility] due to hyperglycemia with patient on TPN. Patient is on insulin pump, but would benefit from addition of regular insulin in TPN. If appropriate please consider   - addition of Regular insulin 15 units delivered in TPN   Dr Amna San regarding above    Current hospital medication:  Insulin pump  Per conversation with patient, insulin pump settings:  - Basal: 0.6 (set at 0.3 until 10 pm tonight)  - Toy Sancho   - Sensitivity: 43   - Target: 100- 140. Patient is a 30 y.o. female with known Type 1 DM on insulin pump at home. Delivered 5/10/19 at 33 wks. This is her third hospitalization since delivery      A1c:   Lab Results   Component Value Date/Time    Hemoglobin A1c 7.4 (H) 05/30/2019 02:49 PM    Hemoglobin A1c 7.2 (H) 05/25/2019 05:17 PM       Recent Glucose Results:   Lab Results   Component Value Date/Time     (H) 06/07/2019 01:44 AM    GLUCPOC 349 (H) 06/07/2019 06:24 AM    GLUCPOC 329 (H) 06/06/2019 11:41 PM    GLUCPOC 141 (H) 06/06/2019 05:31 PM        Lab Results   Component Value Date/Time    Creatinine 1.21 (H) 06/07/2019 01:44 AM     Estimated Creatinine Clearance: 68 mL/min (A) (based on SCr of 1.21 mg/dL (H)). Active Orders   Diet    DIET DIABETIC WITH OPTIONS Consistent Carb 1800kcal; Regular; 50GM Fat; Low Fiber        PO intake:   Patient Vitals for the past 72 hrs:   % Diet Eaten   06/07/19 0845 100 %   06/06/19 1800 95 %   06/06/19 1246 100 %   06/06/19 1022 100 %   06/05/19 1319 35 %       Will continue to follow as needed.     Thank you    Talita Orozco, MS, RN, CDE    Time Spent: 12 min

## 2019-06-13 LAB
ARTERIAL PATENCY WRIST A: ABNORMAL
BASE DEFICIT BLD-SCNC: 4 MMOL/L
BDY SITE: ABNORMAL
CA-I BLD-SCNC: 1.39 MMOL/L (ref 1.12–1.32)
GAS FLOW.O2 O2 DELIVERY SYS: ABNORMAL L/MIN
HCO3 BLD-SCNC: 21.8 MMOL/L (ref 22–26)
PCO2 BLD: 41.2 MMHG (ref 35–45)
PH BLD: 7.33 [PH] (ref 7.35–7.45)
PO2 BLD: 44 MMHG (ref 80–100)
SAO2 % BLD: 76 % (ref 92–97)
SPECIMEN TYPE: ABNORMAL
TOTAL RESP. RATE, ITRR: 14

## 2019-10-30 ENCOUNTER — APPOINTMENT (OUTPATIENT)
Dept: CT IMAGING | Age: 29
DRG: 420 | End: 2019-10-30
Attending: EMERGENCY MEDICINE
Payer: MEDICAID

## 2019-10-30 ENCOUNTER — HOSPITAL ENCOUNTER (INPATIENT)
Age: 29
LOS: 4 days | Discharge: HOME | DRG: 420 | End: 2019-11-03
Attending: EMERGENCY MEDICINE | Admitting: INTERNAL MEDICINE
Payer: MEDICAID

## 2019-10-30 ENCOUNTER — APPOINTMENT (OUTPATIENT)
Dept: GENERAL RADIOLOGY | Age: 29
DRG: 420 | End: 2019-10-30
Attending: EMERGENCY MEDICINE
Payer: MEDICAID

## 2019-10-30 DIAGNOSIS — N17.9 ACUTE RENAL FAILURE, UNSPECIFIED ACUTE RENAL FAILURE TYPE (HCC): ICD-10-CM

## 2019-10-30 DIAGNOSIS — R10.84 ABDOMINAL PAIN, GENERALIZED: ICD-10-CM

## 2019-10-30 DIAGNOSIS — E10.10 TYPE 1 DIABETES MELLITUS WITH KETOACIDOSIS WITHOUT COMA (HCC): Primary | ICD-10-CM

## 2019-10-30 LAB
ADMINISTERED INITIALS, ADMINIT: NORMAL
ALBUMIN SERPL-MCNC: 3.7 G/DL (ref 3.5–5)
ALBUMIN/GLOB SERPL: 1 {RATIO} (ref 1.1–2.2)
ALP SERPL-CCNC: 96 U/L (ref 45–117)
ALT SERPL-CCNC: 18 U/L (ref 12–78)
ANION GAP SERPL CALC-SCNC: 17 MMOL/L (ref 5–15)
ANION GAP SERPL CALC-SCNC: 26 MMOL/L (ref 5–15)
ANION GAP SERPL CALC-SCNC: 28 MMOL/L (ref 5–15)
APPEARANCE UR: ABNORMAL
AST SERPL-CCNC: 15 U/L (ref 15–37)
BACTERIA URNS QL MICRO: ABNORMAL /HPF
BASOPHILS # BLD: 0.1 K/UL (ref 0–0.1)
BASOPHILS NFR BLD: 1 % (ref 0–1)
BILIRUB SERPL-MCNC: 0.9 MG/DL (ref 0.2–1)
BILIRUB UR QL: NEGATIVE
BUN SERPL-MCNC: 46 MG/DL (ref 6–20)
BUN SERPL-MCNC: 47 MG/DL (ref 6–20)
BUN SERPL-MCNC: 48 MG/DL (ref 6–20)
BUN/CREAT SERPL: 15 (ref 12–20)
BUN/CREAT SERPL: 15 (ref 12–20)
BUN/CREAT SERPL: 16 (ref 12–20)
CALCIUM SERPL-MCNC: 8.4 MG/DL (ref 8.5–10.1)
CALCIUM SERPL-MCNC: 9.2 MG/DL (ref 8.5–10.1)
CALCIUM SERPL-MCNC: 9.4 MG/DL (ref 8.5–10.1)
CHLORIDE SERPL-SCNC: 100 MMOL/L (ref 97–108)
CHLORIDE SERPL-SCNC: 106 MMOL/L (ref 97–108)
CHLORIDE SERPL-SCNC: 91 MMOL/L (ref 97–108)
CO2 SERPL-SCNC: 12 MMOL/L (ref 21–32)
CO2 SERPL-SCNC: 12 MMOL/L (ref 21–32)
CO2 SERPL-SCNC: 19 MMOL/L (ref 21–32)
COLOR UR: ABNORMAL
CREAT SERPL-MCNC: 2.97 MG/DL (ref 0.55–1.02)
CREAT SERPL-MCNC: 3.1 MG/DL (ref 0.55–1.02)
CREAT SERPL-MCNC: 3.12 MG/DL (ref 0.55–1.02)
D50 ADMINISTERED, D50ADM: 0 ML
D50 ORDER, D50ORD: 0 ML
DIFFERENTIAL METHOD BLD: ABNORMAL
EOSINOPHIL # BLD: 0 K/UL (ref 0–0.4)
EOSINOPHIL NFR BLD: 0 % (ref 0–7)
EPITH CASTS URNS QL MICRO: ABNORMAL /LPF
ERYTHROCYTE [DISTWIDTH] IN BLOOD BY AUTOMATED COUNT: 14.2 % (ref 11.5–14.5)
EST. AVERAGE GLUCOSE BLD GHB EST-MCNC: 240 MG/DL
GLOBULIN SER CALC-MCNC: 3.8 G/DL (ref 2–4)
GLSCOM COMMENTS: NORMAL
GLUCOSE BLD STRIP.AUTO-MCNC: 163 MG/DL (ref 65–100)
GLUCOSE BLD STRIP.AUTO-MCNC: 204 MG/DL (ref 65–100)
GLUCOSE BLD STRIP.AUTO-MCNC: 360 MG/DL (ref 65–100)
GLUCOSE BLD STRIP.AUTO-MCNC: 579 MG/DL (ref 65–100)
GLUCOSE BLD STRIP.AUTO-MCNC: 583 MG/DL (ref 65–100)
GLUCOSE BLD STRIP.AUTO-MCNC: >600 MG/DL (ref 65–100)
GLUCOSE SERPL-MCNC: 198 MG/DL (ref 65–100)
GLUCOSE SERPL-MCNC: 577 MG/DL (ref 65–100)
GLUCOSE SERPL-MCNC: 767 MG/DL (ref 65–100)
GLUCOSE UR STRIP.AUTO-MCNC: >1000 MG/DL
GLUCOSE, GLC: 163 MG/DL
GLUCOSE, GLC: 204 MG/DL
GLUCOSE, GLC: 360 MG/DL
HBA1C MFR BLD: 10 % (ref 4.2–6.3)
HCG SERPL QL: NEGATIVE
HCT VFR BLD AUTO: 37.7 % (ref 35–47)
HGB BLD-MCNC: 11.6 G/DL (ref 11.5–16)
HGB UR QL STRIP: ABNORMAL
HIGH TARGET, HITG: 250 MG/DL
IMM GRANULOCYTES # BLD AUTO: 0.2 K/UL (ref 0–0.04)
IMM GRANULOCYTES NFR BLD AUTO: 1 % (ref 0–0.5)
INSULIN ADMINSTERED, INSADM: 2.1 UNITS/HOUR
INSULIN ADMINSTERED, INSADM: 2.9 UNITS/HOUR
INSULIN ADMINSTERED, INSADM: 6 UNITS/HOUR
INSULIN ORDER, INSORD: 2.1 UNITS/HOUR
INSULIN ORDER, INSORD: 2.9 UNITS/HOUR
INSULIN ORDER, INSORD: 6 UNITS/HOUR
KETONES UR QL STRIP.AUTO: 80 MG/DL
LEUKOCYTE ESTERASE UR QL STRIP.AUTO: NEGATIVE
LIPASE SERPL-CCNC: 75 U/L (ref 73–393)
LOW TARGET, LOT: 150 MG/DL
LYMPHOCYTES # BLD: 1.8 K/UL (ref 0.8–3.5)
LYMPHOCYTES NFR BLD: 9 % (ref 12–49)
MAGNESIUM SERPL-MCNC: 2.2 MG/DL (ref 1.6–2.4)
MCH RBC QN AUTO: 26.9 PG (ref 26–34)
MCHC RBC AUTO-ENTMCNC: 30.8 G/DL (ref 30–36.5)
MCV RBC AUTO: 87.5 FL (ref 80–99)
MINUTES UNTIL NEXT BG, NBG: 60 MIN
MONOCYTES # BLD: 0.6 K/UL (ref 0–1)
MONOCYTES NFR BLD: 3 % (ref 5–13)
MULTIPLIER, MUL: 0.02
NEUTS SEG # BLD: 16.8 K/UL (ref 1.8–8)
NEUTS SEG NFR BLD: 86 % (ref 32–75)
NITRITE UR QL STRIP.AUTO: NEGATIVE
NRBC # BLD: 0 K/UL (ref 0–0.01)
NRBC BLD-RTO: 0 PER 100 WBC
ORDER INITIALS, ORDINIT: NORMAL
OTHER,OTHU: ABNORMAL
PH UR STRIP: 5.5 [PH] (ref 5–8)
PHOSPHATE SERPL-MCNC: 8.2 MG/DL (ref 2.6–4.7)
PLATELET # BLD AUTO: 485 K/UL (ref 150–400)
PMV BLD AUTO: 11.5 FL (ref 8.9–12.9)
POTASSIUM SERPL-SCNC: 3.5 MMOL/L (ref 3.5–5.1)
POTASSIUM SERPL-SCNC: 4.1 MMOL/L (ref 3.5–5.1)
POTASSIUM SERPL-SCNC: 5.3 MMOL/L (ref 3.5–5.1)
PROT SERPL-MCNC: 7.5 G/DL (ref 6.4–8.2)
PROT UR STRIP-MCNC: 300 MG/DL
RBC # BLD AUTO: 4.31 M/UL (ref 3.8–5.2)
RBC #/AREA URNS HPF: ABNORMAL /HPF (ref 0–5)
SERVICE CMNT-IMP: ABNORMAL
SODIUM SERPL-SCNC: 131 MMOL/L (ref 136–145)
SODIUM SERPL-SCNC: 138 MMOL/L (ref 136–145)
SODIUM SERPL-SCNC: 142 MMOL/L (ref 136–145)
SP GR UR REFRACTOMETRY: 1.03 (ref 1–1.03)
UA: UC IF INDICATED,UAUC: ABNORMAL
UROBILINOGEN UR QL STRIP.AUTO: 0.2 EU/DL (ref 0.2–1)
WBC # BLD AUTO: 19.5 K/UL (ref 3.6–11)
WBC URNS QL MICRO: ABNORMAL /HPF (ref 0–4)
YEAST BUDDING URNS QL: PRESENT

## 2019-10-30 PROCEDURE — 83735 ASSAY OF MAGNESIUM: CPT

## 2019-10-30 PROCEDURE — 96375 TX/PRO/DX INJ NEW DRUG ADDON: CPT

## 2019-10-30 PROCEDURE — 74011000258 HC RX REV CODE- 258: Performed by: INTERNAL MEDICINE

## 2019-10-30 PROCEDURE — 36415 COLL VENOUS BLD VENIPUNCTURE: CPT

## 2019-10-30 PROCEDURE — 71045 X-RAY EXAM CHEST 1 VIEW: CPT

## 2019-10-30 PROCEDURE — 82962 GLUCOSE BLOOD TEST: CPT

## 2019-10-30 PROCEDURE — 74011636637 HC RX REV CODE- 636/637: Performed by: EMERGENCY MEDICINE

## 2019-10-30 PROCEDURE — 74011250636 HC RX REV CODE- 250/636: Performed by: INTERNAL MEDICINE

## 2019-10-30 PROCEDURE — 83690 ASSAY OF LIPASE: CPT

## 2019-10-30 PROCEDURE — 87086 URINE CULTURE/COLONY COUNT: CPT

## 2019-10-30 PROCEDURE — 84703 CHORIONIC GONADOTROPIN ASSAY: CPT

## 2019-10-30 PROCEDURE — 74011250637 HC RX REV CODE- 250/637: Performed by: INTERNAL MEDICINE

## 2019-10-30 PROCEDURE — 74176 CT ABD & PELVIS W/O CONTRAST: CPT

## 2019-10-30 PROCEDURE — 74011250636 HC RX REV CODE- 250/636: Performed by: EMERGENCY MEDICINE

## 2019-10-30 PROCEDURE — 80053 COMPREHEN METABOLIC PANEL: CPT

## 2019-10-30 PROCEDURE — 74011000250 HC RX REV CODE- 250: Performed by: INTERNAL MEDICINE

## 2019-10-30 PROCEDURE — 85025 COMPLETE CBC W/AUTO DIFF WBC: CPT

## 2019-10-30 PROCEDURE — 96374 THER/PROPH/DIAG INJ IV PUSH: CPT

## 2019-10-30 PROCEDURE — 74011636637 HC RX REV CODE- 636/637: Performed by: INTERNAL MEDICINE

## 2019-10-30 PROCEDURE — 65660000000 HC RM CCU STEPDOWN

## 2019-10-30 PROCEDURE — 81001 URINALYSIS AUTO W/SCOPE: CPT

## 2019-10-30 PROCEDURE — 84100 ASSAY OF PHOSPHORUS: CPT

## 2019-10-30 PROCEDURE — 83036 HEMOGLOBIN GLYCOSYLATED A1C: CPT

## 2019-10-30 PROCEDURE — 99284 EMERGENCY DEPT VISIT MOD MDM: CPT

## 2019-10-30 PROCEDURE — 80048 BASIC METABOLIC PNL TOTAL CA: CPT

## 2019-10-30 PROCEDURE — 74011000250 HC RX REV CODE- 250: Performed by: EMERGENCY MEDICINE

## 2019-10-30 RX ORDER — ACETAMINOPHEN 325 MG/1
650 TABLET ORAL
Status: DISCONTINUED | OUTPATIENT
Start: 2019-10-30 | End: 2019-11-03 | Stop reason: HOSPADM

## 2019-10-30 RX ORDER — ONDANSETRON 4 MG/1
4 TABLET, ORALLY DISINTEGRATING ORAL
COMMUNITY

## 2019-10-30 RX ORDER — LABETALOL 300 MG/1
300 TABLET, FILM COATED ORAL 2 TIMES DAILY
COMMUNITY

## 2019-10-30 RX ORDER — DEXTROSE MONOHYDRATE AND SODIUM CHLORIDE 5; .45 G/100ML; G/100ML
100 INJECTION, SOLUTION INTRAVENOUS AS NEEDED
Status: DISCONTINUED | OUTPATIENT
Start: 2019-10-30 | End: 2019-10-30

## 2019-10-30 RX ORDER — DEXTROSE MONOHYDRATE AND SODIUM CHLORIDE 5; .45 G/100ML; G/100ML
150 INJECTION, SOLUTION INTRAVENOUS CONTINUOUS
Status: DISCONTINUED | OUTPATIENT
Start: 2019-10-30 | End: 2019-11-01

## 2019-10-30 RX ORDER — ONDANSETRON 2 MG/ML
4 INJECTION INTRAMUSCULAR; INTRAVENOUS
Status: DISCONTINUED | OUTPATIENT
Start: 2019-10-30 | End: 2019-11-03 | Stop reason: HOSPADM

## 2019-10-30 RX ORDER — DEXTROSE 50 % IN WATER (D50W) INTRAVENOUS SYRINGE
25-50 AS NEEDED
Status: DISCONTINUED | OUTPATIENT
Start: 2019-10-30 | End: 2019-11-01

## 2019-10-30 RX ORDER — SODIUM CHLORIDE 0.9 % (FLUSH) 0.9 %
5-40 SYRINGE (ML) INJECTION EVERY 8 HOURS
Status: DISCONTINUED | OUTPATIENT
Start: 2019-10-30 | End: 2019-11-03 | Stop reason: HOSPADM

## 2019-10-30 RX ORDER — HYDRALAZINE HYDROCHLORIDE 25 MG/1
25 TABLET, FILM COATED ORAL DAILY
Status: DISCONTINUED | OUTPATIENT
Start: 2019-10-31 | End: 2019-11-02

## 2019-10-30 RX ORDER — SODIUM CHLORIDE 0.9 % (FLUSH) 0.9 %
5-40 SYRINGE (ML) INJECTION AS NEEDED
Status: DISCONTINUED | OUTPATIENT
Start: 2019-10-30 | End: 2019-11-03 | Stop reason: HOSPADM

## 2019-10-30 RX ORDER — DOCUSATE SODIUM 100 MG/1
100 CAPSULE, LIQUID FILLED ORAL
Status: DISCONTINUED | OUTPATIENT
Start: 2019-10-30 | End: 2019-11-03 | Stop reason: HOSPADM

## 2019-10-30 RX ORDER — OMEPRAZOLE 40 MG/1
40 CAPSULE, DELAYED RELEASE ORAL DAILY
COMMUNITY

## 2019-10-30 RX ORDER — LABETALOL 100 MG/1
300 TABLET, FILM COATED ORAL 2 TIMES DAILY
Status: DISCONTINUED | OUTPATIENT
Start: 2019-10-30 | End: 2019-11-03 | Stop reason: HOSPADM

## 2019-10-30 RX ORDER — MAGNESIUM SULFATE 100 %
4 CRYSTALS MISCELLANEOUS AS NEEDED
Status: DISCONTINUED | OUTPATIENT
Start: 2019-10-30 | End: 2019-11-01

## 2019-10-30 RX ORDER — INSULIN LISPRO 100 [IU]/ML
INJECTION, SOLUTION INTRAVENOUS; SUBCUTANEOUS
Status: DISCONTINUED | OUTPATIENT
Start: 2019-10-30 | End: 2019-11-01

## 2019-10-30 RX ORDER — HYDRALAZINE HYDROCHLORIDE 50 MG/1
25 TABLET, FILM COATED ORAL DAILY
COMMUNITY

## 2019-10-30 RX ORDER — SODIUM CHLORIDE 9 MG/ML
125 INJECTION, SOLUTION INTRAVENOUS CONTINUOUS
Status: DISCONTINUED | OUTPATIENT
Start: 2019-10-30 | End: 2019-10-30

## 2019-10-30 RX ORDER — FAMOTIDINE 20 MG/1
20 TABLET, FILM COATED ORAL 2 TIMES DAILY
COMMUNITY

## 2019-10-30 RX ORDER — HEPARIN SODIUM 5000 [USP'U]/ML
5000 INJECTION, SOLUTION INTRAVENOUS; SUBCUTANEOUS EVERY 12 HOURS
Status: DISCONTINUED | OUTPATIENT
Start: 2019-10-30 | End: 2019-11-03 | Stop reason: HOSPADM

## 2019-10-30 RX ORDER — DEXTROSE MONOHYDRATE 50 MG/ML
75 INJECTION, SOLUTION INTRAVENOUS CONTINUOUS
Status: DISCONTINUED | OUTPATIENT
Start: 2019-10-30 | End: 2019-10-30

## 2019-10-30 RX ORDER — FENTANYL CITRATE 50 UG/ML
25 INJECTION, SOLUTION INTRAMUSCULAR; INTRAVENOUS
Status: COMPLETED | OUTPATIENT
Start: 2019-10-30 | End: 2019-10-30

## 2019-10-30 RX ORDER — FENTANYL CITRATE 50 UG/ML
25 INJECTION, SOLUTION INTRAMUSCULAR; INTRAVENOUS
Status: COMPLETED | OUTPATIENT
Start: 2019-10-30 | End: 2019-10-31

## 2019-10-30 RX ORDER — HYDRALAZINE HYDROCHLORIDE 20 MG/ML
10 INJECTION INTRAMUSCULAR; INTRAVENOUS
Status: DISCONTINUED | OUTPATIENT
Start: 2019-10-30 | End: 2019-11-01

## 2019-10-30 RX ADMIN — SODIUM CHLORIDE 10.5 UNITS/HR: 900 INJECTION, SOLUTION INTRAVENOUS at 18:29

## 2019-10-30 RX ADMIN — SODIUM CHLORIDE 1000 ML: 900 INJECTION, SOLUTION INTRAVENOUS at 16:31

## 2019-10-30 RX ADMIN — Medication 1 AMPULE: at 21:41

## 2019-10-30 RX ADMIN — Medication 10 ML: at 22:00

## 2019-10-30 RX ADMIN — SODIUM CHLORIDE 1000 ML: 900 INJECTION, SOLUTION INTRAVENOUS at 15:18

## 2019-10-30 RX ADMIN — FENTANYL CITRATE 25 MCG: 50 INJECTION, SOLUTION INTRAMUSCULAR; INTRAVENOUS at 16:31

## 2019-10-30 RX ADMIN — DEXTROSE MONOHYDRATE AND SODIUM CHLORIDE 100 ML/HR: 5; .45 INJECTION, SOLUTION INTRAVENOUS at 22:48

## 2019-10-30 RX ADMIN — PROCHLORPERAZINE EDISYLATE 10 MG: 5 INJECTION INTRAMUSCULAR; INTRAVENOUS at 15:19

## 2019-10-30 RX ADMIN — HUMAN INSULIN 10 UNITS: 100 INJECTION, SOLUTION SUBCUTANEOUS at 14:57

## 2019-10-30 RX ADMIN — ONDANSETRON 4 MG: 2 INJECTION INTRAMUSCULAR; INTRAVENOUS at 21:41

## 2019-10-30 RX ADMIN — SODIUM CHLORIDE 6 UNITS/HR: 900 INJECTION, SOLUTION INTRAVENOUS at 20:36

## 2019-10-30 RX ADMIN — FAMOTIDINE 20 MG: 10 INJECTION, SOLUTION INTRAVENOUS at 21:41

## 2019-10-30 RX ADMIN — FENTANYL CITRATE 25 MCG: 50 INJECTION, SOLUTION INTRAMUSCULAR; INTRAVENOUS at 21:41

## 2019-10-30 RX ADMIN — SODIUM CHLORIDE 125 ML/HR: 900 INJECTION, SOLUTION INTRAVENOUS at 21:15

## 2019-10-30 RX ADMIN — HEPARIN SODIUM 5000 UNITS: 5000 INJECTION INTRAVENOUS; SUBCUTANEOUS at 21:41

## 2019-10-30 NOTE — ED NOTES
IV established, able to medicate for pain and fluids at this time. MD informed of critical Glucose, CO2 values. Pt insists on not being placed on continuous monitoring as she states she needs to continue using the restroom.

## 2019-10-30 NOTE — ED NOTES
Bedside and Verbal shift change report received from Cristal Oates RN (offgoing nurse) given to Viji Bhatti RN (oncoming nurse). Report included the following information SBAR, Kardex, ED Summary, MAR, Recent Results and Med Rec Status.

## 2019-10-30 NOTE — PROGRESS NOTES
Pharmacy - Heparin Dose Adjustment    Indication: dvt prophylaxis     Current Dose: Heparin 5000 units subcutaneously every 8 hours      Weight Ht Readings from Last 1 Encounters:   10/30/19 172.7 cm (68\")        Height Wt Readings from Last 1 Encounters:   10/30/19 59 kg (130 lb)        BMI Body mass index is 19.77 kg/m².        Impression/Plan:   Change to  Heparin 5000 units subcutaneously every 12 hours     Thanks,  Ruy Patricia, PharmD

## 2019-10-30 NOTE — PROGRESS NOTES
Pharmacy Clarification of Prior to Admission Medication Regimen     The patient was interviewed regarding clarification of the prior to admission medication regimen. Patient was very sleepy during the interview but was able to verify PO meds and last doses administered. Patient wears an insulin pump and was unable to verfoy pump settings or endocrinologist with [facility]. Patient let [facility] look at her pump and [facility] was able to get a basal rate (00:00-23:59 0.7 units/hr). Will need to verify pump settings and endocrinologist at a later time. [facility] called [facility], [phone], and spoke with Dudley Hadley, technician, who was able to verify the patient's medications, strengths and refill history. Patient is not complaint based on refill history. Information Obtained From: Patient, outpatient pharmacy, RX Query    Pertinent Pharmacy Findings:  Patient stated compliance, but based in refill history is not complaint. Identified High Alert Medication Information  Insulin Pump:  M[facility] was unable to verify pump settings, and follow up is needed for a later time. PTA medication list was corrected to the following:     Prior to Admission Medications   Prescriptions Last Dose Informant Patient Reported? Taking?   famotidine (PEPCID) 20 mg tablet 10/28/2019 at Unknown time Other Yes Yes   Sig: Take 20 mg by mouth two (2) times a day. ferrous sulfate 325 mg (65 mg iron) tablet 10/28/2019 at Unknown time Other No Yes   Sig: Take 1 Tab by mouth Daily (before breakfast). hydrALAZINE (APRESOLINE) 50 mg tablet 10/28/2019 at Unknown time Other Yes Yes   Sig: Take 25 mg by mouth daily. hydroCHLOROthiazide (HYDRODIURIL) 25 mg tablet 10/28/2019 at Unknown time Other No Yes   Sig: Take 1 Tab by mouth daily. insulin lispro (HUMALOG U-100 INSULIN) 100 unit/mL injection 10/30/2019 at Unknown time Other Yes Yes   Sig: by SubCUTAneous route continuous.  With insulin pump    insulin pump (PATIENT SUPPLIED) misc 10/30/2019 at Unknown time Other Yes Yes   Si.7 Units/hr by SubCUTAneous route continuous. labetalol (NORMODYNE) 300 mg tablet 10/28/2019 at Unknown time Other Yes Yes   Sig: Take 300 mg by mouth two (2) times a day. omeprazole (PRILOSEC) 40 mg capsule 10/28/2019 at Unknown time Other Yes Yes   Sig: Take 40 mg by mouth daily. ondansetron (ZOFRAN ODT) 4 mg disintegrating tablet 10/30/2019 at Unknown time Other Yes Yes   Sig: Take 4 mg by mouth every eight (8) hours as needed for Nausea.       Facility-Administered Medications: None          Thank you,  Rashard Alicea [facility]  Medication History Pharmacy Technician

## 2019-10-30 NOTE — ED NOTES
Pt returned from CT, placed on monitor to obtain current VS.  Pt states current pain 4/10, denies nausea. No other needs expressed at this time.

## 2019-10-30 NOTE — ED NOTES
Assumed pt care from [facility]. Plan of care discussed and all questions answered.

## 2019-10-30 NOTE — H&P
Hospitalist Admission Note    NAME: Lb Bhat   :  1990   MRN:  [id number]     Date/Time:  10/30/2019 6:32 PM    Patient PCP: None  ________________________________________________________________________    My assessment of this patient's clinical condition and my plan of care is as follows. Assessment / Plan:  Diabetic ketoacidosis  Pseudohyponatremia  Mild hyperkalemia  -Start DKA protocol with IV insulin, serial BMPs and also blood sugar checks   -Received a bolus IV fluids in the emergency department. Continue normal saline at 100 ml per hour. Once blood sugars are less than 250, change to D5  -Hold insulin pump while on insulin drip    Acute kidney injury likely prerenal  -Baseline creatinine is around 1.2 and currently creatinine is elevated at 3.10  -Urinalysis shows 2+ bacteria but is not a clean-catch   -Continue IV fluids. Repeat BMP in a.m. Leukocytosis likely reactive from DKA  Noninfectious sirs due to DKA  -Check CBC in a.m. Abnormal UA  -Follow urine culture results and consider starting on antibiotics based on urine culture results  -check pro calcitonin in am     Abdominal pain, N/V due to DKA  -CT abd shows gall bladder sludge but no distension  -cmp, lipase are normal    Hypertension  History of GERD  -Hold home hydrochlorothiazide due to elevated creatinine. Continue home labetalol  -Continue home omeprazole          Code Status: Full  DVT Prophylaxis: Heparin      Baseline: From home independent        Subjective:   CHIEF COMPLAINT: Nausea and vomiting     HISTORY OF PRESENT ILLNESS:     Geetha Cheng is a 30 y.o.   female who presents with medical history of diabetes mellitus on insulin pump, hypertension, gastroesophageal reflux disease is coming to the hospital chief complaints of nausea, vomiting and abdominal pain. Patient is currently drowsy but wakes up to commands and is not a reliable historian.   She reports pain mostly is generalized involving the whole upper abdomen, 3 x 10, nonradiating and without any limiting factors. She reports nausea along with clear vomit. She reports being compliant with her insulin pump. She also reports eating on a regular basis. She does not report any chest pain or shortness of breath. Denies dysuria or urgency. Denies focal weakness, tingling or numbness. On arrival to the hospital she was tachycardic and blood pressure was also on the higher side at 144 x 96. Her lab work she was noted to have a anion gap of 12, blood sugar of 767 patient was noted to have DKA and was started on insulin drip. She was also given IV fluid bolus in the emergency department. We were asked to admit for work up and evaluation of the above problems.      Past Medical History:   Diagnosis Date    Anemia     Chronic pain     Depression     Diabetes type 1, uncontrolled (HCC)     DKA, type 1 (HCC)     Essential hypertension     Heart murmur     Herpes simplex virus (HSV) infection 2017    positive in blood    Peripheral neuropathy     Ventricular tachycardia (HCC)         Past Surgical History:   Procedure Laterality Date    ABDOMEN SURGERY PROC UNLISTED      exploratory laparoscopy    HX CYST REMOVAL      groin       Social History     Tobacco Use    Smoking status: Former Smoker     Packs/day: 0.25     Years: 8.00     Pack years: 2.00     Types: Cigarettes     Last attempt to quit: 10/26/2014     Years since quittin.0    Smokeless tobacco: Never Used   Substance Use Topics    Alcohol use: No     Alcohol/week: 0.0 standard drinks        Family History   Problem Relation Age of Onset    No Known Problems Mother     Sleep Apnea Father     Hypertension Father     Diabetes Father     Heart Disease Maternal Grandfather      Allergies   Allergen Reactions    Morphine Other (comments)    Pcn [Penicillins] Hives     Tolerates ceftriaxone, cephalexin        Prior to Admission medications    Medication Sig Start Date End Date Taking? Authorizing Provider   famotidine (PEPCID) 20 mg tablet Take 20 mg by mouth two (2) times a day. Yes Provider, Historical   omeprazole (PRILOSEC) 40 mg capsule Take 40 mg by mouth daily. Yes Provider, Historical   labetalol (NORMODYNE) 300 mg tablet Take 300 mg by mouth two (2) times a day. Yes Provider, Historical   ondansetron (ZOFRAN ODT) 4 mg disintegrating tablet Take 4 mg by mouth every eight (8) hours as needed for Nausea. Yes Provider, Historical   hydrALAZINE (APRESOLINE) 50 mg tablet Take 25 mg by mouth daily. Yes Provider, Historical   insulin pump (PATIENT SUPPLIED) misc 0.7 Units/hr by SubCUTAneous route continuous. Yes Provider, Historical   insulin lispro (HUMALOG U-100 INSULIN) 100 unit/mL injection by SubCUTAneous route continuous. With insulin pump    Yes Provider, Historical   hydroCHLOROthiazide (HYDRODIURIL) 25 mg tablet Take 1 Tab by mouth daily. 5/25/19  Yes Prelsey Guerrero MD   ferrous sulfate 325 mg (65 mg iron) tablet Take 1 Tab by mouth Daily (before breakfast). 5/24/19  Yes Presley Guerrero MD       REVIEW OF SYSTEMS:     I am not able to complete the review of systems because:    The patient is intubated and sedated    The patient has altered mental status due to his acute medical problems    The patient has baseline aphasia from prior stroke(s)    The patient has baseline dementia and is not reliable historian   y The patient is in acute medical distress and unable to provide information           Total of 12 systems reviewed as follows:       POSITIVE= underlined text  Negative = text not underlined  General:  fever, chills, sweats, generalized weakness, weight loss/gain,      loss of appetite   Eyes:    blurred vision, eye pain, loss of vision, double vision  ENT:    rhinorrhea, pharyngitis   Respiratory:   cough, sputum production, SOB, JUSTIN, wheezing, pleuritic pain   Cardiology:   chest pain, palpitations, orthopnea, PND, edema, syncope   Gastrointestinal: abdominal pain , N/V, diarrhea, dysphagia, constipation, bleeding   Genitourinary:  frequency, urgency, dysuria, hematuria, incontinence   Muskuloskeletal :  arthralgia, myalgia, back pain  Hematology:  easy bruising, nose or gum bleeding, lymphadenopathy   Dermatological: rash, ulceration, pruritis, color change / jaundice  Endocrine:   hot flashes or polydipsia   Neurological:  headache, dizziness, confusion, focal weakness, paresthesia,     Speech difficulties, memory loss, gait difficulty  Psychological: Feelings of anxiety, depression, agitation    Objective:   VITALS:    Visit Vitals  BP (!) 146/92   Pulse (!) 105   Temp 97.6 °F (36.4 °C)   Resp 14   Ht 5' 8\" (1.727 m)   Wt 59 kg (130 lb)   SpO2 100%   BMI 19.77 kg/m²       PHYSICAL EXAM:    General:    cooperative, no distress, appears stated age. HEENT: Atraumatic, anicteric sclerae, pink conjunctivae     No oral ulcers, mucosa moist  Neck:  Supple, symmetrical,  thyroid: non tender  Lungs:   Clear to auscultation bilaterally. No Wheezing or Rhonchi. No rales. Chest wall:  No tenderness  No Accessory muscle use. Heart:   Regular  rhythm,  No  murmur   No edema  Abdomen:   Soft,  generalized tenderness, no guarding, no rigidity  Extremities: No cyanosis. No clubbing,      Skin turgor normal, Capillary refill normal, Radial dial pulse 2+  Skin:     Not pale.   Not Jaundiced  No rashes   Psych:  Drowsy  Neurologic: Drowsy but wakes up to commands, moving all 4 extremities spontaneously    _______________________________________________________________________  Care Plan discussed with:    Comments   Patient y    Family      RN y    Care Manager                    Consultant:      _______________________________________________________________________  Expected  Disposition:   Home with Family y   HH/PT/OT/RN    SNF/LTC    [facility]    ________________________________________________________________________  TOTAL TIME:  61 Minutes    Critical Care Provided Minutes non procedure based      Comments    y Reviewed previous records   >50% of visit spent in counseling and coordination of care y Discussion with patient and/or family and questions answered       ________________________________________________________________________  Signed: Carmen Dunne MD    Procedures: see electronic medical records for all procedures/Xrays and details which were not copied into this note but were reviewed prior to creation of Plan. LAB DATA REVIEWED:    Recent Results (from the past 24 hour(s))   GLUCOSE, POC    Collection Time: 10/30/19  2:30 PM   Result Value Ref Range    Glucose (POC) >600 (HH) 65 - 100 mg/dL    Performed by [employer]    GLUCOSE, POC    Collection Time: 10/30/19  2:31 PM   Result Value Ref Range    Glucose (POC) >600 (HH) 65 - 100 mg/dL    Performed by [employer]    CBC WITH AUTOMATED DIFF    Collection Time: 10/30/19  2:46 PM   Result Value Ref Range    WBC 19.5 (H) 3.6 - 11.0 K/uL    RBC 4.31 3.80 - 5.20 M/uL    HGB 11.6 11.5 - 16.0 g/dL    HCT 37.7 35.0 - 47.0 %    MCV 87.5 80.0 - 99.0 FL    MCH 26.9 26.0 - 34.0 PG    MCHC 30.8 30.0 - 36.5 g/dL    RDW 14.2 11.5 - 14.5 %    PLATELET 868 (H) 962 - 400 K/uL    MPV 11.5 8.9 - 12.9 FL    NRBC 0.0 0  WBC    ABSOLUTE NRBC 0.00 0.00 - 0.01 K/uL    NEUTROPHILS 86 (H) 32 - 75 %    LYMPHOCYTES 9 (L) 12 - 49 %    MONOCYTES 3 (L) 5 - 13 %    EOSINOPHILS 0 0 - 7 %    BASOPHILS 1 0 - 1 %    IMMATURE GRANULOCYTES 1 (H) 0.0 - 0.5 %    ABS. NEUTROPHILS 16.8 (H) 1.8 - 8.0 K/UL    ABS. LYMPHOCYTES 1.8 0.8 - 3.5 K/UL    ABS. MONOCYTES 0.6 0.0 - 1.0 K/UL    ABS. EOSINOPHILS 0.0 0.0 - 0.4 K/UL    ABS. BASOPHILS 0.1 0.0 - 0.1 K/UL    ABS. IMM.  GRANS. 0.2 (H) 0.00 - 0.04 K/UL    DF AUTOMATED     METABOLIC PANEL, COMPREHENSIVE    Collection Time: 10/30/19  2:46 PM   Result Value Ref Range    Sodium 131 (L) 136 - 145 mmol/L    Potassium 5.3 (H) 3.5 - 5.1 mmol/L    Chloride 91 (L) 97 - 108 mmol/L    CO2 12 (LL) 21 - 32 mmol/L Anion gap 28 (H) 5 - 15 mmol/L    Glucose 767 (HH) 65 - 100 mg/dL    BUN 48 (H) 6 - 20 MG/DL    Creatinine 3.10 (H) 0.55 - 1.02 MG/DL    BUN/Creatinine ratio 15 12 - 20      GFR est AA 21 (L) >60 ml/min/1.73m2    GFR est non-AA 18 (L) >60 ml/min/1.73m2    Calcium 9.4 8.5 - 10.1 MG/DL    Bilirubin, total 0.9 0.2 - 1.0 MG/DL    ALT (SGPT) 18 12 - 78 U/L    AST (SGOT) 15 15 - 37 U/L    Alk.  phosphatase 96 45 - 117 U/L    Protein, total 7.5 6.4 - 8.2 g/dL    Albumin 3.7 3.5 - 5.0 g/dL    Globulin 3.8 2.0 - 4.0 g/dL    A-G Ratio 1.0 (L) 1.1 - 2.2     LIPASE    Collection Time: 10/30/19  2:46 PM   Result Value Ref Range    Lipase 75 73 - 393 U/L   HCG QL SERUM    Collection Time: 10/30/19  2:46 PM   Result Value Ref Range    HCG, Ql. NEGATIVE  NEG     URINALYSIS W/ REFLEX CULTURE    Collection Time: 10/30/19  3:07 PM   Result Value Ref Range    Color YELLOW/STRAW      Appearance CLOUDY (A) CLEAR      Specific gravity 1.026 1.003 - 1.030      pH (UA) 5.5 5.0 - 8.0      Protein 300 (A) NEG mg/dL    Glucose >1,000 (A) NEG mg/dL    Ketone 80 (A) NEG mg/dL    Bilirubin NEGATIVE  NEG      Blood MODERATE (A) NEG      Urobilinogen 0.2 0.2 - 1.0 EU/dL    Nitrites NEGATIVE  NEG      Leukocyte Esterase NEGATIVE  NEG      WBC 5-10 0 - 4 /hpf    RBC 0-5 0 - 5 /hpf    Epithelial cells MODERATE (A) FEW /lpf    Bacteria 2+ (A) NEG /hpf    UA:UC IF INDICATED URINE CULTURE ORDERED (A) CNI      Budding yeast PRESENT (A) NEG      Other: Renal Epithelial cells Present     GLUCOSE, POC    Collection Time: 10/30/19  5:49 PM   Result Value Ref Range    Glucose (POC) >600 (HH) 65 - 100 mg/dL    Performed by [facility]    GLUCOSE, POC    Collection Time: 10/30/19  5:50 PM   Result Value Ref Range    Glucose (POC) 579 (H) 65 - 100 mg/dL    Performed by [address] [place] [address]    Collection Time: 10/30/19  5:51 PM   Result Value Ref Range    Glucose (POC) 583 (H) 65 - 100 mg/dL    Performed by Paige Amos

## 2019-10-30 NOTE — ED NOTES
Attempted to medicate patient with heparin pt states, \"not right now please in a couple minutes. \"  Will attempt again in a few minutes.

## 2019-10-30 NOTE — ED NOTES
Bedside and Verbal shift change report given to Digna Scanlon RN (oncoming nurse) by Sara Rae RN (offgoing nurse). Report included the following information SBAR, Kardex, ED Summary, MAR, Recent Results and Med Rec Status.

## 2019-10-30 NOTE — ED PROVIDER NOTES
EMERGENCY DEPARTMENT HISTORY AND PHYSICAL EXAM      Date: 10/30/2019  Patient Name: Bart Lomeli    History of Presenting Illness     Chief Complaint   Patient presents with    Vomiting     since 0400 today with nausea. reports glucose at home was reading high    Abdominal Pain    High Blood Sugar     BG read \"high\"       History Provided By: Patient    HPI: Bart Lomeli, 30 y.o. female presents to the ED with cc of abdominal pain, vomiting and high blood sugar. The patient's pain began yesterday. She states that it is diffuse, involving the abdomen and back. Pain is currently an 8 out of 10 in severity. She started vomiting at 4 AM today. She states she has vomited 5 times and had one episode of diarrhea. Her blood glucose read as high prior to arriving in the emergency department. She denies chest pain, fever, but does exhibit chills. She states she has some shortness of breath but denies cough or leg pain. She denies dysuria, but endorses frequent urination. There are no other complaints, changes, or physical findings at this time. PCP: None    No current facility-administered medications on file prior to encounter. Current Outpatient Medications on File Prior to Encounter   Medication Sig Dispense Refill    doxycycline (VIBRA-TABS) 100 mg tablet Take 1 Tab by mouth every twelve (12) hours. 14 Tab 0    omeprazole (PRILOSEC) 40 mg capsule Take 1 Cap by mouth daily. Take 1 cap twice daily for 14 days, then decrease to 1 cap daily  Indications: ulcer of the duodenum, inflammation of the esophagus with erosion 90 Cap 2    insulin lispro (HUMALOG U-100 INSULIN) 100 unit/mL injection by SubCUTAneous route. With insulin pump      hydrALAZINE (APRESOLINE) 100 mg tablet Take 1 Tab by mouth three (3) times daily. 90 Tab 0    famotidine (PEPCID) 20 mg tablet Take 1 Tab by mouth two (2) times daily as needed (acid reflux).  Indications: gastroesophageal reflux disease 30 Tab 0    hydroCHLOROthiazide (HYDRODIURIL) 25 mg tablet Take 1 Tab by mouth daily. 30 Tab 2    promethazine (PHENERGAN) 12.5 mg tablet Take 1 Tab by mouth every four (4) hours as needed for Nausea. 30 Tab 0    ferrous sulfate 325 mg (65 mg iron) tablet Take 1 Tab by mouth Daily (before breakfast). 30 Tab 2    glucagon (GLUCAGON EMERGENCY KIT, HUMAN,) 1 mg injection Glucagon emergency kit, IM injection  Indications: type 1 diabetes, pregnancy      pnv w/o calcium-iron fum-fa (COMPLETENATE) 29 mg iron- 1 mg chew Take 1 Tab by mouth. Past History     Past Medical History:  Past Medical History:   Diagnosis Date    Anemia     Chronic pain     Depression     Diabetes type 1, uncontrolled ([address]     DKA, type 1 (HCC)     Essential hypertension     Heart murmur     Herpes simplex virus (HSV) infection 2017    positive in blood    Peripheral neuropathy     Ventricular tachycardia (HCC)        Past Surgical History:  Past Surgical History:   Procedure Laterality Date    ABDOMEN SURGERY PROC UNLISTED      exploratory laparoscopy    HX CYST REMOVAL      groin       Family History:  Family History   Problem Relation Age of Onset    No Known Problems Mother     Sleep Apnea Father     Hypertension Father     Diabetes Father     Heart Disease Maternal Grandfather        Social History:  Social History     Tobacco Use    Smoking status: Former Smoker     Packs/day: 0.25     Years: 8.00     Pack years: 2.00     Types: Cigarettes     Last attempt to quit: 10/26/2014     Years since quittin.0    Smokeless tobacco: Never Used   Substance Use Topics    Alcohol use: No     Alcohol/week: 0.0 standard drinks    Drug use: Yes     Frequency: 2.0 times per week     Types: Marijuana       Allergies: Allergies   Allergen Reactions    Morphine Other (comments)    Pcn [Penicillins] Hives     Tolerates ceftriaxone, cephalexin         Review of Systems   Review of Systems   Constitutional: Positive for chills.  Negative for fever. HENT: Negative for congestion. Eyes: Negative. Respiratory: Positive for shortness of breath. Cardiovascular: Negative for chest pain. Gastrointestinal: Positive for abdominal pain, diarrhea, nausea and vomiting. Endocrine: Negative for heat intolerance. Genitourinary: Negative. Musculoskeletal: Negative for back pain. Skin: Negative for rash. Allergic/Immunologic: Negative for immunocompromised state. Neurological: Negative for headaches. Hematological: Does not bruise/bleed easily. Psychiatric/Behavioral: Negative. All other systems reviewed and are negative. Physical Exam   Physical Exam   Constitutional: She is oriented to person, place, and time. She appears well-developed and well-nourished. She appears distressed. Tremulous   HENT:   Head: Normocephalic and atraumatic. Eyes: Pupils are equal, round, and reactive to light. EOM are normal.   Neck: Normal range of motion. Neck supple. Cardiovascular: Normal rate, regular rhythm, normal heart sounds and intact distal pulses. Exam reveals no friction rub. No murmur heard. Pulmonary/Chest: Effort normal and breath sounds normal. No respiratory distress. She has no wheezes. She has no rales. She exhibits no tenderness. Abdominal: Soft. Bowel sounds are normal. She exhibits no distension. There is tenderness. There is no rebound and no guarding. Mild abdominal tenderness   Musculoskeletal: Normal range of motion. She exhibits no edema or tenderness. Neurological: She is alert and oriented to person, place, and time. She exhibits normal muscle tone. Coordination normal.   Skin: Skin is warm and dry. She is not diaphoretic. No pallor. Psychiatric: She has a normal mood and affect. Her behavior is normal.   Nursing note and vitals reviewed.       Diagnostic Study Results     Labs -     Recent Results (from the past 12 hour(s))   GLUCOSE, POC    Collection Time: 10/30/19  2:30 PM   Result Value Ref Range Glucose (POC) >600 (HH) 65 - 100 mg/dL    Performed by Sheng Valdivia    GLUCOSE, POC    Collection Time: 10/30/19  2:31 PM   Result Value Ref Range    Glucose (POC) >600 (HH) 65 - 100 mg/dL    Performed by Sheng Valdivia    CBC WITH AUTOMATED DIFF    Collection Time: 10/30/19  2:46 PM   Result Value Ref Range    WBC 19.5 (H) 3.6 - 11.0 K/uL    RBC 4.31 3.80 - 5.20 M/uL    HGB 11.6 11.5 - 16.0 g/dL    HCT 37.7 35.0 - 47.0 %    MCV 87.5 80.0 - 99.0 FL    MCH 26.9 26.0 - 34.0 PG    MCHC 30.8 30.0 - 36.5 g/dL    RDW 14.2 11.5 - 14.5 %    PLATELET 903 (H) 477 - 400 K/uL    MPV 11.5 8.9 - 12.9 FL    NRBC 0.0 0  WBC    ABSOLUTE NRBC 0.00 0.00 - 0.01 K/uL    NEUTROPHILS 86 (H) 32 - 75 %    LYMPHOCYTES 9 (L) 12 - 49 %    MONOCYTES 3 (L) 5 - 13 %    EOSINOPHILS 0 0 - 7 %    BASOPHILS 1 0 - 1 %    IMMATURE GRANULOCYTES 1 (H) 0.0 - 0.5 %    ABS. NEUTROPHILS 16.8 (H) 1.8 - 8.0 K/UL    ABS. LYMPHOCYTES 1.8 0.8 - 3.5 K/UL    ABS. MONOCYTES 0.6 0.0 - 1.0 K/UL    ABS. EOSINOPHILS 0.0 0.0 - 0.4 K/UL    ABS. BASOPHILS 0.1 0.0 - 0.1 K/UL    ABS. IMM. GRANS. 0.2 (H) 0.00 - 0.04 K/UL    DF AUTOMATED     METABOLIC PANEL, COMPREHENSIVE    Collection Time: 10/30/19  2:46 PM   Result Value Ref Range    Sodium 131 (L) 136 - 145 mmol/L    Potassium 5.3 (H) 3.5 - 5.1 mmol/L    Chloride 91 (L) 97 - 108 mmol/L    CO2 12 (LL) 21 - 32 mmol/L    Anion gap 28 (H) 5 - 15 mmol/L    Glucose 767 (HH) 65 - 100 mg/dL    BUN 48 (H) 6 - 20 MG/DL    Creatinine 3.10 (H) 0.55 - 1.02 MG/DL    BUN/Creatinine ratio 15 12 - 20      GFR est AA 21 (L) >60 ml/min/1.73m2    GFR est non-AA 18 (L) >60 ml/min/1.73m2    Calcium 9.4 8.5 - 10.1 MG/DL    Bilirubin, total 0.9 0.2 - 1.0 MG/DL    ALT (SGPT) 18 12 - 78 U/L    AST (SGOT) 15 15 - 37 U/L    Alk.  phosphatase 96 45 - 117 U/L    Protein, total 7.5 6.4 - 8.2 g/dL    Albumin 3.7 3.5 - 5.0 g/dL    Globulin 3.8 2.0 - 4.0 g/dL    A-G Ratio 1.0 (L) 1.1 - 2.2     LIPASE    Collection Time: 10/30/19  2:46 PM   Result Value Ref Range    Lipase 75 73 - 393 U/L   HCG QL SERUM    Collection Time: 10/30/19  2:46 PM   Result Value Ref Range    HCG, Ql. NEGATIVE  NEG     PHOSPHORUS    Collection Time: 10/30/19  2:46 PM   Result Value Ref Range    Phosphorus 8.2 (H) 2.6 - 4.7 MG/DL   HEMOGLOBIN A1C WITH EAG    Collection Time: 10/30/19  2:46 PM   Result Value Ref Range    Hemoglobin A1c 10.0 (H) 4.2 - 6.3 %    Est. average glucose 240 mg/dL   URINALYSIS W/ REFLEX CULTURE    Collection Time: 10/30/19  3:07 PM   Result Value Ref Range    Color YELLOW/STRAW      Appearance CLOUDY (A) CLEAR      Specific gravity 1.026 1.003 - 1.030      pH (UA) 5.5 5.0 - 8.0      Protein 300 (A) NEG mg/dL    Glucose >1,000 (A) NEG mg/dL    Ketone 80 (A) NEG mg/dL    Bilirubin NEGATIVE  NEG      Blood MODERATE (A) NEG      Urobilinogen 0.2 0.2 - 1.0 EU/dL    Nitrites NEGATIVE  NEG      Leukocyte Esterase NEGATIVE  NEG      WBC 5-10 0 - 4 /hpf    RBC 0-5 0 - 5 /hpf    Epithelial cells MODERATE (A) FEW /lpf    Bacteria 2+ (A) NEG /hpf    UA:UC IF INDICATED URINE CULTURE ORDERED (A) CNI      Budding yeast PRESENT (A) NEG      Other: Renal Epithelial cells Present     GLUCOSE, POC    Collection Time: 10/30/19  5:49 PM   Result Value Ref Range    Glucose (POC) >600 (HH) 65 - 100 mg/dL    Performed by Moses Fulton    GLUCOSE, POC    Collection Time: 10/30/19  5:50 PM   Result Value Ref Range    Glucose (POC) 579 (H) 65 - 100 mg/dL    Performed by Moses Fulton    GLUCOSE, POC    Collection Time: 10/30/19  5:51 PM   Result Value Ref Range    Glucose (POC) 583 (H) 65 - 100 mg/dL    Performed by Don Chapman, BASIC    Collection Time: 10/30/19  6:27 PM   Result Value Ref Range    Sodium 138 136 - 145 mmol/L    Potassium 4.1 3.5 - 5.1 mmol/L    Chloride 100 97 - 108 mmol/L    CO2 12 (LL) 21 - 32 mmol/L    Anion gap 26 (H) 5 - 15 mmol/L    Glucose 577 (H) 65 - 100 mg/dL    BUN 47 (H) 6 - 20 MG/DL    Creatinine 2.97 (H) 0.55 - 1.02 MG/DL    BUN/Creatinine ratio 16 12 - 20      GFR est AA 22 (L) >60 ml/min/1.73m2    GFR est non-AA 18 (L) >60 ml/min/1.73m2    Calcium 8.4 (L) 8.5 - 10.1 MG/DL   GLUCOSE, POC    Collection Time: 10/30/19  8:29 PM   Result Value Ref Range    Glucose (POC) 360 (H) 65 - 100 mg/dL    Performed by Emely child)    GLUCOSTABILIZER    Collection Time: 10/30/19  8:36 PM   Result Value Ref Range    Glucose 360 mg/dL    Insulin order 6.0 units/hour    Insulin adminstered 6.0 units/hour    Multiplier 0.020     Low target 150 mg/dL    High target 250 mg/dL    D50 order 0.0 ml    D50 administered 0.00 ml    Minutes until next BG 60 min    Order initials hb     Administered initials hb     GLSCOM Comments     GLUCOSE, POC    Collection Time: 10/30/19  9:53 PM   Result Value Ref Range    Glucose (POC) 204 (H) 65 - 100 mg/dL    Performed by Lluvia Santamaria    GLUCOSTABILIZER    Collection Time: 10/30/19  9:54 PM   Result Value Ref Range    Glucose 204 mg/dL    Insulin order 2.9 units/hour    Insulin adminstered 2.9 units/hour    Multiplier 0.020     Low target 150 mg/dL    High target 250 mg/dL    D50 order 0.0 ml    D50 administered 0.00 ml    Minutes until next BG 60 min    Order initials lmg     Administered initials lmg     GLSCOM Comments     GLUCOSE, POC    Collection Time: 10/30/19 11:25 PM   Result Value Ref Range    Glucose (POC) 163 (H) 65 - 100 mg/dL    Performed by Lluvia Santamaria    GLUCOSTABILIZER    Collection Time: 10/30/19 11:25 PM   Result Value Ref Range    Glucose 163 mg/dL    Insulin order 2.1 units/hour    Insulin adminstered 2.1 units/hour    Multiplier 0.020     Low target 150 mg/dL    High target 250 mg/dL    D50 order 0.0 ml    D50 administered 0.00 ml    Minutes until next BG 60 min    Order initials lmg     Administered initials lmg     GLSCOM Comments         Radiologic Studies -   XR CHEST PORT   Final Result   IMPRESSION:   No acute cardiopulmonary disease radiographically. .  . CT ABD PELV WO CONT   Final Result   IMPRESSION:   No acute intracranial abdominal pathology. Possible sludge within the   gallbladder which is nondistended. No evidence of gallbladder wall thickening or   pericholecystic fluid. CT Results  (Last 48 hours)    None        CXR Results  (Last 48 hours)    None          Medical Decision Making   I am the first provider for this patient. I reviewed the vital signs, available nursing notes, past medical history, past surgical history, family history and social history. Vital Signs-Reviewed the patient's vital signs. Patient Vitals for the past 12 hrs:   Temp Pulse Resp BP SpO2   10/30/19 1430 97.6 °F (36.4 °C) (!) 104 15 (!) 144/96 100 %         Records Reviewed: Nursing Notes, Old Medical Records, Previous Radiology Studies and Previous Laboratory Studies    Provider Notes (Medical Decision Making):   DKA, gastritis, gastroenteritis, UTI, appendicitis, biliary colic, electrolyte abnormality    ED Course:   Initial assessment performed. The patients presenting problems have been discussed, and they are in agreement with the care plan formulated and outlined with them. I have encouraged them to ask questions as they arise throughout their visit. Consult note:     The patient is being admitted by Dr. Jennifer Valdez, hospitalist    CRITICAL CARE NOTE :    11:40 PM      IMPENDING DETERIORATION -Respiratory, Cardiovascular, CNS, Metabolic and Renal    ASSOCIATED RISK FACTORS - Hypotension, Metabolic changes, Dehydration and CNS Decompensation    MANAGEMENT- Bedside Assessment and Supervision of Care    INTERPRETATION -  Xrays, CT Scan     INTERVENTIONS - hemodynamic mngmt and Metobolic interventions    CASE REVIEW - Hospitalist and Nursing    TREATMENT RESPONSE -Improved    PERFORMED BY - Self        NOTES   :      I have spent 55 minutes of critical care time involved in lab review, consultations with specialist, family decision- making, bedside attention and documentation. During this entire length of time I was immediately available to the patient . Chris Diggs MD                                                       Critical Care Time:   54    Disposition:  Admit    PLAN:  1. Current Discharge Medication List        2. Follow-up Information    None       Return to ED if worse     Diagnosis     Clinical Impression:   1. Type 1 diabetes mellitus with ketoacidosis without coma ([address]    2. Acute renal failure, unspecified acute renal failure type ([address]    3. Abdominal pain, generalized        Attestations:    Chris Diggs MD    Please note that this dictation was completed with [employer], the computer voice recognition software. Quite often unanticipated grammatical, syntax, homophones, and other interpretive errors are inadvertently transcribed by the computer software. Please disregard these errors. Please excuse any errors that have escaped final proofreading. Thank you.

## 2019-10-31 LAB
ADMINISTERED INITIALS, ADMINIT: NORMAL
ANION GAP SERPL CALC-SCNC: 13 MMOL/L (ref 5–15)
ANION GAP SERPL CALC-SCNC: 15 MMOL/L (ref 5–15)
ANION GAP SERPL CALC-SCNC: 16 MMOL/L (ref 5–15)
ANION GAP SERPL CALC-SCNC: 17 MMOL/L (ref 5–15)
ANION GAP SERPL CALC-SCNC: 9 MMOL/L (ref 5–15)
BASOPHILS # BLD: 0 K/UL (ref 0–0.1)
BASOPHILS NFR BLD: 0 % (ref 0–1)
BUN SERPL-MCNC: 35 MG/DL (ref 6–20)
BUN SERPL-MCNC: 40 MG/DL (ref 6–20)
BUN SERPL-MCNC: 42 MG/DL (ref 6–20)
BUN SERPL-MCNC: 46 MG/DL (ref 6–20)
BUN SERPL-MCNC: 46 MG/DL (ref 6–20)
BUN/CREAT SERPL: 16 (ref 12–20)
BUN/CREAT SERPL: 17 (ref 12–20)
CALCIUM SERPL-MCNC: 8 MG/DL (ref 8.5–10.1)
CALCIUM SERPL-MCNC: 8.1 MG/DL (ref 8.5–10.1)
CALCIUM SERPL-MCNC: 8.3 MG/DL (ref 8.5–10.1)
CALCIUM SERPL-MCNC: 8.4 MG/DL (ref 8.5–10.1)
CALCIUM SERPL-MCNC: 9 MG/DL (ref 8.5–10.1)
CHLORIDE SERPL-SCNC: 101 MMOL/L (ref 97–108)
CHLORIDE SERPL-SCNC: 102 MMOL/L (ref 97–108)
CHLORIDE SERPL-SCNC: 102 MMOL/L (ref 97–108)
CHLORIDE SERPL-SCNC: 103 MMOL/L (ref 97–108)
CHLORIDE SERPL-SCNC: 105 MMOL/L (ref 97–108)
CO2 SERPL-SCNC: 17 MMOL/L (ref 21–32)
CO2 SERPL-SCNC: 17 MMOL/L (ref 21–32)
CO2 SERPL-SCNC: 18 MMOL/L (ref 21–32)
CO2 SERPL-SCNC: 21 MMOL/L (ref 21–32)
CO2 SERPL-SCNC: 24 MMOL/L (ref 21–32)
CREAT SERPL-MCNC: 2.22 MG/DL (ref 0.55–1.02)
CREAT SERPL-MCNC: 2.44 MG/DL (ref 0.55–1.02)
CREAT SERPL-MCNC: 2.68 MG/DL (ref 0.55–1.02)
CREAT SERPL-MCNC: 2.71 MG/DL (ref 0.55–1.02)
CREAT SERPL-MCNC: 2.79 MG/DL (ref 0.55–1.02)
D50 ADMINISTERED, D50ADM: 0 ML
D50 ORDER, D50ORD: 0 ML
DIFFERENTIAL METHOD BLD: ABNORMAL
EOSINOPHIL # BLD: 0 K/UL (ref 0–0.4)
EOSINOPHIL NFR BLD: 0 % (ref 0–7)
ERYTHROCYTE [DISTWIDTH] IN BLOOD BY AUTOMATED COUNT: 14.3 % (ref 11.5–14.5)
GLSCOM COMMENTS: NORMAL
GLUCOSE BLD STRIP.AUTO-MCNC: 102 MG/DL (ref 65–100)
GLUCOSE BLD STRIP.AUTO-MCNC: 103 MG/DL (ref 65–100)
GLUCOSE BLD STRIP.AUTO-MCNC: 119 MG/DL (ref 65–100)
GLUCOSE BLD STRIP.AUTO-MCNC: 136 MG/DL (ref 65–100)
GLUCOSE BLD STRIP.AUTO-MCNC: 138 MG/DL (ref 65–100)
GLUCOSE BLD STRIP.AUTO-MCNC: 138 MG/DL (ref 65–100)
GLUCOSE BLD STRIP.AUTO-MCNC: 140 MG/DL (ref 65–100)
GLUCOSE BLD STRIP.AUTO-MCNC: 145 MG/DL (ref 65–100)
GLUCOSE BLD STRIP.AUTO-MCNC: 167 MG/DL (ref 65–100)
GLUCOSE BLD STRIP.AUTO-MCNC: 182 MG/DL (ref 65–100)
GLUCOSE BLD STRIP.AUTO-MCNC: 185 MG/DL (ref 65–100)
GLUCOSE BLD STRIP.AUTO-MCNC: 187 MG/DL (ref 65–100)
GLUCOSE BLD STRIP.AUTO-MCNC: 203 MG/DL (ref 65–100)
GLUCOSE BLD STRIP.AUTO-MCNC: 206 MG/DL (ref 65–100)
GLUCOSE BLD STRIP.AUTO-MCNC: 217 MG/DL (ref 65–100)
GLUCOSE BLD STRIP.AUTO-MCNC: 253 MG/DL (ref 65–100)
GLUCOSE BLD STRIP.AUTO-MCNC: 255 MG/DL (ref 65–100)
GLUCOSE BLD STRIP.AUTO-MCNC: 258 MG/DL (ref 65–100)
GLUCOSE BLD STRIP.AUTO-MCNC: 278 MG/DL (ref 65–100)
GLUCOSE BLD STRIP.AUTO-MCNC: 319 MG/DL (ref 65–100)
GLUCOSE BLD STRIP.AUTO-MCNC: 332 MG/DL (ref 65–100)
GLUCOSE SERPL-MCNC: 102 MG/DL (ref 65–100)
GLUCOSE SERPL-MCNC: 138 MG/DL (ref 65–100)
GLUCOSE SERPL-MCNC: 172 MG/DL (ref 65–100)
GLUCOSE SERPL-MCNC: 253 MG/DL (ref 65–100)
GLUCOSE SERPL-MCNC: 259 MG/DL (ref 65–100)
GLUCOSE, GLC: 102 MG/DL
GLUCOSE, GLC: 103 MG/DL
GLUCOSE, GLC: 119 MG/DL
GLUCOSE, GLC: 136 MG/DL
GLUCOSE, GLC: 138 MG/DL
GLUCOSE, GLC: 138 MG/DL
GLUCOSE, GLC: 140 MG/DL
GLUCOSE, GLC: 145 MG/DL
GLUCOSE, GLC: 167 MG/DL
GLUCOSE, GLC: 182 MG/DL
GLUCOSE, GLC: 185 MG/DL
GLUCOSE, GLC: 187 MG/DL
GLUCOSE, GLC: 203 MG/DL
GLUCOSE, GLC: 206 MG/DL
GLUCOSE, GLC: 217 MG/DL
GLUCOSE, GLC: 253 MG/DL
GLUCOSE, GLC: 255 MG/DL
GLUCOSE, GLC: 258 MG/DL
GLUCOSE, GLC: 278 MG/DL
GLUCOSE, GLC: 290 MG/DL
GLUCOSE, GLC: 319 MG/DL
GLUCOSE, GLC: 332 MG/DL
HCG SERPL-ACNC: <1 MIU/ML (ref 0–6)
HCT VFR BLD AUTO: 36.5 % (ref 35–47)
HGB BLD-MCNC: 11.9 G/DL (ref 11.5–16)
HIGH TARGET, HITG: 250 MG/DL
IMM GRANULOCYTES # BLD AUTO: 0 K/UL (ref 0–0.04)
IMM GRANULOCYTES NFR BLD AUTO: 0 % (ref 0–0.5)
INSULIN ADMINSTERED, INSADM: 0 UNITS/HOUR
INSULIN ADMINSTERED, INSADM: 0.1 UNITS/HOUR
INSULIN ADMINSTERED, INSADM: 0.4 UNITS/HOUR
INSULIN ADMINSTERED, INSADM: 0.8 UNITS/HOUR
INSULIN ADMINSTERED, INSADM: 0.8 UNITS/HOUR
INSULIN ADMINSTERED, INSADM: 0.9 UNITS/HOUR
INSULIN ADMINSTERED, INSADM: 1.9 UNITS/HOUR
INSULIN ADMINSTERED, INSADM: 2 UNITS/HOUR
INSULIN ADMINSTERED, INSADM: 2.1 UNITS/HOUR
INSULIN ADMINSTERED, INSADM: 2.6 UNITS/HOUR
INSULIN ADMINSTERED, INSADM: 2.9 UNITS/HOUR
INSULIN ADMINSTERED, INSADM: 3.7 UNITS/HOUR
INSULIN ADMINSTERED, INSADM: 4 UNITS/HOUR
INSULIN ADMINSTERED, INSADM: 4.6 UNITS/HOUR
INSULIN ADMINSTERED, INSADM: 5.4 UNITS/HOUR
INSULIN ADMINSTERED, INSADM: 6.5 UNITS/HOUR
INSULIN ORDER, INSORD: 0 UNITS/HOUR
INSULIN ORDER, INSORD: 0.1 UNITS/HOUR
INSULIN ORDER, INSORD: 0.4 UNITS/HOUR
INSULIN ORDER, INSORD: 0.8 UNITS/HOUR
INSULIN ORDER, INSORD: 0.8 UNITS/HOUR
INSULIN ORDER, INSORD: 0.9 UNITS/HOUR
INSULIN ORDER, INSORD: 1.9 UNITS/HOUR
INSULIN ORDER, INSORD: 2 UNITS/HOUR
INSULIN ORDER, INSORD: 2.1 UNITS/HOUR
INSULIN ORDER, INSORD: 2.6 UNITS/HOUR
INSULIN ORDER, INSORD: 2.9 UNITS/HOUR
INSULIN ORDER, INSORD: 3.7 UNITS/HOUR
INSULIN ORDER, INSORD: 4 UNITS/HOUR
INSULIN ORDER, INSORD: 4.6 UNITS/HOUR
INSULIN ORDER, INSORD: 5.4 UNITS/HOUR
INSULIN ORDER, INSORD: 6.5 UNITS/HOUR
LOW TARGET, LOT: 150 MG/DL
LYMPHOCYTES # BLD: 1.2 K/UL (ref 0.8–3.5)
LYMPHOCYTES NFR BLD: 4 % (ref 12–49)
MAGNESIUM SERPL-MCNC: 1.6 MG/DL (ref 1.6–2.4)
MAGNESIUM SERPL-MCNC: 1.6 MG/DL (ref 1.6–2.4)
MAGNESIUM SERPL-MCNC: 1.9 MG/DL (ref 1.6–2.4)
MAGNESIUM SERPL-MCNC: 1.9 MG/DL (ref 1.6–2.4)
MAGNESIUM SERPL-MCNC: 2 MG/DL (ref 1.6–2.4)
MCH RBC QN AUTO: 27.2 PG (ref 26–34)
MCHC RBC AUTO-ENTMCNC: 32.6 G/DL (ref 30–36.5)
MCV RBC AUTO: 83.5 FL (ref 80–99)
MINUTES UNTIL NEXT BG, NBG: 60 MIN
MONOCYTES # BLD: 2.2 K/UL (ref 0–1)
MONOCYTES NFR BLD: 7 % (ref 5–13)
MULTIPLIER, MUL: 0
MULTIPLIER, MUL: 0.01
MULTIPLIER, MUL: 0.02
MULTIPLIER, MUL: 0.03
MULTIPLIER, MUL: 0.03
NEUTS BAND NFR BLD MANUAL: 4 %
NEUTS SEG # BLD: 27.8 K/UL (ref 1.8–8)
NEUTS SEG NFR BLD: 85 % (ref 32–75)
NRBC # BLD: 0 K/UL (ref 0–0.01)
NRBC BLD-RTO: 0 PER 100 WBC
ORDER INITIALS, ORDINIT: NORMAL
PLATELET # BLD AUTO: 287 K/UL (ref 150–400)
POTASSIUM SERPL-SCNC: 3.5 MMOL/L (ref 3.5–5.1)
POTASSIUM SERPL-SCNC: 3.6 MMOL/L (ref 3.5–5.1)
POTASSIUM SERPL-SCNC: 3.7 MMOL/L (ref 3.5–5.1)
RBC # BLD AUTO: 4.37 M/UL (ref 3.8–5.2)
RBC MORPH BLD: ABNORMAL
SERVICE CMNT-IMP: ABNORMAL
SODIUM SERPL-SCNC: 133 MMOL/L (ref 136–145)
SODIUM SERPL-SCNC: 135 MMOL/L (ref 136–145)
SODIUM SERPL-SCNC: 136 MMOL/L (ref 136–145)
SODIUM SERPL-SCNC: 137 MMOL/L (ref 136–145)
SODIUM SERPL-SCNC: 139 MMOL/L (ref 136–145)
WBC # BLD AUTO: 31.2 K/UL (ref 3.6–11)

## 2019-10-31 PROCEDURE — 74011250636 HC RX REV CODE- 250/636: Performed by: INTERNAL MEDICINE

## 2019-10-31 PROCEDURE — 74011250637 HC RX REV CODE- 250/637: Performed by: INTERNAL MEDICINE

## 2019-10-31 PROCEDURE — 82962 GLUCOSE BLOOD TEST: CPT

## 2019-10-31 PROCEDURE — 74011636637 HC RX REV CODE- 636/637: Performed by: INTERNAL MEDICINE

## 2019-10-31 PROCEDURE — 83735 ASSAY OF MAGNESIUM: CPT

## 2019-10-31 PROCEDURE — 65660000000 HC RM CCU STEPDOWN

## 2019-10-31 PROCEDURE — 36415 COLL VENOUS BLD VENIPUNCTURE: CPT

## 2019-10-31 PROCEDURE — 51798 US URINE CAPACITY MEASURE: CPT

## 2019-10-31 PROCEDURE — 84702 CHORIONIC GONADOTROPIN TEST: CPT

## 2019-10-31 PROCEDURE — 80048 BASIC METABOLIC PNL TOTAL CA: CPT

## 2019-10-31 PROCEDURE — 74011250637 HC RX REV CODE- 250/637: Performed by: HOSPITALIST

## 2019-10-31 PROCEDURE — 77030011943

## 2019-10-31 PROCEDURE — 74011000258 HC RX REV CODE- 258: Performed by: INTERNAL MEDICINE

## 2019-10-31 PROCEDURE — 85025 COMPLETE CBC W/AUTO DIFF WBC: CPT

## 2019-10-31 PROCEDURE — 74011000250 HC RX REV CODE- 250: Performed by: INTERNAL MEDICINE

## 2019-10-31 RX ORDER — FAMOTIDINE 20 MG/1
20 TABLET, FILM COATED ORAL EVERY 24 HOURS
Status: DISCONTINUED | OUTPATIENT
Start: 2019-11-01 | End: 2019-11-03 | Stop reason: HOSPADM

## 2019-10-31 RX ORDER — HYDROXYZINE 25 MG/1
25 TABLET, FILM COATED ORAL
Status: DISCONTINUED | OUTPATIENT
Start: 2019-10-31 | End: 2019-11-03 | Stop reason: HOSPADM

## 2019-10-31 RX ORDER — HYDROXYZINE PAMOATE 25 MG/1
25 CAPSULE ORAL
Status: DISCONTINUED | OUTPATIENT
Start: 2019-10-31 | End: 2019-10-31 | Stop reason: CLARIF

## 2019-10-31 RX ORDER — PANTOPRAZOLE SODIUM 40 MG/1
40 TABLET, DELAYED RELEASE ORAL
Status: DISCONTINUED | OUTPATIENT
Start: 2019-11-01 | End: 2019-11-03 | Stop reason: HOSPADM

## 2019-10-31 RX ORDER — FLUCONAZOLE 2 MG/ML
200 INJECTION, SOLUTION INTRAVENOUS ONCE
Status: DISCONTINUED | OUTPATIENT
Start: 2019-10-31 | End: 2019-10-31

## 2019-10-31 RX ORDER — TRAMADOL HYDROCHLORIDE 50 MG/1
50 TABLET ORAL
Status: DISCONTINUED | OUTPATIENT
Start: 2019-10-31 | End: 2019-11-03 | Stop reason: HOSPADM

## 2019-10-31 RX ORDER — FLUCONAZOLE 2 MG/ML
200 INJECTION, SOLUTION INTRAVENOUS ONCE
Status: COMPLETED | OUTPATIENT
Start: 2019-10-31 | End: 2019-11-01

## 2019-10-31 RX ORDER — SODIUM CHLORIDE 9 MG/ML
75 INJECTION, SOLUTION INTRAVENOUS CONTINUOUS
Status: DISCONTINUED | OUTPATIENT
Start: 2019-10-31 | End: 2019-11-03

## 2019-10-31 RX ADMIN — Medication 10 ML: at 21:00

## 2019-10-31 RX ADMIN — ONDANSETRON 4 MG: 2 INJECTION INTRAMUSCULAR; INTRAVENOUS at 03:24

## 2019-10-31 RX ADMIN — LABETALOL HCL 300 MG: 200 TABLET, FILM COATED ORAL at 20:58

## 2019-10-31 RX ADMIN — SODIUM CHLORIDE 0.1 UNITS/HR: 900 INJECTION, SOLUTION INTRAVENOUS at 13:00

## 2019-10-31 RX ADMIN — Medication 1 LOZENGE: at 21:46

## 2019-10-31 RX ADMIN — LABETALOL HCL 300 MG: 200 TABLET, FILM COATED ORAL at 08:52

## 2019-10-31 RX ADMIN — Medication 10 ML: at 06:00

## 2019-10-31 RX ADMIN — DEXTROSE MONOHYDRATE AND SODIUM CHLORIDE 100 ML/HR: 5; .45 INJECTION, SOLUTION INTRAVENOUS at 08:50

## 2019-10-31 RX ADMIN — Medication 1 AMPULE: at 08:48

## 2019-10-31 RX ADMIN — CEFTRIAXONE 1 G: 1 INJECTION, POWDER, FOR SOLUTION INTRAMUSCULAR; INTRAVENOUS at 13:22

## 2019-10-31 RX ADMIN — Medication 1 AMPULE: at 20:57

## 2019-10-31 RX ADMIN — TRAMADOL HYDROCHLORIDE 50 MG: 50 TABLET, COATED ORAL at 19:38

## 2019-10-31 RX ADMIN — SODIUM CHLORIDE 5.4 UNITS/HR: 900 INJECTION, SOLUTION INTRAVENOUS at 15:13

## 2019-10-31 RX ADMIN — HEPARIN SODIUM 5000 UNITS: 5000 INJECTION INTRAVENOUS; SUBCUTANEOUS at 21:00

## 2019-10-31 RX ADMIN — FENTANYL CITRATE 25 MCG: 50 INJECTION, SOLUTION INTRAMUSCULAR; INTRAVENOUS at 03:24

## 2019-10-31 RX ADMIN — HYDRALAZINE HYDROCHLORIDE 25 MG: 25 TABLET, FILM COATED ORAL at 08:51

## 2019-10-31 RX ADMIN — SODIUM CHLORIDE 0.8 UNITS/HR: 900 INJECTION, SOLUTION INTRAVENOUS at 18:24

## 2019-10-31 RX ADMIN — FLUCONAZOLE 200 MG: 200 INJECTION, SOLUTION INTRAVENOUS at 14:51

## 2019-10-31 RX ADMIN — SODIUM CHLORIDE 150 ML/HR: 900 INJECTION, SOLUTION INTRAVENOUS at 16:00

## 2019-10-31 RX ADMIN — SODIUM CHLORIDE 3.7 UNITS/HR: 900 INJECTION, SOLUTION INTRAVENOUS at 17:25

## 2019-10-31 RX ADMIN — FAMOTIDINE 20 MG: 10 INJECTION, SOLUTION INTRAVENOUS at 08:50

## 2019-10-31 RX ADMIN — TRAMADOL HYDROCHLORIDE 50 MG: 50 TABLET, COATED ORAL at 13:23

## 2019-10-31 RX ADMIN — HYDROXYZINE HYDROCHLORIDE 25 MG: 25 TABLET, FILM COATED ORAL at 20:59

## 2019-10-31 RX ADMIN — HEPARIN SODIUM 5000 UNITS: 5000 INJECTION INTRAVENOUS; SUBCUTANEOUS at 09:57

## 2019-10-31 RX ADMIN — SODIUM CHLORIDE 6.5 UNITS/HR: 900 INJECTION, SOLUTION INTRAVENOUS at 16:22

## 2019-10-31 NOTE — INTERDISCIPLINARY ROUNDS
Critical care interdisciplinary rounds held on 10/31/2019. Following members present, Pharmacy, Diabetes Treatment, Case Management, Respiratory Therapy, Physical Therapy and Nutrition. Led by Prem Johnson RN and Dr. Elian Zamora. Plan of care discussed. See clinical pathway for plan of care and interventions and desired outcomes. Remains on an insulin gtt. PCU orders noted.

## 2019-10-31 NOTE — PROGRESS NOTES
10/31/2019    INTENSIVIST PROGRESS NOTE:     Patient seen and evaluated, chart reviewed   31 yo female presented with N/V, abdominal pain  Found to be in DKA  Started on insulin drip  Observed in ccu overnight  No acute events overnight  Now pt in CCU in no severe distress  No acute complaints    ROS: no sob, no cp    Visit Vitals  /86 (BP 1 Location: Left arm, BP Patient Position: At rest)   Pulse (!) 108   Temp 99.3 °F (37.4 °C)   Resp 16   Ht 5' 8\" (1.727 m)   Wt 52.3 kg (115 lb 4.8 oz)   SpO2 100%   BMI 17.53 kg/m²       General: no distress  Eyes: anicteric  HEENT: dry oral mucosa  Neck: FROM  CV: RRR  Lungs:  clear  Abd: soft  : no flank pain  Ext: no edema  Skin: no rash  Musculoskeletal: normal inspection  Neuro: non focal    CXR: clear    Labs reviewed    A/P:  - DKA: insulin drip  - MARIS: IVF  - advance diet as tolerated  - DVT prophylaxis  - Will assist on disposition planning when stable for transfer  Luís Melara MD

## 2019-10-31 NOTE — PROGRESS NOTES
Dr. Helane Landau notified that patient's current FSBS is 204 and IVF needs to be changed. Dr. Helane Landau to put orders in. Will continue to monitor.

## 2019-10-31 NOTE — PROGRESS NOTES
Hospitalist Progress Note    NAME: Khushi Quijano   :  1990   MRN:  [id number]     Assessment / Plan:  Diabetic ketoacidosis POA  Managed in ICU  On insulin ggt  Anion Gap near close but insulin drip held due to glucometer, hard to switch long acting at this time  May switch to long acting later today if anion gap remain closed  Continue to monitor lytes closely  ? Compliance at home, does seems to have some pain seeking behavior (pt was laying without discomfort when I entered the room but started crying upon seeing me)    Acute Renal Failure POA  In settings of above  IVF and monitor lytes  CT without hydro    Leukocytosis - Worsening  Was expected stress from DKA but considering worsening and UA seems somewhat abnormal with bacterurea and fungiurea, will add IV Rocephin and fluconzaole  F/u urine cultures and monitor    Pseudohyponatremia  Mild hyperkalemia  Due to DKA   Resolved    ? Abdominal Pain  CT A/P without acute etiology  ? Due to UTI  beta HCG negative on admission  ? Pain seeking behavior  PRN Tylenol and Tramadol for now. Avoid IV pain meds unless we know we are dealing with organic etiology  H/o admissions with pain      Hypertension  Continue hydralazine  Holding HCTZ due to above  PRN IV hydralazine    History of GERD  Continue PPIs        Code Status: Full  DVT Prophylaxis: Heparin        Baseline: From home independent                    Subjective: Pt seen and examined at bedside. NAD but complaining of severe abdominal pain.  Overnight events d/w RN   CHIEF COMPLAINT: f/u \"Nausea, vomiting & abdominal pain\"     Review of Systems:  Symptom Y/N Comments  Symptom Y/N Comments   Fever/Chills n   Chest Pain     Poor Appetite    Edema     Cough n   Abdominal Pain y    Sputum    Joint Pain     SOB/JUSTIN n   Pruritis/Rash     Nausea/vomit    Tolerating PT/OT     Diarrhea    Tolerating Diet     Constipation    Other       Could NOT obtain due to:      Objective:     VITALS:   Last 24hrs VS reviewed since prior progress note. Most recent are:  Patient Vitals for the past 24 hrs:   Temp Pulse Resp BP SpO2   10/31/19 1200     100 %   10/31/19 1100  87 16 98/65 100 %   10/31/19 1000  94 16 104/68 100 %   10/31/19 0900  (!) 108 16 (!) 159/106 100 %   10/31/19 0800 99.3 °F (37.4 °C) (!) 108 16 146/86 100 %   10/31/19 0754     100 %   10/31/19 0700  97 17 115/70 100 %   10/31/19 0500  96 17 103/60 100 %   10/31/19 0415  (!) 102 17 (!) 143/96 100 %   10/31/19 0330 99 °F (37.2 °C) (!) 108 14 142/86 100 %   10/31/19 0300  (!) 107 17 (!) 182/109 100 %   10/31/19 0200  (!) 109 17 (!) 138/95 100 %   10/31/19 0100  (!) 120 22 156/77 (!) 79 %   10/31/19 0000 99 °F (37.2 °C) (!) 105 17 121/81 100 %   10/30/19 2300  (!) 109 18 126/80 100 %   10/30/19 2200  (!) 109 14 133/84 100 %   10/30/19 2104 98 °F (36.7 °C) (!) 113 15 136/85 100 %   10/30/19 2030    106/72 100 %   10/30/19 2015    114/76 100 %   10/30/19 2000    101/64 99 %   10/30/19 1945    109/68 99 %   10/30/19 1930    101/59 100 %   10/30/19 1915    101/62 100 %   10/30/19 1900    114/72 100 %   10/30/19 1845    123/75 100 %   10/30/19 1830    142/90 99 %   10/30/19 1815    (!) 144/96 100 %   10/30/19 1800    (!) 146/93 100 %   10/30/19 1745    139/90 100 %   10/30/19 1733  (!) 105 14 (!) 146/92 100 %   10/30/19 1430 97.6 °F (36.4 °C) (!) 104 15 (!) 144/96 100 %       Intake/Output Summary (Last 24 hours) at 10/31/2019 1252  Last data filed at 10/31/2019 1200  Gross per 24 hour   Intake 1371.1 ml   Output    Net 1371.1 ml        PHYSICAL EXAM:  General: WD, WN. Alert, cooperative, no acute distress    EENT:  EOMI. Anicteric sclerae. MMM  Resp:  CTA bilaterally, no wheezing or rales.   No accessory muscle use  CV:  Regular  rhythm,  No edema  GI:  Soft, Non distended, tender, voluntary guarding.  +Bowel sounds  Neurologic:  Alert and oriented X 3, normal speech,   Psych:   Good insight. Not anxious nor agitated  Skin:  No rashes. No jaundice    Reviewed most current lab test results and cultures  YES  Reviewed most current radiology test results   YES  Review and summation of old records today    NO  Reviewed patient's current orders and MAR    YES  PMH/SH reviewed - no change compared to H&P  ________________________________________________________________________  Care Plan discussed with:    Comments   Patient y    Family      RN y    Care Manager     Consultant                        Multidiciplinary team rounds were held today with , nursing, pharmacist and clinical coordinator. Patient's plan of care was discussed; medications were reviewed and discharge planning was addressed. ________________________________________________________________________  Total NON critical care TIME:  35 Minutes    Total CRITICAL CARE TIME Spent:   Minutes non procedure based      Comments   >50% of visit spent in counseling and coordination of care     ________________________________________________________________________  Hemalatha Benavidez MD     Procedures: see electronic medical records for all procedures/Xrays and details which were not copied into this note but were reviewed prior to creation of Plan. LABS:  I reviewed today's most current labs and imaging studies.   Pertinent labs include:  Recent Labs     10/31/19  0331 10/30/19  1446   WBC 31.2* 19.5*   HGB 11.9 11.6   HCT 36.5 37.7    485*     Recent Labs     10/31/19  1200 10/31/19  0739 10/31/19  0331  10/30/19  1446    137 136   < > 131*   K 3.6 3.5 3.7   < > 5.3*    103 102   < > 91*   CO2 21 18* 17*   < > 12*   * 253* 172*   < > 767*   BUN 42* 46* 46*   < > 48*   CREA 2.68* 2.71* 2.79*   < > 3.10*   CA 8.3* 8.4* 9.0   < > 9.4   MG 1.9 1.9 2.0   < >  --    PHOS  --   --   --   --  8.2*   ALB  --   --   --   --  3.7   TBILI  --   --   --   --  0.9   SGOT  --   --   --   --  15   ALT  --   --   --   --  18    < > = values in this interval not displayed.        Signed: Lucinda Llanos MD

## 2019-10-31 NOTE — PROGRESS NOTES
Nursing daily note:   0700:  Assumed care of patient from Guillermo Allen RN. Report given and chart reviewed. 0730:  Shift assessment completed, glucose stabilizer continues  and insulin gtt adjusted per [facility] with second RN verification. BMP/Mag drawn/labeled/sent. Assessment documented, patient cooperative but tearful. Reassurance provided. Reports abdominal pain, but appears to be resting comfortably and falls back to sleep easily. 6149:  Patient's insulin gtt adjusted per MAR, . Patient medicated per MAR. Tolerating sips of ice water with PO meds. Vital signs remain stable, continues to verbalize having intermittent abd discomfort, however falls back to sleep without difficulty. Remains A&Ox4, GCS 15, family provided update with patient's permission by telephone. Repeat BMP and MG results noted. 0940:  Patient's insulin gtt adjusted per MAR, . Patient remains alert, oriented x 4, continues to verbalize abdominal discomfort/pain. Addressed during rounds with attending physician to address pain medications/management. Dr. Himanshu Caicedo made  aware of patient's pain. No orders given this time. 1048:  Patient's insulin gtt adjusted per MAR, . Diet order placed by Dr. Himanshu Caicedo, patient declines solid food at this time. VS as documented on flow sheet and SR - ST on cardiac monitor. 1151:  Patient's insulin gtt stopped at this time per glucosestabilizer, . BMP/MG drawn/labeled/sent. VS as documented. Remains alert, oriented continues to verbalize abdominal discomfort, however falls asleep immediately when nursing staff leaves room. 1250:  Patient resting with eyes closed, resps even and nonlabored,, Dr. Himanshu Caicedo at bedside to examine patient, will place orders. Insulin gtt restarted per glucosestabilizer please see MAR. D5 1/2 NS rate increased per MAR. Discussed with Dr. Himanshu Caicedo that patient had serum HCG QL completed yesterday negative no need to repeat HCG. Patient medicated per [facility].      1530:  Patient attempted to void per bedpan, unable to urinate. Patient bladder scanned with a volume of 889ml, straight cath order received. Straight cath'd by Jocelyn Dove and Edin Piedra RN. More comfortable post straight cath. Insulin gtt adjusted per MAR. Patient remains A&Ox4, GCS 15, skin pink/warm/dry, airway patent and intact. Medicated per MAR>      1513: Insulin gtt increased per MAR and glucosestabilizer. Blood sugar continues to rise, likely due to increase in D5 containing fluid. Dr. Russell Cantu informed and fluid switched to NS @150cc/hr. No change from earlier assessment. 1622:  Q4 hour BMP/MG drawn/labeled/sent. Insulin gtt increased per MAR and glucose stabilizer. NS infusing at 150cc/hr. Patient resting more comfortably in bed with eyes closed. Reassessment completed, no changes, and documented. 1727: Insulin gtt decreased per glucose stabilizer/MAR, . Patient resting comfortably in bed with eyes closed, resps even and nonlabored with equal rise and fall of chest.  Opens eyes and interacts with nursing staff appropriately. Voices no concerns/needs at this time. VS as documented. 1742:  Called lab due to BMP/MG not resulting. Lab states they did not see order, running labs at this time. 1835: IVF changed back to D5 1/2 NS @ 150ml/hr d/t BG dropping to 102. Dr. Russell Cantu made aware. No further orders at this time. Patient remains alert, oriented. Pharmacy messaged for missing PO labetalol dose.

## 2019-10-31 NOTE — DIABETES MGMT
[facility]    D[facility] Progress Note    Recommendations/ Comments: Pt discussed with rounding team and Dr. Estelle De- patient remains in DKA at this time. Will continue insulin gtt. .  Patient with insulin pump at home. Dr. Estelle De states he would like to resume insulin pump when DKA resolved. Addendum- 1600- Attempted to see patient to see if she has her insulin pump supplies here in the hospital. Patient did not respond to voice and knocking. Discussed with Jaime Smallwood RN. States BGs increased when dextrose rate was increased and patient began eating, IVF changed to NS. Recommend continuing insulin gtt, resuming D5 when blood sugar is <250 mg/dL, and d/c gtt when anion gap closed (<12---2 consecutive lab draws). Provided copy of insulin pump protocol for patient to sign- recommend, if pump is to be resumed, waiting 2 hours after beginning her pump to discontinue the insulin gtt. Chart reviewed on [address] during Multidisciplinary Rounds. A1c:   Lab Results   Component Value Date/Time    Hemoglobin A1c 10.0 (H) 10/30/2019 02:46 PM       Recent Glucose Results:   Lab Results   Component Value Date/Time     (H) 10/31/2019 12:00 PM     (H) 10/31/2019 07:39 AM     (H) 10/31/2019 03:31 AM    GLUCPOC 332 (H) 10/31/2019 03:11 PM    GLUCPOC 319 (H) 10/31/2019 02:03 PM    GLUCPOC 217 (H) 10/31/2019 12:58 PM        Lab Results   Component Value Date/Time    Creatinine 2.68 (H) 10/31/2019 12:00 PM     Estimated Creatinine Clearance: 25.1 mL/min (A) (based on SCr of 2.68 mg/dL (H)). Active Orders   Diet    DIET DIABETIC CONSISTENT CARB Regular        PO intake: No data found. Will continue to follow as needed. Thank you.   Jolanta St RD, [address]  Office:  [phone]      Time spent: 25 minutes

## 2019-10-31 NOTE — PROGRESS NOTES
Admitting hospitalist paged about patient needing pain medication. Patient reports having severe abdominal and back pain. Dr. John Sharma returned phone call, orders received.

## 2019-10-31 NOTE — PROGRESS NOTES
Initial Nutrition Assessment:    INTERVENTIONS/RECOMMENDATIONS:   · Meals/Snacks: General/healthful diet: continue current diet    ASSESSMENT:   10/31: chart reviewed. Medically noted for: DKA, DM I, HTN, CKD, GERD. Noted for n/v/d and abdominal pain. Pt has h/o gastroparesis. Pt now on a consistent carb diet. Dietetic intern attempted to visit pt multiple times but pt was asleep during each attempt. Continue current diet to manage BG levels and encourage PO intake. Will follow up on a later date to assess nutritional status. Diet Order: Consistent carb  % Eaten:  No data found. Pertinent Medications: [x]Reviewed []Other: pepcid, porcine, humalog, insulin regular, zofran   Pertinent Labs: [x]Reviewed []Other: glu (119-258), CO2 (18), BUN (46), creat 2.71), Ca (8.4)  Food Allergies: [x]None []Other   Last BM: 10/30   [x]Active     []Hyperactive  []Hypoactive       [] Absent BS  Skin:    [x] Intact   [] Incision  [] Breakdown  [] Other:    Anthropometrics:   Height: 5' 8\" (172.7 cm) Weight: 52.3 kg (115 lb 4.8 oz)   IBW (%IBW):   ( ) UBW (%UBW):   (  %)   Last Weight Metrics:  Weight Loss Metrics 10/30/2019 6/7/2019 5/28/2019 5/21/2019 5/9/2019 5/2/2019 4/23/2019   Today's Wt 115 lb 4.8 oz 147 lb 11.3 oz 153 lb 151 lb 9.6 oz - 159 lb 9.6 oz 160 lb   BMI 17.53 kg/m2 22.46 kg/m2 23.26 kg/m2 23.05 kg/m2 24.27 kg/m2 24.27 kg/m2 24.33 kg/m2       BMI: Body mass index is 17.53 kg/m². This BMI is indicative of:   [x]Underweight    []Normal    []Overweight    [] Obesity   [] Extreme Obesity (BMI>40)     Estimated Nutrition Needs (Based on):   1672 Kcals/day(BMR 1286 X 1.3 ) , 63 g(52.3 kg X 1.2 ) Protein  Carbohydrate:  At Least 130 g/day  Fluids: 1672 mL/day (1ml/kcal)    NUTRITION DIAGNOSES:   Problem:  Altered nutrition-related lab values      Etiology: related to uncontrolled DM I      Signs/Symptoms: as evidenced by glucose (976-698), DKA      NUTRITION INTERVENTIONS:  Meals/Snacks: General/healthful diet GOAL:   PO intake >75% of meals in 3-5 days     LEARNING NEEDS (Diet, Food/Nutrient-Drug Interaction):    [x] None Identified   [] Identified and Education Provided/Documented   [] Identified and Pt declined/was not appropriate     Cultureal, Roman Catholic, OR Ethnic Dietary Needs:    [x] None Identified   [] Identified and Addressed     [x] Interdisciplinary Care Plan Reviewed/Documented    [x] Discharge Planning: consistent carb diet     MONITORING /EVALUATION:      Food/Nutrient Intake Outcomes:  Total energy intake  Physical Signs/Symptoms Outcomes: Weight/weight change, Electrolyte and renal profile, Glucose profile, GI    NUTRITION RISK:    [x] Patient At Nutritional Risk    [] Patient Not At Nutritional Risk    PT SEEN FOR:    []  MD Consult: []Calorie Count      []Diabetic Diet Education        []Diet Education     []Electrolyte Management     []General Nutrition Management and Supplements     []Management of Tube Feeding     []TPN Recommendations    []  RN Referral:  []MST score >=2     []Enteral/Parenteral Nutrition PTA     []Pregnant: Gestational DM or Multigestation     []Pressure Ulcer/Wound Care needs        [x]  Low BMI  []  [facility]  Pager [phone]  Weekend Pager [phone]

## 2019-11-01 LAB
ADMINISTERED INITIALS, ADMINIT: NORMAL
ANION GAP SERPL CALC-SCNC: 13 MMOL/L (ref 5–15)
ANION GAP SERPL CALC-SCNC: 7 MMOL/L (ref 5–15)
BACTERIA SPEC CULT: NORMAL
BUN SERPL-MCNC: 27 MG/DL (ref 6–20)
BUN SERPL-MCNC: 29 MG/DL (ref 6–20)
BUN/CREAT SERPL: 13 (ref 12–20)
BUN/CREAT SERPL: 14 (ref 12–20)
CALCIUM SERPL-MCNC: 7.9 MG/DL (ref 8.5–10.1)
CALCIUM SERPL-MCNC: 8.4 MG/DL (ref 8.5–10.1)
CC UR VC: NORMAL
CHLORIDE SERPL-SCNC: 101 MMOL/L (ref 97–108)
CHLORIDE SERPL-SCNC: 104 MMOL/L (ref 97–108)
CO2 SERPL-SCNC: 19 MMOL/L (ref 21–32)
CO2 SERPL-SCNC: 25 MMOL/L (ref 21–32)
CREAT SERPL-MCNC: 2.03 MG/DL (ref 0.55–1.02)
CREAT SERPL-MCNC: 2.1 MG/DL (ref 0.55–1.02)
D50 ADMINISTERED, D50ADM: 0 ML
D50 ORDER, D50ORD: 0 ML
ERYTHROCYTE [DISTWIDTH] IN BLOOD BY AUTOMATED COUNT: 14.4 % (ref 11.5–14.5)
GLSCOM COMMENTS: NORMAL
GLUCOSE BLD STRIP.AUTO-MCNC: 103 MG/DL (ref 65–100)
GLUCOSE BLD STRIP.AUTO-MCNC: 109 MG/DL (ref 65–100)
GLUCOSE BLD STRIP.AUTO-MCNC: 113 MG/DL (ref 65–100)
GLUCOSE BLD STRIP.AUTO-MCNC: 137 MG/DL (ref 65–100)
GLUCOSE BLD STRIP.AUTO-MCNC: 145 MG/DL (ref 65–100)
GLUCOSE BLD STRIP.AUTO-MCNC: 158 MG/DL (ref 65–100)
GLUCOSE BLD STRIP.AUTO-MCNC: 181 MG/DL (ref 65–100)
GLUCOSE BLD STRIP.AUTO-MCNC: 212 MG/DL (ref 65–100)
GLUCOSE BLD STRIP.AUTO-MCNC: 225 MG/DL (ref 65–100)
GLUCOSE BLD STRIP.AUTO-MCNC: 226 MG/DL (ref 65–100)
GLUCOSE BLD STRIP.AUTO-MCNC: 271 MG/DL (ref 65–100)
GLUCOSE BLD STRIP.AUTO-MCNC: 290 MG/DL (ref 65–100)
GLUCOSE BLD STRIP.AUTO-MCNC: 293 MG/DL (ref 65–100)
GLUCOSE BLD STRIP.AUTO-MCNC: 306 MG/DL (ref 65–100)
GLUCOSE SERPL-MCNC: 300 MG/DL (ref 65–100)
GLUCOSE SERPL-MCNC: 99 MG/DL (ref 65–100)
GLUCOSE, GLC: 103 MG/DL
GLUCOSE, GLC: 113 MG/DL
GLUCOSE, GLC: 145 MG/DL
GLUCOSE, GLC: 158 MG/DL
GLUCOSE, GLC: 181 MG/DL
GLUCOSE, GLC: 212 MG/DL
GLUCOSE, GLC: 225 MG/DL
GLUCOSE, GLC: 226 MG/DL
GLUCOSE, GLC: 271 MG/DL
GLUCOSE, GLC: 293 MG/DL
GLUCOSE, GLC: 306 MG/DL
HCT VFR BLD AUTO: 33.1 % (ref 35–47)
HGB BLD-MCNC: 10.7 G/DL (ref 11.5–16)
HIGH TARGET, HITG: 250 MG/DL
INSULIN ADMINSTERED, INSADM: 0 UNITS/HOUR
INSULIN ADMINSTERED, INSADM: 0.1 UNITS/HOUR
INSULIN ADMINSTERED, INSADM: 0.1 UNITS/HOUR
INSULIN ADMINSTERED, INSADM: 0.4 UNITS/HOUR
INSULIN ADMINSTERED, INSADM: 1.1 UNITS/HOUR
INSULIN ADMINSTERED, INSADM: 2.3 UNITS/HOUR
INSULIN ADMINSTERED, INSADM: 2.9 UNITS/HOUR
INSULIN ADMINSTERED, INSADM: 3.3 UNITS/HOUR
INSULIN ADMINSTERED, INSADM: 4.6 UNITS/HOUR
INSULIN ADMINSTERED, INSADM: 4.9 UNITS/HOUR
INSULIN ADMINSTERED, INSADM: 6.3 UNITS/HOUR
INSULIN ORDER, INSORD: 0 UNITS/HOUR
INSULIN ORDER, INSORD: 0.1 UNITS/HOUR
INSULIN ORDER, INSORD: 0.1 UNITS/HOUR
INSULIN ORDER, INSORD: 0.4 UNITS/HOUR
INSULIN ORDER, INSORD: 1.1 UNITS/HOUR
INSULIN ORDER, INSORD: 2.3 UNITS/HOUR
INSULIN ORDER, INSORD: 2.9 UNITS/HOUR
INSULIN ORDER, INSORD: 3.3 UNITS/HOUR
INSULIN ORDER, INSORD: 4.6 UNITS/HOUR
INSULIN ORDER, INSORD: 4.9 UNITS/HOUR
INSULIN ORDER, INSORD: 6.3 UNITS/HOUR
LOW TARGET, LOT: 150 MG/DL
MAGNESIUM SERPL-MCNC: 1.6 MG/DL (ref 1.6–2.4)
MAGNESIUM SERPL-MCNC: 1.7 MG/DL (ref 1.6–2.4)
MCH RBC QN AUTO: 27 PG (ref 26–34)
MCHC RBC AUTO-ENTMCNC: 32.3 G/DL (ref 30–36.5)
MCV RBC AUTO: 83.4 FL (ref 80–99)
MINUTES UNTIL NEXT BG, NBG: 60 MIN
MULTIPLIER, MUL: 0
MULTIPLIER, MUL: 0.01
MULTIPLIER, MUL: 0.01
MULTIPLIER, MUL: 0.02
MULTIPLIER, MUL: 0.03
NRBC # BLD: 0 K/UL (ref 0–0.01)
NRBC BLD-RTO: 0 PER 100 WBC
ORDER INITIALS, ORDINIT: NORMAL
PLATELET # BLD AUTO: 221 K/UL (ref 150–400)
POTASSIUM SERPL-SCNC: 3 MMOL/L (ref 3.5–5.1)
POTASSIUM SERPL-SCNC: 3.4 MMOL/L (ref 3.5–5.1)
RBC # BLD AUTO: 3.97 M/UL (ref 3.8–5.2)
SERVICE CMNT-IMP: ABNORMAL
SERVICE CMNT-IMP: NORMAL
SODIUM SERPL-SCNC: 133 MMOL/L (ref 136–145)
SODIUM SERPL-SCNC: 136 MMOL/L (ref 136–145)
WBC # BLD AUTO: 13.3 K/UL (ref 3.6–11)

## 2019-11-01 PROCEDURE — 85027 COMPLETE CBC AUTOMATED: CPT

## 2019-11-01 PROCEDURE — 80048 BASIC METABOLIC PNL TOTAL CA: CPT

## 2019-11-01 PROCEDURE — 74011250637 HC RX REV CODE- 250/637: Performed by: INTERNAL MEDICINE

## 2019-11-01 PROCEDURE — 36415 COLL VENOUS BLD VENIPUNCTURE: CPT

## 2019-11-01 PROCEDURE — 65270000015 HC RM PRIVATE ONCOLOGY

## 2019-11-01 PROCEDURE — 74011636637 HC RX REV CODE- 636/637: Performed by: INTERNAL MEDICINE

## 2019-11-01 PROCEDURE — 74011250636 HC RX REV CODE- 250/636: Performed by: INTERNAL MEDICINE

## 2019-11-01 PROCEDURE — 83735 ASSAY OF MAGNESIUM: CPT

## 2019-11-01 PROCEDURE — 74011000258 HC RX REV CODE- 258: Performed by: INTERNAL MEDICINE

## 2019-11-01 PROCEDURE — 82962 GLUCOSE BLOOD TEST: CPT

## 2019-11-01 PROCEDURE — 74011250637 HC RX REV CODE- 250/637: Performed by: FAMILY MEDICINE

## 2019-11-01 RX ORDER — FLUCONAZOLE 100 MG/1
100 TABLET ORAL DAILY
Status: DISCONTINUED | OUTPATIENT
Start: 2019-11-01 | End: 2019-11-03

## 2019-11-01 RX ORDER — DEXTROSE 50 % IN WATER (D50W) INTRAVENOUS SYRINGE
12.5-25 AS NEEDED
Status: DISCONTINUED | OUTPATIENT
Start: 2019-11-01 | End: 2019-11-03 | Stop reason: HOSPADM

## 2019-11-01 RX ORDER — HYDRALAZINE HYDROCHLORIDE 20 MG/ML
10 INJECTION INTRAMUSCULAR; INTRAVENOUS
Status: DISCONTINUED | OUTPATIENT
Start: 2019-11-01 | End: 2019-11-03 | Stop reason: HOSPADM

## 2019-11-01 RX ORDER — LEVOFLOXACIN 750 MG/1
750 TABLET ORAL
Status: DISCONTINUED | OUTPATIENT
Start: 2019-11-01 | End: 2019-11-03

## 2019-11-01 RX ORDER — INSULIN LISPRO 100 [IU]/ML
INJECTION, SOLUTION INTRAVENOUS; SUBCUTANEOUS
Status: DISCONTINUED | OUTPATIENT
Start: 2019-11-01 | End: 2019-11-03 | Stop reason: HOSPADM

## 2019-11-01 RX ORDER — MAGNESIUM SULFATE 100 %
4 CRYSTALS MISCELLANEOUS AS NEEDED
Status: DISCONTINUED | OUTPATIENT
Start: 2019-11-01 | End: 2019-11-03 | Stop reason: HOSPADM

## 2019-11-01 RX ADMIN — DEXTROSE MONOHYDRATE AND SODIUM CHLORIDE 150 ML/HR: 5; .45 INJECTION, SOLUTION INTRAVENOUS at 02:04

## 2019-11-01 RX ADMIN — HYDRALAZINE HYDROCHLORIDE 25 MG: 25 TABLET, FILM COATED ORAL at 08:43

## 2019-11-01 RX ADMIN — HEPARIN SODIUM 5000 UNITS: 5000 INJECTION INTRAVENOUS; SUBCUTANEOUS at 09:14

## 2019-11-01 RX ADMIN — TRAMADOL HYDROCHLORIDE 50 MG: 50 TABLET, COATED ORAL at 14:22

## 2019-11-01 RX ADMIN — Medication 1 LOZENGE: at 01:04

## 2019-11-01 RX ADMIN — LABETALOL HCL 300 MG: 200 TABLET, FILM COATED ORAL at 17:42

## 2019-11-01 RX ADMIN — FLUCONAZOLE 100 MG: 100 TABLET ORAL at 10:16

## 2019-11-01 RX ADMIN — Medication 5 ML: at 05:37

## 2019-11-01 RX ADMIN — ACETAMINOPHEN 650 MG: 325 TABLET ORAL at 09:14

## 2019-11-01 RX ADMIN — Medication 1 LOZENGE: at 05:37

## 2019-11-01 RX ADMIN — Medication 10 ML: at 14:00

## 2019-11-01 RX ADMIN — POTASSIUM BICARBONATE 40 MEQ: 782 TABLET, EFFERVESCENT ORAL at 06:04

## 2019-11-01 RX ADMIN — HYDROXYZINE HYDROCHLORIDE 25 MG: 25 TABLET, FILM COATED ORAL at 07:24

## 2019-11-01 RX ADMIN — HEPARIN SODIUM 5000 UNITS: 5000 INJECTION INTRAVENOUS; SUBCUTANEOUS at 22:51

## 2019-11-01 RX ADMIN — PANTOPRAZOLE SODIUM 40 MG: 40 TABLET, DELAYED RELEASE ORAL at 08:43

## 2019-11-01 RX ADMIN — LEVOFLOXACIN 750 MG: 750 TABLET, FILM COATED ORAL at 08:45

## 2019-11-01 RX ADMIN — LABETALOL HCL 300 MG: 200 TABLET, FILM COATED ORAL at 08:43

## 2019-11-01 RX ADMIN — ONDANSETRON 4 MG: 2 INJECTION INTRAMUSCULAR; INTRAVENOUS at 18:34

## 2019-11-01 RX ADMIN — Medication 1 AMPULE: at 10:15

## 2019-11-01 RX ADMIN — HUMAN INSULIN 8 UNITS: 100 INJECTION, SUSPENSION SUBCUTANEOUS at 17:42

## 2019-11-01 RX ADMIN — HUMAN INSULIN 8 UNITS: 100 INJECTION, SUSPENSION SUBCUTANEOUS at 10:34

## 2019-11-01 RX ADMIN — TRAMADOL HYDROCHLORIDE 50 MG: 50 TABLET, COATED ORAL at 06:11

## 2019-11-01 RX ADMIN — NITROGLYCERIN 1 INCH: 20 OINTMENT TOPICAL at 22:51

## 2019-11-01 RX ADMIN — FAMOTIDINE 20 MG: 20 TABLET ORAL at 08:43

## 2019-11-01 RX ADMIN — Medication 1 LOZENGE: at 11:24

## 2019-11-01 RX ADMIN — INSULIN LISPRO 1 UNITS: 100 INJECTION, SOLUTION INTRAVENOUS; SUBCUTANEOUS at 12:31

## 2019-11-01 RX ADMIN — DEXTROSE MONOHYDRATE AND SODIUM CHLORIDE 150 ML/HR: 5; .45 INJECTION, SOLUTION INTRAVENOUS at 09:37

## 2019-11-01 RX ADMIN — SODIUM CHLORIDE 150 ML/HR: 900 INJECTION, SOLUTION INTRAVENOUS at 18:34

## 2019-11-01 RX ADMIN — TRAMADOL HYDROCHLORIDE 50 MG: 50 TABLET, COATED ORAL at 20:22

## 2019-11-01 RX ADMIN — HYDROXYZINE HYDROCHLORIDE 25 MG: 25 TABLET, FILM COATED ORAL at 12:34

## 2019-11-01 NOTE — PROGRESS NOTES
11/1/2019    INTENSIVIST PROGRESS NOTE:     Patient seen and evaluated, chart reviewed   31 yo female presented with N/V, abdominal pain  Found to be in DKA  Started on insulin drip, turned off last night  No acute events overnight  Now pt in CCU in no severe distress  No acute complaints    ROS: no sob, no cp    Visit Vitals  BP (!) 144/94   Pulse 79   Temp 97.9 °F (36.6 °C)   Resp 13   Ht 5' 8\" (1.727 m)   Wt 49.3 kg (108 lb 11 oz)   SpO2 100%   BMI 16.53 kg/m²       General: no distress  Eyes: anicteric  HEENT: dry oral mucosa  Neck: FROM  CV: RRR  Lungs:  clear  Abd: soft  : no flank pain  Ext: no edema  Skin: no rash  Musculoskeletal: normal inspection  Neuro: non focal      Labs reviewed    A/P:  - DKA: insulin drip off, adjust baseline insulin dose  - MARIS: IVF  - advance diet as tolerated  - DVT prophylaxis  - UTI: change to po abx  - mobilize  - Will assist on disposition planning, transfer to floor today  Maria Elena Soares MD

## 2019-11-01 NOTE — INTERDISCIPLINARY ROUNDS
Critical care interdisciplinary rounds held on 11/01/2019. Following members present, Pharmacy, Case Management, Respiratory Therapy, Physical Therapy and Nutrition. Led by DANIELA Mello RN and Dr. Selvin Huff. Plan of care discussed. See clinical pathway for plan of care and interventions and desired outcomes. Room available, but waiting for Insulin gtt stop with meds.

## 2019-11-01 NOTE — CDMP QUERY
Patient admitted with DKA noted to have SIRS criteria (HR,WBC). If possible, please document in progress notes and d/c summary if you are evaluating and/or treating any of the following: 
 
 
? SIRS of non-infectious origin w/ acute organ dysfunction ? SIRS of non-infectious origin w/o acute organ dysfunction ? Other Explanation of clinical findings ? Clinically Undetermined (no explanation for clinical findings) The medical record reflects the following: 
 
   Risk Factors:DKA Clinical Indicators: wbc 19--31--13, bands 4, hr  H&P-non-infectious sirs, 10/31 acute renal failure Treatment:Rocephin, DIflucan, f/u cx, ivfs Thanks, Howard Barry RN/CDMP

## 2019-11-01 NOTE — DIABETES MGMT
[facility]    D[facility] Progress Note    Recommendations/ Comments: NPH 8 units BID ordered by Dr. Wei Kapoor as patient will need basal insulin-  Spoke with Michelle Huynh RN, patient still does not have home insulin pump. Recommend continuing to treat patient with insulin injections. When patient eating better, recommend adding Lispro 5 units ac meals. Chart reviewed on [address]- patient on insulin pump at home. A1c:   Lab Results   Component Value Date/Time    Hemoglobin A1c 10.0 (H) 10/30/2019 02:46 PM       Recent Glucose Results:   Lab Results   Component Value Date/Time    GLU 99 11/01/2019 04:14 AM     (H) 11/01/2019 12:01 AM     (H) 10/31/2019 07:51 PM    GLUCPOC 225 (H) 11/01/2019 12:17 PM    GLUCPOC 306 (H) 11/01/2019 11:13 AM    GLUCPOC 293 (H) 11/01/2019 10:09 AM        Lab Results   Component Value Date/Time    Creatinine 2.10 (H) 11/01/2019 04:14 AM     Estimated Creatinine Clearance: 30.2 mL/min (A) (based on SCr of 2.1 mg/dL (H)). Active Orders   Diet    DIET DIABETIC CONSISTENT CARB Regular        PO intake: No data found. Will continue to follow as needed. Thank you.   Maribel Roa RD, [address]  Office:  [phone]      Time spent: 25 minutes

## 2019-11-01 NOTE — PROGRESS NOTES
Oncology End of Shift Note      Bedside shift change report given to Ceferino Vázquez RN (incoming nurse) by Akash Choi (outgoing nurse) on [facility]. Report included the following information SBAR, Kardex, Intake/Output and MAR. Shift Summary: Pt was admitted to Oncology floor today. Pt remained stable throughout shift. Scheduled meds given, Prn meds given for nausea. Pt asked for jevon to come, they were called & will come tomorrow. IV flushes well, pt turns self, hourly rounding completed. Issues for Physician to Address:       Patient on Cardiac Monitoring?     [] Yes  [x] No    Rhythm:          Akash Choi

## 2019-11-01 NOTE — PROGRESS NOTES
Bedside shift change report given to Simone Wang RN (oncoming nurse) by Kilo Lambert RN (offgoing nurse). Report included the following information SBAR, Intake/Output, MAR, Accordion, Recent Results and Med Rec Status. 0730: Patient received in bed asleep, with insulin gtt stopped. 0800: Spoke with Dr. Hermes York, do not need to draw q4 labs due to stopping Insulin gtt. 7972: Diabetes management paged in regards to starting bridge off insulin gtt. Awaiting phone call. 0845: Patient up to bedside commode. 12: Spoke with David Howard at Diabetes Management, will await her NPH suggestions to bridge and then turn off gtt in 2 hours. 7838: Called dietary regarding patient not getting tray yet. 1034: NPH given. Will D/C insulin gtt at 1234.    1200: Patient assessment completed and documented in flowsheets. 1234: Insulin gtt stopped, switched to sliding scale. 1413: TRANSFER - OUT REPORT:    Verbal report given to DEE Jaeger(name) on Daniele Jean  being transferred to Oncology(unit) for routine progression of care       Report consisted of patients Situation, Background, Assessment and   Recommendations(SBAR). Information from the following report(s) SBAR, Intake/Output, MAR, Accordion, Recent Results and Med Rec Status was reviewed with the receiving nurse. Lines:   Peripheral IV 10/30/19 Right Antecubital (Active)   Site Assessment Clean, dry, & intact 11/1/2019 12:00 PM   Phlebitis Assessment 0 11/1/2019 12:00 PM   Infiltration Assessment 0 11/1/2019 12:00 PM   Dressing Status Clean, dry, & intact 11/1/2019 12:00 PM   Dressing Type Tape;Transparent 11/1/2019 12:00 PM   Hub Color/Line Status Pink; Infusing 11/1/2019 12:00 PM   Action Taken Open ports on tubing capped 11/1/2019 12:00 PM   Alcohol Cap Used Yes 11/1/2019 12:00 PM       Peripheral IV 10/30/19 Right;Upper Arm (Active)   Site Assessment Clean, dry, & intact 11/1/2019 12:00 PM   Phlebitis Assessment 0 11/1/2019 12:00 PM   Infiltration Assessment 0 11/1/2019 12:00 PM   Dressing Status Clean, dry, & intact 11/1/2019 12:00 PM   Dressing Type Tape;Transparent 11/1/2019 12:00 PM   Hub Color/Line Status Blue; Infusing 11/1/2019 12:00 PM        Opportunity for questions and clarification was provided.       Patient transported with:   Registered Nurse

## 2019-11-01 NOTE — DIABETES MGMT
[facility]    DTC Progress Note    Recommendations/ Comments: Call received from [facility], patient's nurse.  MD would like transition patient off insulin gtt at this time per protocol. Recommend NPH 8 units now- stop insulin drip 2 hours after.  Patient must resume insulin pump prior to 1700 or Lantus or NPH must be given. Insulin pump is at home, Dad to bring. Adjustment has been made to basal dose given needs on insulin gtt. Please have patient sign pump agreement form placed in bedside chart. DTC to follow up with patient later today to discuss setting temp basal rate while NPH is still working. Chart reviewed on [address]- patient on insulin pump at home. A1c:   Lab Results   Component Value Date/Time    Hemoglobin A1c 10.0 (H) 10/30/2019 02:46 PM       Recent Glucose Results:   Lab Results   Component Value Date/Time    GLU 99 11/01/2019 04:14 AM     (H) 11/01/2019 12:01 AM     (H) 10/31/2019 07:51 PM    GLUCPOC 226 (H) 11/01/2019 08:57 AM    GLUCPOC 181 (H) 11/01/2019 07:49 AM    GLUCPOC 145 (H) 11/01/2019 06:44 AM        Lab Results   Component Value Date/Time    Creatinine 2.10 (H) 11/01/2019 04:14 AM     Estimated Creatinine Clearance: 30.2 mL/min (A) (based on SCr of 2.1 mg/dL (H)). Active Orders   Diet    DIET DIABETIC CONSISTENT CARB Regular        PO intake: No data found. Will continue to follow as needed. Thank you.   Aretha Franz RD, [address]  Office:  [phone]      Time spent: 25 minutes

## 2019-11-01 NOTE — PROGRESS NOTES
Problem: Diabetes Self-Management  Goal: *Disease process and treatment process  Description  Define diabetes and identify own type of diabetes; list 3 options for treating diabetes.   Outcome: Progressing Towards Goal     Problem: Pain  Goal: *Control of Pain  Outcome: Progressing Towards Goal

## 2019-11-02 LAB
ANION GAP SERPL CALC-SCNC: 11 MMOL/L (ref 5–15)
BASOPHILS # BLD: 0 K/UL (ref 0–0.1)
BASOPHILS NFR BLD: 0 % (ref 0–1)
BUN SERPL-MCNC: 15 MG/DL (ref 6–20)
BUN/CREAT SERPL: 9 (ref 12–20)
CALCIUM SERPL-MCNC: 8.4 MG/DL (ref 8.5–10.1)
CHLORIDE SERPL-SCNC: 101 MMOL/L (ref 97–108)
CO2 SERPL-SCNC: 22 MMOL/L (ref 21–32)
CREAT SERPL-MCNC: 1.7 MG/DL (ref 0.55–1.02)
DIFFERENTIAL METHOD BLD: ABNORMAL
EOSINOPHIL # BLD: 0.1 K/UL (ref 0–0.4)
EOSINOPHIL NFR BLD: 1 % (ref 0–7)
ERYTHROCYTE [DISTWIDTH] IN BLOOD BY AUTOMATED COUNT: 14.4 % (ref 11.5–14.5)
GLUCOSE BLD STRIP.AUTO-MCNC: 223 MG/DL (ref 65–100)
GLUCOSE BLD STRIP.AUTO-MCNC: 330 MG/DL (ref 65–100)
GLUCOSE BLD STRIP.AUTO-MCNC: 388 MG/DL (ref 65–100)
GLUCOSE BLD STRIP.AUTO-MCNC: 64 MG/DL (ref 65–100)
GLUCOSE BLD STRIP.AUTO-MCNC: 86 MG/DL (ref 65–100)
GLUCOSE SERPL-MCNC: 340 MG/DL (ref 65–100)
HCT VFR BLD AUTO: 33.3 % (ref 35–47)
HGB BLD-MCNC: 10.4 G/DL (ref 11.5–16)
IMM GRANULOCYTES # BLD AUTO: 0 K/UL (ref 0–0.04)
IMM GRANULOCYTES NFR BLD AUTO: 0 % (ref 0–0.5)
LYMPHOCYTES # BLD: 1.8 K/UL (ref 0.8–3.5)
LYMPHOCYTES NFR BLD: 23 % (ref 12–49)
MCH RBC QN AUTO: 26.9 PG (ref 26–34)
MCHC RBC AUTO-ENTMCNC: 31.2 G/DL (ref 30–36.5)
MCV RBC AUTO: 86.3 FL (ref 80–99)
MONOCYTES # BLD: 0.4 K/UL (ref 0–1)
MONOCYTES NFR BLD: 6 % (ref 5–13)
NEUTS SEG # BLD: 5.3 K/UL (ref 1.8–8)
NEUTS SEG NFR BLD: 70 % (ref 32–75)
NRBC # BLD: 0 K/UL (ref 0–0.01)
NRBC BLD-RTO: 0 PER 100 WBC
PLATELET # BLD AUTO: 240 K/UL (ref 150–400)
PMV BLD AUTO: 11 FL (ref 8.9–12.9)
POTASSIUM SERPL-SCNC: 4.2 MMOL/L (ref 3.5–5.1)
RBC # BLD AUTO: 3.86 M/UL (ref 3.8–5.2)
SERVICE CMNT-IMP: ABNORMAL
SERVICE CMNT-IMP: NORMAL
SODIUM SERPL-SCNC: 134 MMOL/L (ref 136–145)
WBC # BLD AUTO: 7.6 K/UL (ref 3.6–11)

## 2019-11-02 PROCEDURE — 36415 COLL VENOUS BLD VENIPUNCTURE: CPT

## 2019-11-02 PROCEDURE — 74011250637 HC RX REV CODE- 250/637: Performed by: INTERNAL MEDICINE

## 2019-11-02 PROCEDURE — 85025 COMPLETE CBC W/AUTO DIFF WBC: CPT

## 2019-11-02 PROCEDURE — 74011636637 HC RX REV CODE- 636/637: Performed by: INTERNAL MEDICINE

## 2019-11-02 PROCEDURE — 74011250637 HC RX REV CODE- 250/637: Performed by: HOSPITALIST

## 2019-11-02 PROCEDURE — 65270000015 HC RM PRIVATE ONCOLOGY

## 2019-11-02 PROCEDURE — 82962 GLUCOSE BLOOD TEST: CPT

## 2019-11-02 PROCEDURE — 74011250636 HC RX REV CODE- 250/636: Performed by: INTERNAL MEDICINE

## 2019-11-02 PROCEDURE — 74011250636 HC RX REV CODE- 250/636: Performed by: FAMILY MEDICINE

## 2019-11-02 PROCEDURE — 94760 N-INVAS EAR/PLS OXIMETRY 1: CPT

## 2019-11-02 PROCEDURE — 80048 BASIC METABOLIC PNL TOTAL CA: CPT

## 2019-11-02 PROCEDURE — 74011000250 HC RX REV CODE- 250: Performed by: INTERNAL MEDICINE

## 2019-11-02 RX ORDER — HYDROMORPHONE HYDROCHLORIDE 1 MG/ML
0.2 INJECTION, SOLUTION INTRAMUSCULAR; INTRAVENOUS; SUBCUTANEOUS ONCE
Status: COMPLETED | OUTPATIENT
Start: 2019-11-02 | End: 2019-11-02

## 2019-11-02 RX ORDER — HYDRALAZINE HYDROCHLORIDE 25 MG/1
25 TABLET, FILM COATED ORAL 3 TIMES DAILY
Status: DISCONTINUED | OUTPATIENT
Start: 2019-11-02 | End: 2019-11-03 | Stop reason: HOSPADM

## 2019-11-02 RX ORDER — HYDROCHLOROTHIAZIDE 25 MG/1
25 TABLET ORAL
Status: COMPLETED | OUTPATIENT
Start: 2019-11-02 | End: 2019-11-02

## 2019-11-02 RX ORDER — HYDROCODONE BITARTRATE AND ACETAMINOPHEN 5; 325 MG/1; MG/1
1 TABLET ORAL ONCE
Status: COMPLETED | OUTPATIENT
Start: 2019-11-02 | End: 2019-11-02

## 2019-11-02 RX ORDER — METOPROLOL TARTRATE 5 MG/5ML
5 INJECTION INTRAVENOUS
Status: DISCONTINUED | OUTPATIENT
Start: 2019-11-02 | End: 2019-11-03 | Stop reason: HOSPADM

## 2019-11-02 RX ADMIN — PANTOPRAZOLE SODIUM 40 MG: 40 TABLET, DELAYED RELEASE ORAL at 09:39

## 2019-11-02 RX ADMIN — Medication 10 ML: at 01:23

## 2019-11-02 RX ADMIN — INSULIN LISPRO 2 UNITS: 100 INJECTION, SOLUTION INTRAVENOUS; SUBCUTANEOUS at 23:30

## 2019-11-02 RX ADMIN — HYDROXYZINE HYDROCHLORIDE 25 MG: 25 TABLET, FILM COATED ORAL at 18:51

## 2019-11-02 RX ADMIN — NITROGLYCERIN 1 INCH: 20 OINTMENT TOPICAL at 12:33

## 2019-11-02 RX ADMIN — LABETALOL HCL 300 MG: 200 TABLET, FILM COATED ORAL at 09:39

## 2019-11-02 RX ADMIN — FAMOTIDINE 20 MG: 20 TABLET ORAL at 09:39

## 2019-11-02 RX ADMIN — HEPARIN SODIUM 5000 UNITS: 5000 INJECTION INTRAVENOUS; SUBCUTANEOUS at 23:32

## 2019-11-02 RX ADMIN — HYDRALAZINE HYDROCHLORIDE 25 MG: 25 TABLET, FILM COATED ORAL at 23:32

## 2019-11-02 RX ADMIN — HYDRALAZINE HYDROCHLORIDE 25 MG: 25 TABLET, FILM COATED ORAL at 09:40

## 2019-11-02 RX ADMIN — HYDROCHLOROTHIAZIDE 25 MG: 25 TABLET ORAL at 00:28

## 2019-11-02 RX ADMIN — NITROGLYCERIN 1 INCH: 20 OINTMENT TOPICAL at 23:31

## 2019-11-02 RX ADMIN — METOPROLOL TARTRATE 5 MG: 5 INJECTION INTRAVENOUS at 01:12

## 2019-11-02 RX ADMIN — NITROGLYCERIN 1 INCH: 20 OINTMENT TOPICAL at 05:37

## 2019-11-02 RX ADMIN — HYDROMORPHONE HYDROCHLORIDE 0.2 MG: 1 INJECTION, SOLUTION INTRAMUSCULAR; INTRAVENOUS; SUBCUTANEOUS at 07:09

## 2019-11-02 RX ADMIN — LABETALOL HCL 300 MG: 200 TABLET, FILM COATED ORAL at 17:45

## 2019-11-02 RX ADMIN — ONDANSETRON 4 MG: 2 INJECTION INTRAMUSCULAR; INTRAVENOUS at 12:34

## 2019-11-02 RX ADMIN — FLUCONAZOLE 100 MG: 100 TABLET ORAL at 09:40

## 2019-11-02 RX ADMIN — HYDROXYZINE HYDROCHLORIDE 25 MG: 25 TABLET, FILM COATED ORAL at 14:31

## 2019-11-02 RX ADMIN — TRAMADOL HYDROCHLORIDE 50 MG: 50 TABLET, COATED ORAL at 12:33

## 2019-11-02 RX ADMIN — TRAMADOL HYDROCHLORIDE 50 MG: 50 TABLET, COATED ORAL at 02:37

## 2019-11-02 RX ADMIN — HYDROCODONE BITARTRATE AND ACETAMINOPHEN 1 TABLET: 5; 325 TABLET ORAL at 00:28

## 2019-11-02 RX ADMIN — INSULIN LISPRO 4 UNITS: 100 INJECTION, SOLUTION INTRAVENOUS; SUBCUTANEOUS at 12:38

## 2019-11-02 RX ADMIN — TRAMADOL HYDROCHLORIDE 50 MG: 50 TABLET, COATED ORAL at 18:51

## 2019-11-02 RX ADMIN — Medication 1 LOZENGE: at 22:57

## 2019-11-02 RX ADMIN — ONDANSETRON 4 MG: 2 INJECTION INTRAMUSCULAR; INTRAVENOUS at 23:30

## 2019-11-02 RX ADMIN — INSULIN LISPRO 10 UNITS: 100 INJECTION, SOLUTION INTRAVENOUS; SUBCUTANEOUS at 09:40

## 2019-11-02 RX ADMIN — HYDROXYZINE HYDROCHLORIDE 25 MG: 25 TABLET, FILM COATED ORAL at 05:37

## 2019-11-02 RX ADMIN — Medication 10 ML: at 07:11

## 2019-11-02 RX ADMIN — SODIUM CHLORIDE 75 ML/HR: 900 INJECTION, SOLUTION INTRAVENOUS at 17:00

## 2019-11-02 RX ADMIN — NITROGLYCERIN 1 INCH: 20 OINTMENT TOPICAL at 17:48

## 2019-11-02 RX ADMIN — TRAMADOL HYDROCHLORIDE 50 MG: 50 TABLET, COATED ORAL at 23:30

## 2019-11-02 RX ADMIN — HUMAN INSULIN 12 UNITS: 100 INJECTION, SUSPENSION SUBCUTANEOUS at 09:40

## 2019-11-02 RX ADMIN — Medication 10 ML: at 23:30

## 2019-11-02 RX ADMIN — ONDANSETRON 4 MG: 2 INJECTION INTRAMUSCULAR; INTRAVENOUS at 16:14

## 2019-11-02 RX ADMIN — HYDRALAZINE HYDROCHLORIDE 25 MG: 25 TABLET, FILM COATED ORAL at 17:45

## 2019-11-02 RX ADMIN — Medication 10 ML: at 14:31

## 2019-11-02 RX ADMIN — HYDROXYZINE HYDROCHLORIDE 25 MG: 25 TABLET, FILM COATED ORAL at 01:23

## 2019-11-02 NOTE — PROGRESS NOTES
HYPOGLYCEMIC EPISODE DOCUMENTATION    Patient with hypoglycemic episode(s) at 1655(time) on 11/2/19(date). BG value(s) pre-treatment 59    Was patient symptomatic?  [] yes, [x] no  Patient was treated with the following rescue medications/treatments: [] D50                [] Glucose tablets                [] Glucagon                [x] 4oz juice                [] 6oz reg soda                [] 8oz low fat milk  BG value post-treatment: 86   Once BG treated and value greater than 80mg/dl, pt was provided with the following:  [] snack  [x] meal

## 2019-11-02 NOTE — PROGRESS NOTES
2005 - Rounded on patient due to recent transfer out of CCU. Spoke with primary nurse whom expressed concerns about patient's blood pressure being elevated, BP - 179/117. Patient complaining of abdominal pain, medicated with Tramadol. Primary nurse to recheck BP for effectiveness of pain medication previously administered. 2247 - Reevaluated patient due to previously elevated blood pressure, blood pressure now 148/100. Will continue to round on patient. 2655 - Patient with continued elevated blood pressure now 192/120; patient also continues to complain of abdominal pain. Primary nurse to administer scheduled Nitro-Bid and will notify tele-hospitalist that the patient's blood pressure continues to be elevated.

## 2019-11-02 NOTE — PROGRESS NOTES
Problem: Pain  Goal: *Control of Pain  Outcome: Not Progressing Towards Goal   Pain appears to not be managed, BP remains high and pt states she has pain despite pain medication and BP meds

## 2019-11-02 NOTE — PROGRESS NOTES
Spiritual Care Assessment/Progress Note  [facility]      NAME: Yo Boyd      MRN: [id number]  AGE: 30 y.o.  SEX: female  Muslim Affiliation: No preference   Language: English     11/2/2019     Total Time (in minutes): 40     Spiritual Assessment begun in [facility] through conversation with:         [x]Patient        [] Family    [] Friend(s)        Reason for Consult: Request by patient     Spiritual beliefs: (Please include comment if needed)     [] Identifies with a leandra tradition:         [] Supported by a leandra community:            [] Claims no spiritual orientation:           [] Seeking spiritual identity:                [x] Adheres to an individual form of spirituality:           [] Not able to assess:                           Identified resources for coping:      [x] Prayer                               [] Music                  [] Guided Imagery     [x] Family/friends                 [] Pet visits     [] Devotional reading                         [] Unknown     [] Other:                                              Interventions offered during this visit: (See comments for more details)    Patient Interventions: Affirmation of leandra, Affirmation of emotions/emotional suffering, Catharsis/review of pertinent events in supportive environment, Coping skills reviewed/reinforced, Iconic (affirming the presence of God/Higher Power), Initial/Spiritual assessment, patient floor, Normalization of emotional/spiritual concerns, Prayer (actual), Muslim beliefs/image of God discussed           Plan of Care:     [x] Support spiritual and/or cultural needs    [] Support AMD and/or advance care planning process      [] Support grieving process   [] Coordinate Rites and/or Rituals    [] Coordination with community clergy   [] No spiritual needs identified at this time   [] Detailed Plan of Care below (See Comments)  [] Make referral to Music Therapy  [] Make referral to Pet Therapy     [] Make referral to Addiction services  [] Make referral to [facility]  [] Make referral to Spiritual Care Partner  [] No future visits requested        [x] Follow up visits as needed     Comments:   Responded to patient request for a visit on Oncology unit. No family/friends present. Provided ministry of presence and empathic listening as patient shared thoughts and feeling about a strained relationship with her mother. Her parents are ; mother remarried. Patient has good relationship with her father, who is caring for patient's 11 month old daughter at this time. She spoke about her Mormonism beliefs; she calls on God in prayer, but is not affiliated with any particular leandra tradition. Offered prayer at her request.  Advised patient of  availability.        JUAN MANUEL Marie, [facility], Staff [address]    [address] Paging Service  [phone]

## 2019-11-02 NOTE — PROGRESS NOTES
Hospitalist Progress Note    NAME: Quincy Daily   :  1990   MRN:  [id number]     Assessment / Plan:  Diabetic ketoacidosis POA  Candiduria   -? Noncompliance. abd pain is better, more tolerable per pt  -Ucx with no significant growth but UA has budding yeast.  will cont' diflucan  -off insulin gtt. Adjusting NPH today due to hyperglycemia  -family to bring insulin pump in, pt states she is not sure when her dad can bring it.  can transition back to insulin pump and stop NPH if she has her pump  -SSI   -DTC consultation appreciated    Acute Renal Failure POA  -cr improving  -CT without hydro  -cont' IVF  -hold nephrotoxic agents    Leukocytosis, improving  -monitor    Pseudohyponatremia  Mild hyperkalemia  Hypokalemia  -due to DKA, replete lytes  -monitor lab daily    ? Abdominal Pain  -CT A/P without acute etiology  -beta HCG negative on admission  -? Pain seeking behavior  -PRN Tylenol and Tramadol for now. Avoid IV pain meds unless we know we are dealing with organic etiology  -H/o admissions with pain      Hypertension  -continue hydralazine, add TID. Add nitrobid for better BP control  -holding HCTZ due to above  -PRN IV hydralazine    History of GERD  -continue PPIs        Code Status: Full  DVT Prophylaxis: Heparin   Baseline: From home independent                    Subjective: Pt seen and examined at bedside. NAD. Has abd pain. Appetite is poor. Overnight events d/w RN   CHIEF COMPLAINT: f/u \"Nausea, vomiting & abdominal pain\"     Review of Systems:  Symptom Y/N Comments  Symptom Y/N Comments   Fever/Chills n   Chest Pain     Poor Appetite    Edema     Cough n   Abdominal Pain n    Sputum    Joint Pain     SOB/JUSTIN n   Pruritis/Rash     Nausea/vomit    Tolerating PT/OT     Diarrhea    Tolerating Diet     Constipation    Other       Could NOT obtain due to:      Objective:     VITALS:   Last 24hrs VS reviewed since prior progress note.  Most recent are:  Patient Vitals for the past 24 hrs: Temp Pulse Resp BP SpO2   11/02/19 0736 98.5 °F (36.9 °C) 83 16 (!) 158/110 100 %   11/02/19 0647    (!) 163/104    11/02/19 0526    (!) 192/120    11/02/19 0351  83      11/02/19 0234 97.9 °F (36.6 °C) 83 16 (!) 177/115 100 %   11/02/19 0001    (!) 181/115    11/02/19 0000  80      11/01/19 2247 98.3 °F (36.8 °C) 85 16 (!) 148/100 100 %   11/01/19 2134    (!) 153/107    11/01/19 1903    (!) 179/117    11/01/19 1900 98 °F (36.7 °C) 89 16 (!) 189/117 100 %   11/01/19 1445 98.2 °F (36.8 °C) 84 18 (!) 147/99 100 %   11/01/19 1200 98.5 °F (36.9 °C) 86 15 136/84 100 %       Intake/Output Summary (Last 24 hours) at 11/2/2019 0829  Last data filed at 11/2/2019 0418  Gross per 24 hour   Intake 0 ml   Output    Net 0 ml        PHYSICAL EXAM:  General: WD, WN. Alert, cooperative, no acute distress    EENT:  EOMI. Anicteric sclerae. MMM  Resp:  CTA bilaterally, no wheezing or rales. No accessory muscle use  CV:  Regular  rhythm,  No edema  GI:  Soft, Non distended, tender, +Bowel sounds  Neurologic:  Alert and oriented X 3, normal speech   Psych:   Good insight. Not anxious nor agitated  Skin:  No rashes. No jaundice    Reviewed most current lab test results and cultures  YES  Reviewed most current radiology test results   YES  Review and summation of old records today    NO  Reviewed patient's current orders and MAR    YES  PMH/SH reviewed - no change compared to H&P  ________________________________________________________________________  Care Plan discussed with:    Comments   Patient y    Family      RN y    Care Manager     Consultant                        Multidiciplinary team rounds were held today with , nursing, pharmacist and clinical coordinator. Patient's plan of care was discussed; medications were reviewed and discharge planning was addressed.      ________________________________________________________________________  Total NON critical care TIME:  35 Minutes    Total CRITICAL CARE TIME Spent:   Minutes non procedure based      Comments   >50% of visit spent in counseling and coordination of care     ________________________________________________________________________  Coleen Duarte MD     Procedures: see electronic medical records for all procedures/Xrays and details which were not copied into this note but were reviewed prior to creation of Plan. LABS:  I reviewed today's most current labs and imaging studies. Pertinent labs include:  Recent Labs     11/02/19  0443 11/01/19  0414 10/31/19  0331   WBC 7.6 13.3* 31.2*   HGB 10.4* 10.7* 11.9   HCT 33.3* 33.1* 36.5    221 287     Recent Labs     11/02/19 0443 11/01/19 0414 11/01/19  0001 10/31/19  1951  10/30/19  1446   * 136 133* 135*   < > 131*   K 4.2 3.0* 3.4* 3.5   < > 5.3*    104 101 102   < > 91*   CO2 22 25 19* 24   < > 12*   * 99 300* 102*   < > 767*   BUN 15 27* 29* 35*   < > 48*   CREA 1.70* 2.10* 2.03* 2.22*   < > 3.10*   CA 8.4* 8.4* 7.9* 8.1*   < > 9.4   MG  --  1.6 1.7 1.6   < >  --    PHOS  --   --   --   --   --  8.2*   ALB  --   --   --   --   --  3.7   TBILI  --   --   --   --   --  0.9   SGOT  --   --   --   --   --  15   ALT  --   --   --   --   --  18    < > = values in this interval not displayed.        Signed: Coleen Duarte MD

## 2019-11-02 NOTE — PROGRESS NOTES
Reason for Admission:   DKA                  RRAT Score:     18             Do you (patient/family) have any concerns for transition/discharge? None identified              Plan for utilizing home health:   None at this time    Current Advanced Directive/Advance Care Plan:  Pt is a full code and does not wish to complete an advance directive at this time            Transition of Care Plan:          Pt is a 29 y/o female who was admitted with a diagnosis of DKA. CM called patient on room phone re: assessment, discharge planning. CM introduced self, explained role. Pt verbalized understanding. Pt verified demographic information on chart. CM updated physical address and emergency contacts as requested. Pt is visiting her family and normally lives in [place]. Pt reports she will return to her family's home upon discharge with plans to return to [place]. Pt reports she is independent in ADL/IADL needs at baseline. Pt denies DME use. Pt denies use of home health or SNF prior to admission. CM offered education re: home health and pt does not feel she needs home health at this time. Pt reports she will have her family transport at discharge. Pt reports she has no questions or needs at this time. Cm will continue to remain available for support, discharge planning as needed. .Care Management Interventions  PCP Verified by CM: No  Mode of Transport at Discharge: Other (see comment)(Pt's family at discharge)  Transition of Care Consult (CM Consult): Discharge Planning(Pt is independent in ADL/IADL needs at baseline. Pt denies home health or SNF)  Discharge Durable Medical Equipment: No  Physical Therapy Consult: No  Occupational Therapy Consult: No  Speech Therapy Consult: No  Current Support Network: Other(Pt is currently staying with her father.  Pt is visiting from out of town.)  Confirm Follow Up Transport: Family  Plan discussed with Pt/Family/Caregiver: Yes  Discharge Location  Discharge Placement: [facility], MSW, LSW  Supervisee in [address] [place] [address]  [phone]

## 2019-11-03 VITALS
WEIGHT: 108.69 LBS | DIASTOLIC BLOOD PRESSURE: 97 MMHG | SYSTOLIC BLOOD PRESSURE: 151 MMHG | RESPIRATION RATE: 16 BRPM | HEART RATE: 84 BPM | TEMPERATURE: 98.2 F | HEIGHT: 68 IN | OXYGEN SATURATION: 99 % | BODY MASS INDEX: 16.47 KG/M2

## 2019-11-03 LAB
ANION GAP SERPL CALC-SCNC: 8 MMOL/L (ref 5–15)
BUN SERPL-MCNC: 14 MG/DL (ref 6–20)
BUN/CREAT SERPL: 9 (ref 12–20)
CALCIUM SERPL-MCNC: 8.6 MG/DL (ref 8.5–10.1)
CHLORIDE SERPL-SCNC: 102 MMOL/L (ref 97–108)
CO2 SERPL-SCNC: 25 MMOL/L (ref 21–32)
CREAT SERPL-MCNC: 1.53 MG/DL (ref 0.55–1.02)
GLUCOSE BLD STRIP.AUTO-MCNC: 152 MG/DL (ref 65–100)
GLUCOSE BLD STRIP.AUTO-MCNC: 258 MG/DL (ref 65–100)
GLUCOSE BLD STRIP.AUTO-MCNC: 280 MG/DL (ref 65–100)
GLUCOSE SERPL-MCNC: 196 MG/DL (ref 65–100)
POTASSIUM SERPL-SCNC: 4 MMOL/L (ref 3.5–5.1)
SERVICE CMNT-IMP: ABNORMAL
SODIUM SERPL-SCNC: 135 MMOL/L (ref 136–145)

## 2019-11-03 PROCEDURE — 74011250636 HC RX REV CODE- 250/636: Performed by: INTERNAL MEDICINE

## 2019-11-03 PROCEDURE — 74011250637 HC RX REV CODE- 250/637: Performed by: INTERNAL MEDICINE

## 2019-11-03 PROCEDURE — 94760 N-INVAS EAR/PLS OXIMETRY 1: CPT

## 2019-11-03 PROCEDURE — 36415 COLL VENOUS BLD VENIPUNCTURE: CPT

## 2019-11-03 PROCEDURE — 82962 GLUCOSE BLOOD TEST: CPT

## 2019-11-03 PROCEDURE — 80048 BASIC METABOLIC PNL TOTAL CA: CPT

## 2019-11-03 RX ORDER — FLUCONAZOLE 100 MG/1
200 TABLET ORAL DAILY
Qty: 2 TAB | Refills: 0 | Status: SHIPPED | OUTPATIENT
Start: 2019-11-03 | End: 2019-11-04

## 2019-11-03 RX ADMIN — HYDROXYZINE HYDROCHLORIDE 25 MG: 25 TABLET, FILM COATED ORAL at 02:50

## 2019-11-03 RX ADMIN — TRAMADOL HYDROCHLORIDE 50 MG: 50 TABLET, COATED ORAL at 04:08

## 2019-11-03 RX ADMIN — HYDRALAZINE HYDROCHLORIDE 25 MG: 25 TABLET, FILM COATED ORAL at 10:31

## 2019-11-03 RX ADMIN — LABETALOL HCL 300 MG: 200 TABLET, FILM COATED ORAL at 10:30

## 2019-11-03 RX ADMIN — HYDRALAZINE HYDROCHLORIDE 10 MG: 20 INJECTION INTRAMUSCULAR; INTRAVENOUS at 03:36

## 2019-11-03 RX ADMIN — PANTOPRAZOLE SODIUM 40 MG: 40 TABLET, DELAYED RELEASE ORAL at 10:30

## 2019-11-03 RX ADMIN — ACETAMINOPHEN 650 MG: 325 TABLET ORAL at 03:14

## 2019-11-03 RX ADMIN — NITROGLYCERIN 1 INCH: 20 OINTMENT TOPICAL at 06:52

## 2019-11-03 RX ADMIN — Medication 10 ML: at 06:51

## 2019-11-03 RX ADMIN — SODIUM CHLORIDE 75 ML/HR: 900 INJECTION, SOLUTION INTRAVENOUS at 06:51

## 2019-11-03 RX ADMIN — FAMOTIDINE 20 MG: 20 TABLET ORAL at 10:31

## 2019-11-03 NOTE — PROGRESS NOTES
Pt complained of lower back pain 8/10. PRN Tylenol and Tramadol given with little relief. On-call hospitalist paged. Physician paged while nurse stepped off unit. No call back number left. Order for heating pad for now. Will apply K pad warmer and reassess. Forward message to primary team to have them re-evaluate pain regimen.

## 2019-11-03 NOTE — PROGRESS NOTES
BP elevated. Visit Vitals  BP (!) 176/112 (BP 1 Location: Left arm, BP Patient Position: At rest)   Pulse 80   Temp 98 °F (36.7 °C)   Resp 16   Ht 5' 8\" (1.727 m)   Wt 49.3 kg (108 lb 11 oz)   SpO2 100%   BMI 16.53 kg/m²     Schedule Nitro paste and hydralazine given. Will continue to monitor and treat as needed.

## 2019-11-03 NOTE — PROGRESS NOTES
Oncology End of Shift Note      Bedside shift change report given to Florence Lagunas RN (incoming nurse) by Sofia Rivers (outgoing nurse) on Katrin Selby. Report included the following information SBAR, Kardex and MAR. Shift Summary: Patient was calling out for pain medication and zofran every hour. Patient was in bed for entire day. Patient had one episode of hypoglycemia. Patient has not been eating well and will have multiple trays in the room. New bag of IV fluids were hung. Issues for Physician to Address:   Patient states she will walk around the unit and will refuse heparin injection but has not been getting up and walking around. Patient on Cardiac Monitoring?     [x] Yes  [] No    Rhythm: Telemetry: normal sinus rhythm         Margarita Martin

## 2019-11-03 NOTE — PROGRESS NOTES
Weekend CM reviewed medical record, followed up re: transitional care plans. Patient medically cleared for discharge today 11/3/19, plan remains to return home with family support and transport. No additional questions/concerns reported at this time. CM remains available to further assist with any additional discharge needs as indicated.   Annalee Butts, [address] 1939

## 2019-11-03 NOTE — DISCHARGE SUMMARY
Discharge Summary      Name: Tyler Bae  [id number]  YOB: 1990 (Age: 30 y.o.)   Date of Admission: 10/30/2019  Date of Discharge: 11/3/2019  Attending Physician: Michelle Perez MD    Discharge Diagnosis:   SIRs, non infectious  Diabetic ketoacidosis POA  Candiduria   Acute Renal Failure POA  Leukocytosis  Pseudohyponatremia  Mild hyperkalemia  Hypokalemia  Abdominal Pain  Hypertension  History of GERD    Consultations:  IP CONSULT TO HOSPITALIST    Brief Admission History/Reason for Admission Per Anna Schultz MD:   Purnima Buckley is a 30 y.o.   female who presents with medical history of diabetes mellitus on insulin pump, hypertension, gastroesophageal reflux disease is coming to the hospital chief complaints of nausea, vomiting and abdominal pain. Patient is currently drowsy but wakes up to commands and is not a reliable historian. She reports pain mostly is generalized involving the whole upper abdomen, 3 x 10, nonradiating and without any limiting factors. She reports nausea along with clear vomit. She reports being compliant with her insulin pump. She also reports eating on a regular basis. She does not report any chest pain or shortness of breath. Denies dysuria or urgency. Denies focal weakness, tingling or numbness.     On arrival to the hospital she was tachycardic and blood pressure was also on the higher side at 144 x 96. Her lab work she was noted to have a anion gap of 12, blood sugar of 767 patient was noted to have DKA and was started on insulin drip. She was also given IV fluid bolus in the emergency department.  [address] Problems:   SIRs  Diabetic ketoacidosis POA  Candiduria   Likely due to noncompliance. abd pain is better, more tolerable per pt. Ucx with no significant growth but UA has budding yeast.  will cont' diflucan as prescribed. Pt was on insulin gtt, now off.   She was receiving NPH while waiting for her family to bring in insulin pump. Insulin pump resumed today. Medically stable for discharge as pt is tolerating po intake. She already has an appnt with her PCP and endocrinologist.  Recommend cont' with f/u as previously scheduled. Medication compliance discussed.     Acute Renal Failure POA, cr improved with IVF. CT without hydro    Leukocytosis, improving     Pseudohyponatremia  Mild hyperkalemia  Hypokalemia  Due to DKA,  repleted.     ? Abdominal Pain  CT A/P without acute etiology. Beta HCG negative on admission. Possible seeking behavior. She has had multiple admissions with similar pain. Appears stable and NAD this AM.     Hypertension, resume home meds. History of GERD, continue PPI       Discharge Exam:  Patient seen and examined by me on discharge day. Pertinent Findings:  Gen:    Not in distress  Chest: Clear lungs  CVS:   Regular rhythm. No edema  Abd:  Soft, not distended, not tender    Discharge/Recent Laboratory Results:  Recent Labs     11/03/19  0350  11/01/19  0414   *   < > 136   K 4.0   < > 3.0*      < > 104   CO2 25   < > 25   BUN 14   < > 27*   *   < > 99   CA 8.6   < > 8.4*   MG  --   --  1.6    < > = values in this interval not displayed. Recent Labs     11/02/19  0443   HGB 10.4*   HCT 33.3*   WBC 7.6          Discharge Medications:  Current Discharge Medication List      START taking these medications    Details   fluconazole (DIFLUCAN) 100 mg tablet Take 2 Tabs by mouth daily for 1 day. FDA advises cautious prescribing of oral fluconazole in pregnancy. Qty: 2 Tab, Refills: 0         CONTINUE these medications which have NOT CHANGED    Details   famotidine (PEPCID) 20 mg tablet Take 20 mg by mouth two (2) times a day. omeprazole (PRILOSEC) 40 mg capsule Take 40 mg by mouth daily. labetalol (NORMODYNE) 300 mg tablet Take 300 mg by mouth two (2) times a day.       ondansetron (ZOFRAN ODT) 4 mg disintegrating tablet Take 4 mg by mouth every eight (8) hours as needed for Nausea. insulin pump (PATIENT SUPPLIED) misc 0.7 Units/hr by SubCUTAneous route continuous. insulin lispro (HUMALOG U-100 INSULIN) 100 unit/mL injection by SubCUTAneous route continuous. With insulin pump       hydroCHLOROthiazide (HYDRODIURIL) 25 mg tablet Take 1 Tab by mouth daily. Qty: 30 Tab, Refills: 2      ferrous sulfate 325 mg (65 mg iron) tablet Take 1 Tab by mouth Daily (before breakfast). Qty: 30 Tab, Refills: 2             Patient Follow Up Instructions:    Activity: Activity as tolerated  Diet: Diabetic Diet  Wound Care: None needed  Code: Full    DISPOSITION:    Home with Family: x   Home with HH/PT/OT/RN:    SNF/LTC:    RAQUEL:    OTHER:          Follow up with:   PCP : None  Follow-up Information     Follow up With Specialties Details Why Contact Info    None    None ([phone]) Patient stated that they have no PCP              Total time in minutes spent coordinating this discharge (includes going over instructions, follow-up, prescriptions, and preparing report for sign off to her PCP) :  35 minutes

## 2019-11-03 NOTE — PROGRESS NOTES
Oncology End of Shift Note      Bedside shift change report given to [facility], RN (incoming nurse) by Paul Gillespie (outgoing nurse) on Rayann Found. Report included the following information SBAR, Kardex, Intake/Output and MAR. Shift Summary: PRN Tramadol x2, PRN zofran x1, PRN Tylenol x1, and PRN Hydralazine x1. Pt complained of pain even with pain regimen. Labs drawn. Issues for Physician to Address:  Pt with pain regardless of medication given. Patient on Cardiac Monitoring?     [x] Yes  [] No    Rhythm: Telemetry: normal sinus rhythm         Paul Gillespie

## 2019-11-03 NOTE — PROGRESS NOTES

## 2019-11-03 NOTE — PROGRESS NOTES
C/o lower back pain, RN to contact us again for more details,left message on the floor. .  Progress note reviewed, heating pad for now til more info is obtained.

## 2020-02-18 NOTE — PROGRESS NOTES
Hospitalist Progress Note    NAME: Violeta Christianson   :  1990   MRN:  [id number]     Assessment / Plan:  Diabetic ketoacidosis POA  R/o UTI  -? Noncompliance. abd pain is better, more tolerable per pt  -Ucx with no significant growth. Pt received one time dose diflucan  -off insulin ggt, transitioning to NPH  -family to bring insulin pump in, can transition back to insulin pump and stop NPH  -SSI   -DTC consultation appreciated    Acute Renal Failure POA  -cr improving  -CT without hydro  -cont' IVF  -hold nephrotoxic agents    Leukocytosis, improving  -monitor    Pseudohyponatremia  Mild hyperkalemia  Hypokalemia  -due to DKA, replete lytes  -monitor lab daily    ? Abdominal Pain  -CT A/P without acute etiology  -r/o  UTI as above  -beta HCG negative on admission  -? Pain seeking behavior  -PRN Tylenol and Tramadol for now. Avoid IV pain meds unless we know we are dealing with organic etiology  -H/o admissions with pain      Hypertension  -continue hydralazine. Add nitrobid for better BP control  -holding HCTZ due to above  -PRN IV hydralazine    History of GERD  -continue PPIs        Code Status: Full  DVT Prophylaxis: Heparin   Baseline: From home independent                    Subjective: Pt seen and examined at bedside. NAD. Denies abd pain. Overnight events d/w RN   CHIEF COMPLAINT: f/u \"Nausea, vomiting & abdominal pain\"     Review of Systems:  Symptom Y/N Comments  Symptom Y/N Comments   Fever/Chills n   Chest Pain     Poor Appetite    Edema     Cough n   Abdominal Pain n    Sputum    Joint Pain     SOB/JUSTIN n   Pruritis/Rash     Nausea/vomit    Tolerating PT/OT     Diarrhea    Tolerating Diet     Constipation    Other       Could NOT obtain due to:      Objective:     VITALS:   Last 24hrs VS reviewed since prior progress note.  Most recent are:  Patient Vitals for the past 24 hrs:   Temp Pulse Resp BP SpO2   19 1200 98.5 °F (36.9 °C) 86 15 136/84 100 %   19 0800 98 °F (36.7 °C) 81 16 (!) 145/94 100 %   11/01/19 0700  79 13 (!) 144/94 100 %   11/01/19 0600  78 17 (!) 142/107 100 %   11/01/19 0400  74 14 122/82 100 %   11/01/19 0300 97.9 °F (36.6 °C) 75 15 101/67 100 %   11/01/19 0200  78 18 103/65 99 %   11/01/19 0100  80 16 105/64 100 %   11/01/19 0030  81 17  99 %   11/01/19 0000  81 14  100 %   10/31/19 2300 98.2 °F (36.8 °C) 81 17 115/71 99 %   10/31/19 2200  83 16 (!) 150/99 100 %   10/31/19 2100  79 14 (!) 130/94 100 %   10/31/19 2058  81  (!) 130/94    10/31/19 2000  84 10 (!) 152/104 100 %   10/31/19 1929 98.1 °F (36.7 °C)       10/31/19 1900  87 15 173/54 100 %   10/31/19 1800  86 16 119/73 100 %   10/31/19 1700  82 16 102/65 99 %   10/31/19 1600 98.4 °F (36.9 °C) 83 16 118/81 100 %   10/31/19 1500  90 21 124/83 100 %       Intake/Output Summary (Last 24 hours) at 11/1/2019 1416  Last data filed at 10/31/2019 2200  Gross per 24 hour   Intake 1318.72 ml   Output 899 ml   Net 419.72 ml        PHYSICAL EXAM:  General: WD, WN. Alert, cooperative, no acute distress    EENT:  EOMI. Anicteric sclerae. MMM  Resp:  CTA bilaterally, no wheezing or rales. No accessory muscle use  CV:  Regular  rhythm,  No edema  GI:  Soft, Non distended, tender, voluntary guarding.  +Bowel sounds  Neurologic:  Alert and oriented X 3, normal speech,   Psych:   Good insight. Not anxious nor agitated  Skin:  No rashes. No jaundice    Reviewed most current lab test results and cultures  YES  Reviewed most current radiology test results   YES  Review and summation of old records today    NO  Reviewed patient's current orders and MAR    YES  PMH/SH reviewed - no change compared to H&P  ________________________________________________________________________  Care Plan discussed with:    Comments   Patient y    Family      RN y    Care Manager     Consultant                        Multidiciplinary team rounds were held today with , nursing, pharmacist and clinical coordinator. Patient's plan of care was discussed; medications were reviewed and discharge planning was addressed. ________________________________________________________________________  Total NON critical care TIME:  35 Minutes    Total CRITICAL CARE TIME Spent:   Minutes non procedure based      Comments   >50% of visit spent in counseling and coordination of care     ________________________________________________________________________  Bhupendra Santamaria MD     Procedures: see electronic medical records for all procedures/Xrays and details which were not copied into this note but were reviewed prior to creation of Plan. LABS:  I reviewed today's most current labs and imaging studies. Pertinent labs include:  Recent Labs     11/01/19  0414 10/31/19  0331 10/30/19  1446   WBC 13.3* 31.2* 19.5*   HGB 10.7* 11.9 11.6   HCT 33.1* 36.5 37.7    287 485*     Recent Labs     11/01/19 0414 11/01/19  0001 10/31/19  1951  10/30/19  1446    133* 135*   < > 131*   K 3.0* 3.4* 3.5   < > 5.3*    101 102   < > 91*   CO2 25 19* 24   < > 12*   GLU 99 300* 102*   < > 767*   BUN 27* 29* 35*   < > 48*   CREA 2.10* 2.03* 2.22*   < > 3.10*   CA 8.4* 7.9* 8.1*   < > 9.4   MG 1.6 1.7 1.6   < >  --    PHOS  --   --   --   --  8.2*   ALB  --   --   --   --  3.7   TBILI  --   --   --   --  0.9   SGOT  --   --   --   --  15   ALT  --   --   --   --  18    < > = values in this interval not displayed.        Signed: Bhupendra Santamaria MD Fluconazole Counseling:  Patient counseled regarding adverse effects of fluconazole including but not limited to headache, diarrhea, nausea, upset stomach, liver function test abnormalities, taste disturbance, and stomach pain.  There is a rare possibility of liver failure that can occur when taking fluconazole.  The patient understands that monitoring of LFTs and kidney function test may be required, especially at baseline. The patient verbalized understanding of the proper use and possible adverse effects of fluconazole.  All of the patient's questions and concerns were addressed.

## 2020-05-24 ENCOUNTER — APPOINTMENT (OUTPATIENT)
Dept: CT IMAGING | Age: 30
End: 2020-05-24
Attending: EMERGENCY MEDICINE
Payer: MEDICAID

## 2020-05-24 ENCOUNTER — HOSPITAL ENCOUNTER (EMERGENCY)
Age: 30
Discharge: HOME | End: 2020-05-24
Attending: EMERGENCY MEDICINE | Admitting: EMERGENCY MEDICINE
Payer: MEDICAID

## 2020-05-24 VITALS
DIASTOLIC BLOOD PRESSURE: 119 MMHG | BODY MASS INDEX: 19.7 KG/M2 | OXYGEN SATURATION: 99 % | HEIGHT: 68 IN | HEART RATE: 92 BPM | SYSTOLIC BLOOD PRESSURE: 175 MMHG | RESPIRATION RATE: 15 BRPM | WEIGHT: 130 LBS | TEMPERATURE: 98.7 F

## 2020-05-24 DIAGNOSIS — I10 HYPERTENSION, UNSPECIFIED TYPE: ICD-10-CM

## 2020-05-24 DIAGNOSIS — R10.84 ABDOMINAL PAIN, GENERALIZED: ICD-10-CM

## 2020-05-24 DIAGNOSIS — R11.2 NAUSEA AND VOMITING, INTRACTABILITY OF VOMITING NOT SPECIFIED, UNSPECIFIED VOMITING TYPE: Primary | ICD-10-CM

## 2020-05-24 DIAGNOSIS — N18.9 CHRONIC RENAL IMPAIRMENT, UNSPECIFIED CKD STAGE: ICD-10-CM

## 2020-05-24 LAB
ALBUMIN SERPL-MCNC: 2.9 G/DL (ref 3.5–5)
ALBUMIN/GLOB SERPL: 0.8 {RATIO} (ref 1.1–2.2)
ALP SERPL-CCNC: 106 U/L (ref 45–117)
ALT SERPL-CCNC: 18 U/L (ref 12–78)
AMPHET UR QL SCN: NEGATIVE
ANION GAP SERPL CALC-SCNC: 8 MMOL/L (ref 5–15)
APPEARANCE UR: CLEAR
ARTERIAL PATENCY WRIST A: ABNORMAL
AST SERPL-CCNC: 15 U/L (ref 15–37)
BACTERIA URNS QL MICRO: NEGATIVE /HPF
BARBITURATES UR QL SCN: NEGATIVE
BASE EXCESS BLD CALC-SCNC: 0 MMOL/L
BASOPHILS # BLD: 0.1 K/UL (ref 0–0.1)
BASOPHILS NFR BLD: 1 % (ref 0–1)
BDY SITE: ABNORMAL
BENZODIAZ UR QL: NEGATIVE
BILIRUB SERPL-MCNC: 0.4 MG/DL (ref 0.2–1)
BILIRUB UR QL: NEGATIVE
BUN SERPL-MCNC: 24 MG/DL (ref 6–20)
BUN/CREAT SERPL: 11 (ref 12–20)
CA-I BLD-SCNC: 1.27 MMOL/L (ref 1.12–1.32)
CALCIUM SERPL-MCNC: 9.3 MG/DL (ref 8.5–10.1)
CANNABINOIDS UR QL SCN: POSITIVE
CHLORIDE SERPL-SCNC: 108 MMOL/L (ref 97–108)
CO2 SERPL-SCNC: 24 MMOL/L (ref 21–32)
COCAINE UR QL SCN: NEGATIVE
COLOR UR: ABNORMAL
COMMENT, HOLDF: NORMAL
CREAT SERPL-MCNC: 2.23 MG/DL (ref 0.55–1.02)
DIFFERENTIAL METHOD BLD: ABNORMAL
DRUG SCRN COMMENT,DRGCM: ABNORMAL
EOSINOPHIL # BLD: 0.3 K/UL (ref 0–0.4)
EOSINOPHIL NFR BLD: 2 % (ref 0–7)
EPITH CASTS URNS QL MICRO: ABNORMAL /LPF
ERYTHROCYTE [DISTWIDTH] IN BLOOD BY AUTOMATED COUNT: 15.4 % (ref 11.5–14.5)
GAS FLOW.O2 O2 DELIVERY SYS: ABNORMAL L/MIN
GLOBULIN SER CALC-MCNC: 3.7 G/DL (ref 2–4)
GLUCOSE SERPL-MCNC: 206 MG/DL (ref 65–100)
GLUCOSE UR STRIP.AUTO-MCNC: 250 MG/DL
HCG UR QL: NEGATIVE
HCO3 BLD-SCNC: 25 MMOL/L (ref 22–26)
HCT VFR BLD AUTO: 32.5 % (ref 35–47)
HGB BLD-MCNC: 10.1 G/DL (ref 11.5–16)
HGB UR QL STRIP: ABNORMAL
HYALINE CASTS URNS QL MICRO: ABNORMAL /LPF (ref 0–5)
IMM GRANULOCYTES # BLD AUTO: 0 K/UL (ref 0–0.04)
IMM GRANULOCYTES NFR BLD AUTO: 0 % (ref 0–0.5)
KETONES UR QL STRIP.AUTO: NEGATIVE MG/DL
LACTATE SERPL-SCNC: 1 MMOL/L (ref 0.4–2)
LEUKOCYTE ESTERASE UR QL STRIP.AUTO: NEGATIVE
LIPASE SERPL-CCNC: 181 U/L (ref 73–393)
LYMPHOCYTES # BLD: 1.2 K/UL (ref 0.8–3.5)
LYMPHOCYTES NFR BLD: 11 % (ref 12–49)
MAGNESIUM SERPL-MCNC: 1.7 MG/DL (ref 1.6–2.4)
MCH RBC QN AUTO: 25.8 PG (ref 26–34)
MCHC RBC AUTO-ENTMCNC: 31.1 G/DL (ref 30–36.5)
MCV RBC AUTO: 83.1 FL (ref 80–99)
METHADONE UR QL: NEGATIVE
MONOCYTES # BLD: 0.5 K/UL (ref 0–1)
MONOCYTES NFR BLD: 4 % (ref 5–13)
NEUTS SEG # BLD: 8.7 K/UL (ref 1.8–8)
NEUTS SEG NFR BLD: 82 % (ref 32–75)
NITRITE UR QL STRIP.AUTO: NEGATIVE
NRBC # BLD: 0 K/UL (ref 0–0.01)
NRBC BLD-RTO: 0 PER 100 WBC
OPIATES UR QL: NEGATIVE
PCO2 BLD: 39.5 MMHG (ref 35–45)
PCP UR QL: NEGATIVE
PH BLD: 7.41 [PH] (ref 7.35–7.45)
PH UR STRIP: 8 [PH] (ref 5–8)
PLATELET # BLD AUTO: 362 K/UL (ref 150–400)
PMV BLD AUTO: 10.1 FL (ref 8.9–12.9)
PO2 BLD: 44 MMHG (ref 80–100)
POTASSIUM SERPL-SCNC: 3.8 MMOL/L (ref 3.5–5.1)
PROT SERPL-MCNC: 6.6 G/DL (ref 6.4–8.2)
PROT UR STRIP-MCNC: 300 MG/DL
RBC # BLD AUTO: 3.91 M/UL (ref 3.8–5.2)
RBC #/AREA URNS HPF: ABNORMAL /HPF (ref 0–5)
SAMPLES BEING HELD,HOLD: NORMAL
SAO2 % BLD: 80 % (ref 92–97)
SODIUM SERPL-SCNC: 140 MMOL/L (ref 136–145)
SP GR UR REFRACTOMETRY: 1.01 (ref 1–1.03)
SPECIMEN TYPE: ABNORMAL
UA: UC IF INDICATED,UAUC: ABNORMAL
UROBILINOGEN UR QL STRIP.AUTO: 0.2 EU/DL (ref 0.2–1)
WBC # BLD AUTO: 10.7 K/UL (ref 3.6–11)
WBC URNS QL MICRO: ABNORMAL /HPF (ref 0–4)

## 2020-05-24 PROCEDURE — 99285 EMERGENCY DEPT VISIT HI MDM: CPT

## 2020-05-24 PROCEDURE — 96375 TX/PRO/DX INJ NEW DRUG ADDON: CPT

## 2020-05-24 PROCEDURE — 85025 COMPLETE CBC W/AUTO DIFF WBC: CPT

## 2020-05-24 PROCEDURE — 80307 DRUG TEST PRSMV CHEM ANLYZR: CPT

## 2020-05-24 PROCEDURE — 74011000250 HC RX REV CODE- 250: Performed by: EMERGENCY MEDICINE

## 2020-05-24 PROCEDURE — 82803 BLOOD GASES ANY COMBINATION: CPT

## 2020-05-24 PROCEDURE — 96372 THER/PROPH/DIAG INJ SC/IM: CPT

## 2020-05-24 PROCEDURE — 74011250636 HC RX REV CODE- 250/636: Performed by: EMERGENCY MEDICINE

## 2020-05-24 PROCEDURE — 80053 COMPREHEN METABOLIC PANEL: CPT

## 2020-05-24 PROCEDURE — 96374 THER/PROPH/DIAG INJ IV PUSH: CPT

## 2020-05-24 PROCEDURE — 83690 ASSAY OF LIPASE: CPT

## 2020-05-24 PROCEDURE — 83735 ASSAY OF MAGNESIUM: CPT

## 2020-05-24 PROCEDURE — 81025 URINE PREGNANCY TEST: CPT

## 2020-05-24 PROCEDURE — 74176 CT ABD & PELVIS W/O CONTRAST: CPT

## 2020-05-24 PROCEDURE — 36415 COLL VENOUS BLD VENIPUNCTURE: CPT

## 2020-05-24 PROCEDURE — 81001 URINALYSIS AUTO W/SCOPE: CPT

## 2020-05-24 PROCEDURE — 83605 ASSAY OF LACTIC ACID: CPT

## 2020-05-24 RX ORDER — PROMETHAZINE HYDROCHLORIDE 25 MG/1
25 TABLET ORAL
Qty: 12 TAB | Refills: 0 | Status: SHIPPED | OUTPATIENT
Start: 2020-05-24

## 2020-05-24 RX ORDER — DIPHENHYDRAMINE HYDROCHLORIDE 50 MG/ML
12.5 INJECTION, SOLUTION INTRAMUSCULAR; INTRAVENOUS
Status: COMPLETED | OUTPATIENT
Start: 2020-05-24 | End: 2020-05-24

## 2020-05-24 RX ORDER — METOCLOPRAMIDE HYDROCHLORIDE 5 MG/ML
10 INJECTION INTRAMUSCULAR; INTRAVENOUS
Status: COMPLETED | OUTPATIENT
Start: 2020-05-24 | End: 2020-05-24

## 2020-05-24 RX ORDER — SODIUM CHLORIDE 9 MG/ML
150 INJECTION, SOLUTION INTRAVENOUS ONCE
Status: COMPLETED | OUTPATIENT
Start: 2020-05-24 | End: 2020-05-24

## 2020-05-24 RX ORDER — LABETALOL HYDROCHLORIDE 5 MG/ML
10 INJECTION, SOLUTION INTRAVENOUS
Status: COMPLETED | OUTPATIENT
Start: 2020-05-24 | End: 2020-05-24

## 2020-05-24 RX ORDER — DICYCLOMINE HYDROCHLORIDE 10 MG/ML
20 INJECTION INTRAMUSCULAR
Status: COMPLETED | OUTPATIENT
Start: 2020-05-24 | End: 2020-05-24

## 2020-05-24 RX ORDER — DICYCLOMINE HYDROCHLORIDE 10 MG/1
20 CAPSULE ORAL 4 TIMES DAILY
Qty: 20 CAP | Refills: 0 | Status: SHIPPED | OUTPATIENT
Start: 2020-05-24

## 2020-05-24 RX ADMIN — SODIUM CHLORIDE 1000 ML: 900 INJECTION, SOLUTION INTRAVENOUS at 11:17

## 2020-05-24 RX ADMIN — SODIUM CHLORIDE 150 ML/HR: 900 INJECTION, SOLUTION INTRAVENOUS at 11:43

## 2020-05-24 RX ADMIN — LABETALOL HYDROCHLORIDE 10 MG: 5 INJECTION INTRAVENOUS at 11:11

## 2020-05-24 RX ADMIN — DICYCLOMINE HYDROCHLORIDE 20 MG: 10 INJECTION INTRAMUSCULAR at 10:57

## 2020-05-24 RX ADMIN — METOCLOPRAMIDE 10 MG: 5 INJECTION, SOLUTION INTRAMUSCULAR; INTRAVENOUS at 13:56

## 2020-05-24 RX ADMIN — SODIUM CHLORIDE 10 MG: 9 INJECTION INTRAMUSCULAR; INTRAVENOUS; SUBCUTANEOUS at 11:12

## 2020-05-24 RX ADMIN — LABETALOL HYDROCHLORIDE 10 MG: 5 INJECTION INTRAVENOUS at 13:43

## 2020-05-24 RX ADMIN — DIPHENHYDRAMINE HYDROCHLORIDE 12.5 MG: 50 INJECTION, SOLUTION INTRAMUSCULAR; INTRAVENOUS at 11:11

## 2020-05-24 NOTE — DISCHARGE INSTRUCTIONS
High Blood Pressure: Care Instructions  Overview    It's normal for blood pressure to go up and down throughout the day. But if it stays up, you have high blood pressure. Another name for high blood pressure is hypertension. Despite what a lot of people think, high blood pressure usually doesn't cause headaches or make you feel dizzy or lightheaded. It usually has no symptoms. But it does increase your risk of stroke, heart attack, and other problems. You and your doctor will talk about your risks of these problems based on your blood pressure. Your doctor will give you a goal for your blood pressure. Your goal will be based on your health and your age. Lifestyle changes, such as eating healthy and being active, are always important to help lower blood pressure. You might also take medicine to reach your blood pressure goal.  Follow-up care is a key part of your treatment and safety. Be sure to make and go to all appointments, and call your doctor if you are having problems. It's also a good idea to know your test results and keep a list of the medicines you take. How can you care for yourself at home? Medical treatment  · If you stop taking your medicine, your blood pressure will go back up. You may take one or more types of medicine to lower your blood pressure. Be safe with medicines. Take your medicine exactly as prescribed. Call your doctor if you think you are having a problem with your medicine. · Talk to your doctor before you start taking aspirin every day. Aspirin can help certain people lower their risk of a heart attack or stroke. But taking aspirin isn't right for everyone, because it can cause serious bleeding. · See your doctor regularly. You may need to see the doctor more often at first or until your blood pressure comes down. · If you are taking blood pressure medicine, talk to your doctor before you take decongestants or anti-inflammatory medicine, such as ibuprofen.  Some of these medicines can raise blood pressure. · Learn how to check your blood pressure at home. Lifestyle changes  · Stay at a healthy weight. This is especially important if you put on weight around the waist. Losing even 10 pounds can help you lower your blood pressure. · If your doctor recommends it, get more exercise. Walking is a good choice. Bit by bit, increase the amount you walk every day. Try for at least 30 minutes on most days of the week. You also may want to swim, bike, or do other activities. · Avoid or limit alcohol. Talk to your doctor about whether you can drink any alcohol. · Try to limit how much sodium you eat to less than 2,300 milligrams (mg) a day. Your doctor may ask you to try to eat less than 1,500 mg a day. · Eat plenty of fruits (such as bananas and oranges), vegetables, legumes, whole grains, and low-fat dairy products. · Lower the amount of saturated fat in your diet. Saturated fat is found in animal products such as milk, cheese, and meat. Limiting these foods may help you lose weight and also lower your risk for heart disease. · Do not smoke. Smoking increases your risk for heart attack and stroke. If you need help quitting, talk to your doctor about stop-smoking programs and medicines. These can increase your chances of quitting for good. When should you call for help? Call  911 anytime you think you may need emergency care. This may mean having symptoms that suggest that your blood pressure is causing a serious heart or blood vessel problem. Your blood pressure may be over 180/120.   For example, call  911 if:    · You have symptoms of a heart attack. These may include:  ? Chest pain or pressure, or a strange feeling in the chest.  ? Sweating. ? Shortness of breath. ? Nausea or vomiting. ? Pain, pressure, or a strange feeling in the back, neck, jaw, or upper belly or in one or both shoulders or arms. ? Lightheadedness or sudden weakness.   ? A fast or irregular heartbeat.     · You have symptoms of a stroke. These may include:  ? Sudden numbness, tingling, weakness, or loss of movement in your face, arm, or leg, especially on only one side of your body. ? Sudden vision changes. ? Sudden trouble speaking. ? Sudden confusion or trouble understanding simple statements. ? Sudden problems with walking or balance. ? A sudden, severe headache that is different from past headaches.     · You have severe back or belly pain.    Do not wait until your blood pressure comes down on its own. Get help right away.   Call your doctor now or seek immediate care if:    · Your blood pressure is much higher than normal (such as 180/120 or higher), but you don't have symptoms.     · You think high blood pressure is causing symptoms, such as:  ? Severe headache.  ? Blurry vision.    Watch closely for changes in your health, and be sure to contact your doctor if:    · Your blood pressure measures higher than your doctor recommends at least 2 times. That means the top number is higher or the bottom number is higher, or both.     · You think you may be having side effects from your blood pressure medicine. Where can you learn more? Go to http://elvisFace.combernarda.info/  Enter [id number] in the search box to learn more about \"High Blood Pressure: Care Instructions. \"  Current as of: December 15, 2019Content Version: 12.4  © 4077-4696 Healthwise, Incorporated. Care instructions adapted under license by [employer] (which disclaims liability or warranty for this information). If you have questions about a medical condition or this instruction, always ask your healthcare professional. [place] [address] any warranty or liability for your use of this information. Patient Education        Abdominal Pain: Care Instructions  Your Care Instructions    Abdominal pain has many possible causes. Some aren't serious and get better on their own in a few days.  Others need more testing and treatment. If your pain continues or gets worse, you need to be rechecked and may need more tests to find out what is wrong. You may need surgery to correct the problem. Don't ignore new symptoms, such as fever, nausea and vomiting, urination problems, pain that gets worse, and dizziness. These may be signs of a more serious problem. Your doctor may have recommended a follow-up visit in the next 8 to 12 hours. If you are not getting better, you may need more tests or treatment. The doctor has checked you carefully, but problems can develop later. If you notice any problems or new symptoms, get medical treatment right away. Follow-up care is a key part of your treatment and safety. Be sure to make and go to all appointments, and call your doctor if you are having problems. It's also a good idea to know your test results and keep a list of the medicines you take. How can you care for yourself at home? · Rest until you feel better. · To prevent dehydration, drink plenty of fluids, enough so that your urine is light yellow or clear like water. Choose water and other caffeine-free clear liquids until you feel better. If you have kidney, heart, or liver disease and have to limit fluids, talk with your doctor before you increase the amount of fluids you drink. · If your stomach is upset, eat mild foods, such as rice, dry toast or crackers, bananas, and applesauce. Try eating several small meals instead of two or three large ones. · Wait until 48 hours after all symptoms have gone away before you have spicy foods, alcohol, and drinks that contain caffeine. · Do not eat foods that are high in fat. · Avoid anti-inflammatory medicines such as aspirin, ibuprofen (Advil, Motrin), and naproxen (Aleve). These can cause stomach upset. Talk to your doctor if you take daily aspirin for another health problem. When should you call for help? Call 911 anytime you think you may need emergency care.  For example, call if:    · You passed out (lost consciousness).     · You pass maroon or very bloody stools.     · You vomit blood or what looks like coffee grounds.     · You have new, severe belly pain.    Call your doctor now or seek immediate medical care if:    · Your pain gets worse, especially if it becomes focused in one area of your belly.     · You have a new or higher fever.     · Your stools are black and look like tar, or they have streaks of blood.     · You have unexpected vaginal bleeding.     · You have symptoms of a urinary tract infection. These may include:  ? Pain when you urinate. ? Urinating more often than usual.  ? Blood in your urine.     · You are dizzy or lightheaded, or you feel like you may faint.    Watch closely for changes in your health, and be sure to contact your doctor if:    · You are not getting better after 1 day (24 hours). Where can you learn more? Go to http://elvis-bernarda.info/  Enter S048 in the search box to learn more about \"Abdominal Pain: Care Instructions. \"  Current as of: June 26, 2019Content Version: 12.4  © 8626-8142 [employer]. Care instructions adapted under license by [employer] (which disclaims liability or warranty for this information). If you have questions about a medical condition or this instruction, always ask your healthcare professional. [address] any warranty or liability for your use of this information. Patient Education        Nausea and Vomiting: Care Instructions  Your Care Instructions    When you are nauseated, you may feel weak and sweaty and notice a lot of saliva in your mouth. Nausea often leads to vomiting. Most of the time you do not need to worry about nausea and vomiting, but they can be signs of other illnesses. Two common causes of nausea and vomiting are stomach flu and food poisoning.  Nausea and vomiting from viral stomach flu will usually start to improve within 24 hours. Nausea and vomiting from food poisoning may last from 12 to 48 hours. The doctor has checked you carefully, but problems can develop later. If you notice any problems or new symptoms, get medical treatment right away. Follow-up care is a key part of your treatment and safety. Be sure to make and go to all appointments, and call your doctor if you are having problems. It's also a good idea to know your test results and keep a list of the medicines you take. How can you care for yourself at home? · To prevent dehydration, drink plenty of fluids, enough so that your urine is light yellow or clear like water. Choose water and other caffeine-free clear liquids until you feel better. If you have kidney, heart, or liver disease and have to limit fluids, talk with your doctor before you increase the amount of fluids you drink. · Rest in bed until you feel better. · When you are able to eat, try clear soups, mild foods, and liquids until all symptoms are gone for 12 to 48 hours. Other good choices include dry toast, crackers, cooked cereal, and gelatin dessert, such as Jell-O. When should you call for help? Call 911 anytime you think you may need emergency care. For example, call if:    · You passed out (lost consciousness).    Call your doctor now or seek immediate medical care if:    · You have symptoms of dehydration, such as:  ? Dry eyes and a dry mouth. ? Passing only a little dark urine. ? Feeling thirstier than usual.     · You have new or worsening belly pain.     · You have a new or higher fever.     · You vomit blood or what looks like coffee grounds.    Watch closely for changes in your health, and be sure to contact your doctor if:    · You have ongoing nausea and vomiting.     · Your vomiting is getting worse.     · Your vomiting lasts longer than 2 days.     · You are not getting better as expected. Where can you learn more?   Go to http://elvis-bernarda.info/  Enter H591 in the search box to learn more about \"Nausea and Vomiting: Care Instructions. \"  Current as of: June 26, 2019Content Version: 12.4  © 5239-6288 Healthwise, Incorporated. Care instructions adapted under license by [employer] (which disclaims liability or warranty for this information). If you have questions about a medical condition or this instruction, always ask your healthcare professional. [address] any warranty or liability for your use of this information. Patient Education        Chronic Kidney Disease: Care Instructions  Your Care Instructions    Chronic kidney disease happens when your kidneys don't work as well as they should. Your kidneys have a few important jobs. They remove waste from your blood. This waste leaves your body in your urine. They also balance your body's fluids and chemicals. When your kidneys don't work well, extra waste and fluid can build up. This can poison the body and sometimes cause death. The most common causes of this disease are diabetes and high blood pressure. In some cases, the disease develops in 2 to 3 months. But it usually develops over many years. If you take medicine and make healthy changes to your lifestyle, you may be able to prevent the disease from getting worse. But if your kidney damage gets worse, you may need dialysis or a kidney transplant. Dialysis uses a machine to filter waste from the blood. A transplant is surgery to give you a healthy kidney from another person. Follow-up care is a key part of your treatment and safety. Be sure to make and go to all appointments, and call your doctor if you are having problems. It's also a good idea to know your test results and keep a list of the medicines you take. How can you care for yourself at home?   Treatments and appointments    · Be safe with medicines. Take your medicines exactly as prescribed. Call your doctor if you have any problems with your medicine.  You also may take medicine to control your blood pressure or to treat diabetes. Many people who have diabetes take blood pressure medicine.     · If you have diabetes, do your best to keep your blood sugar in your target range. You may do this by eating healthy food and exercising. You may also take medicines.     · Go to your dialysis appointments if you have this treatment.     · Do not take ibuprofen (Advil, Motrin), naproxen (Aleve), or similar medicines, unless your doctor tells you to. These may make the disease worse.     · Do not take any vitamins, over-the-counter medicines, or herbal products without talking to your doctor first.     · Do not smoke or use other tobacco products. Smoking can reduce blood flow to the kidneys. If you need help quitting, talk to your doctor about stop-smoking programs and medicines. These can increase your chances of quitting for good.     · Do not drink alcohol or use illegal drugs.     · Talk to your doctor about an exercise plan. Exercise helps lower your blood pressure. It also makes you feel better.     · If you have an advance directive, let your doctor know. It may include a living will and a durable power of  for health care. If you don't have one, you may want to prepare one. It lets your doctor and loved ones know your health care wishes if you become unable to speak for yourself. Diet    · Talk to a registered dietitian. He or she can help you make a meal plan that is right for you. Most people with kidney disease need to limit salt (sodium), fluids, and protein. Some also have to limit potassium and phosphorus.     · You may have to give up many foods you like. But try to focus on the fact that this will help you stay healthy for as long as possible.     · If you have a hard time eating enough, talk to your doctor or dietitian about ways to add calories to your diet.     · Your diet may change as your disease changes. See your doctor for regular testing.  And work with a dietitian to change your diet as needed. When should you call for help? Call 911 anytime you think you may need emergency care. For example, call if:    · You passed out (lost consciousness).    Call your doctor now or seek immediate medical care if:    · You have less urine than normal or no urine.     · You have trouble urinating or can urinate only very small amounts.     · You are confused or have trouble thinking clearly.     · You feel weaker or more tired than usual.     · You are very thirsty, lightheaded, or dizzy.     · You have nausea and vomiting.     · You have new swelling of your arms or feet, or your swelling is worse.     · You have blood in your urine.     · You have new or worse trouble breathing.    Watch closely for changes in your health, and be sure to contact your doctor if:    · You have any problems with your medicine or other treatment. Where can you learn more? Go to http://elvis-bernarda.info/  Enter N276 in the search box to learn more about \"Chronic Kidney Disease: Care Instructions. \"  Current as of: August 11, 2019Content Version: 12.4  © 6744-3599 Healthwise, Incorporated. Care instructions adapted under license by [employer] (which disclaims liability or warranty for this information). If you have questions about a medical condition or this instruction, always ask your healthcare professional. [address] any warranty or liability for your use of this information.

## 2020-05-24 NOTE — ED TRIAGE NOTES
Arrives crying laying on triage chair with head on armrest, surgical mask in place, for c/o x few hours of \"I CAN\"T STOP THROWING UP, MY BACK HURTS AND I THINK IM GOING INTO DKA. \"  On insulin pump. Denies taking anything OTC for symptom relief. Pt rocking back and forth in triage putting her head between her knees, unable to understand what pt is saying.

## 2020-05-24 NOTE — ED NOTES
Bedside shift change report given to Gerhard Arboleda (oncoming nurse) by Aubrey Paul RN (offgoing nurse). Report included the following information SBAR.

## 2020-05-24 NOTE — ED PROVIDER NOTES
HPI  Patient is a 33-year-old female with past medical history significant for diabetes mellitus type 1 uncontrolled, noncompliance with frequent hospital admissions, chronic pain, chronic kidney disease, depression, hypertension, heart murmur, HSV infection, peripheral neuropathy, marijuana abuse, tobacco abuse and intractable nausea and vomiting who presents to the ED reporting abrupt onset of intractable nonbloody nausea and vomiting, lower abdominal pain and back pain. She was recently admitted from May 14-19 and [facility] for same complaints. She states that she came to visit her stepfather and her symptoms began again early this morning. She states he dropped her off at the ED to be evaluated and treated. On admission she is requesting pain medication. Denies fever, cough, cold symptoms, headache, neck pain, visual changes, focal weakness or rash. Denies any  difficulty breathing, difficulty swallowing, SOB or chest pain. Denies any urinary symptoms or diarrhea. Pt. Reports that she has not had any food or medications since yesterday. She has an insulin pump and states that her glucose was 135 this morning. Pt states that she recently completed a 7 day course of levaquin for treatment of UTI. Old chart reviewed.       Past Medical History:   Diagnosis Date    Anemia     Chronic pain     CKD (chronic kidney disease)     Depression     Diabetes type 1, uncontrolled (HCC)     DKA, type 1 (HCC)     Essential hypertension     Heart murmur     Herpes simplex virus (HSV) infection 2017    positive in blood    Peripheral neuropathy     Ventricular tachycardia (HCC)        Past Surgical History:   Procedure Laterality Date    ABDOMEN SURGERY PROC UNLISTED      exploratory laparoscopy    HX CYST REMOVAL      groin         Family History:   Problem Relation Age of Onset    No Known Problems Mother     Sleep Apnea Father     Hypertension Father     Diabetes Father     Heart Disease Maternal Grandfather        Social History     Socioeconomic History    Marital status: SINGLE     Spouse name: Not on file    Number of children: Not on file    Years of education: Not on file    Highest education level: Not on file   Occupational History    Not on file   Social Needs    Financial resource strain: Not on file    Food insecurity     Worry: Not on file     Inability: Not on file    Transportation needs     Medical: Not on file     Non-medical: Not on file   Tobacco Use    Smoking status: Former Smoker     Packs/day: 0.25     Years: 8.00     Pack years: 2.00     Types: Cigarettes     Last attempt to quit: 10/26/2014     Years since quittin.5    Smokeless tobacco: Never Used   Substance and Sexual Activity    Alcohol use: No     Alcohol/week: 0.0 standard drinks    Drug use: Yes     Frequency: 7.0 times per week     Types: Marijuana     Comment: \"often\"     Sexual activity: Yes     Partners: Male     Birth control/protection: None   Lifestyle    Physical activity     Days per week: Not on file     Minutes per session: Not on file    Stress: Not on file   Relationships    Social connections     Talks on phone: Not on file     Gets together: Not on file     Attends [employer] service: Not on file     Active member of club or organization: Not on file     Attends meetings of clubs or organizations: Not on file     Relationship status: Not on file    Intimate partner violence     Fear of current or ex partner: Not on file     Emotionally abused: Not on file     Physically abused: Not on file     Forced sexual activity: Not on file   Other Topics Concern     Service Not Asked    Blood Transfusions Not Asked    Caffeine Concern Not Asked    Occupational Exposure Not Asked   Akanksha Beasley Hazards Not Asked    Sleep Concern Not Asked    Stress Concern Not Asked    Weight Concern Not Asked    Special Diet Not Asked    Back Care Not Asked    Exercise Not Asked    Bike Helmet Not Asked    Seat Belt Not Asked    Self-Exams Not Asked   Social History Narrative    Not on file         ALLERGIES: Morphine and Pcn [penicillins]    Review of Systems   Constitutional: Positive for appetite change. Negative for activity change, fever and unexpected weight change. HENT: Negative for congestion, sore throat and trouble swallowing. Eyes: Negative for visual disturbance. Respiratory: Negative for cough and shortness of breath. Cardiovascular: Negative for chest pain, palpitations and leg swelling. Gastrointestinal: Positive for abdominal pain, nausea and vomiting. Genitourinary: Negative for difficulty urinating and dysuria. Musculoskeletal: Positive for back pain. Skin: Negative for rash. Neurological: Negative for dizziness, light-headedness and headaches. All other systems reviewed and are negative. Vitals:    05/24/20 1020   BP: (!) 218/140   Pulse: (!) 104   Resp: 22   Temp: 98.1 °F (36.7 °C)   SpO2: 98%   Weight: 59 kg (130 lb)   Height: 5' 8\" (1.727 m)            Physical Exam  Vitals signs and nursing note reviewed. Constitutional:       General: She is not in acute distress. Appearance: Normal appearance. She is not ill-appearing, toxic-appearing or diaphoretic. Comments: Female; smoker; non compliant IDDM   HENT:      Head: Normocephalic. Right Ear: Tympanic membrane normal.      Left Ear: Tympanic membrane normal.      Nose: Nose normal.      Mouth/Throat:      Mouth: Mucous membranes are moist.      Pharynx: No posterior oropharyngeal erythema. Neck:      Musculoskeletal: Normal range of motion and neck supple. Cardiovascular:      Rate and Rhythm: Normal rate and regular rhythm. Pulmonary:      Effort: Pulmonary effort is normal.      Breath sounds: Normal breath sounds. Abdominal:      Palpations: Abdomen is soft. Tenderness: There is abdominal tenderness. There is no guarding or rebound. Musculoskeletal: Normal range of motion.    Skin: General: Skin is warm and dry. Findings: No rash. Neurological:      General: No focal deficit present. Mental Status: She is alert and oriented to person, place, and time. Psychiatric:         Mood and Affect: Mood normal.         Behavior: Behavior normal.          MDM       Procedures    Patient has been reexamined and is presently resting comfortably. She is tolerating p.o. fluids with encouragement. She was given copies of her labs and CT report. Encouraged to keep track of her medications and to take them as prescribed. Recommend close follow-up with PCP.  1:18 PM  Patient's results and plan of care have been reviewed with her. Patient has verbally conveyed his understanding and agreement of his signs, symptoms, diagnosis, treatment and prognosis and additionally agrees to follow up as recommended or return to the Emergency Room should his condition change prior to follow-up. Discharge instructions have also been provided to the patient with some educational information regarding his diagnosis as well a list of reasons why he would want to return to the ER prior to his follow-up appointment should his condition change. Pilar Thompson NP

## 2020-05-25 ENCOUNTER — PATIENT OUTREACH (OUTPATIENT)
Dept: INTERNAL MEDICINE CLINIC | Age: 30
End: 2020-05-25

## 2020-05-25 NOTE — PROGRESS NOTES
Patient contacted regarding recent discharge and COVID-19 risk. [facility] 20 dx-N&V, abd pain, CRI, HTN Discussed COVID-19 related testing which was not done at this time. Test results were pending. Patient informed of results, if available? Not done    Recent Travel Screening and Travel History documentation     Travel Screening       Question Response     In the last month, have you been in contact with someone who was confirmed or suspected to have Coronavirus / COVID-19? No / Unsure     Do you have any of the following symptoms? None of these     Have you traveled internationally in the last month? No      Travel History   Travel since 20     No documented travel since 20        Spoke to pt who stated she is feeling much better today. She is from [place] and has a PCP there. She has new insurance and has not picked up the prescriptions from the ED. Pt is part of a program in [place] like the [facility] and therefore declined enrollment at this time. Care Transition Nurse/ Ambulatory Care Manager contacted the patient by telephone to perform post discharge assessment. Verified name and  with patient as identifiers. Patient has following risk factors of: diabetes and chronic kidney disease. CTN/ACM reviewed discharge instructions, medical action plan and red flags related to discharge diagnosis. Reviewed and educated them on any new and changed medications related to discharge diagnosis. Advised obtaining a 90-day supply of all daily and as-needed medications. Education provided regarding infection prevention, and signs and symptoms of COVID-19 and when to seek medical attention with patient who verbalized understanding. Discussed exposure protocols and quarantine from [address] you at higher risk for severe illness  and given an opportunity for questions and concerns.  The patient agrees to contact the COVID-19 hotline [phone] or PCP office for questions related to their healthcare. CTN/ACM provided contact information for future reference. From CDC: Are you at higher risk for severe illness?  Wash your hands often.  Avoid close contact (6 feet, which is about two arm lengths) with people who are sick.  Put distance between yourself and other people if COVID-19 is spreading in your community.  Clean and disinfect frequently touched surfaces.  Avoid all cruise travel and non-essential air travel.  Call your healthcare professional if you have concerns about COVID-19 and your underlying condition or if you are sick. For more information on steps you can take to protect yourself, see CDC's How to Protect Yourself      Patient/family/caregiver given information for [facility] and agrees to enroll no  Patient's preferred e-mail:  n/a  Patient's preferred phone number: n/a  Based on Loop alert triggers, patient will be contacted by nurse care manager for worsening symptoms. Plan for follow-up call in 7-14 days based on severity of symptoms and risk factors.

## 2020-06-08 ENCOUNTER — PATIENT OUTREACH (OUTPATIENT)
Dept: INTERNAL MEDICINE CLINIC | Age: 30
End: 2020-06-08

## 2020-06-08 NOTE — PROGRESS NOTES
Patient resolved from Transition of Care episode on 6/8/20 as f/u to [facility] 5/24/20 dx-N&V, abd pain, CRI, HTN    Discussed COVID-19 related testing which was not done at this time. Test results were not done. Patient informed of results, if available? n/a     Patient/family has been provided the following resources and education related to COVID-19:                         Signs, symptoms and red flags related to COVID-19            CDC exposure and quarantine guidelines            [facility] exposure contact - [phone]            Contact for their local Department of Health                 Patient currently reports that the following symptoms have improved:  nausea, vomiting and no new symptoms. No further outreach scheduled with this CTN/ACM/LPN/HC/ MA. Episode of Care resolved. Patient has this CTN/ACM/LPN/HC/MA contact information if future needs arise.

## 2023-01-01 ENCOUNTER — APPOINTMENT (OUTPATIENT)
Dept: CT IMAGING | Age: 33
DRG: 130 | End: 2023-01-01
Attending: STUDENT IN AN ORGANIZED HEALTH CARE EDUCATION/TRAINING PROGRAM

## 2023-01-01 ENCOUNTER — APPOINTMENT (OUTPATIENT)
Dept: NEUROLOGY | Age: 33
DRG: 130 | End: 2023-01-01
Attending: EMERGENCY MEDICINE

## 2023-01-01 ENCOUNTER — APPOINTMENT (OUTPATIENT)
Dept: CT IMAGING | Age: 33
DRG: 130 | End: 2023-01-01
Attending: EMERGENCY MEDICINE

## 2023-01-01 ENCOUNTER — APPOINTMENT (OUTPATIENT)
Dept: GENERAL RADIOLOGY | Age: 33
DRG: 130 | End: 2023-01-01
Attending: EMERGENCY MEDICINE

## 2023-01-01 ENCOUNTER — APPOINTMENT (OUTPATIENT)
Dept: VASCULAR LAB | Age: 33
DRG: 130 | End: 2023-01-01
Attending: STUDENT IN AN ORGANIZED HEALTH CARE EDUCATION/TRAINING PROGRAM

## 2023-01-01 ENCOUNTER — APPOINTMENT (OUTPATIENT)
Dept: NEUROLOGY | Age: 33
DRG: 130 | End: 2023-01-01
Attending: STUDENT IN AN ORGANIZED HEALTH CARE EDUCATION/TRAINING PROGRAM

## 2023-01-01 ENCOUNTER — APPOINTMENT (OUTPATIENT)
Dept: CARDIOLOGY | Age: 33
DRG: 130 | End: 2023-01-01
Attending: STUDENT IN AN ORGANIZED HEALTH CARE EDUCATION/TRAINING PROGRAM

## 2023-01-01 ENCOUNTER — APPOINTMENT (OUTPATIENT)
Dept: DIALYSIS | Age: 33
DRG: 130 | End: 2023-01-01
Attending: INTERNAL MEDICINE

## 2023-01-01 ENCOUNTER — APPOINTMENT (OUTPATIENT)
Dept: MRI IMAGING | Age: 33
DRG: 130 | End: 2023-01-01
Attending: INTERNAL MEDICINE

## 2023-01-01 ENCOUNTER — HOSPITAL ENCOUNTER (INPATIENT)
Age: 33
LOS: 4 days | DRG: 130 | End: 2023-09-02
Attending: EMERGENCY MEDICINE | Admitting: EMERGENCY MEDICINE

## 2023-01-01 ENCOUNTER — APPOINTMENT (OUTPATIENT)
Dept: CT IMAGING | Age: 33
DRG: 130 | End: 2023-01-01

## 2023-01-01 ENCOUNTER — APPOINTMENT (OUTPATIENT)
Dept: ULTRASOUND IMAGING | Age: 33
DRG: 130 | End: 2023-01-01
Attending: STUDENT IN AN ORGANIZED HEALTH CARE EDUCATION/TRAINING PROGRAM

## 2023-01-01 VITALS
WEIGHT: 145.06 LBS | RESPIRATION RATE: 31 BRPM | BODY MASS INDEX: 24.17 KG/M2 | HEIGHT: 65 IN | TEMPERATURE: 95.8 F | OXYGEN SATURATION: 54 % | SYSTOLIC BLOOD PRESSURE: 148 MMHG | DIASTOLIC BLOOD PRESSURE: 91 MMHG

## 2023-01-01 DIAGNOSIS — I46.9 CARDIAC ARREST (CMD): Primary | ICD-10-CM

## 2023-01-01 DIAGNOSIS — N19 RENAL FAILURE, UNSPECIFIED CHRONICITY: ICD-10-CM

## 2023-01-01 LAB
ABO + RH BLD: NORMAL
ALBUMIN SERPL-MCNC: 2.4 G/DL (ref 3.6–5.1)
ALBUMIN SERPL-MCNC: 2.8 G/DL (ref 3.6–5.1)
ALBUMIN SERPL-MCNC: 2.8 G/DL (ref 3.6–5.1)
ALBUMIN SERPL-MCNC: 3 G/DL (ref 3.6–5.1)
ALBUMIN SERPL-MCNC: 3.1 G/DL (ref 3.6–5.1)
ALBUMIN SERPL-MCNC: 3.1 G/DL (ref 3.6–5.1)
ALBUMIN SERPL-MCNC: 3.2 G/DL (ref 3.6–5.1)
ALBUMIN/GLOB SERPL: 0.7 {RATIO} (ref 1–2.4)
ALBUMIN/GLOB SERPL: 0.7 {RATIO} (ref 1–2.4)
ALBUMIN/GLOB SERPL: 0.8 {RATIO} (ref 1–2.4)
ALBUMIN/GLOB SERPL: 0.9 {RATIO} (ref 1–2.4)
ALP SERPL-CCNC: 148 UNITS/L (ref 45–117)
ALP SERPL-CCNC: 153 UNITS/L (ref 45–117)
ALP SERPL-CCNC: 158 UNITS/L (ref 45–117)
ALP SERPL-CCNC: 175 UNITS/L (ref 45–117)
ALP SERPL-CCNC: 175 UNITS/L (ref 45–117)
ALP SERPL-CCNC: 181 UNITS/L (ref 45–117)
ALP SERPL-CCNC: 182 UNITS/L (ref 45–117)
ALT SERPL-CCNC: 111 UNITS/L
ALT SERPL-CCNC: 180 UNITS/L
ALT SERPL-CCNC: 246 UNITS/L
ALT SERPL-CCNC: 318 UNITS/L
ALT SERPL-CCNC: 430 UNITS/L
ALT SERPL-CCNC: 493 UNITS/L
ALT SERPL-CCNC: 496 UNITS/L
AMMONIA PLAS-SCNC: 25 MCMOL/L
AMPHETAMINES UR QL SCN>500 NG/ML: NEGATIVE
ANION GAP SERPL CALC-SCNC: 10 MMOL/L (ref 7–19)
ANION GAP SERPL CALC-SCNC: 11 MMOL/L (ref 7–19)
ANION GAP SERPL CALC-SCNC: 13 MMOL/L (ref 7–19)
ANION GAP SERPL CALC-SCNC: 13 MMOL/L (ref 7–19)
ANION GAP SERPL CALC-SCNC: 15 MMOL/L (ref 7–19)
ANION GAP SERPL CALC-SCNC: 24 MMOL/L (ref 7–19)
ANION GAP SERPL CALC-SCNC: 25 MMOL/L (ref 7–19)
ANION GAP SERPL CALC-SCNC: 26 MMOL/L (ref 7–19)
ANION GAP SERPL CALC-SCNC: 28 MMOL/L (ref 7–19)
ANION GAP SERPL CALC-SCNC: 9 MMOL/L (ref 7–19)
AORTIC VALVE AREA (AVA): 0.97
AORTIC VALVE AREA: 2.34
APPEARANCE UR: ABNORMAL
APTT PPP: 58 SEC (ref 22–30)
ASCENDING AORTA (AAD): 8
AST SERPL-CCNC: 105 UNITS/L
AST SERPL-CCNC: 1356 UNITS/L
AST SERPL-CCNC: 1502 UNITS/L
AST SERPL-CCNC: 1690 UNITS/L
AST SERPL-CCNC: 232 UNITS/L
AST SERPL-CCNC: 445 UNITS/L
AST SERPL-CCNC: 713 UNITS/L
ATRIAL RATE (BPM): 51
AV MEAN GRADIENT (AVMG): 5
AV MEAN VELOCITY (AVMV): 0.99
AV PEAK GRADIENT (AVPG): 11
AV PEAK VELOCITY (AVPV): 1.69
AV STENOSIS SEVERITY TEXT: NORMAL
AVI LVOT PEAK GRADIENT (LVOTMG): 1
B-OH-BUTYR SERPL-SCNC: 3.1 MMOL/L (ref 0–0.3)
BACTERIA #/AREA URNS HPF: ABNORMAL /HPF
BACTERIA UR CULT: ABNORMAL
BARBITURATES UR QL SCN>200 NG/ML: NEGATIVE
BASE DEFICIT BLDA-SCNC: -1 MMOL/L (ref -2–3)
BASE DEFICIT BLDA-SCNC: -1 MMOL/L (ref -2–3)
BASE DEFICIT BLDA-SCNC: -16 MMOL/L (ref -2–3)
BASE DEFICIT BLDA-SCNC: -3 MMOL/L (ref -2–3)
BASE DEFICIT BLDA-SCNC: -3 MMOL/L (ref -2–3)
BASE DEFICIT BLDA-SCNC: -4 MMOL/L (ref -2–3)
BASE DEFICIT BLDA-SCNC: -9 MMOL/L (ref -2–3)
BASE DEFICIT BLDA-SCNC: 0 MMOL/L (ref -2–3)
BASE DEFICIT BLDV-SCNC: -18 MMOL/L (ref -2–2)
BASE EXCESS BLDV CALC-SCNC: ABNORMAL MMOL/L
BASOPHILS # BLD: 0 K/MCL (ref 0–0.3)
BASOPHILS # BLD: 0.1 K/MCL (ref 0–0.3)
BASOPHILS # BLD: 0.1 K/MCL (ref 0–0.3)
BASOPHILS NFR BLD: 0 %
BASOPHILS NFR BLD: 1 %
BENZODIAZ UR QL SCN>200 NG/ML: NEGATIVE
BILIRUB SERPL-MCNC: 0.6 MG/DL (ref 0.2–1)
BILIRUB SERPL-MCNC: 0.6 MG/DL (ref 0.2–1)
BILIRUB SERPL-MCNC: 0.7 MG/DL (ref 0.2–1)
BILIRUB SERPL-MCNC: 0.7 MG/DL (ref 0.2–1)
BILIRUB SERPL-MCNC: 0.8 MG/DL (ref 0.2–1)
BILIRUB SERPL-MCNC: 0.9 MG/DL (ref 0.2–1)
BILIRUB SERPL-MCNC: 0.9 MG/DL (ref 0.2–1)
BILIRUB UR QL STRIP: NEGATIVE
BLD GP AB SCN SERPL QL GEL: NEGATIVE
BUN SERPL-MCNC: 25 MG/DL (ref 6–20)
BUN SERPL-MCNC: 27 MG/DL (ref 6–20)
BUN SERPL-MCNC: 27 MG/DL (ref 6–20)
BUN SERPL-MCNC: 29 MG/DL (ref 6–20)
BUN SERPL-MCNC: 38 MG/DL (ref 6–20)
BUN SERPL-MCNC: 39 MG/DL (ref 6–20)
BUN SERPL-MCNC: 54 MG/DL (ref 6–20)
BUN SERPL-MCNC: 56 MG/DL (ref 6–20)
BUN SERPL-MCNC: 59 MG/DL (ref 6–20)
BUN SERPL-MCNC: 61 MG/DL (ref 6–20)
BUN/CREAT SERPL: 7 (ref 7–25)
BUN/CREAT SERPL: 8 (ref 7–25)
BUN/CREAT SERPL: 9 (ref 7–25)
BURR CELLS BLD QL SMEAR: ABNORMAL
BZE UR QL SCN>150 NG/ML: NEGATIVE
CA-I ADJ PH7.4 SERPL-SCNC: 1 MMOL/L (ref 1.15–1.29)
CA-I ADJ PH7.4 SERPL-SCNC: 1.11 MMOL/L (ref 1.15–1.29)
CA-I ADJ PH7.4 SERPL-SCNC: 1.13 MMOL/L (ref 1.15–1.29)
CA-I ADJ PH7.4 SERPL-SCNC: 1.15 MMOL/L (ref 1.15–1.29)
CA-I BLD-SCNC: 1.1 MMOL/L (ref 1.15–1.29)
CA-I SERPL ISE-SCNC: 1.09 MMOL/L (ref 1.15–1.29)
CA-I SERPL ISE-SCNC: 1.09 MMOL/L (ref 1.15–1.29)
CA-I SERPL ISE-SCNC: 1.1 MMOL/L (ref 1.15–1.29)
CA-I SERPL ISE-SCNC: 1.11 MMOL/L (ref 1.15–1.29)
CALCIUM SERPL-MCNC: 7.7 MG/DL (ref 8.4–10.2)
CALCIUM SERPL-MCNC: 7.8 MG/DL (ref 8.4–10.2)
CALCIUM SERPL-MCNC: 8.1 MG/DL (ref 8.4–10.2)
CALCIUM SERPL-MCNC: 8.2 MG/DL (ref 8.4–10.2)
CALCIUM SERPL-MCNC: 8.2 MG/DL (ref 8.4–10.2)
CALCIUM SERPL-MCNC: 8.3 MG/DL (ref 8.4–10.2)
CALCIUM SERPL-MCNC: 8.4 MG/DL (ref 8.4–10.2)
CALCIUM SERPL-MCNC: 8.4 MG/DL (ref 8.4–10.2)
CALCIUM SERPL-MCNC: 8.5 MG/DL (ref 8.4–10.2)
CALCIUM SERPL-MCNC: 8.5 MG/DL (ref 8.4–10.2)
CANNABINOIDS UR QL SCN>50 NG/ML: NEGATIVE
CHLORIDE SERPL-SCNC: 100 MMOL/L (ref 97–110)
CHLORIDE SERPL-SCNC: 100 MMOL/L (ref 97–110)
CHLORIDE SERPL-SCNC: 101 MMOL/L (ref 97–110)
CHLORIDE SERPL-SCNC: 102 MMOL/L (ref 97–110)
CHLORIDE SERPL-SCNC: 102 MMOL/L (ref 97–110)
CHLORIDE SERPL-SCNC: 104 MMOL/L (ref 97–110)
CHLORIDE SERPL-SCNC: 106 MMOL/L (ref 97–110)
CHLORIDE SERPL-SCNC: 106 MMOL/L (ref 97–110)
CHLORIDE SERPL-SCNC: 107 MMOL/L (ref 97–110)
CHLORIDE SERPL-SCNC: 107 MMOL/L (ref 97–110)
CO2 BLD-SCNC: ABNORMAL MMOL/L
CO2 SERPL-SCNC: 10 MMOL/L (ref 21–32)
CO2 SERPL-SCNC: 10 MMOL/L (ref 21–32)
CO2 SERPL-SCNC: 16 MMOL/L (ref 21–32)
CO2 SERPL-SCNC: 20 MMOL/L (ref 21–32)
CO2 SERPL-SCNC: 22 MMOL/L (ref 21–32)
CO2 SERPL-SCNC: 22 MMOL/L (ref 21–32)
CO2 SERPL-SCNC: 25 MMOL/L (ref 21–32)
CO2 SERPL-SCNC: 25 MMOL/L (ref 21–32)
CO2 SERPL-SCNC: 27 MMOL/L (ref 21–32)
CO2 SERPL-SCNC: 9 MMOL/L (ref 21–32)
COLOR UR: YELLOW
CREAT SERPL-MCNC: 2.86 MG/DL (ref 0.51–0.95)
CREAT SERPL-MCNC: 2.96 MG/DL (ref 0.51–0.95)
CREAT SERPL-MCNC: 3.22 MG/DL (ref 0.51–0.95)
CREAT SERPL-MCNC: 3.38 MG/DL (ref 0.51–0.95)
CREAT SERPL-MCNC: 4.14 MG/DL (ref 0.51–0.95)
CREAT SERPL-MCNC: 4.73 MG/DL (ref 0.51–0.95)
CREAT SERPL-MCNC: 7.51 MG/DL (ref 0.51–0.95)
CREAT SERPL-MCNC: 8.01 MG/DL (ref 0.51–0.95)
CREAT SERPL-MCNC: 8.02 MG/DL (ref 0.51–0.95)
CREAT SERPL-MCNC: 8.07 MG/DL (ref 0.51–0.95)
CREAT SERPL-MCNC: 8.6 MG/DL (ref 0.51–0.95)
DEPRECATED RDW RBC: 58 FL (ref 39–50)
DEPRECATED RDW RBC: 59.7 FL (ref 39–50)
DEPRECATED RDW RBC: 63.1 FL (ref 39–50)
DEPRECATED RDW RBC: 63.7 FL (ref 39–50)
DEPRECATED RDW RBC: 63.7 FL (ref 39–50)
DEPRECATED RDW RBC: 66.9 FL (ref 39–50)
E WAVE DECELARATION TIME (MDT): 16.19
EGFRCR SERPLBLD CKD-EPI 2021: 12 ML/MIN/{1.73_M2}
EGFRCR SERPLBLD CKD-EPI 2021: 14 ML/MIN/{1.73_M2}
EGFRCR SERPLBLD CKD-EPI 2021: 17 ML/MIN/{1.73_M2}
EGFRCR SERPLBLD CKD-EPI 2021: 19 ML/MIN/{1.73_M2}
EGFRCR SERPLBLD CKD-EPI 2021: 20 ML/MIN/{1.73_M2}
EGFRCR SERPLBLD CKD-EPI 2021: 21 ML/MIN/{1.73_M2}
EGFRCR SERPLBLD CKD-EPI 2021: 6 ML/MIN/{1.73_M2}
EGFRCR SERPLBLD CKD-EPI 2021: 7 ML/MIN/{1.73_M2}
EOSINOPHIL # BLD: 0 K/MCL (ref 0–0.5)
EOSINOPHIL # BLD: 0 K/MCL (ref 0–0.5)
EOSINOPHIL # BLD: 0.1 K/MCL (ref 0–0.5)
EOSINOPHIL # BLD: 0.1 K/MCL (ref 0–0.5)
EOSINOPHIL # BLD: 0.3 K/MCL (ref 0–0.5)
EOSINOPHIL # BLD: 4.1 K/MCL (ref 0–0.5)
EOSINOPHIL NFR BLD: 0 %
EOSINOPHIL NFR BLD: 0 %
EOSINOPHIL NFR BLD: 1 %
EOSINOPHIL NFR BLD: 1 %
EOSINOPHIL NFR BLD: 2 %
EOSINOPHIL NFR BLD: 26 %
ERYTHROCYTE [DISTWIDTH] IN BLOOD: 18.1 % (ref 11–15)
ERYTHROCYTE [DISTWIDTH] IN BLOOD: 18.2 % (ref 11–15)
ERYTHROCYTE [DISTWIDTH] IN BLOOD: 18.2 % (ref 11–15)
ERYTHROCYTE [DISTWIDTH] IN BLOOD: 18.3 % (ref 11–15)
ERYTHROCYTE [DISTWIDTH] IN BLOOD: 19.1 % (ref 11–15)
ERYTHROCYTE [DISTWIDTH] IN BLOOD: 19.6 % (ref 11–15)
FASTING DURATION TIME PATIENT: ABNORMAL H
FENTANYL UR QL SCN: NEGATIVE
FENTANYL UR QL SCN: NEGATIVE
FIO2 ON VENT: 30 %
FIO2 ON VENT: 40 %
GLOBULIN SER-MCNC: 3.2 G/DL (ref 2–4)
GLOBULIN SER-MCNC: 3.2 G/DL (ref 2–4)
GLOBULIN SER-MCNC: 3.4 G/DL (ref 2–4)
GLOBULIN SER-MCNC: 3.5 G/DL (ref 2–4)
GLOBULIN SER-MCNC: 4.3 G/DL (ref 2–4)
GLUCOSE BLDC GLUCOMTR-MCNC: 100 MG/DL (ref 70–99)
GLUCOSE BLDC GLUCOMTR-MCNC: 105 MG/DL (ref 70–99)
GLUCOSE BLDC GLUCOMTR-MCNC: 106 MG/DL (ref 70–99)
GLUCOSE BLDC GLUCOMTR-MCNC: 118 MG/DL (ref 70–99)
GLUCOSE BLDC GLUCOMTR-MCNC: 119 MG/DL (ref 70–99)
GLUCOSE BLDC GLUCOMTR-MCNC: 123 MG/DL (ref 70–99)
GLUCOSE BLDC GLUCOMTR-MCNC: 126 MG/DL (ref 70–99)
GLUCOSE BLDC GLUCOMTR-MCNC: 135 MG/DL (ref 70–99)
GLUCOSE BLDC GLUCOMTR-MCNC: 146 MG/DL (ref 70–99)
GLUCOSE BLDC GLUCOMTR-MCNC: 150 MG/DL (ref 70–99)
GLUCOSE BLDC GLUCOMTR-MCNC: 157 MG/DL (ref 70–99)
GLUCOSE BLDC GLUCOMTR-MCNC: 174 MG/DL (ref 70–99)
GLUCOSE BLDC GLUCOMTR-MCNC: 176 MG/DL (ref 70–99)
GLUCOSE BLDC GLUCOMTR-MCNC: 184 MG/DL (ref 70–99)
GLUCOSE BLDC GLUCOMTR-MCNC: 200 MG/DL (ref 70–99)
GLUCOSE BLDC GLUCOMTR-MCNC: 207 MG/DL (ref 70–99)
GLUCOSE BLDC GLUCOMTR-MCNC: 213 MG/DL (ref 70–99)
GLUCOSE BLDC GLUCOMTR-MCNC: 219 MG/DL (ref 70–99)
GLUCOSE BLDC GLUCOMTR-MCNC: 222 MG/DL (ref 70–99)
GLUCOSE BLDC GLUCOMTR-MCNC: 247 MG/DL (ref 70–99)
GLUCOSE BLDC GLUCOMTR-MCNC: 281 MG/DL (ref 70–99)
GLUCOSE BLDC GLUCOMTR-MCNC: 305 MG/DL (ref 70–99)
GLUCOSE BLDC GLUCOMTR-MCNC: 307 MG/DL (ref 70–99)
GLUCOSE BLDC GLUCOMTR-MCNC: 316 MG/DL (ref 70–99)
GLUCOSE BLDC GLUCOMTR-MCNC: 335 MG/DL (ref 70–99)
GLUCOSE BLDC GLUCOMTR-MCNC: 357 MG/DL (ref 70–99)
GLUCOSE BLDC GLUCOMTR-MCNC: 59 MG/DL (ref 70–99)
GLUCOSE BLDC GLUCOMTR-MCNC: 65 MG/DL (ref 70–99)
GLUCOSE BLDC GLUCOMTR-MCNC: 65 MG/DL (ref 70–99)
GLUCOSE BLDC GLUCOMTR-MCNC: 66 MG/DL (ref 70–99)
GLUCOSE BLDC GLUCOMTR-MCNC: 71 MG/DL (ref 70–99)
GLUCOSE BLDC GLUCOMTR-MCNC: 72 MG/DL (ref 70–99)
GLUCOSE BLDC GLUCOMTR-MCNC: 78 MG/DL (ref 70–99)
GLUCOSE BLDC GLUCOMTR-MCNC: 82 MG/DL (ref 70–99)
GLUCOSE BLDC GLUCOMTR-MCNC: 83 MG/DL (ref 70–99)
GLUCOSE BLDC GLUCOMTR-MCNC: 90 MG/DL (ref 70–99)
GLUCOSE BLDC GLUCOMTR-MCNC: 93 MG/DL (ref 70–99)
GLUCOSE SERPL-MCNC: 109 MG/DL (ref 70–99)
GLUCOSE SERPL-MCNC: 165 MG/DL (ref 70–99)
GLUCOSE SERPL-MCNC: 179 MG/DL (ref 70–99)
GLUCOSE SERPL-MCNC: 238 MG/DL (ref 70–99)
GLUCOSE SERPL-MCNC: 269 MG/DL (ref 70–99)
GLUCOSE SERPL-MCNC: 315 MG/DL (ref 70–99)
GLUCOSE SERPL-MCNC: 324 MG/DL (ref 70–99)
GLUCOSE SERPL-MCNC: 342 MG/DL (ref 70–99)
GLUCOSE SERPL-MCNC: 67 MG/DL (ref 70–99)
GLUCOSE SERPL-MCNC: 92 MG/DL (ref 70–99)
GLUCOSE UR STRIP-MCNC: 300 MG/DL
HBV CORE IGG+IGM SER QL: NEGATIVE
HBV SURFACE AB SER-ACNC: 15.79 MUNITS/ML
HBV SURFACE AG SER QL: NEGATIVE
HCO3 BLDA-SCNC: 10 MMOL/L (ref 22–28)
HCO3 BLDA-SCNC: 15 MMOL/L (ref 22–28)
HCO3 BLDA-SCNC: 19 MMOL/L (ref 22–28)
HCO3 BLDA-SCNC: 21 MMOL/L (ref 22–28)
HCO3 BLDA-SCNC: 21 MMOL/L (ref 22–28)
HCO3 BLDA-SCNC: 22 MMOL/L (ref 22–28)
HCO3 BLDA-SCNC: 22 MMOL/L (ref 22–28)
HCO3 BLDA-SCNC: 23 MMOL/L (ref 22–28)
HCO3 BLDV-SCNC: 10 MMOL/L (ref 22–28)
HCO3 BLDV-SCNC: ABNORMAL MMOL/L
HCT VFR BLD CALC: 25.5 % (ref 36–46.5)
HCT VFR BLD CALC: 25.7 % (ref 36–46.5)
HCT VFR BLD CALC: 26.3 % (ref 36–46.5)
HCT VFR BLD CALC: 30.4 % (ref 36–46.5)
HCT VFR BLD CALC: 31.6 % (ref 36–46.5)
HCT VFR BLD CALC: 38 % (ref 36–46.5)
HCT VFR BLD CALC: 40 % (ref 36–46.5)
HGB BLD-MCNC: 10.7 G/DL (ref 12–15.5)
HGB BLD-MCNC: 7.9 G/DL (ref 12–15.5)
HGB BLD-MCNC: 8.1 G/DL (ref 12–15.5)
HGB BLD-MCNC: 8.4 G/DL (ref 12–15.5)
HGB BLD-MCNC: 9.2 G/DL (ref 12–15.5)
HGB BLD-MCNC: 9.3 G/DL (ref 12–15.5)
HGB BLDA-MCNC: 8.3 G/DL (ref 12–15.5)
HGB BLDA-MCNC: 8.4 G/DL (ref 12–15.5)
HGB BLDA-MCNC: 8.4 G/DL (ref 12–15.5)
HGB BLDA-MCNC: 8.6 G/DL (ref 12–15.5)
HGB BLDA-MCNC: 8.7 G/DL (ref 12–15.5)
HGB BLDA-MCNC: 8.7 G/DL (ref 12–15.5)
HGB BLDA-MCNC: 8.9 G/DL (ref 12–15.5)
HGB BLDA-MCNC: 9.3 G/DL (ref 12–15.5)
HGB BLDA-MCNC: 9.7 G/DL (ref 12–15.5)
HGB UR QL STRIP: ABNORMAL
HYALINE CASTS #/AREA URNS LPF: ABNORMAL /LPF
IMM GRANULOCYTES # BLD AUTO: 0 K/MCL (ref 0–0.2)
IMM GRANULOCYTES # BLD AUTO: 0.1 K/MCL (ref 0–0.2)
IMM GRANULOCYTES # BLD AUTO: 0.2 K/MCL (ref 0–0.2)
IMM GRANULOCYTES # BLD: 0 %
IMM GRANULOCYTES # BLD: 1 %
IMM GRANULOCYTES # BLD: 2 %
INR PPP: 1.6
INTERVENTRICULAR SEPTUM IN END DIASTOLE (IVSD): 2.48
IRON SATN MFR SERPL: 55 % (ref 15–45)
IRON SERPL-MCNC: 41 MCG/DL (ref 50–170)
KETONES UR STRIP-MCNC: NEGATIVE MG/DL
LACTATE BLDV-SCNC: 1.7 MMOL/L (ref 0–2)
LACTATE BLDV-SCNC: 3 MMOL/L (ref 0–2)
LACTATE BLDV-SCNC: 3.2 MMOL/L (ref 0–2)
LACTATE BLDV-SCNC: 3.3 MMOL/L (ref 0–2)
LACTATE BLDV-SCNC: 3.8 MMOL/L (ref 0–2)
LACTATE BLDV-SCNC: 3.9 MMOL/L (ref 0–2)
LACTATE BLDV-SCNC: 4 MMOL/L (ref 0–2)
LACTATE BLDV-SCNC: 4.4 MMOL/L (ref 0–2)
LACTATE BLDV-SCNC: 6.1 MMOL/L (ref 0–2)
LACTATE BLDV-SCNC: 8.2 MMOL/L (ref 0–2)
LACTATE BLDV-SCNC: 8.9 MMOL/L (ref 0–2)
LEFT INTERNAL DIMENSION IN SYSTOLE (LVSD): 1.1
LEFT VENTRICULAR INTERNAL DIMENSION IN DIASTOLE (LVDD): 2.7
LEFT VENTRICULAR POSTERIOR WALL IN END DIASTOLE (LVPW): 4.5
LEUKOCYTE ESTERASE UR QL STRIP: ABNORMAL
LIPASE SERPL-CCNC: 18 UNITS/L (ref 15–77)
LIPASE SERPL-CCNC: 8 UNITS/L (ref 15–77)
LV EF: NORMAL %
LVOT 2D (LVOTD): 31.8
LVOT VTI (LVOTVTI): 1.28
LYMPHOCYTES # BLD: 0.3 K/MCL (ref 1–4.8)
LYMPHOCYTES # BLD: 0.4 K/MCL (ref 1–4.8)
LYMPHOCYTES # BLD: 0.7 K/MCL (ref 1–4.8)
LYMPHOCYTES # BLD: 0.8 K/MCL (ref 1–4.8)
LYMPHOCYTES # BLD: 0.8 K/MCL (ref 1–4.8)
LYMPHOCYTES # BLD: 1.1 K/MCL (ref 1–4.8)
LYMPHOCYTES NFR BLD: 14 %
LYMPHOCYTES NFR BLD: 15 %
LYMPHOCYTES NFR BLD: 2 %
LYMPHOCYTES NFR BLD: 4 %
LYMPHOCYTES NFR BLD: 6 %
LYMPHOCYTES NFR BLD: 9 %
MAGNESIUM SERPL-MCNC: 2.1 MG/DL (ref 1.7–2.4)
MAGNESIUM SERPL-MCNC: 2.3 MG/DL (ref 1.7–2.4)
MAGNESIUM SERPL-MCNC: 2.4 MG/DL (ref 1.7–2.4)
MAGNESIUM SERPL-MCNC: 2.5 MG/DL (ref 1.7–2.4)
MAGNESIUM SERPL-MCNC: 2.7 MG/DL (ref 1.7–2.4)
MAGNESIUM SERPL-MCNC: 2.8 MG/DL (ref 1.7–2.4)
MAGNESIUM SERPL-MCNC: 2.9 MG/DL (ref 1.7–2.4)
MAGNESIUM SERPL-MCNC: 3 MG/DL (ref 1.7–2.4)
MCH RBC QN AUTO: 27.6 PG (ref 26–34)
MCH RBC QN AUTO: 27.6 PG (ref 26–34)
MCH RBC QN AUTO: 27.8 PG (ref 26–34)
MCH RBC QN AUTO: 27.9 PG (ref 26–34)
MCH RBC QN AUTO: 28.1 PG (ref 26–34)
MCH RBC QN AUTO: 28.6 PG (ref 26–34)
MCHC RBC AUTO-ENTMCNC: 28.2 G/DL (ref 32–36.5)
MCHC RBC AUTO-ENTMCNC: 29.4 G/DL (ref 32–36.5)
MCHC RBC AUTO-ENTMCNC: 30.3 G/DL (ref 32–36.5)
MCHC RBC AUTO-ENTMCNC: 30.7 G/DL (ref 32–36.5)
MCHC RBC AUTO-ENTMCNC: 31.8 G/DL (ref 32–36.5)
MCHC RBC AUTO-ENTMCNC: 31.9 G/DL (ref 32–36.5)
MCV RBC AUTO: 87 FL (ref 78–100)
MCV RBC AUTO: 87.4 FL (ref 78–100)
MCV RBC AUTO: 89.9 FL (ref 78–100)
MCV RBC AUTO: 94.4 FL (ref 78–100)
MCV RBC AUTO: 94.6 FL (ref 78–100)
MCV RBC AUTO: 99.7 FL (ref 78–100)
MONOCYTES # BLD: 0.2 K/MCL (ref 0.3–0.9)
MONOCYTES # BLD: 0.4 K/MCL (ref 0.3–0.9)
MONOCYTES # BLD: 0.5 K/MCL (ref 0.3–0.9)
MONOCYTES # BLD: 0.7 K/MCL (ref 0.3–0.9)
MONOCYTES # BLD: 1.4 K/MCL (ref 0.3–0.9)
MONOCYTES # BLD: 1.5 K/MCL (ref 0.3–0.9)
MONOCYTES NFR BLD: 10 %
MONOCYTES NFR BLD: 10 %
MONOCYTES NFR BLD: 3 %
MONOCYTES NFR BLD: 6 %
MONOCYTES NFR BLD: 6 %
MONOCYTES NFR BLD: 8 %
MRSA DNA SPEC QL NAA+PROBE: DETECTED
MV E TISSUE VEL LAT (MELV): 2.51
MV E TISSUE VEL MED (MESV): 9.14
MV E WAVE VEL/E TISSUE VEL MED(MSR): 5.98
MV PEAK A VELOCITY (MVPAV): 211
MV PEAK E VELOCITY (MVPEV): 0.99
NEUTROPHILS # BLD: 10.3 K/MCL (ref 1.8–7.7)
NEUTROPHILS # BLD: 11.2 K/MCL (ref 1.8–7.7)
NEUTROPHILS # BLD: 4.2 K/MCL (ref 1.8–7.7)
NEUTROPHILS # BLD: 5.4 K/MCL (ref 1.8–7.7)
NEUTROPHILS # BLD: 7 K/MCL (ref 1.8–7.7)
NEUTROPHILS # BLD: 9.4 K/MCL (ref 1.8–7.7)
NEUTROPHILS NFR BLD: 61 %
NEUTROPHILS NFR BLD: 77 %
NEUTROPHILS NFR BLD: 80 %
NEUTROPHILS NFR BLD: 83 %
NEUTROPHILS NFR BLD: 88 %
NEUTS BAND NFR BLD: 3 % (ref 0–10)
NEUTS SEG NFR BLD: 79 %
NITRITE UR QL STRIP: NEGATIVE
NRBC BLD MANUAL-RTO: 0 /100 WBC
NT-PROBNP SERPL-MCNC: ABNORMAL PG/ML
OPIATES UR QL SCN>300 NG/ML: NEGATIVE
OXYHGB MFR BLDA: 92 % (ref 94–98)
OXYHGB MFR BLDA: 94 % (ref 94–98)
OXYHGB MFR BLDA: 94 % (ref 94–98)
OXYHGB MFR BLDA: 96 % (ref 94–98)
OXYHGB MFR BLDA: 97 % (ref 94–98)
OXYHGB MFR BLDV: 89 % (ref 60–80)
P AXIS (DEGREES): 21
PATH REV BLD -IMP: YES
PATH REV: ABNORMAL
PCO2 BLDA: 26 MM HG (ref 32–45)
PCO2 BLDA: 28 MM HG (ref 32–45)
PCO2 BLDA: 30 MM HG (ref 32–45)
PCO2 BLDA: 30 MM HG (ref 32–45)
PCO2 BLDA: 31 MM HG (ref 32–45)
PCO2 BLDA: 33 MM HG (ref 32–45)
PCO2 BLDA: 34 MM HG (ref 32–45)
PCO2 BLDA: 37 MM HG (ref 32–45)
PCO2 BLDV: 29 MM HG (ref 38–51)
PCO2 BLDV: >90 MM HG (ref 38–51)
PCP UR QL SCN>25 NG/ML: NEGATIVE
PH BLDA: 7.22 UNITS (ref 7.35–7.45)
PH BLDA: 7.36 UNITS (ref 7.35–7.45)
PH BLDA: 7.38 UNITS (ref 7.35–7.45)
PH BLDA: 7.42 UNITS (ref 7.35–7.45)
PH BLDA: 7.43 UNITS (ref 7.35–7.45)
PH BLDA: 7.43 UNITS (ref 7.35–7.45)
PH BLDA: 7.45 UNITS (ref 7.35–7.45)
PH BLDA: 7.49 UNITS (ref 7.35–7.45)
PH BLDV: 6.86 UNITS (ref 7.35–7.45)
PH BLDV: 7.15 UNITS (ref 7.35–7.45)
PH UR STRIP: 7 [PH] (ref 5–7)
PHOSPHATE SERPL-MCNC: 1.8 MG/DL (ref 2.4–4.7)
PHOSPHATE SERPL-MCNC: 2.8 MG/DL (ref 2.4–4.7)
PHOSPHATE SERPL-MCNC: 3.4 MG/DL (ref 2.4–4.7)
PHOSPHATE SERPL-MCNC: 3.5 MG/DL (ref 2.4–4.7)
PHOSPHATE SERPL-MCNC: 7 MG/DL (ref 2.4–4.7)
PHOSPHATE SERPL-MCNC: 7.8 MG/DL (ref 2.4–4.7)
PHOSPHATE SERPL-MCNC: 8.2 MG/DL (ref 2.4–4.7)
PLAT MORPH BLD: NORMAL
PLATELET # BLD AUTO: 101 K/MCL (ref 140–450)
PLATELET # BLD AUTO: 123 K/MCL (ref 140–450)
PLATELET # BLD AUTO: 135 K/MCL (ref 140–450)
PLATELET # BLD AUTO: 202 K/MCL (ref 140–450)
PLATELET # BLD AUTO: 211 K/MCL (ref 140–450)
PLATELET # BLD AUTO: 218 K/MCL (ref 140–450)
PO2 BLDA: 100 MM HG (ref 83–108)
PO2 BLDA: 109 MM HG (ref 83–108)
PO2 BLDA: 131 MM HG (ref 83–108)
PO2 BLDA: 132 MM HG (ref 83–108)
PO2 BLDA: 160 MM HG (ref 83–108)
PO2 BLDA: 176 MM HG (ref 83–108)
PO2 BLDA: 72 MM HG (ref 83–108)
PO2 BLDA: 90 MM HG (ref 83–108)
PO2 BLDV: 62 MM HG (ref 35–42)
PO2 BLDV: 82 MM HG (ref 35–42)
POTASSIUM BLD-SCNC: 5.2 MMOL/L (ref 3.4–5.1)
POTASSIUM SERPL-SCNC: 3.8 MMOL/L (ref 3.4–5.1)
POTASSIUM SERPL-SCNC: 4.2 MMOL/L (ref 3.4–5.1)
POTASSIUM SERPL-SCNC: 4.5 MMOL/L (ref 3.4–5.1)
POTASSIUM SERPL-SCNC: 4.5 MMOL/L (ref 3.4–5.1)
POTASSIUM SERPL-SCNC: 4.9 MMOL/L (ref 3.4–5.1)
POTASSIUM SERPL-SCNC: 4.9 MMOL/L (ref 3.4–5.1)
POTASSIUM SERPL-SCNC: 5.2 MMOL/L (ref 3.4–5.1)
POTASSIUM SERPL-SCNC: 5.3 MMOL/L (ref 3.4–5.1)
PR-INTERVAL (MSEC): 152
PROT SERPL-MCNC: 5.8 G/DL (ref 6.4–8.2)
PROT SERPL-MCNC: 6 G/DL (ref 6.4–8.2)
PROT SERPL-MCNC: 6.2 G/DL (ref 6.4–8.2)
PROT SERPL-MCNC: 6.2 G/DL (ref 6.4–8.2)
PROT SERPL-MCNC: 6.5 G/DL (ref 6.4–8.2)
PROT SERPL-MCNC: 6.6 G/DL (ref 6.4–8.2)
PROT SERPL-MCNC: 7.5 G/DL (ref 6.4–8.2)
PROT UR STRIP-MCNC: 300 MG/DL
PROTHROMBIN TIME: 15.9 SEC (ref 9.7–11.8)
QRS-INTERVAL (MSEC): 107
QT-INTERVAL (MSEC): 545
QTC: 503
R AXIS (DEGREES): 68
RAINBOW EXTRA TUBES HOLD SPECIMEN: NORMAL
RBC # BLD: 2.86 MIL/MCL (ref 4–5.2)
RBC # BLD: 2.93 MIL/MCL (ref 4–5.2)
RBC # BLD: 3.01 MIL/MCL (ref 4–5.2)
RBC # BLD: 3.22 MIL/MCL (ref 4–5.2)
RBC # BLD: 3.34 MIL/MCL (ref 4–5.2)
RBC # BLD: 3.81 MIL/MCL (ref 4–5.2)
RBC #/AREA URNS HPF: ABNORMAL /HPF
REPORT TEXT: NORMAL
RV END SYSTOLIC LONGITUDINAL STRAIN FREE WALL (RVGS): 2
SAO2 % BLDA: 94 % (ref 95–99)
SAO2 % BLDA: 96 % (ref 95–99)
SAO2 % BLDA: 98 % (ref 95–99)
SAO2 % BLDA: 98 % (ref 95–99)
SAO2 % BLDA: 99 % (ref 95–99)
SAO2 % BLDV: 92 % (ref 60–80)
SAO2 % BLDV: ABNORMAL %
SODIUM BLD-SCNC: 136 MMOL/L (ref 135–145)
SODIUM SERPL-SCNC: 132 MMOL/L (ref 135–145)
SODIUM SERPL-SCNC: 133 MMOL/L (ref 135–145)
SODIUM SERPL-SCNC: 133 MMOL/L (ref 135–145)
SODIUM SERPL-SCNC: 134 MMOL/L (ref 135–145)
SODIUM SERPL-SCNC: 135 MMOL/L (ref 135–145)
SODIUM SERPL-SCNC: 135 MMOL/L (ref 135–145)
SODIUM SERPL-SCNC: 136 MMOL/L (ref 135–145)
SODIUM SERPL-SCNC: 136 MMOL/L (ref 135–145)
SODIUM SERPL-SCNC: 137 MMOL/L (ref 135–145)
SP GR UR STRIP: 1.01 (ref 1–1.03)
SQUAMOUS #/AREA URNS HPF: ABNORMAL /HPF
T AXIS (DEGREES): 80
TIBC SERPL-MCNC: 75 MCG/DL (ref 250–450)
TRICUSPID VALVE ANNULAR PEAK VELOCITY (TVAPV): 39
TROPONIN I SERPL DL<=0.01 NG/ML-MCNC: 21 NG/L
TSH SERPL-ACNC: 1.99 MCUNITS/ML (ref 0.35–5)
TYPE AND SCREEN EXPIRATION DATE: NORMAL
UROBILINOGEN UR STRIP-MCNC: 0.2 MG/DL
VANCOMYCIN SERPL-MCNC: 9.5 MCG/ML
VENTRICULAR RATE EKG/MIN (BPM): 51
WBC # BLD: 11.6 K/MCL (ref 4.2–11)
WBC # BLD: 13.6 K/MCL (ref 4.2–11)
WBC # BLD: 15.5 K/MCL (ref 4.2–11)
WBC # BLD: 5.5 K/MCL (ref 4.2–11)
WBC # BLD: 6.9 K/MCL (ref 4.2–11)
WBC # BLD: 8.4 K/MCL (ref 4.2–11)
WBC #/AREA URNS HPF: >100 /HPF
WBC MORPH BLD: NORMAL

## 2023-01-01 PROCEDURE — 95720 EEG PHY/QHP EA INCR W/VEEG: CPT | Performed by: PSYCHIATRY & NEUROLOGY

## 2023-01-01 PROCEDURE — 96374 THER/PROPH/DIAG INJ IV PUSH: CPT

## 2023-01-01 PROCEDURE — 10002803 HB RX 637: Performed by: INTERNAL MEDICINE

## 2023-01-01 PROCEDURE — 10002803 HB RX 637: Performed by: STUDENT IN AN ORGANIZED HEALTH CARE EDUCATION/TRAINING PROGRAM

## 2023-01-01 PROCEDURE — 83605 ASSAY OF LACTIC ACID: CPT | Performed by: EMERGENCY MEDICINE

## 2023-01-01 PROCEDURE — 10002801 HB RX 250 W/O HCPCS: Performed by: STUDENT IN AN ORGANIZED HEALTH CARE EDUCATION/TRAINING PROGRAM

## 2023-01-01 PROCEDURE — 10002800 HB RX 250 W HCPCS: Performed by: STUDENT IN AN ORGANIZED HEALTH CARE EDUCATION/TRAINING PROGRAM

## 2023-01-01 PROCEDURE — 10002807 HB RX 258: Performed by: STUDENT IN AN ORGANIZED HEALTH CARE EDUCATION/TRAINING PROGRAM

## 2023-01-01 PROCEDURE — 10006027 HB SUPPLY 278

## 2023-01-01 PROCEDURE — 87040 BLOOD CULTURE FOR BACTERIA: CPT | Performed by: STUDENT IN AN ORGANIZED HEALTH CARE EDUCATION/TRAINING PROGRAM

## 2023-01-01 PROCEDURE — 10000008 HB ROOM CHARGE ICU OR CCU

## 2023-01-01 PROCEDURE — 90935 HEMODIALYSIS ONE EVALUATION: CPT

## 2023-01-01 PROCEDURE — 85027 COMPLETE CBC AUTOMATED: CPT | Performed by: STUDENT IN AN ORGANIZED HEALTH CARE EDUCATION/TRAINING PROGRAM

## 2023-01-01 PROCEDURE — 85025 COMPLETE CBC W/AUTO DIFF WBC: CPT | Performed by: STUDENT IN AN ORGANIZED HEALTH CARE EDUCATION/TRAINING PROGRAM

## 2023-01-01 PROCEDURE — 02HV33Z INSERTION OF INFUSION DEVICE INTO SUPERIOR VENA CAVA, PERCUTANEOUS APPROACH: ICD-10-PCS | Performed by: EMERGENCY MEDICINE

## 2023-01-01 PROCEDURE — 87088 URINE BACTERIA CULTURE: CPT | Performed by: STUDENT IN AN ORGANIZED HEALTH CARE EDUCATION/TRAINING PROGRAM

## 2023-01-01 PROCEDURE — 36556 INSERT NON-TUNNEL CV CATH: CPT | Performed by: EMERGENCY MEDICINE

## 2023-01-01 PROCEDURE — C1752 CATH,HEMODIALYSIS,SHORT-TERM: HCPCS

## 2023-01-01 PROCEDURE — 82565 ASSAY OF CREATININE: CPT

## 2023-01-01 PROCEDURE — 10002807 HB RX 258: Performed by: INTERNAL MEDICINE

## 2023-01-01 PROCEDURE — 10004651 HB RX, NO CHARGE ITEM: Performed by: STUDENT IN AN ORGANIZED HEALTH CARE EDUCATION/TRAINING PROGRAM

## 2023-01-01 PROCEDURE — 96375 TX/PRO/DX INJ NEW DRUG ADDON: CPT

## 2023-01-01 PROCEDURE — 83605 ASSAY OF LACTIC ACID: CPT | Performed by: STUDENT IN AN ORGANIZED HEALTH CARE EDUCATION/TRAINING PROGRAM

## 2023-01-01 PROCEDURE — 84100 ASSAY OF PHOSPHORUS: CPT | Performed by: STUDENT IN AN ORGANIZED HEALTH CARE EDUCATION/TRAINING PROGRAM

## 2023-01-01 PROCEDURE — 82803 BLOOD GASES ANY COMBINATION: CPT | Performed by: INTERNAL MEDICINE

## 2023-01-01 PROCEDURE — 71045 X-RAY EXAM CHEST 1 VIEW: CPT

## 2023-01-01 PROCEDURE — 82803 BLOOD GASES ANY COMBINATION: CPT | Performed by: STUDENT IN AN ORGANIZED HEALTH CARE EDUCATION/TRAINING PROGRAM

## 2023-01-01 PROCEDURE — 96372 THER/PROPH/DIAG INJ SC/IM: CPT | Performed by: STUDENT IN AN ORGANIZED HEALTH CARE EDUCATION/TRAINING PROGRAM

## 2023-01-01 PROCEDURE — G1004 CDSM NDSC: HCPCS

## 2023-01-01 PROCEDURE — 10002803 HB RX 637

## 2023-01-01 PROCEDURE — 10002800 HB RX 250 W HCPCS

## 2023-01-01 PROCEDURE — 85610 PROTHROMBIN TIME: CPT | Performed by: EMERGENCY MEDICINE

## 2023-01-01 PROCEDURE — 83735 ASSAY OF MAGNESIUM: CPT | Performed by: INTERNAL MEDICINE

## 2023-01-01 PROCEDURE — 80307 DRUG TEST PRSMV CHEM ANLYZR: CPT | Performed by: STUDENT IN AN ORGANIZED HEALTH CARE EDUCATION/TRAINING PROGRAM

## 2023-01-01 PROCEDURE — 06HM33Z INSERTION OF INFUSION DEVICE INTO RIGHT FEMORAL VEIN, PERCUTANEOUS APPROACH: ICD-10-PCS | Performed by: STUDENT IN AN ORGANIZED HEALTH CARE EDUCATION/TRAINING PROGRAM

## 2023-01-01 PROCEDURE — 85730 THROMBOPLASTIN TIME PARTIAL: CPT | Performed by: EMERGENCY MEDICINE

## 2023-01-01 PROCEDURE — 96372 THER/PROPH/DIAG INJ SC/IM: CPT

## 2023-01-01 PROCEDURE — 71260 CT THORAX DX C+: CPT

## 2023-01-01 PROCEDURE — 94002 VENT MGMT INPAT INIT DAY: CPT

## 2023-01-01 PROCEDURE — 5A1D90Z PERFORMANCE OF URINARY FILTRATION, CONTINUOUS, GREATER THAN 18 HOURS PER DAY: ICD-10-PCS | Performed by: INTERNAL MEDICINE

## 2023-01-01 PROCEDURE — 5A1955Z RESPIRATORY VENTILATION, GREATER THAN 96 CONSECUTIVE HOURS: ICD-10-PCS | Performed by: EMERGENCY MEDICINE

## 2023-01-01 PROCEDURE — 94003 VENT MGMT INPAT SUBQ DAY: CPT

## 2023-01-01 PROCEDURE — 86901 BLOOD TYPING SEROLOGIC RH(D): CPT | Performed by: EMERGENCY MEDICINE

## 2023-01-01 PROCEDURE — 95716 VEEG EA 12-26HR CONT MNTR: CPT

## 2023-01-01 PROCEDURE — 83690 ASSAY OF LIPASE: CPT | Performed by: EMERGENCY MEDICINE

## 2023-01-01 PROCEDURE — 83690 ASSAY OF LIPASE: CPT | Performed by: STUDENT IN AN ORGANIZED HEALTH CARE EDUCATION/TRAINING PROGRAM

## 2023-01-01 PROCEDURE — 36415 COLL VENOUS BLD VENIPUNCTURE: CPT | Performed by: STUDENT IN AN ORGANIZED HEALTH CARE EDUCATION/TRAINING PROGRAM

## 2023-01-01 PROCEDURE — 10004180 HB COUNTER-TRANSPORT

## 2023-01-01 PROCEDURE — 93306 TTE W/DOPPLER COMPLETE: CPT

## 2023-01-01 PROCEDURE — 74176 CT ABD & PELVIS W/O CONTRAST: CPT

## 2023-01-01 PROCEDURE — 84295 ASSAY OF SERUM SODIUM: CPT | Performed by: INTERNAL MEDICINE

## 2023-01-01 PROCEDURE — 80053 COMPREHEN METABOLIC PANEL: CPT | Performed by: EMERGENCY MEDICINE

## 2023-01-01 PROCEDURE — 80202 ASSAY OF VANCOMYCIN: CPT | Performed by: STUDENT IN AN ORGANIZED HEALTH CARE EDUCATION/TRAINING PROGRAM

## 2023-01-01 PROCEDURE — 84100 ASSAY OF PHOSPHORUS: CPT | Performed by: INTERNAL MEDICINE

## 2023-01-01 PROCEDURE — 82330 ASSAY OF CALCIUM: CPT | Performed by: INTERNAL MEDICINE

## 2023-01-01 PROCEDURE — 80048 BASIC METABOLIC PNL TOTAL CA: CPT | Performed by: INTERNAL MEDICINE

## 2023-01-01 PROCEDURE — 80053 COMPREHEN METABOLIC PANEL: CPT | Performed by: STUDENT IN AN ORGANIZED HEALTH CARE EDUCATION/TRAINING PROGRAM

## 2023-01-01 PROCEDURE — 83880 ASSAY OF NATRIURETIC PEPTIDE: CPT | Performed by: EMERGENCY MEDICINE

## 2023-01-01 PROCEDURE — 10002805 HB CONTRAST AGENT: Performed by: STUDENT IN AN ORGANIZED HEALTH CARE EDUCATION/TRAINING PROGRAM

## 2023-01-01 PROCEDURE — 96376 TX/PRO/DX INJ SAME DRUG ADON: CPT

## 2023-01-01 PROCEDURE — 81001 URINALYSIS AUTO W/SCOPE: CPT | Performed by: STUDENT IN AN ORGANIZED HEALTH CARE EDUCATION/TRAINING PROGRAM

## 2023-01-01 PROCEDURE — 10002801 HB RX 250 W/O HCPCS

## 2023-01-01 PROCEDURE — 70450 CT HEAD/BRAIN W/O DYE: CPT

## 2023-01-01 PROCEDURE — 82010 KETONE BODYS QUAN: CPT | Performed by: STUDENT IN AN ORGANIZED HEALTH CARE EDUCATION/TRAINING PROGRAM

## 2023-01-01 PROCEDURE — 85014 HEMATOCRIT: CPT

## 2023-01-01 PROCEDURE — 10002805 HB CONTRAST AGENT: Performed by: EMERGENCY MEDICINE

## 2023-01-01 PROCEDURE — 86706 HEP B SURFACE ANTIBODY: CPT | Performed by: INTERNAL MEDICINE

## 2023-01-01 PROCEDURE — 84443 ASSAY THYROID STIM HORMONE: CPT | Performed by: STUDENT IN AN ORGANIZED HEALTH CARE EDUCATION/TRAINING PROGRAM

## 2023-01-01 PROCEDURE — 82140 ASSAY OF AMMONIA: CPT | Performed by: STUDENT IN AN ORGANIZED HEALTH CARE EDUCATION/TRAINING PROGRAM

## 2023-01-01 PROCEDURE — 83540 ASSAY OF IRON: CPT | Performed by: INTERNAL MEDICINE

## 2023-01-01 PROCEDURE — 10006023 HB SUPPLY 272

## 2023-01-01 PROCEDURE — 71275 CT ANGIOGRAPHY CHEST: CPT

## 2023-01-01 PROCEDURE — 87340 HEPATITIS B SURFACE AG IA: CPT | Performed by: INTERNAL MEDICINE

## 2023-01-01 PROCEDURE — 82962 GLUCOSE BLOOD TEST: CPT

## 2023-01-01 PROCEDURE — 76705 ECHO EXAM OF ABDOMEN: CPT

## 2023-01-01 PROCEDURE — 72125 CT NECK SPINE W/O DYE: CPT

## 2023-01-01 PROCEDURE — 83735 ASSAY OF MAGNESIUM: CPT | Performed by: STUDENT IN AN ORGANIZED HEALTH CARE EDUCATION/TRAINING PROGRAM

## 2023-01-01 PROCEDURE — 95713 VEEG 2-12 HR CONT MNTR: CPT

## 2023-01-01 PROCEDURE — 82330 ASSAY OF CALCIUM: CPT

## 2023-01-01 PROCEDURE — 84132 ASSAY OF SERUM POTASSIUM: CPT

## 2023-01-01 PROCEDURE — 99291 CRITICAL CARE FIRST HOUR: CPT | Performed by: EMERGENCY MEDICINE

## 2023-01-01 PROCEDURE — 93970 EXTREMITY STUDY: CPT

## 2023-01-01 PROCEDURE — 83735 ASSAY OF MAGNESIUM: CPT | Performed by: EMERGENCY MEDICINE

## 2023-01-01 PROCEDURE — 10002801 HB RX 250 W/O HCPCS: Performed by: EMERGENCY MEDICINE

## 2023-01-01 PROCEDURE — 70551 MRI BRAIN STEM W/O DYE: CPT

## 2023-01-01 PROCEDURE — 99232 SBSQ HOSP IP/OBS MODERATE 35: CPT | Performed by: STUDENT IN AN ORGANIZED HEALTH CARE EDUCATION/TRAINING PROGRAM

## 2023-01-01 PROCEDURE — 86704 HEP B CORE ANTIBODY TOTAL: CPT | Performed by: INTERNAL MEDICINE

## 2023-01-01 PROCEDURE — 82803 BLOOD GASES ANY COMBINATION: CPT

## 2023-01-01 PROCEDURE — 87641 MR-STAPH DNA AMP PROBE: CPT

## 2023-01-01 PROCEDURE — 93010 ELECTROCARDIOGRAM REPORT: CPT | Performed by: INTERNAL MEDICINE

## 2023-01-01 PROCEDURE — 85025 COMPLETE CBC W/AUTO DIFF WBC: CPT | Performed by: EMERGENCY MEDICINE

## 2023-01-01 PROCEDURE — 10002800 HB RX 250 W HCPCS: Performed by: INTERNAL MEDICINE

## 2023-01-01 PROCEDURE — 03HY32Z INSERTION OF MONITORING DEVICE INTO UPPER ARTERY, PERCUTANEOUS APPROACH: ICD-10-PCS | Performed by: EMERGENCY MEDICINE

## 2023-01-01 PROCEDURE — 93306 TTE W/DOPPLER COMPLETE: CPT | Performed by: INTERNAL MEDICINE

## 2023-01-01 PROCEDURE — 95700 EEG CONT REC W/VID EEG TECH: CPT

## 2023-01-01 PROCEDURE — 99252 IP/OBS CONSLTJ NEW/EST SF 35: CPT | Performed by: STUDENT IN AN ORGANIZED HEALTH CARE EDUCATION/TRAINING PROGRAM

## 2023-01-01 PROCEDURE — 92950 HEART/LUNG RESUSCITATION CPR: CPT

## 2023-01-01 PROCEDURE — 93005 ELECTROCARDIOGRAM TRACING: CPT | Performed by: EMERGENCY MEDICINE

## 2023-01-01 PROCEDURE — 99291 CRITICAL CARE FIRST HOUR: CPT

## 2023-01-01 PROCEDURE — 95718 EEG PHYS/QHP 2-12 HR W/VEEG: CPT | Performed by: PSYCHIATRY & NEUROLOGY

## 2023-01-01 PROCEDURE — 84295 ASSAY OF SERUM SODIUM: CPT

## 2023-01-01 PROCEDURE — 84484 ASSAY OF TROPONIN QUANT: CPT | Performed by: EMERGENCY MEDICINE

## 2023-01-01 RX ORDER — BISACODYL 10 MG
10 SUPPOSITORY, RECTAL RECTAL DAILY PRN
Status: DISCONTINUED | OUTPATIENT
Start: 2023-01-01 | End: 2023-01-01 | Stop reason: HOSPADM

## 2023-01-01 RX ORDER — SODIUM BICARBONATE 650 MG/1
650 TABLET ORAL PRN
Status: DISCONTINUED | OUTPATIENT
Start: 2023-01-01 | End: 2023-01-01

## 2023-01-01 RX ORDER — BISACODYL 10 MG
10 SUPPOSITORY, RECTAL RECTAL ONCE
Status: COMPLETED | OUTPATIENT
Start: 2023-01-01 | End: 2023-01-01

## 2023-01-01 RX ORDER — NIFEDIPINE 90 MG/1
90 TABLET, FILM COATED, EXTENDED RELEASE ORAL DAILY
COMMUNITY

## 2023-01-01 RX ORDER — CALCIUM CHLORIDE 100 MG/ML
1 INJECTION INTRAVENOUS; INTRAVENTRICULAR ONCE
Status: COMPLETED | OUTPATIENT
Start: 2023-01-01 | End: 2023-01-01

## 2023-01-01 RX ORDER — INSULIN LISPRO 100 [IU]/ML
2-8 INJECTION, SOLUTION INTRAVENOUS; SUBCUTANEOUS
COMMUNITY

## 2023-01-01 RX ORDER — ALBUMIN (HUMAN) 12.5 G/50ML
12.5 SOLUTION INTRAVENOUS PRN
Status: DISCONTINUED | OUTPATIENT
Start: 2023-01-01 | End: 2023-01-01

## 2023-01-01 RX ORDER — MIDAZOLAM HYDROCHLORIDE 1 MG/ML
2 INJECTION, SOLUTION INTRAMUSCULAR; INTRAVENOUS
Status: DISCONTINUED | OUTPATIENT
Start: 2023-01-01 | End: 2023-01-01

## 2023-01-01 RX ORDER — SODIUM CHLORIDE 9 MG/ML
INJECTION, SOLUTION INTRAVENOUS
Status: DISPENSED
Start: 2023-01-01 | End: 2023-01-01

## 2023-01-01 RX ORDER — NICOTINE POLACRILEX 4 MG
15 LOZENGE BUCCAL PRN
Status: DISCONTINUED | OUTPATIENT
Start: 2023-01-01 | End: 2023-01-01

## 2023-01-01 RX ORDER — LIDOCAINE HYDROCHLORIDE 20 MG/ML
10 JELLY TOPICAL PRN
Status: DISCONTINUED | OUTPATIENT
Start: 2023-01-01 | End: 2023-01-01 | Stop reason: HOSPADM

## 2023-01-01 RX ORDER — MAGNESIUM HYDROXIDE/ALUMINUM HYDROXICE/SIMETHICONE 120; 1200; 1200 MG/30ML; MG/30ML; MG/30ML
30 SUSPENSION ORAL PRN
Status: DISCONTINUED | OUTPATIENT
Start: 2023-01-01 | End: 2023-01-01 | Stop reason: HOSPADM

## 2023-01-01 RX ORDER — INSULIN GLARGINE 100 [IU]/ML
15 INJECTION, SOLUTION SUBCUTANEOUS DAILY
Status: DISCONTINUED | OUTPATIENT
Start: 2023-01-01 | End: 2023-01-01

## 2023-01-01 RX ORDER — HYDRALAZINE HYDROCHLORIDE 20 MG/ML
10 INJECTION INTRAMUSCULAR; INTRAVENOUS EVERY 4 HOURS PRN
Status: DISCONTINUED | OUTPATIENT
Start: 2023-01-01 | End: 2023-01-01

## 2023-01-01 RX ORDER — LACTULOSE 10 G/15ML
20 SOLUTION ORAL DAILY PRN
Status: DISCONTINUED | OUTPATIENT
Start: 2023-01-01 | End: 2023-01-01 | Stop reason: HOSPADM

## 2023-01-01 RX ORDER — POTASSIUM CHLORIDE 29.8 MG/ML
20 INJECTION INTRAVENOUS PRN
Status: DISCONTINUED | OUTPATIENT
Start: 2023-01-01 | End: 2023-01-01

## 2023-01-01 RX ORDER — MIDODRINE HYDROCHLORIDE 5 MG/1
10 TABLET ORAL ONCE
Status: COMPLETED | OUTPATIENT
Start: 2023-01-01 | End: 2023-01-01

## 2023-01-01 RX ORDER — TORSEMIDE 100 MG/1
50 TABLET ORAL DAILY
COMMUNITY

## 2023-01-01 RX ORDER — CALCIUM GLUCONATE 20 MG/ML
1 INJECTION, SOLUTION INTRAVENOUS PRN
Status: DISCONTINUED | OUTPATIENT
Start: 2023-01-01 | End: 2023-01-01

## 2023-01-01 RX ORDER — CHLORHEXIDINE GLUCONATE ORAL RINSE 1.2 MG/ML
15 SOLUTION DENTAL EVERY 12 HOURS SCHEDULED
Status: DISCONTINUED | OUTPATIENT
Start: 2023-01-01 | End: 2023-01-01 | Stop reason: HOSPADM

## 2023-01-01 RX ORDER — 0.9 % SODIUM CHLORIDE 0.9 %
2 VIAL (ML) INJECTION EVERY 12 HOURS SCHEDULED
Status: DISCONTINUED | OUTPATIENT
Start: 2023-01-01 | End: 2023-01-01

## 2023-01-01 RX ORDER — CHLORHEXIDINE GLUCONATE ORAL RINSE 1.2 MG/ML
15 SOLUTION DENTAL PRN
Status: DISCONTINUED | OUTPATIENT
Start: 2023-01-01 | End: 2023-01-01 | Stop reason: HOSPADM

## 2023-01-01 RX ORDER — INSULIN GLARGINE 100 [IU]/ML
5 INJECTION, SOLUTION SUBCUTANEOUS NIGHTLY
COMMUNITY

## 2023-01-01 RX ORDER — METOPROLOL SUCCINATE 50 MG/1
50 TABLET, EXTENDED RELEASE ORAL DAILY
COMMUNITY

## 2023-01-01 RX ORDER — CALCIUM CHLORIDE, MAGNESIUM CHLORIDE, DEXTROSE MONOHYDRATE, LACTIC ACID, SODIUM CHLORIDE, SODIUM BICARBONATE AND POTASSIUM CHLORIDE 3.68; 3.05; 22; 5.4; 6.46; 3.09; .314 G/L; G/L; G/L; G/L; G/L; G/L; G/L
INJECTION INTRAVENOUS CONTINUOUS
Status: DISCONTINUED | OUTPATIENT
Start: 2023-01-01 | End: 2023-01-01

## 2023-01-01 RX ORDER — NOREPINEPHRINE BITARTRATE/D5W 8 MG/250ML
PLASTIC BAG, INJECTION (ML) INTRAVENOUS
Status: COMPLETED
Start: 2023-01-01 | End: 2023-01-01

## 2023-01-01 RX ORDER — DIPHENHYDRAMINE HCL 25 MG
25 CAPSULE ORAL AT BEDTIME
COMMUNITY

## 2023-01-01 RX ORDER — DEXTROSE MONOHYDRATE 25 G/50ML
25 INJECTION, SOLUTION INTRAVENOUS PRN
Status: DISCONTINUED | OUTPATIENT
Start: 2023-01-01 | End: 2023-01-01

## 2023-01-01 RX ORDER — DEXTROSE MONOHYDRATE 25 G/50ML
12.5 INJECTION, SOLUTION INTRAVENOUS PRN
Status: DISCONTINUED | OUTPATIENT
Start: 2023-01-01 | End: 2023-01-01

## 2023-01-01 RX ORDER — HUMAN INSULIN 100 [IU]/ML
INJECTION, SOLUTION SUBCUTANEOUS EVERY 6 HOURS SCHEDULED
Status: DISCONTINUED | OUTPATIENT
Start: 2023-01-01 | End: 2023-01-01

## 2023-01-01 RX ORDER — OXYCODONE AND ACETAMINOPHEN 10; 325 MG/1; MG/1
1 TABLET ORAL EVERY 6 HOURS PRN
COMMUNITY

## 2023-01-01 RX ORDER — CARBOXYMETHYLCELLULOSE SODIUM 5 MG/ML
1 SOLUTION/ DROPS OPHTHALMIC PRN
Status: DISCONTINUED | OUTPATIENT
Start: 2023-01-01 | End: 2023-01-01 | Stop reason: HOSPADM

## 2023-01-01 RX ORDER — MINERAL OIL AND WHITE PETROLATUM 150; 830 MG/G; MG/G
1 OINTMENT OPHTHALMIC
Status: DISCONTINUED | OUTPATIENT
Start: 2023-01-01 | End: 2023-01-01 | Stop reason: HOSPADM

## 2023-01-01 RX ORDER — OMEPRAZOLE 40 MG/1
40 CAPSULE, DELAYED RELEASE ORAL DAILY
COMMUNITY

## 2023-01-01 RX ORDER — LORAZEPAM 2 MG/ML
1 INJECTION INTRAMUSCULAR EVERY 30 MIN PRN
Status: DISCONTINUED | OUTPATIENT
Start: 2023-01-01 | End: 2023-01-01 | Stop reason: HOSPADM

## 2023-01-01 RX ORDER — INSULIN GLARGINE 100 [IU]/ML
5 INJECTION, SOLUTION SUBCUTANEOUS DAILY
Status: DISCONTINUED | OUTPATIENT
Start: 2023-01-01 | End: 2023-01-01

## 2023-01-01 RX ORDER — DEXTROSE MONOHYDRATE 25 G/50ML
INJECTION, SOLUTION INTRAVENOUS
Status: COMPLETED
Start: 2023-01-01 | End: 2023-01-01

## 2023-01-01 RX ORDER — MIDAZOLAM HYDROCHLORIDE 1 MG/ML
5 INJECTION, SOLUTION INTRAMUSCULAR; INTRAVENOUS ONCE
Status: COMPLETED | OUTPATIENT
Start: 2023-01-01 | End: 2023-01-01

## 2023-01-01 RX ORDER — SODIUM POLYSTYRENE SULFONATE 15 G/60ML
15 SUSPENSION ORAL; RECTAL
COMMUNITY

## 2023-01-01 RX ORDER — HYDRALAZINE HYDROCHLORIDE 25 MG/1
100 TABLET, FILM COATED ORAL EVERY 8 HOURS SCHEDULED
Status: DISCONTINUED | OUTPATIENT
Start: 2023-01-01 | End: 2023-01-01

## 2023-01-01 RX ORDER — NICOTINE POLACRILEX 4 MG
30 LOZENGE BUCCAL PRN
Status: DISCONTINUED | OUTPATIENT
Start: 2023-01-01 | End: 2023-01-01

## 2023-01-01 RX ORDER — HYDRALAZINE HYDROCHLORIDE 25 MG/1
25 TABLET, FILM COATED ORAL 3 TIMES DAILY
Status: DISCONTINUED | OUTPATIENT
Start: 2023-01-01 | End: 2023-01-01

## 2023-01-01 RX ORDER — MIDODRINE HYDROCHLORIDE 5 MG/1
5 TABLET ORAL ONCE
Status: DISCONTINUED | OUTPATIENT
Start: 2023-01-01 | End: 2023-01-01

## 2023-01-01 RX ORDER — GLYCOPYRROLATE 0.2 MG/ML
0.2 INJECTION, SOLUTION INTRAMUSCULAR; INTRAVENOUS EVERY 4 HOURS PRN
Status: DISCONTINUED | OUTPATIENT
Start: 2023-01-01 | End: 2023-01-01 | Stop reason: HOSPADM

## 2023-01-01 RX ORDER — FAMOTIDINE 20 MG/1
20 TABLET, FILM COATED ORAL DAILY
Status: DISCONTINUED | OUTPATIENT
Start: 2023-01-01 | End: 2023-01-01

## 2023-01-01 RX ORDER — SODIUM CHLORIDE 9 MG/ML
INJECTION, SOLUTION INTRAVENOUS CONTINUOUS PRN
Status: DISCONTINUED | OUTPATIENT
Start: 2023-01-01 | End: 2023-01-01 | Stop reason: HOSPADM

## 2023-01-01 RX ORDER — AMOXICILLIN 250 MG
2 CAPSULE ORAL 2 TIMES DAILY PRN
Status: DISCONTINUED | OUTPATIENT
Start: 2023-01-01 | End: 2023-01-01 | Stop reason: HOSPADM

## 2023-01-01 RX ORDER — SODIUM CITRATE 4 % (5 ML)
3 SYRINGE (ML) MISCELLANEOUS PRN
Status: DISCONTINUED | OUTPATIENT
Start: 2023-01-01 | End: 2023-01-01 | Stop reason: HOSPADM

## 2023-01-01 RX ORDER — BISACODYL 10 MG
10 SUPPOSITORY, RECTAL RECTAL DAILY PRN
Status: DISCONTINUED | OUTPATIENT
Start: 2023-01-01 | End: 2023-01-01

## 2023-01-01 RX ORDER — HEPARIN SODIUM 5000 [USP'U]/ML
5000 INJECTION, SOLUTION INTRAVENOUS; SUBCUTANEOUS EVERY 8 HOURS SCHEDULED
Status: DISCONTINUED | OUTPATIENT
Start: 2023-01-01 | End: 2023-01-01

## 2023-01-01 RX ORDER — CLONIDINE HYDROCHLORIDE 0.2 MG/1
0.1 TABLET ORAL AT BEDTIME
Status: DISCONTINUED | OUTPATIENT
Start: 2023-01-01 | End: 2023-01-01

## 2023-01-01 RX ORDER — NOREPINEPHRINE BITARTRATE/D5W 8 MG/250ML
0-30 PLASTIC BAG, INJECTION (ML) INTRAVENOUS CONTINUOUS
Status: DISCONTINUED | OUTPATIENT
Start: 2023-01-01 | End: 2023-01-01 | Stop reason: ALTCHOICE

## 2023-01-01 RX ORDER — POLYETHYLENE GLYCOL 3350 17 G/17G
17 POWDER, FOR SOLUTION ORAL DAILY
Status: DISCONTINUED | OUTPATIENT
Start: 2023-01-01 | End: 2023-01-01

## 2023-01-01 RX ORDER — OXYCODONE HYDROCHLORIDE 5 MG/1
10 TABLET ORAL EVERY 4 HOURS PRN
COMMUNITY

## 2023-01-01 RX ORDER — MIDAZOLAM HYDROCHLORIDE 1 MG/ML
INJECTION INTRAMUSCULAR; INTRAVENOUS
Status: COMPLETED
Start: 2023-01-01 | End: 2023-01-01

## 2023-01-01 RX ORDER — SODIUM CHLORIDE 9 MG/ML
INJECTION, SOLUTION INTRAVENOUS CONTINUOUS PRN
Status: DISCONTINUED | OUTPATIENT
Start: 2023-01-01 | End: 2023-01-01

## 2023-01-01 RX ORDER — SODIUM CHLORIDE 3 G/100ML
100 INJECTION, SOLUTION INTRAVENOUS ONCE
Status: COMPLETED | OUTPATIENT
Start: 2023-01-01 | End: 2023-01-01

## 2023-01-01 RX ORDER — 0.9 % SODIUM CHLORIDE 0.9 %
10 VIAL (ML) INJECTION PRN
Status: DISCONTINUED | OUTPATIENT
Start: 2023-01-01 | End: 2023-01-01

## 2023-01-01 RX ORDER — HUMAN INSULIN 100 [IU]/ML
INJECTION, SOLUTION SUBCUTANEOUS EVERY 6 HOURS SCHEDULED
Status: DISCONTINUED | OUTPATIENT
Start: 2023-01-01 | End: 2023-01-01 | Stop reason: ALTCHOICE

## 2023-01-01 RX ORDER — CLONIDINE HYDROCHLORIDE 0.2 MG/1
0.1 TABLET ORAL EVERY 8 HOURS SCHEDULED
Status: DISCONTINUED | OUTPATIENT
Start: 2023-01-01 | End: 2023-01-01

## 2023-01-01 RX ORDER — INSULIN GLARGINE 100 [IU]/ML
2.5 INJECTION, SOLUTION SUBCUTANEOUS DAILY
Status: DISCONTINUED | OUTPATIENT
Start: 2023-01-01 | End: 2023-01-01

## 2023-01-01 RX ORDER — FOLIC ACID/VIT B COMPLEX AND C 0.8 MG
1 TABLET ORAL DAILY
Status: DISCONTINUED | OUTPATIENT
Start: 2023-01-01 | End: 2023-01-01

## 2023-01-01 RX ORDER — ACETAMINOPHEN 325 MG/1
650 TABLET ORAL EVERY 4 HOURS PRN
COMMUNITY

## 2023-01-01 RX ORDER — B-COMPLEX WITH VITAMIN C
1 TABLET ORAL AT BEDTIME
COMMUNITY

## 2023-01-01 RX ORDER — MIDODRINE HYDROCHLORIDE 5 MG/1
5 TABLET ORAL
Status: DISCONTINUED | OUTPATIENT
Start: 2023-01-01 | End: 2023-01-01

## 2023-01-01 RX ORDER — HYDRALAZINE HYDROCHLORIDE 20 MG/ML
10 INJECTION INTRAMUSCULAR; INTRAVENOUS ONCE
Status: COMPLETED | OUTPATIENT
Start: 2023-01-01 | End: 2023-01-01

## 2023-01-01 RX ORDER — CLONIDINE HYDROCHLORIDE 0.1 MG/1
0.1 TABLET ORAL AT BEDTIME
COMMUNITY

## 2023-01-01 RX ORDER — HYDRALAZINE HYDROCHLORIDE 25 MG/1
25 TABLET, FILM COATED ORAL 3 TIMES DAILY
COMMUNITY

## 2023-01-01 RX ADMIN — CHLORHEXIDINE GLUCONATE 15 ML: 1.2 RINSE ORAL at 08:36

## 2023-01-01 RX ADMIN — INSULIN GLARGINE 5 UNITS: 100 INJECTION, SOLUTION SUBCUTANEOUS at 09:23

## 2023-01-01 RX ADMIN — MIDAZOLAM 5 MG: 1 INJECTION INTRAMUSCULAR; INTRAVENOUS at 00:09

## 2023-01-01 RX ADMIN — VANCOMYCIN HYDROCHLORIDE 1000 MG: 1 INJECTION, POWDER, LYOPHILIZED, FOR SOLUTION INTRAVENOUS at 17:16

## 2023-01-01 RX ADMIN — HEPARIN SODIUM 5000 UNITS: 5000 INJECTION INTRAVENOUS; SUBCUTANEOUS at 05:02

## 2023-01-01 RX ADMIN — SODIUM CHLORIDE, POTASSIUM CHLORIDE, SODIUM LACTATE AND CALCIUM CHLORIDE 1000 ML: 600; 310; 30; 20 INJECTION, SOLUTION INTRAVENOUS at 12:12

## 2023-01-01 RX ADMIN — MINERAL OIL, PETROLATUM 1 APPLICATION.: 425; 568 OINTMENT OPHTHALMIC at 03:53

## 2023-01-01 RX ADMIN — MIDAZOLAM HYDROCHLORIDE 5 MG: 1 INJECTION, SOLUTION INTRAMUSCULAR; INTRAVENOUS at 00:09

## 2023-01-01 RX ADMIN — MUPIROCIN 1 APPLICATION.: 20 OINTMENT TOPICAL at 20:32

## 2023-01-01 RX ADMIN — DEXTROSE MONOHYDRATE 12.5 G: 25 INJECTION, SOLUTION INTRAVENOUS at 01:05

## 2023-01-01 RX ADMIN — MINERAL OIL, PETROLATUM 1 APPLICATION.: 425; 568 OINTMENT OPHTHALMIC at 03:44

## 2023-01-01 RX ADMIN — MINERAL OIL, PETROLATUM 1 APPLICATION.: 425; 568 OINTMENT OPHTHALMIC at 07:53

## 2023-01-01 RX ADMIN — PROPOFOL INJECTABLE EMULSION 50 MCG/KG/MIN: 10 INJECTION, EMULSION INTRAVENOUS at 20:42

## 2023-01-01 RX ADMIN — MUPIROCIN 1 APPLICATION.: 20 OINTMENT TOPICAL at 08:00

## 2023-01-01 RX ADMIN — HEPARIN SODIUM 5000 UNITS: 5000 INJECTION INTRAVENOUS; SUBCUTANEOUS at 14:38

## 2023-01-01 RX ADMIN — PANTOPRAZOLE 40 MG: 40 TABLET, DELAYED RELEASE ORAL at 08:00

## 2023-01-01 RX ADMIN — CALCIUM CHLORIDE, MAGNESIUM CHLORIDE, DEXTROSE MONOHYDRATE, LACTIC ACID, SODIUM CHLORIDE, SODIUM BICARBONATE AND POTASSIUM CHLORIDE: 3.68; 3.05; 22; 5.4; 6.46; 3.09; .314 INJECTION INTRAVENOUS at 20:25

## 2023-01-01 RX ADMIN — CALCIUM CHLORIDE, MAGNESIUM CHLORIDE, DEXTROSE MONOHYDRATE, LACTIC ACID, SODIUM CHLORIDE, SODIUM BICARBONATE AND POTASSIUM CHLORIDE: 3.68; 3.05; 22; 5.4; 6.46; 3.09; .314 INJECTION INTRAVENOUS at 06:52

## 2023-01-01 RX ADMIN — MINERAL OIL, PETROLATUM 1 APPLICATION.: 425; 568 OINTMENT OPHTHALMIC at 12:52

## 2023-01-01 RX ADMIN — DEXTROSE MONOHYDRATE 12.5 G: 25 INJECTION, SOLUTION INTRAVENOUS at 09:58

## 2023-01-01 RX ADMIN — MIDODRINE HYDROCHLORIDE 10 MG: 5 TABLET ORAL at 03:28

## 2023-01-01 RX ADMIN — SODIUM BICARBONATE 50 MEQ: 84 INJECTION, SOLUTION INTRAVENOUS at 22:14

## 2023-01-01 RX ADMIN — SODIUM CHLORIDE, PRESERVATIVE FREE 2 ML: 5 INJECTION INTRAVENOUS at 20:32

## 2023-01-01 RX ADMIN — EPOETIN ALFA-EPBX 10000 UNITS: 10000 INJECTION, SOLUTION INTRAVENOUS; SUBCUTANEOUS at 19:24

## 2023-01-01 RX ADMIN — MINERAL OIL, PETROLATUM 1 APPLICATION.: 425; 568 OINTMENT OPHTHALMIC at 20:32

## 2023-01-01 RX ADMIN — VANCOMYCIN HYDROCHLORIDE 1250 MG: 10 INJECTION, POWDER, LYOPHILIZED, FOR SOLUTION INTRAVENOUS at 05:14

## 2023-01-01 RX ADMIN — LORAZEPAM 1 MG: 2 INJECTION INTRAMUSCULAR; INTRAVENOUS at 07:47

## 2023-01-01 RX ADMIN — POLYETHYLENE GLYCOL (3350) 17 G: 17 POWDER, FOR SOLUTION ORAL at 11:32

## 2023-01-01 RX ADMIN — IOHEXOL 100 ML: 350 INJECTION, SOLUTION INTRAVENOUS at 23:50

## 2023-01-01 RX ADMIN — Medication 25 MCG/HR: at 16:53

## 2023-01-01 RX ADMIN — PROPOFOL INJECTABLE EMULSION 50 MCG/KG/MIN: 10 INJECTION, EMULSION INTRAVENOUS at 18:10

## 2023-01-01 RX ADMIN — MINERAL OIL, PETROLATUM 1 APPLICATION.: 425; 568 OINTMENT OPHTHALMIC at 01:19

## 2023-01-01 RX ADMIN — SODIUM CHLORIDE, PRESERVATIVE FREE 2 ML: 5 INJECTION INTRAVENOUS at 20:39

## 2023-01-01 RX ADMIN — GLYCOPYRROLATE 0.2 MG: 0.2 INJECTION, SOLUTION INTRAMUSCULAR; INTRAVENOUS at 08:30

## 2023-01-01 RX ADMIN — MINERAL OIL, PETROLATUM 1 APPLICATION.: 425; 568 OINTMENT OPHTHALMIC at 12:30

## 2023-01-01 RX ADMIN — INSULIN HUMAN 4 UNITS: 100 INJECTION, SOLUTION PARENTERAL at 12:50

## 2023-01-01 RX ADMIN — Medication 10 MG: at 17:10

## 2023-01-01 RX ADMIN — INSULIN HUMAN 2 UNITS: 100 INJECTION, SOLUTION PARENTERAL at 18:11

## 2023-01-01 RX ADMIN — MINERAL OIL, PETROLATUM 1 APPLICATION.: 425; 568 OINTMENT OPHTHALMIC at 11:57

## 2023-01-01 RX ADMIN — MINERAL OIL, PETROLATUM 1 APPLICATION.: 425; 568 OINTMENT OPHTHALMIC at 19:43

## 2023-01-01 RX ADMIN — ANTICOAGULANT SODIUM CITRATE SOLUTION 3 ML: 4 SOLUTION INTRAVENOUS at 22:24

## 2023-01-01 RX ADMIN — MINERAL OIL, PETROLATUM 1 APPLICATION.: 425; 568 OINTMENT OPHTHALMIC at 20:21

## 2023-01-01 RX ADMIN — MINERAL OIL, PETROLATUM 1 APPLICATION.: 425; 568 OINTMENT OPHTHALMIC at 08:07

## 2023-01-01 RX ADMIN — NOREPINEPHRINE BITARTRATE 6 MCG/MIN: 8 INJECTION, SOLUTION INTRAVENOUS at 07:40

## 2023-01-01 RX ADMIN — BISACODYL 10 MG: 10 SUPPOSITORY RECTAL at 16:12

## 2023-01-01 RX ADMIN — CALCIUM GLUCONATE 0.2 G/HR: 98 INJECTION, SOLUTION INTRAVENOUS at 06:58

## 2023-01-01 RX ADMIN — MINERAL OIL, PETROLATUM 1 APPLICATION.: 425; 568 OINTMENT OPHTHALMIC at 20:35

## 2023-01-01 RX ADMIN — MAGNESIUM SULFATE HEPTAHYDRATE 2 G: 40 INJECTION, SOLUTION INTRAVENOUS at 09:52

## 2023-01-01 RX ADMIN — CHLORHEXIDINE GLUCONATE 15 ML: 1.2 RINSE ORAL at 09:24

## 2023-01-01 RX ADMIN — LABETALOL HYDROCHLORIDE 100 MG: 100 TABLET, FILM COATED ORAL at 10:33

## 2023-01-01 RX ADMIN — MEROPENEM 1 G: 1 INJECTION INTRAVENOUS at 01:38

## 2023-01-01 RX ADMIN — PROPOFOL 10 MCG/KG/MIN: 10 INJECTION, EMULSION INTRAVENOUS at 01:12

## 2023-01-01 RX ADMIN — HYDRALAZINE HYDROCHLORIDE 10 MG: 20 INJECTION INTRAMUSCULAR; INTRAVENOUS at 01:13

## 2023-01-01 RX ADMIN — ANTICOAGULANT SODIUM CITRATE SOLUTION 3 ML: 4 SOLUTION INTRAVENOUS at 22:25

## 2023-01-01 RX ADMIN — MINERAL OIL, PETROLATUM 1 APPLICATION.: 425; 568 OINTMENT OPHTHALMIC at 23:52

## 2023-01-01 RX ADMIN — MINERAL OIL, PETROLATUM 1 APPLICATION.: 425; 568 OINTMENT OPHTHALMIC at 17:00

## 2023-01-01 RX ADMIN — INSULIN HUMAN 5 UNITS: 100 INJECTION, SOLUTION PARENTERAL at 01:18

## 2023-01-01 RX ADMIN — FENTANYL CITRATE 50 MCG: 50 INJECTION, SOLUTION INTRAMUSCULAR; INTRAVENOUS at 14:40

## 2023-01-01 RX ADMIN — MUPIROCIN 1 APPLICATION.: 20 OINTMENT TOPICAL at 20:34

## 2023-01-01 RX ADMIN — INSULIN GLARGINE 3 UNITS: 100 INJECTION, SOLUTION SUBCUTANEOUS at 13:58

## 2023-01-01 RX ADMIN — INSULIN HUMAN 4 UNITS: 100 INJECTION, SOLUTION PARENTERAL at 11:57

## 2023-01-01 RX ADMIN — HEPARIN SODIUM 5000 UNITS: 5000 INJECTION INTRAVENOUS; SUBCUTANEOUS at 01:13

## 2023-01-01 RX ADMIN — HYDRALAZINE HYDROCHLORIDE 100 MG: 25 TABLET, FILM COATED ORAL at 10:32

## 2023-01-01 RX ADMIN — CLONIDINE HYDROCHLORIDE 0.1 MG: 0.2 TABLET ORAL at 18:40

## 2023-01-01 RX ADMIN — PROPOFOL INJECTABLE EMULSION 40 MCG/KG/MIN: 10 INJECTION, EMULSION INTRAVENOUS at 01:10

## 2023-01-01 RX ADMIN — PROPOFOL INJECTABLE EMULSION 40 MCG/KG/MIN: 10 INJECTION, EMULSION INTRAVENOUS at 05:47

## 2023-01-01 RX ADMIN — MINERAL OIL, PETROLATUM 1 APPLICATION.: 425; 568 OINTMENT OPHTHALMIC at 09:23

## 2023-01-01 RX ADMIN — SODIUM CHLORIDE, PRESERVATIVE FREE 2 ML: 5 INJECTION INTRAVENOUS at 10:18

## 2023-01-01 RX ADMIN — PANTOPRAZOLE 40 MG: 40 TABLET, DELAYED RELEASE ORAL at 10:05

## 2023-01-01 RX ADMIN — CLONIDINE HYDROCHLORIDE 0.1 MG: 0.2 TABLET ORAL at 10:32

## 2023-01-01 RX ADMIN — MINERAL OIL, PETROLATUM 1 APPLICATION.: 425; 568 OINTMENT OPHTHALMIC at 15:00

## 2023-01-01 RX ADMIN — HEPARIN SODIUM 5000 UNITS: 5000 INJECTION INTRAVENOUS; SUBCUTANEOUS at 14:13

## 2023-01-01 RX ADMIN — MINERAL OIL, PETROLATUM 1 APPLICATION.: 425; 568 OINTMENT OPHTHALMIC at 16:47

## 2023-01-01 RX ADMIN — LORAZEPAM 1 MG: 2 INJECTION INTRAMUSCULAR; INTRAVENOUS at 22:18

## 2023-01-01 RX ADMIN — MINERAL OIL, PETROLATUM 1 APPLICATION.: 425; 568 OINTMENT OPHTHALMIC at 00:15

## 2023-01-01 RX ADMIN — PROPOFOL 10 MCG/KG/MIN: 10 INJECTION, EMULSION INTRAVENOUS at 17:26

## 2023-01-01 RX ADMIN — MEROPENEM 1 G: 1 INJECTION INTRAVENOUS at 21:02

## 2023-01-01 RX ADMIN — INSULIN HUMAN 8 UNITS: 100 INJECTION, SOLUTION PARENTERAL at 05:41

## 2023-01-01 RX ADMIN — HEPARIN SODIUM 5000 UNITS: 5000 INJECTION INTRAVENOUS; SUBCUTANEOUS at 05:41

## 2023-01-01 RX ADMIN — CHLORHEXIDINE GLUCONATE 15 ML: 1.2 RINSE ORAL at 20:39

## 2023-01-01 RX ADMIN — HEPARIN SODIUM 5000 UNITS: 5000 INJECTION INTRAVENOUS; SUBCUTANEOUS at 13:56

## 2023-01-01 RX ADMIN — PANTOPRAZOLE 40 MG: 40 TABLET, DELAYED RELEASE ORAL at 08:08

## 2023-01-01 RX ADMIN — PROPOFOL 30 MCG/KG/MIN: 10 INJECTION, EMULSION INTRAVENOUS at 01:37

## 2023-01-01 RX ADMIN — HEPARIN SODIUM 5000 UNITS: 5000 INJECTION INTRAVENOUS; SUBCUTANEOUS at 05:38

## 2023-01-01 RX ADMIN — MEROPENEM 1 G: 1 INJECTION INTRAVENOUS at 20:34

## 2023-01-01 RX ADMIN — INSULIN GLARGINE 15 UNITS: 100 INJECTION, SOLUTION SUBCUTANEOUS at 12:50

## 2023-01-01 RX ADMIN — PROPOFOL 20 MCG/KG/MIN: 10 INJECTION, EMULSION INTRAVENOUS at 18:19

## 2023-01-01 RX ADMIN — MEROPENEM 1 G: 1 INJECTION INTRAVENOUS at 14:41

## 2023-01-01 RX ADMIN — Medication 0.8 MG: at 12:54

## 2023-01-01 RX ADMIN — INSULIN HUMAN 3.5 UNITS/HR: 1 INJECTION, SOLUTION INTRAVENOUS at 12:16

## 2023-01-01 RX ADMIN — CHLORHEXIDINE GLUCONATE 15 ML: 1.2 RINSE ORAL at 20:32

## 2023-01-01 RX ADMIN — FENTANYL CITRATE 50 MCG: 50 INJECTION, SOLUTION INTRAMUSCULAR; INTRAVENOUS at 00:53

## 2023-01-01 RX ADMIN — DEXTROSE MONOHYDRATE 12.5 G: 25 INJECTION, SOLUTION INTRAVENOUS at 06:33

## 2023-01-01 RX ADMIN — PROPOFOL INJECTABLE EMULSION 40 MCG/KG/MIN: 10 INJECTION, EMULSION INTRAVENOUS at 10:43

## 2023-01-01 RX ADMIN — MINERAL OIL, PETROLATUM 1 APPLICATION.: 425; 568 OINTMENT OPHTHALMIC at 03:39

## 2023-01-01 RX ADMIN — SODIUM CHLORIDE, PRESERVATIVE FREE 2 ML: 5 INJECTION INTRAVENOUS at 08:08

## 2023-01-01 RX ADMIN — SODIUM BICARBONATE 150 ML/HR: 84 INJECTION, SOLUTION INTRAVENOUS at 05:13

## 2023-01-01 RX ADMIN — IOHEXOL 80 ML: 350 INJECTION, SOLUTION INTRAVENOUS at 05:00

## 2023-01-01 RX ADMIN — PROPOFOL 10 MCG/KG/MIN: 10 INJECTION, EMULSION INTRAVENOUS at 17:31

## 2023-01-01 RX ADMIN — MINERAL OIL, PETROLATUM 1 APPLICATION.: 425; 568 OINTMENT OPHTHALMIC at 07:41

## 2023-01-01 RX ADMIN — MINERAL OIL, PETROLATUM 1 APPLICATION.: 425; 568 OINTMENT OPHTHALMIC at 08:00

## 2023-01-01 RX ADMIN — Medication 10 MG: at 12:34

## 2023-01-01 RX ADMIN — DEXTROSE MONOHYDRATE 12.5 G: 25 INJECTION, SOLUTION INTRAVENOUS at 17:36

## 2023-01-01 RX ADMIN — CHLORHEXIDINE GLUCONATE 15 ML: 1.2 RINSE ORAL at 08:00

## 2023-01-01 RX ADMIN — SODIUM CHLORIDE, PRESERVATIVE FREE 2 ML: 5 INJECTION INTRAVENOUS at 09:00

## 2023-01-01 RX ADMIN — INSULIN HUMAN 1 UNITS: 100 INJECTION, SOLUTION PARENTERAL at 05:57

## 2023-01-01 RX ADMIN — HEPARIN SODIUM 5000 UNITS: 5000 INJECTION INTRAVENOUS; SUBCUTANEOUS at 05:53

## 2023-01-01 RX ADMIN — CHLORHEXIDINE GLUCONATE 15 ML: 1.2 RINSE ORAL at 10:30

## 2023-01-01 RX ADMIN — MINERAL OIL, PETROLATUM 1 APPLICATION.: 425; 568 OINTMENT OPHTHALMIC at 04:24

## 2023-01-01 RX ADMIN — MINERAL OIL, PETROLATUM 1 APPLICATION.: 425; 568 OINTMENT OPHTHALMIC at 04:00

## 2023-01-01 RX ADMIN — CHLORHEXIDINE GLUCONATE 15 ML: 1.2 RINSE ORAL at 20:00

## 2023-01-01 RX ADMIN — NOREPINEPHRINE BITARTRATE 5 MCG/MIN: 8 INJECTION, SOLUTION INTRAVENOUS at 22:24

## 2023-01-01 RX ADMIN — CHLORHEXIDINE GLUCONATE 15 ML: 1.2 RINSE ORAL at 08:08

## 2023-01-01 RX ADMIN — CALCIUM CHLORIDE 1 G: 100 INJECTION INTRAVENOUS; INTRAVENTRICULAR at 22:25

## 2023-01-01 RX ADMIN — CHLORHEXIDINE GLUCONATE 15 ML: 1.2 RINSE ORAL at 20:37

## 2023-01-01 RX ADMIN — SODIUM CHLORIDE, PRESERVATIVE FREE 2 ML: 5 INJECTION INTRAVENOUS at 08:00

## 2023-01-01 RX ADMIN — HEPARIN SODIUM 5000 UNITS: 5000 INJECTION INTRAVENOUS; SUBCUTANEOUS at 21:02

## 2023-01-01 RX ADMIN — POTASSIUM CHLORIDE 20 MEQ: 29.8 INJECTION, SOLUTION INTRAVENOUS at 16:41

## 2023-01-01 RX ADMIN — SODIUM CHLORIDE 100 ML: 3 INJECTION, SOLUTION INTRAVENOUS at 10:49

## 2023-01-01 RX ADMIN — MINERAL OIL, PETROLATUM 1 APPLICATION.: 425; 568 OINTMENT OPHTHALMIC at 15:06

## 2023-01-01 RX ADMIN — HEPARIN SODIUM 5000 UNITS: 5000 INJECTION INTRAVENOUS; SUBCUTANEOUS at 21:53

## 2023-01-01 RX ADMIN — MINERAL OIL, PETROLATUM 1 APPLICATION.: 425; 568 OINTMENT OPHTHALMIC at 12:41

## 2023-01-01 RX ADMIN — PROPOFOL 5 MCG/KG/MIN: 10 INJECTION, EMULSION INTRAVENOUS at 05:01

## 2023-01-01 RX ADMIN — FENTANYL CITRATE 100 MCG: 50 INJECTION, SOLUTION INTRAMUSCULAR; INTRAVENOUS at 00:38

## 2023-01-01 RX ADMIN — SODIUM CHLORIDE, PRESERVATIVE FREE 2 ML: 5 INJECTION INTRAVENOUS at 20:00

## 2023-01-01 RX ADMIN — LORAZEPAM 1 MG: 2 INJECTION INTRAMUSCULAR; INTRAVENOUS at 08:30

## 2023-01-01 RX ADMIN — INSULIN GLARGINE 3 UNITS: 100 INJECTION, SOLUTION SUBCUTANEOUS at 08:00

## 2023-01-01 RX ADMIN — MEROPENEM 1 G: 1 INJECTION INTRAVENOUS at 04:12

## 2023-01-01 RX ADMIN — MUPIROCIN 1 APPLICATION.: 20 OINTMENT TOPICAL at 10:34

## 2023-01-01 RX ADMIN — PANTOPRAZOLE 40 MG: 40 TABLET, DELAYED RELEASE ORAL at 10:33

## 2023-01-01 RX ADMIN — MINERAL OIL, PETROLATUM 1 APPLICATION.: 425; 568 OINTMENT OPHTHALMIC at 23:39

## 2023-01-01 RX ADMIN — LABETALOL HYDROCHLORIDE 10 MG: 5 INJECTION, SOLUTION INTRAVENOUS at 16:11

## 2023-01-01 RX ADMIN — INSULIN HUMAN 4 UNITS: 100 INJECTION, SOLUTION PARENTERAL at 06:13

## 2023-01-01 ASSESSMENT — PAIN SCALES - BEHAVIORAL PAIN SCALE (BPS)
BPS_SCORE: 3
BPS_SCORE: 4
BPS_SCORE: 3
BPS_SCORE: 4
BPS_SCORE: 3
BPS_SCORE: 4
BPS_SCORE: 4
BPS_SCORE: 3
BPS_SCORE: 4
BPS_SCORE: 4
BPS_SCORE: 3
BPS_SCORE: 4
BPS_SCORE: 3

## 2023-01-01 ASSESSMENT — COGNITIVE AND FUNCTIONAL STATUS - GENERAL
DO YOU HAVE DIFFICULTY DRESSING OR BATHING: YES
BECAUSE OF A PHYSICAL, MENTAL, OR EMOTIONAL CONDITION, DO YOU HAVE DIFFICULTY DOING ERRANDS ALONE: YES
BECAUSE OF A PHYSICAL, MENTAL, OR EMOTIONAL CONDITION, DO YOU HAVE SERIOUS DIFFICULTY CONCENTRATING, REMEMBERING OR MAKING DECISIONS: YES
DO YOU HAVE SERIOUS DIFFICULTY WALKING OR CLIMBING STAIRS: YES

## 2023-01-01 ASSESSMENT — MOVEMENT AND STRENGTH ASSESSMENTS
FACE_JAW: FACE SYMMETRICAL
ALL_EXTREMITIES: OTHER (COMMENT)

## 2023-01-01 ASSESSMENT — ABNORMAL INVOLUNTARY MOVEMENTS - GENERAL NEURO
AIM_FACE_JAW: INTERMITTENT;TIC
AIM_HEAD_NECK: INTERMITTENT;TIC
AIM_ALL_EXTREMITIES: INTERMITTENT

## 2023-01-01 ASSESSMENT — PATIENT HEALTH QUESTIONNAIRE - PHQ9
IS PATIENT ABLE TO COMPLETE PHQ2 OR PHQ9: NO, DEFER TO LATER TIME
IS PATIENT ABLE TO COMPLETE PHQ2 OR PHQ9: NO, DEFER TO LATER TIME

## 2023-08-29 PROBLEM — I46.9 CARDIAC ARREST (CMD): Status: ACTIVE | Noted: 2023-01-01

## 2023-09-03 LAB
BACTERIA BLD CULT: NORMAL
BACTERIA BLD CULT: NORMAL

## 2024-04-16 NOTE — PROGRESS NOTES
----- Message from Freddie Guillory MD sent at 3/15/2024  3:12 PM EDT -----  4 weeks follow-up - CMP   Xray ordered at this time to confirm tip placement of central line per policy. Gumaro Harris RN aware.

## 2025-04-09 NOTE — PROGRESS NOTES
RENAL  PROGRESS NOTE        Subjective:   Seen earlier,also resting  Objective:   VITALS SIGNS:    Visit Vitals  /77   Pulse (!) 107   Temp 98.3 °F (36.8 °C)   Resp 16   Wt 60.2 kg (132 lb 11.5 oz)   SpO2 98%   BMI 20.18 kg/m²       O2 Device: Room air       Temp (24hrs), Av.6 °F (37 °C), Min:98.3 °F (36.8 °C), Max:99 °F (37.2 °C)         PHYSICAL EXAM:    resting  DATA REVIEW:     INTAKE / OUTPUT:   Last shift:      No intake/output data recorded. Last 3 shifts: No intake/output data recorded. No intake or output data in the 24 hours ending 19 1339      LABS:   Recent Labs     19  05019  0743   WBC 17.0* 14.4* 15.3*   HGB 7.5* 7.8* 8.6*   HCT 24.4* 27.5* 29.0*   * 479* 492*     Recent Labs     19  0500 19  0743    139 139   K 4.0 4.3 2.9*   * 109* 104   CO2 19* 17* 16*   * 174* 322*   BUN 20 31* 20   CREA 1.27* 1.83* 1.49*   CA 7.7* 7.8* 8.5   MG  --  1.3*  --    PHOS  --  2.8  --    ALB  --  1.9*  --    TBILI  --  0.4  --    SGOT  --  9*  --    ALT  --  11*  --            Assessment:       MARIS on CKD stage 2/3:creat better  Nephrotic syndrome: DM nephropathy complicated by recent pre-eclampsia (delivered on 5/10/19)-> last spot urine prot/cr ratio was 4.3gm (19)   Hypercalcemia: better  N/V/D  Metabolic acidosis:high AG  HTN: fluctuating.   DM1:  Poorly controlled the pas    Anemia     Plan:   Labs better  Kasandra Melo MD increase your chances of quitting for good.  Limit alcohol to 2 drinks a day for men and 1 drink a day for women. Too much alcohol can cause health problems.  If you have a BMI higher than 25  Your doctor may do other tests to check your risk for weight-related health problems. This may include measuring the distance around your waist. A waist measurement of more than 40 inches in men or 35 inches in women can increase the risk of weight-related health problems.  Talk with your doctor about steps you can take to stay healthy or improve your health. You may need to make lifestyle changes to lose weight and stay healthy, such as changing your diet and getting regular exercise.  If you have a BMI lower than 18.5  Your doctor may do other tests to check your risk for health problems.  Talk with your doctor about steps you can take to stay healthy or improve your health. You may need to make lifestyle changes to gain or maintain weight and stay healthy, such as getting more healthy foods in your diet and doing exercises to build muscle.  Where can you learn more?  Go to http://[email or web address].net/[employer].  Enter S176 in the search box to learn more about \"Body Mass Index: Care Instructions.\"  Current as of: June 26, 2018  Content Version: 11.8  © 5015-1165 [employer]. Care instructions adapted under license by [employer] (which disclaims liability or warranty for this information). If you have questions about a medical condition or this instruction, always ask your healthcare professional. [employer] disclaims any warranty or liability for your use of this information.
